# Patient Record
Sex: FEMALE | Race: WHITE | Employment: OTHER | URBAN - METROPOLITAN AREA
[De-identification: names, ages, dates, MRNs, and addresses within clinical notes are randomized per-mention and may not be internally consistent; named-entity substitution may affect disease eponyms.]

---

## 2017-11-13 ENCOUNTER — TRANSFERRED RECORDS (OUTPATIENT)
Dept: HEALTH INFORMATION MANAGEMENT | Facility: CLINIC | Age: 57
End: 2017-11-13

## 2018-01-24 ENCOUNTER — TRANSFERRED RECORDS (OUTPATIENT)
Dept: HEALTH INFORMATION MANAGEMENT | Facility: CLINIC | Age: 58
End: 2018-01-24

## 2018-09-13 ENCOUNTER — MEDICAL CORRESPONDENCE (OUTPATIENT)
Dept: HEALTH INFORMATION MANAGEMENT | Facility: CLINIC | Age: 58
End: 2018-09-13

## 2018-10-04 LAB
CREAT SERPL-MCNC: 1.5 MG/DL (ref 0.6–1)
GFR SERPL CREATININE-BSD FRML MDRD: 36 ML/MIN/1.73M2
GLUCOSE SERPL-MCNC: 72 MG/DL (ref 70–110)
POTASSIUM SERPL-SCNC: 4.5 MMOL/L (ref 3.5–5.1)

## 2018-11-23 LAB
CREAT SERPL-MCNC: 1 MG/DL (ref 0.55–1.02)
GFR SERPL CREATININE-BSD FRML MDRD: 57 ML/MIN/1.73M2
TSH SERPL-ACNC: 3.94 UIU/ML (ref 0.36–3.74)

## 2018-11-26 ENCOUNTER — TRANSFERRED RECORDS (OUTPATIENT)
Dept: HEALTH INFORMATION MANAGEMENT | Facility: CLINIC | Age: 58
End: 2018-11-26

## 2019-01-04 ENCOUNTER — TRANSFERRED RECORDS (OUTPATIENT)
Dept: HEALTH INFORMATION MANAGEMENT | Facility: CLINIC | Age: 59
End: 2019-01-04

## 2019-01-09 ENCOUNTER — OFFICE VISIT (OUTPATIENT)
Dept: PODIATRY | Facility: CLINIC | Age: 59
End: 2019-01-09
Payer: COMMERCIAL

## 2019-01-09 ENCOUNTER — ANCILLARY PROCEDURE (OUTPATIENT)
Dept: GENERAL RADIOLOGY | Facility: CLINIC | Age: 59
End: 2019-01-09
Payer: COMMERCIAL

## 2019-01-09 VITALS
OXYGEN SATURATION: 94 % | DIASTOLIC BLOOD PRESSURE: 71 MMHG | BODY MASS INDEX: 27.47 KG/M2 | HEIGHT: 67 IN | SYSTOLIC BLOOD PRESSURE: 137 MMHG | WEIGHT: 175 LBS | HEART RATE: 98 BPM

## 2019-01-09 DIAGNOSIS — M79.672 BILATERAL FOOT PAIN: ICD-10-CM

## 2019-01-09 DIAGNOSIS — M79.672 BILATERAL FOOT PAIN: Primary | ICD-10-CM

## 2019-01-09 DIAGNOSIS — M79.671 BILATERAL FOOT PAIN: ICD-10-CM

## 2019-01-09 DIAGNOSIS — E11.42 TYPE 2 DIABETES MELLITUS WITH DIABETIC POLYNEUROPATHY, UNSPECIFIED WHETHER LONG TERM INSULIN USE (H): ICD-10-CM

## 2019-01-09 DIAGNOSIS — E11.621 DIABETIC ULCER OF RIGHT MIDFOOT ASSOCIATED WITH TYPE 2 DIABETES MELLITUS, WITH NECROSIS OF BONE (H): ICD-10-CM

## 2019-01-09 DIAGNOSIS — M79.671 BILATERAL FOOT PAIN: Primary | ICD-10-CM

## 2019-01-09 DIAGNOSIS — L97.414 DIABETIC ULCER OF RIGHT MIDFOOT ASSOCIATED WITH TYPE 2 DIABETES MELLITUS, WITH NECROSIS OF BONE (H): ICD-10-CM

## 2019-01-09 LAB — RADIOLOGIST FLAGS: NORMAL

## 2019-01-09 PROCEDURE — 11042 DBRDMT SUBQ TIS 1ST 20SQCM/<: CPT | Performed by: PODIATRIST

## 2019-01-09 PROCEDURE — 73630 X-RAY EXAM OF FOOT: CPT | Mod: LT | Performed by: RADIOLOGY

## 2019-01-09 PROCEDURE — 99203 OFFICE O/P NEW LOW 30 MIN: CPT | Mod: 25 | Performed by: PODIATRIST

## 2019-01-09 RX ORDER — GLIMEPIRIDE 2 MG/1
TABLET ORAL
Refills: 0 | Status: ON HOLD | COMMUNITY
Start: 2018-12-10 | End: 2020-02-06

## 2019-01-09 RX ORDER — SIMVASTATIN 40 MG
TABLET ORAL
Refills: 0 | Status: ON HOLD | COMMUNITY
Start: 2018-06-11 | End: 2020-07-21

## 2019-01-09 RX ORDER — NICOTINE 21 MG/24HR
PATCH, TRANSDERMAL 24 HOURS TRANSDERMAL
Refills: 0 | COMMUNITY
Start: 2018-12-02 | End: 2019-08-28

## 2019-01-09 RX ORDER — GABAPENTIN 600 MG/1
TABLET ORAL
Refills: 10 | Status: ON HOLD | COMMUNITY
Start: 2019-01-05 | End: 2020-07-21

## 2019-01-09 ASSESSMENT — MIFFLIN-ST. JEOR: SCORE: 1388.48

## 2019-01-09 ASSESSMENT — PAIN SCALES - GENERAL: PAINLEVEL: WORST PAIN (10)

## 2019-01-09 NOTE — NURSING NOTE
"Tiffani Tan's chief complaint for this visit includes:  Chief Complaint   Patient presents with     Consult     bilateral feet     PCP: [facility]    Referring Provider:  Referred Self, MD  No address on file    /71 (BP Location: Right arm, Patient Position: Sitting, Cuff Size: Adult Large)   Pulse 98   Ht 1.689 m (5' 6.5\")   Wt 79.4 kg (175 lb)   SpO2 94%   BMI 27.82 kg/m    Worst Pain (10)     Do you need any medication refills at today's visit? no    "

## 2019-01-09 NOTE — LETTER
1/9/2019         RE: Tiffani Tan  [address] [facility] [address]        Dear Colleague,    Thank you for referring your patient, Tiffani Tan, to the [facility]. Please see a copy of my visit note below.    Date of Service: 1/9/2019    Chief Complaint:   Chief Complaint   Patient presents with     Consult     bilateral feet        HPI: Tiffani is a 59 year old female who presents today for further evaluation of right foot ulceration and left Charcot foot.  She has a significant history with Dr. Alexy Guzmán at [facility]. She relates that she has had surgery on the left foot. For the last few weeks, it appears as though she has been having an Unna's boot placed on the right foot. There are hundreds of pages of previous notes that were faxed to the office just befire this visit and they have not all been reviewed at the time of the visit.. She apparently had a GraftJacket applied to a left plantar 1st met ulceration in October, as well as a 1st met head resection Previously had a right 1st met head plantar condylectomy and Graft Jacket in the beginning of 2018. She also had a 2nd MTPJ implant at that time. She was going to have a surgical procedure on the right foot in December, however that was put on hold as she was having significant pain in the left foot. She has had numerous XRs and MRIs. She relates that she was recently hospitalized in December with cellulitis on the right foot. She has had the ulcer on the right foot for a few months. She does relate pain to the foot, which she was being prescribed narcotics. She relates that she is currently changing the dressing on the right foot daily with betadine. She is wearing a diabetic shoe on the right and a CAM boot on the left. 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 However, she does relate that she is walking on both of her feet.       Review of Systems: No  n/v/d/f/c/ns/sob/cp    PMH: DM2 with neuropathy.  Chronic pain.     PSxH: BL foot surgery.     Allergies: Patient has no allergy information on record.    SH:   Social History     Socioeconomic History     Marital status: Single     Spouse name: Not on file     Number of children: Not on file     Years of education: Not on file     Highest education level: Not on file   Social Needs     Financial resource strain: Not on file     Food insecurity - worry: Not on file     Food insecurity - inability: Not on file     Transportation needs - medical: Not on file     Transportation needs - non-medical: Not on file   Occupational History     Not on file   Tobacco Use     Smoking status: Not on file   Substance and Sexual Activity     Alcohol use: Not on file     Drug use: Not on file     Sexual activity: Not on file   Other Topics Concern     Not on file   Social History Narrative     Not on file       FH: No family history on file.    Objective:    PT pulses are 2/4 bilaterally. Difficult to palpate DP pulses. CRT is 4 seconds. Absent pedal hair.   Gross sensation is absent bilaterally. Protective sensation is absent as demonstrated with the [place] touch test.   Equinus is severe bilaterally. Charcot foot noted to the left foot with a rockerbottom deformity and DTI on the plantar aspect of the foot. No Charcot foot type on the right foot. Hammered digits BL.   Nails normal bilaterally.            A dibetic wound is noted at right  plantar 1st met head measuring 3.8cm x 4.4cm x 3cm.    Inman Classification: 3    Wound base: Pink/hypergranulation    Edges: macerated and hyperkeratotic    Drainage: moderate/serous    Odor: no    Undermining: As noted on the picture, the wound probes at the center. There is then undermining through the hypergranular tissue distally to the wound edge and then follows the edge up the medial side of the ulcer.    Bone Exposure: Yes: 1st met head    Clinical Signs of Infection: Not  acutely.    After obtaining patient consent, the wound was irrigated with copious amounts of saline. A scalpel was then used to debride the wound into subcutaneous tissue. The wound edges were debrided back to healthy, bleeding tissue. The wound base exhibited healthy bleeding. Given the patient's lack of sensation, no anesthesia was necessary for the procedure.  The plantar left foot has a DTI at the apex of the rockerbottom deformity. No open lesions noted to this foot.       bilateral foot xrays indicated in 3 weightbearing views.    There is likely some bone destruction of the right medial 1st met head. On the lateral view, she does have a plantarflexed first ray.   Bone destruction noted to the left foot that appears consistent with Charcot.       Assessment: Type 2 diabetic with h/o chronic ulcerations of BL feet with open ulcer of the right foot. I had a long and miya discussion with her about the nature, treatment, and prognosis of this ulcer. Because of the size and the depth, I do not see another way to heal this ulceration than complete NWB and an eventual total contact cast. I related to her that she is in a much higher risk category of having amputation, including a BKA. I discussed with her that she will need to stay completely NWB on this foot in order to have any chance of healing this ulceration. She does not appear acutely infected, but I would request that she sees ID in order to get their take on treatment with 6 weeks of abx. We will also repeat XRs in 2 weeks to see if there is any evolution noted in the right 1st met head. She did ask for pain medication and I deferred to the physician already writing these for her. I did refer her to pain management. I would also like to get a baseline PANCHITO for her. May need a vascular referral. I will also order home nursing, as the wound needs extensive packing.     Plan:  - Pt seen and evaluated.  - Xrays taken and discussed with her.   - I spent an hour  counseling her on the prognosis of the ulcer, as documented.   - Total NWB to BL feet.  - Referrals to ID and pain management.   - ABIs ordered.  - See again in 1 week.  - DH2 shoe dispensed to the right foot.  - Home nursing ordered for daily packing,              Again, thank you for allowing me to participate in the care of your patient.        Sincerely,        Daniel Giron DPM

## 2019-01-09 NOTE — PROGRESS NOTES
Date of Service: 1/9/2019    Chief Complaint:   Chief Complaint   Patient presents with     Consult     bilateral feet        HPI: Tiffani is a 59 year old female who presents today for further evaluation of right foot ulceration and left Charcot foot.  She has a significant history with Dr. Alexy Guzmán at [facility]. She relates that she has had surgery on the left foot. For the last few weeks, it appears as though she has been having an Unna's boot placed on the right foot. There are hundreds of pages of previous notes that were faxed to the office just befire this visit and they have not all been reviewed at the time of the visit.. She apparently had a GraftJacket applied to a left plantar 1st met ulceration in October, as well as a 1st met head resection Previously had a right 1st met head plantar condylectomy and Graft Jacket in the beginning of 2018. She also had a 2nd MTPJ implant at that time. She was going to have a surgical procedure on the right foot in December, however that was put on hold as she was having significant pain in the left foot. She has had numerous XRs and MRIs. She relates that she was recently hospitalized in December with cellulitis on the right foot. She has had the ulcer on the right foot for a few months. She does relate pain to the foot, which she was being prescribed narcotics. She relates that she is currently changing the dressing on the right foot daily with betadine. She is wearing a diabetic shoe on the right and a CAM boot on the left. 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 However, she does relate that she is walking on both of her feet.       Review of Systems: No n/v/d/f/c/ns/sob/cp    PMH: DM2 with neuropathy.  Chronic pain.     PSxH: BL foot surgery.     Allergies: Patient has no allergy information on record.    SH:   Social History     Socioeconomic History     Marital status: Single     Spouse name:  Not on file     Number of children: Not on file     Years of education: Not on file     Highest education level: Not on file   Social Needs     Financial resource strain: Not on file     Food insecurity - worry: Not on file     Food insecurity - inability: Not on file     Transportation needs - medical: Not on file     Transportation needs - non-medical: Not on file   Occupational History     Not on file   Tobacco Use     Smoking status: Not on file   Substance and Sexual Activity     Alcohol use: Not on file     Drug use: Not on file     Sexual activity: Not on file   Other Topics Concern     Not on file   Social History Narrative     Not on file       FH: No family history on file.    Objective:    PT pulses are 2/4 bilaterally. Difficult to palpate DP pulses. CRT is 4 seconds. Absent pedal hair.   Gross sensation is absent bilaterally. Protective sensation is absent as demonstrated with the [place] touch test.   Equinus is severe bilaterally. Charcot foot noted to the left foot with a rockerbottom deformity and DTI on the plantar aspect of the foot. No Charcot foot type on the right foot. Hammered digits BL.   Nails normal bilaterally.            A dibetic wound is noted at right  plantar 1st met head measuring 3.8cm x 4.4cm x 3cm.    Inman Classification: 3    Wound base: Pink/hypergranulation    Edges: macerated and hyperkeratotic    Drainage: moderate/serous    Odor: no    Undermining: As noted on the picture, the wound probes at the center. There is then undermining through the hypergranular tissue distally to the wound edge and then follows the edge up the medial side of the ulcer.    Bone Exposure: Yes: 1st met head    Clinical Signs of Infection: Not acutely.    After obtaining patient consent, the wound was irrigated with copious amounts of saline. A scalpel was then used to debride the wound into subcutaneous tissue. The wound edges were debrided back to healthy, bleeding tissue. The wound base  exhibited healthy bleeding. Given the patient's lack of sensation, no anesthesia was necessary for the procedure.  The plantar left foot has a DTI at the apex of the rockerbottom deformity. No open lesions noted to this foot.       bilateral foot xrays indicated in 3 weightbearing views.    There is likely some bone destruction of the right medial 1st met head. On the lateral view, she does have a plantarflexed first ray.   Bone destruction noted to the left foot that appears consistent with Charcot.       Assessment: Type 2 diabetic with h/o chronic ulcerations of BL feet with open ulcer of the right foot. I had a long and miya discussion with her about the nature, treatment, and prognosis of this ulcer. Because of the size and the depth, I do not see another way to heal this ulceration than complete NWB and an eventual total contact cast. I related to her that she is in a much higher risk category of having amputation, including a BKA. I discussed with her that she will need to stay completely NWB on this foot in order to have any chance of healing this ulceration. She does not appear acutely infected, but I would request that she sees ID in order to get their take on treatment with 6 weeks of abx. We will also repeat XRs in 2 weeks to see if there is any evolution noted in the right 1st met head. She did ask for pain medication and I deferred to the physician already writing these for her. I did refer her to pain management. I would also like to get a baseline PANCHITO for her. May need a vascular referral. I will also order home nursing, as the wound needs extensive packing.     Plan:  - Pt seen and evaluated.  - Xrays taken and discussed with her.   - I spent an hour counseling her on the prognosis of the ulcer, as documented.   - Total NWB to BL feet.  - Referrals to ID and pain management.   - ABIs ordered.  - See again in 1 week.  - DH2 shoe dispensed to the right foot.  - Home nursing ordered for daily packing,

## 2019-01-09 NOTE — PATIENT INSTRUCTIONS
Thanks for coming today.  [facility]  [address] [place] [address]    To schedule future appointments in Ortho Clinic, you may call [phone].    To schedule ordered imaging by your provider:   Call Central Imaging [phone]    To schedule an injection ordered by your provider:  Call Central Imaging Injection scheduling line: [phone]  [facility] available online at:  [email or web address]    Please call if any further questions or concerns ([phone]).  Clinic hours 8 am to 5 pm.    Return to clinic (call) if symptoms worsen or fail to improve.

## 2019-01-10 ENCOUNTER — TELEPHONE (OUTPATIENT)
Dept: PODIATRY | Facility: CLINIC | Age: 59
End: 2019-01-10

## 2019-01-10 NOTE — TELEPHONE ENCOUNTER
[facility] Call Center    Phone Message    May a detailed message be left on voicemail: yes    Reason for Call: Order(s): Home Care Orders: Other: Candelaria with [facility] states that Dr. Giron referred patient to have home care for wound care but there are no specifications as to how often, what to clean wound with, what to pack it with, how to be wrapped.  She can not find this information in Epic.  She is requesting a call back to clarify. Please advise.    Action Taken: Message routed to:  Adult Clinics: Podiatry p [id number]

## 2019-01-10 NOTE — TELEPHONE ENCOUNTER
Health Call Center    Phone Message    May a detailed message be left on voicemail: yes    Reason for Call: Other: Candelaria from home care states she is sorry but they can't see this pt for the wound care because of insurance purposes. Insurance requires home bound status so they cannot see her at her work place. Candelaria states she would appreciate the clinic reaching out to the pt on instructions of how to care for her wound. Pt's number is [phone].   Please call Candelaria [phone] with any questions.     Action Taken: Message routed to:  Adult Clinics: Podiatry p [phone]

## 2019-01-11 NOTE — TELEPHONE ENCOUNTER
Candelaria returned call, she talked with patient again who confirmed she moved to her place of employment and they then can start wound care for patient. They will start with daily dressing changes to help teach the  how to do wound care, after the appointment on the 16th they will reevaluate.    Haily Heath RN

## 2019-01-11 NOTE — TELEPHONE ENCOUNTER
Clean the areas.   Soak 1/4 inch ribbon of gauze in betadine and pack into the ulcer.   Cover with a dry dressing.     I don't think that she can do this by herself. We will see what it looks like at her next visit.     Called and talked with patient, she relays that she is living at her work and is on one level living because it is to hard to get around.  Let her know that I am not sure what her insurance requires for a homebound standpoint but I would talk with Candelaria again and see what insurance says.  Called and talked with Candelaria, explained what patient told me and she will look into more and call us back.    Relayed to patient about wound care and what supplies she needs. Currently she is getting supplies from [employer].  She will have her   her supplies and they will try to start dressing changes.  Will call patient back with update after talking with Candelaria again.  Haily Heath RN

## 2019-01-13 ENCOUNTER — DOCUMENTATION ONLY (OUTPATIENT)
Dept: CARE COORDINATION | Facility: CLINIC | Age: 59
End: 2019-01-13

## 2019-01-13 NOTE — PROGRESS NOTES
[facility] now requests orders and shares plan of care/discharge summaries for some patients through [facility].  Please REPLY TO THIS MESSAGE OR ROUTE BACK TO THE AUTHOR in order to give authorization for orders when needed.  This is considered a verbal order, you will still receive a faxed copy of orders for signature.  Thank you for your assistance in improving collaboration for our patients.    ORDER  SN 4W1, 3W2, 2W1, 6 PRN    MD SUMMARY/PLAN OF CARE    SUMMARY TO MD  SITUATION   Patient is a 59 year old female who is currently living at her place of business per her report due to being wheelchair bound and unable to get in and out of her house safely due to the stairs.  She stated her  goes between the business and the house and he has been doing the shopping and assisting her with her ADL/IADLs that she is not able to complete.  She has a handicapped accessible bathroom, bed, kitchen/lunchroom area and a shower with a seat in the business.  She demonstrated the ability to stand and pivot and take a few steps to transfer to the toilet and bed safely.  She has a wound to the bottom of her right foot that measured 3.5x4x0.8cm with tunneling at 11oclock that was 1.4cm deep.  Wound care had not been performed since her last MD visit as her  was unsure what to do.  There was no packing in the wound during assessment.  The wound was repacked and the  was instructed on what to do if orders are not able to be received in time on Monday.  Her left foot was assessed and the heel was white and boggy as well as the bottom of the foot due to moisture and the patient was instructed to keep the foot uncovered as much as possible to allow the moisture to dissipate.  The patient has a camwalker for the left foot and a diabetic shoe on the right.  The patient states she is experiencing increased depression due to the wound on her foot and her inability to be mobile.  Her PHQ2 score is 3.  A  sample of Calming aromatherapy was provided to the patient and she was instructed it can be purchased at  Pharmacy if it helps.  Patient stated she is not eating well and she was instructed that she needs proper nutrition for healing and her diabetes.  She was instructed to increase the amount of protein she eats daily to assist with wound healing. Her BS today was 136 after eating some fruit this morning.  Her vitals /64, P 85, T 97.2, RR 18, SPO2 95 percent on RA, lungs CTA.  She has severe neuropathy in her hands and feet that also causes her to have  problems with her hands.  She states the neuropathy has been present before she became a diabetic.  She states her pain is always over 10, even with the medication.  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  She was instructed to keep her legs elevated above her heart as much as possible to assist in removing the fluid from her legs.  Patient stated she has 2 alcoholic drinks per day and she was instructed that drinking alcohol can delay the healing process.  The following interaction was found during medication reconciliation glimepiride and losartan HCTZ, level 3.    BACKGROUND Wound care to R foot, DM2, Cellulitis both feet, Osteomylitis, Neuropathy, HTN, Anemia, Bilateral foot pain  ANALYSIS Patient will benefit from SN for wound care and assessment of health, patient declined PT, OT and HHA at this time.  RECOMMENDATION SN 4W1, 3W2, 2W1, 6 PRN

## 2019-01-14 NOTE — PROGRESS NOTES
[facility] is requesting orders be expedited.  They would like to have Home Care visit the patient today. Requesting a verbal so patient can be seen for wound care starting to day.   Please advise.

## 2019-01-16 ENCOUNTER — OFFICE VISIT (OUTPATIENT)
Dept: PODIATRY | Facility: CLINIC | Age: 59
End: 2019-01-16
Payer: COMMERCIAL

## 2019-01-16 VITALS
TEMPERATURE: 98.7 F | RESPIRATION RATE: 18 BRPM | DIASTOLIC BLOOD PRESSURE: 76 MMHG | SYSTOLIC BLOOD PRESSURE: 126 MMHG | OXYGEN SATURATION: 95 % | HEART RATE: 100 BPM

## 2019-01-16 DIAGNOSIS — L97.416 DIABETIC ULCER OF RIGHT MIDFOOT ASSOCIATED WITH TYPE 2 DIABETES MELLITUS, WITH BONE INVOLVEMENT WITHOUT EVIDENCE OF NECROSIS (H): ICD-10-CM

## 2019-01-16 DIAGNOSIS — E11.42 TYPE 2 DIABETES MELLITUS WITH DIABETIC POLYNEUROPATHY, WITHOUT LONG-TERM CURRENT USE OF INSULIN (H): ICD-10-CM

## 2019-01-16 DIAGNOSIS — E11.621 DIABETIC ULCER OF RIGHT MIDFOOT ASSOCIATED WITH TYPE 2 DIABETES MELLITUS, WITH BONE INVOLVEMENT WITHOUT EVIDENCE OF NECROSIS (H): ICD-10-CM

## 2019-01-16 DIAGNOSIS — M14.672 CHARCOT'S JOINT OF LEFT FOOT: ICD-10-CM

## 2019-01-16 PROCEDURE — 99213 OFFICE O/P EST LOW 20 MIN: CPT | Performed by: PODIATRIST

## 2019-01-16 ASSESSMENT — PAIN SCALES - GENERAL: PAINLEVEL: WORST PAIN (10)

## 2019-01-16 NOTE — PROGRESS NOTES
Chief Complaint:   Chief Complaint   Patient presents with     RECHECK     Bilat neuropathy, chronic ulcers, pain, appt per pt. Records at [facility]         Not on File      Subjective: Tiffani is a 59 year old female who presents to the clinic today for a follow up of right foot ulcer and left Charcot foot. She relates that home nursing is coming to change the dressings, however she thinks that her  can change the dressings. 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 She relates chronic pain to the foot. No n/v/d/f/c/ns/sob/cp.    Objective  98.7 100 18 126/76 Data Unavailable 0 lbs 0 oz        A dibetic wound is noted at right  plantar 1st met head measuring 3.8cm x 4.4cm x 3cm.     Inman Classification: 3     Wound base: Pink/hypergranulation     Edges: macerated and hyperkeratotic     Drainage: moderate/serous     Odor: no     Undermining: As noted on the picture, the wound probes at the center. There is then undermining through the hypergranular tissue distally to the wound edge and then follows the edge up the medial side of the ulcer.     Bone Exposure: Yes: 1st met head     Clinical Signs of Infection: Not acutely.     After obtaining patient consent, the wound was irrigated with copious amounts of saline. No sharp debridement performed today. However, silver nitrate was applied to the hypergranular base.   The plantar left foot has a DTI at the apex of the rockerbottom deformity. No open lesions noted to this foot.                Assessment: Type 2 diabetic with h/o chronic ulcerations of BL feet with open ulcer of the right foot. After last week's visit, she did not schedule the PANCHITO. She will need to bring us these results. Has an ID appt, but would like to see if she can get into her previous ID practice. She does continue to weightbear on the foot some. Continues with packing.      Plan:  - Pt seen and evaluated.  - Total NWB to BL feet.  - Cont home  nursing with packing.   - Con boot on the left and DH2 shoe on the right.   - See again in 2 weeks.

## 2019-01-16 NOTE — NURSING NOTE
Tiffani Tan's chief complaint for this visit includes:  Chief Complaint   Patient presents with     RECHECK     Bilat neuropathy, chronic ulcers, pain, appt per pt. Records at [facility]      PCP: [facility]    Referring Provider:  Referred Self, MD  No address on file    /76 (BP Location: Right arm, Patient Position: Sitting, Cuff Size: Adult Large)   Pulse 100   Temp 98.7  F (37.1  C)   Resp 18   SpO2 95%   Worst Pain (10)     Do you need any medication refills at today's visit? No    Keon Worley CMA (AAMA)

## 2019-01-16 NOTE — LETTER
1/16/2019         RE: Tiffani Tan  [address]   [facility] [address]        Dear Colleague,    Thank you for referring your patient, Tiffani Tan, to the [facility]. Please see a copy of my visit note below.    Chief Complaint:   Chief Complaint   Patient presents with     RECHECK     Bilat neuropathy, chronic ulcers, pain, appt per pt. Records at [facility]         Not on File      Subjective: Tiffani is a 59 year old female who presents to the clinic today for a follow up of right foot ulcer and left Charcot foot. She relates that home nursing is coming to change the dressings, however she thinks that her  can change the dressings. 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 She relates chronic pain to the foot. No n/v/d/f/c/ns/sob/cp.    Objective  98.7 100 18 126/76 Data Unavailable 0 lbs 0 oz        A dibetic wound is noted at right  plantar 1st met head measuring 3.8cm x 4.4cm x 3cm.     Inman Classification: 3     Wound base: Pink/hypergranulation     Edges: macerated and hyperkeratotic     Drainage: moderate/serous     Odor: no     Undermining: As noted on the picture, the wound probes at the center. There is then undermining through the hypergranular tissue distally to the wound edge and then follows the edge up the medial side of the ulcer.     Bone Exposure: Yes: 1st met head     Clinical Signs of Infection: Not acutely.     After obtaining patient consent, the wound was irrigated with copious amounts of saline. No sharp debridement performed today. However, silver nitrate was applied to the hypergranular base.   The plantar left foot has a DTI at the apex of the rockerbottom deformity. No open lesions noted to this foot.                Assessment: Type 2 diabetic with h/o chronic ulcerations of BL feet with open ulcer of the right foot. After last week's visit, she did not schedule the PANCHITO. She will need to bring us these results. Has an ID  appt, but would like to see if she can get into her previous ID practice. She does continue to weightbear on the foot some. Continues with packing.      Plan:  - Pt seen and evaluated.  - Total NWB to BL feet.  - Cont home nursing with packing.   - Con boot on the left and DH2 shoe on the right.   - See again in 2 weeks.       Again, thank you for allowing me to participate in the care of your patient.        Sincerely,        Daniel Giron DPM

## 2019-01-16 NOTE — PATIENT INSTRUCTIONS
Thanks for coming today.  Ortho/Sports Medicine Clinic  [address] [place] [address]    To schedule future appointments in Ortho Clinic, you may call [phone].    To schedule ordered imaging by your provider:   Call Central Imaging [phone]    To schedule an injection ordered by your provider:  Call Central Imaging Injection scheduling line: [phone]  [employer] available online at:  [email or web address]    Please call if any further questions or concerns ([phone]).  Clinic hours 8 am to 5 pm.    Return to clinic (call) if symptoms worsen or fail to improve.

## 2019-01-28 ENCOUNTER — DOCUMENTATION ONLY (OUTPATIENT)
Dept: CARE COORDINATION | Facility: CLINIC | Age: 59
End: 2019-01-28

## 2019-02-04 ENCOUNTER — TRANSFERRED RECORDS (OUTPATIENT)
Dept: HEALTH INFORMATION MANAGEMENT | Facility: CLINIC | Age: 59
End: 2019-02-04

## 2019-02-06 ENCOUNTER — OFFICE VISIT (OUTPATIENT)
Dept: PODIATRY | Facility: CLINIC | Age: 59
End: 2019-02-06
Payer: COMMERCIAL

## 2019-02-06 VITALS — HEART RATE: 84 BPM | OXYGEN SATURATION: 92 % | DIASTOLIC BLOOD PRESSURE: 62 MMHG | SYSTOLIC BLOOD PRESSURE: 98 MMHG

## 2019-02-06 DIAGNOSIS — L97.416 DIABETIC ULCER OF RIGHT MIDFOOT ASSOCIATED WITH TYPE 2 DIABETES MELLITUS, WITH BONE INVOLVEMENT WITHOUT EVIDENCE OF NECROSIS (H): Primary | ICD-10-CM

## 2019-02-06 DIAGNOSIS — E11.42 TYPE 2 DIABETES MELLITUS WITH DIABETIC POLYNEUROPATHY, WITHOUT LONG-TERM CURRENT USE OF INSULIN (H): ICD-10-CM

## 2019-02-06 DIAGNOSIS — M14.672 CHARCOT'S JOINT OF LEFT FOOT: ICD-10-CM

## 2019-02-06 DIAGNOSIS — E11.621 DIABETIC ULCER OF RIGHT MIDFOOT ASSOCIATED WITH TYPE 2 DIABETES MELLITUS, WITH BONE INVOLVEMENT WITHOUT EVIDENCE OF NECROSIS (H): Primary | ICD-10-CM

## 2019-02-06 PROCEDURE — 29445 APPL RIGID TOT CNTC LEG CAST: CPT | Mod: RT | Performed by: PODIATRIST

## 2019-02-06 RX ORDER — AMOXICILLIN AND CLAVULANATE POTASSIUM 500; 125 MG/1; MG/1
1 TABLET, FILM COATED ORAL
COMMUNITY
Start: 2019-01-29 | End: 2019-07-17

## 2019-02-06 RX ORDER — AMOXICILLIN 500 MG/1
CAPSULE ORAL
Refills: 0 | COMMUNITY
Start: 2019-01-29 | End: 2019-07-17

## 2019-02-06 ASSESSMENT — PAIN SCALES - GENERAL: PAINLEVEL: MILD PAIN (3)

## 2019-02-06 NOTE — PATIENT INSTRUCTIONS
Thanks for coming today.  Ortho/Sports Medicine Clinic  [address] [place] [address]    To schedule future appointments in Ortho Clinic, you may call [phone].    To schedule ordered imaging by your provider:   Call Central Imaging [phone]    To schedule an injection ordered by your provider:  Call Central Imaging Injection scheduling line: [phone]  [employer] available online at:  [email or web address]    Please call if any further questions or concerns ([phone]).  Clinic hours 8 am to 5 pm.    Return to clinic (call) if symptoms worsen or fail to improve.

## 2019-02-06 NOTE — PROGRESS NOTES
Chief Complaint   Patient presents with     Wound Check     right foot ulcer          Not on File      Subjective: Tiffani is a 59 year old female who presents to the clinic today for a follow up of right foot ulceration. Relates that home nursing is still coming to change the dressings and packing daily. Has been bearing weight on her feet. She is seeing ID, Dr. Carr in the "[employer] system. She is currently on Augmentin and Amox. She has had the PANCHITO performed at [facility] on Monday, however we do not have this report.     Objective     A dibetic wound is noted at right  plantar 1st met head measuring 3cm x 3.3cm x 1.5cm.     Inman Classification: 2     Wound base: Pink/hypergranulation     Edges: macerated and hyperkeratotic     Drainage: moderate/serous     Odor: no     Undermining: As noted on the picture, the wound probes at the center. There is then undermining through the hypergranular tissue distally to the wound edge and then follows the edge up the medial side of the ulcer.     Bone Exposure: No. All bone is covered.     Clinical Signs of Infection: Not acutely.       After obtaining patient consent, the wound was irrigated with copious amounts of saline. A scalpel was then used to debride the wound into subcutaneous tissue. The wound edges were debrided back to healthy, bleeding tissue. The wound base exhibited healthy bleeding. Given the patient's lack of sensation, no anesthesia was necessary for the procedure.     Assessment: Type 2 diabetic with h/o chronic ulcerations of BL feet with open ulcer of the right foot. She did have the PANCHITO performed. She will have the lab fax over the report. Currently on long term abx per ID. She does continue to weightbear on the foot some. Continues with packing.      Plan:  - Pt seen and evaluated.  - Total NWB to BL feet.  - I have recommended that she start total contact casting on the right leg today. She agrees to this. Wound was debrided and a TCC-EZ cast  was applied to the right leg. This should be left C/D/I for the next week.   - See again in 1 week.

## 2019-02-06 NOTE — NURSING NOTE
Tiffani Tan's chief complaint for this visit includes:  Chief Complaint   Patient presents with     Wound Check     right foot ulcer     PCP: [facility]    Referring Provider:  No referring provider defined for this encounter.    BP 98/62 (BP Location: Left arm, Patient Position: Sitting, Cuff Size: Adult Large)   Pulse 84   SpO2 92%   Mild Pain (3)     Do you need any medication refills at today's visit? NO

## 2019-02-06 NOTE — LETTER
2/6/2019         RE: Tiffani Tan  [address] [facility] [address]        Dear Colleague,    Thank you for referring your patient, Tiffani Tan, to the [facility]. Please see a copy of my visit note below.    Chief Complaint   Patient presents with     Wound Check     right foot ulcer          Not on File      Subjective: Tiffani is a 59 year old female who presents to the clinic today for a follow up of right foot ulceration. Relates that home nursing is still coming to change the dressings and packing daily. Has been bearing weight on her feet. She is seeing ID, Dr. Carr in the [employer] system. She is currently on Augmentin and Amox. She has had the PANCHITO performed at [facility] on Monday, however we do not have this report.     Objective     A dibetic wound is noted at right  plantar 1st met head measuring 3cm x 3.3cm x 1.5cm.     Inman Classification: 2     Wound base: Pink/hypergranulation     Edges: macerated and hyperkeratotic     Drainage: moderate/serous     Odor: no     Undermining: As noted on the picture, the wound probes at the center. There is then undermining through the hypergranular tissue distally to the wound edge and then follows the edge up the medial side of the ulcer.     Bone Exposure: No. All bone is covered.     Clinical Signs of Infection: Not acutely.       After obtaining patient consent, the wound was irrigated with copious amounts of saline. A scalpel was then used to debride the wound into subcutaneous tissue. The wound edges were debrided back to healthy, bleeding tissue. The wound base exhibited healthy bleeding. Given the patient's lack of sensation, no anesthesia was necessary for the procedure.     Assessment: Type 2 diabetic with h/o chronic ulcerations of BL feet with open ulcer of the right foot.  She did have the PANCHITO performed. She will have the lab fax over the report. Currently on long term abx per ID. She does continue to weightbear on the foot  some. Continues with packing.      Plan:  - Pt seen and evaluated.  - Total NWB to BL feet.  - I have recommended that she start total contact casting on the right leg today. She agrees to this. Wound was debrided and a TCC-EZ cast was applied to the right leg. This should be left C/D/I for the next week.   - See again in 1 week.         Again, thank you for allowing me to participate in the care of your patient.        Sincerely,        Daniel Girno DPM

## 2019-02-11 ENCOUNTER — TELEPHONE (OUTPATIENT)
Dept: PODIATRY | Facility: CLINIC | Age: 59
End: 2019-02-11

## 2019-02-11 NOTE — TELEPHONE ENCOUNTER
Called Selam back and relayed wound care orders from LOV note. Agreeable with plan. We will call and update after appt this coming Wednesday.     Vincenzo Farah RN

## 2019-02-11 NOTE — TELEPHONE ENCOUNTER
[facility] Call Center    Phone Message    May a detailed message be left on voicemail: yes    Reason for Call: Order(s): Home Care Orders: Skilled Nursing: extended 1 time a weeks for 5 weeks , Other: clarification on wound orders, Patient Provider: Dr. Giron and Date needed: Asap ok to leave v/msg    Action Taken: Message routed to:  Adult Clinics: Podiatry p [phone]

## 2019-02-12 NOTE — PROGRESS NOTES
Chief Complaint   Patient presents with     RECHECK     right foot TCC-EZ cast removal and wound check          Not on File      Subjective: Tiffani is a 59 year old female who presents to the clinic today for a follow up of right diabetic ulcer. She tolerated the cast well with no complications. Relates that she is trying to stay off of her foot. She saw ID yesterday and will cont with the Augmentin.    Objective        A diabetic wound is noted at right  plantar 1st met head measuring 2.9cm x 3.6cm x 0.5cm.     Inman Classification: 2     Wound base: Pink/hypergranulation     Edges: macerated and hyperkeratotic     Drainage: moderate/serous     Odor: no     Undermining: Central wound probing is filled in today.. There is then undermining through the hypergranular tissue distally to the wound edge and then follows the edge up the medial side of the ulcer. This is improving     Bone Exposure: No. All bone is covered.     Clinical Signs of Infection: Not acutely.        After obtaining patient consent, the wound was irrigated with copious amounts of saline. A scalpel was then used to debride the wound into subcutaneous tissue. The wound edges were debrided back to healthy, bleeding tissue. The wound base exhibited healthy bleeding. Given the patient's lack of sensation, no anesthesia was necessary for the procedure.     Assessment: Type 2 diabetic with h/o chronic ulcerations of BL feet with open ulcer of the right foot. This has actually improved today with a week of total contact casting. She did have the PANCHITO performed. She will have the lab fax over the report. Currently on long term abx per ID. She does continue to weightbear on the foot some. Continues with packing.      Plan:  - Pt seen and evaluated.  - Total NWB to BL feet.  - I have recommended that she cont total contact casting on the right leg today. She agrees to this. Wound was debrided and a fiberglass cast was applied to the right leg. This should be  left C/D/I for the next week.   - See again in 1 week.

## 2019-02-13 ENCOUNTER — OFFICE VISIT (OUTPATIENT)
Dept: PODIATRY | Facility: CLINIC | Age: 59
End: 2019-02-13
Payer: COMMERCIAL

## 2019-02-13 DIAGNOSIS — E11.621 DIABETIC ULCER OF RIGHT MIDFOOT ASSOCIATED WITH TYPE 2 DIABETES MELLITUS, WITH BONE INVOLVEMENT WITHOUT EVIDENCE OF NECROSIS (H): Primary | ICD-10-CM

## 2019-02-13 DIAGNOSIS — E11.42 TYPE 2 DIABETES MELLITUS WITH DIABETIC POLYNEUROPATHY, WITHOUT LONG-TERM CURRENT USE OF INSULIN (H): ICD-10-CM

## 2019-02-13 DIAGNOSIS — M14.672 CHARCOT'S JOINT OF LEFT FOOT: ICD-10-CM

## 2019-02-13 DIAGNOSIS — L97.416 DIABETIC ULCER OF RIGHT MIDFOOT ASSOCIATED WITH TYPE 2 DIABETES MELLITUS, WITH BONE INVOLVEMENT WITHOUT EVIDENCE OF NECROSIS (H): Primary | ICD-10-CM

## 2019-02-13 PROCEDURE — 29445 APPL RIGID TOT CNTC LEG CAST: CPT | Performed by: PODIATRIST

## 2019-02-13 NOTE — NURSING NOTE
Tiffani Tan's chief complaint for this visit includes:  Chief Complaint   Patient presents with     RECHECK     right foot TCC-EZ cast removal and wound check     PCP: [facility]    Referring Provider:  No referring provider defined for this encounter.    There were no vitals taken for this visit.  Data Unavailable     Do you need any medication refills at today's visit? no

## 2019-02-13 NOTE — PATIENT INSTRUCTIONS
Thanks for coming today.  Ortho/Sports Medicine Clinic  [address] [place] [address]    To schedule future appointments in Ortho Clinic, you may call [phone].    To schedule ordered imaging by your provider:   Call Central Imaging [phone]    To schedule an injection ordered by your provider:  Call Central Imaging Injection scheduling line: [phone]  [employer] available online at:  [email or web address]    Please call if any further questions or concerns ([phone]).  Clinic hours 8 am to 5 pm.    Return to clinic (call) if symptoms worsen or fail to improve.

## 2019-02-13 NOTE — LETTER
2/13/2019         RE: Tiffani Tan  [address] [facility] [address]        Dear Colleague,    Thank you for referring your patient, Tiffani Tan, to the [facility]. Please see a copy of my visit note below.    Chief Complaint   Patient presents with     RECHECK     right foot TCC-EZ cast removal and wound check          Not on File      Subjective: Tiffani is a 59 year old female who presents to the clinic today for a follow up of right diabetic ulcer. She tolerated the cast well with no complications. Relates that she is trying to stay off of her foot. She saw ID yesterday and will cont with the Augmentin.    Objective        A diabetic wound is noted at right  plantar 1st met head measuring 2.9cm x 3.6cm x  0.5cm.     Inman Classification: 2     Wound base: Pink/hypergranulation     Edges: macerated and hyperkeratotic     Drainage: moderate/serous     Odor: no     Undermining: Central wound probing is filled in today.. There is then undermining through the hypergranular tissue distally to the wound edge and then follows the edge up the medial side of the ulcer. This is improving     Bone Exposure: No. All bone is covered.     Clinical Signs of Infection: Not acutely.        After obtaining patient consent, the wound was irrigated with copious amounts of saline. A scalpel was then used to debride the wound into subcutaneous tissue. The wound edges were debrided back to healthy, bleeding tissue. The wound base exhibited healthy bleeding. Given the patient's lack of sensation, no anesthesia was necessary for the procedure.     Assessment: Type 2 diabetic with h/o chronic ulcerations of BL feet with open ulcer of the right foot. This has actually improved today with a week of total contact casting. She did have the PANCHITO performed. She will have the lab fax over the report. Currently on long term abx per ID. She does continue to weightbear on the foot some. Continues with packing.      Plan:  - Pt seen and  evaluated.  - Total NWB to BL feet.  - I have recommended that she cont total contact casting on the right leg today. She agrees to this. Wound was debrided and a fiberglass cast was applied to the right leg. This should be left C/D/I for the next week.   - See again in 1 week.         Again, thank you for allowing me to participate in the care of your patient.        Sincerely,        Daniel Giron DPM

## 2019-02-18 NOTE — PROGRESS NOTES
Chief Complaint   Patient presents with     RECHECK     right foot wound check          Not on File      Subjective: Tiffani is a 59 year old female who presents to the clinic today for a follow up of right foot ulceration on a diabetic Charcot foot. She has been total contact casted for the last two weeks. She tolerated the last cast, however she did not like the fiberglass much and wanted it cut off after a day or so.     Objective              A diabetic wound is noted at right  plantar 1st met head measuring 2.9cm x 4cm x 0.2cm.     Inman Classification: 2     Wound base: Pink/hypergranulation     Edges: macerated and hyperkeratotic     Drainage: moderate/serous     Odor: no     Undermining: None noted today. All undermining has filled in.     Bone Exposure: No. All bone is covered.     Clinical Signs of Infection: Not acutely.        After obtaining patient consent, the wound was irrigated with copious amounts of saline. A scalpel was then used to debride the wound into subcutaneous tissue. The wound edges were debrided back to healthy, bleeding tissue. The wound base exhibited healthy bleeding. Given the patient's lack of sensation, no anesthesia was necessary for the procedure.     Assessment: Type 2 diabetic with h/o chronic ulcerations of BL feet with open ulcer of the right foot. This has actually improved today with two weeks of total contact casting.      Plan:  - Pt seen and evaluated.  - Total NWB to BL feet.  - Wound debrided as described.   - I have recommended that she cont total contact casting on the right leg today, however we do not have TCCs and she does not want another fiberglass. The TCC-EZ system will be delivered today, so she will return tomorrow for EZ casting with our ATCs.  - See again in 1 week.

## 2019-02-20 ENCOUNTER — OFFICE VISIT (OUTPATIENT)
Dept: PODIATRY | Facility: CLINIC | Age: 59
End: 2019-02-20
Payer: COMMERCIAL

## 2019-02-20 DIAGNOSIS — L97.416 DIABETIC ULCER OF RIGHT MIDFOOT ASSOCIATED WITH TYPE 2 DIABETES MELLITUS, WITH BONE INVOLVEMENT WITHOUT EVIDENCE OF NECROSIS (H): Primary | ICD-10-CM

## 2019-02-20 DIAGNOSIS — E11.42 TYPE 2 DIABETES MELLITUS WITH DIABETIC POLYNEUROPATHY, WITHOUT LONG-TERM CURRENT USE OF INSULIN (H): ICD-10-CM

## 2019-02-20 DIAGNOSIS — E11.621 DIABETIC ULCER OF RIGHT MIDFOOT ASSOCIATED WITH TYPE 2 DIABETES MELLITUS, WITH BONE INVOLVEMENT WITHOUT EVIDENCE OF NECROSIS (H): Primary | ICD-10-CM

## 2019-02-20 PROCEDURE — 11042 DBRDMT SUBQ TIS 1ST 20SQCM/<: CPT | Performed by: PODIATRIST

## 2019-02-20 NOTE — LETTER
2/20/2019         RE: Tiffani Tan  [address] [facility] [address]        Dear Colleague,    Thank you for referring your patient, Tiffani Tan, to the [facility]. Please see a copy of my visit note below.    Chief Complaint   Patient presents with     RECHECK     right foot wound check          Not on File      Subjective: Tiffani is a 59 year old female who presents to the clinic today for a follow up of right foot ulceration on a diabetic Charcot foot. She has been total contact casted for the last two weeks. She tolerated the last cast, however she did not like the fiberglass much and wanted it cut off after a day or so.     Objective              A diabetic wound is noted at right  plantar 1st met head measuring 2.9cm x 4cm x 0.2cm.     Inman Classification: 2     Wound base: Pink/hypergranulation     Edges: macerated and hyperkeratotic     Drainage: moderate/serous     Odor: no     Undermining: None noted today. All undermining has filled in.     Bone Exposure: No. All bone is covered.     Clinical Signs of Infection: Not acutely.        After obtaining patient consent, the wound was irrigated with copious amounts of saline. A scalpel was then used to debride the wound into subcutaneous tissue. The wound edges were debrided back to healthy, bleeding tissue. The wound base exhibited healthy bleeding. Given the patient's lack of sensation, no anesthesia was necessary for the procedure.     Assessment: Type 2 diabetic with h/o chronic ulcerations of BL feet with open ulcer of the right foot. This has actually improved today with two weeks of total contact casting.      Plan:  - Pt seen and evaluated.  - Total NWB to BL feet.  - Wound debrided as described.   - I have recommended that she cont total contact casting on the right leg today, however we do not have TCCs and she does not want another fiberglass. The TCC-EZ system will be delivered today, so she will return tomorrow for EZ casting with  our ATCs.  - See again in 1 week.         Again, thank you for allowing me to participate in the care of your patient.        Sincerely,        Daniel Giron DPM

## 2019-02-20 NOTE — NURSING NOTE
Tiffani Tan's chief complaint for this visit includes:  Chief Complaint   Patient presents with     RECHECK     right foot wound check     [facility]: [facility]    Referring Provider:  No referring provider defined for this encounter.    There were no vitals taken for this visit.  Data Unavailable     Do you need any medication refills at today's visit? no

## 2019-02-20 NOTE — PATIENT INSTRUCTIONS
Thanks for coming today.  Ortho/Sports Medicine Clinic  [address] [place] [address]    To schedule future appointments in Ortho Clinic, you may call [phone].    To schedule ordered imaging by your provider:   Call Central Imaging [phone]    To schedule an injection ordered by your provider:  Call Central Imaging Injection scheduling line: [phone]  [employer] available online at:  [email or web address]    Please call if any further questions or concerns ([phone]).  Clinic hours 8 am to 5 pm.    Return to clinic (call) if symptoms worsen or fail to improve.

## 2019-02-21 ENCOUNTER — ALLIED HEALTH/NURSE VISIT (OUTPATIENT)
Dept: NURSING | Facility: CLINIC | Age: 59
End: 2019-02-21
Payer: COMMERCIAL

## 2019-02-21 DIAGNOSIS — L97.416 DIABETIC ULCER OF RIGHT MIDFOOT ASSOCIATED WITH TYPE 2 DIABETES MELLITUS, WITH BONE INVOLVEMENT WITHOUT EVIDENCE OF NECROSIS (H): Primary | ICD-10-CM

## 2019-02-21 DIAGNOSIS — E11.621 DIABETIC ULCER OF RIGHT MIDFOOT ASSOCIATED WITH TYPE 2 DIABETES MELLITUS, WITH BONE INVOLVEMENT WITHOUT EVIDENCE OF NECROSIS (H): Primary | ICD-10-CM

## 2019-02-21 DIAGNOSIS — Z47.89 AFTERCARE FOR CAST OR SPLINT CHECK OR CHANGE: ICD-10-CM

## 2019-02-21 DIAGNOSIS — Z97.8 CAST IN PLACE ON LOWER EXTREMITY: ICD-10-CM

## 2019-02-21 DIAGNOSIS — E11.42 TYPE 2 DIABETES MELLITUS WITH DIABETIC POLYNEUROPATHY, WITHOUT LONG-TERM CURRENT USE OF INSULIN (H): ICD-10-CM

## 2019-02-21 PROCEDURE — 29445 APPL RIGID TOT CNTC LEG CAST: CPT | Mod: RT

## 2019-02-21 NOTE — PROGRESS NOTES
A TCC-EZ cast was applied today in a normal and correct manor. The patient was given cast care instructions.     Cast/splint application  Date/Time: 2/21/2019 7:34 AM  Performed by: Gabriela Lyons ATC  Authorized by: Daniel Giron DPM     Consent:     Consent obtained:  Verbal    Consent given by:  Patient  Procedure details:     Laterality:  Right    Cast type:  Total contact    Supplies used: TCC-EZ.  Post-procedure details:     Patient tolerance of procedure:  Tolerated well, no immediate complications    Patient provided with cast or splint care instructions: Yes

## 2019-02-27 ENCOUNTER — OFFICE VISIT (OUTPATIENT)
Dept: PODIATRY | Facility: CLINIC | Age: 59
End: 2019-02-27
Payer: COMMERCIAL

## 2019-02-27 DIAGNOSIS — E11.621 DIABETIC ULCER OF RIGHT MIDFOOT ASSOCIATED WITH TYPE 2 DIABETES MELLITUS, WITH BONE INVOLVEMENT WITHOUT EVIDENCE OF NECROSIS (H): ICD-10-CM

## 2019-02-27 DIAGNOSIS — E11.42 TYPE 2 DIABETES MELLITUS WITH DIABETIC POLYNEUROPATHY, WITHOUT LONG-TERM CURRENT USE OF INSULIN (H): Primary | ICD-10-CM

## 2019-02-27 DIAGNOSIS — M14.672 CHARCOT'S JOINT OF LEFT FOOT: ICD-10-CM

## 2019-02-27 DIAGNOSIS — L97.416 DIABETIC ULCER OF RIGHT MIDFOOT ASSOCIATED WITH TYPE 2 DIABETES MELLITUS, WITH BONE INVOLVEMENT WITHOUT EVIDENCE OF NECROSIS (H): ICD-10-CM

## 2019-02-27 PROCEDURE — 29445 APPL RIGID TOT CNTC LEG CAST: CPT | Mod: RT | Performed by: PODIATRIST

## 2019-02-27 NOTE — LETTER
2019         RE: Tiffani Tan  [address] [facility] [address]        Dear Colleague,    Thank you for referring your patient, Tiffani Tan, to the [facility]. Please see a copy of my visit note below.    Chief Complaint   Patient presents with     WOUND CARE     Right foot          Not on File      Subjective: Tiffani is a 59 year old female who presents to the clinic today for a follow up of right foot ulcer. She tolerated the last cast well with no complications. No n/v/d/f/c/ns/sob/cp    Objective  Data Unavailable Data Unavailable Data Unavailable Data Unavailable Data Unavailable 0 lbs 0 oz        A diabetic pressure wound is noted at right  plantar 1st met head measuring 3cm x 3cm x 1.5cm.    Inman Classification: 2    Wound base: Red/Granulation    Edges: hyperkeratotic    Drainage: scant/serous    Odor: no    Undermining: no    Tunnelin O'Clock for 1.5cm    Bone Exposure: No    Clinical Signs of Infection: No    After obtaining patient consent, the wound was irrigated with copious amounts of saline. A scalpel was then used to debride the wound into the subcutaneous tissue. The wound edges were debrided to healthy, bleeding tissue. Given the patient's lack of sensation, no anesthesia was necessary for the procedure.       Assessment: DM2 with neuropathy  Right 1st met head ulcer. Healing well.     Plan:   - Pt seen and evaluated  - Wound was debrided as described.   - Recommend a TCC-EZ on the right foot and she agrees to this. This was applied to the right leg. This should be left C/D/I for the next week.   - Pt to return to clinic in 1 week.         Again, thank you for allowing me to participate in the care of your patient.        Sincerely,        Daniel Giron DPM

## 2019-02-28 NOTE — PROGRESS NOTES
Chief Complaint   Patient presents with     WOUND CARE     Right foot          Not on File      Subjective: Tiffani is a 59 year old female who presents to the clinic today for a follow up of right foot ulcer. She tolerated the last cast well with no complications. No n/v/d/f/c/ns/sob/cp    Objective  Data Unavailable Data Unavailable Data Unavailable Data Unavailable Data Unavailable 0 lbs 0 oz        A diabetic pressure wound is noted at right  plantar 1st met head measuring 3cm x 3cm x 1.5cm.    Inman Classification: 2    Wound base: Red/Granulation    Edges: hyperkeratotic    Drainage: scant/serous    Odor: no    Undermining: no    Tunnelin O'Clock for 1.5cm    Bone Exposure: No    Clinical Signs of Infection: No    After obtaining patient consent, the wound was irrigated with copious amounts of saline. A scalpel was then used to debride the wound into the subcutaneous tissue. The wound edges were debrided to healthy, bleeding tissue. Given the patient's lack of sensation, no anesthesia was necessary for the procedure.       Assessment: DM2 with neuropathy  Right 1st met head ulcer. Healing well.     Plan:   - Pt seen and evaluated  - Wound was debrided as described.   - Recommend a TCC-EZ on the right foot and she agrees to this. This was applied to the right leg. This should be left C/D/I for the next week.   - Pt to return to clinic in 1 week.

## 2019-03-01 ENCOUNTER — TRANSFERRED RECORDS (OUTPATIENT)
Dept: HEALTH INFORMATION MANAGEMENT | Facility: CLINIC | Age: 59
End: 2019-03-01

## 2019-03-04 NOTE — PROGRESS NOTES
Chief Complaint   Patient presents with     Right Foot - Cast Change, WOUND CARE        Not on File      Subjective: Tiffani is a 59 year old female who presents to the clinic today for a follow up of right foot ulcer. She tolerated the last cast well with no complications. No n/v/d/f/c/ns/sob/cp     Objective  BP 94/57 (BP Location: Left arm, Patient Position: Sitting, Cuff Size: Adult Large)   Pulse 80               A diabetic pressure wound is noted at right  plantar 1st met head measuring 2.9cm x 2.9cm x 1.5cm.     Inman Classification: 2     Wound base: Red/Granulation     Edges: hyperkeratotic     Drainage: scant/serous     Odor: no     Undermining: no     Tunnelin O'Clock for 1.5cm     Bone Exposure: No     Clinical Signs of Infection: No     After obtaining patient consent, the wound was irrigated with copious amounts of saline. A curettewas then used to debride the wound into the subcutaneous tissue. The wound edges were debrided to healthy, bleeding tissue. Given the patient's lack of sensation, no anesthesia was necessary for the procedure.         Assessment: DM2 with neuropathy  Right 1st met head ulcer. Healing well.      Plan:   - Pt seen and evaluated  - Wound was debrided as described.   - Recommend a TCC-EZ on the right foot and she agrees to this. This was applied to the right leg. This should be left C/D/I for the next week.   - Pt to return to clinic in 1 week for nurse visit and with me in 2 weeks.

## 2019-03-06 ENCOUNTER — OFFICE VISIT (OUTPATIENT)
Dept: PODIATRY | Facility: CLINIC | Age: 59
End: 2019-03-06
Payer: COMMERCIAL

## 2019-03-06 VITALS — HEART RATE: 80 BPM | DIASTOLIC BLOOD PRESSURE: 57 MMHG | SYSTOLIC BLOOD PRESSURE: 94 MMHG

## 2019-03-06 DIAGNOSIS — E11.621 DIABETIC ULCER OF RIGHT MIDFOOT ASSOCIATED WITH TYPE 2 DIABETES MELLITUS, WITH BONE INVOLVEMENT WITHOUT EVIDENCE OF NECROSIS (H): ICD-10-CM

## 2019-03-06 DIAGNOSIS — E11.42 TYPE 2 DIABETES MELLITUS WITH DIABETIC POLYNEUROPATHY, WITHOUT LONG-TERM CURRENT USE OF INSULIN (H): Primary | ICD-10-CM

## 2019-03-06 DIAGNOSIS — L97.416 DIABETIC ULCER OF RIGHT MIDFOOT ASSOCIATED WITH TYPE 2 DIABETES MELLITUS, WITH BONE INVOLVEMENT WITHOUT EVIDENCE OF NECROSIS (H): ICD-10-CM

## 2019-03-06 PROCEDURE — 29445 APPL RIGID TOT CNTC LEG CAST: CPT | Mod: RT | Performed by: PODIATRIST

## 2019-03-06 NOTE — LETTER
3/6/2019         RE: Tiffani Tan  [address] [facility] [address]        Dear Colleague,    Thank you for referring your patient, Tiffani Tan, to the [facility]. Please see a copy of my visit note below.    Chief Complaint   Patient presents with     Right Foot - Cast Change, WOUND CARE        Not on File      Subjective: Tiffani is a 59 year old female who presents to the clinic today for a follow up of right foot ulcer. She tolerated the last cast well with no complications. No n/v/d/f/c/ns/sob/cp     Objective  BP 94/57 (BP Location: Left arm, Patient Position: Sitting, Cuff Size: Adult Large)   Pulse 80               A diabetic pressure wound is noted at right  plantar 1st met head measuring  2.9cm x 2.9cm x 1.5cm.     Inman Classification: 2     Wound base: Red/Granulation     Edges: hyperkeratotic     Drainage: scant/serous     Odor: no     Undermining: no     Tunnelin O'Clock for 1.5cm     Bone Exposure: No     Clinical Signs of Infection: No     After obtaining patient consent, the wound was irrigated with copious amounts of saline. A curettewas then used to debride the wound into the subcutaneous tissue. The wound edges were debrided to healthy, bleeding tissue. Given the patient's lack of sensation, no anesthesia was necessary for the procedure.         Assessment: DM2 with neuropathy  Right 1st met head ulcer. Healing well.      Plan:   - Pt seen and evaluated  - Wound was debrided as described.   - Recommend a TCC-EZ on the right foot and she agrees to this. This was applied to the right leg. This should be left C/D/I for the next week.   - Pt to return to clinic in 1 week for nurse visit and with me in 2 weeks.          Again, thank you for allowing me to participate in the care of your patient.        Sincerely,        Daniel Giron DPM

## 2019-03-06 NOTE — NURSING NOTE
Tiffani Tan's chief complaint for this visit includes:  Chief Complaint   Patient presents with     Right Foot - Cast Change, WOUND CARE     PCP: [facility]    Referring Provider:  No referring provider defined for this encounter.    BP 94/57 (BP Location: Left arm, Patient Position: Sitting, Cuff Size: Adult Large)   Pulse 80   Data Unavailable     Do you need any medication refills at today's visit? No    Lisette Long LPN

## 2019-03-06 NOTE — PATIENT INSTRUCTIONS
Thanks for coming today.  Ortho[facility] Medicine Clinic  [address] [place] [address]    To schedule future appointments in Ortho Clinic, you may call [phone].    To schedule ordered imaging by your provider:   Call [facility] [phone]    To schedule an injection ordered by your provider:  Call [facility] Injection scheduling line: [phone]  All Prote[employer] Agency[employer] available online at:  [email or web address]    Please call if any further questions or concerns ([phone]).  Clinic hours 8 am to 5 pm.    Return to clinic (call) if symptoms worsen or fail to improve.

## 2019-03-12 ENCOUNTER — TELEPHONE (OUTPATIENT)
Dept: PODIATRY | Facility: CLINIC | Age: 59
End: 2019-03-12

## 2019-03-12 NOTE — TELEPHONE ENCOUNTER
Dr Castillo Meek in Neurology wants to speak with Dr Giron when he returns to [facility] next week. Dr Meek is seeing patient for left foot pain. Please call his cell # [phone] regarding patient medical history and possibility of new diagnosis of CRPS.  Deann Duenas RN

## 2019-03-13 ENCOUNTER — ALLIED HEALTH/NURSE VISIT (OUTPATIENT)
Dept: NURSING | Facility: CLINIC | Age: 59
End: 2019-03-13
Payer: COMMERCIAL

## 2019-03-13 DIAGNOSIS — E11.621 DIABETIC ULCER OF RIGHT MIDFOOT ASSOCIATED WITH TYPE 2 DIABETES MELLITUS, WITH BONE INVOLVEMENT WITHOUT EVIDENCE OF NECROSIS (H): Primary | ICD-10-CM

## 2019-03-13 DIAGNOSIS — L97.416 DIABETIC ULCER OF RIGHT MIDFOOT ASSOCIATED WITH TYPE 2 DIABETES MELLITUS, WITH BONE INVOLVEMENT WITHOUT EVIDENCE OF NECROSIS (H): Primary | ICD-10-CM

## 2019-03-13 DIAGNOSIS — Z47.89 AFTERCARE FOR CAST OR SPLINT CHECK OR CHANGE: ICD-10-CM

## 2019-03-13 PROCEDURE — 29445 APPL RIGID TOT CNTC LEG CAST: CPT

## 2019-03-13 NOTE — PROGRESS NOTES
Cast removal:    Relevant Diagnosis: Right foot wound    Patient educated on cast removal process: Yes     Tiffani's cast was removed per physician instruction.    Skin was observed and found to be intact with no signs of concern:Yes     Concern noted:no     Person(s) involved in removal:   Patient     Questions asked: none      The patient was placed in a Right TCC-EZ, she will follow up with Dr. Giron next week.     Cast/splint application  Date/Time: 3/13/2019 7:42 AM  Performed by: Gabriela Lyons ATC  Authorized by: Daniel Giron DPM     Consent:     Consent obtained:  Verbal    Consent given by:  Patient  Procedure details:     Laterality:  Right    Location:  Foot    Cast type:  Total contact    Supplies used: TCC-EZ.  Post-procedure details:     Patient tolerance of procedure:  Tolerated well, no immediate complications    Patient provided with cast or splint care instructions: Yes

## 2019-03-13 NOTE — PATIENT INSTRUCTIONS
Thanks for coming today.  Ortho/Sports Medicine Clinic  [address] [place] [address]    To schedule future appointments in Ortho Clinic, you may call [phone].    To schedule ordered imaging by your provider:   Call Central [facility] [phone]    To schedule an injection ordered by your provider:  Call Central Imaging Injection scheduling line: [phone]  [email or web address] available online at:  [email or web address]    Please call if any further questions or concerns ([phone]).  Clinic hours 8 am to 5 pm.    Return to clinic (call) if symptoms worsen or fail to improve.

## 2019-03-20 ENCOUNTER — OFFICE VISIT (OUTPATIENT)
Dept: PODIATRY | Facility: CLINIC | Age: 59
End: 2019-03-20
Payer: COMMERCIAL

## 2019-03-20 VITALS — HEART RATE: 84 BPM | OXYGEN SATURATION: 94 % | DIASTOLIC BLOOD PRESSURE: 74 MMHG | SYSTOLIC BLOOD PRESSURE: 132 MMHG

## 2019-03-20 DIAGNOSIS — M14.672 CHARCOT'S JOINT OF LEFT FOOT: Primary | ICD-10-CM

## 2019-03-20 DIAGNOSIS — L97.416 DIABETIC ULCER OF RIGHT MIDFOOT ASSOCIATED WITH TYPE 2 DIABETES MELLITUS, WITH BONE INVOLVEMENT WITHOUT EVIDENCE OF NECROSIS (H): ICD-10-CM

## 2019-03-20 DIAGNOSIS — E11.42 TYPE 2 DIABETES MELLITUS WITH DIABETIC POLYNEUROPATHY, WITHOUT LONG-TERM CURRENT USE OF INSULIN (H): ICD-10-CM

## 2019-03-20 DIAGNOSIS — E11.621 DIABETIC ULCER OF RIGHT MIDFOOT ASSOCIATED WITH TYPE 2 DIABETES MELLITUS, WITH BONE INVOLVEMENT WITHOUT EVIDENCE OF NECROSIS (H): ICD-10-CM

## 2019-03-20 PROCEDURE — 99213 OFFICE O/P EST LOW 20 MIN: CPT | Mod: 25 | Performed by: PODIATRIST

## 2019-03-20 PROCEDURE — 11042 DBRDMT SUBQ TIS 1ST 20SQCM/<: CPT | Performed by: PODIATRIST

## 2019-03-20 NOTE — PROGRESS NOTES
Chief Complaint   Patient presents with     RECHECK     TCC-EZ          Not on File      Subjective: Tiffani is a 59 year old female who presents to the clinic today for a follow up of right foot ulcer. She relates that she has seen her ID doctor. She continues with the antibiotics. Relates that she is seeing pain management. She is having pain in the left foot. Relates that she tolerate the cast well with no complications.     Objective        A diabetic pressure wound is noted at right  plantar 1st met head measuring 2.4cm x 2.7cm x 1cm.     Inman Classification: 2     Wound base: Red/Granulation     Edges: hyperkeratotic     Drainage: scant/serous     Odor: no     Undermining: no     Tunnelin O'Clock for 1cm     Bone Exposure: No     Clinical Signs of Infection: No     After obtaining patient consent, the wound was irrigated with copious amounts of saline. A curette and scalpel were then used to debride the wound into the subcutaneous tissue. The wound edges were debrided to healthy, bleeding tissue. Given the patient's lack of sensation, no anesthesia was necessary for the procedure.   Left foot is painful with ROM. Foot is in a forefoot inversion. No open lesions noted.      BL foot ABIs are 1.      Assessment: DM2 with neuropathy  Right 1st met head ulcer. Healing well.   Left Charcot foot.      Plan:   - Pt seen and evaluated  - Wound was debrided as described.   - Recommend a TCC-EZ on the right foot and she agrees to this. This was applied to the right leg. This should be left C/D/I for the next week. ABIs are normal. I think that this ulcer is ready for an Apligraf. Will try to get this covered. The longer this foot is in a cast, the more abnormal pressures are applied to the left foot, which increases her Charcot deformity.   - Pt to return to clinic in 1 week.

## 2019-03-20 NOTE — NURSING NOTE
Tiffani Tan's chief complaint for this visit includes:  Chief Complaint   Patient presents with     RECHECK     TCC-EZ     PCP: [facility]    Referring Provider:  No referring provider defined for this encounter.    /74   Pulse 84   SpO2 94%   Data Unavailable     Do you need any medication refills at today's visit? no

## 2019-03-20 NOTE — PATIENT INSTRUCTIONS
Thanks for coming today.  Ortho/Sports Medicine Clinic  [address] [place] [address]    To schedule future appointments in Ortho Clinic, you may call [phone].    To schedule ordered imaging by your Provider: Call [facility] at [phone]    [employer] available online at:   [email or web address]    Please call if any further questions or concerns [phone] and ask for the Orthopedic Department. Clinic hours 8 am to 5 pm.    Return to clinic if symptoms worsen.

## 2019-03-20 NOTE — LETTER
3/20/2019         RE: Tiffani Tan  [address] [facility] [address]        Dear Colleague,    Thank you for referring your patient, Tiffani Tan, to the [facility]. Please see a copy of my visit note below.    Chief Complaint   Patient presents with     RECHECK     TCC-EZ          Not on File      Subjective: Tiffani is a 59 year old female who presents to the clinic today for a follow up of right foot ulcer. She relates that she has seen her ID doctor. She continues with the antibiotics. Relates that she is seeing pain management. She is having pain in the left foot. Relates that she tolerate the cast well with no complications.     Objective        A diabetic pressure wound is noted at right  plantar 1st met head measuring 2. 4cm x 2. 7cm x 1cm.     Inman Classification: 2     Wound base: Red/Granulation     Edges: hyperkeratotic     Drainage: scant/serous     Odor: no     Undermining: no     Tunnelin O'Clock for 1cm     Bone Exposure: No     Clinical Signs of Infection: No     After obtaining patient consent, the wound was irrigated with copious amounts of saline. A curette  and scalpel were then used to debride the wound into the subcutaneous tissue. The wound edges were debrided to healthy, bleeding tissue. Given the patient's lack of sensation, no anesthesia was necessary for the procedure.   Left foot is painful with ROM. Foot is in a forefoot inversion. No open lesions noted.      BL foot ABIs are 1.      Assessment: DM2 with neuropathy  Right 1st met head ulcer. Healing well.   Left Charcot foot.      Plan:   - Pt seen and evaluated  - Wound was debrided as described.   - Recommend a TCC-EZ on the right foot and she agrees to this. This was applied to the right leg. This should be left C/D/I for the next week. ABIs are normal. I think that this ulcer is ready for an Apligraf. Will try to get this covered. The longer this foot is in a cast, the more abnormal pressures are applied to the  left foot, which increases her Charcot deformity.   - Pt to return to clinic in 1 week.         Again, thank you for allowing me to participate in the care of your patient.        Sincerely,        Daniel Giron DPM

## 2019-03-25 NOTE — PROGRESS NOTES
Chief Complaint   Patient presents with     Right Foot - WOUND CARE     Cast Change          No Known Allergies      Subjective: Tiffani is a 59 year old female who presents to the clinic today for a follow up of right foot ulcer. She relates that she has seen her ID doctor. She continues with the antibiotics. Relates that she is seeing pain management. She is having pain in the left foot. Relates that she tolerate the cast well with no complications.      Objective                  A diabetic pressure wound is noted at right  plantar 1st met head measuring 2.1cm x 2.7cm x 0.3cm.     Inman Classification: 2     Wound base: Red/Granulation     Edges: hyperkeratotic     Drainage: scant/serous     Odor: no     Undermining: no     Tunnelin O'Clock for 0.3cm     Bone Exposure: No     Clinical Signs of Infection: No     After obtaining patient consent, the wound was irrigated with copious amounts of saline. A curette was then used to debride the wound into the subcutaneous tissue. The wound edges were debrided to healthy, bleeding tissue. Given the patient's lack of sensation, no anesthesia was necessary for the procedure.        BL foot ABIs are 1.      Assessment: DM2 with neuropathy  Right 1st met head ulcer. Healing well. Marked healing in the depth of the ulcer over the last week. Does not probe deeply anymore. I think that the foot is finally ready for an Apligraf.  Left Charcot foot.      Plan:   - Pt seen and evaluated  - Wound was debrided as described.   - Recommend a TCC-EZ on the right foot and she agrees to this. This was applied to the right leg. This should be left C/D/I for the next week. ABIs are normal. I think that this ulcer is ready for an Apligraf. Will try to get this covered. The longer this foot is in a cast, the more abnormal pressures are applied to the left foot, which increases her Charcot deformity.   - Pt to return to clinic in 1 week. Will work on Apligraf coverage.

## 2019-03-27 ENCOUNTER — OFFICE VISIT (OUTPATIENT)
Dept: PODIATRY | Facility: CLINIC | Age: 59
End: 2019-03-27
Payer: COMMERCIAL

## 2019-03-27 ENCOUNTER — TELEPHONE (OUTPATIENT)
Dept: PODIATRY | Facility: CLINIC | Age: 59
End: 2019-03-27

## 2019-03-27 VITALS — HEART RATE: 83 BPM | DIASTOLIC BLOOD PRESSURE: 65 MMHG | SYSTOLIC BLOOD PRESSURE: 108 MMHG

## 2019-03-27 DIAGNOSIS — M14.672 CHARCOT'S JOINT OF LEFT FOOT: ICD-10-CM

## 2019-03-27 DIAGNOSIS — L97.416 DIABETIC ULCER OF RIGHT MIDFOOT ASSOCIATED WITH TYPE 2 DIABETES MELLITUS, WITH BONE INVOLVEMENT WITHOUT EVIDENCE OF NECROSIS (H): Primary | ICD-10-CM

## 2019-03-27 DIAGNOSIS — E11.42 TYPE 2 DIABETES MELLITUS WITH DIABETIC POLYNEUROPATHY, WITHOUT LONG-TERM CURRENT USE OF INSULIN (H): ICD-10-CM

## 2019-03-27 DIAGNOSIS — E11.621 DIABETIC ULCER OF RIGHT MIDFOOT ASSOCIATED WITH TYPE 2 DIABETES MELLITUS, WITH BONE INVOLVEMENT WITHOUT EVIDENCE OF NECROSIS (H): Primary | ICD-10-CM

## 2019-03-27 PROCEDURE — 29445 APPL RIGID TOT CNTC LEG CAST: CPT | Performed by: PODIATRIST

## 2019-03-27 NOTE — TELEPHONE ENCOUNTER
Financial Counselor Review for Apligraf, Dermagraft or Puraply AM:    Which product(s) to be checked: Apligraf and Dermagraf    Has the patient tried Apligraf, Dermagraft or Puraply before: Yes, has had Apligraf in the past, not for this wound.    Diagnosis code (include ICD-10 code):  M14.672, E11.621, L97.416, E11.42    Coverage and patient financial responsibility information:YES    Does patient need to be contacted by Financial Counselor:YES (not sure if we are still doing this?)    Note: Do not use abbreviations and route encounter to: [facility] [place] [[id number]]

## 2019-03-27 NOTE — NURSING NOTE
Tiffani Tan's chief complaint for this visit includes:  Chief Complaint   Patient presents with     Right Foot - WOUND CARE     Cast Change     PCP: [facility]    Referring Provider:  Wily Bonilla MD  [facility] [address]  [place] [address]    /65 (BP Location: Right arm, Patient Position: Sitting, Cuff Size: Adult Large)   Pulse 83   Data Unavailable     Do you need any medication refills at today's visit? Rubina Long LPN

## 2019-03-27 NOTE — LETTER
3/27/2019         RE: Tiffani Tan  [address] [facility] [address]        Dear Colleague,    Thank you for referring your patient, Tiffani Tan, to the [facility]. Please see a copy of my visit note below.    Chief Complaint   Patient presents with     Right Foot - WOUND CARE     Cast Change          No Known Allergies      Subjective: Tiffani is a 59 year old female who presents to the clinic today for a follow up of right foot ulcer. She relates that she has seen her ID doctor. She continues with the antibiotics. Relates that she is seeing pain management. She is having pain in the left foot. Relates that she tolerate the cast well with no complications.      Objective                  A diabetic pressure wound is noted at right  plantar 1st met head measuring 2.1cm x 2.7cm x 0.3cm.     Inman Classification: 2     Wound base: Red/Granulation     Edges: hyperkeratotic     Drainage: scant/serous     Odor: no     Undermining: no     Tunnelin O'Clock for 0.3cm     Bone Exposure: No     Clinical Signs of Infection: No     After obtaining patient consent, the wound was irrigated with copious amounts of saline. A curette w as then used to debride the wound into the subcutaneous tissue. The wound edges were debrided to healthy, bleeding tissue. Given the patient's lack of sensation, no anesthesia was necessary for the procedure.        BL foot ABIs are 1.      Assessment: DM2 with neuropathy  Right 1st met head ulcer. Healing well. Marked healing in the depth of the ulcer over the last week. Does not probe deeply anymore. I think that the foot is finally ready for an Apligraf.  Left Charcot foot.      Plan:   - Pt seen and evaluated  - Wound was debrided as described.   - Recommend a TCC-EZ on the right foot and she agrees to this. This was applied to the right leg. This should be left C/D/I for the next week. ABIs are normal. I think that this ulcer is ready for an Apligraf. Will try to get this  covered. The longer this foot is in a cast, the more abnormal pressures are applied to the left foot, which increases her Charcot deformity.   - Pt to return to clinic in 1 week. Will work on Apligraf coverage.         Again, thank you for allowing me to participate in the care of your patient.        Sincerely,        Daniel Giron DPM

## 2019-03-27 NOTE — PATIENT INSTRUCTIONS
Thanks for coming today.  Ortho/Sports Medicine Clinic  [address] [place] [address]    To schedule future appointments in Ortho Clinic, you may call [phone].    To schedule ordered imaging by your provider:   Call Central [facility] [phone]    To schedule an injection ordered by your provider:  Call [facility] Injection scheduling line: [phone]  [email or web address] available online at:  [email or web address]    Please call if any further questions or concerns ([phone]).  Clinic hours 8 am to 5 pm.    Return to clinic (call) if symptoms worsen or fail to improve.

## 2019-03-28 NOTE — TELEPHONE ENCOUNTER
Apligraf and Dermagraft do not require PA with [employer] [place]. Patient has a $2,200.00 deductible which has already been met. Services will be covered at the contracted allowed amount. Ref # [id number].     Spoke with patient. She understands and would like to move forward with scheduling.

## 2019-04-03 ENCOUNTER — OFFICE VISIT (OUTPATIENT)
Dept: PODIATRY | Facility: CLINIC | Age: 59
End: 2019-04-03
Payer: COMMERCIAL

## 2019-04-03 VITALS — DIASTOLIC BLOOD PRESSURE: 69 MMHG | SYSTOLIC BLOOD PRESSURE: 126 MMHG | HEART RATE: 61 BPM

## 2019-04-03 DIAGNOSIS — L97.416 DIABETIC ULCER OF RIGHT MIDFOOT ASSOCIATED WITH TYPE 2 DIABETES MELLITUS, WITH BONE INVOLVEMENT WITHOUT EVIDENCE OF NECROSIS (H): ICD-10-CM

## 2019-04-03 DIAGNOSIS — E11.621 DIABETIC ULCER OF RIGHT MIDFOOT ASSOCIATED WITH TYPE 2 DIABETES MELLITUS, WITH BONE INVOLVEMENT WITHOUT EVIDENCE OF NECROSIS (H): ICD-10-CM

## 2019-04-03 DIAGNOSIS — E11.42 TYPE 2 DIABETES MELLITUS WITH DIABETIC POLYNEUROPATHY, WITHOUT LONG-TERM CURRENT USE OF INSULIN (H): Primary | ICD-10-CM

## 2019-04-03 DIAGNOSIS — M14.672 CHARCOT'S JOINT OF LEFT FOOT: ICD-10-CM

## 2019-04-03 PROCEDURE — 15271 SKIN SUB GRAFT TRNK/ARM/LEG: CPT | Performed by: PODIATRIST

## 2019-04-03 NOTE — PROGRESS NOTES
Chief Complaint   Patient presents with     Right Foot - Follow Up, WOUND CARE, Cast Change          No Known Allergies      Subjective: Tiffani is a 59 year old female who presents to the clinic today for a follow up of right foot ulcer. She tolerated the cast well. She was approved for an Apligraf.     Objective  A diabetic pressure wound is noted at right  plantar 1st met head measuring 2.3cm x 2.3cm x 0.1cm.     Inman Classification: 2     Wound base: Red/Granulation     Edges: hyperkeratotic     Drainage: scant/serous     Odor: no     Undermining: no     Tunneling: None     Bone Exposure: No     Clinical Signs of Infection: No     After obtaining patient consent, the wound was irrigated with copious amounts of saline. A curette was then used to debride the wound into the subcutaneous tissue. The wound edges were debrided to healthy, bleeding tissue. Given the patient's lack of sensation, no anesthesia was necessary for the procedure.         BL foot ABIs are 1.      Assessment: DM2 with neuropathy  Right 1st met head ulcer. Healing well. Marked healing in the depth of the ulcer over the last week. Does not probe deeply anymore. I think that the foot is finally ready for an Apligraf.  Left Charcot foot.      Plan:   - Pt seen and evaluated  - Wound was debrided as described.   - I discussed Apligraf application with her. A 44 sq cm piece of Apligraf was applied to the right ulcer in the usual aseptic manner. This was fixated with Steri-strips and covered with sterile dressing. No waste of the graft.   - Recommend a TCC-EZ on the right foot and she agrees to this. This was applied to the right leg. This should be left C/D/I for the next week. ABIs are normal. I think that this ulcer is ready for an Apligraf. Will try to get this covered. The longer this foot is in a cast, the more abnormal pressures are applied to the left foot, which increases her Charcot deformity. She would like to speak to Dr. Adams about  reconstruction of the  Left Charcot foot. Will refer her.   - Pt to return to clinic in 1 week. This was [employer] application #1.

## 2019-04-03 NOTE — LETTER
4/3/2019         RE: Tiffani Tan  [address] [facility] [address]        Dear Colleague,    Thank you for referring your patient, Tiffani Tan, to the [facility]. Please see a copy of my visit note below.    Chief Complaint   Patient presents with     Right Foot - Follow Up, WOUND CARE, Cast Change          No Known Allergies      Subjective: Tiffani is a 59 year old female who presents to the clinic today for a follow up of right foot ulcer. She tolerated the cast well. She was approved for an Apligraf.     Objective  A diabetic pressure wound is noted at right  plantar 1st met head measuring 2.3cm x 2.3cm x 0.1cm.     Inman Classification: 2     Wound base: Red/Granulation     Edges: hyperkeratotic     Drainage: scant/serous     Odor: no     Undermining: no     Tunneling: None     Bone Exposure: No     Clinical Signs of Infection: No     After obtaining patient consent, the wound was irrigated with copious amounts of saline. A curette was then used to debride the wound into the subcutaneous tissue. The wound edges were debrided to healthy, bleeding tissue. Given the patient's lack of sensation, no anesthesia was necessary for the procedure.         BL foot ABIs are 1.      Assessment: DM2 with neuropathy  Right 1st met head ulcer. Healing well. Marked healing in the depth of the ulcer over the last week. Does not probe deeply anymore. I think that the foot is finally ready for an Apligraf.  Left Charcot foot.      Plan:   - Pt seen and evaluated  - Wound was debrided as described.   - I discussed Apligraf application with her. A 44 sq cm piece of Apligraf was applied to the right ulcer in the usual aseptic manner. This was fixated with Steri-strips and covered with sterile dressing. No waste of the graft.   - Recommend a TCC-EZ on the right foot and she agrees to this. This was applied to the right leg. This should be left C/D/I for the next week. ABIs are normal. I think that this ulcer is ready  for an Apligraf. Will try to get this covered. The longer this foot is in a cast, the more abnormal pressures are applied to the left foot, which increases her Charcot deformity. She would like to speak to Dr. Adams about reconstruction of the  Left Charcot foot. Will refer her.   - Pt to return to clinic in 1 week. This was Apligraf application #1.           Again, thank you for allowing me to participate in the care of your patient.        Sincerely,        Daniel Giron DPM

## 2019-04-03 NOTE — PATIENT INSTRUCTIONS
Thanks for coming today.  Ortho/Sports Medicine Clinic  [address] [place] [address]    To schedule future appointments in Ortho Clinic, you may call [phone].    To schedule ordered imaging by your provider:   Call Central [facility] [phone]    To schedule an injection ordered by your provider:  Call [facility] Injection scheduling line: [phone]  [email or web address] available online at:  [email or web address]    Please call if any further questions or concerns ([phone]).  Clinic hours 8 am to 5 pm.    Return to clinic (call) if symptoms worsen or fail to improve.

## 2019-04-03 NOTE — NURSING NOTE
Tiffani Tan's chief complaint for this visit includes:  Chief Complaint   Patient presents with     Right Foot - Follow Up, WOUND CARE, Cast Change     PCP: [facility]    Referring Provider:  Wily Bonilla MD  [facility] [address]  [place] [address]    /69 (BP Location: Right arm, Patient Position: Sitting, Cuff Size: Adult Large)   Pulse 61   Data Unavailable     Do you need any medication refills at today's visit? No    Lisette Long LPN

## 2019-04-08 NOTE — PROGRESS NOTES
Chief Complaint   Patient presents with     Right Foot - Follow Up, Cast Change          No Known Allergies      Subjective: Tiffani is a 59 year old female who presents to the clinic today for a follow up of right foot ulcer. Tolerated the cast well. Has Apligraf #1 placed last week.      Objective            A diabetic pressure wound is noted at right  plantar 1st met head measuring 1.6cm x 1.9cm x 0.1cm.     Inman Classification: 2     Wound base: Red/Granulation     Edges: hyperkeratotic     Drainage: scant/serous     Odor: no     Undermining: no     Tunneling: None     Bone Exposure: No     Clinical Signs of Infection: No     After obtaining patient consent, the wound was irrigated with copious amounts of saline. A curette was then used to debride the wound into the subcutaneous tissue. The wound edges were debrided to healthy, bleeding tissue. Given the patient's lack of sensation, no anesthesia was necessary for the procedure.         BL foot ABIs are 1.      Assessment: DM2 with neuropathy  Right 1st met head ulcer. Healing well. Marked healing in the depth of the ulcer over the last week. Does not probe deeply anymore. I think that the foot is finally ready for an Apligraf.  Left Charcot foot.      Plan:   - Pt seen and evaluated  - Wound was debrided as described.   - I discussed Apligraf application with her. A 44 sq cm piece of Apligraf was applied to the right ulcer in the usual aseptic manner. This was fixated with Steri-strips and covered with sterile dressing. No waste of the graft.   - Recommend a TCC-EZ on the right foot and she agrees to this. This was applied to the right leg. This should be left C/D/I for the next week. ABIs are normal.  The longer this foot is in a cast, the more abnormal pressures are applied to the left foot, which increases her Charcot deformity. She has an appt with Dr. Adams in a few weeks.   - Pt to return to clinic in 1 week. This was Apligraf application #2.

## 2019-04-10 ENCOUNTER — OFFICE VISIT (OUTPATIENT)
Dept: PODIATRY | Facility: CLINIC | Age: 59
End: 2019-04-10
Payer: COMMERCIAL

## 2019-04-10 DIAGNOSIS — E11.42 TYPE 2 DIABETES MELLITUS WITH DIABETIC POLYNEUROPATHY, WITHOUT LONG-TERM CURRENT USE OF INSULIN (H): ICD-10-CM

## 2019-04-10 DIAGNOSIS — E11.621 DIABETIC ULCER OF RIGHT MIDFOOT ASSOCIATED WITH TYPE 2 DIABETES MELLITUS, WITH BONE INVOLVEMENT WITHOUT EVIDENCE OF NECROSIS (H): ICD-10-CM

## 2019-04-10 DIAGNOSIS — L97.416 DIABETIC ULCER OF RIGHT MIDFOOT ASSOCIATED WITH TYPE 2 DIABETES MELLITUS, WITH BONE INVOLVEMENT WITHOUT EVIDENCE OF NECROSIS (H): ICD-10-CM

## 2019-04-10 DIAGNOSIS — M14.672 CHARCOT'S JOINT OF LEFT FOOT: Primary | ICD-10-CM

## 2019-04-10 PROCEDURE — 15271 SKIN SUB GRAFT TRNK/ARM/LEG: CPT | Performed by: PODIATRIST

## 2019-04-10 PROCEDURE — 99212 OFFICE O/P EST SF 10 MIN: CPT | Mod: 25 | Performed by: PODIATRIST

## 2019-04-10 NOTE — PATIENT INSTRUCTIONS
Thanks for coming today.  Ortho/[facility] Medicine Clinic  [address] [place] [address]    To schedule future appointments in Ortho Clinic, you may call [phone].    To schedule ordered imaging by your provider:   Call Central Imaging [phone]    To schedule an injection ordered by your provider:  Call Central Imaging Injection scheduling line: [phone]  [email or web address] available online at:  [email or web address]    Please call if any further questions or concerns ([phone]).  Clinic hours 8 am to 5 pm.    Return to clinic (call) if symptoms worsen or fail to improve.

## 2019-04-10 NOTE — LETTER
4/10/2019         RE: Tiffani Tan  [address] [facility] [address]        Dear Colleague,    Thank you for referring your patient, Tiffani Tan, to the [facility]. Please see a copy of my visit note below.    Chief Complaint   Patient presents with     Right Foot - Follow Up, Cast Change          No Known Allergies      Subjective: Tiffani is a 59 year old female who presents to the clinic today for a follow up of right foot ulcer. Tolerated the cast well. Has Apligraf #1 placed last week.      Objective            A diabetic pressure wound is noted at right  plantar 1st met head measuring 1.6cm x 1.9cm x 0.1cm.     Inman Classification: 2     Wound base: Red/Granulation     Edges: hyperkeratotic     Drainage: scant/serous     Odor: no     Undermining: no     Tunneling: None     Bone Exposure: No     Clinical Signs of Infection: No     After obtaining patient consent, the wound was irrigated with copious amounts of saline. A curette was then used to debride the wound into the subcutaneous tissue. The wound edges were debrided to healthy, bleeding tissue. Given the patient's lack of sensation, no anesthesia was necessary for the procedure.         BL foot ABIs are 1.      Assessment: DM2 with neuropathy  Right 1st met head ulcer. Healing well. Marked healing in the depth of the ulcer over the last week. Does not probe deeply anymore. I think that the foot is finally ready for an Apligraf.  Left Charcot foot.      Plan:   - Pt seen and evaluated  - Wound was debrided as described.   - I discussed Apligraf application with her. A 44 sq cm piece of Apligraf was applied to the right ulcer in the usual aseptic manner. This was fixated with Steri-strips and covered with sterile dressing. No waste of the graft.   - Recommend a TCC-EZ on the right foot and she agrees to this. This was applied to the right leg. This should be left C/D/I for the next week. ABIs are normal.  The longer this foot is in a cast, the  more abnormal pressures are applied to the left foot, which increases her Charcot deformity. She has an appt with Dr. Adams in a few weeks.   - Pt to return to clinic in 1 week. This was Apligraf application #2.      Again, thank you for allowing me to participate in the care of your patient.        Sincerely,        Daniel Giron DPM

## 2019-04-10 NOTE — NURSING NOTE
Tiffani Tan's chief complaint for this visit includes:  Chief Complaint   Patient presents with     Right Foot - Follow Up, Cast Change     PCP: [facility]    Referring Provider:  Wily Bonilla MD  [facility] [address]  [place] [address]    There were no vitals taken for this visit.  Data Unavailable     Do you need any medication refills at today's visit? No    Lisette Long LPN

## 2019-04-10 NOTE — NURSING NOTE
Right short leg TCC-EZ cast applied. Pt tolerated well. Cast education reviewed. All questions answered at this time.    Vincenzo Farah RN

## 2019-04-17 ENCOUNTER — OFFICE VISIT (OUTPATIENT)
Dept: PODIATRY | Facility: CLINIC | Age: 59
End: 2019-04-17
Payer: COMMERCIAL

## 2019-04-17 DIAGNOSIS — E11.42 TYPE 2 DIABETES MELLITUS WITH DIABETIC POLYNEUROPATHY, WITHOUT LONG-TERM CURRENT USE OF INSULIN (H): Primary | ICD-10-CM

## 2019-04-17 DIAGNOSIS — L97.416 DIABETIC ULCER OF RIGHT MIDFOOT ASSOCIATED WITH TYPE 2 DIABETES MELLITUS, WITH BONE INVOLVEMENT WITHOUT EVIDENCE OF NECROSIS (H): ICD-10-CM

## 2019-04-17 DIAGNOSIS — M14.672 CHARCOT'S JOINT OF LEFT FOOT: ICD-10-CM

## 2019-04-17 DIAGNOSIS — E11.621 DIABETIC ULCER OF RIGHT MIDFOOT ASSOCIATED WITH TYPE 2 DIABETES MELLITUS, WITH BONE INVOLVEMENT WITHOUT EVIDENCE OF NECROSIS (H): ICD-10-CM

## 2019-04-17 PROCEDURE — 15271 SKIN SUB GRAFT TRNK/ARM/LEG: CPT | Performed by: PODIATRIST

## 2019-04-17 NOTE — NURSING NOTE
Tiffani Tan's chief complaint for this visit includes:  Chief Complaint   Patient presents with     Right Foot - WOUND CARE, Cast Change     PCP: [facility]    Referring Provider:  Wily Bonilla MD  [facility] [address]  [place] [address]    There were no vitals taken for this visit.  Data Unavailable     Do you need any medication refills at today's visit? No    Lisette Long LPN

## 2019-04-17 NOTE — PROGRESS NOTES
Chief Complaint:   Chief Complaint   Patient presents with     Right Foot - WOUND CARE, Cast Change        No Known Allergies      Subjective: Tiffani is a 59 year old female who presents to the clinic today for a follow up of right foot ulceration. Relates that she tolerated the cast well with no complications. Had Apligraf #2 placed last week.     Objective          A diabetic pressure wound is noted at right  plantar foot measuring 1.8cm x 2.1cm x 0.1cm.    Inman Classification: 2    Wound base: Red/Granulation    Edges: intact    Drainage: scant/serous    Odor: no    Undermining: no    Bone Exposure: No    Clinical Signs of Infection: No    After obtaining patient consent, the wound was irrigated with copious amounts of saline. A curette was then used to debride the wound into the subcutaneous tissue. The wound edges were debrided to healthy, bleeding tissue. Given the patient's lack of sensation, no anesthesia was necessary for the procedure.       Assessment: DM2 with neuropathy.  Charcot foot  Right foot ulceration - continues to heal with Apligraf. 3rd application today.     Plan:   - Pt seen and evaluated  - Wound debrided as described.   - I discussed Apligraf application with her. A 44 sq cm piece of Apligraf was applied to the right ulcer in the usual aseptic manner. This was fixated with Steri-strips and covered with sterile dressing. No waste of the graft.   - Recommend a TCC-EZ on the right foot and she agrees to this. This was applied to the right leg. This should be left C/D/I for the next week. ABIs are normal.  The longer this foot is in a cast, the more abnormal pressures are applied to the left foot, which increases her Charcot deformity. She has an appt with Dr. Adams in a couple of weeks.   - Pt to return to clinic in 1 week. This was Apligraf application #3.

## 2019-04-17 NOTE — LETTER
4/17/2019         RE: Tiffani Tan  [address] [facility] [place] [address]        Dear Colleague,    Thank you for referring your patient, Tiffani Tan, to the [facility]. Please see a copy of my visit note below.    Chief Complaint:   Chief Complaint   Patient presents with     Right Foot - WOUND CARE, Cast Change        No Known Allergies      Subjective: Tiffani is a 59 year old female who presents to the clinic today for a follow up of right foot ulceration. Relates that she tolerated the cast well with no complications. Had Apligraf #2 placed last week.     Objective          A diabetic pressure wound is noted at right  plantar foot measuring 1.8cm x 2.1cm x 0.1cm.    Inman Classification: 2    Wound base: Red/Granulation    Edges: intact    Drainage: scant/serous    Odor: no    Undermining: no    Bone Exposure: No    Clinical Signs of Infection: No    After obtaining patient consent, the wound was irrigated with copious amounts of saline. A curette was then used to debride the wound into the subcutaneous tissue. The wound edges were debrided to healthy, bleeding tissue. Given the patient's lack of sensation, no anesthesia was necessary for the procedure.       Assessment: DM2 with neuropathy.  Charcot foot  Right foot ulceration - continues to heal with Apligraf. 3rd application today.     Plan:   - Pt seen and evaluated  - Wound debrided as described.   - I discussed Apligraf application with her. A 44 sq cm piece of Apligraf was applied to the right ulcer in the usual aseptic manner. This was fixated with Steri-strips and covered with sterile dressing. No waste of the graft.   - Recommend a TCC-EZ on the right foot and she agrees to this. This was applied to the right leg. This should be left C/D/I for the next week. ABIs are normal.  The longer this foot is in a cast, the more abnormal pressures are applied to the left foot, which increases her Charcot deformity. She has an appt with Dr. Adams in a  couple of weeks.   - Pt to return to clinic in 1 week. This was Apligraf application #3.        Again, thank you for allowing me to participate in the care of your patient.        Sincerely,        Daniel Giron DPM

## 2019-04-19 NOTE — TELEPHONE ENCOUNTER
RECORDS RECEIVED FROM: Charcot's joint of left foot,Diabetic ulcer of right midfoot associated with type 2 diabetes mellitus, with bone involvement without evidence of necrosis,referred by ,all records in system,appt per pt   DATE RECEIVED: Apr 30, 2019    NOTES STATUS DETAILS   OFFICE NOTE from referring provider Internal Dr. Giron 4/17/19   OFFICE NOTE from other specialist N/A    DISCHARGE SUMMARY from hospital N/A    DISCHARGE REPORT from the ER N/A    OPERATIVE REPORT N/A    MEDICATION LIST Internal    IMPLANT RECORD/STICKER N/A    LABS     CBC/DIFF Care Everywhere 3/14/19   CULTURES N/A    INJECTIONS DONE IN RADIOLOGY N/A    MRI N/A    CT SCAN N/A    XRAYS (IMAGES & REPORTS) Internal 1/9/19   TUMOR     PATHOLOGY  Slides & report N/A

## 2019-04-24 ENCOUNTER — OFFICE VISIT (OUTPATIENT)
Dept: PODIATRY | Facility: CLINIC | Age: 59
End: 2019-04-24
Payer: COMMERCIAL

## 2019-04-24 VITALS — DIASTOLIC BLOOD PRESSURE: 71 MMHG | SYSTOLIC BLOOD PRESSURE: 144 MMHG

## 2019-04-24 DIAGNOSIS — L97.416 DIABETIC ULCER OF RIGHT MIDFOOT ASSOCIATED WITH TYPE 2 DIABETES MELLITUS, WITH BONE INVOLVEMENT WITHOUT EVIDENCE OF NECROSIS (H): ICD-10-CM

## 2019-04-24 DIAGNOSIS — M14.672 CHARCOT'S JOINT OF LEFT FOOT: Primary | ICD-10-CM

## 2019-04-24 DIAGNOSIS — E11.621 DIABETIC ULCER OF RIGHT MIDFOOT ASSOCIATED WITH TYPE 2 DIABETES MELLITUS, WITH BONE INVOLVEMENT WITHOUT EVIDENCE OF NECROSIS (H): ICD-10-CM

## 2019-04-24 DIAGNOSIS — E11.42 TYPE 2 DIABETES MELLITUS WITH DIABETIC POLYNEUROPATHY, WITHOUT LONG-TERM CURRENT USE OF INSULIN (H): ICD-10-CM

## 2019-04-24 PROCEDURE — 15271 SKIN SUB GRAFT TRNK/ARM/LEG: CPT | Performed by: PODIATRIST

## 2019-04-24 NOTE — NURSING NOTE
Tiffani Tan's chief complaint for this visit includes:  Chief Complaint   Patient presents with     Right Foot - WOUND CARE, Cast Change     PCP: [facility]    Referring Provider:  Wily Bonilla MD  [facility] [address]  [place] [address]    /71 (BP Location: Right arm, Patient Position: Sitting, Cuff Size: Adult Regular)   Data Unavailable     Do you need any medication refills at today's visit? Rubina Long LPN

## 2019-04-24 NOTE — PROGRESS NOTES
Chief Complaint   Patient presents with     Right Foot - WOUND CARE, Cast Change          No Known Allergies        Subjective: Tiffani is a 59 year old female who presents to the clinic today for a follow up of right foot ulceration. Relates that she tolerated the cast well with no complications. Had Apligraf #3 placed last week.      Objective                 A diabetic pressure wound is noted at right  plantar foot measuring 1.8cm x 1.8cm x 0.1cm.     Inman Classification: 2     Wound base: Red/Granulation     Edges: intact     Drainage: scant/serous     Odor: no     Undermining: no     Bone Exposure: No     Clinical Signs of Infection: No     After obtaining patient consent, the wound was irrigated with copious amounts of saline. A curette was then used to debride the wound into the subcutaneous tissue. The wound edges were debrided to healthy, bleeding tissue. Given the patient's lack of sensation, no anesthesia was necessary for the procedure.         Assessment: DM2 with neuropathy.  Charcot foot  Right foot ulceration - continues to heal with Apligraf. 4th application today.      Plan:   - Pt seen and evaluated  - Wound debrided as described.   - I discussed Apligraf application with her. A 44 sq cm piece of Apligraf was applied to the right ulcer in the usual aseptic manner. This was fixated with Steri-strips and covered with sterile dressing. No waste of the graft.   - Recommend a TCC-EZ on the right foot and she agrees to this. This was applied to the right leg. This should be left C/D/I for the next week. ABIs are normal.  The longer this foot is in a cast, the more abnormal pressures are applied to the left foot, which increases her Charcot deformity. She has an appt with Dr. Adams in a couple of weeks.   - Pt to return to clinic in 1 week. This was Apligraf application #4.

## 2019-04-24 NOTE — PATIENT INSTRUCTIONS
Thanks for coming today.  Ortho/Sports Medicine Clinic  [address] [place] [address]    To schedule future appointments in Ortho Clinic, you may call [phone].    To schedule ordered imaging by your provider:   Call Central Imaging [phone]    To schedule an injection ordered by your provider:  Call Central Imaging Injection scheduling line: [phone]  [email or web address] available online at:  [email or web address]    Please call if any further questions or concerns ([phone]).  Clinic hours 8 am to 5 pm.    Return to clinic (call) if symptoms worsen or fail to improve.

## 2019-04-24 NOTE — LETTER
4/24/2019         RE: Tiffani Tan  [address] [facility] [address]        Dear Colleague,    Thank you for referring your patient, Tiffani Tan, to the [facility]. Please see a copy of my visit note below.    Chief Complaint   Patient presents with     Right Foot - WOUND CARE, Cast Change          No Known Allergies        Subjective: Tiffani is a 59 year old female who presents to the clinic today for a follow up of right foot ulceration. Relates that she tolerated the cast well with no complications. Had Apligraf #3 placed last week.      Objective                 A diabetic pressure wound is noted at right  plantar foot measuring 1.8cm x 1.8cm x 0.1cm.     Inman Classification: 2     Wound base: Red/Granulation     Edges: intact     Drainage: scant/serous     Odor: no     Undermining: no     Bone Exposure: No     Clinical Signs of Infection: No     After obtaining patient consent, the wound was irrigated with copious amounts of saline. A curette was then used to debride the wound into the subcutaneous tissue. The wound edges were debrided to healthy, bleeding tissue. Given the patient's lack of sensation, no anesthesia was necessary for the procedure.         Assessment: DM2 with neuropathy.  Charcot foot  Right foot ulceration - continues to heal with Apligraf. 4th application today.      Plan:   - Pt seen and evaluated  - Wound debrided as described.   - I discussed Apligraf application with her. A 44 sq cm piece of Apligraf was applied to the right ulcer in the usual aseptic manner. This was fixated with Steri-strips and covered with sterile dressing. No waste of the graft.   - Recommend a TCC-EZ on the right foot and she agrees to this. This was applied to the right leg. This should be left C/D/I for the next week. ABIs are normal.  The longer this foot is in a cast, the more abnormal pressures are applied to the left foot, which increases her Charcot deformity. She has an appt with Dr. Adams in  a couple of weeks.   - Pt to return to clinic in 1 week. This was Apligraf application #4.          Again, thank you for allowing me to participate in the care of your patient.        Sincerely,        Daniel Giron DPM

## 2019-04-30 ENCOUNTER — OFFICE VISIT (OUTPATIENT)
Dept: ORTHOPEDICS | Facility: CLINIC | Age: 59
End: 2019-04-30
Attending: PODIATRIST
Payer: COMMERCIAL

## 2019-04-30 ENCOUNTER — ANCILLARY PROCEDURE (OUTPATIENT)
Dept: GENERAL RADIOLOGY | Facility: CLINIC | Age: 59
End: 2019-04-30
Attending: ORTHOPAEDIC SURGERY
Payer: COMMERCIAL

## 2019-04-30 ENCOUNTER — PRE VISIT (OUTPATIENT)
Dept: ORTHOPEDICS | Facility: CLINIC | Age: 59
End: 2019-04-30

## 2019-04-30 DIAGNOSIS — M25.572 PAIN IN JOINT, ANKLE AND FOOT, LEFT: ICD-10-CM

## 2019-04-30 DIAGNOSIS — M14.672 CHARCOT'S JOINT OF LEFT FOOT: ICD-10-CM

## 2019-04-30 DIAGNOSIS — M14.672 CHARCOT'S JOINT OF LEFT FOOT: Primary | ICD-10-CM

## 2019-04-30 ASSESSMENT — ENCOUNTER SYMPTOMS
FLANK PAIN: 0
LOSS OF CONSCIOUSNESS: 0
TREMORS: 0
SEIZURES: 0
DIZZINESS: 1
DISTURBANCES IN COORDINATION: 1
INSOMNIA: 1
NUMBNESS: 1
PANIC: 0
DEPRESSION: 1
TINGLING: 0
HEMATURIA: 0
HEADACHES: 0
DECREASED CONCENTRATION: 0
WEAKNESS: 0
MEMORY LOSS: 0
NERVOUS/ANXIOUS: 1
SPEECH CHANGE: 0
DIFFICULTY URINATING: 0
DYSURIA: 0
PARALYSIS: 0

## 2019-04-30 NOTE — NURSING NOTE
Reason For Visit:   Chief Complaint   Patient presents with     Consult     charcot foot xr grom 1/19       There were no vitals taken for this visit.    Pain Assessment  Patient Currently in Pain: Yes  0-10 Pain Scale: 10  Primary Pain Location: Foot    Lisette David ATC

## 2019-04-30 NOTE — PROGRESS NOTES
CHIEF COMPLAINT:  Left foot deformity.      HISTORY OF PRESENT ILLNESS:  Mrs. Tan is a 59-year-old female who presents in the accompaniment of her daughter for evaluation of her left foot.  The patient reports to have a history of Charcot arthropathy and reports to have a struggle with the left foot .  Currently, she is under the care of Dr. Giron with regards to the right foot.      The patient has carried the diagnosis of Charcot arthropathy for quite a while.  For the most part, she has been able to control the left foot without any significant ulcerations, although this is always by their report.  Reports to be minimally ambulatory now secondary to taking care of her right foot.  Reports to wear a CAM walker on the left foot.      She presents today for discussion of treatment options.      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  Reports otherwise to be very sedentary and minimally active because of the precautions that she needs to take for her right foot.      PAST MEDICAL HISTORY:  Significant for insulin-dependent diabetes, high cholesterol.      PAST SURGICAL HISTORY:  Reviewed today.      DRUG ALLERGIES:  None.      CURRENT MEDICATIONS:  Please refer to encounter form.      PHYSICAL EXAMINATION:  On today's visit, she presents as a pleasant female in no apparent distress.      On today's visit, she presents with a fairly swollen left foot.  Presents with a very superficial ulceration along the lateral aspect of the ankle joint.  The foot presents as slightly cavus and varus alignment.  Forefoot exam is unremarkable except for some thickening of the skin plantarly.  Alignment of the lesser toes is within normal limits.      There is diffuse swelling through the lower leg with at least a 2+ pitting edema.      RADIOGRAPHIC EVALUATION:  Plain x-rays of the foot were reviewed today which were significant for showing complete destruction of the foot, both of the  forefoot and hindfoot area.  The patient presents with some anterior dislocation of the talus with respect to the tibia.      ASSESSMENT:  Status post left foot Charcot arthropathy with severe deformity.      PLAN:  I discussed with the patient that at this point I do not think that the foot is reconstructable.  I suggest to proceed with the use of a CROW brace to see how much we can contain the foot and keep her from having any ulcerations.      I spent a fair amount of time with her, telling her the importance and the fragility of the foot as it will be very easy for her to develop an ulcer and, therefore, to proceed with further complications down the road.      All questions were answered.  The patient was pleased with the discussion.  The patient will follow up accordingly.  A prescription for a CROW brace was given to her on today's visit.  The patient will follow up otherwise on a p.r.n. basis with us and she will continue her regular followup with Dr. Giron.      All questions were answered.  The patient was pleased with the discussion.      TT 30 minutes, CT 20 minutes.

## 2019-04-30 NOTE — LETTER
4/30/2019       RE: Tiffani Tan  [address] [facility] [address]     Dear Colleague,    Thank you for referring your patient, Tiffani Tan, to the [facility]. Please see a copy of my visit note below.    CHIEF COMPLAINT:  Left foot deformity.      HISTORY OF PRESENT ILLNESS:  Mrs. Tan is a 59-year-old female who presents in the accompaniment of her daughter for evaluation of her left foot.  The patient reports to have a history of Charcot arthropathy and reports to have a struggle with the left foot .  Currently, she is under the care of Dr. Giron with regards to the right foot.      The patient has carried the diagnosis of Charcot arthropathy for quite a while.  For the most part, she has been able to control the left foot without any significant ulcerations, although this is always by their report.  Reports to be minimally ambulatory now secondary to taking care of her right foot.  Reports to wear a CAM walker on the left foot.      She presents today for discussion of treatment options.      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  Reports otherwise to be very sedentary and minimally active because of the precautions that she needs to take for her right foot.      PAST MEDICAL HISTORY:  Significant for insulin-dependent diabetes, high cholesterol.      PAST SURGICAL HISTORY:  Reviewed today.      DRUG ALLERGIES:  None.      CURRENT MEDICATIONS:  Please refer to encounter form.      PHYSICAL EXAMINATION:  On today's visit, she presents as a pleasant female in no apparent distress.      On today's visit, she presents with a fairly swollen left foot.  Presents with a very superficial ulceration along the lateral aspect of the ankle joint.  The foot presents as slightly cavus and varus alignment.  Forefoot exam is unremarkable except for some thickening of the skin plantarly.  Alignment of the lesser toes is  within normal limits.      There is diffuse swelling through the lower leg with at least a 2+ pitting edema.      RADIOGRAPHIC EVALUATION:  Plain x-rays of the foot were reviewed today which were significant for showing complete destruction of the foot, both of the forefoot and hindfoot area.  The patient presents with some anterior dislocation of the talus with respect to the tibia.      ASSESSMENT:  Status post left foot Charcot arthropathy with severe deformity.      PLAN:  I discussed with the patient that at this point I do not think that the foot is reconstructable.  I suggest to proceed with the use of a CROW brace to see how much we can contain the foot and keep her from having any ulcerations.      I spent a fair amount of time with her, telling her the importance and the fragility of the foot as it will be very easy for her to develop an ulcer and, therefore, to proceed with further complications down the road.      All questions were answered.  The patient was pleased with the discussion.  The patient will follow up accordingly.  A prescription for a CROW brace was given to her on today's visit.  The patient will follow up otherwise on a p.r.n. basis with us and she will continue her regular followup with Dr. Giron.      All questions were answered.  The patient was pleased with the discussion.      TT 30 minutes, CT 20 minutes.     Again, thank you for allowing me to participate in the care of your patient.      Sincerely,    Aniceto Adams MD

## 2019-05-01 ENCOUNTER — OFFICE VISIT (OUTPATIENT)
Dept: PODIATRY | Facility: CLINIC | Age: 59
End: 2019-05-01
Payer: COMMERCIAL

## 2019-05-01 VITALS — HEART RATE: 92 BPM | SYSTOLIC BLOOD PRESSURE: 124 MMHG | OXYGEN SATURATION: 93 % | DIASTOLIC BLOOD PRESSURE: 64 MMHG

## 2019-05-01 DIAGNOSIS — E11.621 DIABETIC ULCER OF RIGHT MIDFOOT ASSOCIATED WITH TYPE 2 DIABETES MELLITUS, WITH BONE INVOLVEMENT WITHOUT EVIDENCE OF NECROSIS (H): Primary | ICD-10-CM

## 2019-05-01 DIAGNOSIS — L97.416 DIABETIC ULCER OF RIGHT MIDFOOT ASSOCIATED WITH TYPE 2 DIABETES MELLITUS, WITH BONE INVOLVEMENT WITHOUT EVIDENCE OF NECROSIS (H): Primary | ICD-10-CM

## 2019-05-01 DIAGNOSIS — E11.42 TYPE 2 DIABETES MELLITUS WITH DIABETIC POLYNEUROPATHY, WITHOUT LONG-TERM CURRENT USE OF INSULIN (H): ICD-10-CM

## 2019-05-01 PROCEDURE — 15275 SKIN SUB GRAFT FACE/NK/HF/G: CPT | Performed by: PODIATRIST

## 2019-05-01 NOTE — PROGRESS NOTES
No past medical history on file.  Patient Active Problem List   Diagnosis     Charcot's joint of left foot     Type 2 diabetes mellitus with diabetic polyneuropathy, without long-term current use of insulin (H)     Diabetic ulcer of right midfoot associated with type 2 diabetes mellitus, with bone involvement without evidence of necrosis (H)     No past surgical history on file.  Social History     Socioeconomic History     Marital status: Single     Spouse name: Not on file     Number of children: Not on file     Years of education: Not on file     Highest education level: Not on file   Occupational History     Not on file   Social Needs     Financial resource strain: Not on file     Food insecurity:     Worry: Not on file     Inability: Not on file     Transportation needs:     Medical: Not on file     Non-medical: Not on file   Tobacco Use     Smoking status: Current Every Day Smoker     Smokeless tobacco: Never Used   Substance and Sexual Activity     Alcohol use: Not on file     Drug use: Not on file     Sexual activity: Not on file   Lifestyle     Physical activity:     Days per week: Not on file     Minutes per session: Not on file     Stress: Not on file   Relationships     Social connections:     Talks on phone: Not on file     Gets together: Not on file     Attends Anglican service: Not on file     Active member of club or organization: Not on file     Attends meetings of clubs or organizations: Not on file     Relationship status: Not on file     Intimate partner violence:     Fear of current or ex partner: Not on file     Emotionally abused: Not on file     Physically abused: Not on file     Forced sexual activity: Not on file   Other Topics Concern     Not on file   Social History Narrative     Not on file     No family history on file.  A1c             5.5                1/21/2019  Labs, imaging and previous notes reviewed as noted in emr.  SUBJECTIVE FINDINGS:  59-year-old female presents for  Realigraf from Dr. Giron.  She is in an EZ total contact cast.  She relates it has been going okay.  She has seen Dr. Adams for Charcot foot on the left yesterday.  She relates there is no specific treatment for that for her.      OBJECTIVE FINDINGS:  DP and PT are 2/4 right.  She has a right plantar first MPJ ulcer that is deep into the subcutaneous tissues.  There is some serosanguineous drainage.  Minimal edema, no erythema, no odor, no calor.  It is about 2 x 2.5 cm at its widest margin.  There are some new skin islands.  The depth has decreased from looking at last week's picture.      ASSESSMENT AND PLAN:  Ulcer, right first MPJ.  She is diabetic with peripheral neuropathy.  Diagnosis and treatment discussed with her.  The ulcer was prepped for Apligraf.  The Apligraf was applied and secured with Wound Veil and Steri-Strips.  A saline wet-to-dry dressing applied.  Total contact EZ cast to the right lower extremity.  This is the fifth Apligraf that has been applied.  An entire Apligraf was used to cover the wound and margins.  None was discarded.  Return to clinic next week to follow up with Dr. Giron.

## 2019-05-01 NOTE — NURSING NOTE
Tiffani Tan's chief complaint for this visit includes:  Chief Complaint   Patient presents with     Right Foot - Follow Up, WOUND CARE     Cast Change     [facility]    Referring Provider:  Wily Bonilla MD  [facility] [address]  [place] [id number]    /64 (BP Location: Right arm, Patient Position: Sitting, Cuff Size: Adult Large)   Pulse 92   SpO2 93%   Data Unavailable     Do you need any medication refills at today's visit? No    Lisette Long LPN

## 2019-05-01 NOTE — PATIENT INSTRUCTIONS
Thanks for coming today.  Ortho/Sports Medicine Clinic  [address] [place] [address]    To schedule future appointments in Ortho Clinic, you may call [phone].    To schedule ordered imaging by your provider:   Call Central Imaging [phone]    To schedule an injection ordered by your provider:  Call Central Imaging Injection scheduling line: [phone]  [employer] available online at:  [email or web address]    Please call if any further questions or concerns ([phone]).  Clinic hours 8 am to 5 pm.    Return to clinic (call) if symptoms worsen or fail to improve.

## 2019-05-08 ENCOUNTER — OFFICE VISIT (OUTPATIENT)
Dept: PODIATRY | Facility: CLINIC | Age: 59
End: 2019-05-08
Payer: COMMERCIAL

## 2019-05-08 DIAGNOSIS — E11.42 TYPE 2 DIABETES MELLITUS WITH DIABETIC POLYNEUROPATHY, WITHOUT LONG-TERM CURRENT USE OF INSULIN (H): Primary | ICD-10-CM

## 2019-05-08 DIAGNOSIS — M14.672 CHARCOT'S JOINT OF LEFT FOOT: ICD-10-CM

## 2019-05-08 DIAGNOSIS — L97.416 DIABETIC ULCER OF RIGHT MIDFOOT ASSOCIATED WITH TYPE 2 DIABETES MELLITUS, WITH BONE INVOLVEMENT WITHOUT EVIDENCE OF NECROSIS (H): ICD-10-CM

## 2019-05-08 DIAGNOSIS — E11.621 DIABETIC ULCER OF RIGHT MIDFOOT ASSOCIATED WITH TYPE 2 DIABETES MELLITUS, WITH BONE INVOLVEMENT WITHOUT EVIDENCE OF NECROSIS (H): ICD-10-CM

## 2019-05-08 PROCEDURE — 29445 APPL RIGID TOT CNTC LEG CAST: CPT | Performed by: PODIATRIST

## 2019-05-08 NOTE — NURSING NOTE
Tiffani Tan's chief complaint for this visit includes:  Chief Complaint   Patient presents with     Right Foot - Follow Up, WOUND CARE     PCP: [facility] (Inactive)    Referring Provider:  Wily Bonilla MD  [facility] [address] [place] [address]    There were no vitals taken for this visit.  Data Unavailable     Do you need any medication refills at today's visit? No    Lisette Long LPN

## 2019-05-08 NOTE — PATIENT INSTRUCTIONS
Thanks for coming today.  Ortho/Sports Medicine Clinic  [address] [place] [address]    To schedule future appointments in Ortho Clinic, you may call [phone].    To schedule ordered imaging by your provider:   Call Central Imaging [phone]    To schedule an injection ordered by your provider:  Call Central Imaging Injection scheduling line: [phone]  [employer] available online at:  [email or web address]    Please call if any further questions or concerns ([phone]).  Clinic hours 8 am to 5 pm.    Return to clinic (call) if symptoms worsen or fail to improve.

## 2019-05-08 NOTE — LETTER
5/8/2019         RE: Tiffani Tan  [address] [facility] [address]        Dear Colleague,    Thank you for referring your patient, Tiffani Tan, to the [facility]. Please see a copy of my visit note below.    Chief Complaint:   Chief Complaint   Patient presents with     Right Foot - Follow Up, WOUND CARE        No Known Allergies      Subjective: Tiffani is a 59 year old female who presents to the clinic today for a follow up of right foot ulcer. She relates that she tolerated the last cast well with no complications. She has stayed off of the foot. Last week was Apligraf #5.    Objective  Data Unavailable Data Unavailable Data Unavailable Data Unavailable Data Unavailable 0 lbs 0 oz        A diabetic pressure wound is noted at right  plantar foot measuring 1.3cm x 1.3cm x 0.1cm.    Inman Classification: 2    Wound base: Red/Granulation    Edges: intact    Drainage: slight/serous    Odor: no    Undermining: no    Bone Exposure: No    Clinical Signs of Infection: No    After obtaining patient consent, the wound was irrigated with copious amounts of saline. A scalpel and curette were then used to debride the wound into the subcutaneous tissue. The wound edges were debrided to healthy, bleeding tissue. Given the patient's lack of sensation, no anesthesia was necessary for the procedure.       Assessment: DM2 with neuropathy  Charcot foot    Plan:   - Pt seen and evaluated  - Right foot ulceration was debrided as described. Wound edges look good. No Apligraf this week.   - TCC was applied to the right leg.  Remain NWB.   - Pt to return to clinic in 1 week.       Again, thank you for allowing me to participate in the care of your patient.        Sincerely,        Daniel Giron DPM

## 2019-05-08 NOTE — PROGRESS NOTES
Chief Complaint:   Chief Complaint   Patient presents with     Right Foot - Follow Up, WOUND CARE        No Known Allergies      Subjective: Tiffani is a 59 year old female who presents to the clinic today for a follow up of right foot ulcer. She relates that she tolerated the last cast well with no complications. She has stayed off of the foot. Last week was Apligraf #5.    Objective  Data Unavailable Data Unavailable Data Unavailable Data Unavailable Data Unavailable 0 lbs 0 oz        A diabetic pressure wound is noted at right  plantar foot measuring 1.3cm x 1.3cm x 0.1cm.    Inman Classification: 2    Wound base: Red/Granulation    Edges: intact    Drainage: slight/serous    Odor: no    Undermining: no    Bone Exposure: No    Clinical Signs of Infection: No    After obtaining patient consent, the wound was irrigated with copious amounts of saline. A scalpel and curette were then used to debride the wound into the subcutaneous tissue. The wound edges were debrided to healthy, bleeding tissue. Given the patient's lack of sensation, no anesthesia was necessary for the procedure.       Assessment: DM2 with neuropathy  Charcot foot    Plan:   - Pt seen and evaluated  - Right foot ulceration was debrided as described. Wound edges look good. No Apligraf this week.   - TCC was applied to the right leg.  Remain NWB.   - Pt to return to clinic in 1 week.

## 2019-05-15 ENCOUNTER — OFFICE VISIT (OUTPATIENT)
Dept: PODIATRY | Facility: CLINIC | Age: 59
End: 2019-05-15
Payer: COMMERCIAL

## 2019-05-15 DIAGNOSIS — L97.416 DIABETIC ULCER OF RIGHT MIDFOOT ASSOCIATED WITH TYPE 2 DIABETES MELLITUS, WITH BONE INVOLVEMENT WITHOUT EVIDENCE OF NECROSIS (H): ICD-10-CM

## 2019-05-15 DIAGNOSIS — M14.672 CHARCOT'S JOINT OF LEFT FOOT: ICD-10-CM

## 2019-05-15 DIAGNOSIS — E11.621 DIABETIC ULCER OF RIGHT MIDFOOT ASSOCIATED WITH TYPE 2 DIABETES MELLITUS, WITH BONE INVOLVEMENT WITHOUT EVIDENCE OF NECROSIS (H): ICD-10-CM

## 2019-05-15 DIAGNOSIS — E11.42 TYPE 2 DIABETES MELLITUS WITH DIABETIC POLYNEUROPATHY, WITHOUT LONG-TERM CURRENT USE OF INSULIN (H): Primary | ICD-10-CM

## 2019-05-15 PROCEDURE — 15271 SKIN SUB GRAFT TRNK/ARM/LEG: CPT | Performed by: PODIATRIST

## 2019-05-15 NOTE — LETTER
5/15/2019         RE: Tiffani Tan  [address] [facility] [address]        Dear Colleague,    Thank you for referring your patient, Tiffani Tan, to the [facility]. Please see a copy of my visit note below.    Chief Complaint:   Chief Complaint   Patient presents with     Right Foot - Follow Up, WOUND CARE, Cast Change        No Known Allergies      Subjective: Tiffani is a 59 year old female who presents to the clinic today for a follow up of right foot ulcer. She tolerated the cast well with no complications. She is getting her CROW in a few days for the left foot.     Objective          A diabetic pressure wound is noted at right  plantar 1st met head measuring 2cm x 2.3cm x 0.1cm.    Inman Classification: 2    Wound base: Red/Granulation    Edges: intact    Drainage: scant/serous    Odor: no    Undermining: no    Bone Exposure: No    Clinical Signs of Infection: No    After obtaining patient consent, the wound was irrigated with copious amounts of saline. A curette was then used to debride the wound into the subcutaneous tissue. The wound edges were debrided to healthy, bleeding tissue. Given the patient's lack of sensation, no anesthesia was necessary for the procedure.       Assessment: DM2 with neuropathy.  Charcot foot.  Right plantar foot ulceration - slightly larger than last week. Because of this., I would recommend that we use another Apligraf and TCC on the ulceration and she agrees.     Plan:   - Pt seen and evaluated  - Wound debrided as described.   - I discussed Apligraf application with her. A 44 sq cm piece of Apligraf was applied to the right ulcer in the usual aseptic manner. This was fixated with Steri-strips and covered with sterile dressing. No waste of the graft.   - Recommend a TCC-EZ on the right foot and she agrees to this. This was applied to the right leg. This should be left C/D/I for the next week. ABIs are normal.  The longer this foot is in a cast, the more abnormal  pressures are applied to the left foot, which increases her Charcot deformity. She has an appt with Dr. Aadms in a couple of weeks.   - Pt to return to clinic in 1 week. This was Apligraf application #6.        Again, thank you for allowing me to participate in the care of your patient.        Sincerely,        Daniel Giron DPM

## 2019-05-15 NOTE — PATIENT INSTRUCTIONS
Thanks for coming today.  Ortho/Sports Medicine Clinic  [address] [place] [address]    To schedule future appointments in Ortho Clinic, you may call [phone].    To schedule ordered imaging by your provider:   Call Central Imaging [phone]    To schedule an injection ordered by your provider:  Call Central Imaging Injection scheduling line: [phone]  [employer] available online at:  [email or web address]    Please call if any further questions or concerns ([phone]).  Clinic hours 8 am to 5 pm.    Return to clinic (call) if symptoms worsen or fail to improve.

## 2019-05-15 NOTE — PROGRESS NOTES
Chief Complaint:   Chief Complaint   Patient presents with     Right Foot - Follow Up, WOUND CARE, Cast Change        No Known Allergies      Subjective: Tiffani is a 59 year old female who presents to the clinic today for a follow up of right foot ulcer. She tolerated the cast well with no complications. She is getting her CROW in a few days for the left foot.     Objective          A diabetic pressure wound is noted at right  plantar 1st met head measuring 2cm x 2.3cm x 0.1cm.    Inman Classification: 2    Wound base: Red/Granulation    Edges: intact    Drainage: scant/serous    Odor: no    Undermining: no    Bone Exposure: No    Clinical Signs of Infection: No    After obtaining patient consent, the wound was irrigated with copious amounts of saline. A curette was then used to debride the wound into the subcutaneous tissue. The wound edges were debrided to healthy, bleeding tissue. Given the patient's lack of sensation, no anesthesia was necessary for the procedure.       Assessment: DM2 with neuropathy.  Charcot foot.  Right plantar foot ulceration - slightly larger than last week. Because of this., I would recommend that we use another Apligraf and TCC on the ulceration and she agrees.     Plan:   - Pt seen and evaluated  - Wound debrided as described.   - I discussed Apligraf application with her. A 44 sq cm piece of Apligraf was applied to the right ulcer in the usual aseptic manner. This was fixated with Steri-strips and covered with sterile dressing. No waste of the graft.   - Recommend a TCC-EZ on the right foot and she agrees to this. This was applied to the right leg. This should be left C/D/I for the next week. ABIs are normal.  The longer this foot is in a cast, the more abnormal pressures are applied to the left foot, which increases her Charcot deformity. She has an appt with Dr. Adams in a couple of weeks.   - Pt to return to clinic in 1 week. This was Apligraf application #6.

## 2019-05-15 NOTE — NURSING NOTE
Tiffani Tan's chief complaint for this visit includes:  Chief Complaint   Patient presents with     Right Foot - Follow Up, WOUND CARE, Cast Change     [facility]    Referring Provider:  Wily Bonilla MD  [facility] [address] [place] [address]    There were no vitals taken for this visit.  Data Unavailable     Do you need any medication refills at today's visit? No    Lisette Long LPN

## 2019-05-22 ENCOUNTER — OFFICE VISIT (OUTPATIENT)
Dept: PODIATRY | Facility: CLINIC | Age: 59
End: 2019-05-22
Payer: COMMERCIAL

## 2019-05-22 DIAGNOSIS — E11.42 TYPE 2 DIABETES MELLITUS WITH DIABETIC POLYNEUROPATHY, WITHOUT LONG-TERM CURRENT USE OF INSULIN (H): Primary | ICD-10-CM

## 2019-05-22 DIAGNOSIS — M14.672 CHARCOT'S JOINT OF LEFT FOOT: ICD-10-CM

## 2019-05-22 DIAGNOSIS — L97.416 DIABETIC ULCER OF RIGHT MIDFOOT ASSOCIATED WITH TYPE 2 DIABETES MELLITUS, WITH BONE INVOLVEMENT WITHOUT EVIDENCE OF NECROSIS (H): ICD-10-CM

## 2019-05-22 DIAGNOSIS — E11.621 DIABETIC ULCER OF RIGHT MIDFOOT ASSOCIATED WITH TYPE 2 DIABETES MELLITUS, WITH BONE INVOLVEMENT WITHOUT EVIDENCE OF NECROSIS (H): ICD-10-CM

## 2019-05-22 PROCEDURE — 29445 APPL RIGID TOT CNTC LEG CAST: CPT | Mod: RT | Performed by: PODIATRIST

## 2019-05-22 NOTE — LETTER
5/22/2019         RE: Tiffani Tan  [address] [facility] [address]        Dear Colleague,    Thank you for referring your patient, Tiffani Tan, to the [facility]. Please see a copy of my visit note below.    Chief Complaint:   Chief Complaint   Patient presents with     Right Foot - Follow Up, WOUND CARE, Cast Change        No Known Allergies      Subjective: Tiffani is a 59 year old female who presents to the clinic today for a follow up of right foot ulcer. She had an Apligraf #6 placed last week. She tolerated the cast well with no complications.      Objective        A diabetic pressure wound is noted at right  plantar 1st met head measuring  0.9cm x 2cm x 0.1cm.     Inman Classification: 2     Wound base: Red/Granulation     Edges: intact     Drainage: scant/serous     Odor: no     Undermining: no     Bone Exposure: No     Clinical Signs of Infection: No     After obtaining patient consent, the wound was irrigated with copious amounts of saline. A curette was then used to debride the wound into the subcutaneous tissue. The wound edges were debrided to healthy, bleeding tissue. Given the patient's lack of sensation, no anesthesia was necessary for the procedure.         Assessment: DM2 with neuropathy.  Charcot foot.  Right plantar foot ulceration - slightly larger than last week. Because of this., I would recommend that we use another Apligraf and TCC on the ulceration and she agrees.      Plan:   - Pt seen and evaluated  - Wound debrided as described.   - We do not have an Apligraf available for her for this week. Will cover the wound with Neema and a TCC.  - Recommend a TCC-EZ on the right foot and she agrees to this. This was applied to the right leg. This should be left C/D/I for the next week. ABIs are normal.    - Pt to return to clinic in 1 week for Apligraf application #7.      Again, thank you for allowing me to participate in the care of your patient.        Sincerely,        Daniel SIMPSON  MARY Giron

## 2019-05-22 NOTE — NURSING NOTE
Tiffani Tan's chief complaint for this visit includes:  Chief Complaint   Patient presents with     Right Foot - Follow Up, WOUND CARE, Cast Change     [facility]    Referring Provider:  Wily Bonilla MD  [facility] [address] [place] [address]    There were no vitals taken for this visit.  Data Unavailable     Do you need any medication refills at today's visit? No    Lisette Long LPN

## 2019-05-22 NOTE — PATIENT INSTRUCTIONS
Thanks for coming today.  Ortho/Sports Medicine Clinic  [address] [place] [address]    To schedule future appointments in Ortho Clinic, you may call [phone].    To schedule ordered imaging by your provider:   Call Central Imaging [phone]    To schedule an injection ordered by your provider:  Call Central Imaging Injection scheduling line: [phone]  [employer] available online at:  [email or web address]    Please call if any further questions or concerns ([phone]).  Clinic hours 8 am to 5 pm.    Return to clinic (call) if symptoms worsen or fail to improve.

## 2019-05-22 NOTE — PROGRESS NOTES
Chief Complaint:   Chief Complaint   Patient presents with     Right Foot - Follow Up, WOUND CARE, Cast Change        No Known Allergies      Subjective: Tiffani is a 59 year old female who presents to the clinic today for a follow up of right foot ulcer. She had an Apligraf #6 placed last week. She tolerated the cast well with no complications.      Objective        A diabetic pressure wound is noted at right  plantar 1st met head measuring 0.9cm x 2cm x 0.1cm.     Inman Classification: 2     Wound base: Red/Granulation     Edges: intact     Drainage: scant/serous     Odor: no     Undermining: no     Bone Exposure: No     Clinical Signs of Infection: No     After obtaining patient consent, the wound was irrigated with copious amounts of saline. A curette was then used to debride the wound into the subcutaneous tissue. The wound edges were debrided to healthy, bleeding tissue. Given the patient's lack of sensation, no anesthesia was necessary for the procedure.         Assessment: DM2 with neuropathy.  Charcot foot.  Right plantar foot ulceration - slightly larger than last week. Because of this., I would recommend that we use another Apligraf and TCC on the ulceration and she agrees.      Plan:   - Pt seen and evaluated  - Wound debrided as described.   - We do not have an Apligraf available for her for this week. Will cover the wound with Neema and a TCC.  - Recommend a TCC-EZ on the right foot and she agrees to this. This was applied to the right leg. This should be left C/D/I for the next week. ABIs are normal.    - Pt to return to clinic in 1 week for Apligraf application #7.

## 2019-05-29 ENCOUNTER — OFFICE VISIT (OUTPATIENT)
Dept: PODIATRY | Facility: CLINIC | Age: 59
End: 2019-05-29
Payer: COMMERCIAL

## 2019-05-29 DIAGNOSIS — E11.621 DIABETIC ULCER OF RIGHT MIDFOOT ASSOCIATED WITH TYPE 2 DIABETES MELLITUS, WITH BONE INVOLVEMENT WITHOUT EVIDENCE OF NECROSIS (H): ICD-10-CM

## 2019-05-29 DIAGNOSIS — E11.42 TYPE 2 DIABETES MELLITUS WITH DIABETIC POLYNEUROPATHY, WITHOUT LONG-TERM CURRENT USE OF INSULIN (H): Primary | ICD-10-CM

## 2019-05-29 DIAGNOSIS — M14.672 CHARCOT'S JOINT OF LEFT FOOT: ICD-10-CM

## 2019-05-29 DIAGNOSIS — L97.416 DIABETIC ULCER OF RIGHT MIDFOOT ASSOCIATED WITH TYPE 2 DIABETES MELLITUS, WITH BONE INVOLVEMENT WITHOUT EVIDENCE OF NECROSIS (H): ICD-10-CM

## 2019-05-29 PROCEDURE — 15271 SKIN SUB GRAFT TRNK/ARM/LEG: CPT | Performed by: PODIATRIST

## 2019-05-29 NOTE — PROGRESS NOTES
Chief Complaint:   Chief Complaint   Patient presents with     Right Foot - Follow Up, WOUND CARE, Cast Change        No Known Allergies      Subjective: Tiffani is a 59 year old female who presents to the clinic today for a follow up of right plantar foot ulceration. She tolerated the cast well with no complications.     Objective    A diabetic pressure wound is noted at right  plantar 1st met head measuring 0.9cm x 2cm x 0.1cm.     Inman Classification: 2     Wound base: Red/Granulation     Edges: intact     Drainage: scant/serous     Odor: no     Undermining: no     Bone Exposure: No     Clinical Signs of Infection: No     After obtaining patient consent, the wound was irrigated with copious amounts of saline. A curette was then used to debride the wound into the subcutaneous tissue. The wound edges were debrided to healthy, bleeding tissue. Given the patient's lack of sensation, no anesthesia was necessary for the procedure.         Assessment: DM2 with neuropathy.  Charcot foot.  Right plantar foot ulceration - slightly larger than last week. Because of this., I would recommend that we use another Apligraf and TCC on the ulceration and she agrees.      Plan:   - Pt seen and evaluated  - Wound debrided as described.   - I discussed Apligraf application with her. A 44 sq cm piece of Apligraf was applied to the right ulcer in the usual aseptic manner. This was fixated with Steri-strips and covered with sterile dressing. No waste of the graft.     - Recommend a TCC-EZ on the right foot and she agrees to this. This was applied to the right leg. This should be left C/D/I for the next week. ABIs are normal.    - Pt to return to clinic in 1 week for Apligraf application #8.

## 2019-05-29 NOTE — PATIENT INSTRUCTIONS
Thanks for coming today.  Ortho/Sports Medicine Clinic  [address] [place] [address]    To schedule future appointments in Ortho Clinic, you may call [phone].    To schedule ordered imaging by your provider:   Call Central Imaging [phone]    To schedule an injection ordered by your provider:  Call Central Imaging Injection scheduling line: [phone]  [employer] available online at:  [email or web address]    Please call if any further questions or concerns ([phone]).  Clinic hours 8 am to 5 pm.    Return to clinic (call) if symptoms worsen or fail to improve.

## 2019-05-29 NOTE — LETTER
5/29/2019         RE: Tiffani Tan  [address] [facility] [address]        Dear Colleague,    Thank you for referring your patient, Tiffani Tan, to the [facility]. Please see a copy of my visit note below.    Chief Complaint:   Chief Complaint   Patient presents with     Right Foot - Follow Up, WOUND CARE, Cast Change        No Known Allergies      Subjective: Tiffani is a 59 year old female who presents to the clinic today for a follow up of right plantar foot ulceration. She tolerated the cast well with no complications.     Objective    A diabetic pressure wound is noted at right  plantar 1st met head measuring 0.9cm x 2cm x 0.1cm.     Inman Classification: 2     Wound base: Red/Granulation     Edges: intact     Drainage: scant/serous     Odor: no     Undermining: no     Bone Exposure: No     Clinical Signs of Infection: No     After obtaining patient consent, the wound was irrigated with copious amounts of saline. A curette was then used to debride the wound into the subcutaneous tissue. The wound edges were debrided to healthy, bleeding tissue. Given the patient's lack of sensation, no anesthesia was necessary for the procedure.         Assessment: DM2 with neuropathy.  Charcot foot.  Right plantar foot ulceration - slightly larger than last week. Because of this., I would recommend that we use another Apligraf and TCC on the ulceration and she agrees.      Plan:   - Pt seen and evaluated  - Wound debrided as described.   -  I discussed Apligraf application with her. A 44 sq cm piece of Apligraf was applied to the right ulcer in the usual aseptic manner. This was fixated with Steri-strips and covered with sterile dressing. No waste of the graft.     - Recommend a TCC-EZ on the right foot and she agrees to this. This was applied to the right leg. This should be left C/D/I for the next week. ABIs are normal.    - Pt to return to clinic in 1 week for Apligraf application #8.        Again, thank you  for allowing me to participate in the care of your patient.        Sincerely,        Daniel Giron DPM

## 2019-06-05 ENCOUNTER — OFFICE VISIT (OUTPATIENT)
Dept: PODIATRY | Facility: CLINIC | Age: 59
End: 2019-06-05
Payer: COMMERCIAL

## 2019-06-05 DIAGNOSIS — E11.621 DIABETIC ULCER OF RIGHT MIDFOOT ASSOCIATED WITH TYPE 2 DIABETES MELLITUS, WITH NECROSIS OF BONE (H): ICD-10-CM

## 2019-06-05 DIAGNOSIS — M14.672 CHARCOT'S JOINT OF LEFT FOOT: ICD-10-CM

## 2019-06-05 DIAGNOSIS — L97.416 DIABETIC ULCER OF RIGHT MIDFOOT ASSOCIATED WITH TYPE 2 DIABETES MELLITUS, WITH BONE INVOLVEMENT WITHOUT EVIDENCE OF NECROSIS (H): ICD-10-CM

## 2019-06-05 DIAGNOSIS — L97.414 DIABETIC ULCER OF RIGHT MIDFOOT ASSOCIATED WITH TYPE 2 DIABETES MELLITUS, WITH NECROSIS OF BONE (H): ICD-10-CM

## 2019-06-05 DIAGNOSIS — E11.621 DIABETIC ULCER OF RIGHT MIDFOOT ASSOCIATED WITH TYPE 2 DIABETES MELLITUS, WITH BONE INVOLVEMENT WITHOUT EVIDENCE OF NECROSIS (H): ICD-10-CM

## 2019-06-05 DIAGNOSIS — E11.42 TYPE 2 DIABETES MELLITUS WITH DIABETIC POLYNEUROPATHY, WITHOUT LONG-TERM CURRENT USE OF INSULIN (H): Primary | ICD-10-CM

## 2019-06-05 PROCEDURE — 15271 SKIN SUB GRAFT TRNK/ARM/LEG: CPT | Performed by: PODIATRIST

## 2019-06-05 NOTE — PROGRESS NOTES
Chief Complaint:   Chief Complaint   Patient presents with     Right Foot - Follow Up, WOUND CARE, Cast Change        No Known Allergies      Subjective: Tiffani is a 59 year old female who presents to the clinic today for a follow up of right plantar foot ulceration. She tolerated the cast well with no complications.      Objective         A diabetic pressure wound is noted at right  plantar 1st met head measuring 1.9cm x 2cm x 0.1cm.     Inman Classification: 2     Wound base: Red/Granulation     Edges: intact     Drainage: scant/serous     Odor: no     Undermining: no     Bone Exposure: No     Clinical Signs of Infection: No     After obtaining patient consent, the wound was irrigated with copious amounts of saline. A curette was then used to debride the wound into the subcutaneous tissue. The wound edges were debrided to healthy, bleeding tissue. Given the patient's lack of sensation, no anesthesia was necessary for the procedure.         Assessment: DM2 with neuropathy.  Charcot foot.  Right plantar foot ulceration - better than last week. Because of this., I would recommend that we use another Apligraf and TCC on the ulceration and she agrees.      Plan:   - Pt seen and evaluated  - Wound debrided as described.   - I discussed Apligraf application with her. A 44 sq cm piece of Apligraf was applied to the right ulcer in the usual aseptic manner. This was fixated with Steri-strips and covered with sterile dressing. No waste of the graft.      - Recommend a TCC-EZ on the right foot and she agrees to this. This was applied to the right leg. This should be left C/D/I for the next week. ABIs are normal.    - Pt to return to clinic in 1 week for Apligraf application #8.

## 2019-06-05 NOTE — LETTER
6/5/2019         RE: Tiffani Tan  [address] [facility] [address]        Dear Colleague,    Thank you for referring your patient, Tiffani Tan, to the [facility]. Please see a copy of my visit note below.    Chief Complaint:   Chief Complaint   Patient presents with     Right Foot - Follow Up, WOUND CARE, Cast Change        No Known Allergies      Subjective: Tiffani is a 59 year old female who presents to the clinic today for a follow up of right plantar foot ulceration. She tolerated the cast well with no complications.      Objective         A diabetic pressure wound is noted at right  plantar 1st met head measuring 1.9cm x 2cm x 0.1cm.     Inman Classification: 2     Wound base: Red/Granulation     Edges: intact     Drainage: scant/serous     Odor: no     Undermining: no     Bone Exposure: No     Clinical Signs of Infection: No     After obtaining patient consent, the wound was irrigated with copious amounts of saline. A curette was then used to debride the wound into the subcutaneous tissue. The wound edges were debrided to healthy, bleeding tissue. Given the patient's lack of sensation, no anesthesia was necessary for the procedure.         Assessment: DM2 with neuropathy.  Charcot foot.  Right plantar foot ulceration - better than last week. Because of this., I would recommend that we use another Apligraf and TCC on the ulceration and she agrees.      Plan:   - Pt seen and evaluated  - Wound debrided as described.   - I discussed Apligraf application with her. A 44 sq cm piece of Apligraf was applied to the right ulcer in the usual aseptic manner. This was fixated with Steri-strips and covered with sterile dressing. No waste of the graft.      - Recommend a TCC-EZ on the right foot and she agrees to this. This was applied to the right leg. This should be left C/D/I for the next week. ABIs are normal.    - Pt to return to clinic in 1 week for Apligraf application #8.        Again, thank you for  allowing me to participate in the care of your patient.        Sincerely,        Daniel Giron DPM

## 2019-06-05 NOTE — NURSING NOTE
Tiffani Tan's chief complaint for this visit includes:  Chief Complaint   Patient presents with     Right Foot - Follow Up, WOUND CARE, Cast Change     [facility]    Referring Provider:  Wily Bonilla MD  [facility] [place] [facility] [address] [place] [address]    There were no vitals taken for this visit.  Data Unavailable     Do you need any medication refills at today's visit? No    Lisette Long LPN

## 2019-06-05 NOTE — PATIENT INSTRUCTIONS
Thanks for coming today.  Ortho/Sports Medicine Clinic  [address] [place] [address]    To schedule future appointments in Ortho Clinic, you may call [phone].    To schedule ordered imaging by your provider:   Call Central Imaging [phone]    To schedule an injection ordered by your provider:  Call Central Imaging Injection scheduling line: [phone]  [email or web address] available online at:  [email or web address]    Please call if any further questions or concerns ([phone]).  Clinic hours 8 am to 5 pm.    Return to clinic (call) if symptoms worsen or fail to improve.

## 2019-06-12 ENCOUNTER — OFFICE VISIT (OUTPATIENT)
Dept: PODIATRY | Facility: CLINIC | Age: 59
End: 2019-06-12
Payer: COMMERCIAL

## 2019-06-12 DIAGNOSIS — L97.416 DIABETIC ULCER OF RIGHT MIDFOOT ASSOCIATED WITH TYPE 2 DIABETES MELLITUS, WITH BONE INVOLVEMENT WITHOUT EVIDENCE OF NECROSIS (H): ICD-10-CM

## 2019-06-12 DIAGNOSIS — M14.672 CHARCOT'S JOINT OF LEFT FOOT: ICD-10-CM

## 2019-06-12 DIAGNOSIS — E11.42 TYPE 2 DIABETES MELLITUS WITH DIABETIC POLYNEUROPATHY, WITHOUT LONG-TERM CURRENT USE OF INSULIN (H): Primary | ICD-10-CM

## 2019-06-12 DIAGNOSIS — E11.621 DIABETIC ULCER OF RIGHT MIDFOOT ASSOCIATED WITH TYPE 2 DIABETES MELLITUS, WITH BONE INVOLVEMENT WITHOUT EVIDENCE OF NECROSIS (H): ICD-10-CM

## 2019-06-12 PROCEDURE — 11042 DBRDMT SUBQ TIS 1ST 20SQCM/<: CPT | Performed by: PODIATRIST

## 2019-06-12 NOTE — LETTER
6/12/2019         RE: Tiffani Tan  [address] [facility] [address]        Dear Colleague,    Thank you for referring your patient, Tiffani Tan, to the [facility]. Please see a copy of my visit note below.    Chief Complaint:   Chief Complaint   Patient presents with     Right Foot - Follow Up, WOUND CARE, Cast Change        No Known Allergies      Subjective: Tiffani is a 59 year old female who presents to the clinic today for a follow up of right plantar foot. She relates some itchiness to the cast.     Objective        A diabetic pressure wound is noted at right  plantar 1st met head measuring 1.5cm x 2cm x 0.1cm.     Inman Classification: 2     Wound base: Red/Granulation     Edges: irritated     Drainage: scant/serous     Odor: no     Undermining: no     Bone Exposure: No     Clinical Signs of Infection: No     After obtaining patient consent, the wound was irrigated with copious amounts of saline. A curette was then used to debride the wound into the subcutaneous tissue. The wound edges were debrided to healthy, bleeding tissue. Given the patient's lack of sensation, no anesthesia was necessary for the procedure.         Assessment: DM2 with neuropathy.  Charcot foot.  Right plantar foot ulceration - better than last week. Because of this., I would recommend that we use another Apligraf and TCC on the ulceration and she agrees.      Plan:   - Pt seen and evaluated  - Wound debrided as described.   - Will forego Apligraf and TCC for the next week. Start cleansing with Microklenz, pat dry, apply Medihoney to the wound, cover with Mepilex, 4x4s, and an ACE. Change daily.   - See again in 1 week.         Again, thank you for allowing me to participate in the care of your patient.        Sincerely,        Daniel Giron DPM

## 2019-06-12 NOTE — PATIENT INSTRUCTIONS
Thanks for coming today.  Ortho/Sports Medicine Clinic  [address] [place] [address]    To schedule future appointments in Ortho Clinic, you may call [phone].    To schedule ordered imaging by your provider:   Call Central Imaging [phone]    To schedule an injection ordered by your provider:  Call Central Imaging Injection scheduling line: [phone]  [email or web address] available online at:  [email or web address]    Please call if any further questions or concerns ([phone]).  Clinic hours 8 am to 5 pm.    Return to clinic (call) if symptoms worsen or fail to improve.

## 2019-06-12 NOTE — PROGRESS NOTES
Chief Complaint:   Chief Complaint   Patient presents with     Right Foot - Follow Up, WOUND CARE, Cast Change        No Known Allergies      Subjective: Tiffani is a 59 year old female who presents to the clinic today for a follow up of right plantar foot. She relates some itchiness to the cast.     Objective        A diabetic pressure wound is noted at right  plantar 1st met head measuring 1.5cm x 2cm x 0.1cm.     Inman Classification: 2     Wound base: Red/Granulation     Edges: irritated     Drainage: scant/serous     Odor: no     Undermining: no     Bone Exposure: No     Clinical Signs of Infection: No     After obtaining patient consent, the wound was irrigated with copious amounts of saline. A curette was then used to debride the wound into the subcutaneous tissue. The wound edges were debrided to healthy, bleeding tissue. Given the patient's lack of sensation, no anesthesia was necessary for the procedure.         Assessment: DM2 with neuropathy.  Charcot foot.  Right plantar foot ulceration - better than last week. Because of this., I would recommend that we use another Apligraf and TCC on the ulceration and she agrees.      Plan:   - Pt seen and evaluated  - Wound debrided as described.   - Will forego Apligraf and TCC for the next week. Start cleansing with Microklenz, pat dry, apply Medihoney to the wound, cover with Mepilex, 4x4s, and an ACE. Change daily.   - See again in 1 week.

## 2019-06-12 NOTE — NURSING NOTE
Tiffani Tan's chief complaint for this visit includes:  Chief Complaint   Patient presents with     Right Foot - Follow Up, WOUND CARE, Cast Change     [facility]    Referring Provider:  Wily Bonilla MD  [facility] [address] [place] [address]    There were no vitals taken for this visit.  Data Unavailable     Do you need any medication refills at today's visit? No    Lisette Long LPN

## 2019-06-19 ENCOUNTER — OFFICE VISIT (OUTPATIENT)
Dept: PODIATRY | Facility: CLINIC | Age: 59
End: 2019-06-19
Payer: COMMERCIAL

## 2019-06-19 DIAGNOSIS — E11.42 TYPE 2 DIABETES MELLITUS WITH DIABETIC POLYNEUROPATHY, WITHOUT LONG-TERM CURRENT USE OF INSULIN (H): Primary | ICD-10-CM

## 2019-06-19 DIAGNOSIS — E11.621 DIABETIC ULCER OF RIGHT MIDFOOT ASSOCIATED WITH TYPE 2 DIABETES MELLITUS, WITH BONE INVOLVEMENT WITHOUT EVIDENCE OF NECROSIS (H): ICD-10-CM

## 2019-06-19 DIAGNOSIS — E11.621 DIABETIC ULCER OF RIGHT MIDFOOT ASSOCIATED WITH TYPE 2 DIABETES MELLITUS, WITH NECROSIS OF BONE (H): ICD-10-CM

## 2019-06-19 DIAGNOSIS — L97.414 DIABETIC ULCER OF RIGHT MIDFOOT ASSOCIATED WITH TYPE 2 DIABETES MELLITUS, WITH NECROSIS OF BONE (H): ICD-10-CM

## 2019-06-19 DIAGNOSIS — L97.416 DIABETIC ULCER OF RIGHT MIDFOOT ASSOCIATED WITH TYPE 2 DIABETES MELLITUS, WITH BONE INVOLVEMENT WITHOUT EVIDENCE OF NECROSIS (H): ICD-10-CM

## 2019-06-19 DIAGNOSIS — M14.672 CHARCOT'S JOINT OF LEFT FOOT: ICD-10-CM

## 2019-06-19 PROCEDURE — 11042 DBRDMT SUBQ TIS 1ST 20SQCM/<: CPT | Performed by: PODIATRIST

## 2019-06-19 NOTE — PROGRESS NOTES
Chief Complaint:   Chief Complaint   Patient presents with     Right Foot - Follow Up, WOUND CARE        No Known Allergies      Subjective: Tiffani is a 59 year old female who presents to the clinic today for a follow up of right plantar foot ulcer. She relates that he has been covering the ulcer with the Medihoney daily. No pain to the foot.    Objective        A diabetic pressure wound is noted at right  plantar 1st met head measuring 1.4cm x 2.3cm x 0.1cm.     Inman Classification: 2     Wound base: Red/Granulation     Edges: irritated     Drainage: scant/serous     Odor: no     Undermining: no     Bone Exposure: No     Clinical Signs of Infection: No     After obtaining patient consent, the wound was irrigated with copious amounts of saline. A curette was then used to debride the wound into the subcutaneous tissue. The wound edges were debrided to healthy, bleeding tissue. Given the patient's lack of sensation, no anesthesia was necessary for the procedure.         Assessment: DM2 with neuropathy.  Charcot foot.  Right plantar foot ulceration - better than last week. Because of this., I would recommend that we use another Apligraf and TCC on the ulceration and she agrees.      Plan:   - Pt seen and evaluated  - Wound debrided as described.   - Will forego Apligraf and TCC for the next week. Start cleansing with Microklenz, pat dry, apply Aquacel to the wound, cover with 4x4, and an ACE. Change daily.   - See again in 1 week.

## 2019-06-19 NOTE — NURSING NOTE
Tiffani Tan's chief complaint for this visit includes:  Chief Complaint   Patient presents with     Right Foot - Follow Up, WOUND CARE     PCP: [facility] (Inactive)    Referring Provider:  Wily Bonilla MD  [facility] [place] [facility] [address] [place] [address]    There were no vitals taken for this visit.  Data Unavailable     Do you need any medication refills at today's visit? No    Lisette Long LPN

## 2019-06-19 NOTE — LETTER
6/19/2019         RE: Tiffani Tan  [address] [facility] [address]        Dear Colleague,    Thank you for referring your patient, Tiffani Tan, to the [facility]. Please see a copy of my visit note below.    Chief Complaint:   Chief Complaint   Patient presents with     Right Foot - Follow Up, WOUND CARE        No Known Allergies      Subjective: Tiffani is a 59 year old female who presents to the clinic today for a follow up of right plantar foot ulcer. She relates that he has been covering the ulcer with the Medihoney daily. No pain to the foot.    Objective        A diabetic pressure wound is noted at right  plantar 1st met head measuring 1.4cm x 2.3cm x 0.1cm.     Inman Classification: 2     Wound base: Red/Granulation     Edges: irritated     Drainage: scant/serous     Odor: no     Undermining: no     Bone Exposure: No     Clinical Signs of Infection: No     After obtaining patient consent, the wound was irrigated with copious amounts of saline. A curette was then used to debride the wound into the subcutaneous tissue. The wound edges were debrided to healthy, bleeding tissue. Given the patient's lack of sensation, no anesthesia was necessary for the procedure.         Assessment: DM2 with neuropathy.  Charcot foot.  Right plantar foot ulceration - better than last week. Because of this., I would recommend that we use another Apligraf and TCC on the ulceration and she agrees.      Plan:   - Pt seen and evaluated  - Wound debrided as described.   - Will forego Apligraf and TCC for the next week. Start cleansing with Microklenz, pat dry, apply Aquacel to the wound, cover with 4x4, and an ACE. Change daily.   - See again in 1 week.       Again, thank you for allowing me to participate in the care of your patient.        Sincerely,        Daniel Giron DPM

## 2019-06-19 NOTE — PATIENT INSTRUCTIONS
Thanks for coming today.  Ortho/Sports Medicine Clinic  [address] [place] [address]    To schedule future appointments in Ortho Clinic, you may call [phone].    To schedule ordered imaging by your provider:   Call Central Imaging [phone]    To schedule an injection ordered by your provider:  Call Central Imaging Injection scheduling line: [phone]  [email or web address] available online at:  [email or web address]    Please call if any further questions or concerns ([phone]).  Clinic hours 8 am to 5 pm.    Return to clinic (call) if symptoms worsen or fail to improve.

## 2019-06-24 NOTE — PROGRESS NOTES
Chief Complaint   Patient presents with     Right Foot - WOUND CARE, Follow Up          No Known Allergies      Subjective: Tiffani is a 59 year old female who presents to the clinic today for a follow up of right foot ulceration. She relates that she continues to use the Aquacel and has remained NWB on the foot. No pain noted today. Relates that she saw infectious dz and right leg looked good. She does have new wounds on the left leg and ankle. She relates that she wears the CROW, however she has a lot of drainage from the leg and the CROW will fill will fill with fluid and become saturated. She has gone back to using the CAM, however this has rubbed a sore spot on the outside of the left ankle. She relates that this morning, her right leg appeared red and warm. Was OK yesterday.     Objective          A diabetic pressure wound is noted at right  plantar 1st met head measuring 1.4cm x 2cm x 0.1cm.     Inman Classification: 2     Wound base: Red/Granulation     Edges: irritated     Drainage: scant/serous     Odor: no     Undermining: no     Bone Exposure: No     Clinical Signs of Infection: No     After obtaining patient consent, the wound was irrigated with copious amounts of saline. A curette was then used to debride the wound into the subcutaneous tissue. The wound edges were debrided to healthy, bleeding tissue. Given the patient's lack of sensation, no anesthesia was necessary for the procedure.   ____________________  Wound #2  A venous wound is noted at left  anterior leg measuring 0.5cm x 0.5cm x 0.1cm.    Inman Classification: 2    Wound base: Pink/Granulation    Edges: lipodermatosclerosis    Drainage: copious/serous    Odor: no    Undermining: no    Bone Exposure: No    Clinical Signs of Infection: No    After obtaining patient consent, the wound was irrigated with copious amounts of saline. No sharp debridement performed on this ulceration today.   _______________  Wound #3  A pressure wound is noted at  left  lateral malleolus measuring 0.5cm x 0.5cm x 0.2cm.    Inman Classification: 2    Wound base: Red/Granulation    Edges: intact    Drainage: scant/serous    Odor: no    Undermining: no    Bone Exposure: No    Clinical Signs of Infection: No    After obtaining patient consent, the wound was irrigated with copious amounts of saline. No sharp debridement performed today.        Assessment: DM2 with neuropathy.  Charcot foot.  Right plantar foot ulceration - better than last week. Recommend continuation of the Aquacel.  New left leg venous ulcer - recommend compression.  New left lateral mall ulceration from CAM pressure.      Plan:   - Pt seen and evaluated  - Wound debrided as described on the right plantar foot.   - Right leg was erythematous and warm. Cellulitis vs venous stasis changes. Will start abx for the area. Sent in rx for Augmentin. If any worsening, present to the ED.  - Agree with ID recs for compression on the left leg. I applied Aquacel to the wounds and wrapped in a Profore compressive boot. This should be left intact for the next week.   - Will forego Apligraf and TCC for the next week. Start cleansing with Microklenz, pat dry, apply Aquacel to the wound, cover with 4x4, and an ACE. Change daily.   - See again in 1 week.

## 2019-06-26 ENCOUNTER — OFFICE VISIT (OUTPATIENT)
Dept: PODIATRY | Facility: CLINIC | Age: 59
End: 2019-06-26
Payer: COMMERCIAL

## 2019-06-26 DIAGNOSIS — I87.2 VENOUS INCOMPETENCE: ICD-10-CM

## 2019-06-26 DIAGNOSIS — L97.322 SKIN ULCER OF LEFT ANKLE WITH FAT LAYER EXPOSED (H): ICD-10-CM

## 2019-06-26 DIAGNOSIS — L97.416 DIABETIC ULCER OF RIGHT MIDFOOT ASSOCIATED WITH TYPE 2 DIABETES MELLITUS, WITH BONE INVOLVEMENT WITHOUT EVIDENCE OF NECROSIS (H): ICD-10-CM

## 2019-06-26 DIAGNOSIS — E11.621 DIABETIC ULCER OF RIGHT MIDFOOT ASSOCIATED WITH TYPE 2 DIABETES MELLITUS, WITH BONE INVOLVEMENT WITHOUT EVIDENCE OF NECROSIS (H): ICD-10-CM

## 2019-06-26 DIAGNOSIS — M14.672 CHARCOT'S JOINT OF LEFT FOOT: ICD-10-CM

## 2019-06-26 DIAGNOSIS — L03.115 CELLULITIS OF RIGHT LOWER EXTREMITY: Primary | ICD-10-CM

## 2019-06-26 DIAGNOSIS — L97.222 VENOUS STASIS ULCER OF LEFT CALF WITH FAT LAYER EXPOSED WITHOUT VARICOSE VEINS (H): ICD-10-CM

## 2019-06-26 DIAGNOSIS — I87.2 VENOUS STASIS ULCER OF LEFT CALF WITH FAT LAYER EXPOSED WITHOUT VARICOSE VEINS (H): ICD-10-CM

## 2019-06-26 DIAGNOSIS — E11.42 TYPE 2 DIABETES MELLITUS WITH DIABETIC POLYNEUROPATHY, WITHOUT LONG-TERM CURRENT USE OF INSULIN (H): ICD-10-CM

## 2019-06-26 PROCEDURE — 99213 OFFICE O/P EST LOW 20 MIN: CPT | Mod: 25 | Performed by: PODIATRIST

## 2019-06-26 PROCEDURE — 11042 DBRDMT SUBQ TIS 1ST 20SQCM/<: CPT | Performed by: PODIATRIST

## 2019-06-26 NOTE — LETTER
6/26/2019         RE: Tiffani Tan  [address] [facility] [address]        Dear Colleague,    Thank you for referring your patient, Tiffani Tan, to the [facility]. Please see a copy of my visit note below.    Chief Complaint   Patient presents with     Right Foot - WOUND CARE, Follow Up          No Known Allergies      Subjective: Tiffani is a 59 year old female who presents to the clinic today for a follow up of right foot ulceration. She relates that she continues to use the Aquacel and has remained NWB on the foot. No pain noted today. Relates that she saw infectious dz and right leg looked good. She does have new wounds on the left leg and ankle. She relates that she wears the CROW, however she has a lot of drainage from the leg and the CROW will fill will fill with fluid and become saturated. She has gone back to using the CAM, however this has rubbed a sore spot on the outside of the left ankle. She relates that this morning, her right leg appeared red and warm. Was OK yesterday.     Objective          A diabetic pressure wound is noted at right  plantar 1st met head measuring 1.4cm x 2cm x 0.1cm.     Inman Classification: 2     Wound base: Red/Granulation     Edges: irritated     Drainage: scant/serous     Odor: no     Undermining: no     Bone Exposure: No     Clinical Signs of Infection: No     After obtaining patient consent, the wound was irrigated with copious amounts of saline. A curette was then used to debride the wound into the subcutaneous tissue. The wound edges were debrided to healthy, bleeding tissue. Given the patient's lack of sensation, no anesthesia was necessary for the procedure.   ____________________  Wound #2  A venous wound is noted at left  anterior leg measuring 0.5cm x 0.5cm x 0.1cm.    Inman Classification: 2    Wound base: Pink/Granulation    Edges: lipodermatosclerosis    Drainage: copious/serous    Odor: no    Undermining: no    Bone Exposure: No    Clinical Signs  of Infection: No    After obtaining patient consent, the wound was irrigated with copious amounts of saline. No sharp debridement performed on this ulceration today.   _______________  Wound #3  A pressure wound is noted at left  lateral malleolus measuring 0.5cm x 0.5cm x 0.2cm.    Inman Classification: 2    Wound base: Red/Granulation    Edges: intact    Drainage: scant/serous    Odor: no    Undermining: no    Bone Exposure: No    Clinical Signs of Infection: No    After obtaining patient consent, the wound was irrigated with copious amounts of saline. No sharp debridement performed today.        Assessment: DM2 with neuropathy.  Charcot foot.  Right plantar foot ulceration - better than last week. Recommend continuation of the Aquacel.  New left leg venous ulcer - recommend compression.  New left lateral mall ulceration from CAM pressure.      Plan:   - Pt seen and evaluated  - Wound debrided as described on the right plantar foot.   - Right leg was erythematous and warm. Cellulitis vs venous stasis changes. Will start abx for the area. Sent in rx for Augmentin. If any worsening, present to the ED.  - Agree with ID recs for compression on the left leg. I applied Aquacel to the wounds and wrapped in a Profore compressive boot. This should be left intact for the next week.   - Will forego Apligraf and TCC for the next week. Start cleansing with Microklenz, pat dry, apply Aquacel to the wound, cover with 4x4, and an ACE. Change daily.   - See again in 1 week.         Again, thank you for allowing me to participate in the care of your patient.        Sincerely,        Daniel Giron DPM

## 2019-06-26 NOTE — NURSING NOTE
Tiffani Tan's chief complaint for this visit includes:  Chief Complaint   Patient presents with     Right Foot - WOUND CARE, Follow Up     PCP: [facility] (Inactive)    Referring Provider:  Wily Bonilla MD  [facility] [facility] [address] [place] [address]    There were no vitals taken for this visit.  Data Unavailable     Do you need any medication refills at today's visit? No    Lisette Long LPN

## 2019-06-26 NOTE — PATIENT INSTRUCTIONS
Thanks for coming today.  Ortho/Sports Medicine Clinic  [address] [place] [address]    To schedule future appointments in Ortho Clinic, you may call [phone].    To schedule ordered imaging by your provider:   Call Central Imaging [phone]    To schedule an injection ordered by your provider:  Call Central Imaging Injection scheduling line: [phone]  [email or web address] available online at:  [email or web address]    Please call if any further questions or concerns ([phone]).  Clinic hours 8 am to 5 pm.    Return to clinic (call) if symptoms worsen or fail to improve.

## 2019-07-01 NOTE — PROGRESS NOTES
Chief Complaint   Patient presents with     Right Foot - Follow Up, WOUND CARE          No Known Allergies      Subjective: Tiffani is a 59 year old female who presents to the clinic today for a follow up of BL foot ulcers. She relates that she has been taking the Augmentin as directed. She relates that she tolerated the left boot well with no complications. She has been changing the dressing on the right foot as directed. No pain on the right and left lateral malleolar pain 2/2 ulceration.      Objective              A diabetic pressure wound is noted at right  plantar 1st met head measuring 1.1cm x 2cm x 0.1cm.     Inman Classification: 2     Wound base: Red/Granulation     Edges: irritated     Drainage: scant/serous     Odor: no     Undermining: no     Bone Exposure: No     Clinical Signs of Infection: No     After obtaining patient consent, the wound was irrigated with copious amounts of saline. A curette was then used to debride the wound into the subcutaneous tissue. The wound edges were debrided to healthy, bleeding tissue. Given the patient's lack of sensation, no anesthesia was necessary for the procedure.   ____________________  Wound #2    A venous wound is noted at left  anterior leg and has healed.  _______________  Wound #3        A pressure wound is noted at left  lateral malleolus measuring 0.5cm x 0.5cm x 0.2cm.     Inman Classification: 2     Wound base: Red/Granulation     Edges: intact     Drainage: scant/serous     Odor: no     Undermining: no     Bone Exposure: No     Clinical Signs of Infection: No     After obtaining patient consent, the wound was irrigated with copious amounts of saline. No sharp debridement performed today.         Assessment: DM2 with neuropathy.  Charcot foot.  Right plantar foot ulceration - better than last week. Recommend continuation of the Aquacel.  New left leg venous ulcer - healed  New left lateral mall ulceration from CAM pressure.      Plan:   - Pt seen and  evaluated  - Wound debrided as described on the right plantar foot.   - Right leg was erythematous and warm. Cellulitis vs venous stasis changes. Will start abx for the area. Sent in rx for Augmentin. If any worsening, present to the ED.  - Agree with ID recs for compression on bilateral legs. I applied Aquacel to the wounds and wrapped in  Profore compressive boots. These should be left intact for the next week.   - See again in 1 week.

## 2019-07-03 ENCOUNTER — OFFICE VISIT (OUTPATIENT)
Dept: PODIATRY | Facility: CLINIC | Age: 59
End: 2019-07-03
Payer: COMMERCIAL

## 2019-07-03 DIAGNOSIS — L97.322 SKIN ULCER OF LEFT ANKLE WITH FAT LAYER EXPOSED (H): ICD-10-CM

## 2019-07-03 DIAGNOSIS — M14.672 CHARCOT'S JOINT OF LEFT FOOT: ICD-10-CM

## 2019-07-03 DIAGNOSIS — I87.2 VENOUS INCOMPETENCE: ICD-10-CM

## 2019-07-03 DIAGNOSIS — E11.621 DIABETIC ULCER OF RIGHT MIDFOOT ASSOCIATED WITH TYPE 2 DIABETES MELLITUS, WITH BONE INVOLVEMENT WITHOUT EVIDENCE OF NECROSIS (H): ICD-10-CM

## 2019-07-03 DIAGNOSIS — E11.42 TYPE 2 DIABETES MELLITUS WITH DIABETIC POLYNEUROPATHY, WITHOUT LONG-TERM CURRENT USE OF INSULIN (H): Primary | ICD-10-CM

## 2019-07-03 DIAGNOSIS — L97.416 DIABETIC ULCER OF RIGHT MIDFOOT ASSOCIATED WITH TYPE 2 DIABETES MELLITUS, WITH BONE INVOLVEMENT WITHOUT EVIDENCE OF NECROSIS (H): ICD-10-CM

## 2019-07-03 PROCEDURE — 11042 DBRDMT SUBQ TIS 1ST 20SQCM/<: CPT | Performed by: PODIATRIST

## 2019-07-03 PROCEDURE — 99213 OFFICE O/P EST LOW 20 MIN: CPT | Mod: 25 | Performed by: PODIATRIST

## 2019-07-03 NOTE — NURSING NOTE
Tiffani Tan's chief complaint for this visit includes:  Chief Complaint   Patient presents with     Right Foot - Follow Up, WOUND CARE     PCP: [facility] (Inactive)    Referring Provider:  Wily Bonilla MD  [facility] [facility] [address] [place] [address]    There were no vitals taken for this visit.  Data Unavailable     Do you need any medication refills at today's visit? No    Lisette Long LPN

## 2019-07-03 NOTE — PATIENT INSTRUCTIONS
Thanks for coming today.  Ortho/Sports Medicine Clinic  [address] [place] [address]    To schedule future appointments in Ortho Clinic, you may call [phone].    To schedule ordered imaging by your provider:   Call Central Imaging [phone]    To schedule an injection ordered by your provider:  Call Central Imaging Injection scheduling line: [phone]  [employer] available online at:  [email or web address]    Please call if any further questions or concerns ([phone]).  Clinic hours 8 am to 5 pm.    Return to clinic (call) if symptoms worsen or fail to improve.

## 2019-07-03 NOTE — LETTER
7/3/2019         RE: Tiffani Tan  [address] [facility] [address]        Dear Colleague,    Thank you for referring your patient, Tiffani Tan, to the [facility]. Please see a copy of my visit note below.    Chief Complaint   Patient presents with     Right Foot - Follow Up, WOUND CARE          No Known Allergies      Subjective: Tiffani is a 59 year old female who presents to the clinic today for a follow up of BL foot ulcers. She relates that she has been taking the Augmentin as directed. She relates that she tolerated the left boot well with no complications. She has been changing the dressing on the right foot as directed. No pain on the right and left lateral malleolar pain 2/2 ulceration.      Objective              A diabetic pressure wound is noted at right  plantar 1st met head measuring 1.1cm x 2cm x 0.1cm.     Inman Classification: 2     Wound base: Red/Granulation     Edges: irritated     Drainage: scant/serous     Odor: no     Undermining: no     Bone Exposure: No     Clinical Signs of Infection: No     After obtaining patient consent, the wound was irrigated with copious amounts of saline. A curette was then used to debride the wound into the subcutaneous tissue. The wound edges were debrided to healthy, bleeding tissue. Given the patient's lack of sensation, no anesthesia was necessary for the procedure.   ____________________  Wound #2    A venous wound is noted at left  anterior leg  and has healed.  _______________  Wound #3        A pressure wound is noted at left  lateral malleolus measuring 0.5cm x 0.5cm x 0.2cm.     Inman Classification: 2     Wound base: Red/Granulation     Edges: intact     Drainage: scant/serous     Odor: no     Undermining: no     Bone Exposure: No     Clinical Signs of Infection: No     After obtaining patient consent, the wound was irrigated with copious amounts of saline. No sharp debridement performed today.         Assessment: DM2 with  neuropathy.  Charcot foot.  Right plantar foot ulceration - better than last week. Recommend continuation of the Aquacel.  New left leg venous ulcer - healed  New left lateral mall ulceration from CAM pressure.      Plan:   - Pt seen and evaluated  - Wound debrided as described on the right plantar foot.   - Right leg was erythematous and warm. Cellulitis vs venous stasis changes. Will start abx for the area. Sent in rx for Augmentin. If any worsening, present to the ED.  - Agree with ID recs for compression on bilateral legs. I applied Aquacel to the wounds and wrapped in  Profore compressive boots. These should be left intact for the next week.   - See again in 1 week.       Again, thank you for allowing me to participate in the care of your patient.        Sincerely,        Daniel Giron DPM

## 2019-07-10 ENCOUNTER — OFFICE VISIT (OUTPATIENT)
Dept: PODIATRY | Facility: CLINIC | Age: 59
End: 2019-07-10
Payer: COMMERCIAL

## 2019-07-10 DIAGNOSIS — L97.416 DIABETIC ULCER OF RIGHT MIDFOOT ASSOCIATED WITH TYPE 2 DIABETES MELLITUS, WITH BONE INVOLVEMENT WITHOUT EVIDENCE OF NECROSIS (H): ICD-10-CM

## 2019-07-10 DIAGNOSIS — E11.621 DIABETIC ULCER OF RIGHT MIDFOOT ASSOCIATED WITH TYPE 2 DIABETES MELLITUS, WITH BONE INVOLVEMENT WITHOUT EVIDENCE OF NECROSIS (H): ICD-10-CM

## 2019-07-10 DIAGNOSIS — M14.672 CHARCOT'S JOINT OF LEFT FOOT: ICD-10-CM

## 2019-07-10 DIAGNOSIS — L97.322 SKIN ULCER OF LEFT ANKLE WITH FAT LAYER EXPOSED (H): ICD-10-CM

## 2019-07-10 DIAGNOSIS — E11.42 TYPE 2 DIABETES MELLITUS WITH DIABETIC POLYNEUROPATHY, WITHOUT LONG-TERM CURRENT USE OF INSULIN (H): Primary | ICD-10-CM

## 2019-07-10 DIAGNOSIS — I87.2 VENOUS INCOMPETENCE: ICD-10-CM

## 2019-07-10 PROCEDURE — 29445 APPL RIGID TOT CNTC LEG CAST: CPT | Mod: RT | Performed by: PODIATRIST

## 2019-07-10 PROCEDURE — 29581 APPL MULTLAYER CMPRN SYS LEG: CPT | Mod: LT | Performed by: PODIATRIST

## 2019-07-10 NOTE — NURSING NOTE
Tiffani Tan's chief complaint for this visit includes:  Chief Complaint   Patient presents with     Right Foot - Follow Up, WOUND CARE     Left Foot - Follow Up, WOUND CARE     [facility]    Referring Provider:  Wily Bonilla MD  [facility] [facility] [address] [place] [address]    There were no vitals taken for this visit.  Data Unavailable     Do you need any medication refills at today's visit? Rubina Long LPN

## 2019-07-10 NOTE — PROGRESS NOTES
Chief Complaint   Patient presents with     Right Foot - Follow Up, WOUND CARE     Left Foot - Follow Up, WOUND CARE          No Known Allergies      Subjective: Tiffani is a 59 year old female who presents to the clinic today for a follow up of BL foot ulcers. She tolerated the boots well with no complications.     Objective        A diabetic pressure wound is noted at right  plantar 1st met head measuring 1.1cm x 2cm x 0.1cm.     Inman Classification: 2     Wound base: Red/Granulation     Edges: irritated     Drainage: scant/serous     Odor: no     Undermining: no     Bone Exposure: No     Clinical Signs of Infection: No     After obtaining patient consent, the wound was irrigated with copious amounts of saline. A curette was then used to debride the wound into the subcutaneous tissue. The wound edges were debrided to healthy, bleeding tissue. Given the patient's lack of sensation, no anesthesia was necessary for the procedure.   ____________________  Wound #2     A venous wound is noted at left  anterior leg and has healed.  _______________  Wound #3              A pressure wound is noted at left  lateral malleolus measuring 0.5cm x 0.5cm x 0.2cm.     Inman Classification: 2     Wound base: Red/Granulation     Edges: intact     Drainage: scant/serous     Odor: no     Undermining: no     Bone Exposure: No     Clinical Signs of Infection: No     After obtaining patient consent, the wound was irrigated with copious amounts of saline. A curette was then used to debride the wound into the subcutaneous tissue. The wound edges were debrided to healthy, bleeding tissue. Given the patient's lack of sensation, no anesthesia was necessary for the procedure.         Assessment: DM2 with neuropathy.  Charcot foot.  Right plantar foot ulceration - stable but not more contracted. Will go back to TCC.  New left leg venous ulcer - healed  New left lateral mall ulceration from CAM pressure.      Plan:   - Pt seen and  evaluated  - Wounds debrided as described on the BL feet.   - Right leg was placed in a TCC-EZ.   - Profore boot applied to the left leg.   - See again in 1 week.

## 2019-07-10 NOTE — PATIENT INSTRUCTIONS
Thanks for coming today.  Ortho/Sports Medicine Clinic  [address] [place] [address]    To schedule future appointments in Ortho Clinic, you may call [phone].    To schedule ordered imaging by your provider:   Call Central Imaging [phone]    To schedule an injection ordered by your provider:  Call Central Imaging Injection scheduling line: [phone]  [employer] available online at:  [email or web address]    Please call if any further questions or concerns ([phone]).  Clinic hours 8 am to 5 pm.    Return to clinic (call) if symptoms worsen or fail to improve.

## 2019-07-10 NOTE — LETTER
7/10/2019         RE: Tiffani Tan  [address] [facility] [address]        Dear Colleague,    Thank you for referring your patient, Tiffani Tan, to the [facility]. Please see a copy of my visit note below.    Chief Complaint   Patient presents with     Right Foot - Follow Up, WOUND CARE     Left Foot - Follow Up, WOUND CARE          No Known Allergies      Subjective: Tiffani is a 59 year old female who presents to the clinic today for a follow up of BL foot ulcers. She tolerated the boots well with no complications.     Objective        A diabetic pressure wound is noted at right  plantar 1st met head measuring 1.1cm x 2cm x 0.1cm.     Inman Classification: 2     Wound base: Red/Granulation     Edges: irritated     Drainage: scant/serous     Odor: no     Undermining: no     Bone Exposure: No     Clinical Signs of Infection: No     After obtaining patient consent, the wound was irrigated with copious amounts of saline. A curette was then used to debride the wound into the subcutaneous tissue. The wound edges were debrided to healthy, bleeding tissue. Given the patient's lack of sensation, no anesthesia was necessary for the procedure.   ____________________  Wound #2     A venous wound is noted at left  anterior leg and has healed.  _______________  Wound #3              A pressure wound is noted at left  lateral malleolus measuring 0.5cm x 0.5cm x 0.2cm.     Inman Classification: 2     Wound base: Red/Granulation     Edges: intact     Drainage: scant/serous     Odor: no     Undermining: no     Bone Exposure: No     Clinical Signs of Infection: No     After obtaining patient consent, the wound was irrigated with copious amounts of saline. A curette was then used to debride the wound into the subcutaneous tissue. The wound edges were debrided to healthy, bleeding tissue. Given the patient's lack of sensation, no anesthesia was necessary for the procedure.         Assessment: DM2 with  neuropathy.  Charcot foot.  Right plantar foot ulceration - stable but not more contracted. Will go back to TCC.  New left leg venous ulcer - healed  New left lateral mall ulceration from CAM pressure.      Plan:   - Pt seen and evaluated  - Wounds debrided as described on the BL feet.   - Right leg was placed in a TCC-EZ.   - Profore boot applied to the left leg.   - See again in 1 week.         Again, thank you for allowing me to participate in the care of your patient.        Sincerely,        Daniel Giron DPM

## 2019-07-14 NOTE — PROGRESS NOTES
Chief Complaint   Patient presents with     Right Foot - Follow Up, WOUND CARE, Cast Change     Left Foot - Follow Up, WOUND CARE          No Known Allergies      Subjective: Tiffani is a 59 year old female who presents to the clinic today for a follow up of BL foot ulcers. She tolerated the boot well with no complications on the left and the same with the cast on the right foot. She has been intermittently casted for the right diabetic foot over the last few months. She received a letter from the [facility] that states that she is having her license revoked because of the casting on the right leg.      Objective              A diabetic pressure wound is noted at right  plantar 1st met head measuring 1.6cm x 2cm x 0.1cm.     Inman Classification: 2     Wound base: Red/Granulation     Edges: irritated     Drainage: scant/serous     Odor: no     Undermining: no     Bone Exposure: No     Clinical Signs of Infection: No     After obtaining patient consent, the wound was irrigated with copious amounts of saline. A curette was then used to debride the wound into the subcutaneous tissue. The wound edges were debrided to healthy, bleeding tissue. Given the patient's lack of sensation, no anesthesia was necessary for the procedure.   ____________________  Wound #2                       A pressure wound is noted at left  lateral malleolus measuring 0.5cm x 0.5cm x 0.2cm.     Inman Classification: 2     Wound base: Red/Granulation     Edges: intact     Drainage: scant/serous     Odor: no     Undermining: no     Bone Exposure: No     Clinical Signs of Infection: No     After obtaining patient consent, the wound was irrigated with copious amounts of saline. A curette was then used to debride the wound into the subcutaneous tissue. The wound edges were debrided to healthy, bleeding tissue. Given the patient's lack of sensation, no anesthesia was necessary for the procedure.         Assessment: DM2 with neuropathy.  Charcot foot.  Right  plantar foot ulceration - stable but not more contracted. Will go back to TCC.  New left leg venous ulcer - healed  New left lateral mall ulceration from CAM pressure.      Plan:   - Pt seen and evaluated  - Wounds debrided as described on the BL feet.   - No cast will be applied to the right leg this week. She has had intermittent casting for the last few months for the right leg. When she is in the cast, she does not drive herself. When she is not casted, she is able to drive a car as she had previously been doing. Her cast is not a permanent treatment and the history of casting should not preclude her from driving.   - Profore boot applied to BL legs.   - See again in 1 week.

## 2019-07-17 ENCOUNTER — OFFICE VISIT (OUTPATIENT)
Dept: PODIATRY | Facility: CLINIC | Age: 59
End: 2019-07-17
Payer: COMMERCIAL

## 2019-07-17 DIAGNOSIS — L97.416 DIABETIC ULCER OF RIGHT MIDFOOT ASSOCIATED WITH TYPE 2 DIABETES MELLITUS, WITH BONE INVOLVEMENT WITHOUT EVIDENCE OF NECROSIS (H): ICD-10-CM

## 2019-07-17 DIAGNOSIS — I87.2 VENOUS INCOMPETENCE: ICD-10-CM

## 2019-07-17 DIAGNOSIS — M14.672 CHARCOT'S JOINT OF LEFT FOOT: ICD-10-CM

## 2019-07-17 DIAGNOSIS — L97.322 SKIN ULCER OF LEFT ANKLE WITH FAT LAYER EXPOSED (H): ICD-10-CM

## 2019-07-17 DIAGNOSIS — E11.42 TYPE 2 DIABETES MELLITUS WITH DIABETIC POLYNEUROPATHY, WITHOUT LONG-TERM CURRENT USE OF INSULIN (H): Primary | ICD-10-CM

## 2019-07-17 DIAGNOSIS — E11.621 DIABETIC ULCER OF RIGHT MIDFOOT ASSOCIATED WITH TYPE 2 DIABETES MELLITUS, WITH BONE INVOLVEMENT WITHOUT EVIDENCE OF NECROSIS (H): ICD-10-CM

## 2019-07-17 PROCEDURE — 29581 APPL MULTLAYER CMPRN SYS LEG: CPT | Mod: 50 | Performed by: PODIATRIST

## 2019-07-17 RX ORDER — LOSARTAN POTASSIUM AND HYDROCHLOROTHIAZIDE 25; 100 MG/1; MG/1
1 TABLET ORAL DAILY
Status: ON HOLD | COMMUNITY
Start: 2019-07-04 | End: 2020-02-06

## 2019-07-17 RX ORDER — ESCITALOPRAM OXALATE 10 MG/1
10 TABLET ORAL
COMMUNITY
Start: 2019-02-22 | End: 2019-08-28

## 2019-07-17 RX ORDER — HYDROCODONE BITARTRATE AND ACETAMINOPHEN 7.5; 325 MG/1; MG/1
1 TABLET ORAL 3 TIMES DAILY PRN
Refills: 0 | Status: ON HOLD | COMMUNITY
Start: 2019-06-10 | End: 2020-02-06

## 2019-07-17 NOTE — LETTER
7/17/2019         RE: Tiffani Tan  [address] [facility] [address]        Dear Colleague,    Thank you for referring your patient, Tiffani Tan, to the [facility]. Please see a copy of my visit note below.    Chief Complaint   Patient presents with     Right Foot - Follow Up, WOUND CARE, Cast Change     Left Foot - Follow Up, WOUND CARE          No Known Allergies      Subjective: Tiffani is a 59 year old female who presents to the clinic today for a follow up of BL foot ulcers. She tolerated the boot well with no complications on the left and the same with the cast on the right foot.  She has been intermittently casted for the right diabetic foot over the last few months. She received a letter from the [employer] that states that she is having her license revoked because of the casting on the right leg.      Objective              A diabetic pressure wound is noted at right  plantar 1st met head measuring 1.6cm x 2cm x 0.1cm.     Inman Classification: 2     Wound base: Red/Granulation     Edges: irritated     Drainage: scant/serous     Odor: no     Undermining: no     Bone Exposure: No     Clinical Signs of Infection: No     After obtaining patient consent, the wound was irrigated with copious amounts of saline. A curette was then used to debride the wound into the subcutaneous tissue. The wound edges were debrided to healthy, bleeding tissue. Given the patient's lack of sensation, no anesthesia was necessary for the procedure.   ____________________  Wound #2                       A pressure wound is noted at left  lateral malleolus measuring 0.5cm x 0.5cm x 0.2cm.     Inman Classification: 2     Wound base: Red/Granulation     Edges: intact     Drainage: scant/serous     Odor: no     Undermining: no     Bone Exposure: No     Clinical Signs of Infection: No     After obtaining patient consent, the wound was irrigated with copious amounts of saline. A curette was then used to debride the wound into the  subcutaneous tissue. The wound edges were debrided to healthy, bleeding tissue. Given the patient's lack of sensation, no anesthesia was necessary for the procedure.         Assessment: DM2 with neuropathy.  Charcot foot.  Right plantar foot ulceration - stable but not more contracted. Will go back to TCC.  New left leg venous ulcer - healed  New left lateral mall ulceration from CAM pressure.      Plan:   - Pt seen and evaluated  - Wounds debrided as described on the BL feet.   - No cast will be applied to the right leg this week. She has had intermittent casting for the last few months for the right leg. When she is in the cast, she does not drive herself. When she is not casted, she is able to drive a car as she had previously been doing. Her cast is not a permanent treatment and the history of casting should not preclude her from driving.   - Profore boot applied to BL legs.   - See again in 1 week.       Again, thank you for allowing me to participate in the care of your patient.        Sincerely,        Daniel Giron DPM

## 2019-07-17 NOTE — NURSING NOTE
Tiffani Tan's chief complaint for this visit includes:  Chief Complaint   Patient presents with     Right Foot - Follow Up, WOUND CARE, Cast Change     Left Foot - Follow Up, WOUND CARE     PCP: [facility] (Inactive)    Referring Provider:  Wily Bonilla MD  [facility] [address] [place] [address]    There were no vitals taken for this visit.  Data Unavailable     Do you need any medication refills at today's visit? Rubina Long LPN

## 2019-07-17 NOTE — PATIENT INSTRUCTIONS
Thanks for coming today.  Ortho/Sports Medicine Clinic  [address] [place] [address]    To schedule future appointments in Ortho Clinic, you may call [phone].    To schedule ordered imaging by your provider:   Call Central Imaging [phone]    To schedule an injection ordered by your provider:  Call Central Imaging Injection scheduling line: [phone]  [email or web address] available online at:  [email or web address]    Please call if any further questions or concerns ([phone]).  Clinic hours 8 am to 5 pm.    Return to clinic (call) if symptoms worsen or fail to improve.

## 2019-07-24 ENCOUNTER — OFFICE VISIT (OUTPATIENT)
Dept: PODIATRY | Facility: CLINIC | Age: 59
End: 2019-07-24
Payer: COMMERCIAL

## 2019-07-24 DIAGNOSIS — E11.621 DIABETIC ULCER OF RIGHT MIDFOOT ASSOCIATED WITH TYPE 2 DIABETES MELLITUS, WITH BONE INVOLVEMENT WITHOUT EVIDENCE OF NECROSIS (H): ICD-10-CM

## 2019-07-24 DIAGNOSIS — L97.322 SKIN ULCER OF LEFT ANKLE WITH FAT LAYER EXPOSED (H): ICD-10-CM

## 2019-07-24 DIAGNOSIS — E11.42 TYPE 2 DIABETES MELLITUS WITH DIABETIC POLYNEUROPATHY, WITHOUT LONG-TERM CURRENT USE OF INSULIN (H): Primary | ICD-10-CM

## 2019-07-24 DIAGNOSIS — M14.672 CHARCOT'S JOINT OF LEFT FOOT: ICD-10-CM

## 2019-07-24 DIAGNOSIS — L97.416 DIABETIC ULCER OF RIGHT MIDFOOT ASSOCIATED WITH TYPE 2 DIABETES MELLITUS, WITH BONE INVOLVEMENT WITHOUT EVIDENCE OF NECROSIS (H): ICD-10-CM

## 2019-07-24 PROCEDURE — 11042 DBRDMT SUBQ TIS 1ST 20SQCM/<: CPT | Performed by: PODIATRIST

## 2019-07-24 NOTE — PROGRESS NOTES
Chief Complaint   Patient presents with     Right Foot - Follow Up, WOUND CARE, Unnaboot     Left Foot - Follow Up, WOUND CARE, Unnaboot          No Known Allergies      Subjective: Tiffani is a 59 year old female who presents to the clinic today for a follow up of BL foot ulcers. Relates that she tolerated the boots well with no complications. DL has been suspended for 30 days while [employer] reviews her notes.     Objective        A diabetic pressure wound is noted at right  plantar 1st met head measuring 1.3cm x 2.5cm x 0.2cm.     Inman Classification: 2     Wound base: Red/Granulation     Edges: irritated     Drainage: scant/serous     Odor: no     Undermining: no     Bone Exposure: No     Clinical Signs of Infection: No     After obtaining patient consent, the wound was irrigated with copious amounts of saline. A curette was then used to debride the wound into the subcutaneous tissue. The wound edges were debrided to healthy, bleeding tissue. Given the patient's lack of sensation, no anesthesia was necessary for the procedure.   ____________________  Wound #2                             A pressure wound is noted at left  lateral malleolus measuring 0.5cm x 0.3cm x 0.2cm.     Inman Classification: 2     Wound base: Red/Granulation     Edges: intact     Drainage: scant/serous     Odor: no     Undermining: no     Bone Exposure: No     Clinical Signs of Infection: No     After obtaining patient consent, the wound was irrigated with copious amounts of saline. A curette was then used to debride the wound into the subcutaneous tissue. The wound edges were debrided to healthy, bleeding tissue. Given the patient's lack of sensation, no anesthesia was necessary for the procedure.         Assessment: DM2 with neuropathy.  Charcot foot.  Right plantar foot ulceration - stable but not more contracted. Will go back to TCC.  New left leg venous ulcer - healed  New left lateral mall ulceration from CAM pressure.      Plan:   - Pt seen  and evaluated  - Wounds debrided as described on the BL feet.   - Will try to get Regranex covered for the right foot. Will likely need PA.   - Unna's boots applied to BL legs.   - See again in 1 week.

## 2019-07-24 NOTE — LETTER
7/24/2019         RE: Tiffani Tan  [address] [facility] [address]        Dear Colleague,    Thank you for referring your patient, Tiffani Tan, to the [facility]. Please see a copy of my visit note below.    Chief Complaint   Patient presents with     Right Foot - Follow Up, WOUND CARE, Unnaboot     Left Foot - Follow Up, WOUND CARE, Unnaboot          No Known Allergies      Subjective: Tiffani is a 59 year old female who presents to the clinic today for a follow up of BL foot ulcers. Relates that she tolerated the boots well with no complications. DL has been suspended for 30 days while [employer] reviews her notes.     Objective        A diabetic pressure wound is noted at right  plantar 1st met head measuring 1.3cm x 2.5cm x 0.2cm.     Inman Classification: 2     Wound base: Red/Granulation     Edges: irritated     Drainage: scant/serous     Odor: no     Undermining: no     Bone Exposure: No     Clinical Signs of Infection: No     After obtaining patient consent, the wound was irrigated with copious amounts of saline. A curette was then used to debride the wound into the subcutaneous tissue. The wound edges were debrided to healthy, bleeding tissue. Given the patient's lack of sensation, no anesthesia was necessary for the procedure.   ____________________  Wound #2                             A pressure wound is noted at left  lateral malleolus measuring 0.5cm x 0.3cm x 0.2cm.     Inman Classification: 2     Wound base: Red/Granulation     Edges: intact     Drainage: scant/serous     Odor: no     Undermining: no     Bone Exposure: No     Clinical Signs of Infection: No     After obtaining patient consent, the wound was irrigated with copious amounts of saline. A curette was then used to debride the wound into the subcutaneous tissue. The wound edges were debrided to healthy, bleeding tissue. Given the patient's lack of sensation, no anesthesia was necessary for the procedure.         Assessment: DM2  with neuropathy.  Charcot foot.  Right plantar foot ulceration - stable but not more contracted. Will go back to TCC.  New left leg venous ulcer - healed  New left lateral mall ulceration from CAM pressure.      Plan:   - Pt seen and evaluated  - Wounds debrided as described on the BL feet.   -  Will try to get Regranex covered for the right foot. Will likely need PA.   - Unna's boots applied to BL legs.   - See again in 1 week.         Again, thank you for allowing me to participate in the care of your patient.        Sincerely,        Daniel Giron, MARY

## 2019-07-24 NOTE — PATIENT INSTRUCTIONS
Thanks for coming today.  [facility] Clinic  [address] [place] [address]    To schedule future appointments in Ortho Clinic, you may call [phone].    To schedule ordered imaging by your provider:   Call Central Imaging [phone]    To schedule an injection ordered by your provider:  Call Central Imaging Injection scheduling line: [phone]  [email or web address] available online at:  [email or web address]    Please call if any further questions or concerns ([phone]).  Clinic hours 8 am to 5 pm.    Return to clinic (call) if symptoms worsen or fail to improve.

## 2019-07-24 NOTE — NURSING NOTE
Tiffani Tan's chief complaint for this visit includes:  Chief Complaint   Patient presents with     Right Foot - Follow Up, WOUND CARE, Unnaboot     Left Foot - Follow Up, WOUND CARE, Unnaboot     PCP: T[facility] (Inactive)    Referring Provider:  Wily Bonilla MD  [facility]  [id number] [place] [address]    There were no vitals taken for this visit.  Data Unavailable     Do you need any medication refills at today's visit? No    Lisette Long LPN

## 2019-07-31 ENCOUNTER — OFFICE VISIT (OUTPATIENT)
Dept: PODIATRY | Facility: CLINIC | Age: 59
End: 2019-07-31
Payer: COMMERCIAL

## 2019-07-31 DIAGNOSIS — E11.42 TYPE 2 DIABETES MELLITUS WITH DIABETIC POLYNEUROPATHY, WITHOUT LONG-TERM CURRENT USE OF INSULIN (H): Primary | ICD-10-CM

## 2019-07-31 DIAGNOSIS — L97.322 SKIN ULCER OF LEFT ANKLE WITH FAT LAYER EXPOSED (H): ICD-10-CM

## 2019-07-31 DIAGNOSIS — E11.621 DIABETIC ULCER OF RIGHT MIDFOOT ASSOCIATED WITH TYPE 2 DIABETES MELLITUS, WITH BONE INVOLVEMENT WITHOUT EVIDENCE OF NECROSIS (H): ICD-10-CM

## 2019-07-31 DIAGNOSIS — I87.2 VENOUS INCOMPETENCE: ICD-10-CM

## 2019-07-31 DIAGNOSIS — M14.672 CHARCOT'S JOINT OF LEFT FOOT: ICD-10-CM

## 2019-07-31 DIAGNOSIS — L97.416 DIABETIC ULCER OF RIGHT MIDFOOT ASSOCIATED WITH TYPE 2 DIABETES MELLITUS, WITH BONE INVOLVEMENT WITHOUT EVIDENCE OF NECROSIS (H): ICD-10-CM

## 2019-07-31 PROCEDURE — 11042 DBRDMT SUBQ TIS 1ST 20SQCM/<: CPT | Performed by: PODIATRIST

## 2019-07-31 NOTE — LETTER
7/31/2019         RE: Tiffani Tan  [address] [facility] [address]        Dear Colleague,    Thank you for referring your patient, Tiffani Tan, to the [facility]. Please see a copy of my visit note below.    Chief Complaint   Patient presents with     Right Foot - Follow Up, WOUND CARE     Left Foot - Follow Up, WOUND CARE          No Known Allergies      Subjective: Tiffani is a 59 year old female who presents to the clinic today for a follow up of BL foot ulcerations. She relates that she tolerated the boots well with no complications.     Objective              A diabetic pressure wound is noted at right  plantar 1st met head measuring 1.3cm x 2.5cm x 0.2cm.     Inman Classification: 2     Wound base: Red/Granulation     Edges: irritated     Drainage: scant/serous     Odor: no     Undermining: no     Bone Exposure: No     Clinical Signs of Infection: No     After obtaining patient consent, the wound was irrigated with copious amounts of saline. A curette and scalpel were then used to debride the wound into the subcutaneous tissue. The wound edges were debrided to healthy, bleeding tissue. Given the patient's lack of sensation, no anesthesia was necessary for the procedure.   ____________________  Wound #2                                   A pressure wound is noted at left  lateral malleolus measuring 0.3cm x 0.3cm x 0.2cm.     Inman Classification: 2     Wound base: Red/Granulation     Edges: intact     Drainage: scant/serous     Odor: no     Undermining: no     Bone Exposure: No     Clinical Signs of Infection: No     After obtaining patient consent, the wound was irrigated with copious amounts of saline. A curette was then used to debride the wound into the subcutaneous tissue. The wound edges were debrided to healthy, bleeding tissue. Given the patient's lack of sensation, no anesthesia was necessary for the procedure.         Assessment: DM2 with neuropathy.  Charcot foot.  Right plantar foot  ulceration - stable but not more contracted. Will go back to [facility].  New left leg venous ulcer - healed  New left lateral mall ulceration from CAM pressure.      Plan:   - Pt seen and evaluated  - Wounds debrided as described on the BL feet.   - Will try to get Regranex covered for the right foot. It is going through the PA process now.    - Unna's boots applied to BL legs. Neema was applied to the right foot.  - See again in 1 week.         Again, thank you for allowing me to participate in the care of your patient.        Sincerely,        Daniel Giron, MARY

## 2019-07-31 NOTE — NURSING NOTE
Tiffani Tan's chief complaint for this visit includes:  Chief Complaint   Patient presents with     Right Foot - Follow Up, WOUND CARE     Left Foot - Follow Up, WOUND CARE     [facility]    Referring Provider:  Wily Bonilla MD  [facility] [place] [facility] [address] [place] [address]    There were no vitals taken for this visit.  Data Unavailable     Do you need any medication refills at today's visit? Rubina Long LPN

## 2019-07-31 NOTE — PATIENT INSTRUCTIONS
Thanks for coming today.  Ortho[facility] Medicine Clinic  [address] [place] [address]    To schedule future appointments in Ortho Clinic, you may call [phone].    To schedule ordered imaging by your provider:   Call Central Imaging [phone]    To schedule an injection ordered by your provider:  Call Central Imaging Injection scheduling line: [phone]  Vibrowhart available online at:  [email or web address]    Please call if any further questions or concerns ([phone]).  Clinic hours 8 am to 5 pm.    Return to clinic (call) if symptoms worsen or fail to improve.

## 2019-07-31 NOTE — PROGRESS NOTES
Chief Complaint   Patient presents with     Right Foot - Follow Up, WOUND CARE     Left Foot - Follow Up, WOUND CARE          No Known Allergies      Subjective: Tiffani is a 59 year old female who presents to the clinic today for a follow up of BL foot ulcerations. She relates that she tolerated the boots well with no complications.     Objective              A diabetic pressure wound is noted at right  plantar 1st met head measuring 1.3cm x 2.5cm x 0.2cm.     Inman Classification: 2     Wound base: Red/Granulation     Edges: irritated     Drainage: scant/serous     Odor: no     Undermining: no     Bone Exposure: No     Clinical Signs of Infection: No     After obtaining patient consent, the wound was irrigated with copious amounts of saline. A curette and scalpel were then used to debride the wound into the subcutaneous tissue. The wound edges were debrided to healthy, bleeding tissue. Given the patient's lack of sensation, no anesthesia was necessary for the procedure.   ____________________  Wound #2                                   A pressure wound is noted at left  lateral malleolus measuring 0.3cm x 0.3cm x 0.2cm.     Inman Classification: 2     Wound base: Red/Granulation     Edges: intact     Drainage: scant/serous     Odor: no     Undermining: no     Bone Exposure: No     Clinical Signs of Infection: No     After obtaining patient consent, the wound was irrigated with copious amounts of saline. A curette was then used to debride the wound into the subcutaneous tissue. The wound edges were debrided to healthy, bleeding tissue. Given the patient's lack of sensation, no anesthesia was necessary for the procedure.         Assessment: DM2 with neuropathy.  Charcot foot.  Right plantar foot ulceration - stable but not more contracted. Will go back to [facility].  New left leg venous ulcer - healed  New left lateral mall ulceration from CAM pressure.      Plan:   - Pt seen and evaluated  - Wounds debrided as described on  the BL feet.   - Will try to get Regranex covered for the right foot. It is going through the PA process now.    - Unna's boots applied to BL legs. Neema was applied to the right foot.  - See again in 1 week.

## 2019-08-01 ENCOUNTER — TELEPHONE (OUTPATIENT)
Dept: PODIATRY | Facility: CLINIC | Age: 59
End: 2019-08-01

## 2019-08-01 NOTE — TELEPHONE ENCOUNTER
Health Call Center    Phone Message    May a detailed message be left on voicemail: yes    Reason for Call: Symptoms or Concerns     Patient has questions for administration of the cream, regranex, for her foot ulcer - Please call to advise. She wants to begin using it ASAP - due for tonight.      Action Taken: Message routed to:  Adult Clinics: Podiatry p [id number]

## 2019-08-01 NOTE — TELEPHONE ENCOUNTER
Pt picked up Regranex cream at pharmacy today and had questions about cleaning the ulcers prior and general application. Went over instructions, clean with saline or wound cleanser, OK to pat dry with sterile gauze or let dry a little, then apply a thin layer of the medication evenly over the ulcer, cover with sterile gauze. Will send message to Dr. Giron letting him know that she will start using it tonight.     Vincenzo Farah RN

## 2019-08-07 ENCOUNTER — OFFICE VISIT (OUTPATIENT)
Dept: PODIATRY | Facility: CLINIC | Age: 59
End: 2019-08-07
Payer: COMMERCIAL

## 2019-08-07 DIAGNOSIS — L97.322 SKIN ULCER OF LEFT ANKLE WITH FAT LAYER EXPOSED (H): ICD-10-CM

## 2019-08-07 DIAGNOSIS — E11.42 TYPE 2 DIABETES MELLITUS WITH DIABETIC POLYNEUROPATHY, WITHOUT LONG-TERM CURRENT USE OF INSULIN (H): Primary | ICD-10-CM

## 2019-08-07 DIAGNOSIS — E11.621 DIABETIC ULCER OF RIGHT MIDFOOT ASSOCIATED WITH TYPE 2 DIABETES MELLITUS, WITH BONE INVOLVEMENT WITHOUT EVIDENCE OF NECROSIS (H): ICD-10-CM

## 2019-08-07 DIAGNOSIS — L97.416 DIABETIC ULCER OF RIGHT MIDFOOT ASSOCIATED WITH TYPE 2 DIABETES MELLITUS, WITH BONE INVOLVEMENT WITHOUT EVIDENCE OF NECROSIS (H): ICD-10-CM

## 2019-08-07 DIAGNOSIS — I87.2 VENOUS INCOMPETENCE: ICD-10-CM

## 2019-08-07 DIAGNOSIS — M14.672 CHARCOT'S JOINT OF LEFT FOOT: ICD-10-CM

## 2019-08-07 PROCEDURE — 11042 DBRDMT SUBQ TIS 1ST 20SQCM/<: CPT | Performed by: PODIATRIST

## 2019-08-07 NOTE — PATIENT INSTRUCTIONS
Thanks for coming today.  Ortho[facility] Medicine Clinic  [address] [place] [address]    To schedule future appointments in Ortho Clinic, you may call [phone].    To schedule ordered imaging by your provider:   Call Central Imaging [phone]    To schedule an injection ordered by your provider:  Call Central Imaging Injection scheduling line: [phone]  Spry Hive Industrieshart available online at:  [email or web address]    Please call if any further questions or concerns ([phone]).  Clinic hours 8 am to 5 pm.    Return to clinic (call) if symptoms worsen or fail to improve.

## 2019-08-07 NOTE — LETTER
8/7/2019         RE: Tiffani Tan  [address] [facility] [address]        Dear Colleague,    Thank you for referring your patient, Tiffani Tan, to the [facility]. Please see a copy of my visit note below.    Chief Complaint:   Chief Complaint   Patient presents with     Right Foot - Follow Up, WOUND CARE     Left Foot - Follow Up, WOUND CARE        No Known Allergies      Subjective: Tiffani is a 59 year old female who presents to the clinic today for a follow up of bilateral foot wounds.  She relates that she did get the Regranex gel.  She has been using it on the right foot at night.  She relates that she is getting quite significant pain in the left lateral ankle.  This is the foot that is quite deformed.    Objective        A diabetic pressure wound is noted at right  plantar 1st met head measuring 1.3cm x 2.8cm x 0.2cm.     Inman Classification: 2     Wound base: Red/Granulation     Edges: intact     Drainage: scant/serous     Odor: no     Undermining: no     Bone Exposure: No     Clinical Signs of Infection: No     After obtaining patient consent, the wound was irrigated with copious amounts of saline. A curette w as then used to debride the wound into the subcutaneous tissue. The wound edges were debrided to healthy, bleeding tissue. Given the patient's lack of sensation, no anesthesia was necessary for the procedure.   ____________________  Wound #2           A pressure wound is noted at left  lateral malleolus measuring 0.3cm x 0.3cm x 0.2cm.     Inman Classification: 2     Wound base: Red/Granulation     Edges: intact     Drainage: scant/serous     Odor: no     Undermining: no     Bone Exposure: No     Clinical Signs of Infection: No     After obtaining patient consent, the wound was irrigated with copious amounts of saline. A curette was then used to debride the wound into the subcutaneous tissue. The wound edges were debrided to healthy, bleeding tissue. Given the patient's lack of  sensation, no anesthesia was necessary for the procedure.         Assessment: DM2 with neuropathy.  Charcot foot.  Right plantar foot ulceration - stable but not more contracted. Will go back to [facility].  Left leg venous ulcer - healed  Left lateral mall ulceration from CAM pressure.      Plan:   - Pt seen and evaluated  - Wounds debrided as described on the BL feet.   - Cont Regranex on the right foot.  - Unna's boots applied to L leg. Saline dressing to right foot in the daytime.   - See again in 1 week.       Again, thank you for allowing me to participate in the care of your patient.        Sincerely,        Daniel Giron DPM

## 2019-08-07 NOTE — NURSING NOTE
Tiffani Tan's chief complaint for this visit includes:  Chief Complaint   Patient presents with     Right Foot - Follow Up, WOUND CARE     Left Foot - Follow Up, WOUND CARE     [facility]    Referring Provider:  Wily Bonilla MD  [facility] [place] [facility] [address] [place] [address]    There were no vitals taken for this visit.  Data Unavailable     Do you need any medication refills at today's visit? Rubina Long LPN

## 2019-08-07 NOTE — PROGRESS NOTES
Chief Complaint:   Chief Complaint   Patient presents with     Right Foot - Follow Up, WOUND CARE     Left Foot - Follow Up, WOUND CARE        No Known Allergies      Subjective: Tiffani is a 59 year old female who presents to the clinic today for a follow up of bilateral foot wounds.  She relates that she did get the Regranex gel.  She has been using it on the right foot at night.  She relates that she is getting quite significant pain in the left lateral ankle.  This is the foot that is quite deformed.    Objective        A diabetic pressure wound is noted at right  plantar 1st met head measuring 1.3cm x 2.8cm x 0.2cm.     Inman Classification: 2     Wound base: Red/Granulation     Edges: intact     Drainage: scant/serous     Odor: no     Undermining: no     Bone Exposure: No     Clinical Signs of Infection: No     After obtaining patient consent, the wound was irrigated with copious amounts of saline. A curette was then used to debride the wound into the subcutaneous tissue. The wound edges were debrided to healthy, bleeding tissue. Given the patient's lack of sensation, no anesthesia was necessary for the procedure.   ____________________  Wound #2           A pressure wound is noted at left  lateral malleolus measuring 0.3cm x 0.3cm x 0.2cm.     Inman Classification: 2     Wound base: Red/Granulation     Edges: intact     Drainage: scant/serous     Odor: no     Undermining: no     Bone Exposure: No     Clinical Signs of Infection: No     After obtaining patient consent, the wound was irrigated with copious amounts of saline. A curette was then used to debride the wound into the subcutaneous tissue. The wound edges were debrided to healthy, bleeding tissue. Given the patient's lack of sensation, no anesthesia was necessary for the procedure.         Assessment: DM2 with neuropathy.  Charcot foot.  Right plantar foot ulceration - stable but not more contracted. Will go back to [facility].  Left leg venous ulcer  - healed  Left lateral mall ulceration from CAM pressure.      Plan:   - Pt seen and evaluated  - Wounds debrided as described on the BL feet.   - Cont Regranex on the right foot.  - Unna's boots applied to L leg. Saline dressing to right foot in the daytime.   - See again in 1 week.

## 2019-08-15 ENCOUNTER — APPOINTMENT (OUTPATIENT)
Dept: NURSING | Facility: CLINIC | Age: 59
End: 2019-08-15
Payer: COMMERCIAL

## 2019-08-21 ENCOUNTER — TELEPHONE (OUTPATIENT)
Dept: ORTHOPEDICS | Facility: CLINIC | Age: 59
End: 2019-08-21

## 2019-08-21 ENCOUNTER — OFFICE VISIT (OUTPATIENT)
Dept: PODIATRY | Facility: CLINIC | Age: 59
End: 2019-08-21
Payer: COMMERCIAL

## 2019-08-21 DIAGNOSIS — E11.621 DIABETIC ULCER OF RIGHT MIDFOOT ASSOCIATED WITH TYPE 2 DIABETES MELLITUS, WITH BONE INVOLVEMENT WITHOUT EVIDENCE OF NECROSIS (H): ICD-10-CM

## 2019-08-21 DIAGNOSIS — L97.416 DIABETIC ULCER OF RIGHT MIDFOOT ASSOCIATED WITH TYPE 2 DIABETES MELLITUS, WITH BONE INVOLVEMENT WITHOUT EVIDENCE OF NECROSIS (H): ICD-10-CM

## 2019-08-21 DIAGNOSIS — E11.42 TYPE 2 DIABETES MELLITUS WITH DIABETIC POLYNEUROPATHY, WITHOUT LONG-TERM CURRENT USE OF INSULIN (H): Primary | ICD-10-CM

## 2019-08-21 DIAGNOSIS — M14.672 CHARCOT'S JOINT OF LEFT FOOT: ICD-10-CM

## 2019-08-21 PROCEDURE — 11042 DBRDMT SUBQ TIS 1ST 20SQCM/<: CPT | Performed by: PODIATRIST

## 2019-08-21 RX ORDER — CIPROFLOXACIN 500 MG/1
500 TABLET, FILM COATED ORAL 2 TIMES DAILY
Qty: 14 TABLET | Refills: 0 | Status: SHIPPED | OUTPATIENT
Start: 2019-08-21 | End: 2019-12-11

## 2019-08-21 NOTE — PROGRESS NOTES
Chief Complaint:   Chief Complaint   Patient presents with     Right Foot - Follow Up, WOUND CARE     Left Foot - Follow Up, WOUND CARE        No Known Allergies      Subjective: Tiffani is a 59 year old female who presents to the clinic today for a follow up of BL foot ulcerations. She relates that she continues to use the Regranex on the right foot ulceration. Has noticed green drainage to the foot. Continues to have left ankle pain.     Objective          A diabetic pressure wound is noted at right  plantar 1st met head measuring 1.6cm x 2.9cm x 0.2cm.    Inman Classification: 2    Wound base: Pink/Granulation    Edges: hyperkeratotic    Drainage: moderate/green    Odor: fruity    Undermining: no    Bone Exposure: No    Clinical Signs of Infection: Yes: green tint with a fruity odor.     After obtaining patient consent, the wound was irrigated with copious amounts of saline. A scalpel and curette were then used to debride the wound into the subcutaneous tissue. The wound edges were debrided to healthy, bleeding tissue. Given the patient's lack of sensation, no anesthesia was necessary for the procedure.   Wound on the left lateral malleolus has healed over today. No s/s of infection.     Assessment: DM2 with a healed left ankle ulceration. Right plantar 1st met head ulcer persists and now has pseudomonal infection. Wound is also larger than the last visit.     Plan:   - Pt seen and evaluated  - Right foot ulcer was debrided as described. For dressings, apply a Vashe soaked 4x4 to the area and wrap with Kerlix. Change daily.   - Needs pseudomonal coverage. She is at increased risk of hypoglycemia with Cipro and Amaryl. I have discussed this with her and the symptoms of hypoglycemia. She is going to test her blood sugars more often.   - Unna's boot was applied to the left leg.  - Pt to return to clinic in 1 week.

## 2019-08-21 NOTE — PATIENT INSTRUCTIONS
Thanks for coming today.  Ortho/Sports Medicine Clinic  [address] [place] [address]    To schedule future appointments in Ortho Clinic, you may call [phone].    To schedule ordered imaging by your provider:   Call Central Imaging [phone]    To schedule an injection ordered by your provider:  Call Central Imaging Injection scheduling line: [phone]  ClearServehart available online at:  [email or web address]    Please call if any further questions or concerns ([phone]).  Clinic hours 8 am to 5 pm.    Return to clinic (call) if symptoms worsen or fail to improve.

## 2019-08-21 NOTE — NURSING NOTE
Tiffani Tan's chief complaint for this visit includes:  Chief Complaint   Patient presents with     Right Foot - Follow Up, WOUND CARE     Left Foot - Follow Up, WOUND CARE     [facility]    Referring Provider:  Wily Bonilla MD  [facility] [place] [facility] [address] [place] [address]    There were no vitals taken for this visit.  Data Unavailable     Do you need any medication refills at today's visit? Rubina Long LPN

## 2019-08-21 NOTE — LETTER
8/21/2019         RE: Tiffani Tan  [address] [facility] [address]        Dear Colleague,    Thank you for referring your patient, Tiffani Tan, to the [facility]. Please see a copy of my visit note below.    Chief Complaint:   Chief Complaint   Patient presents with     Right Foot - Follow Up, WOUND CARE     Left Foot - Follow Up, WOUND CARE        No Known Allergies      Subjective: Tiffani is a 59 year old female who presents to the clinic today for a follow up of BL foot ulcerations. She relates that she continues to use the Regranex on the right foot ulceration. Has noticed green drainage to the foot. Continues to have left ankle pain.     Objective          A diabetic pressure wound is noted at right  plantar 1st met head measuring 1.6cm x 2.9cm x 0.2cm.    Inman Classification: 2    Wound base: Pink/Granulation    Edges: hyperkeratotic    Drainage: moderate/green    Odor: fruity    Undermining: no    Bone Exposure: No    Clinical Signs of Infection: Yes: green tint with a fruity odor.     After obtaining patient consent, the wound was irrigated with copious amounts of saline. A scalpel and curette were then used to debride the wound into the subcutaneous tissue. The wound edges were debrided to healthy, bleeding tissue. Given the patient's lack of sensation, no anesthesia was necessary for the procedure.   Wound on the left lateral malleolus has healed over today. No s/s of infection.     Assessment: DM2 with a healed left ankle ulceration. Right plantar 1st met head ulcer persists and now has pseudomonal infection. Wound is also larger than the last visit.     Plan:   - Pt seen and evaluated  - Right foot ulcer was debrided as described. For dressings, apply a Vashe soaked 4x4 to the area and wrap with Kerlix. Change daily.   - Needs pseudomonal coverage. She is at increased risk of hypoglycemia with Cipro and Amaryl. I have discussed this with her and the symptoms of hypoglycemia. She is  going to test her blood sugars more often.   - Unna's boot was applied to the left leg.  - Pt to return to clinic in 1 week.       Again, thank you for allowing me to participate in the care of your patient.        Sincerely,        Daniel Giron DPM

## 2019-08-21 NOTE — TELEPHONE ENCOUNTER
Financial Counselor Review for Apligraf, Dermagraft, Puraply AM, Affinity, NuShield:    Which product(s) to be checked:  Puraply AM Affinity,     Has the patient tried any of these products before: NO    Was it for this wound: YES    Diagnosis code (include ICD-10 code): E11.42, L97.322, E11.621, L97.416, M14.672,     Coverage and patient financial responsibility information:YES    Does patient need to be contacted by Financial Counselor:YES    Note: Do not use abbreviations and route encounter to [facility] [place] [[id number]]

## 2019-08-23 NOTE — TELEPHONE ENCOUNTER
No PA Required Apligraf. Patient has a $2,200.00 deductible which has been met. Services will be covered at the contracted allowed amount.

## 2019-08-23 NOTE — TELEPHONE ENCOUNTER
She has already used Apligraf, this PA was for Puraply as seen below. Can that please be checked?    Lisette UMANA LPN

## 2019-08-28 ENCOUNTER — TELEPHONE (OUTPATIENT)
Dept: ORTHOPEDICS | Facility: CLINIC | Age: 59
End: 2019-08-28

## 2019-08-28 ENCOUNTER — OFFICE VISIT (OUTPATIENT)
Dept: PODIATRY | Facility: CLINIC | Age: 59
End: 2019-08-28
Payer: COMMERCIAL

## 2019-08-28 ENCOUNTER — DOCUMENTATION ONLY (OUTPATIENT)
Dept: CARE COORDINATION | Facility: CLINIC | Age: 59
End: 2019-08-28

## 2019-08-28 ENCOUNTER — ANCILLARY PROCEDURE (OUTPATIENT)
Dept: GENERAL RADIOLOGY | Facility: CLINIC | Age: 59
End: 2019-08-28
Attending: PODIATRIST
Payer: COMMERCIAL

## 2019-08-28 DIAGNOSIS — E11.621 DIABETIC ULCER OF RIGHT MIDFOOT ASSOCIATED WITH TYPE 2 DIABETES MELLITUS, WITH BONE INVOLVEMENT WITHOUT EVIDENCE OF NECROSIS (H): ICD-10-CM

## 2019-08-28 DIAGNOSIS — M14.672 CHARCOT'S JOINT OF LEFT FOOT: Primary | ICD-10-CM

## 2019-08-28 DIAGNOSIS — M14.672 CHARCOT'S JOINT OF LEFT FOOT: ICD-10-CM

## 2019-08-28 DIAGNOSIS — E11.42 TYPE 2 DIABETES MELLITUS WITH DIABETIC POLYNEUROPATHY, WITHOUT LONG-TERM CURRENT USE OF INSULIN (H): ICD-10-CM

## 2019-08-28 DIAGNOSIS — I87.2 VENOUS INCOMPETENCE: ICD-10-CM

## 2019-08-28 DIAGNOSIS — M79.605 PAIN OF LEFT LOWER EXTREMITY: ICD-10-CM

## 2019-08-28 DIAGNOSIS — L97.416 DIABETIC ULCER OF RIGHT MIDFOOT ASSOCIATED WITH TYPE 2 DIABETES MELLITUS, WITH BONE INVOLVEMENT WITHOUT EVIDENCE OF NECROSIS (H): ICD-10-CM

## 2019-08-28 PROCEDURE — 99213 OFFICE O/P EST LOW 20 MIN: CPT | Mod: 25 | Performed by: PODIATRIST

## 2019-08-28 PROCEDURE — 11042 DBRDMT SUBQ TIS 1ST 20SQCM/<: CPT | Performed by: PODIATRIST

## 2019-08-28 PROCEDURE — 73590 X-RAY EXAM OF LOWER LEG: CPT | Mod: LT | Performed by: RADIOLOGY

## 2019-08-28 PROCEDURE — 73620 X-RAY EXAM OF FOOT: CPT | Mod: LT | Performed by: RADIOLOGY

## 2019-08-28 PROCEDURE — 73610 X-RAY EXAM OF ANKLE: CPT | Mod: LT | Performed by: RADIOLOGY

## 2019-08-28 NOTE — PROGRESS NOTES
Chief Complaint:   Chief Complaint   Patient presents with     Right Foot - Follow Up, WOUND CARE     Left Foot - Follow Up, WOUND CARE        No Known Allergies      Subjective: Tiffani is a 59 year old female who presents to the clinic today for a follow up of right plantar foot ulcer. Relates that she has been taking the cipro. She has another dose for tonight. She relates that she had to remove the Unna's boot on the left leg after a couple of days as the leg because very swollen and painful. She relates that the leg has blisters and is leaking a copious amount of fluid.     Objective        A diabetic pressure wound is noted at right  plantar 1st met head measuring 1.5cm x 2.9cm x 0.2cm.     Inman Classification: 2     Wound base: Pink/Granulation     Edges: hyperkeratotic     Drainage: moderate/green     Odor: fruity     Undermining: no     Bone Exposure: No     Clinical Signs of Infection: no     After obtaining patient consent, the wound was irrigated with copious amounts of saline. A curette weas then used to debride the wound into the subcutaneous tissue. The wound edges were debrided to healthy, bleeding tissue. Given the patient's lack of sensation, no anesthesia was necessary for the procedure.   Wound on the left lateral malleolus has healed over today. No s/s of infection.   Left leg is very edematous and is hyperpigmented without any warmth to the area. No open lesions noted to the leg, however there are blisters on the lower leg and there is copious amounts of serous drainage to the leg.              Assessment: DM2 with a healed left ankle ulceration. Right plantar 1st met head ulcer persists. 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   She has new edema to the left foot, ankle and leg without warmth. This appears to be relates to her venous dz and Charcot changes.    left tib/fib, ankle, and foot xrays indicated in 6 nonweightbearing views.    Charcot changes noted throughout the  left foot and ankle. Complete destruction of the cuneiform joints. Talus is significantly displaced from the mortise. Severe Charcot destruction. No tibial or fibular involvement noted.         Plan:   - Pt seen and evaluated.  - Right foot ulcer was debrided as described. For dressings, apply a Vashe soaked 4x4 to the area and wrap with Kerlix. Change daily.   - XRs of the left LE taken and discussed with her. Unfortunately, the foot is no longer acceptable for weight bearing and I do not think that the boot is providing her any more support. She would like to speak to Dr. Adams regarding a BKA. Referral was written.   - Adaptic, ABDs, Ricky, and Tubigrip was applied to the left leg. Change daily.   - Pt to return to clinic in 1 week.

## 2019-08-28 NOTE — TELEPHONE ENCOUNTER
Financial Counselor Review for Apligraf, Dermagraft,:    Which product(s) to be checked: Apligraf, Dermagraft, Puraply Janel Johansen    Has the patient tried any of these products before: YES    Was it for this wound: YES    Diagnosis code (include ICD-10 code): M14.672, E11.42, E11.621, &97.416, 87.2    Coverage and patient financial responsibility information:YES    Does patient need to be contacted by Financial Counselor:YES    Note: Do not use abbreviations and route encounter to [facility] [place] [[id number]]

## 2019-08-28 NOTE — PATIENT INSTRUCTIONS
Thanks for coming today.  Ortho/Sports Medicine Clinic  [address] [place] [address]    To schedule future appointments in Ortho Clinic, you may call [phone].    To schedule ordered imaging by your provider:   Call Central Imaging [phone]    To schedule an injection ordered by your provider:  Call Central Imaging Injection scheduling line: [phone]  Emergent Labshart available online at:  [email or web address]    Please call if any further questions or concerns ([phone]).  Clinic hours 8 am to 5 pm.    Return to clinic (call) if symptoms worsen or fail to improve.

## 2019-08-28 NOTE — NURSING NOTE
Tiffani Tan's chief complaint for this visit includes:  Chief Complaint   Patient presents with     Right Foot - Follow Up, WOUND CARE     Left Foot - Follow Up, WOUND CARE     [facility]    Referring Provider:  Wily Bonilla MD  [facility] [facility] [address] [place] [address]    There were no vitals taken for this visit.  Data Unavailable     Do you need any medication refills at today's visit? Rubina Long LPN

## 2019-08-28 NOTE — LETTER
8/28/2019         RE: Tiffani Tan  [address] [facility] [address]        Dear Colleague,    Thank you for referring your patient, Tiffani Tan, to the [facility]. Please see a copy of my visit note below.    Chief Complaint:   Chief Complaint   Patient presents with     Right Foot - Follow Up, WOUND CARE     Left Foot - Follow Up, WOUND CARE        No Known Allergies      Subjective: Tiffani is a 59 year old female who presents to the clinic today for a follow up of right plantar foot ulcer. Relates that she has been taking the cipro. She has another dose for tonight. She relates that she had to remove the Unna's boot on the left leg after a couple of days as the leg because very swollen and painful. She relates that the leg has blisters and is leaking a copious amount of fluid.     Objective        A diabetic pressure wound is noted at right  plantar 1st met head measuring 1.5cm x 2.9cm x 0.2cm.     Inman Classification: 2     Wound base: Pink/Granulation     Edges: hyperkeratotic     Drainage: moderate/green     Odor: fruity     Undermining: no     Bone Exposure: No     Clinical Signs of Infection:  no     After obtaining patient consent, the wound was irrigated with copious amounts of saline. A curette we as then used to debride the wound into the subcutaneous tissue. The wound edges were debrided to healthy, bleeding tissue. Given the patient's lack of sensation, no anesthesia was necessary for the procedure.   Wound on the left lateral malleolus has healed over today. No s/s of infection.   Left leg is very edematous and is hyperpigmented without any warmth to the area. No open lesions noted to the leg, however there are blisters on the lower leg and there is copious amounts of serous drainage to the leg.              Assessment: DM2 with a healed left ankle ulceration. Right plantar 1st met head ulcer persists. 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   She has new edema to  the left foot, ankle and leg without warmth. This appears to be relates to her venous dz and Charcot changes.    left tib/fib, ankle, and foot xrays indicated in 6 nonweightbearing views.    Charcot changes noted throughout the left foot and ankle. Complete destruction of the cuneiform joints. Talus is significantly displaced from the mortise. Severe Charcot destruction. No tibial or fibular involvement noted.         Plan:   - Pt seen and evaluated.  - Right foot ulcer was debrided as described. For dressings, apply a Vashe soaked 4x4 to the area and wrap with Kerlix. Change daily.   - XRs of the left LE taken and discussed with her. Unfortunately, the foot is no longer acceptable for weight bearing and I do not think that the boot is providing her any more support. She would like to speak to Dr. Adams regarding a BKA. Referral was written.   - Adaptic, ABDs, Ricky, and Tubigrip was applied to the left leg. Change daily.   - Pt to return to clinic in 1 week.          Again, thank you for allowing me to participate in the care of your patient.        Sincerely,        Daniel Giron DPM

## 2019-09-03 NOTE — TELEPHONE ENCOUNTER
Pa submitted to [facility] for any of the following products: Apligraf, Dermagraft, Puraply AM Idalmis, Nuield    Once PA received will route message when patient is good to go.

## 2019-09-04 ENCOUNTER — OFFICE VISIT (OUTPATIENT)
Dept: PODIATRY | Facility: CLINIC | Age: 59
End: 2019-09-04
Payer: COMMERCIAL

## 2019-09-04 DIAGNOSIS — L97.416 DIABETIC ULCER OF RIGHT MIDFOOT ASSOCIATED WITH TYPE 2 DIABETES MELLITUS, WITH BONE INVOLVEMENT WITHOUT EVIDENCE OF NECROSIS (H): ICD-10-CM

## 2019-09-04 DIAGNOSIS — E11.42 TYPE 2 DIABETES MELLITUS WITH DIABETIC POLYNEUROPATHY, WITHOUT LONG-TERM CURRENT USE OF INSULIN (H): ICD-10-CM

## 2019-09-04 DIAGNOSIS — I87.2 VENOUS INCOMPETENCE: ICD-10-CM

## 2019-09-04 DIAGNOSIS — M14.672 CHARCOT'S JOINT OF LEFT FOOT: Primary | ICD-10-CM

## 2019-09-04 DIAGNOSIS — E11.621 DIABETIC ULCER OF RIGHT MIDFOOT ASSOCIATED WITH TYPE 2 DIABETES MELLITUS, WITH BONE INVOLVEMENT WITHOUT EVIDENCE OF NECROSIS (H): ICD-10-CM

## 2019-09-04 PROCEDURE — 99213 OFFICE O/P EST LOW 20 MIN: CPT | Mod: 25 | Performed by: PODIATRIST

## 2019-09-04 PROCEDURE — 11042 DBRDMT SUBQ TIS 1ST 20SQCM/<: CPT | Performed by: PODIATRIST

## 2019-09-04 NOTE — PATIENT INSTRUCTIONS
Thanks for coming today.  Ortho/Sports Medicine Clinic  [address] [place] [address]    To schedule future appointments in Ortho Clinic, you may call [phone].    To schedule ordered imaging by your provider:   Call Central Imaging [phone]    To schedule an injection ordered by your provider:  Call Central Imaging Injection scheduling line: [phone]  Tinteohart available online at:  [email or web address]    Please call if any further questions or concerns ([phone]).  Clinic hours 8 am to 5 pm.    Return to clinic (call) if symptoms worsen or fail to improve.

## 2019-09-04 NOTE — PROGRESS NOTES
Chief Complaint:   Chief Complaint   Patient presents with     Right Foot - WOUND CARE, RECHECK     Left Foot - WOUND CARE, RECHECK        No Known Allergies      Subjective: Tiffani is a 59 year old female who presents to the clinic today for a follow up of left Charcot foot and right diabetic ulcer. Relates that she needs a letter for her license to be reinstated. Relates that she has an appt with Dr. Adams on Tuesday to discuss the left foot Charcot. She has been using the Vashe on the right foot. Edema in the left leg has decreased.     Objective        A diabetic pressure wound is noted at right  plantar 1st met head measuring 2cm x 3cm x 0.2cm.     Inman Classification: 2     Wound base: Pink/Granulation     Edges: hyperkeratotic     Drainage: moderate/green     Odor: fruity     Undermining: no     Bone Exposure: No     Clinical Signs of Infection: no     After obtaining patient consent, the wound was irrigated with copious amounts of saline. A curette weas then used to debride the wound into the subcutaneous tissue. The wound edges were debrided to healthy, bleeding tissue. Given the patient's lack of sensation, no anesthesia was necessary for the procedure.   Wound on the left lateral malleolus has healed over today. No s/s of infection.   Left leg is very edematous and is hyperpigmented without any warmth to the area. No open lesions noted to the leg, however there are blisters on the lower leg and there is copious amounts of serous drainage to the leg.                 Assessment: DM2 with a healed left ankle ulceration. Right plantar 1st met head ulcer persists. 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   She has new edema to the left foot, ankle and leg without warmth. This appears to be relates to her venous dz and Charcot changes.     left tib/fib, ankle, and foot xrays indicated in 6 nonweightbearing views.     Charcot changes noted throughout the left foot and ankle. Complete destruction  of the cuneiform joints. Talus is significantly displaced from the mortise. Severe Charcot destruction. No tibial or fibular involvement noted.         Plan:   - Pt seen and evaluated.  - Right foot ulcer was debrided as described. For dressings, apply Prismato the area and wrap with Kerlix. Change every other day.   - Adaptic, ABDs, Ricky, and Tubigrip was applied to the left leg. Change daily.   - Pt to return to clinic in 1 week.

## 2019-09-04 NOTE — LETTER
Verification of Appointment  2019     Seen today: yes    Patient:  Tiffani Tan  :   1960  MRN:     [id number]  Physician: DANIEL KNOWLES    Tiffani Tan is under my care for a right foot ulcer.  She has had her driving privileges suspended. From my findings today:  She does not have to be reviewed at any time.  She is not on a prescription that would keep her from driving.  She does not have a medical reason why she cannot drive.  She is safe to drive a motor vehicle.                         Daniel Knowles DPM

## 2019-09-04 NOTE — LETTER
9/4/2019         RE: Tiffani Tan  [address] [facility] [address]        Dear Colleague,    Thank you for referring your patient, Tiffani Tan, to the [facility]. Please see a copy of my visit note below.    Chief Complaint:   Chief Complaint   Patient presents with     Right Foot - WOUND CARE, RECHECK     Left Foot - WOUND CARE, RECHECK        No Known Allergies      Subjective: Tiffani is a 59 year old female who presents to the clinic today for a follow up of left Charcot foot and right diabetic ulcer. Relates that she needs a letter for her license to be reinstated. Relates that she has an appt with Dr. Adams on Tuesday to discuss the left foot Charcot. She has been using the Vashe on the right foot. Edema in the left leg has decreased.     Objective        A diabetic pressure wound is noted at right  plantar 1st met head measuring 2cm x 3cm x 0.2cm.     Inman Classification: 2     Wound base: Pink/Granulation     Edges: hyperkeratotic     Drainage: moderate/green     Odor: fruity     Undermining: no     Bone Exposure: No     Clinical Signs of Infection: no     After obtaining patient consent, the wound was irrigated with copious amounts of saline. A curette weas then used to debride the wound into the subcutaneous tissue. The wound edges were debrided to healthy, bleeding tissue. Given the patient's lack of sensation, no anesthesia was necessary for the procedure.   Wound on the left lateral malleolus has healed over today. No s/s of infection.   Left leg is very edematous and is hyperpigmented without any warmth to the area. No open lesions noted to the leg, however there are blisters on the lower leg and there is copious amounts of serous drainage to the leg.                 Assessment: DM2 with a healed left ankle ulceration. Right plantar 1st met head ulcer persists. 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   She has new edema to the left foot, ankle and leg without warmth.  This appears to be relates to her venous dz and Charcot changes.     left tib/fib, ankle, and foot xrays indicated in 6 nonweightbearing views.     Charcot changes noted throughout the left foot and ankle. Complete destruction of the cuneiform joints. Talus is significantly displaced from the mortise. Severe Charcot destruction. No tibial or fibular involvement noted.         Plan:   - Pt seen and evaluated.  - Right foot ulcer was debrided as described. For dressings, apply Prismato the area and wrap with Kerlix. Change every other day.   - Adaptic, ABDs, Ricky, and Tubigrip was applied to the left leg. Change daily.   - Pt to return to clinic in 1 week.          Again, thank you for allowing me to participate in the care of your patient.        Sincerely,        Daniel Giron DPM

## 2019-09-04 NOTE — NURSING NOTE
Tiffani aTn's chief complaint for this visit includes:  Chief Complaint   Patient presents with     Right Foot - WOUND CARE, RECHECK     Left Foot - WOUND CARE, RECHECK     [facility]    Referring Provider:  Wily Bonilla MD  [facility] [address] [place] [address]    There were no vitals taken for this visit.  Data Unavailable     Do you need any medication refills at today's visit? Rubina Long LPN

## 2019-09-06 NOTE — TELEPHONE ENCOUNTER
Received [place] of [employer] Fax. Apligraf is covered, Puraply is not. Still waiting to hear about Dermagraft.    Lisette UMANA LPN

## 2019-09-11 NOTE — TELEPHONE ENCOUNTER
PA came back from [employer] [place] patient has no coverage for PuraPly/ AM and Nushield.    Patient does have coverage for Apligraf and Dermagraft CPT code: 65448, 20879, 89483, 59846 with ICD10: E11.42, E11.621, L97.416, E87.2 patient has met deductible of $2,200 and is now covered at 100%. Call ref. # [id number] Debbie 09/11/19 1:27pm

## 2019-09-18 ENCOUNTER — OFFICE VISIT (OUTPATIENT)
Dept: PODIATRY | Facility: CLINIC | Age: 59
End: 2019-09-18
Payer: COMMERCIAL

## 2019-09-18 DIAGNOSIS — E11.42 TYPE 2 DIABETES MELLITUS WITH DIABETIC POLYNEUROPATHY, WITHOUT LONG-TERM CURRENT USE OF INSULIN (H): ICD-10-CM

## 2019-09-18 DIAGNOSIS — E11.621 DIABETIC ULCER OF RIGHT MIDFOOT ASSOCIATED WITH TYPE 2 DIABETES MELLITUS, WITH BONE INVOLVEMENT WITHOUT EVIDENCE OF NECROSIS (H): ICD-10-CM

## 2019-09-18 DIAGNOSIS — L97.416 DIABETIC ULCER OF RIGHT MIDFOOT ASSOCIATED WITH TYPE 2 DIABETES MELLITUS, WITH BONE INVOLVEMENT WITHOUT EVIDENCE OF NECROSIS (H): ICD-10-CM

## 2019-09-18 DIAGNOSIS — M14.672 CHARCOT'S JOINT OF LEFT FOOT: Primary | ICD-10-CM

## 2019-09-18 PROCEDURE — 15275 SKIN SUB GRAFT FACE/NK/HF/G: CPT | Performed by: PODIATRIST

## 2019-09-18 NOTE — PROGRESS NOTES
Chief Complaint:   Chief Complaint   Patient presents with     Right Foot - WOUND CARE     Left Foot - WOUND CARE        No Known Allergies      Subjective: Tiffani is a 59 year old female who presents to the clinic today for a follow up of right foot ulcer and left Charcot foot. She has an appt with Dr. Adams in October. No pain to the right foot.     Objective        A diabetic pressure wound is noted at right  plantar 1st met head measuring 2.3cm x 3.2cm x 0.2cm.     Inman Classification: 2     Wound base: Pink/Granulation     Edges: hyperkeratotic     Drainage: moderate/green     Odor: fruity     Undermining: no     Bone Exposure: No     Clinical Signs of Infection: no     After obtaining patient consent, the wound was irrigated with copious amounts of saline. A curette weas then used to debride the wound into the subcutaneous tissue. The wound edges were debrided to healthy, bleeding tissue. Given the patient's lack of sensation, no anesthesia was necessary for the procedure.   Wound on the left lateral malleolus has healed over today. No s/s of infection.        Assessment: DM2 with a healed left ankle ulceration. Right plantar 1st met head ulcer persists. Pseudomonal colonization/infection appears to be resolved.   She has new edema to the left foot, ankle and leg without warmth. This appears to be relates to her venous dz and Charcot changes.        Plan:   - Pt seen and evaluated.  - Right foot ulcer was debrided as described.   - A 38 square cm piece of Dermagraft was applied to the right foot wound and affixed with steristrips. The foot was wrapped in a DSD. This should be left intact for 7 days.   - Adaptic, ABDs, Ricky, and Tubigrip was applied to the left leg. Change daily.   - Pt to return to clinic in 1 week.

## 2019-09-18 NOTE — LETTER
9/18/2019         RE: Tiffani Tan  [address] [facility] [address]        Dear Colleague,    Thank you for referring your patient, Tiffani Tan, to the [facility]. Please see a copy of my visit note below.    Chief Complaint:   Chief Complaint   Patient presents with     Right Foot - WOUND CARE     Left Foot - WOUND CARE        No Known Allergies      Subjective: Tiffani is a 59 year old female who presents to the clinic today for a follow up of right foot ulcer and left Charcot foot. She has an appt with Dr. Adams in October. No pain to the right foot.     Objective        A diabetic pressure wound is noted at right  plantar 1st met head measuring 2.3cm x 3 .2cm x 0.2cm.     Inman Classification: 2     Wound base: Pink/Granulation     Edges: hyperkeratotic     Drainage: moderate/green     Odor: fruity     Undermining: no     Bone Exposure: No     Clinical Signs of Infection: no     After obtaining patient consent, the wound was irrigated with copious amounts of saline. A curette weas then used to debride the wound into the subcutaneous tissue. The wound edges were debrided to healthy, bleeding tissue. Given the patient's lack of sensation, no anesthesia was necessary for the procedure.   Wound on the left lateral malleolus has healed over today. No s/s of infection.        Assessment: DM2 with a healed left ankle ulceration. Right plantar 1st met head ulcer persists. Pseudomonal colonization/infection appears to be resolved.   She has new edema to the left foot, ankle and leg without warmth. This appears to be relates to her venous dz and Charcot changes.        Plan:   - Pt seen and evaluated.  - Right foot ulcer was debrided as described.   - Dermagraft was applied to the right foot wound and affixed with steristrips. The foot was wrapped in a DSD. This should be left intact for 7 days.   - Adaptic, ABDs, Ricky, and Tubigrip was applied to the left leg. Change daily.   - Pt to return to clinic in 1  week.       Again, thank you for allowing me to participate in the care of your patient.        Sincerely,        Daniel Giron DPM

## 2019-09-18 NOTE — PATIENT INSTRUCTIONS
Thanks for coming today.  Ortho/Sports Medicine Clinic  [address] [place] [address]    To schedule future appointments in Ortho Clinic, you may call [phone].    To schedule ordered imaging by your provider:   Call Central Imaging [phone]    To schedule an injection ordered by your provider:  Call Central Imaging Injection scheduling line: [phone]  Renew Fibrehart available online at:  [email or web address]    Please call if any further questions or concerns ([phone]).  Clinic hours 8 am to 5 pm.    Return to clinic (call) if symptoms worsen or fail to improve.

## 2019-09-18 NOTE — NURSING NOTE
Tiffani Tan's chief complaint for this visit includes:  Chief Complaint   Patient presents with     Right Foot - WOUND CARE     Left Foot - WOUND CARE     [facility]    Referring Provider:  Wily Bonilla MD  [facility] [address] [place] [address]    There were no vitals taken for this visit.  Data Unavailable     Do you need any medication refills at today's visit? No    Lisette Long LPN

## 2019-09-25 ENCOUNTER — OFFICE VISIT (OUTPATIENT)
Dept: PODIATRY | Facility: CLINIC | Age: 59
End: 2019-09-25
Payer: COMMERCIAL

## 2019-09-25 DIAGNOSIS — L97.322 SKIN ULCER OF LEFT ANKLE WITH FAT LAYER EXPOSED (H): ICD-10-CM

## 2019-09-25 DIAGNOSIS — L97.416 DIABETIC ULCER OF RIGHT MIDFOOT ASSOCIATED WITH TYPE 2 DIABETES MELLITUS, WITH BONE INVOLVEMENT WITHOUT EVIDENCE OF NECROSIS (H): ICD-10-CM

## 2019-09-25 DIAGNOSIS — E11.621 DIABETIC ULCER OF RIGHT MIDFOOT ASSOCIATED WITH TYPE 2 DIABETES MELLITUS, WITH BONE INVOLVEMENT WITHOUT EVIDENCE OF NECROSIS (H): ICD-10-CM

## 2019-09-25 DIAGNOSIS — E11.42 TYPE 2 DIABETES MELLITUS WITH DIABETIC POLYNEUROPATHY, WITHOUT LONG-TERM CURRENT USE OF INSULIN (H): ICD-10-CM

## 2019-09-25 DIAGNOSIS — I87.2 VENOUS INCOMPETENCE: ICD-10-CM

## 2019-09-25 DIAGNOSIS — M14.672 CHARCOT'S JOINT OF LEFT FOOT: Primary | ICD-10-CM

## 2019-09-25 PROCEDURE — 11042 DBRDMT SUBQ TIS 1ST 20SQCM/<: CPT | Performed by: PODIATRIST

## 2019-09-25 NOTE — PATIENT INSTRUCTIONS
Thanks for coming today.  Ortho/Sports Medicine Clinic  [address] [place] [address]    To schedule future appointments in Ortho Clinic, you may call [phone].    To schedule ordered imaging by your provider:   Call Central Imaging [phone]    To schedule an injection ordered by your provider:  Call Central Imaging Injection scheduling line: [phone]  HelloBookshart available online at:  [email or web address]    Please call if any further questions or concerns ([phone]).  Clinic hours 8 am to 5 pm.    Return to clinic (call) if symptoms worsen or fail to improve.

## 2019-09-25 NOTE — PROGRESS NOTES
Chief Complaint:   Chief Complaint   Patient presents with     Right Foot - WOUND CARE     Left Foot - WOUND CARE        No Known Allergies      Subjective: Tiffani is a 59 year old female who presents to the clinic today for a follow up of right foot ulcer. She tolerated the boot well with no complications. Has an appt coming up with Dr. Adams.     Objective        A diabetic pressure wound is noted at right  plantar 1st met head measuring 2cm x 2.8cm x 0.2cm.     Inman Classification: 2     Wound base: Pink/Granulation     Edges: hyperkeratotic     Drainage: moderate/green     Odor: fruity     Undermining: no     Bone Exposure: No     Clinical Signs of Infection: no     After obtaining patient consent, the wound was irrigated with copious amounts of saline. A curette was then used to debride the wound into the subcutaneous tissue. The wound edges were debrided to healthy, bleeding tissue. Given the patient's lack of sensation, no anesthesia was necessary for the procedure.   Wound on the left lateral malleolus has healed over today. No s/s of infection.         Assessment: DM2 with a healed left ankle ulceration. Right plantar 1st met head ulcer persists.   She has edema to the left foot, ankle and leg without warmth. This appears to be relates to her venous dz and Charcot changes.        Plan:   - Pt seen and evaluated.  - Right foot ulcer was debrided as described.   - Dermagraft not available. Will wrap with Neema and an Unna's boot.   - Pt to return to clinic in 1 week.

## 2019-09-25 NOTE — NURSING NOTE
Tiffani Tan's chief complaint for this visit includes:  Chief Complaint   Patient presents with     Right Foot - WOUND CARE     Left Foot - WOUND CARE     [facility]    Referring Provider:  Wily Bonilla MD  [facility] [address] [place] [address]    There were no vitals taken for this visit.  Data Unavailable     Do you need any medication refills at today's visit? No    Lisette Long LPN

## 2019-09-25 NOTE — LETTER
9/25/2019         RE: Tiffani Tan  [address] [facility] [address]        Dear Colleague,    Thank you for referring your patient, Tiffani Tan, to the [facility]. Please see a copy of my visit note below.    Chief Complaint:   Chief Complaint   Patient presents with     Right Foot - WOUND CARE     Left Foot - WOUND CARE        No Known Allergies      Subjective: Tiffani is a 59 year old female who presents to the clinic today for a follow up of right foot ulcer. She tolerated the boot well with no complications. Has an appt coming up with Dr. Adams.     Objective        A diabetic pressure wound is noted at right  plantar 1st met head measuring 2cm x 2.8cm x 0.2cm.     Inman Classification: 2     Wound base: Pink/Granulation     Edges: hyperkeratotic     Drainage: moderate/green     Odor: fruity     Undermining: no     Bone Exposure: No     Clinical Signs of Infection: no     After obtaining patient consent, the wound was irrigated with copious amounts of saline. A curette was then used to debride the wound into the subcutaneous tissue. The wound edges were debrided to healthy, bleeding tissue. Given the patient's lack of sensation, no anesthesia was necessary for the procedure.   Wound on the left lateral malleolus has healed over today. No s/s of infection.         Assessment: DM2 with a healed left ankle ulceration. Right plantar 1st met head ulcer persists.   She has edema to the left foot, ankle and leg without warmth. This appears to be relates to her venous dz and Charcot changes.        Plan:   - Pt seen and evaluated.  - Right foot ulcer was debrided as described.   -  Dermagraft not available. Will wrap with Neema and an Unna's boot.   - Pt to return to clinic in 1 week.       Again, thank you for allowing me to participate in the care of your patient.        Sincerely,        Daniel Giron DPM

## 2019-10-01 ENCOUNTER — OFFICE VISIT (OUTPATIENT)
Dept: ORTHOPEDICS | Facility: CLINIC | Age: 59
End: 2019-10-01
Payer: COMMERCIAL

## 2019-10-01 ENCOUNTER — ANCILLARY PROCEDURE (OUTPATIENT)
Dept: GENERAL RADIOLOGY | Facility: CLINIC | Age: 59
End: 2019-10-01
Attending: ORTHOPAEDIC SURGERY
Payer: COMMERCIAL

## 2019-10-01 VITALS — HEIGHT: 66 IN | BODY MASS INDEX: 31.34 KG/M2 | WEIGHT: 195 LBS

## 2019-10-01 DIAGNOSIS — M14.672 CHARCOT'S JOINT OF LEFT FOOT: ICD-10-CM

## 2019-10-01 DIAGNOSIS — M14.672 CHARCOT'S JOINT OF LEFT FOOT: Primary | ICD-10-CM

## 2019-10-01 ASSESSMENT — MIFFLIN-ST. JEOR: SCORE: 1471.26

## 2019-10-01 NOTE — LETTER
10/1/2019       RE: Tiffani Tan  [address] [facility] [address]     Dear Colleague,    Thank you for referring your patient, Tiffani Tan, to the [facility] ORTHOP[facility] at [facility]. Please see a copy of my visit note below.    CHIEF COMPLAINT:  Left foot Charcot arthropathy type 3A and 1.      HISTORY OF PRESENT ILLNESS:  Ms. Tan presents today for further followup in the company of her .  Reports to have a further deformity of the foot in spite of her best efforts.  She reports to be using the boot all the time.  However, she reports that the boot is not fitting her anymore.      PHYSICAL EXAMINATION:  On today's visit, she presents with quite a deformed foot with a very strong and non-correctable inverted posture.  There is no obvious skin breakdown.      RADIOGRAPHIC EVALUATION:  Three views of the ankle were obtained today which were significant for showing a full dislocation of the ankle joint.  The patient presented with a large amount of destruction of the mid foot area based on x-rays from 08/2019.        ASSESSMENT:  Advanced left foot Charcot arthropathy.      PLAN:  I discussed with the patient and her  that at this point I do not believe that this foot is helpable.  I encouraged her to proceed with some adjustments to the CROW brace, and if this is not successful in improving her symptoms to a satisfying level to her, then we will consider a below-the-knee amputation.  Prior to that, she will have to be confirmed to have appropriate blood flow to her leg.      All questions were answered.  Patient was pleased with the discussion.  The patient will follow up on a p.r.n. basis.      TT 15 minutes, CT 10 minutes.    Aniceto Adams MD

## 2019-10-01 NOTE — PROGRESS NOTES
CHIEF COMPLAINT:  Left foot Charcot arthropathy type 3A and 1.      HISTORY OF PRESENT ILLNESS:  Ms. Tan presents today for further followup in the company of her .  Reports to have a further deformity of the foot in spite of her best efforts.  She reports to be using the boot all the time.  However, she reports that the boot is not fitting her anymore.      PHYSICAL EXAMINATION:  On today's visit, she presents with quite a deformed foot with a very strong and non-correctable inverted posture.  There is no obvious skin breakdown.      RADIOGRAPHIC EVALUATION:  Three views of the ankle were obtained today which were significant for showing a full dislocation of the ankle joint.  The patient presented with a large amount of destruction of the mid foot area based on x-rays from 08/2019.        ASSESSMENT:  Advanced left foot Charcot arthropathy.      PLAN:  I discussed with the patient and her  that at this point I do not believe that this foot is helpable.  I encouraged her to proceed with some adjustments to the CROW brace, and if this is not successful in improving her symptoms to a satisfying level to her, then we will consider a below-the-knee amputation.  Prior to that, she will have to be confirmed to have appropriate blood flow to her leg.      All questions were answered.  Patient was pleased with the discussion.  The patient will follow up on a p.r.n. basis.      TT 15 minutes, CT 10 minutes.

## 2019-10-01 NOTE — NURSING NOTE
"Reason For Visit:   Chief Complaint   Patient presents with     Consult     Left charcot foot       Ht 1.676 m (5' 6\")   Wt 88.5 kg (195 lb)   BMI 31.47 kg/m      Pain Assessment  Patient Currently in Pain: Yes  0-10 Pain Scale: 10  Primary Pain Location: Foot    Lisette David ATC    "

## 2019-10-02 ENCOUNTER — OFFICE VISIT (OUTPATIENT)
Dept: PODIATRY | Facility: CLINIC | Age: 59
End: 2019-10-02
Payer: COMMERCIAL

## 2019-10-02 DIAGNOSIS — L97.416 DIABETIC ULCER OF RIGHT MIDFOOT ASSOCIATED WITH TYPE 2 DIABETES MELLITUS, WITH BONE INVOLVEMENT WITHOUT EVIDENCE OF NECROSIS (H): ICD-10-CM

## 2019-10-02 DIAGNOSIS — E11.621 DIABETIC ULCER OF RIGHT MIDFOOT ASSOCIATED WITH TYPE 2 DIABETES MELLITUS, WITH BONE INVOLVEMENT WITHOUT EVIDENCE OF NECROSIS (H): ICD-10-CM

## 2019-10-02 DIAGNOSIS — M14.672 CHARCOT'S JOINT OF LEFT FOOT: Primary | ICD-10-CM

## 2019-10-02 DIAGNOSIS — E11.42 TYPE 2 DIABETES MELLITUS WITH DIABETIC POLYNEUROPATHY, WITHOUT LONG-TERM CURRENT USE OF INSULIN (H): ICD-10-CM

## 2019-10-02 PROCEDURE — 15271 SKIN SUB GRAFT TRNK/ARM/LEG: CPT | Performed by: PODIATRIST

## 2019-10-02 NOTE — PATIENT INSTRUCTIONS
Thanks for coming today.  [facility]  [address] [place] [address]    To schedule future appointments in Ortho Clinic, you may call [phone].    To schedule ordered imaging by your provider:   Call [facility] [phone]    To schedule an injection ordered by your provider:  Call Central [facility] Injection scheduling line: [phone]  [employer] available online at:  [email or web address]    Please call if any further questions or concerns ([phone]).  Clinic hours 8 am to 5 pm.    Return to clinic (call) if symptoms worsen or fail to improve.

## 2019-10-02 NOTE — PROGRESS NOTES
Date of Service: 10/2/2019    Chief Complaint:   Chief Complaint   Patient presents with     Right Foot - WOUND CARE        HPI: Tiffani is a 59 year old female who presents today for further evaluation of right foot ulcer. Relates that she saw Dr. Adams yesterday. They are going to try to fix the boot and if that does not work, move toward BKA of the left leg.      Review of Systems: No n/v/d/f/c/ns/sob/cp    PMH: No past medical history on file.    PSxH: No past surgical history on file.    Allergies: Patient has no known allergies.    SH:   Social History     Socioeconomic History     Marital status: Single     Spouse name: Not on file     Number of children: Not on file     Years of education: Not on file     Highest education level: Not on file   Occupational History     Not on file   Social Needs     Financial resource strain: Not on file     Food insecurity:     Worry: Not on file     Inability: Not on file     Transportation needs:     Medical: Not on file     Non-medical: Not on file   Tobacco Use     Smoking status: Current Every Day Smoker     Smokeless tobacco: Never Used   Substance and Sexual Activity     Alcohol use: Not on file     Drug use: Not on file     Sexual activity: Not on file   Lifestyle     Physical activity:     Days per week: Not on file     Minutes per session: Not on file     Stress: Not on file   Relationships     Social connections:     Talks on phone: Not on file     Gets together: Not on file     Attends Gnosticist service: Not on file     Active member of club or organization: Not on file     Attends meetings of clubs or organizations: Not on file     Relationship status: Not on file     Intimate partner violence:     Fear of current or ex partner: Not on file     Emotionally abused: Not on file     Physically abused: Not on file     Forced sexual activity: Not on file   Other Topics Concern     Not on file   Social History Narrative     Not on file       FH: No family history on  file.    Objective:        A diabetic pressure wound is noted at right  plantar 1st met head measuring 2.3cm x 3.2cm x 0.3cm.     Inman Classification: 2     Wound base: Pink/Granulation     Edges: hyperkeratotic     Drainage: moderate/green     Odor: fruity     Undermining: no     Bone Exposure: No     Clinical Signs of Infection: no     After obtaining patient consent, the wound was irrigated with copious amounts of saline. A curette weas then used to debride the wound into the subcutaneous tissue. The wound edges were debrided to healthy, bleeding tissue. Given the patient's lack of sensation, no anesthesia was necessary for the procedure.   Wound on the left lateral malleolus has healed over today. No s/s of infection.         Assessment: DM2 with a healed left ankle ulceration. Right plantar 1st met head ulcer persists. Pseudomonal colonization/infection appears to be resolved.   She has new edema to the left foot, ankle and leg without warmth. This appears to be relates to her venous dz and Charcot changes.        Plan:   - Pt seen and evaluated.  - Right foot ulcer was debrided as described.   - A 38 square cm piece of Dermagraft was applied to the right foot wound and affixed with steristrips. The foot was wrapped in a DSD. This should be left intact for 7 days.   - Pt to return to clinic in 1 week.

## 2019-10-02 NOTE — NURSING NOTE
Tiffani Tan's chief complaint for this visit includes:  Chief Complaint   Patient presents with     Right Foot - WOUND CARE     [facility]    Referring Provider:  Wily Bonilla MD  [facility] [facility] [address] [place] [address]    There were no vitals taken for this visit.  Data Unavailable     Do you need any medication refills at today's visit? No    Lisette Long LPN

## 2019-10-02 NOTE — LETTER
10/2/2019         RE: Tiffani Tan  [address] [facility] [address]        Dear Colleague,    Thank you for referring your patient, Tiffani Tan, to the [facility]. Please see a copy of my visit note below.    Date of Service: 10/2/2019    Chief Complaint:   Chief Complaint   Patient presents with     Right Foot - WOUND CARE        HPI: Tiffani is a 59 year old female who presents today for further evaluation of right foot ulcer. Relates that she saw Dr. Adams yesterday. They are going to try to fix the boot and if that does not work, move toward BKA of the left leg.      Review of Systems: No n/v/d/f/c/ns/sob/cp    PMH: No past medical history on file.    PSxH: No past surgical history on file.    Allergies: Patient has no known allergies.    SH:   Social History     Socioeconomic History     Marital status: Single     Spouse name: Not on file     Number of children: Not on file     Years of education: Not on file     Highest education level: Not on file   Occupational History     Not on file   Social Needs     Financial resource strain: Not on file     Food insecurity:     Worry: Not on file     Inability: Not on file     Transportation needs:     Medical: Not on file     Non-medical: Not on file   Tobacco Use     Smoking status: Current Every Day Smoker     Smokeless tobacco: Never Used   Substance and Sexual Activity     Alcohol use: Not on file     Drug use: Not on file     Sexual activity: Not on file   Lifestyle     Physical activity:     Days per week: Not on file     Minutes per session: Not on file     Stress: Not on file   Relationships     Social connections:     Talks on phone: Not on file     Gets together: Not on file     Attends Gnosticism service: Not on file     Active member of club or organization: Not on file     Attends meetings of clubs or organizations: Not on file     Relationship status: Not on file     Intimate partner violence:     Fear of current or ex partner: Not on file      Emotionally abused: Not on file     Physically abused: Not on file     Forced sexual activity: Not on file   Other Topics Concern     Not on file   Social History Narrative     Not on file       FH: No family history on file.    Objective:        A diabetic pressure wound is noted at right  plantar 1st met head measuring 2.3cm x 3.2cm x 0.3cm.     Inman Classification: 2     Wound base: Pink/Granulation     Edges: hyperkeratotic     Drainage: moderate/green     Odor: fruity     Undermining: no     Bone Exposure: No     Clinical Signs of Infection: no     After obtaining patient consent, the wound was irrigated with copious amounts of saline. A curette weas then used to debride the wound into the subcutaneous tissue. The wound edges were debrided to healthy, bleeding tissue. Given the patient's lack of sensation, no anesthesia was necessary for the procedure.   Wound on the left lateral malleolus has healed over today. No s/s of infection.         Assessment: DM2 with a healed left ankle ulceration. Right plantar 1st met head ulcer persists. Pseudomonal colonization/infection appears to be resolved.   She has new edema to the left foot, ankle and leg without warmth. This appears to be relates to her venous dz and Charcot changes.        Plan:   - Pt seen and evaluated.  - Right foot ulcer was debrided as described.   - A 38 square cm piece of Dermagraft was applied to the right foot wound and affixed with steristrips. The foot was wrapped in a DSD. This should be left intact for 7 days.   - Pt to return to clinic in 1 week.              Again, thank you for allowing me to participate in the care of your patient.        Sincerely,        Daniel Giron DPM

## 2019-10-09 ENCOUNTER — OFFICE VISIT (OUTPATIENT)
Dept: PODIATRY | Facility: CLINIC | Age: 59
End: 2019-10-09
Payer: COMMERCIAL

## 2019-10-09 DIAGNOSIS — E11.621 DIABETIC ULCER OF RIGHT MIDFOOT ASSOCIATED WITH TYPE 2 DIABETES MELLITUS, WITH BONE INVOLVEMENT WITHOUT EVIDENCE OF NECROSIS (H): ICD-10-CM

## 2019-10-09 DIAGNOSIS — L97.416 DIABETIC ULCER OF RIGHT MIDFOOT ASSOCIATED WITH TYPE 2 DIABETES MELLITUS, WITH BONE INVOLVEMENT WITHOUT EVIDENCE OF NECROSIS (H): ICD-10-CM

## 2019-10-09 DIAGNOSIS — E11.42 TYPE 2 DIABETES MELLITUS WITH DIABETIC POLYNEUROPATHY, WITHOUT LONG-TERM CURRENT USE OF INSULIN (H): ICD-10-CM

## 2019-10-09 DIAGNOSIS — M14.672 CHARCOT'S JOINT OF LEFT FOOT: Primary | ICD-10-CM

## 2019-10-09 PROCEDURE — 29445 APPL RIGID TOT CNTC LEG CAST: CPT | Performed by: PODIATRIST

## 2019-10-09 NOTE — PATIENT INSTRUCTIONS
Thanks for coming today.  [facility]  [address] [place] [address]    To schedule future appointments in Ortho Clinic, you may call [phone].    To schedule ordered imaging by your provider:   Call Central Imaging [phone]    To schedule an injection ordered by your provider:  Call Central Imaging Injection scheduling line: [phone]  [employer] available online at:  [email or web address]    Please call if any further questions or concerns ([phone]).  Clinic hours 8 am to 5 pm.    Return to clinic (call) if symptoms worsen or fail to improve.

## 2019-10-09 NOTE — NURSING NOTE
Tiffani Tan's chief complaint for this visit includes:  Chief Complaint   Patient presents with     Right Foot - WOUND CARE     dermagraft     PCP: [facility] (Inactive)    Referring Provider:  Wily Bonilla MD  [facility] [address] [place] [address]    There were no vitals taken for this visit.  Data Unavailable     Do you need any medication refills at today's visit? No    Lisette Long LPN

## 2019-10-09 NOTE — LETTER
10/9/2019         RE: Tiffani Tan  [address] [facility] [address]        Dear Colleague,    Thank you for referring your patient, Tiffani Tna, to the [facility]. Please see a copy of my visit note below.    Chief Complaint:   Chief Complaint   Patient presents with     Right Foot - WOUND CARE     dermagraft        No Known Allergies      Subjective: Tiffani is a 59 year old female who presents to the clinic today for a follow up of right plantar foot ulceration. She relates that she does put some weight on the foot for transfers    Objective        A diabetic pressure wound is noted at right  plantar 1st met head measuring 1.5cm x 3cm x 0.5cm.     Inman Classification: 2     Wound base: Pink/Granulation     Edges: hyperkeratotic     Drainage: moderate/green     Odor: no     Underminin O'Clock     Bone Exposure: No     Clinical Signs of Infection: no     After obtaining patient consent, the wound was irrigated with copious amounts of saline. A scalpel was then used to debride the wound into the subcutaneous tissue. The wound edges were debrided to healthy, bleeding tissue. Silver nitrate sticks were used as cautery.  Given the patient's lack of sensation, no anesthesia was necessary for the procedure.   Wound on the left lateral malleolus has healed over today. No s/s of infection.         Assessment: DM2 with a healed left ankle ulceration. Right plantar 1st met head ulcer persists. Pseudomonal colonization/infection appears to be resolved.   She has new edema to the left foot, ankle and leg without warmth. This appears to be relates to her venous dz and Charcot changes.        Plan:   - Pt seen and evaluated.  - Right foot ulcer was debrided as described.   - Unfortunately, she does have some new undermining. Will defer Dermagraft this week. I would like to total contact cast her to help with load transfer. She agrees to this. A TCC-EZ was applied to the right leg. Should be left C/D/I for the  next week.   - Pt to return to clinic in 1 week. Reapply TCC next week.       Again, thank you for allowing me to participate in the care of your patient.        Sincerely,        Daniel Giron DPM

## 2019-10-09 NOTE — PROGRESS NOTES
Chief Complaint:   Chief Complaint   Patient presents with     Right Foot - WOUND CARE     dermagraft        No Known Allergies      Subjective: Tiffani is a 59 year old female who presents to the clinic today for a follow up of right plantar foot ulceration. She relates that she does put some weight on the foot for transfers    Objective        A diabetic pressure wound is noted at right  plantar 1st met head measuring 1.5cm x 3cm x 0.5cm.     Inman Classification: 2     Wound base: Pink/Granulation     Edges: hyperkeratotic     Drainage: moderate/green     Odor: no     Underminin O'Clock     Bone Exposure: No     Clinical Signs of Infection: no     After obtaining patient consent, the wound was irrigated with copious amounts of saline. A scalpel was then used to debride the wound into the subcutaneous tissue. The wound edges were debrided to healthy, bleeding tissue. Silver nitrate sticks were used as cautery.  Given the patient's lack of sensation, no anesthesia was necessary for the procedure.   Wound on the left lateral malleolus has healed over today. No s/s of infection.         Assessment: DM2 with a healed left ankle ulceration. Right plantar 1st met head ulcer persists. Pseudomonal colonization/infection appears to be resolved.   She has new edema to the left foot, ankle and leg without warmth. This appears to be relates to her venous dz and Charcot changes.        Plan:   - Pt seen and evaluated.  - Right foot ulcer was debrided as described.   - Unfortunately, she does have some new undermining. Will defer Dermagraft this week. I would like to total contact cast her to help with load transfer. She agrees to this. A TCC-EZ was applied to the right leg. Should be left C/D/I for the next week.   - Pt to return to clinic in 1 week. Reapply TCC next week.

## 2019-10-16 ENCOUNTER — APPOINTMENT (OUTPATIENT)
Dept: NURSING | Facility: CLINIC | Age: 59
End: 2019-10-16
Payer: COMMERCIAL

## 2019-10-23 ENCOUNTER — OFFICE VISIT (OUTPATIENT)
Dept: PODIATRY | Facility: CLINIC | Age: 59
End: 2019-10-23
Payer: COMMERCIAL

## 2019-10-23 DIAGNOSIS — L97.416 DIABETIC ULCER OF RIGHT MIDFOOT ASSOCIATED WITH TYPE 2 DIABETES MELLITUS, WITH BONE INVOLVEMENT WITHOUT EVIDENCE OF NECROSIS (H): ICD-10-CM

## 2019-10-23 DIAGNOSIS — E11.42 TYPE 2 DIABETES MELLITUS WITH DIABETIC POLYNEUROPATHY, WITHOUT LONG-TERM CURRENT USE OF INSULIN (H): ICD-10-CM

## 2019-10-23 DIAGNOSIS — E11.621 DIABETIC ULCER OF RIGHT MIDFOOT ASSOCIATED WITH TYPE 2 DIABETES MELLITUS, WITH BONE INVOLVEMENT WITHOUT EVIDENCE OF NECROSIS (H): ICD-10-CM

## 2019-10-23 DIAGNOSIS — M14.672 CHARCOT'S JOINT OF LEFT FOOT: Primary | ICD-10-CM

## 2019-10-23 PROCEDURE — 29445 APPL RIGID TOT CNTC LEG CAST: CPT | Mod: LT | Performed by: PODIATRIST

## 2019-10-23 NOTE — PROGRESS NOTES
Chief Complaint:   Chief Complaint   Patient presents with     Right Foot - WOUND CARE        No Known Allergies      Subjective: Tiffani is a 59 year old female who presents to the clinic today for a follow up of right foot ulceration. She was casted last week and tolerated this well. No new LE concerns.     Objective        A diabetic pressure wound is noted at right  plantar 1st met head measuring 2cm x 3.7cm x 0.5cm.      Inman Classification: 2     Wound base: Pink/Granulation     Edges: hyperkeratotic     Drainage: moderate/serous     Odor: no     Underminin O'Clock     Bone Exposure: No     Clinical Signs of Infection: no     After obtaining patient consent, the wound was irrigated with copious amounts of saline. A curette was then used to debride the wound into the subcutaneous tissue. The wound edges were debrided to healthy, bleeding tissue. Silver nitrate sticks were used as cautery.  Given the patient's lack of sensation, no anesthesia was necessary for the procedure.   Wound on the left lateral malleolus has healed over today. No s/s of infection.         Assessment: DM2 with a healed left ankle ulceration. Right plantar 1st met head ulcer persists. Pseudomonal colonization/infection appears to be resolved.           Plan:   - Pt seen and evaluated.  - Right foot ulcer was debrided as described.   - Wound has improved again since the TCC. Will cont with this.    - Pt to return to clinic in 1 week.

## 2019-10-23 NOTE — NURSING NOTE
Tiffani Tan's chief complaint for this visit includes:  Chief Complaint   Patient presents with     Right Foot - WOUND CARE     [facility]    Referring Provider:  Wily Bonilla MD  [facility] [address] [place] [address]    There were no vitals taken for this visit.  Data Unavailable     Do you need any medication refills at today's visit? No    Lisette Long LPN

## 2019-10-23 NOTE — LETTER
10/23/2019         RE: Tiffani Tan  [address] [facility] [address]        Dear Colleague,    Thank you for referring your patient, Tiffani Tan, to the [facility]. Please see a copy of my visit note below.      Chief Complaint:   Chief Complaint   Patient presents with     Right Foot - WOUND CARE        No Known Allergies      Subjective: Tiffani is a 59 year old female who presents to the clinic today for a follow up of right foot ulceration. She was casted last week and tolerated this well. No new LE concerns.     Objective        A diabetic pressure wound is noted at right  plantar 1st met head measuring 2cm x 3.7cm x 0.5cm.      Inman Classification: 2     Wound base: Pink/Granulation     Edges: hyperkeratotic     Drainage: moderate/serous     Odor: no     Underminin O'Clock     Bone Exposure: No     Clinical Signs of Infection: no     After obtaining patient consent, the wound was irrigated with copious amounts of saline. A  curette was then used to debride the wound into the subcutaneous tissue. The wound edges were debrided to healthy, bleeding tissue. Silver nitrate sticks were used as cautery.  Given the patient's lack of sensation, no anesthesia was necessary for the procedure.   Wound on the left lateral malleolus has healed over today. No s/s of infection.         Assessment: DM2 with a healed left ankle ulceration. Right plantar 1st met head ulcer persists. Pseudomonal colonization/infection appears to be resolved.           Plan:   - Pt seen and evaluated.  - Right foot ulcer was debrided as described.   - Wound has improved again since the TCC. Will cont with this.    - Pt to return to clinic in 1 week.       Again, thank you for allowing me to participate in the care of your patient.        Sincerely,        Daniel Giron DPM

## 2019-10-29 NOTE — PROGRESS NOTES
Chief Complaint   Patient presents with     Right Foot - WOUND CARE          No Known Allergies      Subjective: Tiffani is a 59 year old female who presents to the clinic today for a follow up of right foot ulceration. She was casted last week and tolerated this well. No new LE concerns.      Objective              A diabetic pressure wound is noted at right  plantar 1st met head measuring 1.9cm x 3.5cm x 0.2cm.      Inman Classification: 2     Wound base: Pink/Granulation     Edges: hyperkeratotic     Drainage: moderate/serous     Odor: no     Underminin O'Clock     Bone Exposure: No     Clinical Signs of Infection: no     After obtaining patient consent, the wound was irrigated with copious amounts of saline. A curette was then used to debride the wound into the subcutaneous tissue. The wound edges were debrided to healthy, bleeding tissue. Silver nitrate sticks were used as cautery.  Given the patient's lack of sensation, no anesthesia was necessary for the procedure.   Wound on the left lateral malleolus has healed over today. No s/s of infection.         Assessment: DM2 with a healed left ankle ulceration. Right plantar 1st met head ulcer persists. Pseudomonal colonization/infection appears to be resolved.            Plan:   - Pt seen and evaluated.  - Right foot ulcer was debrided as described.   - Wound has improved again since the TCC. Will cont with this.   - - A 38 square cm piece of Dermagraft was applied to the right foot wound and affixed with steristrips. The foot was wrapped in a DSD. This should be left intact for 7 days.    - Pt to return to clinic in 1 week.

## 2019-10-30 ENCOUNTER — OFFICE VISIT (OUTPATIENT)
Dept: PODIATRY | Facility: CLINIC | Age: 59
End: 2019-10-30
Payer: COMMERCIAL

## 2019-10-30 DIAGNOSIS — E11.621 DIABETIC ULCER OF RIGHT MIDFOOT ASSOCIATED WITH TYPE 2 DIABETES MELLITUS, WITH BONE INVOLVEMENT WITHOUT EVIDENCE OF NECROSIS (H): ICD-10-CM

## 2019-10-30 DIAGNOSIS — E11.42 TYPE 2 DIABETES MELLITUS WITH DIABETIC POLYNEUROPATHY, WITHOUT LONG-TERM CURRENT USE OF INSULIN (H): ICD-10-CM

## 2019-10-30 DIAGNOSIS — L97.416 DIABETIC ULCER OF RIGHT MIDFOOT ASSOCIATED WITH TYPE 2 DIABETES MELLITUS, WITH BONE INVOLVEMENT WITHOUT EVIDENCE OF NECROSIS (H): ICD-10-CM

## 2019-10-30 DIAGNOSIS — M14.672 CHARCOT'S JOINT OF LEFT FOOT: Primary | ICD-10-CM

## 2019-10-30 PROCEDURE — 15275 SKIN SUB GRAFT FACE/NK/HF/G: CPT | Performed by: PODIATRIST

## 2019-10-30 NOTE — LETTER
10/30/2019         RE: Tiffani Tan  [address] [facility] [address]        Dear Colleague,    Thank you for referring your patient, Tiffani Tan, to the [facility]. Please see a copy of my visit note below.    Chief Complaint   Patient presents with     Right Foot - WOUND CARE          No Known Allergies      Subjective: Tiffani is a 59 year old female who presents to the clinic today for a follow up of right foot ulceration. She was casted last week and tolerated this well. No new LE concerns.      Objective              A diabetic pressure wound is noted at right  plantar 1st met head measuring 1.9cm x 3.5cm x 0.2cm.      Inman Classification: 2     Wound base: Pink/Granulation     Edges: hyperkeratotic     Drainage: moderate/serous     Odor: no     Underminin O'Clock     Bone Exposure: No     Clinical Signs of Infection: no     After obtaining patient consent, the wound was irrigated with copious amounts of saline. A curette was then used to debride the wound into the subcutaneous tissue. The wound edges were debrided to healthy, bleeding tissue. Silver nitrate sticks were used as cautery.  Given the patient's lack of sensation, no anesthesia was necessary for the procedure.   Wound on the left lateral malleolus has healed over today. No s/s of infection.         Assessment: DM2 with a healed left ankle ulceration. Right plantar 1st met head ulcer persists. Pseudomonal colonization/infection appears to be resolved.            Plan:   - Pt seen and evaluated.  - Right foot ulcer was debrided as described.   - Wound has improved again since the TCC. Will cont with this.   - - A 38 square cm piece of Dermagraft was applied to the right foot wound and affixed with steristrips. The foot was wrapped in a DSD. This should be left intact for 7 days.    - Pt to return to clinic in 1 week.            Again, thank you for allowing me to participate in the care of your patient.         Sincerely,        Daniel Giron DPM

## 2019-10-30 NOTE — NURSING NOTE
Tiffani Tan's chief complaint for this visit includes:  Chief Complaint   Patient presents with     Right Foot - WOUND CARE     [facility]    Referring Provider:  Wily Bonilla MD  [facility] [address] [place] [address]    There were no vitals taken for this visit.  Data Unavailable     Do you need any medication refills at today's visit? NO    Lisette Long LPN

## 2019-11-06 ENCOUNTER — OFFICE VISIT (OUTPATIENT)
Dept: PODIATRY | Facility: CLINIC | Age: 59
End: 2019-11-06
Payer: COMMERCIAL

## 2019-11-06 DIAGNOSIS — M14.672 CHARCOT'S JOINT OF LEFT FOOT: Primary | ICD-10-CM

## 2019-11-06 DIAGNOSIS — E11.42 TYPE 2 DIABETES MELLITUS WITH DIABETIC POLYNEUROPATHY, WITHOUT LONG-TERM CURRENT USE OF INSULIN (H): ICD-10-CM

## 2019-11-06 DIAGNOSIS — E11.621 DIABETIC ULCER OF RIGHT MIDFOOT ASSOCIATED WITH TYPE 2 DIABETES MELLITUS, WITH BONE INVOLVEMENT WITHOUT EVIDENCE OF NECROSIS (H): ICD-10-CM

## 2019-11-06 DIAGNOSIS — L97.416 DIABETIC ULCER OF RIGHT MIDFOOT ASSOCIATED WITH TYPE 2 DIABETES MELLITUS, WITH BONE INVOLVEMENT WITHOUT EVIDENCE OF NECROSIS (H): ICD-10-CM

## 2019-11-06 PROCEDURE — 15275 SKIN SUB GRAFT FACE/NK/HF/G: CPT | Performed by: PODIATRIST

## 2019-11-06 NOTE — LETTER
11/6/2019         RE: Tiffani Tan  [address] [place] [employer] [address]        Dear Colleague,    Thank you for referring your patient, Tiffani Tan, to the [facility]. Please see a copy of my visit note below.    Chief Complaint:   Chief Complaint   Patient presents with     Right Foot - WOUND CARE        No Known Allergies      Subjective: Tiffani is a 59 year old female who presents to the clinic today for a follow up of right plantar foot ulceration. She tolerated the cast well with no complications.     Objective          A right plantar 1st met head wound is noted measuring 1.7cm x 3.4cm x 0.1cm.  Inman Classification: 2    Wound base: Red/Granulation    Edges: macerated hyperkeratotic tissue    Drainage: scant/serous    Odor: no    Undermining: no    Bone Exposure: No    Clinical Signs of Infection: No    After obtaining patient consent, the wound was irrigated with copious amounts of saline. A scalpel was then used to debride the wound into the subcutaneous tissue. The wound edges were debrided to healthy, bleeding tissue. Given the patient's lack of sensation, no anesthesia was necessary for the procedure.       Assessment: Healing right foot ulceration  DM2 with neuropathy.     Plan:   - Pt seen and evaluated  - Wound was debrided as described.   - Wound has improved again since the TCC. Will cont with this.   - A 38 square cm piece of Dermagraft was applied to the right foot wound and affixed with steristrips. The foot was wrapped in a DSD. This should be left intact for 7 days.    - Pt to return to clinic in 1 week.       Again, thank you for allowing me to participate in the care of your patient.        Sincerely,        Daniel Giron DPM

## 2019-11-06 NOTE — NURSING NOTE
Tiffani Tan's chief complaint for this visit includes:  Chief Complaint   Patient presents with     Right Foot - WOUND CARE     [facility]    Referring Provider:  Wily Bonilla MD  [facility] [facility] [address] [place] [address]    There were no vitals taken for this visit.  Data Unavailable     Do you need any medication refills at today's visit? No    Lisette Long LPN

## 2019-11-06 NOTE — PROGRESS NOTES
Chief Complaint:   Chief Complaint   Patient presents with     Right Foot - WOUND CARE        No Known Allergies      Subjective: Tiffani is a 59 year old female who presents to the clinic today for a follow up of right plantar foot ulceration. She tolerated the cast well with no complications.     Objective          A right plantar 1st met head wound is noted measuring 1.7cm x 3.4cm x 0.1cm.  Inman Classification: 2    Wound base: Red/Granulation    Edges: macerated hyperkeratotic tissue    Drainage: scant/serous    Odor: no    Undermining: no    Bone Exposure: No    Clinical Signs of Infection: No    After obtaining patient consent, the wound was irrigated with copious amounts of saline. A scalpel was then used to debride the wound into the subcutaneous tissue. The wound edges were debrided to healthy, bleeding tissue. Given the patient's lack of sensation, no anesthesia was necessary for the procedure.       Assessment: Healing right foot ulceration  DM2 with neuropathy.     Plan:   - Pt seen and evaluated  - Wound was debrided as described.   - Wound has improved again since the TCC. Will cont with this.   - A 38 square cm piece of Dermagraft was applied to the right foot wound and affixed with steristrips. The foot was wrapped in a DSD. This should be left intact for 7 days.    - Pt to return to clinic in 1 week.

## 2019-11-12 NOTE — PROGRESS NOTES
Chief Complaint   Patient presents with     Right Foot - Follow Up, WOUND CARE          No Known Allergies      Subjective: Tiffani is a 59 year old female who presents to the clinic today for a follow up of right foot ulceration. She tolerated the cast last week with no complications. No new LE complaints.     Objective        A right plantar 1stmet head wound is noted measuring 1.4cm x 3cm x 0.1cm.  Inman Classification: 2     Wound base: Red/Granulation     Edges: macerated hyperkeratotic tissue     Drainage: scant/serous     Odor: no     Undermining: no     Bone Exposure: No     Clinical Signs of Infection: No     After obtaining patient consent, the wound was irrigated with copious amounts of saline. A curette was then used to debride the wound into the subcutaneous tissue. The wound edges were debrided to healthy, bleeding tissue. Given the patient's lack of sensation, no anesthesia was necessary for the procedure.         Assessment: Healing right foot ulceration  DM2 with neuropathy.      Plan:   - Pt seen and evaluated  - Wound was debrided as described.   - Wound has improved again since the TCC. Will cont with this.   - A 38 square cm piece of Dermagraft was applied to the right foot wound and affixed with steristrips. The foot was wrapped in a DSD. This should be left intact for 7 days.    - Pt to return to clinic in 1 week.

## 2019-11-13 ENCOUNTER — OFFICE VISIT (OUTPATIENT)
Dept: PODIATRY | Facility: CLINIC | Age: 59
End: 2019-11-13
Payer: COMMERCIAL

## 2019-11-13 VITALS — SYSTOLIC BLOOD PRESSURE: 123 MMHG | DIASTOLIC BLOOD PRESSURE: 64 MMHG | HEART RATE: 53 BPM | OXYGEN SATURATION: 94 %

## 2019-11-13 DIAGNOSIS — I87.2 VENOUS INCOMPETENCE: ICD-10-CM

## 2019-11-13 DIAGNOSIS — E11.621 DIABETIC ULCER OF RIGHT MIDFOOT ASSOCIATED WITH TYPE 2 DIABETES MELLITUS, WITH BONE INVOLVEMENT WITHOUT EVIDENCE OF NECROSIS (H): ICD-10-CM

## 2019-11-13 DIAGNOSIS — L97.416 DIABETIC ULCER OF RIGHT MIDFOOT ASSOCIATED WITH TYPE 2 DIABETES MELLITUS, WITH BONE INVOLVEMENT WITHOUT EVIDENCE OF NECROSIS (H): ICD-10-CM

## 2019-11-13 DIAGNOSIS — M14.672 CHARCOT'S JOINT OF LEFT FOOT: Primary | ICD-10-CM

## 2019-11-13 DIAGNOSIS — E11.42 TYPE 2 DIABETES MELLITUS WITH DIABETIC POLYNEUROPATHY, WITHOUT LONG-TERM CURRENT USE OF INSULIN (H): ICD-10-CM

## 2019-11-13 PROCEDURE — 15275 SKIN SUB GRAFT FACE/NK/HF/G: CPT | Performed by: PODIATRIST

## 2019-11-13 NOTE — NURSING NOTE
Tiffani Tan's chief complaint for this visit includes:  Chief Complaint   Patient presents with     Right Foot - Follow Up, WOUND CARE     [facility]    Referring Provider:  Wily Bonilla MD  [facility] [address]  [place] [address]    /64 (BP Location: Left arm, Patient Position: Sitting, Cuff Size: Adult Regular)   Pulse 53   SpO2 94%   Data Unavailable     Do you need any medication refills at today's visit? No    Lisette Long LPN

## 2019-11-13 NOTE — LETTER
11/13/2019         RE: Tiffani Tan  [address] [place] [facility] [address]        Dear Colleague,    Thank you for referring your patient, Tiffani Tan, to the [facility]. Please see a copy of my visit note below.    Chief Complaint   Patient presents with     Right Foot - Follow Up, WOUND CARE          No Known Allergies      Subjective: Tiffani is a 59 year old female who presents to the clinic today for a follow up of right foot ulceration. She tolerated the cast last week with no complications. No new LE complaints.     Objective        A right plantar 1stmet head wound is noted measuring 1.4cm x 3cm x 0.1cm.  Inman Classification: 2     Wound base: Red/Granulation     Edges: macerated hyperkeratotic tissue     Drainage: scant/serous     Odor: no     Undermining: no     Bone Exposure: No     Clinical Signs of Infection: No     After obtaining patient consent, the wound was irrigated with copious amounts of saline. A curette was then used to debride the wound into the subcutaneous tissue. The wound edges were debrided to healthy, bleeding tissue. Given the patient's lack of sensation, no anesthesia was necessary for the procedure.         Assessment: Healing right foot ulceration  DM2 with neuropathy.      Plan:   - Pt seen and evaluated  - Wound was debrided as described.   - Wound has improved again since the TCC. Will cont with this.   - A 38 square cm piece of Dermagraft was applied to the right foot wound and affixed with steristrips. The foot was wrapped in a DSD. This should be left intact for 7 days.    - Pt to return to clinic in 1 week.            Again, thank you for allowing me to participate in the care of your patient.        Sincerely,        Daniel Giron DPM

## 2019-11-19 NOTE — PROGRESS NOTES
Chief Complaint   Patient presents with     Right Foot - WOUND CARE          No Known Allergies      Subjective: Tiffani is a 59 year old female who presents to the clinic today for a follow up of right foot wound. She tolerated the cast well with no complications.     Objective      A right plantar 1st met head wound is noted measuring 1.2cm x 2.7cm x 0.1cm.  Inman Classification: 2     Wound base: Red/Granulation     Edges: macerated hyperkeratotic tissue     Drainage: scant/serous     Odor: no     Undermining: no     Bone Exposure: No     Clinical Signs of Infection: No     After obtaining patient consent, the wound was irrigated with copious amounts of saline. A curette was then used to debride the wound into the subcutaneous tissue. The wound edges were debrided to healthy, bleeding tissue. Given the patient's lack of sensation, no anesthesia was necessary for the procedure.         Assessment: Healing right foot ulceration  DM2 with neuropathy.      Plan:   - Pt seen and evaluated  - Wound was debrided as described.   - Wound has improved again since the TCC. Will cont with this.   - A 38 square cm piece of Dermagraft was applied to the right foot wound and affixed with steristrips. The foot was wrapped in a DSD. This should be left intact for 7 days.    - Pt to return to clinic in 1 week.

## 2019-11-20 ENCOUNTER — OFFICE VISIT (OUTPATIENT)
Dept: PODIATRY | Facility: CLINIC | Age: 59
End: 2019-11-20
Payer: COMMERCIAL

## 2019-11-20 ENCOUNTER — TELEPHONE (OUTPATIENT)
Dept: PODIATRY | Facility: CLINIC | Age: 59
End: 2019-11-20

## 2019-11-20 DIAGNOSIS — E11.621 DIABETIC ULCER OF RIGHT MIDFOOT ASSOCIATED WITH TYPE 2 DIABETES MELLITUS, WITH BONE INVOLVEMENT WITHOUT EVIDENCE OF NECROSIS (H): ICD-10-CM

## 2019-11-20 DIAGNOSIS — L97.416 DIABETIC ULCER OF RIGHT MIDFOOT ASSOCIATED WITH TYPE 2 DIABETES MELLITUS, WITH BONE INVOLVEMENT WITHOUT EVIDENCE OF NECROSIS (H): ICD-10-CM

## 2019-11-20 DIAGNOSIS — M14.672 CHARCOT'S JOINT OF LEFT FOOT: Primary | ICD-10-CM

## 2019-11-20 DIAGNOSIS — E11.42 TYPE 2 DIABETES MELLITUS WITH DIABETIC POLYNEUROPATHY, WITHOUT LONG-TERM CURRENT USE OF INSULIN (H): ICD-10-CM

## 2019-11-20 PROCEDURE — 15275 SKIN SUB GRAFT FACE/NK/HF/G: CPT | Performed by: PODIATRIST

## 2019-11-20 NOTE — LETTER
11/20/2019         RE: Tiffani Tan  [address] [facility] [address]        Dear Colleague,    Thank you for referring your patient, Tiffani Tan, to the [facility]. Please see a copy of my visit note below.    Chief Complaint   Patient presents with     Right Foot - WOUND CARE          No Known Allergies      Subjective: Tiffani is a 59 year old female who presents to the clinic today for a follow up of right foot wound. She tolerated the cast well with no complications.     Objective      A right plantar 1st met head wound is noted measuring 1.2cm x 2.7cm x 0.1cm.  Inman Classification: 2     Wound base: Red/Granulation     Edges: macerated hyperkeratotic tissue     Drainage: scant/serous     Odor: no     Undermining: no     Bone Exposure: No     Clinical Signs of Infection: No     After obtaining patient consent, the wound was irrigated with copious amounts of saline. A curette was then used to debride the wound into the subcutaneous tissue. The wound edges were debrided to healthy, bleeding tissue. Given the patient's lack of sensation, no anesthesia was necessary for the procedure.         Assessment: Healing right foot ulceration  DM2 with neuropathy.      Plan:   - Pt seen and evaluated  - Wound was debrided as described.   - Wound has improved again since the TCC. Will cont with this.   - A 38 square cm piece of Dermagraft was applied to the right foot wound and affixed with steristrips. The foot was wrapped in a DSD. This should be left intact for 7 days.    - Pt to return to clinic in 1 week.         Again, thank you for allowing me to participate in the care of your patient.        Sincerely,        Daniel Giron DPM
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
## 2019-11-20 NOTE — NURSING NOTE
Tiffani Tan's chief complaint for this visit includes:  Chief Complaint   Patient presents with     Right Foot - WOUND CARE     [facility]    Referring Provider:  Wily Bonilla MD  [facility] [address] [place] [address]    There were no vitals taken for this visit.  Data Unavailable     Do you need any medication refills at today's visit? No    Lisette Long LPN

## 2019-11-25 NOTE — TELEPHONE ENCOUNTER
11/25 Explained we need to schedule an appointment on 1/2/2020 with Dr. Whitten at 7 a.m. Also explained we need to schedule nurse only visit the week of Christmas. Provided phone number [phone] to schedule.     Jeannette Aguilar          Procedure    Ortho/Sports Med/Ent/Eye   [facility]   [phone]

## 2019-11-26 ENCOUNTER — ALLIED HEALTH/NURSE VISIT (OUTPATIENT)
Dept: NURSING | Facility: CLINIC | Age: 59
End: 2019-11-26
Payer: COMMERCIAL

## 2019-11-26 DIAGNOSIS — E11.42 TYPE 2 DIABETES MELLITUS WITH DIABETIC POLYNEUROPATHY, WITHOUT LONG-TERM CURRENT USE OF INSULIN (H): ICD-10-CM

## 2019-11-26 DIAGNOSIS — E11.621 DIABETIC ULCER OF RIGHT MIDFOOT ASSOCIATED WITH TYPE 2 DIABETES MELLITUS, WITH BONE INVOLVEMENT WITHOUT EVIDENCE OF NECROSIS (H): ICD-10-CM

## 2019-11-26 DIAGNOSIS — L97.329: ICD-10-CM

## 2019-11-26 DIAGNOSIS — M14.672 CHARCOT'S JOINT OF LEFT FOOT: Primary | ICD-10-CM

## 2019-11-26 DIAGNOSIS — E11.622: ICD-10-CM

## 2019-11-26 DIAGNOSIS — L97.416 DIABETIC ULCER OF RIGHT MIDFOOT ASSOCIATED WITH TYPE 2 DIABETES MELLITUS, WITH BONE INVOLVEMENT WITHOUT EVIDENCE OF NECROSIS (H): ICD-10-CM

## 2019-11-27 NOTE — PROGRESS NOTES
Tiffani Tan comes into clinic today at the request of Dr. Medina for cast change and wound check of bilateral foot ulcers.    S: No complaints from right leg and the TCC-EZ currently in place. Pt complains of pressure, swelling, and pinching in left leg. She is wearing a crow boot custom made by [employer] and this is pinching due the amount of edema.     O: Right plantar 1st metatarsal head wound slightly smaller than last week, measurements not taken. Wound edges were very macerated, dressings that were removed were also heavily saturated, wound base beefy red tissue. Left leg was found to have a small skin tear on the proximal anterior aspect of the ankle, this was actively weeping serous fluid during the whole visit. Apparently new left lateral malleolus ulcer measuring approximately 2.5 cm across and 0.5 cm at the deepest point. Skin appears macerated around this ulcer as well, tissue not as healthy appearing, but some areas of bleeding noted. Left leg very edematous and weeping throughout visit.     A: Neither ulcer appears to be infected. Significant swelling noted in both legs, but left is worse than right.    P: Foam dressings placed on both ulcers per the instructions of Dr. Giron who was consulted via phone. TCC placed on right leg. Left leg wrapped with cling wrap to keep gauze and dressings in place, then wrapped with ace up to knee to help compress fluid. Pt reported that she will remove the left leg boot and monitor dressings multiple times per day to help keep dry. She also stated that she would elevate this leg. She will call the clinic if she notes any drainage through the TCC on the right leg so we can replace. Pt will return to clinic in one week.    This service provided today was under the direct supervision of Dr. Beckford, who was available if needed.    Vincenzo Farah RN

## 2019-11-27 NOTE — PATIENT INSTRUCTIONS
Thanks for coming today.  Ortho/Sports Medicine Clinic  [address] [place] [address]    To schedule future appointments in Ortho Clinic, you may call [phone].    To schedule ordered imaging by your provider:   Call Central Imaging [phone]    To schedule an injection ordered by your provider:  Call Central Imaging Injection scheduling line: [phone]  [employer] available online at:  [email or web address]    Please call if any further questions or concerns ([phone]).  Clinic hours 8 am to 5 pm.    Return to clinic (call) if symptoms worsen or fail to improve.

## 2019-12-04 ENCOUNTER — OFFICE VISIT (OUTPATIENT)
Dept: PODIATRY | Facility: CLINIC | Age: 59
End: 2019-12-04
Payer: COMMERCIAL

## 2019-12-04 VITALS — SYSTOLIC BLOOD PRESSURE: 146 MMHG | HEART RATE: 101 BPM | DIASTOLIC BLOOD PRESSURE: 72 MMHG | OXYGEN SATURATION: 94 %

## 2019-12-04 DIAGNOSIS — L97.323 SKIN ULCER OF LEFT ANKLE WITH NECROSIS OF MUSCLE (H): ICD-10-CM

## 2019-12-04 DIAGNOSIS — M14.672 CHARCOT'S JOINT OF LEFT FOOT: Primary | ICD-10-CM

## 2019-12-04 DIAGNOSIS — L97.416 DIABETIC ULCER OF RIGHT MIDFOOT ASSOCIATED WITH TYPE 2 DIABETES MELLITUS, WITH BONE INVOLVEMENT WITHOUT EVIDENCE OF NECROSIS (H): ICD-10-CM

## 2019-12-04 DIAGNOSIS — E11.42 TYPE 2 DIABETES MELLITUS WITH DIABETIC POLYNEUROPATHY, WITHOUT LONG-TERM CURRENT USE OF INSULIN (H): ICD-10-CM

## 2019-12-04 DIAGNOSIS — E11.621 DIABETIC ULCER OF RIGHT MIDFOOT ASSOCIATED WITH TYPE 2 DIABETES MELLITUS, WITH BONE INVOLVEMENT WITHOUT EVIDENCE OF NECROSIS (H): ICD-10-CM

## 2019-12-04 PROCEDURE — 15275 SKIN SUB GRAFT FACE/NK/HF/G: CPT | Performed by: PODIATRIST

## 2019-12-04 PROCEDURE — 99213 OFFICE O/P EST LOW 20 MIN: CPT | Mod: 25 | Performed by: PODIATRIST
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
## 2019-12-04 NOTE — NURSING NOTE
Tiffani Tan's chief complaint for this visit includes:  Chief Complaint   Patient presents with     RECHECK     pressure ulcer right  plantar 1st met head -  possible Dermagraft     PCP: [facility] (Inactive)    Referring Provider:  Wily Bonilla MD  [facility]  [id number] [place] [address]    BP (!) 146/72 (BP Location: Left arm, Patient Position: Sitting, Cuff Size: Adult Regular)   Pulse 101   SpO2 94%   Data Unavailable     Do you need any medication refills at today's visit? Rubina Oshea, [facility]
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
## 2019-12-10 NOTE — PATIENT INSTRUCTIONS
Thanks for coming today.  Ortho/Sports Medicine Clinic  [address] [place] [address]    To schedule future appointments in Ortho Clinic, you may call [phone].    To schedule ordered imaging by your provider:   Call Central Imaging [phone]    To schedule an injection ordered by your provider:  Call Central Imaging Injection scheduling line: [phone]  [email or web address] available online at:  [email or web address]    Please call if any further questions or concerns ([phone]).  Clinic hours 8 am to 5 pm.    Return to clinic (call) if symptoms worsen or fail to improve.

## 2019-12-10 NOTE — PROGRESS NOTES
Chief Complaint   Patient presents with     RECHECK     bilateral feet wound check          No Known Allergies      Subjective: Tiffani is a 59 year old female who presents to the clinic today for a follow up of BL diabetic foot and ankle wounds. She is contemplating a left BKA.     Objective        A right plantar 1st met head wound is noted measuring 0.9cm x 2.7cm x 0.1cm.  Inman Classification: 2     Wound base: Red/Granulation     Edges: macerated tissue     Drainage: scant/serous     Odor: no     Undermining: no     Bone Exposure: No     Clinical Signs of Infection: Yes - cellulitis of the hallux     After obtaining patient consent, the wound was irrigated with copious amounts of saline. No sharp debridement performed today.   ____________________________________  Wound #2              A Charcot wound is noted at left  lateral malleolus measuring 1.9cm x 2cm x 0.3cm.     Inman Classification: 3     Wound base: Red/hypergranulation     Edges: epibole     Drainage: copious/serous     Odor: no     Underminin O'Clock     Bone Exposure: Yes: lateral malleolus     Clinical Signs of Infection: No     After obtaining patient consent, the wound was irrigated with copious amounts of saline. No sharp debridement performed on this ulceration.      Lab Results   Component Value Date    WBC 9.4 2019     Lab Results   Component Value Date    RBC 3.95 2019     Lab Results   Component Value Date    HGB 11.9 2019     Lab Results   Component Value Date    HCT 38.4 2019     No components found for: MCT  Lab Results   Component Value Date    MCV 97 2019     Lab Results   Component Value Date    MCH 30.1 2019     Lab Results   Component Value Date    MCHC 31.0 2019     Lab Results   Component Value Date    RDW 18.5 2019     Lab Results   Component Value Date     2019     Last Comprehensive Metabolic Panel:  Sodium   Date Value Ref Range Status   2019 132 (L) 133 -  144 mmol/L Final     Potassium   Date Value Ref Range Status   12/11/2019 4.6 3.4 - 5.3 mmol/L Final     Chloride   Date Value Ref Range Status   12/11/2019 100 94 - 109 mmol/L Final     Carbon Dioxide   Date Value Ref Range Status   12/11/2019 26 20 - 32 mmol/L Final     Anion Gap   Date Value Ref Range Status   12/11/2019 6 3 - 14 mmol/L Final     Glucose   Date Value Ref Range Status   12/11/2019 108 (H) 70 - 99 mg/dL Final     Comment:     Non Fasting     Urea Nitrogen   Date Value Ref Range Status   12/11/2019 21 7 - 30 mg/dL Final     Creatinine   Date Value Ref Range Status   12/11/2019 1.00 0.52 - 1.04 mg/dL Final     GFR Estimate   Date Value Ref Range Status   12/11/2019 61 >60 mL/min/[1.73_m2] Final     Comment:     Non  GFR Calc  Starting 12/18/2018, serum creatinine based estimated GFR (eGFR) will be   calculated using the Chronic Kidney Disease Epidemiology Collaboration   (CKD-EPI) equation.       Calcium   Date Value Ref Range Status   12/11/2019 9.1 8.5 - 10.1 mg/dL Final          Assessment: Healing right foot ulceration  DM2 with neuropathy.   Left Charcot foot and ankle with probing ulceration to the lateral malleolus. This does not appear infected today. She has seriously thought about having the BKA over the last few days. She is strongly considering this.      Plan:   - Pt seen and evaluated  - Wound is likely infected today on the right foot. No Dermagraft. Start Augmentin. Betadine dressings daily.   - Pt to return to clinic in 1 week.

## 2019-12-11 ENCOUNTER — OFFICE VISIT (OUTPATIENT)
Dept: PODIATRY | Facility: CLINIC | Age: 59
End: 2019-12-11
Payer: COMMERCIAL

## 2019-12-11 VITALS — DIASTOLIC BLOOD PRESSURE: 69 MMHG | HEART RATE: 97 BPM | SYSTOLIC BLOOD PRESSURE: 133 MMHG

## 2019-12-11 DIAGNOSIS — E11.621 DIABETIC ULCER OF RIGHT MIDFOOT ASSOCIATED WITH TYPE 2 DIABETES MELLITUS, WITH BONE INVOLVEMENT WITHOUT EVIDENCE OF NECROSIS (H): Primary | ICD-10-CM

## 2019-12-11 DIAGNOSIS — L97.416 DIABETIC ULCER OF RIGHT MIDFOOT ASSOCIATED WITH TYPE 2 DIABETES MELLITUS, WITH BONE INVOLVEMENT WITHOUT EVIDENCE OF NECROSIS (H): Primary | ICD-10-CM

## 2019-12-11 LAB
ANION GAP SERPL CALCULATED.3IONS-SCNC: 6 MMOL/L (ref 3–14)
BASOPHILS # BLD AUTO: 0.1 10E9/L (ref 0–0.2)
BASOPHILS NFR BLD AUTO: 1 %
BUN SERPL-MCNC: 21 MG/DL (ref 7–30)
CALCIUM SERPL-MCNC: 9.1 MG/DL (ref 8.5–10.1)
CHLORIDE SERPL-SCNC: 100 MMOL/L (ref 94–109)
CO2 SERPL-SCNC: 26 MMOL/L (ref 20–32)
CREAT SERPL-MCNC: 1 MG/DL (ref 0.52–1.04)
DIFFERENTIAL METHOD BLD: ABNORMAL
EOSINOPHIL # BLD AUTO: 0.1 10E9/L (ref 0–0.7)
EOSINOPHIL NFR BLD AUTO: 1 %
ERYTHROCYTE [DISTWIDTH] IN BLOOD BY AUTOMATED COUNT: 18.5 % (ref 10–15)
GFR SERPL CREATININE-BSD FRML MDRD: 61 ML/MIN/{1.73_M2}
GLUCOSE SERPL-MCNC: 108 MG/DL (ref 70–99)
HCT VFR BLD AUTO: 38.4 % (ref 35–47)
HGB BLD-MCNC: 11.9 G/DL (ref 11.7–15.7)
IMM GRANULOCYTES # BLD: 0.1 10E9/L (ref 0–0.4)
IMM GRANULOCYTES NFR BLD: 0.7 %
LYMPHOCYTES # BLD AUTO: 2.5 10E9/L (ref 0.8–5.3)
LYMPHOCYTES NFR BLD AUTO: 26.8 %
MCH RBC QN AUTO: 30.1 PG (ref 26.5–33)
MCHC RBC AUTO-ENTMCNC: 31 G/DL (ref 31.5–36.5)
MCV RBC AUTO: 97 FL (ref 78–100)
MONOCYTES # BLD AUTO: 1.1 10E9/L (ref 0–1.3)
MONOCYTES NFR BLD AUTO: 11.4 %
NEUTROPHILS # BLD AUTO: 5.6 10E9/L (ref 1.6–8.3)
NEUTROPHILS NFR BLD AUTO: 59.1 %
PLATELET # BLD AUTO: 192 10E9/L (ref 150–450)
POTASSIUM SERPL-SCNC: 4.6 MMOL/L (ref 3.4–5.3)
RBC # BLD AUTO: 3.95 10E12/L (ref 3.8–5.2)
SODIUM SERPL-SCNC: 132 MMOL/L (ref 133–144)
WBC # BLD AUTO: 9.4 10E9/L (ref 4–11)

## 2019-12-11 PROCEDURE — 99213 OFFICE O/P EST LOW 20 MIN: CPT | Performed by: PODIATRIST

## 2019-12-11 PROCEDURE — 36415 COLL VENOUS BLD VENIPUNCTURE: CPT | Performed by: PODIATRIST

## 2019-12-11 PROCEDURE — 80048 BASIC METABOLIC PNL TOTAL CA: CPT | Performed by: PODIATRIST

## 2019-12-11 PROCEDURE — 85025 COMPLETE CBC W/AUTO DIFF WBC: CPT | Performed by: PODIATRIST

## 2019-12-11 NOTE — LETTER
2019         RE: Tiffani Tan  [address] [facility] [address]        Dear Colleague,    Thank you for referring your patient, Tiffani Tan, to the [facility]. Please see a copy of my visit note below.    Chief Complaint   Patient presents with     RECHECK     bilateral feet wound check          No Known Allergies      Subjective: Tiffani is a 59 year old female who presents to the clinic today for a follow up of BL diabetic foot and ankle wounds. She is contemplating a left BKA.     Objective        A right plantar 1st met head wound is noted measuring 0.9cm x 2.7cm x 0.1cm.  Inman Classification: 2     Wound base: Red/Granulation     Edges: macerated tissue     Drainage: scant/serous     Odor: no     Undermining: no     Bone Exposure: No     Clinical Signs of Infection: Yes - cellulitis of the hallux     After obtaining patient consent, the wound was irrigated with copious amounts of saline. No sharp debridement performed today.   ____________________________________  Wound #2              A Charcot wound is noted at left  lateral malleolus measuring 1.9cm x 2cm x 0.3cm.     Inman Classification: 3     Wound base: Red/hypergranulation     Edges: epibole     Drainage: copious/serous     Odor: no     Underminin O'Clock     Bone Exposure: Yes: lateral malleolus     Clinical Signs of Infection: No     After obtaining patient consent, the wound was irrigated with copious amounts of saline. No sharp debridement performed on this ulceration.      Lab Results   Component Value Date    WBC 9.4 2019     Lab Results   Component Value Date    RBC 3.95 2019     Lab Results   Component Value Date    HGB 11.9 2019     Lab Results   Component Value Date    HCT 38.4 2019     No components found for: MCT  Lab Results   Component Value Date    MCV 97 2019     Lab Results   Component Value Date    MCH 30.1 2019     Lab Results   Component Value Date    MCHC 31.0  12/11/2019     Lab Results   Component Value Date    RDW 18.5 12/11/2019     Lab Results   Component Value Date     12/11/2019     Last Comprehensive Metabolic Panel:  Sodium   Date Value Ref Range Status   12/11/2019 132 (L) 133 - 144 mmol/L Final     Potassium   Date Value Ref Range Status   12/11/2019 4.6 3.4 - 5.3 mmol/L Final     Chloride   Date Value Ref Range Status   12/11/2019 100 94 - 109 mmol/L Final     Carbon Dioxide   Date Value Ref Range Status   12/11/2019 26 20 - 32 mmol/L Final     Anion Gap   Date Value Ref Range Status   12/11/2019 6 3 - 14 mmol/L Final     Glucose   Date Value Ref Range Status   12/11/2019 108 (H) 70 - 99 mg/dL Final     Comment:     Non Fasting     Urea Nitrogen   Date Value Ref Range Status   12/11/2019 21 7 - 30 mg/dL Final     Creatinine   Date Value Ref Range Status   12/11/2019 1.00 0.52 - 1.04 mg/dL Final     GFR Estimate   Date Value Ref Range Status   12/11/2019 61 >60 mL/min/[1.73_m2] Final     Comment:     Non  GFR Calc  Starting 12/18/2018, serum creatinine based estimated GFR (eGFR) will be   calculated using the Chronic Kidney Disease Epidemiology Collaboration   (CKD-EPI) equation.       Calcium   Date Value Ref Range Status   12/11/2019 9.1 8.5 - 10.1 mg/dL Final          Assessment: Healing right foot ulceration  DM2 with neuropathy.   Left Charcot foot and ankle with probing ulceration to the lateral malleolus. This does not appear infected today. She has seriously thought about having the BKA over the last few days. She is strongly considering this.      Plan:   - Pt seen and evaluated  - Wound is likely infected today on the right foot. No Dermagraft. Start Augmentin. Betadine dressings daily.   - Pt to return to clinic in 1 week.         Again, thank you for allowing me to participate in the care of your patient.        Sincerely,        Daniel Giron DPM

## 2019-12-11 NOTE — PATIENT INSTRUCTIONS
Thanks for coming today.  [facility]  [address] [place] [address]    To schedule future appointments in Ortho Clinic, you may call [phone].    To schedule ordered imaging by your provider:   Call Central Imaging [phone]    To schedule an injection ordered by your provider:  Call Central Imaging Injection scheduling line: [phone]  Call [employer] available online at:  [email or web address]    Please call if any further questions or concerns ([phone]).  Clinic hours 8 am to 5 pm.    Return to clinic (call) if symptoms worsen or fail to improve.

## 2019-12-11 NOTE — NURSING NOTE
Tiffani Tan's goals for this visit include:   Chief Complaint   Patient presents with     RECHECK     bilateral feet wound check     She requests these members of her care team be copied on today's visit information:     PCP: Clinic - [facility]    Referring Provider:  Wily Bonilla MD  [facility]  [address] [place] [address]    /69 (BP Location: Left arm, Patient Position: Sitting, Cuff Size: Adult Regular)   Pulse 97     Do you need any medication refills at today's visit? No

## 2019-12-18 ENCOUNTER — OFFICE VISIT (OUTPATIENT)
Dept: PODIATRY | Facility: CLINIC | Age: 59
End: 2019-12-18
Payer: COMMERCIAL

## 2019-12-18 VITALS — DIASTOLIC BLOOD PRESSURE: 84 MMHG | OXYGEN SATURATION: 96 % | SYSTOLIC BLOOD PRESSURE: 162 MMHG | HEART RATE: 101 BPM

## 2019-12-18 DIAGNOSIS — I87.2 VENOUS INCOMPETENCE: ICD-10-CM

## 2019-12-18 DIAGNOSIS — M14.672 CHARCOT'S JOINT OF LEFT FOOT: ICD-10-CM

## 2019-12-18 DIAGNOSIS — L97.416 DIABETIC ULCER OF RIGHT MIDFOOT ASSOCIATED WITH TYPE 2 DIABETES MELLITUS, WITH BONE INVOLVEMENT WITHOUT EVIDENCE OF NECROSIS (H): Primary | ICD-10-CM

## 2019-12-18 DIAGNOSIS — E11.42 TYPE 2 DIABETES MELLITUS WITH DIABETIC POLYNEUROPATHY, WITHOUT LONG-TERM CURRENT USE OF INSULIN (H): ICD-10-CM

## 2019-12-18 DIAGNOSIS — E11.621 DIABETIC ULCER OF RIGHT MIDFOOT ASSOCIATED WITH TYPE 2 DIABETES MELLITUS, WITH BONE INVOLVEMENT WITHOUT EVIDENCE OF NECROSIS (H): Primary | ICD-10-CM

## 2019-12-18 DIAGNOSIS — L97.323 SKIN ULCER OF LEFT ANKLE WITH NECROSIS OF MUSCLE (H): ICD-10-CM

## 2019-12-18 PROCEDURE — 15275 SKIN SUB GRAFT FACE/NK/HF/G: CPT | Performed by: PODIATRIST

## 2019-12-18 PROCEDURE — 99213 OFFICE O/P EST LOW 20 MIN: CPT | Mod: 25 | Performed by: PODIATRIST

## 2019-12-18 NOTE — NURSING NOTE
Tiffani Tan's chief complaint for this visit includes:  Chief Complaint   Patient presents with     RECHECK     pressure ulcer right  plantar 1st met head - Dermagraft     PCP: Clinic - [facility]    Referring Provider:  Wily Bonilla MD  [facility]  [id number] [place] [address]    BP (!) 162/84 (BP Location: Left arm, Patient Position: Sitting, Cuff Size: Adult Regular)   Pulse 101   SpO2 96%   Data Unavailable     Do you need any medication refills at today's visit? No    Christina Oshea, ARIAN

## 2019-12-18 NOTE — PATIENT INSTRUCTIONS
Thanks for coming today.  Ortho[facility] Medicine Clinic  [address] [place] [address]    To schedule future appointments in Ortho Clinic, you may call [phone].    To schedule ordered imaging by your provider:   Call Central Imaging [phone]    To schedule an injection ordered by your provider:  Call Central Imaging Injection scheduling line: [phone]  [employer] available online at:  [email or web address]    Please call if any further questions or concerns ([phone]).  Clinic hours 8 am to 5 pm.    Return to clinic (call) if symptoms worsen or fail to improve.

## 2019-12-18 NOTE — LETTER
2019         RE: Tiffani Tan  [address] [facility] [address]        Dear Colleague,    Thank you for referring your patient, Tiffani Tan, to the [facility]. Please see a copy of my visit note below.    Chief Complaint   Patient presents with     RECHECK     pressure ulcer right  plantar 1st met head - Dermagraft          No Known Allergies      Subjective: Tiffani is a 59 year old female who presents to the clinic today for a follow up of BL diabetic foot and ankle wounds. She is contemplating a left BKA.  She is taking the Augmentin. No n/v/d/f/c/ns/sob/cp. Relates that her legs are discolored and warm.      Objective          A right plantar 1st met head wound is noted measuring 1.4cm x 2cm x 0.1cm.  Inman Classification: 2     Wound base: Red/Granulation     Edges: macerated tissue     Drainage: scant/serous     Odor: no     Undermining: no     Bone Exposure: No     Clinical Signs of Infection: Yes - cellulitis of the hallux       After obtaining patient consent, the wound was irrigated with copious amounts of saline. A curettte was then used to debride the wound into subcutaneous tissue. The wound edges were debrided back to healthy, bleeding tissue. The wound base exhibited healthy bleeding. Given the patient's lack of sensation, no anesthesia was necessary for the procedure.    ____________________________________  Wound #2                  A Charcot wound is noted at left  lateral malleolus measuring 1.9cm x 2cm x 1cm.     Inman Classification: 3     Wound base: Red/hypergranulation     Edges: epibole     Drainage: copious/serous     Odor: no     Underminin O'Clock     Bone Exposure: Yes: lateral malleolus     Clinical Signs of Infection: No     After obtaining patient consent, the wound was irrigated with copious amounts of saline. No sharp debridement performed on this ulceration.   Legs are warm with venous stasis dermatitis changes noted BL. No s/s of infection noted.             Lab Results   Component Value Date     WBC 9.4 12/11/2019            Lab Results   Component Value Date     RBC 3.95 12/11/2019            Lab Results   Component Value Date     HGB 11.9 12/11/2019            Lab Results   Component Value Date     HCT 38.4 12/11/2019      No components found for: MCT        Lab Results   Component Value Date     MCV 97 12/11/2019            Lab Results   Component Value Date     MCH 30.1 12/11/2019            Lab Results   Component Value Date     MCHC 31.0 12/11/2019            Lab Results   Component Value Date     RDW 18.5 12/11/2019            Lab Results   Component Value Date      12/11/2019      Last Comprehensive Metabolic Panel:        Sodium   Date Value Ref Range Status   12/11/2019 132 (L) 133 - 144 mmol/L Final            Potassium   Date Value Ref Range Status   12/11/2019 4.6 3.4 - 5.3 mmol/L Final            Chloride   Date Value Ref Range Status   12/11/2019 100 94 - 109 mmol/L Final            Carbon Dioxide   Date Value Ref Range Status   12/11/2019 26 20 - 32 mmol/L Final            Anion Gap   Date Value Ref Range Status   12/11/2019 6 3 - 14 mmol/L Final              Glucose   Date Value Ref Range Status   12/11/2019 108 (H) 70 - 99 mg/dL Final       Comment:       Non Fasting            Urea Nitrogen   Date Value Ref Range Status   12/11/2019 21 7 - 30 mg/dL Final            Creatinine   Date Value Ref Range Status   12/11/2019 1.00 0.52 - 1.04 mg/dL Final              GFR Estimate   Date Value Ref Range Status   12/11/2019 61 >60 mL/min/[1.73_m2] Final       Comment:       Non  GFR Calc  Starting 12/18/2018, serum creatinine based estimated GFR (eGFR) will be   calculated using the Chronic Kidney Disease Epidemiology Collaboration   (CKD-EPI) equation.               Calcium   Date Value Ref Range Status   12/11/2019 9.1 8.5 - 10.1 mg/dL Final            Assessment: Healing right foot ulceration  DM2 with neuropathy.   Left  Charcot foot and ankle with probing ulceration to the lateral malleolus. This does not appear infected today. She has seriously thought about having the BKA over the last few days. She is strongly considering this.   Venous stasis dermatitis.     Plan:   - Pt seen and evaluated  - A 38 square cm piece of Dermagraft was applied to the right foot wound and affixed with steristrips. The foot was wrapped in a Profore boot. This should be left intact for 7 days.   - Left left wrapped in a Profore boot that she will take off on Friday. Aquacel as the wound contact layer.   - If worsening over the next week, she should present to the ED.  - Pt to return to clinic in 1 week.           Again, thank you for allowing me to participate in the care of your patient.        Sincerely,        Daniel Giron DPM

## 2019-12-18 NOTE — PROGRESS NOTES
Chief Complaint   Patient presents with     RECHECK     pressure ulcer right  plantar 1st met head - Dermagraft          No Known Allergies      Subjective: Tiffani is a 59 year old female who presents to the clinic today for a follow up of BL diabetic foot and ankle wounds. She is contemplating a left BKA. She is taking the Augmentin. No n/v/d/f/c/ns/sob/cp. Relates that her legs are discolored and warm.      Objective          A right plantar 1st met head wound is noted measuring 1.4cm x 2cm x 0.1cm.  Inman Classification: 2     Wound base: Red/Granulation     Edges: macerated tissue     Drainage: scant/serous     Odor: no     Undermining: no     Bone Exposure: No     Clinical Signs of Infection: Yes - cellulitis of the hallux       After obtaining patient consent, the wound was irrigated with copious amounts of saline. A curettte was then used to debride the wound into subcutaneous tissue. The wound edges were debrided back to healthy, bleeding tissue. The wound base exhibited healthy bleeding. Given the patient's lack of sensation, no anesthesia was necessary for the procedure.    ____________________________________  Wound #2                  A Charcot wound is noted at left  lateral malleolus measuring 1.9cm x 2cm x 1cm.     Inman Classification: 3     Wound base: Red/hypergranulation     Edges: epibole     Drainage: copious/serous     Odor: no     Underminin O'Clock     Bone Exposure: Yes: lateral malleolus     Clinical Signs of Infection: No     After obtaining patient consent, the wound was irrigated with copious amounts of saline. No sharp debridement performed on this ulceration.   Legs are warm with venous stasis dermatitis changes noted BL. No s/s of infection noted.            Lab Results   Component Value Date     WBC 9.4 2019            Lab Results   Component Value Date     RBC 3.95 2019            Lab Results   Component Value Date     HGB 11.9 2019            Lab Results    Component Value Date     HCT 38.4 12/11/2019      No components found for: MCT        Lab Results   Component Value Date     MCV 97 12/11/2019            Lab Results   Component Value Date     MCH 30.1 12/11/2019            Lab Results   Component Value Date     MCHC 31.0 12/11/2019            Lab Results   Component Value Date     RDW 18.5 12/11/2019            Lab Results   Component Value Date      12/11/2019      Last Comprehensive Metabolic Panel:        Sodium   Date Value Ref Range Status   12/11/2019 132 (L) 133 - 144 mmol/L Final            Potassium   Date Value Ref Range Status   12/11/2019 4.6 3.4 - 5.3 mmol/L Final            Chloride   Date Value Ref Range Status   12/11/2019 100 94 - 109 mmol/L Final            Carbon Dioxide   Date Value Ref Range Status   12/11/2019 26 20 - 32 mmol/L Final            Anion Gap   Date Value Ref Range Status   12/11/2019 6 3 - 14 mmol/L Final              Glucose   Date Value Ref Range Status   12/11/2019 108 (H) 70 - 99 mg/dL Final       Comment:       Non Fasting            Urea Nitrogen   Date Value Ref Range Status   12/11/2019 21 7 - 30 mg/dL Final      Creatinine   Date Value Ref Range Status   12/11/2019 1.00 0.52 - 1.04 mg/dL Final              GFR Estimate   Date Value Ref Range Status   12/11/2019 61 >60 mL/min/[1.73_m2] Final       Comment:       Non  GFR Calc  Starting 12/18/2018, serum creatinine based estimated GFR (eGFR) will be   calculated using the Chronic Kidney Disease Epidemiology Collaboration   (CKD-EPI) equation.               Calcium   Date Value Ref Range Status   12/11/2019 9.1 8.5 - 10.1 mg/dL Final            Assessment: Healing right foot ulceration  DM2 with neuropathy.   Left Charcot foot and ankle with probing ulceration to the lateral malleolus. This does not appear infected today. She has seriously thought about having the BKA over the last few days. She is strongly considering this.   Venous stasis  dermatitis.     Plan:   - Pt seen and evaluated  - A 38 square cm piece of Dermagraft was applied to the right foot wound and affixed with steristrips. The foot was wrapped in a Profore boot. This should be left intact for 7 days.   - Left left wrapped in a Profore boot that she will take off on Friday. Aquacel as the wound contact layer.   - If worsening over the next week, she should present to the ED.  - Pt to return to clinic in 1 week.

## 2019-12-24 ENCOUNTER — ALLIED HEALTH/NURSE VISIT (OUTPATIENT)
Dept: NURSING | Facility: CLINIC | Age: 59
End: 2019-12-24
Payer: COMMERCIAL

## 2019-12-24 DIAGNOSIS — I87.2 VENOUS INCOMPETENCE: ICD-10-CM

## 2019-12-24 DIAGNOSIS — L97.323 SKIN ULCER OF LEFT ANKLE WITH NECROSIS OF MUSCLE (H): ICD-10-CM

## 2019-12-24 DIAGNOSIS — E11.621 DIABETIC ULCER OF RIGHT MIDFOOT ASSOCIATED WITH TYPE 2 DIABETES MELLITUS, WITH BONE INVOLVEMENT WITHOUT EVIDENCE OF NECROSIS (H): Primary | ICD-10-CM

## 2019-12-24 DIAGNOSIS — E11.42 TYPE 2 DIABETES MELLITUS WITH DIABETIC POLYNEUROPATHY, WITHOUT LONG-TERM CURRENT USE OF INSULIN (H): ICD-10-CM

## 2019-12-24 DIAGNOSIS — L97.416 DIABETIC ULCER OF RIGHT MIDFOOT ASSOCIATED WITH TYPE 2 DIABETES MELLITUS, WITH BONE INVOLVEMENT WITHOUT EVIDENCE OF NECROSIS (H): Primary | ICD-10-CM

## 2019-12-24 DIAGNOSIS — M14.672 CHARCOT'S JOINT OF LEFT FOOT: ICD-10-CM

## 2019-12-24 PROCEDURE — 99211 OFF/OP EST MAY X REQ PHY/QHP: CPT

## 2019-12-26 NOTE — PATIENT INSTRUCTIONS
Thanks for coming today.  Ortho/[facility] Medicine Clinic  [address] [place] [address]    To schedule future appointments in Ortho Clinic, you may call [phone].    To schedule ordered imaging by your provider:   Call Central Imaging [phone]    To schedule an injection ordered by your provider:  Call Central Imaging Injection scheduling line: [phone]  [employer] available online at:  [email or web address]    Please call if any further questions or concerns ([phone]).  Clinic hours 8 am to 5 pm.    Return to clinic (call) if symptoms worsen or fail to improve.

## 2019-12-26 NOTE — PROGRESS NOTES
Tiffani Tan comes into clinic today at the request of Dr. Giron for wound care/dressing change.    S: There has been history of infection in right plantar 1st met head wound, but no signs and symptoms present today. Pt complains of drainage from both wounds and pain in her right small toe from being compressed in the Profore boot. Pt reports elevating and icing with relief. Currently taking over the counter pain medications for pain as needed    O: Right plantar 1st met head wound: moderate amount of serosanguinous drainage. Maceration noted around the wound. Left lateral malleolus Charcot wound: same as other wound.     A: There doesn't appear to be any change in her wounds from last week. Continue with treatment plan per Dr. Giron's orders.     P: All sutures were easily removed today. Routine wound care discussed. The patient will follow up in 1 week, per post op plan. If there are any concerns or signs and symptoms of infection, patient will reach out to the clinic. Patient is agreeable with plan.    This service provided today was under the direct supervision of Dr. Romero, who was available if needed.    Vincenzo Farah RN

## 2020-01-02 ENCOUNTER — OFFICE VISIT (OUTPATIENT)
Dept: PODIATRY | Facility: CLINIC | Age: 60
End: 2020-01-02
Payer: COMMERCIAL

## 2020-01-02 VITALS — SYSTOLIC BLOOD PRESSURE: 136 MMHG | DIASTOLIC BLOOD PRESSURE: 76 MMHG | OXYGEN SATURATION: 91 % | HEART RATE: 99 BPM

## 2020-01-02 DIAGNOSIS — L97.323 SKIN ULCER OF LEFT ANKLE WITH NECROSIS OF MUSCLE (H): ICD-10-CM

## 2020-01-02 DIAGNOSIS — M14.672 CHARCOT'S JOINT OF LEFT FOOT: ICD-10-CM

## 2020-01-02 DIAGNOSIS — E11.42 TYPE 2 DIABETES MELLITUS WITH DIABETIC POLYNEUROPATHY, WITHOUT LONG-TERM CURRENT USE OF INSULIN (H): ICD-10-CM

## 2020-01-02 DIAGNOSIS — I87.2 VENOUS INCOMPETENCE: ICD-10-CM

## 2020-01-02 DIAGNOSIS — E11.621 DIABETIC ULCER OF RIGHT MIDFOOT ASSOCIATED WITH TYPE 2 DIABETES MELLITUS, WITH BONE INVOLVEMENT WITHOUT EVIDENCE OF NECROSIS (H): Primary | ICD-10-CM

## 2020-01-02 DIAGNOSIS — L97.416 DIABETIC ULCER OF RIGHT MIDFOOT ASSOCIATED WITH TYPE 2 DIABETES MELLITUS, WITH BONE INVOLVEMENT WITHOUT EVIDENCE OF NECROSIS (H): Primary | ICD-10-CM

## 2020-01-02 PROCEDURE — 29580 STRAPPING UNNA BOOT: CPT | Mod: RT | Performed by: PODIATRIST

## 2020-01-02 PROCEDURE — 29405 APPL SHORT LEG CAST: CPT | Mod: LT | Performed by: PODIATRIST

## 2020-01-02 RX ORDER — CEPHALEXIN 500 MG/1
500 CAPSULE ORAL 2 TIMES DAILY
Qty: 28 CAPSULE | Refills: 0 | Status: SHIPPED | OUTPATIENT
Start: 2020-01-02 | End: 2020-01-16

## 2020-01-02 NOTE — PROGRESS NOTES
No past medical history on file.  Patient Active Problem List   Diagnosis     Charcot's joint of left foot     Type 2 diabetes mellitus with diabetic polyneuropathy, without long-term current use of insulin (H)     Diabetic ulcer of right midfoot associated with type 2 diabetes mellitus, with bone involvement without evidence of necrosis (H)     Venous incompetence     Venous stasis ulcer of left calf with fat layer exposed without varicose veins (H)     Skin ulcer of left ankle with fat layer exposed (H)     No past surgical history on file.  Social History     Socioeconomic History     Marital status: Single     Spouse name: Not on file     Number of children: Not on file     Years of education: Not on file     Highest education level: Not on file   Occupational History     Not on file   Social Needs     Financial resource strain: Not on file     Food insecurity:     Worry: Not on file     Inability: Not on file     Transportation needs:     Medical: Not on file     Non-medical: Not on file   Tobacco Use     Smoking status: Current Every Day Smoker     Smokeless tobacco: Never Used   Substance and Sexual Activity     Alcohol use: Not on file     Drug use: Not on file     Sexual activity: Not on file   Lifestyle     Physical activity:     Days per week: Not on file     Minutes per session: Not on file     Stress: Not on file   Relationships     Social connections:     Talks on phone: Not on file     Gets together: Not on file     Attends Restorationist service: Not on file     Active member of club or organization: Not on file     Attends meetings of clubs or organizations: Not on file     Relationship status: Not on file     Intimate partner violence:     Fear of current or ex partner: Not on file     Emotionally abused: Not on file     Physically abused: Not on file     Forced sexual activity: Not on file   Other Topics Concern     Not on file   Social History Narrative     Not on file     No family history on  file.  Lab Results   Component Value Date    WBC 9.4 12/11/2019     Lab Results   Component Value Date    RBC 3.95 12/11/2019     Lab Results   Component Value Date    HGB 11.9 12/11/2019     Lab Results   Component Value Date    HCT 38.4 12/11/2019     No components found for: MCT  Lab Results   Component Value Date    MCV 97 12/11/2019     Lab Results   Component Value Date    MCH 30.1 12/11/2019     Lab Results   Component Value Date    MCHC 31.0 12/11/2019     Lab Results   Component Value Date    RDW 18.5 12/11/2019     Lab Results   Component Value Date     12/11/2019     Last Comprehensive Metabolic Panel:  Sodium   Date Value Ref Range Status   12/11/2019 132 (L) 133 - 144 mmol/L Final     Potassium   Date Value Ref Range Status   12/11/2019 4.6 3.4 - 5.3 mmol/L Final     Chloride   Date Value Ref Range Status   12/11/2019 100 94 - 109 mmol/L Final     Carbon Dioxide   Date Value Ref Range Status   12/11/2019 26 20 - 32 mmol/L Final     Anion Gap   Date Value Ref Range Status   12/11/2019 6 3 - 14 mmol/L Final     Glucose   Date Value Ref Range Status   12/11/2019 108 (H) 70 - 99 mg/dL Final     Comment:     Non Fasting     Urea Nitrogen   Date Value Ref Range Status   12/11/2019 21 7 - 30 mg/dL Final     Creatinine   Date Value Ref Range Status   12/11/2019 1.00 0.52 - 1.04 mg/dL Final     GFR Estimate   Date Value Ref Range Status   12/11/2019 61 >60 mL/min/[1.73_m2] Final     Comment:     Non  GFR Calc  Starting 12/18/2018, serum creatinine based estimated GFR (eGFR) will be   calculated using the Chronic Kidney Disease Epidemiology Collaboration   (CKD-EPI) equation.       Calcium   Date Value Ref Range Status   12/11/2019 9.1 8.5 - 10.1 mg/dL Final   SUBJECTIVE FINDINGS:  60-year-old female presents from Dr. Giron for right first MPJ ulcer and left lateral ankle ulcer. She relates she has been doing Dermagraft.  She thinks she has had maybe 5 or 6 of those applied on the right.   She has been wearing a CROW boot on the left but she does not feel it fits well.  She has had it adjusted.  Relates no recent injuries.  She is in a wheelchair.  She is at work.  She relates they have done a Profore boot on the right.  The left one got wet so she stopped that.  She has seen Dr. Adams for surgical options for below-the-knee amputation on the left.  Relates no systemic signs of infection but she does have difficulty with mobility with the left one.      OBJECTIVE FINDINGS:  DP and PT are 2/4 bilaterally.  She has peripheral edema left and to a lesser degree on the right.  She has a right plantar first MPJ ulcer that is about 3.5 x 1 cm.  It is deep into the subcutaneous tissues.  Some edema.  Minimal erythema.  No odor, no calor.  Some serosanguineous drainage.  She has venous stasis discoloration bilaterally.  She has a left lateral ankle ulcer that is about 2.5 cm in diameter.  It is deep through the subcutaneous tissues.  Some edema, some erythema, some serosanguineous drainage.  No odor, no calor.  She has instability of the ankle on the left.  Previous x-rays and labs reviewed as noted in the EMR.        ASSESSMENT/PLAN:  Ulcer, left ankle.  Charcot foot and ankle, left.  Ulcer, right first MPJ.  She is diabetic with peripheral neuropathy.  Previous notes reviewed as noted in EMR.  Diagnosis and treatment options discussed with her.  Local wound care done upon consent today.  Wound Vashe wet-to-dry dressing applied to the ulcer sites.  Total contact cast applied to the left.  Total contact soft cast applied to the right and use discussed with her for both those.  Surgical shoe dispensed for the left.  I am going to put her on Keflex for the signs of infection.  She will return to clinic and see me Monday.

## 2020-01-02 NOTE — PATIENT INSTRUCTIONS
Thanks for coming today.  [facility]  [address] [place] [address]    To schedule future appointments in Ortho Clinic, you may call [phone].    To schedule ordered imaging by your provider:   Call Central Imaging [phone]    To schedule an injection ordered by your provider:  Call Central Imaging Injection scheduling line: [phone]  [employer] available online at:  [email or web address]    Please call if any further questions or concerns ([phone]).  Clinic hours 8 am to 5 pm.    Return to clinic (call) if symptoms worsen or fail to improve.

## 2020-01-02 NOTE — LETTER
1/2/2020         RE: Tiffani Tan  [address] [facility] [address]        Dear Colleague,    Thank you for referring your patient, Tiffani Tan, to the [facility]. Please see a copy of my visit note below.    No past medical history on file.  Patient Active Problem List   Diagnosis     Charcot's joint of left foot     Type 2 diabetes mellitus with diabetic polyneuropathy, without long-term current use of insulin (H)     Diabetic ulcer of right midfoot associated with type 2 diabetes mellitus, with bone involvement without evidence of necrosis (H)     Venous incompetence     Venous stasis ulcer of left calf with fat layer exposed without varicose veins (H)     Skin ulcer of left ankle with fat layer exposed (H)     No past surgical history on file.  Social History     Socioeconomic History     Marital status: Single     Spouse name: Not on file     Number of children: Not on file     Years of education: Not on file     Highest education level: Not on file   Occupational History     Not on file   Social Needs     Financial resource strain: Not on file     Food insecurity:     Worry: Not on file     Inability: Not on file     Transportation needs:     Medical: Not on file     Non-medical: Not on file   Tobacco Use     Smoking status: Current Every Day Smoker     Smokeless tobacco: Never Used   Substance and Sexual Activity     Alcohol use: Not on file     Drug use: Not on file     Sexual activity: Not on file   Lifestyle     Physical activity:     Days per week: Not on file     Minutes per session: Not on file     Stress: Not on file   Relationships     Social connections:     Talks on phone: Not on file     Gets together: Not on file     Attends Scientologist service: Not on file     Active member of club or organization: Not on file     Attends meetings of clubs or organizations: Not on file     Relationship status: Not on file     Intimate partner violence:     Fear of current or ex partner: Not on  file     Emotionally abused: Not on file     Physically abused: Not on file     Forced sexual activity: Not on file   Other Topics Concern     Not on file   Social History Narrative     Not on file     No family history on file.  Lab Results   Component Value Date    WBC 9.4 12/11/2019     Lab Results   Component Value Date    RBC 3.95 12/11/2019     Lab Results   Component Value Date    HGB 11.9 12/11/2019     Lab Results   Component Value Date    HCT 38.4 12/11/2019     No components found for: MCT  Lab Results   Component Value Date    MCV 97 12/11/2019     Lab Results   Component Value Date    MCH 30.1 12/11/2019     Lab Results   Component Value Date    MCHC 31.0 12/11/2019     Lab Results   Component Value Date    RDW 18.5 12/11/2019     Lab Results   Component Value Date     12/11/2019     Last Comprehensive Metabolic Panel:  Sodium   Date Value Ref Range Status   12/11/2019 132 (L) 133 - 144 mmol/L Final     Potassium   Date Value Ref Range Status   12/11/2019 4.6 3.4 - 5.3 mmol/L Final     Chloride   Date Value Ref Range Status   12/11/2019 100 94 - 109 mmol/L Final     Carbon Dioxide   Date Value Ref Range Status   12/11/2019 26 20 - 32 mmol/L Final     Anion Gap   Date Value Ref Range Status   12/11/2019 6 3 - 14 mmol/L Final     Glucose   Date Value Ref Range Status   12/11/2019 108 (H) 70 - 99 mg/dL Final     Comment:     Non Fasting     Urea Nitrogen   Date Value Ref Range Status   12/11/2019 21 7 - 30 mg/dL Final     Creatinine   Date Value Ref Range Status   12/11/2019 1.00 0.52 - 1.04 mg/dL Final     GFR Estimate   Date Value Ref Range Status   12/11/2019 61 >60 mL/min/[1.73_m2] Final     Comment:     Non  GFR Calc  Starting 12/18/2018, serum creatinine based estimated GFR (eGFR) will be   calculated using the Chronic Kidney Disease Epidemiology Collaboration   (CKD-EPI) equation.       Calcium   Date Value Ref Range Status   12/11/2019 9.1 8.5 - 10.1 mg/dL Final   SUBJECTIVE  FINDINGS:  60-year-old female presents from Dr. Giron for right first MPJ ulcer and left lateral ankle ulcer. She relates she has been doing Dermagraft.  She thinks she has had maybe 5 or 6 of those applied on the right.  She has been wearing a CROW boot on the left but she does not feel it fits well.  She has had it adjusted.  Relates no recent injuries.  She is in a wheelchair.  She is at work.  She relates they have done a Profore boot on the right.  The left one got wet so she stopped that.  She has seen Dr. Adams for surgical options for below-the-knee amputation on the left.  Relates no systemic signs of infection but she does have difficulty with mobility with the left one.      OBJECTIVE FINDINGS:  DP and PT are 2/4 bilaterally.  She has peripheral edema left and to a lesser degree on the right.  She has a right plantar first MPJ ulcer that is about 3.5 x 1 cm.  It is deep into the subcutaneous tissues.  Some edema.  Minimal erythema.  No odor, no calor.  Some serosanguineous drainage.  She has venous stasis discoloration bilaterally.  She has a left lateral ankle ulcer that is about 2.5 cm in diameter.  It is deep through the subcutaneous tissues.  Some edema, some erythema, some serosanguineous drainage.  No odor, no calor.  She has instability of the ankle on the left.  Previous x-rays and labs reviewed as noted in the EMR.        ASSESSMENT/PLAN:  Ulcer, left ankle.  Charcot foot and ankle, left.  Ulcer, right first MPJ.  She is diabetic with peripheral neuropathy.  Previous notes reviewed as noted in EMR.  Diagnosis and treatment options discussed with her.  Local wound care done upon consent today.  Wound Vashe wet-to-dry dressing applied to the ulcer sites.  Total contact cast applied to the left.  Total contact soft cast applied to the right and use discussed with her for both those.  Surgical shoe dispensed for the left.  I am going to put her on Keflex for the signs of infection.  She will return  to clinic and see me Monday.                   Again, thank you for allowing me to participate in the care of your patient.        Sincerely,        Nolan Whitten DPM

## 2020-01-02 NOTE — NURSING NOTE
Tiffani Tan's chief complaint for this visit includes:  Chief Complaint   Patient presents with     RECHECK     wound check      PCP: Clinic - [facility]    Referring Provider:  Wily Bonilla MD  [facility]  [id number]  [place] [address]    /76   Pulse 99   SpO2 91%   Data Unavailable     Do you need any medication refills at today's visit? no

## 2020-01-06 ENCOUNTER — OFFICE VISIT (OUTPATIENT)
Dept: PODIATRY | Facility: CLINIC | Age: 60
End: 2020-01-06
Payer: COMMERCIAL

## 2020-01-06 VITALS — HEART RATE: 91 BPM | OXYGEN SATURATION: 92 % | SYSTOLIC BLOOD PRESSURE: 131 MMHG | DIASTOLIC BLOOD PRESSURE: 69 MMHG

## 2020-01-06 DIAGNOSIS — L97.323 SKIN ULCER OF LEFT ANKLE WITH NECROSIS OF MUSCLE (H): ICD-10-CM

## 2020-01-06 DIAGNOSIS — L97.416 DIABETIC ULCER OF RIGHT MIDFOOT ASSOCIATED WITH TYPE 2 DIABETES MELLITUS, WITH BONE INVOLVEMENT WITHOUT EVIDENCE OF NECROSIS (H): Primary | ICD-10-CM

## 2020-01-06 DIAGNOSIS — E11.621 DIABETIC ULCER OF RIGHT MIDFOOT ASSOCIATED WITH TYPE 2 DIABETES MELLITUS, WITH BONE INVOLVEMENT WITHOUT EVIDENCE OF NECROSIS (H): Primary | ICD-10-CM

## 2020-01-06 DIAGNOSIS — E11.49 TYPE II OR UNSPECIFIED TYPE DIABETES MELLITUS WITH NEUROLOGICAL MANIFESTATIONS, NOT STATED AS UNCONTROLLED(250.60) (H): ICD-10-CM

## 2020-01-06 DIAGNOSIS — M14.672 CHARCOT'S JOINT OF LEFT FOOT: ICD-10-CM

## 2020-01-06 DIAGNOSIS — E11.51 DIABETES MELLITUS WITH PERIPHERAL VASCULAR DISEASE (H): ICD-10-CM

## 2020-01-06 PROCEDURE — 29580 STRAPPING UNNA BOOT: CPT | Mod: RT | Performed by: PODIATRIST

## 2020-01-06 PROCEDURE — 29405 APPL SHORT LEG CAST: CPT | Mod: LT | Performed by: PODIATRIST

## 2020-01-06 NOTE — LETTER
1/6/2020         RE: Tiffani Tan  [address] [facility] [address]        Dear Colleague,    Thank you for referring your patient, Tiffani Tan, to the [facility]. Please see a copy of my visit note below.    No past medical history on file.  Patient Active Problem List   Diagnosis     Charcot's joint of left foot     Type 2 diabetes mellitus with diabetic polyneuropathy, without long-term current use of insulin (H)     Diabetic ulcer of right midfoot associated with type 2 diabetes mellitus, with bone involvement without evidence of necrosis (H)     Venous incompetence     Venous stasis ulcer of left calf with fat layer exposed without varicose veins (H)     Skin ulcer of left ankle with fat layer exposed (H)     No past surgical history on file.  Social History     Socioeconomic History     Marital status: Single     Spouse name: Not on file     Number of children: Not on file     Years of education: Not on file     Highest education level: Not on file   Occupational History     Not on file   Social Needs     Financial resource strain: Not on file     Food insecurity:     Worry: Not on file     Inability: Not on file     Transportation needs:     Medical: Not on file     Non-medical: Not on file   Tobacco Use     Smoking status: Current Every Day Smoker     Smokeless tobacco: Never Used   Substance and Sexual Activity     Alcohol use: Not on file     Drug use: Not on file     Sexual activity: Not on file   Lifestyle     Physical activity:     Days per week: Not on file     Minutes per session: Not on file     Stress: Not on file   Relationships     Social connections:     Talks on phone: Not on file     Gets together: Not on file     Attends Sabianist service: Not on file     Active member of club or organization: Not on file     Attends meetings of clubs or organizations: Not on file     Relationship status: Not on file     Intimate partner violence:     Fear of current or ex partner: Not on  file     Emotionally abused: Not on file     Physically abused: Not on file     Forced sexual activity: Not on file   Other Topics Concern     Not on file   Social History Narrative     Not on file     No family history on file.          SUBJECTIVE FINDINGS:  A 60-year-old female returns to clinic for ulcer left ankle, Charcot foot-ankle left, ulcer right first MPJ, static peripheral neuropathy, venous stasis disease, lymphedema.  Relates she is doing okay.  She relates she gets a little rubbing on the anterior proximal calf on the left.  Otherwise, they both felt better.  She did not get the tingling or the sharp pain in her first MPJ like she did before with them.  That is better.      OBJECTIVE FINDINGS:  DP and PT are 2/4 bilaterally.  She has decreased edema bilaterally.  She has an ulcer on the left lateral ankle.  It is deep through the subcutaneous tissue.  There is a good granular base, some serosanguineous drainage, no erythema, no odor, no calor, minimal maceration.  She has a plantar right first MPJ ulceration with a granular base.  There is some maceration, no erythema, no odor, no calor, some serosanguineous drainage.      IMPRESSION AND PLAN:  Ulcer, left ankle.  Charcot foot-ankle, left.  Ulcer, right first MPJ.  She is diabetic with peripheral neuropathy and vascular disease and venous edema.  Diagnosis and treatment options discussed with her.  Local wound care done upon consent.  Wound Vashe wet-to-dry dressing applied to the ulcer sites.  Total contact cast was applied to the left leg upon consent.  Total contact soft cast applied to the right lower extremity upon consent.  She will return to clinic and see me Friday as scheduled.         Again, thank you for allowing me to participate in the care of your patient.        Sincerely,        Nolan Whitten DPM

## 2020-01-06 NOTE — PROGRESS NOTES
No past medical history on file.  Patient Active Problem List   Diagnosis     Charcot's joint of left foot     Type 2 diabetes mellitus with diabetic polyneuropathy, without long-term current use of insulin (H)     Diabetic ulcer of right midfoot associated with type 2 diabetes mellitus, with bone involvement without evidence of necrosis (H)     Venous incompetence     Venous stasis ulcer of left calf with fat layer exposed without varicose veins (H)     Skin ulcer of left ankle with fat layer exposed (H)     No past surgical history on file.  Social History     Socioeconomic History     Marital status: Single     Spouse name: Not on file     Number of children: Not on file     Years of education: Not on file     Highest education level: Not on file   Occupational History     Not on file   Social Needs     Financial resource strain: Not on file     Food insecurity:     Worry: Not on file     Inability: Not on file     Transportation needs:     Medical: Not on file     Non-medical: Not on file   Tobacco Use     Smoking status: Current Every Day Smoker     Smokeless tobacco: Never Used   Substance and Sexual Activity     Alcohol use: Not on file     Drug use: Not on file     Sexual activity: Not on file   Lifestyle     Physical activity:     Days per week: Not on file     Minutes per session: Not on file     Stress: Not on file   Relationships     Social connections:     Talks on phone: Not on file     Gets together: Not on file     Attends Scientology service: Not on file     Active member of club or organization: Not on file     Attends meetings of clubs or organizations: Not on file     Relationship status: Not on file     Intimate partner violence:     Fear of current or ex partner: Not on file     Emotionally abused: Not on file     Physically abused: Not on file     Forced sexual activity: Not on file   Other Topics Concern     Not on file   Social History Narrative     Not on file     No family history on  file.          SUBJECTIVE FINDINGS:  A 60-year-old female returns to clinic for ulcer left ankle, Charcot foot-ankle left, ulcer right first MPJ, static peripheral neuropathy, venous stasis disease, lymphedema.  Relates she is doing okay.  She relates she gets a little rubbing on the anterior proximal calf on the left.  Otherwise, they both felt better.  She did not get the tingling or the sharp pain in her first MPJ like she did before with them.  That is better.      OBJECTIVE FINDINGS:  DP and PT are 2/4 bilaterally.  She has decreased edema bilaterally.  She has an ulcer on the left lateral ankle.  It is deep through the subcutaneous tissue.  There is a good granular base, some serosanguineous drainage, no erythema, no odor, no calor, minimal maceration.  She has a plantar right first MPJ ulceration with a granular base.  There is some maceration, no erythema, no odor, no calor, some serosanguineous drainage.      IMPRESSION AND PLAN:  Ulcer, left ankle.  Charcot foot-ankle, left.  Ulcer, right first MPJ.  She is diabetic with peripheral neuropathy and vascular disease and venous edema.  Diagnosis and treatment options discussed with her.  Local wound care done upon consent.  Wound Vashe wet-to-dry dressing applied to the ulcer sites.  Total contact cast was applied to the left leg upon consent.  Total contact soft cast applied to the right lower extremity upon consent.  She will return to clinic and see me Friday as scheduled.

## 2020-01-06 NOTE — NURSING NOTE
Tiffani Tan's chief complaint for this visit includes:  No chief complaint on file.    PCP: Clinic - [facility]    Referring Provider:  Wily Bonilla MD  [facility] [address]  [place] [address]    /69 (BP Location: Right arm, Patient Position: Sitting, Cuff Size: Adult Regular)   Pulse 91   SpO2 92%   Data Unavailable     Do you need any medication refills at today's visit? Rubina Oshea CMA

## 2020-01-10 ENCOUNTER — OFFICE VISIT (OUTPATIENT)
Dept: PODIATRY | Facility: CLINIC | Age: 60
End: 2020-01-10
Payer: COMMERCIAL

## 2020-01-10 VITALS — SYSTOLIC BLOOD PRESSURE: 158 MMHG | OXYGEN SATURATION: 90 % | DIASTOLIC BLOOD PRESSURE: 90 MMHG | HEART RATE: 110 BPM

## 2020-01-10 DIAGNOSIS — L97.416 DIABETIC ULCER OF RIGHT MIDFOOT ASSOCIATED WITH TYPE 2 DIABETES MELLITUS, WITH BONE INVOLVEMENT WITHOUT EVIDENCE OF NECROSIS (H): Primary | ICD-10-CM

## 2020-01-10 DIAGNOSIS — M14.672 CHARCOT'S JOINT OF LEFT FOOT: ICD-10-CM

## 2020-01-10 DIAGNOSIS — E11.49 TYPE II OR UNSPECIFIED TYPE DIABETES MELLITUS WITH NEUROLOGICAL MANIFESTATIONS, NOT STATED AS UNCONTROLLED(250.60) (H): ICD-10-CM

## 2020-01-10 DIAGNOSIS — L97.323 SKIN ULCER OF LEFT ANKLE WITH NECROSIS OF MUSCLE (H): ICD-10-CM

## 2020-01-10 DIAGNOSIS — E11.51 DIABETES MELLITUS WITH PERIPHERAL VASCULAR DISEASE (H): ICD-10-CM

## 2020-01-10 DIAGNOSIS — E11.621 DIABETIC ULCER OF RIGHT MIDFOOT ASSOCIATED WITH TYPE 2 DIABETES MELLITUS, WITH BONE INVOLVEMENT WITHOUT EVIDENCE OF NECROSIS (H): Primary | ICD-10-CM

## 2020-01-10 PROCEDURE — 29405 APPL SHORT LEG CAST: CPT | Mod: LT | Performed by: PODIATRIST

## 2020-01-10 PROCEDURE — 29580 STRAPPING UNNA BOOT: CPT | Mod: RT | Performed by: PODIATRIST

## 2020-01-10 NOTE — PATIENT INSTRUCTIONS
Thanks for coming today.  Ortho/Sports Medicine Clinic  [address] [place] [address]    To schedule future appointments in Ortho Clinic, you may call [phone].    To schedule ordered imaging by your provider:   Call Central Imaging [phone]    To schedule an injection ordered by your provider:  Call Central Imaging Injection scheduling line: [phone]  [email or web address] available online at:  [email or web address]    Please call if any further questions or concerns ([phone]).  Clinic hours 8 am to 5 pm.    Return to clinic (call) if symptoms worsen or fail to improve.

## 2020-01-10 NOTE — NURSING NOTE
Tiffani Tan's chief complaint for this visit includes:  Chief Complaint   Patient presents with     RECHECK     wound care     PCP: [place] [facility]    Referring Provider:  No referring provider defined for this encounter.    BP (!) 158/90   Pulse 110   SpO2 90%   Data Unavailable     Do you need any medication refills at today's visit? no

## 2020-01-10 NOTE — PROGRESS NOTES
No past medical history on file.  Patient Active Problem List   Diagnosis     Charcot's joint of left foot     Type 2 diabetes mellitus with diabetic polyneuropathy, without long-term current use of insulin (H)     Diabetic ulcer of right midfoot associated with type 2 diabetes mellitus, with bone involvement without evidence of necrosis (H)     Venous incompetence     Venous stasis ulcer of left calf with fat layer exposed without varicose veins (H)     Skin ulcer of left ankle with fat layer exposed (H)     No past surgical history on file.  Social History     Socioeconomic History     Marital status: Single     Spouse name: Not on file     Number of children: Not on file     Years of education: Not on file     Highest education level: Not on file   Occupational History     Not on file   Social Needs     Financial resource strain: Not on file     Food insecurity:     Worry: Not on file     Inability: Not on file     Transportation needs:     Medical: Not on file     Non-medical: Not on file   Tobacco Use     Smoking status: Current Every Day Smoker     Smokeless tobacco: Never Used   Substance and Sexual Activity     Alcohol use: Not on file     Drug use: Not on file     Sexual activity: Not on file   Lifestyle     Physical activity:     Days per week: Not on file     Minutes per session: Not on file     Stress: Not on file   Relationships     Social connections:     Talks on phone: Not on file     Gets together: Not on file     Attends Cheondoism service: Not on file     Active member of club or organization: Not on file     Attends meetings of clubs or organizations: Not on file     Relationship status: Not on file     Intimate partner violence:     Fear of current or ex partner: Not on file     Emotionally abused: Not on file     Physically abused: Not on file     Forced sexual activity: Not on file   Other Topics Concern     Not on file   Social History Narrative     Not on file     No family history on  file.    SUBJECTIVE FINDINGS:  A 60-year-old female returns to clinic for ulcer left ankle, Charcot foot-ankle left, ulcer right first MPJ, static peripheral neuropathy, venous stasis disease, lymphedema.  Relates she is doing okay.  She relates she gets a little rubbing on the proximal calf on the left.       OBJECTIVE FINDINGS:  DP and PT are 2/4 bilaterally.  She has decreased edema bilaterally.  She has an ulcer on the left lateral ankle.  It is deep through the subcutaneous tissue.  There is a good granular base, some serosanguineous drainage, no erythema, no odor, no calor, minimal maceration.  She has a plantar right first MPJ ulceration with a granular base.  There is some maceration, no erythema, no odor, no calor, some serosanguineous drainage.      IMPRESSION AND PLAN:  Ulcer, left ankle.  Charcot foot-ankle, left.  Ulcer, right first MPJ.  She is diabetic with peripheral neuropathy and vascular disease and venous edema.  Diagnosis and treatment options discussed with her.  Local wound care done upon consent.  Wound Vashe wet-to-dry dressing applied to the ulcer sites.  Neema applied to the right 1st mpj ulcer.  Total contact cast was applied to the left leg upon consent.  Total contact soft cast applied to the right lower extremity upon consent.  She will return to clinic and see me in one week.

## 2020-01-10 NOTE — LETTER
1/10/2020         RE: Tiffani Tan  [address] [facility] [address]        Dear Colleague,    Thank you for referring your patient, Tiffani Tan, to the [facility]. Please see a copy of my visit note below.    No past medical history on file.  Patient Active Problem List   Diagnosis     Charcot's joint of left foot     Type 2 diabetes mellitus with diabetic polyneuropathy, without long-term current use of insulin (H)     Diabetic ulcer of right midfoot associated with type 2 diabetes mellitus, with bone involvement without evidence of necrosis (H)     Venous incompetence     Venous stasis ulcer of left calf with fat layer exposed without varicose veins (H)     Skin ulcer of left ankle with fat layer exposed (H)     No past surgical history on file.  Social History     Socioeconomic History     Marital status: Single     Spouse name: Not on file     Number of children: Not on file     Years of education: Not on file     Highest education level: Not on file   Occupational History     Not on file   Social Needs     Financial resource strain: Not on file     Food insecurity:     Worry: Not on file     Inability: Not on file     Transportation needs:     Medical: Not on file     Non-medical: Not on file   Tobacco Use     Smoking status: Current Every Day Smoker     Smokeless tobacco: Never Used   Substance and Sexual Activity     Alcohol use: Not on file     Drug use: Not on file     Sexual activity: Not on file   Lifestyle     Physical activity:     Days per week: Not on file     Minutes per session: Not on file     Stress: Not on file   Relationships     Social connections:     Talks on phone: Not on file     Gets together: Not on file     Attends Denominational service: Not on file     Active member of club or organization: Not on file     Attends meetings of clubs or organizations: Not on file     Relationship status: Not on file     Intimate partner violence:     Fear of current or ex partner: Not on  file     Emotionally abused: Not on file     Physically abused: Not on file     Forced sexual activity: Not on file   Other Topics Concern     Not on file   Social History Narrative     Not on file     No family history on file.    SUBJECTIVE FINDINGS:  A 60-year-old female returns to clinic for ulcer left ankle, Charcot foot-ankle left, ulcer right first MPJ, static peripheral neuropathy, venous stasis disease, lymphedema.  Relates she is doing okay.  She relates she gets a little rubbing on the proximal calf on the left.       OBJECTIVE FINDINGS:  DP and PT are 2/4 bilaterally.  She has decreased edema bilaterally.  She has an ulcer on the left lateral ankle.  It is deep through the subcutaneous tissue.  There is a good granular base, some serosanguineous drainage, no erythema, no odor, no calor, minimal maceration.  She has a plantar right first MPJ ulceration with a granular base.  There is some maceration, no erythema, no odor, no calor, some serosanguineous drainage.      IMPRESSION AND PLAN:  Ulcer, left ankle.  Charcot foot-ankle, left.  Ulcer, right first MPJ.  She is diabetic with peripheral neuropathy and vascular disease and venous edema.  Diagnosis and treatment options discussed with her.  Local wound care done upon consent.  Wound Vashe wet-to-dry dressing applied to the ulcer sites.   Neema applied to the right 1st mpj ulcer.  Total contact cast was applied to the left leg upon consent.  Total contact soft cast applied to the right lower extremity upon consent.  She will return to clinic and see me in one week.     Again, thank you for allowing me to participate in the care of your patient.        Sincerely,        Nolan Whitten DPM

## 2020-01-16 ENCOUNTER — OFFICE VISIT (OUTPATIENT)
Dept: PODIATRY | Facility: CLINIC | Age: 60
End: 2020-01-16
Payer: COMMERCIAL

## 2020-01-16 VITALS — OXYGEN SATURATION: 94 % | SYSTOLIC BLOOD PRESSURE: 146 MMHG | HEART RATE: 96 BPM | DIASTOLIC BLOOD PRESSURE: 83 MMHG

## 2020-01-16 DIAGNOSIS — E11.51 DIABETES MELLITUS WITH PERIPHERAL VASCULAR DISEASE (H): ICD-10-CM

## 2020-01-16 DIAGNOSIS — I87.2 VENOUS INCOMPETENCE: ICD-10-CM

## 2020-01-16 DIAGNOSIS — E11.49 TYPE II OR UNSPECIFIED TYPE DIABETES MELLITUS WITH NEUROLOGICAL MANIFESTATIONS, NOT STATED AS UNCONTROLLED(250.60) (H): ICD-10-CM

## 2020-01-16 DIAGNOSIS — E11.42 TYPE 2 DIABETES MELLITUS WITH DIABETIC POLYNEUROPATHY, WITHOUT LONG-TERM CURRENT USE OF INSULIN (H): ICD-10-CM

## 2020-01-16 DIAGNOSIS — M14.672 CHARCOT'S JOINT OF LEFT FOOT: ICD-10-CM

## 2020-01-16 DIAGNOSIS — L97.416 DIABETIC ULCER OF RIGHT MIDFOOT ASSOCIATED WITH TYPE 2 DIABETES MELLITUS, WITH BONE INVOLVEMENT WITHOUT EVIDENCE OF NECROSIS (H): Primary | ICD-10-CM

## 2020-01-16 DIAGNOSIS — L97.323 SKIN ULCER OF LEFT ANKLE WITH NECROSIS OF MUSCLE (H): ICD-10-CM

## 2020-01-16 DIAGNOSIS — E11.621 DIABETIC ULCER OF RIGHT MIDFOOT ASSOCIATED WITH TYPE 2 DIABETES MELLITUS, WITH BONE INVOLVEMENT WITHOUT EVIDENCE OF NECROSIS (H): Primary | ICD-10-CM

## 2020-01-16 PROCEDURE — 29405 APPL SHORT LEG CAST: CPT | Mod: LT | Performed by: PODIATRIST

## 2020-01-16 PROCEDURE — 29580 STRAPPING UNNA BOOT: CPT | Mod: RT | Performed by: PODIATRIST

## 2020-01-16 RX ORDER — CEPHALEXIN 500 MG/1
500 CAPSULE ORAL 2 TIMES DAILY
Qty: 28 CAPSULE | Refills: 0 | Status: ON HOLD | OUTPATIENT
Start: 2020-01-16 | End: 2020-02-06

## 2020-01-16 NOTE — NURSING NOTE
Tiffani Tan's chief complaint for this visit includes:  Chief Complaint   Patient presents with     RECHECK     cast change     PCP: [facility]    Referring Provider:  No referring provider defined for this encounter.    BP (!) 146/83   Pulse 96   SpO2 94%   Data Unavailable     Do you need any medication refills at today's visit? no

## 2020-01-16 NOTE — PROGRESS NOTES
No past medical history on file.  Patient Active Problem List   Diagnosis     Charcot's joint of left foot     Type 2 diabetes mellitus with diabetic polyneuropathy, without long-term current use of insulin (H)     Diabetic ulcer of right midfoot associated with type 2 diabetes mellitus, with bone involvement without evidence of necrosis (H)     Venous incompetence     Venous stasis ulcer of left calf with fat layer exposed without varicose veins (H)     Skin ulcer of left ankle with fat layer exposed (H)     No past surgical history on file.  Social History     Socioeconomic History     Marital status: Single     Spouse name: Not on file     Number of children: Not on file     Years of education: Not on file     Highest education level: Not on file   Occupational History     Not on file   Social Needs     Financial resource strain: Not on file     Food insecurity:     Worry: Not on file     Inability: Not on file     Transportation needs:     Medical: Not on file     Non-medical: Not on file   Tobacco Use     Smoking status: Current Every Day Smoker     Smokeless tobacco: Never Used   Substance and Sexual Activity     Alcohol use: Not on file     Drug use: Not on file     Sexual activity: Not on file   Lifestyle     Physical activity:     Days per week: Not on file     Minutes per session: Not on file     Stress: Not on file   Relationships     Social connections:     Talks on phone: Not on file     Gets together: Not on file     Attends Bahai service: Not on file     Active member of club or organization: Not on file     Attends meetings of clubs or organizations: Not on file     Relationship status: Not on file     Intimate partner violence:     Fear of current or ex partner: Not on file     Emotionally abused: Not on file     Physically abused: Not on file     Forced sexual activity: Not on file   Other Topics Concern     Not on file   Social History Narrative     Not on file     No family history on  file.        SUBJECTIVE FINDINGS:  60-year-old female returns to clinic for ulcer, left ankle and right first MPJ.  She relates it is doing okay.  She relates it does feel good to be able to put weight on her calf and get in the shower and that has helped.  She is not sure how the ulcer is doing.  No problems with the cast or the Unna boot on the right.      OBJECTIVE FINDINGS:  DP and PT are 2/4 bilaterally.  She has decreased edema bilaterally.  She has a right plantar first MPJ ulcer that is about 3.2 x 1 cm at its widest margins.  There is hyperkeratotic tissue buildup with some maceration.  There is a good granular base.  There is some edema, minimal erythema, no odor, no calor.  Some serosanguineous drainage.  She has a left lateral ankle ulcer with an increased granular base.  This tracks deep through the subcutaneous tissues.  There is some edema, no gross erythema, no odor, no calor.  Some serosanguineous drainage.      ASSESSMENT/PLAN:  Ulcer, left ankle.  Charcot foot, left foot and ankle.  Ulcer, right first MPJ plantarly.  She is diabetic with peripheral neuropathy and vascular disease.  Diagnosis and treatment options discussed with her.  Local wound care done upon consent today.  Neema applied to the ulcer sites.  Hydrofera Blue applied to the ulcer sites.  A multilayer Unna boot and total contact soft cast applied to the right lower extremity.  Total contact cast applied to the left lower extremity, both upon consent, and use discussed with her.  Some improvement seen.  She will follow up either with me or Dr. Giron next week.

## 2020-01-16 NOTE — PATIENT INSTRUCTIONS
Thanks for coming today.  Ortho/Sports Medicine Clinic  [address] [place] [address]    To schedule future appointments in Ortho Clinic, you may call [phone].    To schedule ordered imaging by your provider:   Call Central Imaging [phone]    To schedule an injection ordered by your provider:  Call Central Imaging Injection scheduling line: [phone]  [place] Depollutionhart available online at:  [email or web address]    Please call if any further questions or concerns ([phone]).  Clinic hours 8 am to 5 pm.    Return to clinic (call) if symptoms worsen or fail to improve.

## 2020-01-16 NOTE — LETTER
1/16/2020         RE: Tiffani Tan  [address] [facility] [address]        Dear Colleague,    Thank you for referring your patient, Tiffani Tan, to the [facility]. Please see a copy of my visit note below.    No past medical history on file.  Patient Active Problem List   Diagnosis     Charcot's joint of left foot     Type 2 diabetes mellitus with diabetic polyneuropathy, without long-term current use of insulin (H)     Diabetic ulcer of right midfoot associated with type 2 diabetes mellitus, with bone involvement without evidence of necrosis (H)     Venous incompetence     Venous stasis ulcer of left calf with fat layer exposed without varicose veins (H)     Skin ulcer of left ankle with fat layer exposed (H)     No past surgical history on file.  Social History     Socioeconomic History     Marital status: Single     Spouse name: Not on file     Number of children: Not on file     Years of education: Not on file     Highest education level: Not on file   Occupational History     Not on file   Social Needs     Financial resource strain: Not on file     Food insecurity:     Worry: Not on file     Inability: Not on file     Transportation needs:     Medical: Not on file     Non-medical: Not on file   Tobacco Use     Smoking status: Current Every Day Smoker     Smokeless tobacco: Never Used   Substance and Sexual Activity     Alcohol use: Not on file     Drug use: Not on file     Sexual activity: Not on file   Lifestyle     Physical activity:     Days per week: Not on file     Minutes per session: Not on file     Stress: Not on file   Relationships     Social connections:     Talks on phone: Not on file     Gets together: Not on file     Attends Faith service: Not on file     Active member of club or organization: Not on file     Attends meetings of clubs or organizations: Not on file     Relationship status: Not on file     Intimate partner violence:     Fear of current or ex partner: Not on  file     Emotionally abused: Not on file     Physically abused: Not on file     Forced sexual activity: Not on file   Other Topics Concern     Not on file   Social History Narrative     Not on file     No family history on file.        SUBJECTIVE FINDINGS:  60-year-old female returns to clinic for ulcer, left ankle and right first MPJ.  She relates it is doing okay.  She relates it does feel good to be able to put weight on her calf and get in the shower and that has helped.  She is not sure how the ulcer is doing.  No problems with the cast or the Unna boot on the right.      OBJECTIVE FINDINGS:  DP and PT are 2/4 bilaterally.  She has decreased edema bilaterally.  She has a right plantar first MPJ ulcer that is about 3.2 x 1 cm at its widest margins.  There is hyperkeratotic tissue buildup with some maceration.  There is a good granular base.  There is some edema, minimal erythema, no odor, no calor.  Some serosanguineous drainage.  She has a left lateral ankle ulcer with an increased granular base.  This tracks deep through the subcutaneous tissues.  There is some edema, no gross erythema, no odor, no calor.  Some serosanguineous drainage.      ASSESSMENT/PLAN:  Ulcer, left ankle.  Charcot foot, left foot and ankle.  Ulcer, right first MPJ plantarly.  She is diabetic with peripheral neuropathy and vascular disease.  Diagnosis and treatment options discussed with her.  Local wound care done upon consent today.  Neema applied to the ulcer sites.  Hydrofera Blue applied to the ulcer sites.  A multilayer Unna boot and total contact soft cast applied to the right lower extremity.  Total contact cast applied to the left lower extremity, both upon consent, and use discussed with her.  Some improvement seen.  She will follow up either with me or Dr. Giron next week.           Again, thank you for allowing me to participate in the care of your patient.        Sincerely,        Nolan Whitten DPM

## 2020-01-22 ENCOUNTER — OFFICE VISIT (OUTPATIENT)
Dept: PODIATRY | Facility: CLINIC | Age: 60
End: 2020-01-22
Payer: COMMERCIAL

## 2020-01-22 VITALS — DIASTOLIC BLOOD PRESSURE: 85 MMHG | HEART RATE: 79 BPM | SYSTOLIC BLOOD PRESSURE: 152 MMHG | OXYGEN SATURATION: 95 %

## 2020-01-22 DIAGNOSIS — L97.323 SKIN ULCER OF LEFT ANKLE WITH NECROSIS OF MUSCLE (H): ICD-10-CM

## 2020-01-22 DIAGNOSIS — M14.672 CHARCOT'S JOINT OF LEFT FOOT: ICD-10-CM

## 2020-01-22 DIAGNOSIS — E11.621 DIABETIC ULCER OF RIGHT MIDFOOT ASSOCIATED WITH TYPE 2 DIABETES MELLITUS, WITH BONE INVOLVEMENT WITHOUT EVIDENCE OF NECROSIS (H): Primary | ICD-10-CM

## 2020-01-22 DIAGNOSIS — L97.416 DIABETIC ULCER OF RIGHT MIDFOOT ASSOCIATED WITH TYPE 2 DIABETES MELLITUS, WITH BONE INVOLVEMENT WITHOUT EVIDENCE OF NECROSIS (H): Primary | ICD-10-CM

## 2020-01-22 PROCEDURE — 99213 OFFICE O/P EST LOW 20 MIN: CPT | Mod: 25 | Performed by: PODIATRIST

## 2020-01-22 PROCEDURE — 11042 DBRDMT SUBQ TIS 1ST 20SQCM/<: CPT | Performed by: PODIATRIST

## 2020-01-22 NOTE — LETTER
2020         RE: Tiffani Tan  [address] [facility] [address]        Dear Colleague,    Thank you for referring your patient, Tiffani Tan, to the [facility]. Please see a copy of my visit note below.    Chief Complaint   Patient presents with     RECHECK     pressure ulcer right  plantar 1st met head -  / left charcot foot and left ankle ulcer          No Known Allergies      Subjective: Tiffani is a 60 year old female who presents to the clinic today for a follow up of BL foot and leg ulcerations. She was unna's boooted BL with casting on the left foot last week. She tolerated these well. She did weight bear on the left leg.    Objective            A right plantar 1st met head wound is noted measuring 1.5cm x 3cm x 0.2cm.  Inman Classification: 2     Wound base: Red/Granulation     Edges: macerated tissue     Drainage: scant/serous     Odor: no     Undermining: no     Bone Exposure: No     Clinical Signs of Infection: Yes - cellulitis of the hallux        After obtaining patient consent, the wound was irrigated with copious amounts of saline. A curette was then used to debride the wound into subcutaneous tissue. The wound edges were debrided back to healthy, bleeding tissue. The wound base exhibited healthy bleeding. Given the patient's lack of sensation, no anesthesia was necessary for the procedure.     ____________________________________  Wound #2                        A Charcot wound is noted at left  lateral malleolus measuring 2.2cm x 2.6cm x 2.6cm.     Inman Classification: 3     Wound base: Red/hypergranulation     Edges: epibole     Drainage: copious/serous     Odor: no     Underminin O'Clock     Bone Exposure: Yes: lateral malleolus     Clinical Signs of Infection: No     After obtaining patient consent, the wound was irrigated with copious amounts of saline. No sharp debridement performed on this ulceration.   Legs are warm with venous stasis dermatitis changes noted BL.  No s/s of infection noted.            Assessment: Healing right foot ulceration  DM2 with neuropathy.   Left Charcot foot and ankle with probing ulceration to the lateral malleolus. This does not appear infected today. She has seriously thought about having the BKA over the last few days.   Venous stasis dermatitis.     Plan:   - Pt seen and evaluated  - Right foot wound debrided as described.  - BL Unna's booting applied. Posterior splint applied to the left leg. She will likely need left BKA.   - If worsening over the next week, she should present to the ED.  - Pt to return to clinic in 1 week.           Again, thank you for allowing me to participate in the care of your patient.        Sincerely,        Daniel Giron DPM

## 2020-01-22 NOTE — NURSING NOTE
Tiffani Tan's chief complaint for this visit includes:  Chief Complaint   Patient presents with     RECHECK     pressure ulcer right  plantar 1st met head -  / left charcot foot and left ankle ulcer     PCP: [facility]    Referring Provider:  Wily Bonilla MD  [facility]  [id number] [place] [address]    BP (!) 152/85 (BP Location: Left arm, Patient Position: Sitting, Cuff Size: Adult Regular)   Pulse 79   SpO2 95%   Data Unavailable     Do you need any medication refills at today's visit? No    Christina Oshea, [facility]

## 2020-01-29 ENCOUNTER — OFFICE VISIT (OUTPATIENT)
Dept: PODIATRY | Facility: CLINIC | Age: 60
End: 2020-01-29
Payer: COMMERCIAL

## 2020-01-29 ENCOUNTER — TELEPHONE (OUTPATIENT)
Dept: PODIATRY | Facility: CLINIC | Age: 60
End: 2020-01-29

## 2020-01-29 VITALS — DIASTOLIC BLOOD PRESSURE: 80 MMHG | HEART RATE: 84 BPM | SYSTOLIC BLOOD PRESSURE: 149 MMHG | OXYGEN SATURATION: 92 %

## 2020-01-29 DIAGNOSIS — L97.323 SKIN ULCER OF LEFT ANKLE WITH NECROSIS OF MUSCLE (H): ICD-10-CM

## 2020-01-29 DIAGNOSIS — E11.51 DIABETES MELLITUS WITH PERIPHERAL VASCULAR DISEASE (H): ICD-10-CM

## 2020-01-29 DIAGNOSIS — E11.49 TYPE II OR UNSPECIFIED TYPE DIABETES MELLITUS WITH NEUROLOGICAL MANIFESTATIONS, NOT STATED AS UNCONTROLLED(250.60) (H): ICD-10-CM

## 2020-01-29 DIAGNOSIS — M14.672 CHARCOT'S JOINT OF LEFT FOOT: ICD-10-CM

## 2020-01-29 DIAGNOSIS — L97.416 DIABETIC ULCER OF RIGHT MIDFOOT ASSOCIATED WITH TYPE 2 DIABETES MELLITUS, WITH BONE INVOLVEMENT WITHOUT EVIDENCE OF NECROSIS (H): Primary | ICD-10-CM

## 2020-01-29 DIAGNOSIS — E11.621 DIABETIC ULCER OF RIGHT MIDFOOT ASSOCIATED WITH TYPE 2 DIABETES MELLITUS, WITH BONE INVOLVEMENT WITHOUT EVIDENCE OF NECROSIS (H): Primary | ICD-10-CM

## 2020-01-29 DIAGNOSIS — B35.1 ONYCHOMYCOSIS: ICD-10-CM

## 2020-01-29 PROCEDURE — 29405 APPL SHORT LEG CAST: CPT | Mod: LT | Performed by: PODIATRIST

## 2020-01-29 PROCEDURE — 29580 STRAPPING UNNA BOOT: CPT | Mod: RT | Performed by: PODIATRIST

## 2020-01-29 NOTE — TELEPHONE ENCOUNTER
Financial Counselor Review for Apligraf, Dermagraft, Puraply AM, Affinity, NuShield:    Which product(s) to be checked: Apligraf, Dermagraft, Puraply AM Affinity, NuShield    Has the patient tried any of these products before: YES    Was it for this wound: YES    Diagnosis code (include ICD-10 code): E11.621; L97.416; M14.672; L97.323; 250.60; E11.49; E11.51; B35.1    Coverage and patient financial responsibility information:YES    Does patient need to be contacted by Financial Counselor:YES    Note: Do not use abbreviations and route encounter to [facility] [place] [[id number]]

## 2020-01-29 NOTE — PATIENT INSTRUCTIONS
Thanks for coming today.  Ortho/Sports Medicine Clinic  [address] [place] [address]    To schedule future appointments in Ortho Clinic, you may call [phone].    To schedule ordered imaging by your provider:   Call Central Imaging [phone]    To schedule an injection ordered by your provider:  Call Central Imaging Injection scheduling line: [phone]  Anagranhart available online at:  [email or web address]    Please call if any further questions or concerns ([phone]).  Clinic hours 8 am to 5 pm.    Return to clinic (call) if symptoms worsen or fail to improve.

## 2020-01-29 NOTE — PROGRESS NOTES
No past medical history on file.  Patient Active Problem List   Diagnosis     Charcot's joint of left foot     Type 2 diabetes mellitus with diabetic polyneuropathy, without long-term current use of insulin (H)     Diabetic ulcer of right midfoot associated with type 2 diabetes mellitus, with bone involvement without evidence of necrosis (H)     Venous incompetence     Venous stasis ulcer of left calf with fat layer exposed without varicose veins (H)     Skin ulcer of left ankle with fat layer exposed (H)     No past surgical history on file.  Social History     Socioeconomic History     Marital status: Single     Spouse name: Not on file     Number of children: Not on file     Years of education: Not on file     Highest education level: Not on file   Occupational History     Not on file   Social Needs     Financial resource strain: Not on file     Food insecurity:     Worry: Not on file     Inability: Not on file     Transportation needs:     Medical: Not on file     Non-medical: Not on file   Tobacco Use     Smoking status: Current Every Day Smoker     Smokeless tobacco: Never Used   Substance and Sexual Activity     Alcohol use: Not on file     Drug use: Not on file     Sexual activity: Not on file   Lifestyle     Physical activity:     Days per week: Not on file     Minutes per session: Not on file     Stress: Not on file   Relationships     Social connections:     Talks on phone: Not on file     Gets together: Not on file     Attends Buddhism service: Not on file     Active member of club or organization: Not on file     Attends meetings of clubs or organizations: Not on file     Relationship status: Not on file     Intimate partner violence:     Fear of current or ex partner: Not on file     Emotionally abused: Not on file     Physically abused: Not on file     Forced sexual activity: Not on file   Other Topics Concern     Not on file   Social History Narrative     Not on file     No family history on  file.  SUBJECTIVE FINDINGS:  60-year-old female returns to clinic for ulcer left ankle, Charcot foot left and ankle and ulcer right first MPJ.  She relates she needs her toenails cut.  She cannot really cut them herself.  Relates she is doing okay.  She saw Dr. Giron last week.  Reviewed those notes.      OBJECTIVE FINDINGS:  DP and PT are 2/4 bilaterally.  Still some peripheral edema bilaterally.  Overall, this is decreased.  Left lateral ankle ulcer is relatively unchanged.  It is deep through the subcutaneous tissue.  There is some serosanguineous drainage.  No erythema, no odor, no calor.  She has a right plantar first MPJ ulcer that is deep through the subcutaneous tissue with some serosanguineous drainage, no erythema, no odor, no calor.  She has dystrophic, thick and brittle nails with subungual debris and dystrophy bilaterally to differing degrees 1-5.        ASSESSMENT/PLAN:  Ulcer, left ankle.  Charcot foot and ankle, left.  Ulcer, right first MPJ.  She is diabetic with peripheral neuropathy and vascular disease, onychauxis and onychomycosis.  Diagnosis and treatment options discussed with her.  Local wound care done upon consent today.  Endoform applied to the ulcer sites with Wound Vashe wet-to-dry dressings.  A multilayer Unna boot and compression boot and total soft contact cast applied to the right lower extremity upon consent.  Total contact cast applied to left lower extremity upon consent.  She relates the cast felt better than the posterior splint because it seemed to be more solid.  I am going to have her hang on to the posterior splint as we may go back to that.  It is still in good shape.  We will recheck Apligraf, Dermagraft and PuraPly coverage.  She will return to clinic and see me in 1 week.     Toenails were debrided or reduced upon consent.  The right fourth toenail bled a bit upon debridement.  Local wound care done upon consent.  Endoform and Band-Aid applied and use discussed with  .

## 2020-01-29 NOTE — LETTER
1/29/2020         RE: Tiffani Tan  [address] [facility] [address]        Dear Colleague,    Thank you for referring your patient, Tiffani Tan, to the [facility]. Please see a copy of my visit note below.    No past medical history on file.  Patient Active Problem List   Diagnosis     Charcot's joint of left foot     Type 2 diabetes mellitus with diabetic polyneuropathy, without long-term current use of insulin (H)     Diabetic ulcer of right midfoot associated with type 2 diabetes mellitus, with bone involvement without evidence of necrosis (H)     Venous incompetence     Venous stasis ulcer of left calf with fat layer exposed without varicose veins (H)     Skin ulcer of left ankle with fat layer exposed (H)     No past surgical history on file.  Social History     Socioeconomic History     Marital status: Single     Spouse name: Not on file     Number of children: Not on file     Years of education: Not on file     Highest education level: Not on file   Occupational History     Not on file   Social Needs     Financial resource strain: Not on file     Food insecurity:     Worry: Not on file     Inability: Not on file     Transportation needs:     Medical: Not on file     Non-medical: Not on file   Tobacco Use     Smoking status: Current Every Day Smoker     Smokeless tobacco: Never Used   Substance and Sexual Activity     Alcohol use: Not on file     Drug use: Not on file     Sexual activity: Not on file   Lifestyle     Physical activity:     Days per week: Not on file     Minutes per session: Not on file     Stress: Not on file   Relationships     Social connections:     Talks on phone: Not on file     Gets together: Not on file     Attends Yarsanism service: Not on file     Active member of club or organization: Not on file     Attends meetings of clubs or organizations: Not on file     Relationship status: Not on file     Intimate partner violence:     Fear of current or ex partner: Not on  file     Emotionally abused: Not on file     Physically abused: Not on file     Forced sexual activity: Not on file   Other Topics Concern     Not on file   Social History Narrative     Not on file     No family history on file.  SUBJECTIVE FINDINGS:  60-year-old female returns to clinic for ulcer left ankle, Charcot foot left and ankle and ulcer right first MPJ.  She relates she needs her toenails cut.  She cannot really cut them herself.  Relates she is doing okay.  She saw Dr. Giorn last week.  Reviewed those notes.      OBJECTIVE FINDINGS:  DP and PT are 2/4 bilaterally.  Still some peripheral edema bilaterally.  Overall, this is decreased.  Left lateral ankle ulcer is relatively unchanged.  It is deep through the subcutaneous tissue.  There is some serosanguineous drainage.  No erythema, no odor, no calor.  She has a right plantar first MPJ ulcer that is deep through the subcutaneous tissue with some serosanguineous drainage, no erythema, no odor, no calor.  She has dystrophic, thick and brittle nails with subungual debris and dystrophy bilaterally to differing degrees 1-5.        ASSESSMENT/PLAN:  Ulcer, left ankle.  Charcot foot and ankle, left.  Ulcer, right first MPJ.  She is diabetic with peripheral neuropathy and vascular disease, onychauxis and onychomycosis.  Diagnosis and treatment options discussed with her.  Local wound care done upon consent today.  Endoform applied to the ulcer sites with Wound Vashe wet-to-dry dressings.  A multilayer Unna boot and compression boot and total soft contact cast applied to the right lower extremity upon consent.  Total contact cast applied to left lower extremity upon consent.  She relates the cast felt better than the posterior splint because it seemed to be more solid.  I am going to have her hang on to the posterior splint as we may go back to that.  It is still in good shape.  We will recheck Apligraf, Dermagraft and PuraPly coverage.  She will return to  clinic and see me in 1 week.     Toenails were debrided or reduced upon consent.  The right fourth toenail bled a bit upon debridement.  Local wound care done upon consent.  Endoform and Band-Aid applied and use discussed with her.           Again, thank you for allowing me to participate in the care of your patient.        Sincerely,        Nolan Whitten DPM

## 2020-01-29 NOTE — NURSING NOTE
Tiffani Tan's chief complaint for this visit includes:  Chief Complaint   Patient presents with     RECHECK     right foot check      PCP: [facility]    Referring Provider:  No referring provider defined for this encounter.    BP (!) 149/80   Pulse 84   SpO2 92%   Data Unavailable     Do you need any medication refills at today's visit? no

## 2020-01-30 NOTE — TELEPHONE ENCOUNTER
Benefit investigation form and ddemographics facesheet faxed to [facility] for review.

## 2020-02-03 ENCOUNTER — TRANSFERRED RECORDS (OUTPATIENT)
Dept: HEALTH INFORMATION MANAGEMENT | Facility: CLINIC | Age: 60
End: 2020-02-03

## 2020-02-03 LAB
ALT SERPL-CCNC: 21 IU/L (ref 8–45)
AST SERPL-CCNC: 33 IU/L (ref 2–40)
CREAT SERPL-MCNC: 1.35 MG/DL (ref 0.57–1.11)
GFR SERPL CREATININE-BSD FRML MDRD: 40 ML/MIN/1.73M2
GLUCOSE SERPL-MCNC: 229 MG/DL (ref 65–100)
POTASSIUM SERPL-SCNC: 6.5 MMOL/L (ref 3.5–5)

## 2020-02-03 NOTE — TELEPHONE ENCOUNTER
Benefits came back from [facility], Apligraf, Dermagraft under this policy doesn't require a prior authorization. This patient has a $2200 deductible of which $55 has been applied. Once met will be covered at the contracted amount. Call ref. [place] [id number] 1/31/2020 1:20pm    At this time PuraPly/AM and NuShield is not covered for this patient and will not be covered under this patients plan. Call ref. # [place] [id number] 1/31/2020 1:20pm

## 2020-02-04 ENCOUNTER — APPOINTMENT (OUTPATIENT)
Dept: GENERAL RADIOLOGY | Facility: CLINIC | Age: 60
End: 2020-02-04
Attending: PHYSICIAN ASSISTANT
Payer: COMMERCIAL

## 2020-02-04 ENCOUNTER — APPOINTMENT (OUTPATIENT)
Dept: CARDIOLOGY | Facility: CLINIC | Age: 60
End: 2020-02-04
Attending: SURGERY
Payer: COMMERCIAL

## 2020-02-04 ENCOUNTER — HOSPITAL ENCOUNTER (INPATIENT)
Facility: CLINIC | Age: 60
LOS: 3 days | Discharge: HOME | End: 2020-02-07
Attending: SURGERY | Admitting: INTERNAL MEDICINE
Payer: COMMERCIAL

## 2020-02-04 ENCOUNTER — APPOINTMENT (OUTPATIENT)
Dept: ULTRASOUND IMAGING | Facility: CLINIC | Age: 60
End: 2020-02-04
Attending: SURGERY
Payer: COMMERCIAL

## 2020-02-04 ENCOUNTER — APPOINTMENT (OUTPATIENT)
Dept: GENERAL RADIOLOGY | Facility: CLINIC | Age: 60
End: 2020-02-04
Attending: SURGERY
Payer: COMMERCIAL

## 2020-02-04 DIAGNOSIS — K25.4 GASTROINTESTINAL HEMORRHAGE ASSOCIATED WITH GASTRIC ULCER: Primary | ICD-10-CM

## 2020-02-04 DIAGNOSIS — K70.30 ALCOHOLIC CIRRHOSIS OF LIVER WITHOUT ASCITES (H): ICD-10-CM

## 2020-02-04 PROBLEM — K92.2 UGIB (UPPER GASTROINTESTINAL BLEED): Status: ACTIVE | Noted: 2020-02-04

## 2020-02-04 PROBLEM — K92.2 GI BLEED: Status: ACTIVE | Noted: 2020-02-04

## 2020-02-04 LAB
ALBUMIN SERPL-MCNC: 2.3 G/DL (ref 3.4–5)
ALBUMIN UR-MCNC: NEGATIVE MG/DL
ALP SERPL-CCNC: 86 U/L (ref 40–150)
ALT SERPL W P-5'-P-CCNC: 21 U/L (ref 0–50)
ANION GAP SERPL CALCULATED.3IONS-SCNC: 1 MMOL/L (ref 3–14)
ANION GAP SERPL CALCULATED.3IONS-SCNC: 5 MMOL/L (ref 3–14)
ANION GAP SERPL CALCULATED.3IONS-SCNC: 5 MMOL/L (ref 3–14)
ANION GAP SERPL CALCULATED.3IONS-SCNC: 7 MMOL/L (ref 3–14)
APPEARANCE UR: CLEAR
AST SERPL W P-5'-P-CCNC: 32 U/L (ref 0–45)
BILIRUB SERPL-MCNC: 0.5 MG/DL (ref 0.2–1.3)
BILIRUB UR QL STRIP: NEGATIVE
BLD PROD TYP BPU: NORMAL
BLD UNIT ID BPU: 0
BLOOD PRODUCT CODE: NORMAL
BPU ID: NORMAL
BUN SERPL-MCNC: 90 MG/DL (ref 7–30)
BUN SERPL-MCNC: 93 MG/DL (ref 7–30)
BUN SERPL-MCNC: 99 MG/DL (ref 7–30)
BUN SERPL-MCNC: 99 MG/DL (ref 7–30)
CA-I BLD-MCNC: 4.6 MG/DL (ref 4.4–5.2)
CALCIUM SERPL-MCNC: 7.9 MG/DL (ref 8.5–10.1)
CALCIUM SERPL-MCNC: 8 MG/DL (ref 8.5–10.1)
CALCIUM SERPL-MCNC: 8.1 MG/DL (ref 8.5–10.1)
CALCIUM SERPL-MCNC: 8.3 MG/DL (ref 8.5–10.1)
CHLORIDE SERPL-SCNC: 108 MMOL/L (ref 94–109)
CHLORIDE SERPL-SCNC: 110 MMOL/L (ref 94–109)
CHLORIDE SERPL-SCNC: 110 MMOL/L (ref 94–109)
CHLORIDE SERPL-SCNC: 112 MMOL/L (ref 94–109)
CO2 SERPL-SCNC: 24 MMOL/L (ref 20–32)
CO2 SERPL-SCNC: 25 MMOL/L (ref 20–32)
CO2 SERPL-SCNC: 26 MMOL/L (ref 20–32)
CO2 SERPL-SCNC: 29 MMOL/L (ref 20–32)
COLOR UR AUTO: YELLOW
CREAT SERPL-MCNC: 1.25 MG/DL (ref 0.52–1.04)
CREAT SERPL-MCNC: 1.29 MG/DL (ref 0.52–1.04)
CREAT SERPL-MCNC: 1.53 MG/DL (ref 0.52–1.04)
CREAT SERPL-MCNC: 1.53 MG/DL (ref 0.52–1.04)
CRP SERPL-MCNC: 19 MG/L (ref 0–8)
ERYTHROCYTE [DISTWIDTH] IN BLOOD BY AUTOMATED COUNT: 16.4 % (ref 10–15)
ERYTHROCYTE [DISTWIDTH] IN BLOOD BY AUTOMATED COUNT: 16.6 % (ref 10–15)
ERYTHROCYTE [DISTWIDTH] IN BLOOD BY AUTOMATED COUNT: 16.7 % (ref 10–15)
ERYTHROCYTE [DISTWIDTH] IN BLOOD BY AUTOMATED COUNT: 16.9 % (ref 10–15)
ERYTHROCYTE [DISTWIDTH] IN BLOOD BY AUTOMATED COUNT: NORMAL % (ref 10–15)
ERYTHROCYTE [SEDIMENTATION RATE] IN BLOOD BY WESTERGREN METHOD: 108 MM/H (ref 0–30)
GFR SERPL CREATININE-BSD FRML MDRD: 36 ML/MIN/{1.73_M2}
GFR SERPL CREATININE-BSD FRML MDRD: 36 ML/MIN/{1.73_M2}
GFR SERPL CREATININE-BSD FRML MDRD: 45 ML/MIN/{1.73_M2}
GFR SERPL CREATININE-BSD FRML MDRD: 46 ML/MIN/{1.73_M2}
GLUCOSE BLDC GLUCOMTR-MCNC: 162 MG/DL (ref 70–99)
GLUCOSE BLDC GLUCOMTR-MCNC: 165 MG/DL (ref 70–99)
GLUCOSE BLDC GLUCOMTR-MCNC: 175 MG/DL (ref 70–99)
GLUCOSE BLDC GLUCOMTR-MCNC: 200 MG/DL (ref 70–99)
GLUCOSE BLDC GLUCOMTR-MCNC: 220 MG/DL (ref 70–99)
GLUCOSE BLDC GLUCOMTR-MCNC: 232 MG/DL (ref 70–99)
GLUCOSE SERPL-MCNC: 160 MG/DL (ref 70–99)
GLUCOSE SERPL-MCNC: 172 MG/DL (ref 70–99)
GLUCOSE SERPL-MCNC: 199 MG/DL (ref 70–99)
GLUCOSE SERPL-MCNC: 239 MG/DL (ref 70–99)
GLUCOSE UR STRIP-MCNC: NEGATIVE MG/DL
GRAM STN SPEC: ABNORMAL
GRAM STN SPEC: ABNORMAL
HBA1C MFR BLD: 6.5 % (ref 0–5.6)
HCO3 BLDV-SCNC: 23 MMOL/L (ref 21–28)
HCT VFR BLD AUTO: 19.5 % (ref 35–47)
HCT VFR BLD AUTO: 22.8 % (ref 35–47)
HCT VFR BLD AUTO: 23.3 % (ref 35–47)
HCT VFR BLD AUTO: 24.1 % (ref 35–47)
HCT VFR BLD AUTO: NORMAL % (ref 35–47)
HGB BLD-MCNC: 6 G/DL (ref 11.7–15.7)
HGB BLD-MCNC: 7.1 G/DL (ref 11.7–15.7)
HGB BLD-MCNC: 7.2 G/DL (ref 11.7–15.7)
HGB BLD-MCNC: 7.4 G/DL (ref 11.7–15.7)
HGB BLD-MCNC: NORMAL G/DL (ref 11.7–15.7)
HGB UR QL STRIP: NEGATIVE
INR PPP: 1.45 (ref 0.86–1.14)
KETONES UR STRIP-MCNC: NEGATIVE MG/DL
LACTATE BLD-SCNC: 1.2 MMOL/L (ref 0.7–2)
LACTATE BLD-SCNC: 1.4 MMOL/L (ref 0.7–2)
LACTATE BLD-SCNC: 1.6 MMOL/L (ref 0.7–2)
LEUKOCYTE ESTERASE UR QL STRIP: NEGATIVE
Lab: ABNORMAL
MCH RBC QN AUTO: 31.6 PG (ref 26.5–33)
MCH RBC QN AUTO: 31.7 PG (ref 26.5–33)
MCH RBC QN AUTO: 31.8 PG (ref 26.5–33)
MCH RBC QN AUTO: 31.9 PG (ref 26.5–33)
MCH RBC QN AUTO: NORMAL PG (ref 26.5–33)
MCHC RBC AUTO-ENTMCNC: 30.7 G/DL (ref 31.5–36.5)
MCHC RBC AUTO-ENTMCNC: 30.8 G/DL (ref 31.5–36.5)
MCHC RBC AUTO-ENTMCNC: 30.9 G/DL (ref 31.5–36.5)
MCHC RBC AUTO-ENTMCNC: 31.1 G/DL (ref 31.5–36.5)
MCHC RBC AUTO-ENTMCNC: NORMAL G/DL (ref 31.5–36.5)
MCV RBC AUTO: 101 FL (ref 78–100)
MCV RBC AUTO: 103 FL (ref 78–100)
MCV RBC AUTO: 103 FL (ref 78–100)
MCV RBC AUTO: 104 FL (ref 78–100)
MCV RBC AUTO: NORMAL FL (ref 78–100)
MRSA DNA SPEC QL NAA+PROBE: NEGATIVE
NITRATE UR QL: NEGATIVE
O2/TOTAL GAS SETTING VFR VENT: ABNORMAL %
OXYHGB MFR BLDV: 95 %
PCO2 BLDV: 31 MM HG (ref 40–50)
PH BLDV: 7.49 PH (ref 7.32–7.43)
PH UR STRIP: 5.5 PH (ref 5–7)
PHOSPHATE SERPL-MCNC: 3.6 MG/DL (ref 2.5–4.5)
PLATELET # BLD AUTO: 133 10E9/L (ref 150–450)
PLATELET # BLD AUTO: 153 10E9/L (ref 150–450)
PLATELET # BLD AUTO: 160 10E9/L (ref 150–450)
PLATELET # BLD AUTO: 81 10E9/L (ref 150–450)
PLATELET # BLD AUTO: NORMAL 10E9/L (ref 150–450)
PO2 BLDV: 73 MM HG (ref 25–47)
POTASSIUM SERPL-SCNC: 4.6 MMOL/L (ref 3.4–5.3)
POTASSIUM SERPL-SCNC: 4.8 MMOL/L (ref 3.4–5.3)
POTASSIUM SERPL-SCNC: 5 MMOL/L (ref 3.4–5.3)
POTASSIUM SERPL-SCNC: 5.1 MMOL/L (ref 3.4–5.3)
PROCALCITONIN SERPL-MCNC: 1.75 NG/ML
PROT SERPL-MCNC: 6.4 G/DL (ref 6.8–8.8)
RBC # BLD AUTO: 1.88 10E12/L (ref 3.8–5.2)
RBC # BLD AUTO: 2.25 10E12/L (ref 3.8–5.2)
RBC # BLD AUTO: 2.27 10E12/L (ref 3.8–5.2)
RBC # BLD AUTO: 2.33 10E12/L (ref 3.8–5.2)
RBC # BLD AUTO: NORMAL 10E12/L (ref 3.8–5.2)
RBC #/AREA URNS AUTO: <1 /HPF (ref 0–2)
SODIUM SERPL-SCNC: 138 MMOL/L (ref 133–144)
SODIUM SERPL-SCNC: 141 MMOL/L (ref 133–144)
SODIUM SERPL-SCNC: 142 MMOL/L (ref 133–144)
SODIUM SERPL-SCNC: 142 MMOL/L (ref 133–144)
SOURCE: NORMAL
SP GR UR STRIP: 1.03 (ref 1–1.03)
SPECIMEN SOURCE: ABNORMAL
SPECIMEN SOURCE: NORMAL
SQUAMOUS #/AREA URNS AUTO: 1 /HPF (ref 0–1)
TRANSFUSION STATUS PATIENT QL: NORMAL
TRANSFUSION STATUS PATIENT QL: NORMAL
TROPONIN I SERPL-MCNC: 0.51 UG/L (ref 0–0.04)
TROPONIN I SERPL-MCNC: 0.54 UG/L (ref 0–0.04)
TSH SERPL DL<=0.005 MIU/L-ACNC: 0.7 MU/L (ref 0.4–4)
UROBILINOGEN UR STRIP-MCNC: NORMAL MG/DL (ref 0–2)
WBC # BLD AUTO: 20.1 10E9/L (ref 4–11)
WBC # BLD AUTO: 22.4 10E9/L (ref 4–11)
WBC # BLD AUTO: 22.6 10E9/L (ref 4–11)
WBC # BLD AUTO: 23.9 10E9/L (ref 4–11)
WBC # BLD AUTO: NORMAL 10E9/L (ref 4–11)
WBC #/AREA URNS AUTO: 0 /HPF (ref 0–5)

## 2020-02-04 PROCEDURE — 25000128 H RX IP 250 OP 636: Performed by: INTERNAL MEDICINE

## 2020-02-04 PROCEDURE — 85652 RBC SED RATE AUTOMATED: CPT | Performed by: STUDENT IN AN ORGANIZED HEALTH CARE EDUCATION/TRAINING PROGRAM

## 2020-02-04 PROCEDURE — 86140 C-REACTIVE PROTEIN: CPT | Performed by: STUDENT IN AN ORGANIZED HEALTH CARE EDUCATION/TRAINING PROGRAM

## 2020-02-04 PROCEDURE — 25000132 ZZH RX MED GY IP 250 OP 250 PS 637: Performed by: STUDENT IN AN ORGANIZED HEALTH CARE EDUCATION/TRAINING PROGRAM

## 2020-02-04 PROCEDURE — 93306 TTE W/DOPPLER COMPLETE: CPT | Mod: 26 | Performed by: INTERNAL MEDICINE

## 2020-02-04 PROCEDURE — 87186 SC STD MICRODIL/AGAR DIL: CPT | Performed by: PHYSICIAN ASSISTANT

## 2020-02-04 PROCEDURE — 86850 RBC ANTIBODY SCREEN: CPT | Performed by: STUDENT IN AN ORGANIZED HEALTH CARE EDUCATION/TRAINING PROGRAM

## 2020-02-04 PROCEDURE — 87040 BLOOD CULTURE FOR BACTERIA: CPT | Performed by: STUDENT IN AN ORGANIZED HEALTH CARE EDUCATION/TRAINING PROGRAM

## 2020-02-04 PROCEDURE — 73610 X-RAY EXAM OF ANKLE: CPT | Mod: LT

## 2020-02-04 PROCEDURE — 93005 ELECTROCARDIOGRAM TRACING: CPT

## 2020-02-04 PROCEDURE — 87640 STAPH A DNA AMP PROBE: CPT | Performed by: STUDENT IN AN ORGANIZED HEALTH CARE EDUCATION/TRAINING PROGRAM

## 2020-02-04 PROCEDURE — 83605 ASSAY OF LACTIC ACID: CPT | Performed by: INTERNAL MEDICINE

## 2020-02-04 PROCEDURE — 84100 ASSAY OF PHOSPHORUS: CPT | Performed by: STUDENT IN AN ORGANIZED HEALTH CARE EDUCATION/TRAINING PROGRAM

## 2020-02-04 PROCEDURE — 84132 ASSAY OF SERUM POTASSIUM: CPT | Performed by: STUDENT IN AN ORGANIZED HEALTH CARE EDUCATION/TRAINING PROGRAM

## 2020-02-04 PROCEDURE — 85027 COMPLETE CBC AUTOMATED: CPT | Performed by: STUDENT IN AN ORGANIZED HEALTH CARE EDUCATION/TRAINING PROGRAM

## 2020-02-04 PROCEDURE — 86923 COMPATIBILITY TEST ELECTRIC: CPT | Performed by: STUDENT IN AN ORGANIZED HEALTH CARE EDUCATION/TRAINING PROGRAM

## 2020-02-04 PROCEDURE — 36415 COLL VENOUS BLD VENIPUNCTURE: CPT | Performed by: STUDENT IN AN ORGANIZED HEALTH CARE EDUCATION/TRAINING PROGRAM

## 2020-02-04 PROCEDURE — 86704 HEP B CORE ANTIBODY TOTAL: CPT | Performed by: INTERNAL MEDICINE

## 2020-02-04 PROCEDURE — 25000131 ZZH RX MED GY IP 250 OP 636 PS 637: Performed by: STUDENT IN AN ORGANIZED HEALTH CARE EDUCATION/TRAINING PROGRAM

## 2020-02-04 PROCEDURE — 87070 CULTURE OTHR SPECIMN AEROBIC: CPT | Performed by: PHYSICIAN ASSISTANT

## 2020-02-04 PROCEDURE — 87641 MR-STAPH DNA AMP PROBE: CPT | Performed by: STUDENT IN AN ORGANIZED HEALTH CARE EDUCATION/TRAINING PROGRAM

## 2020-02-04 PROCEDURE — P9016 RBC LEUKOCYTES REDUCED: HCPCS | Performed by: STUDENT IN AN ORGANIZED HEALTH CARE EDUCATION/TRAINING PROGRAM

## 2020-02-04 PROCEDURE — 82330 ASSAY OF CALCIUM: CPT | Performed by: STUDENT IN AN ORGANIZED HEALTH CARE EDUCATION/TRAINING PROGRAM

## 2020-02-04 PROCEDURE — 86900 BLOOD TYPING SEROLOGIC ABO: CPT | Performed by: STUDENT IN AN ORGANIZED HEALTH CARE EDUCATION/TRAINING PROGRAM

## 2020-02-04 PROCEDURE — 36556 INSERT NON-TUNNEL CV CATH: CPT | Mod: GC | Performed by: SURGERY

## 2020-02-04 PROCEDURE — 25000125 ZZHC RX 250: Performed by: STUDENT IN AN ORGANIZED HEALTH CARE EDUCATION/TRAINING PROGRAM

## 2020-02-04 PROCEDURE — 40000141 ZZH STATISTIC PERIPHERAL IV START W/O US GUIDANCE

## 2020-02-04 PROCEDURE — 80048 BASIC METABOLIC PNL TOTAL CA: CPT | Performed by: STUDENT IN AN ORGANIZED HEALTH CARE EDUCATION/TRAINING PROGRAM

## 2020-02-04 PROCEDURE — 25000128 H RX IP 250 OP 636: Performed by: STUDENT IN AN ORGANIZED HEALTH CARE EDUCATION/TRAINING PROGRAM

## 2020-02-04 PROCEDURE — 84145 PROCALCITONIN (PCT): CPT | Performed by: STUDENT IN AN ORGANIZED HEALTH CARE EDUCATION/TRAINING PROGRAM

## 2020-02-04 PROCEDURE — 87077 CULTURE AEROBIC IDENTIFY: CPT | Performed by: PHYSICIAN ASSISTANT

## 2020-02-04 PROCEDURE — 86803 HEPATITIS C AB TEST: CPT | Performed by: INTERNAL MEDICINE

## 2020-02-04 PROCEDURE — 93306 TTE W/DOPPLER COMPLETE: CPT

## 2020-02-04 PROCEDURE — 85610 PROTHROMBIN TIME: CPT | Performed by: STUDENT IN AN ORGANIZED HEALTH CARE EDUCATION/TRAINING PROGRAM

## 2020-02-04 PROCEDURE — 40000986 XR CHEST PORT 1 VW

## 2020-02-04 PROCEDURE — 40000893 ZZH STATISTIC PT IP EVAL DEFER

## 2020-02-04 PROCEDURE — 80053 COMPREHEN METABOLIC PANEL: CPT | Performed by: STUDENT IN AN ORGANIZED HEALTH CARE EDUCATION/TRAINING PROGRAM

## 2020-02-04 PROCEDURE — 82805 BLOOD GASES W/O2 SATURATION: CPT | Performed by: STUDENT IN AN ORGANIZED HEALTH CARE EDUCATION/TRAINING PROGRAM

## 2020-02-04 PROCEDURE — 80048 BASIC METABOLIC PNL TOTAL CA: CPT | Performed by: INTERNAL MEDICINE

## 2020-02-04 PROCEDURE — 25800030 ZZH RX IP 258 OP 636: Performed by: STUDENT IN AN ORGANIZED HEALTH CARE EDUCATION/TRAINING PROGRAM

## 2020-02-04 PROCEDURE — 93010 ELECTROCARDIOGRAM REPORT: CPT | Mod: 76 | Performed by: INTERNAL MEDICINE

## 2020-02-04 PROCEDURE — 00000146 ZZHCL STATISTIC GLUCOSE BY METER IP

## 2020-02-04 PROCEDURE — 86706 HEP B SURFACE ANTIBODY: CPT | Performed by: INTERNAL MEDICINE

## 2020-02-04 PROCEDURE — 86706 HEP B SURFACE ANTIBODY: CPT | Performed by: STUDENT IN AN ORGANIZED HEALTH CARE EDUCATION/TRAINING PROGRAM

## 2020-02-04 PROCEDURE — 85027 COMPLETE CBC AUTOMATED: CPT | Performed by: SURGERY

## 2020-02-04 PROCEDURE — 85027 COMPLETE CBC AUTOMATED: CPT | Performed by: INTERNAL MEDICINE

## 2020-02-04 PROCEDURE — 86901 BLOOD TYPING SEROLOGIC RH(D): CPT | Performed by: STUDENT IN AN ORGANIZED HEALTH CARE EDUCATION/TRAINING PROGRAM

## 2020-02-04 PROCEDURE — C9113 INJ PANTOPRAZOLE SODIUM, VIA: HCPCS | Performed by: STUDENT IN AN ORGANIZED HEALTH CARE EDUCATION/TRAINING PROGRAM

## 2020-02-04 PROCEDURE — 81001 URINALYSIS AUTO W/SCOPE: CPT | Performed by: STUDENT IN AN ORGANIZED HEALTH CARE EDUCATION/TRAINING PROGRAM

## 2020-02-04 PROCEDURE — 76700 US EXAM ABDOM COMPLETE: CPT

## 2020-02-04 PROCEDURE — 40000556 ZZH STATISTIC PERIPHERAL IV START W US GUIDANCE

## 2020-02-04 PROCEDURE — 84484 ASSAY OF TROPONIN QUANT: CPT | Performed by: INTERNAL MEDICINE

## 2020-02-04 PROCEDURE — 99223 1ST HOSP IP/OBS HIGH 75: CPT | Mod: GC | Performed by: INTERNAL MEDICINE

## 2020-02-04 PROCEDURE — 87340 HEPATITIS B SURFACE AG IA: CPT | Performed by: INTERNAL MEDICINE

## 2020-02-04 PROCEDURE — 73630 X-RAY EXAM OF FOOT: CPT | Mod: RT

## 2020-02-04 PROCEDURE — 36415 COLL VENOUS BLD VENIPUNCTURE: CPT | Performed by: SURGERY

## 2020-02-04 PROCEDURE — 83036 HEMOGLOBIN GLYCOSYLATED A1C: CPT | Performed by: STUDENT IN AN ORGANIZED HEALTH CARE EDUCATION/TRAINING PROGRAM

## 2020-02-04 PROCEDURE — 83605 ASSAY OF LACTIC ACID: CPT | Performed by: STUDENT IN AN ORGANIZED HEALTH CARE EDUCATION/TRAINING PROGRAM

## 2020-02-04 PROCEDURE — 84443 ASSAY THYROID STIM HORMONE: CPT | Performed by: STUDENT IN AN ORGANIZED HEALTH CARE EDUCATION/TRAINING PROGRAM

## 2020-02-04 PROCEDURE — 20000004 ZZH R&B ICU UMMC

## 2020-02-04 PROCEDURE — 84484 ASSAY OF TROPONIN QUANT: CPT | Performed by: STUDENT IN AN ORGANIZED HEALTH CARE EDUCATION/TRAINING PROGRAM

## 2020-02-04 PROCEDURE — 25800030 ZZH RX IP 258 OP 636: Performed by: INTERNAL MEDICINE

## 2020-02-04 PROCEDURE — 87205 SMEAR GRAM STAIN: CPT | Performed by: PHYSICIAN ASSISTANT

## 2020-02-04 RX ORDER — GABAPENTIN 600 MG/1
600 TABLET ORAL 2 TIMES DAILY
Status: DISCONTINUED | OUTPATIENT
Start: 2020-02-04 | End: 2020-02-04

## 2020-02-04 RX ORDER — FENTANYL CITRATE 50 UG/ML
25-50 INJECTION, SOLUTION INTRAMUSCULAR; INTRAVENOUS
Status: DISCONTINUED | OUTPATIENT
Start: 2020-02-04 | End: 2020-02-07 | Stop reason: HOSPADM

## 2020-02-04 RX ORDER — THIAMINE HYDROCHLORIDE 100 MG/ML
100 INJECTION, SOLUTION INTRAMUSCULAR; INTRAVENOUS DAILY
Status: DISCONTINUED | OUTPATIENT
Start: 2020-02-04 | End: 2020-02-05

## 2020-02-04 RX ORDER — NOREPINEPHRINE BITARTRATE 0.06 MG/ML
0.03-0.4 INJECTION, SOLUTION INTRAVENOUS CONTINUOUS
Status: DISCONTINUED | OUTPATIENT
Start: 2020-02-04 | End: 2020-02-06

## 2020-02-04 RX ORDER — GABAPENTIN 600 MG/1
600 TABLET ORAL DAILY
Status: DISCONTINUED | OUTPATIENT
Start: 2020-02-04 | End: 2020-02-04

## 2020-02-04 RX ORDER — CEFTRIAXONE 1 G/1
1 INJECTION, POWDER, FOR SOLUTION INTRAMUSCULAR; INTRAVENOUS ONCE
Status: COMPLETED | OUTPATIENT
Start: 2020-02-04 | End: 2020-02-04

## 2020-02-04 RX ORDER — FENTANYL CITRATE 50 UG/ML
INJECTION, SOLUTION INTRAMUSCULAR; INTRAVENOUS
Status: DISCONTINUED
Start: 2020-02-04 | End: 2020-02-05 | Stop reason: HOSPADM

## 2020-02-04 RX ORDER — CEFTRIAXONE 2 G/1
2 INJECTION, POWDER, FOR SOLUTION INTRAMUSCULAR; INTRAVENOUS EVERY 24 HOURS
Status: DISCONTINUED | OUTPATIENT
Start: 2020-02-05 | End: 2020-02-04

## 2020-02-04 RX ORDER — NICOTINE POLACRILEX 4 MG
15-30 LOZENGE BUCCAL
Status: DISCONTINUED | OUTPATIENT
Start: 2020-02-04 | End: 2020-02-07 | Stop reason: HOSPADM

## 2020-02-04 RX ORDER — DEXTROSE MONOHYDRATE 25 G/50ML
25-50 INJECTION, SOLUTION INTRAVENOUS
Status: DISCONTINUED | OUTPATIENT
Start: 2020-02-04 | End: 2020-02-04

## 2020-02-04 RX ORDER — GABAPENTIN 600 MG/1
600 TABLET ORAL 2 TIMES DAILY
Status: DISCONTINUED | OUTPATIENT
Start: 2020-02-05 | End: 2020-02-06

## 2020-02-04 RX ORDER — DEXTROSE MONOHYDRATE 25 G/50ML
25-50 INJECTION, SOLUTION INTRAVENOUS
Status: DISCONTINUED | OUTPATIENT
Start: 2020-02-04 | End: 2020-02-07 | Stop reason: HOSPADM

## 2020-02-04 RX ORDER — SIMVASTATIN 10 MG
40 TABLET ORAL AT BEDTIME
Status: DISCONTINUED | OUTPATIENT
Start: 2020-02-04 | End: 2020-02-07 | Stop reason: HOSPADM

## 2020-02-04 RX ORDER — PIPERACILLIN SODIUM, TAZOBACTAM SODIUM 4; .5 G/20ML; G/20ML
4.5 INJECTION, POWDER, LYOPHILIZED, FOR SOLUTION INTRAVENOUS EVERY 6 HOURS
Status: DISCONTINUED | OUTPATIENT
Start: 2020-02-04 | End: 2020-02-07 | Stop reason: HOSPADM

## 2020-02-04 RX ORDER — NALOXONE HYDROCHLORIDE 0.4 MG/ML
.1-.4 INJECTION, SOLUTION INTRAMUSCULAR; INTRAVENOUS; SUBCUTANEOUS
Status: DISCONTINUED | OUTPATIENT
Start: 2020-02-04 | End: 2020-02-07 | Stop reason: HOSPADM

## 2020-02-04 RX ORDER — NICOTINE POLACRILEX 4 MG
15-30 LOZENGE BUCCAL
Status: DISCONTINUED | OUTPATIENT
Start: 2020-02-04 | End: 2020-02-04

## 2020-02-04 RX ORDER — FOLIC ACID 5 MG/ML
1 INJECTION, SOLUTION INTRAMUSCULAR; INTRAVENOUS; SUBCUTANEOUS DAILY
Status: DISCONTINUED | OUTPATIENT
Start: 2020-02-04 | End: 2020-02-05

## 2020-02-04 RX ORDER — GABAPENTIN 600 MG/1
600 TABLET ORAL ONCE
Status: COMPLETED | OUTPATIENT
Start: 2020-02-04 | End: 2020-02-04

## 2020-02-04 RX ADMIN — PIPERACILLIN AND TAZOBACTAM 4.5 G: 4; .5 INJECTION, POWDER, FOR SOLUTION INTRAVENOUS at 17:03

## 2020-02-04 RX ADMIN — VANCOMYCIN HYDROCHLORIDE 2500 MG: 1 INJECTION, POWDER, LYOPHILIZED, FOR SOLUTION INTRAVENOUS at 16:14

## 2020-02-04 RX ADMIN — GABAPENTIN 600 MG: 600 TABLET, FILM COATED ORAL at 21:13

## 2020-02-04 RX ADMIN — INSULIN ASPART 1 UNITS: 100 INJECTION, SOLUTION INTRAVENOUS; SUBCUTANEOUS at 21:13

## 2020-02-04 RX ADMIN — SODIUM CHLORIDE 8 MG/HR: 9 INJECTION, SOLUTION INTRAVENOUS at 08:36

## 2020-02-04 RX ADMIN — GABAPENTIN 600 MG: 600 TABLET, FILM COATED ORAL at 08:46

## 2020-02-04 RX ADMIN — SODIUM CHLORIDE 8 MG/HR: 9 INJECTION, SOLUTION INTRAVENOUS at 16:53

## 2020-02-04 RX ADMIN — CEFTRIAXONE 1 G: 1 INJECTION, POWDER, FOR SOLUTION INTRAMUSCULAR; INTRAVENOUS at 08:46

## 2020-02-04 RX ADMIN — FOLIC ACID 1 MG: 5 INJECTION, SOLUTION INTRAMUSCULAR; INTRAVENOUS; SUBCUTANEOUS at 08:46

## 2020-02-04 RX ADMIN — SODIUM CHLORIDE, POTASSIUM CHLORIDE, SODIUM LACTATE AND CALCIUM CHLORIDE 1000 ML: 600; 310; 30; 20 INJECTION, SOLUTION INTRAVENOUS at 12:30

## 2020-02-04 RX ADMIN — INSULIN ASPART 2 UNITS: 100 INJECTION, SOLUTION INTRAVENOUS; SUBCUTANEOUS at 08:45

## 2020-02-04 RX ADMIN — SODIUM CHLORIDE, POTASSIUM CHLORIDE, SODIUM LACTATE AND CALCIUM CHLORIDE 1000 ML: 600; 310; 30; 20 INJECTION, SOLUTION INTRAVENOUS at 14:35

## 2020-02-04 RX ADMIN — PIPERACILLIN AND TAZOBACTAM 4.5 G: 4; .5 INJECTION, POWDER, FOR SOLUTION INTRAVENOUS at 11:58

## 2020-02-04 RX ADMIN — OCTREOTIDE ACETATE 50 MCG/HR: 200 INJECTION, SOLUTION INTRAVENOUS; SUBCUTANEOUS at 08:36

## 2020-02-04 RX ADMIN — INSULIN ASPART 2 UNITS: 100 INJECTION, SOLUTION INTRAVENOUS; SUBCUTANEOUS at 11:58

## 2020-02-04 RX ADMIN — PIPERACILLIN AND TAZOBACTAM 4.5 G: 4; .5 INJECTION, POWDER, FOR SOLUTION INTRAVENOUS at 23:07

## 2020-02-04 RX ADMIN — THIAMINE HYDROCHLORIDE 100 MG: 100 INJECTION, SOLUTION INTRAMUSCULAR; INTRAVENOUS at 09:16

## 2020-02-04 RX ADMIN — SIMVASTATIN 40 MG: 10 TABLET, FILM COATED ORAL at 21:12

## 2020-02-04 RX ADMIN — INSULIN ASPART 1 UNITS: 100 INJECTION, SOLUTION INTRAVENOUS; SUBCUTANEOUS at 23:19

## 2020-02-04 RX ADMIN — INSULIN ASPART 1 UNITS: 100 INJECTION, SOLUTION INTRAVENOUS; SUBCUTANEOUS at 16:16

## 2020-02-04 RX ADMIN — Medication 0.03 MCG/KG/MIN: at 22:40

## 2020-02-04 ASSESSMENT — MIFFLIN-ST. JEOR: SCORE: 1594.75

## 2020-02-04 ASSESSMENT — ACTIVITIES OF DAILY LIVING (ADL)
ADLS_ACUITY_SCORE: 19
ADLS_ACUITY_SCORE: 19
ADLS_ACUITY_SCORE: 20
ADLS_ACUITY_SCORE: 20

## 2020-02-04 NOTE — PHARMACY-CONSULT NOTE
Patient is being treated for hyperkalemia. A pharmacy consult was initiated to review the patient's medication list for possible causes of hyperkalemia.    The following medications for this patient may cause or exacerbate hyperkalemia:     - Losartan/hydrochlorothiazide has a hyperkalemia incidence rate <1% due to the losartan component of this combination medication.    Consider adjusting medications listed above to avoid future episodes of hyperkalemia.     Radha Curry, PharmD, MPH  PGY1 Pharmacy Practice Resident

## 2020-02-04 NOTE — PROGRESS NOTES
Intensivist addendum:  Notified that patient's bp fell to 80/40.  Pulse in 90s.  On my visit she is in no acute distress.  Breathing without difficulty.  Mentation is actually improved from this morning.  Unclear why her bp has decreased.  Her noon hgb is 7.4 up from 7.1 this morning with no transfusion.  pltlts and INR are ok.  She has had no further bloody/black bowel movements and no hematemesis.  I suspect she is not bleeding further.  This may be onset of sepsis; her foot with osteo is a possible source.  We will fluid bolus with 30 ml/kg, broaden abx to vanco/zosyn, check lactate level and pan culture.  Will initiate pressors if hypotension is recalcitrant to fluids.

## 2020-02-04 NOTE — CONSULTS
"[facility]  ORTHOPAEDIC SURGERY CONSULT - HISTORY AND PHYSICAL    DATE OF CONSULT: 2/4/2020   REQUESTING PROVIDER: Nile Gottlieb MD, MD    CC: ulcer, right and left foot  DATE OF INJURY: chronic      HISTORY OF PRESENT ILLNESS:   Tiffani Tan is a 60 year old female with PMH including T2DM, cirrhosis 2/2 alcohol abuse, charcot arthropathy of left foot and ankle, right first MP joint, and chronic osteomyelitis. She was transferred from [facility] for hemorrhagic shock 2/2 acute upper GI bleed. Ortho/podiatry was consulted to assess bilateral lower extremity wounds.    The patient follows with podiatry as outpatient for lower extremity wounds. She was last seen by Dr. Whitten on 1/29. Per the clinic note:    \"Ulcer, left ankle.  Charcot foot and ankle, left.  Ulcer, right first MPJ.  She is diabetic with peripheral neuropathy and vascular disease, onychauxis and onychomycosis.  Diagnosis and treatment options discussed with her.  Local wound care done upon consent today.  Endoform applied to the ulcer sites with Wound Vashe wet-to-dry dressings.  A multilayer Unna boot and compression boot and total soft contact cast applied to the right lower extremity upon consent.  Total contact cast applied to left lower extremity upon consent.  She relates the cast felt better than the posterior splint because it seemed to be more solid.  I am going to have her hang on to the posterior splint as we may go back to that.  It is still in good shape.  We will recheck Apligraf, Dermagraft and PuraPly coverage.  She will return to clinic and see me in 1 week.      Toenails were debrided or reduced upon consent.  The right fourth toenail bled a bit upon debridement.  Local wound care done upon consent.  Endoform and Band-Aid applied and use discussed with her.\"    Based on chart review, the patient has seen Dr. Adams as recently as 10/2019 to discuss left BKA due to the chronic, non-healing nature of the wound. This is " "non-painful to her, and she believes that has contributed to her apprehension to go forward with the BKA. She has previously attempted unna boot treatment with posterior splint for weight bearing. She was ambulating immediately prior to presenting to the OSH earlier this week.       In addition to the above complaints, the patient also reports the following:   General: denies fever, chills, sweats; endorses fatigue  Integument: denies rashes, sores, lumps/bumps not otherwise noted above - bilateral LE wounds have been \"stable\"  Respiratory: denies cough, sputum production, hemoptysis, dyspnea, and wheezing  Cardiac: denies chest pain, SOB, palpitations  GI: endorses abdominal pain, nausea; denies recent vomiting, change in bowel habits - diarrhea or constipation  Peripheral Vascular: denies history of DVT, peripheral edema  Musculoskeletal:  denies joint pain, swollen or stiff joints not otherwise noted above  Neurological: denies fainting, blackouts, focal weakness or paralysis, numbness/loss of sensation, tingling/altered sensation, tremors or other involuntary movements not otherwise noted above  Hematological: denies blood abnormalities other than those noted above    History obtained from patient interview and chart review. All relevant notes were reviewed and HPI/pertinent ROS were updated to reflect their current presentation and hospital course.       PAST MEDICAL HISTORY:   Charcot arthropathy, left ankle and foot, right foot  T2DM    PAST SURGICAL HISTORY:   No prior orthopedic surgery    MEDICATIONS:   Prior to Admission medications    Medication Sig Last Dose Taking? Auth Provider   amoxicillin-clavulanate (AUGMENTIN) 875-125 MG tablet Take 1 tablet by mouth 2 times daily   Daniel Giron DPM   blood glucose (CONTOUR NEXT TEST) test strip apply 1 strip by finger poke route 2 times per day.   Reported, Patient   blood glucose monitoring (MIGUEL A MICROLET) lancets by subcutaneous route 2 times per " day.   Reported, Patient   cephALEXin (KEFLEX) 500 MG capsule Take 1 capsule (500 mg) by mouth 2 times daily   Nolan Whitten DPM   CONTOUR NEXT TEST test strip TEST BLOOD BID   Reported, Patient   gabapentin (NEURONTIN) 600 MG tablet TK 2 TS PO TID. OK TO TK 1 ADDITIONAL T PRN.   Reported, Patient   glimepiride (AMARYL) 2 MG tablet TK 1 T PO  D   Reported, Patient   HYDROcodone-acetaminophen (NORCO) 7.5-325 MG per tablet Take 1 tablet by mouth 3 times daily as needed for pain   Reported, Patient   losartan-hydrochlorothiazide (HYZAAR) 100-25 MG tablet Take 1 tablet by mouth daily   Reported, Patient   order for DME Equipment being ordered: [id number] $35  shoe post op mens Nolan Brito DPM   order for DME Equipment being ordered: [id number] $70   Daniel Cope LG, DPM   simvastatin (ZOCOR) 40 MG tablet TK 1 T PO QPM.   Reported, Patient         ALLERGIES:   Patient has no known allergies.      SOCIAL HISTORY:   Patient endorses daily tobacco use; patient drinks alcohol daily; denies illicit drug use    Social History     Occupational History     Not on file   Tobacco Use     Smoking status: Current Every Day Smoker     Smokeless tobacco: Never Used   Substance and Sexual Activity     Alcohol use: Not on file     Drug use: Not on file     Sexual activity: Not on file       FAMILY HISTORY:  Patient denies known family history of bleeding, clotting, or anesthesia related complications.     REVIEW OF SYSTEMS:   A brief 10-point ROS was performed during the patient encounter. All pertinent items are included in the HPI. Other systems were negative, as below:    Head:  denies new headache, dizziness, light headedness  Eyes: denies new changes in vision, blurred vision, diplopia, excessive tearing  Ears: denies new hearing loss, pain  Nose: denies recent nasal congestion   Mouth: denies new oral sores, ulcers  Throat:  denies new pain, hoarseness, difficulty  swallowing  Urinary: denies new onset dysuria, hematuria, polyuria, nocturia   Endocrine:  denies excessive sweating, polyuria, polydipsia, polyphagia   Psychiatric: denies new onset depression, sleep disturbances, substance abuse      PHYSICAL EXAM:   Vitals:    02/04/20 0800 02/04/20 0900 02/04/20 1000 02/04/20 1100   BP: 111/57 125/65 91/47 100/41   BP Location: Right arm      Pulse: 99 95 94 103   Resp: 16 14 13 12   Temp: 98.5  F (36.9  C)      TempSrc: Axillary      SpO2: 100% 100% 100% 100%   Weight:       Height:         General: Aroused with verbal stimuli, appropriate, following commands, NAD  Neuro: CN II-XII grossly intact  Psych: Appropriate affect  Skin: venous stasis bilateral lower extremities  HEENT:  Normocephalic, atraumatic; EOMs grossly intact; external ear without erythema or edema bilaterally; no septal deviation  Lungs: Breathing comfortably and nonlabored, audible wheezes with cough, no stridor noted  Heart/Cardiovascular: Regular pulse, no peripheral cyanosis  Abdomen: Soft, non-tender, non-distended   Musculoskeletal:   Right Lower Extremity:     [id number] ulcer over the metatarsal head, stage III      No significant tenderness to palpation over thigh, knee, leg, ankle/foot.     Normal ROM of hip, knee, and ankle, without tenderness    Motor intact distally throughout the tibial and peroneal nerve distributions as indicated by intact 5/5 strength with TA/GSC/EHL/FHL firing    No sensation throughout sp/dp/tibial/saph/sural nerves    DP/PT pulses palpable, 1+, toes cool, darkened  Left Lower Extremity:     [id number], probe to bone over lateral malleolus, purulent discharge; varus deformity at ankle joint      No significant tenderness to palpation over thigh, knee, leg, ankle/foot.     Normal ROM of hip, knee, and ankle, without tenderness    Motor intact distally throughout the tibial and peroneal nerve distributions as indicated by intact 5/5 strength with TA/GSC/EHL/FHL firing    No  sensation over sp/dp/tibial/saph/sural nerves    DP/PT pulses palpable, 1+, toes cool      LABS:  CBC  Hemoglobin   Date Value Ref Range Status   02/04/2020 7.1 (L) 11.7 - 15.7 g/dL Final     WBC   Date Value Ref Range Status   02/04/2020 22.4 (H) 4.0 - 11.0 10e9/L Final     Hematocrit   Date Value Ref Range Status   02/04/2020 22.8 (L) 35.0 - 47.0 % Final     Platelet Count   Date Value Ref Range Status   02/04/2020 153 150 - 450 10e9/L Final       CREATININE  Creatinine   Date Value Ref Range Status   02/04/2020 1.25 (H) 0.52 - 1.04 mg/dL Final       GLUCOSE  Glucose   Date Value Ref Range Status   02/04/2020 239 (H) 70 - 99 mg/dL Final       INR  INR   Date Value Ref Range Status   02/04/2020 1.45 (H) 0.86 - 1.14 Final       INFLAMMATORY LABS  WBC   Date Value Ref Range Status   02/04/2020 22.4 (H) 4.0 - 11.0 10e9/L Final     CRP Inflammation   Date Value Ref Range Status   02/04/2020 19.0 (H) 0.0 - 8.0 mg/L Final     Sed Rate   Date Value Ref Range Status   02/04/2020 108 (H) 0 - 30 mm/h Final         IMAGING:  EXAM: XR ANKLE LT G/E 3 VW  2/4/2020 10:39 AM       HISTORY: History of charcot, ulcer over lateral malleolus     COMPARISON: 10/1/2019, 8/28/2019     FINDINGS: 3 nonweightbearing views of the left ankle.     Soft tissue defect evident lateral to the lateral malleolus.  Periostitis of the distal fibula as progressed compared to prior. Tiny  avulsive fragments distal to the distal fibula on the oblique view.  Subtle distal fibular osteolysis. Distal tibial periostitis is similar  prior. Extensive bony deformity and disorganized osteolysis and bony  proliferation, grossly similar to prior. Anterior subluxation of the  talar dome, similar to prior. Diffuse soft tissue prominence.                                                                       IMPRESSION:   1. Progressive periostitis of the distal fibula. Subtle osteolysis of  the distal fibula compatible with osteomyelitis. Small adjacent  avulsive  fracture fragment.  2. Charcot arthropathy changes.      EXAM: XR FOOT RT G/E 3 VW  2/4/2020 10:40 AM       HISTORY: Plantar ulceration     COMPARISON: Foot 1/9/2019     FINDINGS: Portable nonweightbearing AP, oblique, and lateral views of  the right foot.     Osteolysis of the first metatarsal head appears progressed compared to  prior with progressive loss of bone stock of the median eminence.  Chronic deformities of the second and fifth metatarsals and second and  fifth proximal phalanges, stable. Chronic deformities of the heads of  the third and fourth metatarsals     Collapse of the fourth metatarsal head is new since prior radiographs.  Stable ossicle within the second metatarsophalangeal joint. Tiny  plantar calcaneal heel spur. Dorsal soft tissue swelling. Plantar soft  tissue defect. Mild amount of air projecting over the first  metatarsophalangeal joint on the AP view, likely correlating to  plantar soft tissue defect on the lateral view.                                                                       IMPRESSION:   1. Progressive osteolysis of the first metatarsal head with plantar  soft tissue defect and gas compatible with ongoing septic arthritis  and osteomyelitis.  2. Collapse of the fourth metatarsal head is new since prior  radiographs 1/9/2019.    ASSESSMENT AND PLAN:    Tiffani Tan is a 60 year old female who presents with T2DM, peripheral neuropathy, charcot arthropathy of left ankle/foot, right foot who presents with leukocytosis, elevated CRP/ESR and sepsis (WBC, tachycardia) in the setting of recent upper GI bleed and chronic osteomyelitis of right first metatarsal head, left distal fibula.    Although recent leukocytosis/sepsis may be reactive from recent UGI bleed/hemorrhagic shock, findings are suspicious in setting of bilateral lower extremity osteomyelitis. Wound cultures and XRs will be obtained today. Recommend broad spectrum antibiotics, narrowing per culture results.      Unfortunately, the patient has exhausted wound care options without success. Patient would likely benefit from a left BKA. The timing of this procedure would depend on the patient's clinical course. If she appreciates improvement with antibiotic treatment, elect for local wound cares per podiatry recommendations (below) and outpatient coordination for elective procedure. If, however, the patient does not improve, it would be reasonable to pursue left BKA during this hospital stay. In either event, recommend nutrition consult for optimization and PCO2 to assess vascular status.     Activity: NWB to left, right lower extremity  Wound cares: Recommend WOC nursing consult -  microklens ulcers, apply Medihoney, wrap with sterile Kerlix and ACE wrap from MP joints to below knee with ACE wrap.  Cultures: wound cultures, gram stain left lateral malleolar wound pending    Consults: podiatry (wound care recs as above per Dr. Whitten)    Ortho will continue to follow    Edwar Hernandez PA-C  Orthopaedic Surgery  Pager: [phone]     Thank you for allowing me to participate in this patient's care. Please page me at [phone] with any questions/concerns. If there is no response, if it is a weekend, or if it is during evening hours, please page the orthopaedic surgery resident on call.

## 2020-02-04 NOTE — PROGRESS NOTES
Talked to hepatology team about hypotension at bedside, reports talked to MICU resident/team, continue with fluids, increased from 500 to 1L LR, order placed, Dr. Gottlieb at bedside to assess at 1350. Report continue to monitor and give fluids at this time. RN at bedside. No EGD until pressures are improved. Continue with POC.

## 2020-02-04 NOTE — PROGRESS NOTES
Talked to MD about low BP, MAP 60-61, MD notified, verbal order for 500cc LR. 500cc LR given over 1 hr. Continue with POC.

## 2020-02-04 NOTE — CONSULTS
GASTROENTEROLOGY CONSULTATION      Date of Admission: 2/4/2020         Reason for Consultation:   59 yo woman w/ question of cirrhosis on CT presented w/ melena and hematemesis. Needs endoscopic evaluation  Requesting Dr. Nile Gottlieb     ASSESSMENT    Tiffani Tan is a 60-year-old female with a history of alcohol use disorder, DM2 c/b Charcot's foot and chronic foot ulcers, HTN and HLD who presents with coffee-ground emesis, melena, epigastric pain, and acute on chronic anemia.     # Concern for upper GI bleed   # Acute on chronic anemia   Presents with 1-2 day history of coffee-ground emesis, melena and intermittent epigastric pain, hgb 9.8 on admission to outside hospital, down from baseline 11-12. Hgb further down-trended to 7.6 and 7.1, in the setting of IV hydration. Altogether, concerning for UGI bleed. Differential includes PUD, esophagogastric varices, gastritis, AVM, Zo Ureña tear. Tachycardic and mildly hypotensive at outside hospital, improved with IVF. No melena on exam, low suspicion for ongoing bleed.     # Presumed alcoholic cirrhosis  -- MELD-Na 13 on 2/4/2020  -- Hepatic encephalopathy: No  -- Ascites: No  -- Varices: No previous EGDs  -- HCC screening: Last ultrasound 2/3/20, no focal masses  CT showing enlarged liver with nodular contour and recanalized umbilical vein suggestive of cirrhosis and portal hypertension. Patient has long history of alcohol abuse with no prior diagnosis of cirrhosis. No signs of decompensated disease (no ascites, hepatic encephalopathy or known varices). Elevated INR possibly due to impaired synthetic function vs  vitamin deficiency. No documented hep A/B/C immune status.        RECOMMENDATIONS  - Had planned for EGD this afternoon to evaluate for varices vs other causes of UGI bleed but had to abort procedure due to hypotension. Will re-attempt once hemodynamics improved.  Not urgent at this point unless evidence of active bleeding.  - Fluid resuscitation and  workup for hypotension per MICU. Low suspicion that hypotension is due to active GI bleed  - Continue pantoprazole and octreotide drips  - Trend Hgb , transfuse to goal of 7  - Daily CMP and INR   - Please obtain hepatitis B and C serologies      Thank you for involving us in this patient's care. Please do not hesitate to contact the GI service with any questions or concerns.     Pt care plan discussed with Dr. Dias, GI staff physician.    Aleksandar Dunn MD  Internal Medicine, PGY-2  -------------------------------------------------------------------------------------------------------------------           History of Present Illness:   Tiffani Tan is a 60-year-old female with a history of alcohol use disorder, type 2 diabetes (complicated by neuropathy, Charcot's foot and chronic foot ulcers), hypertension and hyperlipidemia who presents with melena and abdominal pain.    Patient initially presented to [facility] yesterday complaining of acute worsening of chronic foot pain along with 1-day history of epigastric pain, nausea, vomiting (coffee-ground emesis) and black stools.  Epigastric pain is achy in nature and occurs intermittently throughout the day, no identifiable precipitating or alleviating factors.  Emesis has been coffee-ground in color since the first episode, no bright red blood.  She denies fevers, chills, shortness of breath or chest pain.  Denies NSAID use.  She reports long history of heavy alcohol use, drinks about 4 mixed vodka drinks in [employer] shot cups daily (1 oz vodka per drink), has been drinking this amount for years. Denies heavier alcohol use prior. No previous DIUs or alcohol-related legal issues. No previous diagnosis of cirrhosis, no prior EGDs. She reports having a colonoscopy in the past that was unremarkable.     On presentation to outside hospital, patient was tachycardic and normotensive, with labs notable for Hgb 9.8 (recent baseline 11-13), Cr. 1.25 (up from 1.0 in  "12/2019), K 6.5, lactate 3.4. Abdominal ultrasound showed unremarkable liver and no evidence of biliary ductal dilatation. CT a/p also obtained which showed enlarged liver with nodular contour suggestive of cirrhosis. Patient was started on octreotide and PPI drips and transferred to the [facility] for further evaluation and management.             Past Medical History:   Alcohol use disorder   DM2, c/b Charcot's foot and diabetic foot ulcers  HTN  HLD    Restless leg syndrome         Previous Endoscopy:   No EGDs  Last colonoscopy 10/2016 per chart review, no report available         Social History:   ETOH: drinks about 4 mixed vodka drinks in [employer] shot cups daily (1 oz vodka per drink)  Tobacco: smokes 1ppd  Drugs: denies          Family History:   Father- colon cancer   No family hx of liver disease          Allergies:   Reviewed and edited as appropriate   No Known Allergies         Medications:     Current Facility-Administered Medications   Medication     [START ON 2/5/2020] cefTRIAXone (ROCEPHIN) 2 g vial to attach to  ml bag for ADULTS or NS 50 ml bag for PEDS     glucose gel 15-30 g    Or     dextrose 50 % injection 25-50 mL    Or     glucagon injection 1 mg     glucose gel 15-30 g    Or     dextrose 50 % injection 25-50 mL    Or     glucagon injection 1 mg     folic acid injection 1 mg     gabapentin (NEURONTIN) tablet 600 mg     insulin aspart (NovoLOG) inj (RAPID ACTING)     naloxone (NARCAN) injection 0.1-0.4 mg     octreotide (sandoSTATIN) 1,250 mcg in sodium chloride 0.9 % 250 mL     pantoprazole (PROTONIX) 80 mg in sodium chloride 0.9 % 100 mL infusion     simvastatin (ZOCOR) tablet 40 mg     thiamine (B-1) injection 100 mg             Review of Systems:     A complete 10 point review of systems was performed and is negative except as noted in the HPI           Physical Exam:   /65   Pulse 95   Temp 98.5  F (36.9  C) (Axillary)   Resp 14   Ht 1.676 m (5' 6\")   Wt " 101.3 kg (223 lb 5.2 oz)   SpO2 100%   BMI 36.05 kg/m    Wt:   Wt Readings from Last 2 Encounters:   02/04/20 101.3 kg (223 lb 5.2 oz)   10/01/19 88.5 kg (195 lb)      Gen: Ill-appearing, no acute distress  Eyes: Sclera anicteric  Respiratory: Unlabored breathing on 6LPM O2  Abd: Obese, nondistended, mild tenderness to palpation in epigastrium, no masses, +bs  Rectal: small external hemorrhoids, no anal fissures, no melena   Skin: wwp, a few spider angiomas on chest, no jaundice  Neuro: Awake, alert, answering questions appropriately but slow to respond at times, no asterixis  MSK: Charcot's foot b/l with dressing over ulcers         Data:   Labs and imaging below were independently reviewed and interpreted    BMP  Recent Labs   Lab 02/04/20  0743      POTASSIUM 4.8   CHLORIDE 108   NAYANA 8.3*   CO2 29   BUN 93*   CR 1.25*   *     CBC  Recent Labs   Lab 02/04/20  0743   WBC 22.4*   RBC 2.25*   HGB 7.1*   HCT 22.8*   *   MCH 31.6   MCHC 31.1*   RDW 16.6*        INR  Recent Labs   Lab 02/04/20  0734   INR 1.45*     LFTs  Recent Labs   Lab 02/04/20  0743   ALKPHOS 86   AST 32   ALT 21   BILITOTAL 0.5   PROTTOTAL 6.4*   ALBUMIN 2.3*     02/03/2020 US Abdomen limited RUQ  EXAM: US ABDOMEN LIMITED RUQ  LOCATION: [facility]  DATE/TIME: 2/3/2020 9:30 PM    INDICATION: Vomiting.  COMPARISON: None.  TECHNIQUE: Limited abdominal ultrasound.    FINDINGS:    GALLBLADDER: No gallstones are visualized, however, the gallbladder wall appears  to be moderately thickened and irregular measuring up to 8 mm. No  pericholecystic fluid is noted. There was not a sonographic Canseco's sign.  Clinical significance of the wall thickening is uncertain, however, recommend  correlation with physical examination.    BILE DUCTS: No biliary dilatation. The common duct measures 4 mm.    LIVER: Normal parenchyma with smooth contour. No focal mass.    RIGHT KIDNEY: No hydronephrosis.    PANCREAS: Obscured by bowel  gas.    No ascites.    IMPRESSION:  1.  Indeterminate irregular thickening of the gallbladder wall without  gallstones. No sonographic Canseco's sign was noted. Acalculous cholecystitis  cannot be excluded, however, I would recommend correlation with the patient's  clinical exam. We have no prior imaging for comparison. Consider follow-up.  2.  No evidence for biliary ductal dilatation.  3.  The liver is unremarkable.    -------------------------------------------------------------------------------------------------------------  02/03/2020 CT a/p w/ contrast   EXAM: CT ABDOMEN PELVIS W  LOCATION: [facility]  DATE/TIME: 2/3/2020 10:28 PM    INDICATION: Vomiting  COMPARISON: Ultrasound 02/03/2020  TECHNIQUE: CT scan of the abdomen and pelvis was performed following injection  of IV contrast. Multiplanar reformats were obtained. Dose reduction techniques  were used.  CONTRAST: IOHEXOL 350 MG IODINE/ML  ML BOTTLE: 117mL    FINDINGS:   LOWER CHEST: Lung bases are clear. Varices along the distal esophagus.    HEPATOBILIARY: Liver is enlarged and nodular in contour. Recanalized umbilical  vein. Gallbladder is contracted. This reduces evaluation of wall thickening  described sonographically.    PANCREAS: Normal.    SPLEEN: Normal.    ADRENAL GLANDS: Normal.    KIDNEYS/BLADDER: Normal.    BOWEL: Normal caliber. Diverticulosis. Normal appendix.    LYMPH NODES: Subcentimeter retroperitoneal lymph nodes.    VASCULATURE: Nonspecific haziness adjacent to the origins of the celiac and  superior mesenteric arteries.    PELVIC ORGANS: Coarse uterine calcifications suggesting fibroids.    OTHER: None.    MUSCULOSKELETAL: Degenerative change osseous structures.    IMPRESSION:   1.  Liver is enlarged with nodular contour suggesting cirrhosis.  2.  No bowel obstruction or abscess.  3.  Diverticulosis.    Attending Note  I evaluated the patient with the resident and participated in the decision-making and agree with the  resident's note.  Tanya Dias MD

## 2020-02-04 NOTE — H&P
[facility]      MICU History and Physicial  Tiffani Tan MRN: [id number]  1960  Date of Admission:(Not on file)  Primary care provider: [facility]      Assessment and Plan:     60yoF w/ hx of etoh cirrhosis, T2DM c/b Charcot's foot transferred from OSH for hemorrhagic shock 2/2 acute UGIB.     PLAN:  ===NEURO===  #Diabetic neuropathy  Takes 1200 mg gabapentin TID. Given current LIGIA will not continue at this high dose.   - started on 600 mg gabapentin BID     # Etoh dependence  Has hx of etoh abuse. Per patient, her last drink was on Friday (1/31/20). No hx of DT per chart review. Pt started on thiamine and folate in OSH. Given diazepam for concern of etoh w/d.   - CIWA protocol   - started on thiamine   - started on folate  - chem dep consult    # Sedation: none    ===CARDIOVASCULAR===  # Septic Shock   # C/f hemorrhagic shock (less likely)   Acute hematemesis at OSH, hgb 9.8, trended down to 7.6 s/p 2.5L crystalloid, LA 3.8, trended down to 2.4 w/ volume resuscitation. Abd imaging showed evidence of cirrhosis and possible ascites. No prior diagnosis of cirrhosis per chart review. Has hx of etoh abuse. Received thiamine, folate and crystalloids before transfer, no record of transfusion done at OSH. Septic shock also on ddx with leukocytosis of 21 and hx of charcot's foot and LLE ulcer. CRP 2.7 XR shows evidence of osteomyelitis. Received 1x zosyn at OSH. Pt became hypotensive later on in the day requiring more fluids. She was broadened from CTX to vanc and zosyn. Hgb dropped to 6.0 (most likely dilutional as she had received an additional 2 L LR for hypotension).   - on vanc and zosyn   - hold pta losartan-hydrochlorothiazide   - hold pta lasix   - type and cross  - 2 large bore IV  - transfuse for hgb <7, plt <50  - stat cbc w/ coags  - hgb Q4H  - GI consult  - blood cx  - trend wbc and CRP, ESR    ===PULMONARY===  No acute issue    ===GASTROINTESTINAL===  # Acute  UGIB c/b possible hemorrhagic shock  # Etoh cirrhosis   # Ascites  Acute hematemesis w/ hgb of 9.8, trended down to 7.6 s/p volume resuscitation. Outside CT CAP showed varices along the distal esophagus and an enlarged, nodular liver consistent with cirrhosis. No ascites seen on CT or abd U/S at OSH.  - hgb Q4H, transfuse for hgb <7   - GI consult, appreciate recs  - NPO   - on octreotide gtt  - on protonix gtt   - on vanc/zosyn     # Nutrition  NPO for possible scope in the AM     ===INFECTIOUS DISEASE===  # Osteomyelitis, right foot     # Leukocytosis   # Lactic acidosis   Abd pain w/ CT CAP showing cirrhosis and gallbladder wall thickening. No gallstone or biliary dilatation. LFTs not concerning for acute biliary process. No CT evidence of intra-abd infection, no ascities. Received zosyn x1 in ED at OSH. Afebrile. Xray of foot shows evidence of osteomyelitis. Grew enterococcus, acinetobacter and corynebacterium from R food wound when hospitalized earlier in 2019. All organisms were pansensitive. At the time forefoot amputation was discussed but pt declined. She was started on a prolonged course of Augmentin (1/29/2019-6/24/2019) and followed up in ID clinic. Most recently seen by podiatry on 1/29/2020, received local wound care.  - on vanc and zosyn   - trend CRP and ESR  - CBC Q4H   - podiatry consult   - pan cultured     Antimicrobials:  Zosyn 4.5 g IV Q6H (2/4 - *)   Vancomycin 1500 mg IV Q18H (2/4 - *)     Cultures:   BC pending   Sputum culture pending   Urine culture pending     ===RENAL===  # LIGIA  # Hyperkalemia (resolved)   Cr 1.38 on admission from baseline 0.8. Hyperkalemia likely 2/2 LIGIA. Shifted in OSH, K trended down from 6.3 --> 5.8. Hyperkalemia resolved later in the day following insulin administration. EKG showed no peaked T waves, MD interval prolongation.   - BMP Q4H   - hyperkalemia protocol     # Lactic acidosis   # AGMA  LA 3.4 on admission in setting of leukocytosis and acute GIB. Trended  down after volume resuscitation. Received zosyn x1. High glucose of 299 but without ketones.   - trend lactate  - BMP Q4H     ===HEME/ONC===  # Acute blood loss anemia  Hgb baseline around 10, hgb 9.8 on admission, trended down to 7.6 s/p 2.5L crystalloid. Likely partially 2/2 dilution as all cell lines trended down, but pt actively having hematemsis w/ elevated LA. Hgb dropped to 6.0 later in the day, following an additional 2 L of LR. Patient has been typed and screened, she consented for blood transfusion.   - hgb Q4H  - transfuse for hgb <7, plt <50    ===ENDOCRINE===  # T2DM c/b peripheral neuropathy and charcot's foot  # Hyperglycemia  Takes glimepiride pta, no insulin. Most recent HgbA1C 6.5.  in ED but neg beta-hydroxybutyrate. No concern for DKA.   - hold pta glimepiride  - medium sliding scale insulin  - hypoglycemic protocol     # HLD   - c/w pta simvastatin     ===SKIN/MSK===  # Osteomyelitis, right foot   Xray of foot shows osteomyelitis. In the past, wound grew pan-sensitive enterococcus. Had been on prolonged abx until 6/2019. Started on Vanc and Zosyn. Has been closely followed by podiatry as outpatient, most recent visit 1/29/2020. Receiving local wound care, including endoform w/ wound vashe wet-to-dry dressing. Multilayer unna boot and compression boot w/ total soft contact cast for RLE. Total contact cast for LLE  - podiatry consult  - on vanc and zosyn   - PT/OT    Prophylaxis:  DVT: SCD (2/2 to c/f UGIB)   GI: PPI gtt, octreotide gtt   Family:  Updated on 2/4    Disposition: ICU     Code Status: full     Patient was seen and discussed with attending physician Dr. Gottlieb, who agrees with above assessment and plan.    Angela Valencia MD   Internal Medicine, PGY-1   P:[phone]            Chief Complaint:   Acute UGIB         History of Present Illness:   Tiffani Tan is a 60 y.o. female with past medical history including T2DM c/f neuropathy, charcot's foot, etoh abuse presented to ED of  OSH for abd pain and vomiting. On presentation, pt reports poor po intake and multiple episodes of coffee ground emesis since early AM of 2/3 w/ BRB.  Also reports diarrhea over past 2 days that is also black associated w/ abd pain over suprapubic area that migrated to epigastric region. She denies any current chest pain or shortness of breath. She endorses nausea on and off. She denies any urinary symptoms including dysuria, frequency, or urgency. She denies fever or chills. She states that she is currently only taking gabapentin and no other medications. Has not been checking her blood sugars regularly over the last couple months. Sees podiatry weekly for foot ulcers and wound care. She reports her foot ulcers have been stable with no concerns. Patient states that she typically drinks 5-7 mixed vodka drinks/day. She denies any other concerns at this time.     In ED, she was found to be tachycardic in 120s, BP 90-100s/50-60s. Admission labs significant for K 6.3 --> 5.8 s/p shift, Cr 1.38 from baseline of 0.8-0.9, LA 3.4 --> 2.4, wbc 21, hgb 9.8 --> 7.6 s/p 2.5L NS. Lipase neg, Alkphos 109, ALT 31, AST 33. CT abd/pelv showed evidence c/f cirrhosis, no ascites and thickened gallbladder wall. RUQ neg for Canseco's sign and showed no gallstone or biliary dilation. Pt endorses hx of etoh abuse but unclear last drink per ED note. It was suspected that etiology of GIB was 2/2 variceal given imaging evidence c/f cirrhosis. Pt started on thiamine, folate, PPI IV and octreotide. Had further episodes of emesis in ED and was transferred to [facility] for concern of further decompensation from hemorrhagic shock           Review of Systems:     Unable to perform due to patient condition.          Past Medical History:   Medical History reviewed.   No past medical history on file.          Past Surgical History:   Surgical History reviewed.   No past surgical history on file.          Social History:   Social History reviewed.  Social  History     Tobacco Use     Smoking status: Current Every Day Smoker     Smokeless tobacco: Never Used   Substance Use Topics     Alcohol use: Not on file             Family History:   Family History reviewed.   No family history on file.          Allergies:   No Known Allergies          Medications:   Medications Reviewed.   No current facility-administered medications for this encounter.      Current Outpatient Medications   Medication Sig     amoxicillin-clavulanate (AUGMENTIN) 875-125 MG tablet Take 1 tablet by mouth 2 times daily     blood glucose (CONTOUR NEXT TEST) test strip apply 1 strip by finger poke route 2 times per day.     blood glucose monitoring (MIGUEL A MICROLET) lancets by subcutaneous route 2 times per day.     cephALEXin (KEFLEX) 500 MG capsule Take 1 capsule (500 mg) by mouth 2 times daily     CONTOUR NEXT TEST test strip TEST BLOOD BID     gabapentin (NEURONTIN) 600 MG tablet TK 2 TS PO TID. OK TO TK 1 ADDITIONAL T PRN.     glimepiride (AMARYL) 2 MG tablet TK 1 T PO  D     HYDROcodone-acetaminophen (NORCO) 7.5-325 MG per tablet Take 1 tablet by mouth 3 times daily as needed for pain     losartan-hydrochlorothiazide (HYZAAR) 100-25 MG tablet Take 1 tablet by mouth daily     order for DME Equipment being ordered: [id number] $35  shoe post op mens md     order for DME Equipment being ordered: [id number] $70  DH Shoe LG     simvastatin (ZOCOR) 40 MG tablet TK 1 T PO QPM.             Physical Exam:   Vitals were reviewed.  Temp: 98.8  F (37.1  C) Temp src: Oral BP: (!) 85/52 Pulse: 100 Heart Rate: 99 Resp: 16 SpO2: 100 % O2 Device: Nasal cannula Oxygen Delivery: 2 LPM    General: in no acute distress, obese  Skin: bilateral venous stasis present on bilateral lower extremities  HEENT: MMM, PERRLA, EOM intact  CV: RRR, normal S1S2, no murmur, clicks, rubs  Resp: Clear to auscultation bilaterally, no wheezes, rhonchi  Abd: obese, non tender to palpation, firm, slightly distended,  hepatomegaly present, no rebound tenderness, guarding, or rigidity   Musc: full ROM in all extremities, weight bearing limited by osteomyelitis of right foot and presence of several foot ulcers   Extremities: bilateral venous stasis present, both feet appear edematous but are firm to touch, ulcers present on bilateral feet   Neuro: No lateralizing symptoms or focal neurologic deficits, AOx3

## 2020-02-04 NOTE — PROGRESS NOTES
Critical Care Services Progress Note:  I personally examined and evaluated the patient today. I formulated today s plan with the house staff team or resident(s) and agree with the findings and plan in the associated note (see separately attested resident note).   I have evaluated all laboratory values and imaging studies from the past 24 hours.  Summary of hospital course:  60F h/o etoh abuse and has CT evidence of cirrhosis now transferred from OSH for melena.  Overnight events/pertinent findings today:  Arrived safely from OSH overnight.  No abdominal pain, no nausea/vomiting now.  Ongoing melena.  No dizzyness or lightheadedness.  ROS:  Negative on 10-point ROS except as noted above.  Data  bp ok off pressors.  satting well on room air.  Labs (personally reviewed):  Lytes ok.  Creat 1.38 ligia.  Glucose 310 at OSH.  hgb 7.1.  pltlts 153.  Wbc elevated 22.4. INR 1.45 acceptable.  ekg (personally reviewed):  99 sinus, nl axis, nl int, no acute ischemic changes.  Assessment/plan:  1.  Acute GI bleeding: large bore access.  Monitoring hgbs.  Hemodynamically stable.  pltlts ok 153. T/s sent.  INR ok. protonix and octreotide drips.  CTX prophy.  Hepatology folllowing.  NPO.  2.  H/o Cirrhosis:  Appreciate hepatology input.  Likely etoh cirrhosis.  No evidence of acute hepatitis today.  3.  Leukocytosis:  22.4, likely reactive to GIB  4.  H/o etoh abuse:  ciwa protocol.  Thiamine and folate ordered.  5.  LIGIA: in setting of gib.  Continue volume support.  6. H/o foot osteo:  Continue coverage per home regimen.  Podiatry consult.  Rest per resident note.     Nile Gottlieb

## 2020-02-04 NOTE — LETTER
Patient:  Tiffani Tan  :   1960  MRN:     [id number]        Ms. Tiffani Tan  [address] [facility] [address]        2020    Dear Ms. Tan,    Thank you for choosing the [facility] for your healthcare needs.  We appreciate the opportunity to serve you.    The following are your recent test results.     Resulted Orders   Blood culture   Result Value Ref Range    Specimen Description Blood Right Arm     Culture Micro No growth    Blood culture   Result Value Ref Range    Specimen Description Blood Left Hand     Culture Micro No growth    Wound Culture Aerobic Bacterial   Result Value Ref Range    Specimen Description Wound Left lateral malleolus     Special Requests Specimen collected in eSwab transport (white cap)     Culture Micro Light growth  Enterococcus faecalis   (A)     Culture Micro (A)      Light growth  Corynebacterium striatum  Identification obtained by MALDI-TOF mass spectrometry research use only database. Test   characteristics determined and verified by the [facility]   ([facility]) [place], [place].  Susceptibility testing not routinely done      Culture Micro Light growth  Stenotrophomonas maltophilia   (A)     Culture Micro Light growth  Normal skin dorian      Blood culture   Result Value Ref Range    Specimen Description Blood     Special Requests Left Hand     Culture Micro No growth    Blood culture   Result Value Ref Range    Specimen Description Blood     Special Requests Right Arm     Culture Micro No growth    Helicobacter pylori Antigen Stool   Result Value Ref Range    Helicobacter pylori Antigen Stool Negative NEG^Negative      Comment:      Negative for Helicobacter pylori antigen by enzyme immunoassay. A negative   result indicates the absence of H. pylori antigen or that the level of antigen   is below the level of detection.             Please contact us if you have any questions or concerns.  We  look forward to serving your needs in the future.      Sincerely,    Patricia Felder MD

## 2020-02-04 NOTE — PROGRESS NOTES
Admitted/transferred from: [facility]  Reason for admission/transfer: Acute GIB   Patient status upon admission/transfer: Lethargic but answering questions appropriately.  Interventions: per plan of care   Plan: per plan of care  2 RN skin assessment: completed by Johana CAMACHO and Siva BETANCOURT  Result of skin assessment and interventions/actions: Right arm bruising, right side of mouth bruising (dog scratched her per patient) right ball of foot ulcer and Left ankle ulcer.   Height, weight, drug calc weight: Done  Patient belongings (see Flowsheet - Adult Profile for details): with patient at bedside.  MDRO education (if applicable):

## 2020-02-04 NOTE — PHARMACY-VANCOMYCIN DOSING SERVICE
Pharmacy Vancomycin Initial Note  Date of Service 2020  Patient's  1960  60 year old, female    Indication: Sepsis    Current estimated CrCl = Estimated Creatinine Clearance: 55 mL/min (A) (based on SCr of 1.29 mg/dL (H)).    Creatinine for last 3 days  2020:  7:43 AM Creatinine 1.25 mg/dL; 12:01 PM Creatinine 1.29 mg/dL    Recent Vancomycin Level(s) for last 3 days  No results found for requested labs within last 72 hours.      Vancomycin IV Administrations (past 72 hours)      No vancomycin orders with administrations in past 72 hours.                Nephrotoxins and other renal medications (From now, onward)    Start     Dose/Rate Route Frequency Ordered Stop    20 1100  vancomycin 1500 mg in 0.9% NaCl 250 ml intermittent infusion 1,500 mg      15 mg/kg × 101.3 kg  over 90 Minutes Intravenous EVERY 18 HOURS 20 1407      20 1415  vancomycin (VANCOCIN) 2,500 mg in sodium chloride 0.9 % 500 mL intermittent infusion      25 mg/kg × 101.3 kg  over 2 Hours Intravenous ONCE 20 1401      20 1130  piperacillin-tazobactam (ZOSYN) 4.5 g vial to attach to  mL bag      4.5 g  over 30 Minutes Intravenous EVERY 6 HOURS 20 1129            Contrast Orders - past 72 hours (72h ago, onward)    None                Plan:  1.  Start vancomycin  2500 mg IV once followed by 1500 mg IV q18h.   2.  Goal Trough Level: 15-20 mg/L   3.  Pharmacy will check trough levels as appropriate in 1-3 Days.    4. Serum creatinine levels will be ordered daily for the first week of therapy and at least twice weekly for subsequent weeks.    5. [place] method utilized to dose vancomycin therapy: Method 2    Gorge Mccabe [facility]

## 2020-02-04 NOTE — PROGRESS NOTES
Reviewed RN consult for bilateral foot wounds, provider order required for WOC service. WOC RN requested bedside RN obtain provider order/consult.   Per chart review pt is being followed closely by podiatry, last seen by Dr Whitten on 1/29.  Please consider consult by podiatry for continuity of care.    Or will need MD order for WOC to assess.    WOC consult completed by WOC- RN, await provider consult if services desired    Rose Murdock RN BSN CWOCN

## 2020-02-05 LAB
ABO + RH BLD: NORMAL
ABO + RH BLD: NORMAL
ALBUMIN SERPL-MCNC: 2.2 G/DL (ref 3.4–5)
ALP SERPL-CCNC: 73 U/L (ref 40–150)
ALT SERPL W P-5'-P-CCNC: 20 U/L (ref 0–50)
ANION GAP SERPL CALCULATED.3IONS-SCNC: 6 MMOL/L (ref 3–14)
AST SERPL W P-5'-P-CCNC: 43 U/L (ref 0–45)
BACTERIA SPEC CULT: ABNORMAL
BACTERIA SPEC CULT: ABNORMAL
BILIRUB DIRECT SERPL-MCNC: 0.2 MG/DL (ref 0–0.2)
BILIRUB SERPL-MCNC: 0.6 MG/DL (ref 0.2–1.3)
BLD GP AB SCN SERPL QL: NORMAL
BLD PROD TYP BPU: NORMAL
BLD PROD TYP BPU: NORMAL
BLD UNIT ID BPU: 0
BLOOD BANK CMNT PATIENT-IMP: NORMAL
BLOOD PRODUCT CODE: NORMAL
BPU ID: NORMAL
BUN SERPL-MCNC: 82 MG/DL (ref 7–30)
CALCIUM SERPL-MCNC: 8.1 MG/DL (ref 8.5–10.1)
CHLORIDE SERPL-SCNC: 114 MMOL/L (ref 94–109)
CO2 SERPL-SCNC: 23 MMOL/L (ref 20–32)
CREAT SERPL-MCNC: 1.46 MG/DL (ref 0.52–1.04)
ERYTHROCYTE [DISTWIDTH] IN BLOOD BY AUTOMATED COUNT: 16.8 % (ref 10–15)
ERYTHROCYTE [DISTWIDTH] IN BLOOD BY AUTOMATED COUNT: 16.9 % (ref 10–15)
ERYTHROCYTE [DISTWIDTH] IN BLOOD BY AUTOMATED COUNT: 17.1 % (ref 10–15)
ERYTHROCYTE [DISTWIDTH] IN BLOOD BY AUTOMATED COUNT: 17.2 % (ref 10–15)
ERYTHROCYTE [DISTWIDTH] IN BLOOD BY AUTOMATED COUNT: 17.3 % (ref 10–15)
FIBRINOGEN PPP-MCNC: 334 MG/DL (ref 200–420)
GFR SERPL CREATININE-BSD FRML MDRD: 38 ML/MIN/{1.73_M2}
GLUCOSE BLDC GLUCOMTR-MCNC: 149 MG/DL (ref 70–99)
GLUCOSE BLDC GLUCOMTR-MCNC: 156 MG/DL (ref 70–99)
GLUCOSE BLDC GLUCOMTR-MCNC: 157 MG/DL (ref 70–99)
GLUCOSE BLDC GLUCOMTR-MCNC: 161 MG/DL (ref 70–99)
GLUCOSE BLDC GLUCOMTR-MCNC: 162 MG/DL (ref 70–99)
GLUCOSE BLDC GLUCOMTR-MCNC: 226 MG/DL (ref 70–99)
GLUCOSE SERPL-MCNC: 167 MG/DL (ref 70–99)
GRAM STN SPEC: ABNORMAL
HBV CORE AB SERPL QL IA: NONREACTIVE
HBV SURFACE AB SERPL IA-ACNC: 0 M[IU]/ML
HBV SURFACE AG SERPL QL IA: NONREACTIVE
HCT VFR BLD AUTO: 21.9 % (ref 35–47)
HCT VFR BLD AUTO: 23 % (ref 35–47)
HCT VFR BLD AUTO: 23.8 % (ref 35–47)
HCT VFR BLD AUTO: 25.3 % (ref 35–47)
HCT VFR BLD AUTO: 25.6 % (ref 35–47)
HCV AB SERPL QL IA: NONREACTIVE
HGB BLD-MCNC: 6.7 G/DL (ref 11.7–15.7)
HGB BLD-MCNC: 7.1 G/DL (ref 11.7–15.7)
HGB BLD-MCNC: 7.2 G/DL (ref 11.7–15.7)
HGB BLD-MCNC: 7.8 G/DL (ref 11.7–15.7)
HGB BLD-MCNC: 7.8 G/DL (ref 11.7–15.7)
INR PPP: 1.33 (ref 0.86–1.14)
INTERPRETATION ECG - MUSE: NORMAL
INTERPRETATION ECG - MUSE: NORMAL
Lab: ABNORMAL
MAGNESIUM SERPL-MCNC: 1.6 MG/DL (ref 1.6–2.3)
MCH RBC QN AUTO: 31.4 PG (ref 26.5–33)
MCH RBC QN AUTO: 31.4 PG (ref 26.5–33)
MCH RBC QN AUTO: 31.6 PG (ref 26.5–33)
MCH RBC QN AUTO: 31.8 PG (ref 26.5–33)
MCH RBC QN AUTO: 32.1 PG (ref 26.5–33)
MCHC RBC AUTO-ENTMCNC: 30.3 G/DL (ref 31.5–36.5)
MCHC RBC AUTO-ENTMCNC: 30.5 G/DL (ref 31.5–36.5)
MCHC RBC AUTO-ENTMCNC: 30.6 G/DL (ref 31.5–36.5)
MCHC RBC AUTO-ENTMCNC: 30.8 G/DL (ref 31.5–36.5)
MCHC RBC AUTO-ENTMCNC: 30.9 G/DL (ref 31.5–36.5)
MCV RBC AUTO: 102 FL (ref 78–100)
MCV RBC AUTO: 103 FL (ref 78–100)
MCV RBC AUTO: 104 FL (ref 78–100)
MCV RBC AUTO: 104 FL (ref 78–100)
MCV RBC AUTO: 105 FL (ref 78–100)
NUM BPU REQUESTED: 2
PHOSPHATE SERPL-MCNC: 4.2 MG/DL (ref 2.5–4.5)
PLATELET # BLD AUTO: 63 10E9/L (ref 150–450)
PLATELET # BLD AUTO: 64 10E9/L (ref 150–450)
PLATELET # BLD AUTO: 68 10E9/L (ref 150–450)
PLATELET # BLD AUTO: 72 10E9/L (ref 150–450)
PLATELET # BLD AUTO: 76 10E9/L (ref 150–450)
POTASSIUM SERPL-SCNC: 4.4 MMOL/L (ref 3.4–5.3)
PROT SERPL-MCNC: 6.2 G/DL (ref 6.8–8.8)
RBC # BLD AUTO: 2.12 10E12/L (ref 3.8–5.2)
RBC # BLD AUTO: 2.26 10E12/L (ref 3.8–5.2)
RBC # BLD AUTO: 2.29 10E12/L (ref 3.8–5.2)
RBC # BLD AUTO: 2.43 10E12/L (ref 3.8–5.2)
RBC # BLD AUTO: 2.45 10E12/L (ref 3.8–5.2)
SODIUM SERPL-SCNC: 143 MMOL/L (ref 133–144)
SPECIMEN EXP DATE BLD: NORMAL
SPECIMEN SOURCE: ABNORMAL
SPECIMEN SOURCE: ABNORMAL
TRANSFUSION STATUS PATIENT QL: NORMAL
TRANSFUSION STATUS PATIENT QL: NORMAL
TROPONIN I SERPL-MCNC: 0.58 UG/L (ref 0–0.04)
TROPONIN I SERPL-MCNC: 0.77 UG/L (ref 0–0.04)
WBC # BLD AUTO: 15.1 10E9/L (ref 4–11)
WBC # BLD AUTO: 15.7 10E9/L (ref 4–11)
WBC # BLD AUTO: 16.1 10E9/L (ref 4–11)
WBC # BLD AUTO: 17.7 10E9/L (ref 4–11)
WBC # BLD AUTO: 22 10E9/L (ref 4–11)

## 2020-02-05 PROCEDURE — 25800030 ZZH RX IP 258 OP 636: Performed by: INTERNAL MEDICINE

## 2020-02-05 PROCEDURE — 83735 ASSAY OF MAGNESIUM: CPT | Performed by: INTERNAL MEDICINE

## 2020-02-05 PROCEDURE — 84484 ASSAY OF TROPONIN QUANT: CPT | Performed by: STUDENT IN AN ORGANIZED HEALTH CARE EDUCATION/TRAINING PROGRAM

## 2020-02-05 PROCEDURE — 25800030 ZZH RX IP 258 OP 636: Performed by: STUDENT IN AN ORGANIZED HEALTH CARE EDUCATION/TRAINING PROGRAM

## 2020-02-05 PROCEDURE — 25000132 ZZH RX MED GY IP 250 OP 250 PS 637: Performed by: STUDENT IN AN ORGANIZED HEALTH CARE EDUCATION/TRAINING PROGRAM

## 2020-02-05 PROCEDURE — 85610 PROTHROMBIN TIME: CPT | Performed by: INTERNAL MEDICINE

## 2020-02-05 PROCEDURE — 40000902 ZZH STATISTIC WOC PT EDUCATION, 16-30 MIN

## 2020-02-05 PROCEDURE — 80076 HEPATIC FUNCTION PANEL: CPT | Performed by: INTERNAL MEDICINE

## 2020-02-05 PROCEDURE — 87205 SMEAR GRAM STAIN: CPT | Performed by: STUDENT IN AN ORGANIZED HEALTH CARE EDUCATION/TRAINING PROGRAM

## 2020-02-05 PROCEDURE — 25000132 ZZH RX MED GY IP 250 OP 250 PS 637: Performed by: INTERNAL MEDICINE

## 2020-02-05 PROCEDURE — 25000128 H RX IP 250 OP 636: Performed by: STUDENT IN AN ORGANIZED HEALTH CARE EDUCATION/TRAINING PROGRAM

## 2020-02-05 PROCEDURE — P9016 RBC LEUKOCYTES REDUCED: HCPCS | Performed by: STUDENT IN AN ORGANIZED HEALTH CARE EDUCATION/TRAINING PROGRAM

## 2020-02-05 PROCEDURE — 20000004 ZZH R&B ICU UMMC

## 2020-02-05 PROCEDURE — 85027 COMPLETE CBC AUTOMATED: CPT | Performed by: STUDENT IN AN ORGANIZED HEALTH CARE EDUCATION/TRAINING PROGRAM

## 2020-02-05 PROCEDURE — 80048 BASIC METABOLIC PNL TOTAL CA: CPT | Performed by: INTERNAL MEDICINE

## 2020-02-05 PROCEDURE — 25000128 H RX IP 250 OP 636: Performed by: INTERNAL MEDICINE

## 2020-02-05 PROCEDURE — 25000125 ZZHC RX 250: Performed by: STUDENT IN AN ORGANIZED HEALTH CARE EDUCATION/TRAINING PROGRAM

## 2020-02-05 PROCEDURE — 99291 CRITICAL CARE FIRST HOUR: CPT | Performed by: INTERNAL MEDICINE

## 2020-02-05 PROCEDURE — 25000132 ZZH RX MED GY IP 250 OP 250 PS 637: Performed by: NURSE PRACTITIONER

## 2020-02-05 PROCEDURE — 25000125 ZZHC RX 250

## 2020-02-05 PROCEDURE — G0463 HOSPITAL OUTPT CLINIC VISIT: HCPCS

## 2020-02-05 PROCEDURE — 85027 COMPLETE CBC AUTOMATED: CPT | Performed by: NURSE PRACTITIONER

## 2020-02-05 PROCEDURE — 84100 ASSAY OF PHOSPHORUS: CPT | Performed by: INTERNAL MEDICINE

## 2020-02-05 PROCEDURE — 25000128 H RX IP 250 OP 636

## 2020-02-05 PROCEDURE — 0DJ08ZZ INSPECTION OF UPPER INTESTINAL TRACT, VIA NATURAL OR ARTIFICIAL OPENING ENDOSCOPIC: ICD-10-PCS | Performed by: INTERNAL MEDICINE

## 2020-02-05 PROCEDURE — 85027 COMPLETE CBC AUTOMATED: CPT | Performed by: INTERNAL MEDICINE

## 2020-02-05 PROCEDURE — C9113 INJ PANTOPRAZOLE SODIUM, VIA: HCPCS | Performed by: STUDENT IN AN ORGANIZED HEALTH CARE EDUCATION/TRAINING PROGRAM

## 2020-02-05 PROCEDURE — 43235 EGD DIAGNOSTIC BRUSH WASH: CPT | Performed by: INTERNAL MEDICINE

## 2020-02-05 PROCEDURE — 25000128 H RX IP 250 OP 636: Performed by: NURSE PRACTITIONER

## 2020-02-05 PROCEDURE — 85384 FIBRINOGEN ACTIVITY: CPT | Performed by: INTERNAL MEDICINE

## 2020-02-05 PROCEDURE — 84484 ASSAY OF TROPONIN QUANT: CPT | Performed by: INTERNAL MEDICINE

## 2020-02-05 PROCEDURE — 00000146 ZZHCL STATISTIC GLUCOSE BY METER IP

## 2020-02-05 RX ORDER — SIMETHICONE
LIQUID (ML) MISCELLANEOUS PRN
Status: DISCONTINUED | OUTPATIENT
Start: 2020-02-05 | End: 2020-02-07 | Stop reason: HOSPADM

## 2020-02-05 RX ORDER — FENTANYL CITRATE 50 UG/ML
25-50 INJECTION, SOLUTION INTRAMUSCULAR; INTRAVENOUS
Status: DISCONTINUED | OUTPATIENT
Start: 2020-02-05 | End: 2020-02-05

## 2020-02-05 RX ORDER — POTASSIUM CL/LIDO/0.9 % NACL 10MEQ/0.1L
10 INTRAVENOUS SOLUTION, PIGGYBACK (ML) INTRAVENOUS
Status: DISCONTINUED | OUTPATIENT
Start: 2020-02-05 | End: 2020-02-07 | Stop reason: HOSPADM

## 2020-02-05 RX ORDER — NALOXONE HYDROCHLORIDE 0.4 MG/ML
.1-.4 INJECTION, SOLUTION INTRAMUSCULAR; INTRAVENOUS; SUBCUTANEOUS
Status: DISCONTINUED | OUTPATIENT
Start: 2020-02-05 | End: 2020-02-05

## 2020-02-05 RX ORDER — FLUMAZENIL 0.1 MG/ML
0.2 INJECTION, SOLUTION INTRAVENOUS
Status: DISCONTINUED | OUTPATIENT
Start: 2020-02-05 | End: 2020-02-05

## 2020-02-05 RX ORDER — POTASSIUM CHLORIDE 1.5 G/1.58G
20-40 POWDER, FOR SOLUTION ORAL
Status: DISCONTINUED | OUTPATIENT
Start: 2020-02-05 | End: 2020-02-07 | Stop reason: HOSPADM

## 2020-02-05 RX ORDER — POTASSIUM CHLORIDE 7.45 MG/ML
10 INJECTION INTRAVENOUS
Status: DISCONTINUED | OUTPATIENT
Start: 2020-02-05 | End: 2020-02-07 | Stop reason: HOSPADM

## 2020-02-05 RX ORDER — PANTOPRAZOLE SODIUM 40 MG/1
40 TABLET, DELAYED RELEASE ORAL
Status: DISCONTINUED | OUTPATIENT
Start: 2020-02-05 | End: 2020-02-07 | Stop reason: HOSPADM

## 2020-02-05 RX ORDER — MAGNESIUM SULFATE HEPTAHYDRATE 40 MG/ML
4 INJECTION, SOLUTION INTRAVENOUS EVERY 4 HOURS PRN
Status: DISCONTINUED | OUTPATIENT
Start: 2020-02-05 | End: 2020-02-07 | Stop reason: HOSPADM

## 2020-02-05 RX ORDER — FOLIC ACID 1 MG/1
1 TABLET ORAL DAILY
Status: DISCONTINUED | OUTPATIENT
Start: 2020-02-06 | End: 2020-02-07 | Stop reason: HOSPADM

## 2020-02-05 RX ORDER — POTASSIUM CHLORIDE 750 MG/1
20-40 TABLET, EXTENDED RELEASE ORAL
Status: DISCONTINUED | OUTPATIENT
Start: 2020-02-05 | End: 2020-02-07 | Stop reason: HOSPADM

## 2020-02-05 RX ORDER — POTASSIUM CHLORIDE 29.8 MG/ML
20 INJECTION INTRAVENOUS
Status: DISCONTINUED | OUTPATIENT
Start: 2020-02-05 | End: 2020-02-07 | Stop reason: HOSPADM

## 2020-02-05 RX ORDER — FENTANYL CITRATE 50 UG/ML
INJECTION, SOLUTION INTRAMUSCULAR; INTRAVENOUS
Status: DISCONTINUED
Start: 2020-02-05 | End: 2020-02-05 | Stop reason: HOSPADM

## 2020-02-05 RX ORDER — FENTANYL CITRATE 50 UG/ML
50-100 INJECTION, SOLUTION INTRAMUSCULAR; INTRAVENOUS
Status: COMPLETED | OUTPATIENT
Start: 2020-02-05 | End: 2020-02-05

## 2020-02-05 RX ORDER — LANOLIN ALCOHOL/MO/W.PET/CERES
100 CREAM (GRAM) TOPICAL DAILY
Status: DISCONTINUED | OUTPATIENT
Start: 2020-02-06 | End: 2020-02-07 | Stop reason: HOSPADM

## 2020-02-05 RX ADMIN — GABAPENTIN 600 MG: 600 TABLET, FILM COATED ORAL at 08:39

## 2020-02-05 RX ADMIN — OCTREOTIDE ACETATE 50 MCG/HR: 200 INJECTION, SOLUTION INTRAVENOUS; SUBCUTANEOUS at 11:12

## 2020-02-05 RX ADMIN — INSULIN ASPART 3 UNITS: 100 INJECTION, SOLUTION INTRAVENOUS; SUBCUTANEOUS at 21:18

## 2020-02-05 RX ADMIN — FOLIC ACID 1 MG: 5 INJECTION, SOLUTION INTRAMUSCULAR; INTRAVENOUS; SUBCUTANEOUS at 08:59

## 2020-02-05 RX ADMIN — PANTOPRAZOLE SODIUM 40 MG: 40 TABLET, DELAYED RELEASE ORAL at 17:33

## 2020-02-05 RX ADMIN — TOPICAL ANESTHETIC: 200 SPRAY DENTAL; PERIODONTAL at 10:30

## 2020-02-05 RX ADMIN — INSULIN ASPART 1 UNITS: 100 INJECTION, SOLUTION INTRAVENOUS; SUBCUTANEOUS at 17:33

## 2020-02-05 RX ADMIN — PIPERACILLIN AND TAZOBACTAM 4.5 G: 4; .5 INJECTION, POWDER, FOR SOLUTION INTRAVENOUS at 05:51

## 2020-02-05 RX ADMIN — THIAMINE HYDROCHLORIDE 100 MG: 100 INJECTION, SOLUTION INTRAMUSCULAR; INTRAVENOUS at 08:58

## 2020-02-05 RX ADMIN — PIPERACILLIN AND TAZOBACTAM 4.5 G: 4; .5 INJECTION, POWDER, FOR SOLUTION INTRAVENOUS at 17:33

## 2020-02-05 RX ADMIN — INSULIN ASPART 1 UNITS: 100 INJECTION, SOLUTION INTRAVENOUS; SUBCUTANEOUS at 04:16

## 2020-02-05 RX ADMIN — FENTANYL CITRATE 50 MCG: 50 INJECTION INTRAMUSCULAR; INTRAVENOUS at 10:32

## 2020-02-05 RX ADMIN — SODIUM CHLORIDE 500 ML: 9 INJECTION, SOLUTION INTRAVENOUS at 10:34

## 2020-02-05 RX ADMIN — MAGNESIUM SULFATE IN WATER 4 G: 40 INJECTION, SOLUTION INTRAVENOUS at 15:22

## 2020-02-05 RX ADMIN — INSULIN ASPART 1 UNITS: 100 INJECTION, SOLUTION INTRAVENOUS; SUBCUTANEOUS at 13:28

## 2020-02-05 RX ADMIN — INSULIN ASPART 1 UNITS: 100 INJECTION, SOLUTION INTRAVENOUS; SUBCUTANEOUS at 08:30

## 2020-02-05 RX ADMIN — MIDAZOLAM 1 MG: 1 INJECTION INTRAMUSCULAR; INTRAVENOUS at 10:32

## 2020-02-05 RX ADMIN — GABAPENTIN 600 MG: 600 TABLET, FILM COATED ORAL at 21:06

## 2020-02-05 RX ADMIN — MIDAZOLAM 1 MG: 1 INJECTION INTRAMUSCULAR; INTRAVENOUS at 10:34

## 2020-02-05 RX ADMIN — PIPERACILLIN AND TAZOBACTAM 4.5 G: 4; .5 INJECTION, POWDER, FOR SOLUTION INTRAVENOUS at 11:17

## 2020-02-05 RX ADMIN — SODIUM CHLORIDE 8 MG/HR: 9 INJECTION, SOLUTION INTRAVENOUS at 03:52

## 2020-02-05 RX ADMIN — SIMVASTATIN 40 MG: 10 TABLET, FILM COATED ORAL at 21:06

## 2020-02-05 RX ADMIN — VANCOMYCIN HYDROCHLORIDE 1500 MG: 10 INJECTION, POWDER, LYOPHILIZED, FOR SOLUTION INTRAVENOUS at 12:17

## 2020-02-05 RX ADMIN — TOPICAL ANESTHETIC 0.5 ML: 200 SPRAY DENTAL; PERIODONTAL at 10:32

## 2020-02-05 ASSESSMENT — ACTIVITIES OF DAILY LIVING (ADL)
ADLS_ACUITY_SCORE: 27
ADLS_ACUITY_SCORE: 21
ADLS_ACUITY_SCORE: 20
ADLS_ACUITY_SCORE: 23
ADLS_ACUITY_SCORE: 21
ADLS_ACUITY_SCORE: 23

## 2020-02-05 ASSESSMENT — MIFFLIN-ST. JEOR: SCORE: 1606.75

## 2020-02-05 NOTE — PROGRESS NOTES
MEDICAL ICU PROGRESS NOTE  02/05/2020      Date of Service (when I saw the patient): 02/05/2020    ASSESSMENT: 60yoF w/ hx of etoh cirrhosis, T2DM c/b Charcot's foot transferred from OSH for hemorrhagic shock 2/2 acute UGIB. EGD (2/5) shows multiple gastric ulcers.    CHANGES and MAJOR THINGS TODAY:   - EGD today per GI  - on/off levo overnight, MAP goal >65  - stop octreotide   - change PPI to oral    PLAN:  ===NEURO===  #Diabetic neuropathy  Takes 1200 mg gabapentin TID. Given current LIGIA will not continue at this high dose.   - started on 600 mg gabapentin BID      # Etoh dependence  Has hx of etoh abuse. Per patient, her last drink was on Friday (1/31/20). No hx of DT per chart review. Pt started on thiamine and folate in OSH. Given diazepam for concern of etoh w/d.   - [facility] protocol   - on thiamine and folate  - chem dep consult     # Sedation: none     ===CARDIOVASCULAR===  # Septic Shock vs hemorraghic shock   Acute hematemesis at OSH, hgb 9.8, trended down to 7.6 s/p 2.5L crystalloid, LA 3.8, trended down to 2.4 w/ volume resuscitation. Abd imaging showed evidence of cirrhosis and possible ascites. No prior diagnosis of cirrhosis per chart review. Has hx of etoh abuse. Received thiamine, folate and crystalloids before transfer, no record of transfusion done at OSH. Septic shock also on ddx with leukocytosis of 21 and hx of charcot's foot and LLE ulcer. CRP 2.7 XR shows evidence of osteomyelitis. Hgb dropped to 6.0, 1 uPRBC given. hgb stable overnight  - see ID for antimicrobials and cultures   - hold pta losartan, hydrochlorothiazide, lasix  - transfuse for hgb <7, plt <50  - stat cbc w/ coags  - repeat hbg this afternoon  - GI consult with EGD today    # elevated troponin  Likely secondary to hypotension and demand. ECG (2/4) unremarkable. Echo (2/4) EF >70% with hyperkinetic LV. Trop slightly elevated.  - trend troponin to peak     ===PULMONARY===  No acute issue  - on/off NC overnight 1-2  L     ===GASTROINTESTINAL===  # Acute UGIB c/b possible hemorrhagic shock  # Etoh cirrhosis   # Ascites  Acute hematemesis w/ hgb of 9.8, trended down to 7.6 s/p volume resuscitation. Outside CT CAP showed varices along the distal esophagus and an enlarged, nodular liver consistent with cirrhosis. No ascites seen on CT or abd U/S at OSH. Abd US (2/4) with hepatomegaly, no ascites. No portal vein thrombus. EGD (2/5) showed multiple gastric ulcers.  - hgb repeat this afternoon, transfuse for hgb <7   - GI consult, appreciate recs  - H pylori stool antigen   - stop octreotide  - stop protonix gtt, start PO BID     # Nutrition  - advance diet as tolerated     ===INFECTIOUS DISEASE===  # Osteomyelitis, right foot     # Leukocytosis   # Lactic acidosis   Abd pain w/ CT CAP showing cirrhosis and gallbladder wall thickening. No gallstone or biliary dilatation. LFTs not concerning for acute biliary process. No CT evidence of intra-abd infection, no ascities. Received zosyn x1 in ED at OSH. Afebrile. Xray of foot shows evidence of osteomyelitis. Grew enterococcus, acinetobacter and corynebacterium from R food wound when hospitalized earlier in 2019. All organisms were pansensitive. At the time forefoot amputation was discussed but pt declined. She was started on a prolonged course of Augmentin (1/29/2019-6/24/2019) and followed up in ID clinic. Most recently seen by podiatry on 1/29/2020, received local wound care.  - on vanc and zosyn   - ortho consult- appreciate recs     Antimicrobials:  Zosyn 4.5 g IV Q6H (2/4 - *)   Vancomycin 1500 mg IV Q18H (2/4 - *)      Cultures:   BC (2/4) pending   Urine culture (2/4) pending   Left foot wound (2/4) pending. GS with rare gram pos bacilli  MRSA (2/4) negative     ===RENAL===  # LIGIA  # Hyperkalemia (resolved)   Cr 1.38 on admission from baseline 0.8. Hyperkalemia likely 2/2 LIGIA. Shifted in OSH, K trended down from 6.3 --> 5.8. Hyperkalemia resolved later in the day following insulin  administration. EKG showed no peaked T waves, VT interval prolongation.   - Daily BMP  - hyperkalemia protocol      # Lactic acidosis - resolved  LA 3.4 on admission in setting of leukocytosis and acute GIB. Trended down after volume resuscitation. Received zosyn x1. High glucose of 299 but without ketones.      ===HEME/ONC===  # Acute blood loss anemia  Hgb baseline around 10, hgb 9.8 on admission, trended down to 7.6 s/p 2.5L crystalloid. Likely partially 2/2 dilution as all cell lines trended down, but pt actively having hematemsis w/ elevated LA. Hgb dropped to 6.0 later in the day, following an additional 2 L of LR. Patient has been typed and screened, she consented for blood transfusion.   - CBC this afternoon, then daily if stable  - transfuse for hgb <7, plt <50     ===ENDOCRINE===  # T2DM c/b peripheral neuropathy and charcot's foot  # Hyperglycemia  Takes glimepiride pta, no insulin. Most recent HgbA1C 6.5.  in ED but neg beta-hydroxybutyrate. No concern for DKA.   - hold pta glimepiride  - medium sliding scale insulin  - hypoglycemic protocol      # HLD   - c/w pta simvastatin      ===SKIN/MSK===  # Osteomyelitis, right foot   Xray of foot shows osteomyelitis. In the past, wound grew pan-sensitive enterococcus. Had been on prolonged abx until 6/2019. Started on Vanc and Zosyn. Has been closely followed by podiatry as outpatient, most recent visit 1/29/2020. Receiving local wound care, including endoform w/ wound vashe wet-to-dry dressing. Multilayer unna boot and compression boot w/ total soft contact cast for RLE. Total contact cast for LLE  - ortho consult  - WOC consult  - on vanc and zosyn   - PT/OT    General Cares/Prophylaxis:    DVT Prophylaxis: Mechanical only (bleeding risk)  GI Prophylaxis: PPI  Code Status: Full Code    Lines/tubes/drains:  - R) internal jugular triple lumen, multiple PIVs    Disposition: Medical ICU    Patient seen and findings/plan discussed with medical ICU staff,   Marcelino CLARKE [facility] NP    ====================================  INTERVAL HISTORY:   Tiffani had a grossly unremarkable overnight.  She did require levo fed on and off at minimal doses.  She did need oxygen via nasal cannula at 1 to 2 L.  This morning she denies any pain, chest pain, shortness of breath, breathing difficulties.  She does have some nausea but attributes that to not being able to eat.  Only complaint right now is dry mouth and being thirsty.  She would also like to go home.  Denies any visual or auditory hallucinations.  Explained to her that the plan today will be for her EGD scope, and the results would dictate her next steps.  She verbalizes understanding and is in agreement with the plan.  OBJECTIVE:   1. VITAL SIGNS:   Temp:  [97  F (36.1  C)-99.5  F (37.5  C)] 98.6  F (37  C)  Pulse:  [] 98  Heart Rate:  [] 96  Resp:  [6-40] 6  BP: ()/(36-65) 108/62  SpO2:  [90 %-100 %] 97 %  Resp: (!) 6      2. INTAKE/ OUTPUT:   I/O last 3 completed shifts:  In: 2488.07 [P.O.:150; I.V.:1038.07; IV Piggyback:1000]  Out: 2750 [Urine:2750]    3. PHYSICAL EXAMINATION:  General: in no acute distress, obese  Skin: bilateral venous stasis present on bilateral lower extremities  HEENT: MMM, PERRLA, EOM intact  CV: RRR, normal S1S2, no murmur, clicks, rubs  Resp: Clear to auscultation bilaterally, no wheezes, rhonchi  Abd: obese, non tender to palpation, firm, slightly distended, hepatomegaly present, no rebound tenderness, guarding, or rigidity   Musc: full ROM in all extremities, weight bearing limited by osteomyelitis of right foot and presence of foot ulcers   Extremities: bilateral venous stasis present, both feet appear edematous but are firm to touch, ulcers present on bilateral feet   Neuro: No lateralizing symptoms or focal neurologic deficits, AOx3     4. LABS:   Arterial Blood Gases   No lab results found in last 7 days.  Complete Blood Count   Recent Labs   Lab 02/05/20  6122  02/04/20 2156 02/04/20  1651 02/04/20  1601   WBC 22.0* 22.6* 20.1* Canceled, Test credited   HGB 7.2* 7.2* 6.0* Canceled, Test credited   PLT 76* 81* 133* Canceled, Test credited     Basic Metabolic Panel  Recent Labs   Lab 02/05/20  0413 02/04/20  2156 02/04/20  1601 02/04/20  1201    142 142 141   POTASSIUM 4.4 4.6 5.0 5.1   CHLORIDE 114* 112* 110* 110*   CO2 23 24 25 26   BUN 82* 90* 99* 99*   CR 1.46* 1.53* 1.53* 1.29*   * 160* 172* 199*     Liver Function Tests  Recent Labs   Lab 02/05/20 0413 02/04/20  0743 02/04/20  0734   AST 43 32  --    ALT 20 21  --    ALKPHOS 73 86  --    BILITOTAL 0.6 0.5  --    ALBUMIN 2.2* 2.3*  --    INR  --   --  1.45*     Pancreatic Enzymes  No lab results found in last 7 days.  Coagulation Profile  Recent Labs   Lab 02/04/20  0734   INR 1.45*       5. RADIOLOGY:   Recent Results (from the past 24 hour(s))   XR Ankle Left G/E 3 Views    Narrative    EXAM: XR ANKLE LT G/E 3 VW  2/4/2020 10:39 AM      HISTORY: History of charcot, ulcer over lateral malleolus    COMPARISON: 10/1/2019, 8/28/2019    FINDINGS: 3 nonweightbearing views of the left ankle.    Soft tissue defect evident lateral to the lateral malleolus.  Periostitis of the distal fibula as progressed compared to prior. Tiny  avulsive fragments distal to the distal fibula on the oblique view.  Subtle distal fibular osteolysis. Distal tibial periostitis is similar  prior. Extensive bony deformity and disorganized osteolysis and bony  proliferation, grossly similar to prior. Anterior subluxation of the  talar dome, similar to prior. Diffuse soft tissue prominence.       Impression    IMPRESSION:   1. Progressive periostitis of the distal fibula. Subtle osteolysis of  the distal fibula compatible with osteomyelitis. Small adjacent  avulsive fracture fragment.  2. Charcot arthropathy changes.    JORGE CLARK (Joe) MD   XR Foot Right G/E 3 Views    Narrative    EXAM: XR FOOT RT G/E 3 VW  2/4/2020 10:40 AM       HISTORY: Plantar ulceration    COMPARISON: Foot 1/9/2019    FINDINGS: Portable nonweightbearing AP, oblique, and lateral views of  the right foot.    Osteolysis of the first metatarsal head appears progressed compared to  prior with progressive loss of bone stock of the median eminence.  Chronic deformities of the second and fifth metatarsals and second and  fifth proximal phalanges, stable. Chronic deformities of the heads of  the third and fourth metatarsals    Collapse of the fourth metatarsal head is new since prior radiographs.  Stable ossicle within the second metatarsophalangeal joint. Tiny  plantar calcaneal heel spur. Dorsal soft tissue swelling. Plantar soft  tissue defect. Mild amount of air projecting over the first  metatarsophalangeal joint on the AP view, likely correlating to  plantar soft tissue defect on the lateral view.       Impression    IMPRESSION:   1. Progressive osteolysis of the first metatarsal head with plantar  soft tissue defect and gas compatible with ongoing septic arthritis  and osteomyelitis.  2. Collapse of the fourth metatarsal head is new since prior  radiographs 1/9/2019.    JORGE CLARK MD (Joe)   US Abdomen Complete w Doppler Complete    Narrative    EXAMINATION: US ABDOMEN COMPLETE WITH DOPPLER COMPLETE  2/4/2020 2:38  PM      CLINICAL HISTORY: pt with newly diagnosed cirrhosis, please assess for  ascites and possible clots in the portal vein    COMPARISON: None. There is an outside abdomen ultrasound report dated  2/3/2020.        FINDINGS:  Enlarged, heterogeneous, and coarsened liver measuring 21 cm in the  craniocaudal dimension. No focal mass is appreciated. No intra or  extrahepatic biliary duct dilatation. The common bile duct measures 4  mm in diameter. The gallbladder wall is borderline thickened,  measuring 3.3 mm. No, gallstones or pericholecystic fluid.    Extrahepatic portal vein flow is antegrade, measuring 14 cm/sec.  Right portal vein flow is  antegrade, measuring 18 cm/sec.  Left portal vein flow is antegrade, measuring 13 cm/sec.    Hepatic artery flow is antegrade:  181 cm/sec peak systolic  0.63 resistive index.     The splenic vein is patent with flow towards the liver.  The left,  middle, and right hepatic veins are patent with flow towards the IVC.  The IVC is patent with flow towards the heart.   The aorta is of  normal caliber.    The spleen measures maximally 11.5 cm and is normal in appearance. The  visualized head and body of the pancreas are normal in echogenicity.  The tail is not well visualized.    The kidneys are normal in position and echogenicity. The right kidney  measures 11.4 cm and the left kidney measures 10.7 cm. No  hydronephrosis, shadowing stone, or mass appreciated.    No free fluid appreciated in the visualized portions of the abdomen.      Impression    IMPRESSION:   1. Hepatomegaly. Echogenic, heterogenous liver parenchyma and mildly  thickened gallbladder wall most compatible with chronic intrinsic  parenchymal disease. No focal mass appreciated. No ascites appreciated  as questioned.  2. Patent doppler evaluation of the liver. No portal vein thrombus  appreciated as questioned.    I have personally reviewed the examination and initial interpretation  and I agree with the findings.    STERLING BORREGO MD   Echocardiogram Complete    Narrative    [id number]  [id number]  [id number]  [id number]^RIGO^VAHID^- AP SIERRA           [facility],[place]  Echocardiography Laboratory  [address] [place] [id number]     Name: ZURDO ALEGRIA  MRN: [id number]  : 1960  Study Date: 2020 02:58 PM  Age: 60 yrs  Gender: Female  Patient Location: [facility]  Reason For Study: Endocarditis  Ordering Physician: VAHID SIERRA  Performed By: Silver Charles RDCS     BSA: 2.1 m2  Height: 66 in  Weight: 223 lb  BP: 89/44 mmHg  _____________________________________________________________________________  __         Procedure  Complete Portable Echo Adult. Technically difficult study. Poor acoustic  windows.  _____________________________________________________________________________  __        Interpretation Summary  Technically difficult study. Poor acoustic windows.     No definite echocardiographic evidence of endocarditis on this transthoracic  study. Consider CHIDI if clinical suspicion warrants.     Global and regional left ventricular function is hyperkinetic with an EF >70%.  Hyperdynamic LV function results in cavity obliteration in systole and a peak  mid-cavitary gradient of 50 mmHg with Valsalva maneuver.  Right ventricular function, chamber size, wall motion, and thickness are  normal.  No significant valvular disease.  There is no prior study for direct comparison.     _____________________________________________________________________________  __        Left Ventricle  Left ventricular wall thickness is normal. Left ventricular size is normal.  Global and regional left ventricular function is hyperkinetic with an EF >70%.  Cavity obliteration and mid-ventricular gradient are present. There is a mid-  cavitary gradient that reaches 50 mmHg with valsalva, most likely due to  hyperdynamic state. Left ventricular diastolic function is normal. No regional  wall motion abnormalities are seen.     Right Ventricle  Right ventricular function, chamber size, wall motion, and thickness are  normal.     Atria  Both atria appear normal.        Mitral Valve  The mitral valve is normal.     Aortic Valve  Aortic valve is normal in structure and function. The aortic valve is  tricuspid. No aortic regurgitation is present.     Tricuspid Valve  The tricuspid valve is normal. The peak velocity of the tricuspid regurgitant  jet is not obtainable.     Pulmonic Valve  The pulmonic valve is normal.     Vessels  The aorta root is normal. Ascending aorta 3.5 cm. Dilation of the inferior  vena cava is present with normal respiratory  variation in diameter. IVC  diameter and respiratory changes fall into an intermediate range suggesting an  RA pressure of 8 mmHg.     Pericardium  No pericardial effusion is present.        Compared to Previous Study  There is no prior study for direct comparison.  _____________________________________________________________________________  __     MMode/2D Measurements & Calculations  RVDd: 3.3 cm  IVSd: 0.96 cm  LVIDd: 5.3 cm  LVIDs: 3.4 cm  LVPWd: 1.0 cm  FS: 37.4 %  LV mass(C)d: 201.0 grams  LV mass(C)dI: 95.9 grams/m2  asc Aorta Diam: 3.5 cm  LVOT diam: 2.3 cm  LVOT area: 4.1 cm2  LA Volume Index (BP): 35.3 ml/m2  RWT: 0.38  TAPSE: 2.3 cm           Doppler Measurements & Calculations  MV E max yoshi: 103.3 cm/sec  MV A max yoshi: 116.1 cm/sec  MV E/A: 0.89  MV dec slope: 571.7 cm/sec2  MV dec time: 0.18 sec  E/E' av.5  Lateral E/e': 11.3  Medial E/e': 13.6     _____________________________________________________________________________  __           Report approved by: Richard Hinton 2020 04:14 PM      XR Chest Port 1 View    Narrative    EXAMINATION: XR CHEST PORT 1 VW, 2020 9:33 PM    INDICATION: 59 yo woman w/ cirrhosis presented w/ GI bleed and sepsis.  Verify R IJ central line placement    COMPARISON: None    FINDINGS: Single AP upright radiograph the chest    Trachea is midline. Cardiac and mediastinal borders are clear. Cardiac  silhouette is enlarged. Low lung volumes. Right IJ central venous  catheter with tip projecting in the low SVC. Mild indistinct pulmonary  vasculature with interstitial opacities. No appreciable pneumothorax.  Trace bilateral pleural effusions.       Impression    IMPRESSION:  1. Right IJ central venous catheter with tip projecting in the lower  SVC.  2. Mildly enlarged heart with mild pulmonary edema.    I have personally reviewed the examination and initial interpretation  and I agree with the findings.    STERLING BORREGO MD        =========================================

## 2020-02-05 NOTE — PLAN OF CARE
ICU End of Shift Summary. See flowsheets for vital signs and detailed assessment.    Changes this shift: Alert and oriented, no reports of pain, JONAS, and PERRL. No BM or emesis overnight. Needing levo on/off to maintain MAPS >65. On Protonix and octreotide gtt. Afebrile. On 2L NC overnight. LS clear.     Plan: EGD today, NPO since 0000.

## 2020-02-05 NOTE — PLAN OF CARE
ICU End of Shift Summary. See flowsheets for vital signs and detailed assessment.    Changes this shift: L eye bruising, unknown source. Alter and oriented x4. Pt reports no pain, nausea. No bloody vomiting or stools. Reports feeling bloating, no significant change with bedside assessment. Unable to complete EGD d/t hypotension, most likely tomorrow. Tachycardia, 's. Hgb 6.0, MD notified, PRBC ordered. Hypotension, MD notified and are following, 2L bolus given, pressures remain the same or slightly improved, asymptomatic. Cardiac workup completed. L foot, non-weight bearing. Continues requiring oxygen, 2-4L. Protonix and octreotide infusions continue.  Diabetic ulcer, per podiatry to remain covered. Echo at bedside. Abdominal US at bedside.     Plan: PRBC to be given. Monitor pressures. Continue with POC.

## 2020-02-05 NOTE — PROGRESS NOTES
Orthopaedic Surgery Progress Note 02/05/2020    S: PRBC transfused overnight.  Afebrile.  BP responsive to measures, more normalized.  No other acute events overnight.      O:  Temp: 99  F (37.2  C) Temp src: Axillary BP: 105/53 Pulse: 96 Heart Rate: 98 Resp: 13 SpO2: 96 % O2 Device: Nasal cannula Oxygen Delivery: 2 LPM    Exam:  Gen: No acute distress, resting comfortably in bed.  Resp: Non-labored breathing  MSK:  BLE:  - Dressings c/d/i  - Ulcerations appear stable, no purulence  - No leg erythema  - BLE pitting edema      Recent Labs   Lab 02/05/20  0413 02/04/20  2156 02/04/20  1651  02/04/20  0743   WBC 22.0* 22.6* 20.1*   < > 22.4*   HGB 7.2* 7.2* 6.0*   < > 7.1*   PLT 76* 81* 133*   < > 153   CRP  --   --   --   --  19.0*    < > = values in this interval not displayed.       Assessment: Tiffani Tan is a 60 year old female with bilateral LE ulcerations and SIRS criteria, but more compatible with ongoing GI bleed and less likely sepsis related to LE infection/osteomyelitis.    Plan:  No immediate indications for BKA or surgical debridement  WOC/Podiatry for wound cares  BLE elevation and edema control  Nutritional optimization  Abx per ID/primary teams  Trend WBC/CRP/ESR/Procalcitonin  Remainder of cares per primary      Orthopedics to continue to chart check and monitor vitals/lab values.  If clinical condition deteriorates please contact us for further treatment recommendations      Levy Garces MD  Orthopaedic Surgery PGY-4  Pager [phone]    Please page me directly with any questions/concerns during regular weekday hours before 5pm. If there is no response, if it is a weekend, or if it is during evening hours then please page the orthopaedic surgery resident on call.

## 2020-02-05 NOTE — PROGRESS NOTES
"CLINICAL NUTRITION SERVICES - ASSESSMENT NOTE     Nutrition Prescription    RECOMMENDATIONS FOR MDs/PROVIDERS TO ORDER:  Adv diet to Regular, or as allowed by GI team.    Malnutrition Status:    Pt does not meet two of the established criteria necessary for diagnosing malnutrition but is at risk for malnutrition.    Recommendations already ordered by Registered Dietitian (RD):  Discussed nutrition POC on MICU rounds.        REASON FOR ASSESSMENT  Tiffani Tan is a 60 year old female assessed by the dietitian for Admission Nutrition Risk Screen for new/uncontrolled diabetes.    NUTRITION HISTORY  Note pt has a wound on bilateral feet, podiatry to see (WOC RN has seen as well). Pt's skin on her feet is very, very dry and wound is chronic. Pt has a h/o EtOH abuse. Pt does not check her BG, per NP.     CURRENT NUTRITION ORDERS  Diet: NPO  Intake/Tolerance: n/a    LABS  Labs reviewed- highest BG was 239 mg/dL yesterday on admission. Recent HgbA1c was 6.5% on 2/4/20.    MEDICATIONS  Medications reviewed- folate and thiamin    ANTHROPOMETRICS  Height: 167.6 cm (5' 6\")  Admission Weight: 101.3 kg (2/4/20)  Most Recent Weight: 102.5 kg (225 lb 15.5 oz)    IBW: 59.1 kg  BMI: Obesity Grade II BMI 35-39.9  Weight History:   Wt Readings from Last 10 Encounters:   02/05/20 102.5 kg (225 lb 15.5 oz)   10/01/19 88.5 kg (195 lb)   01/09/19 79.4 kg (175 lb)     Weight has gone up significantly over the past year.   Dosing Weight: 70 kg (adj wt)    ASSESSED NUTRITION NEEDS  Estimated Energy Needs: 0821-1254 kcals/day (25 - 30 kcals/kg)  Justification: Maintenance; aim for low end given obesity  Estimated Protein Needs:  g/day (1.2 - 1.5 g/kg)  Justification: Wound healing  Estimated Fluid Needs: Per provider pending fluid status    PHYSICAL FINDINGS  See malnutrition section below.  Sarcopenic obesity  Dry, scaly skin on feet  Wounds on feet  LE skin is chris     MALNUTRITION  % Intake: Decreased intake does not meet criteria  % " Weight Loss: None noted  Subcutaneous Fat Loss: None observed  Muscle Loss: Scapular bone, Thoracic region (clavicle, acromium bone, deltoid, trapezius, pectoral) and Upper arm (bicep, tricep): mild  Fluid Accumulation/Edema: None noted, though difficult to assess given obesity  Malnutrition Diagnosis: Pt does not meet two of the established criteria necessary for diagnosing malnutrition but is at risk for malnutrition    NUTRITION DIAGNOSIS  Inadequate oral intake related to GI status inhibiting ability to take PO as evidenced by pt NPO x 2 days.      INTERVENTIONS  Implementation  Nutrition Education: See education flowsheet   Collaboration with other providers - MICU rounds    Goals  Pt to consume % of nutritionally adequate meal trays TID, or the equivalent with supplements/snacks.     Monitoring/Evaluation  Progress toward goals will be monitored and evaluated per protocol.    Yue Peng RD, LD  ([facility] diet[facility], abk- [phone])

## 2020-02-05 NOTE — PROCEDURES
DATE:  2/4/2020    PROCEDURE:   R internal jugular vein central line placement    INDICATION:   Hypotension    PROCEDURE :   John Oneal MD    SUPERVISED BY:  Tanya Butt MD    CONSENT:   Informed consent was obtained after risks and benefits were explained at length.     PROCEDURE SUMMARY:   A time-out was performed. The pt's R neck was prepped w/ chlorhexidine in terile fashion. The access area was injected w/ 1% lidocaine to numb the region. Ultrasound was used to guide the introducer needle into the R internal jugular vein until flush was obtained. The wire was introduced and it's location in the internal jugular vein was verified by ultrasound. The introducer needle was removed and a knick was made in the skin prior to dilation. After dilation, a triple lumen central line was inserted to 16cm. All ports miles back blood. It was sutured in place and a antibiotic disc was applied. The area was dressed and cleaned. The patient tolerated the procedure well.     An Xray was ordered to verify placement.     Dr. Butt  was present for the key portions of the procedure.    ESTIMATED BLOOD LOSS: 5mL      John Oneal MD  Internal Medicine PGY-3

## 2020-02-05 NOTE — OR NURSING
EGD completed on unit 4C with 4C RN monitoring patient and administering conscious sedation. No interventions.

## 2020-02-05 NOTE — PLAN OF CARE
PT 4C: CANCEL; pt having procedure in AM and unable to return in PM. Awaiting ortho recs on NWB vs heel weightbearing on R LE.

## 2020-02-05 NOTE — PROGRESS NOTES
"[facility] Nurse Inpatient Wound Assessment   Reason for consultation: bilateral LE wounds     Assessment  Bilateral LE wounds due to Diabetic Ulcer. Wounds are chronic per chart and Pt. She sees podiatry weekly with last visit on 1/29/20.  Status: initial assessment    Treatment Plan  Bilateral LE wounds: Daily  and PRN when soiled   1. Cleanse with microklenz and gauze  2. Cut approx 3-4 cm strip of mesalt for R foot - lay over wound bed and 5-6 cm strip for L foot wound gently packed into cavity.  3. Cut Exudry in half and place each half over wounds on bilateral feet  4. Secure using 1/2 roll kerlix and medipore tape. Ace 6\" to bilateral LE from foot to below knee.   5. Encourage pt to keep pressure off wounds as often as possible.     Orders Written  [facility] Nurse follow-up plan:signing off and weekly  Nursing to notify the Provider(s) and re-consult the [facility] Nurse if wound(s) deteriorates or new skin concern.    Patient History  According to provider note(s):  60yoF w/ hx of etoh cirrhosis, T2DM c/b Charcot's foot transferred from OSH for hemorrhagic shock 2/2 acute UGIB. EGD (2/5) shows multiple gastric ulcers.    Objective Data  Containment of urine/stool: Brief, Incontinent pad in bed and External catheter    Active Diet Order  Orders Placed This Encounter      Advance Diet as Tolerated: Clear Liquid Diet      Output:   I/O last 3 completed shifts:  In: 2488.07 [P.O.:150; I.V.:1038.07; IV Piggyback:1000]  Out: 2750 [Urine:2750]    Risk Assessment:   Sensory Perception: 3-->slightly limited  Moisture: 3-->occasionally moist  Activity: 3-->walks occasionally  Mobility: 3-->slightly limited  Nutrition: 3-->adequate  Friction and Shear: 3-->no apparent problem  Stewart Score: 18                          Labs:   Recent Labs   Lab 02/05/20  1116 02/05/20  0810 02/05/20  0413  02/04/20  0743  02/04/20  0734   ALBUMIN  --   --  2.2*  --  2.3*   < >  --    HGB 6.7* 7.1* 7.2*   < > 7.1*  --   --    INR  --  1.33*  --   --   --   " --  1.45*   WBC 16.1* 17.7* 22.0*   < > 22.4*  --   --    A1C  --   --   --   --   --   --  6.5*   CRP  --   --   --   --  19.0*  --   --     < > = values in this interval not displayed.       Physical Exam  Skin inspection: focused bilateral LE    Wound Location:  Right medial/plantar foot near 1st met head    Date of last photo 2/5/20 (measuring tool is towards heel)  Wound History: chronic wounds  Measurements (length x width x depth, in cm) 1.5  x 2.5  x  0.5 cm   Wound Base: 100 % granulation tissue and superficial scab to edge of wound circumferentially   Tunneling up to 0.8 cm from 10-11 o'clock and 3-5 O'clock if 12 is big toe and 6 is measuring tool.   Undermining N/A  Palpation of the wound bed: normal   Periwound skin: intact and superficial scabbing to edge of wound  Periwound Color: normal and consistent with surrounding tissue  Periwound Temperature: normal   Drainage:, moderate  Description of drainage: serosanguinous  Odor: none  Pain: denies , none    Wound Location:  Left lateral charcot foot    Date of last photo 2/5/20  Wound History: chronic wound to charcot foot  Measurements (length x width x depth, in cm) 1.5  x 1.8  x  0.6 cm   Wound Base: 100 % granulation tissue  Tunneling N/A  Undermining N/A  Palpation of the wound bed: normal   Periwound skin: intact and callused  Periwound Color: normal and consistent with surrounding tissue  Periwound Temperature: normal   Drainage:, moderate  Description of drainage: serosanguinous  Odor: none  Pain: absent, none      Interventions  Current support surface: Standard  Atmos Air mattress  Current off-loading measures: Pillows under calves and Pillows  Visual inspection and assessment completed of wounds  Wound Care: done per plan of care  Supplies: gathered, placed at the bedside, discussed with RN and discussed with patient  Education provided: plan of care, Infection prevention  and Off-loading pressure  Discussed plan of care with Patient and  Nurse    Nadya Bedolla, RN

## 2020-02-06 ENCOUNTER — TELEPHONE (OUTPATIENT)
Dept: GASTROENTEROLOGY | Facility: CLINIC | Age: 60
End: 2020-02-06

## 2020-02-06 LAB
ALBUMIN UR-MCNC: NEGATIVE MG/DL
ANION GAP SERPL CALCULATED.3IONS-SCNC: 2 MMOL/L (ref 3–14)
APPEARANCE UR: CLEAR
BILIRUB UR QL STRIP: NEGATIVE
BUN SERPL-MCNC: 45 MG/DL (ref 7–30)
CALCIUM SERPL-MCNC: 8.2 MG/DL (ref 8.5–10.1)
CHLORIDE SERPL-SCNC: 114 MMOL/L (ref 94–109)
CO2 SERPL-SCNC: 26 MMOL/L (ref 20–32)
COLOR UR AUTO: YELLOW
CREAT SERPL-MCNC: 1.16 MG/DL (ref 0.52–1.04)
ERYTHROCYTE [DISTWIDTH] IN BLOOD BY AUTOMATED COUNT: 17.5 % (ref 10–15)
ERYTHROCYTE [DISTWIDTH] IN BLOOD BY AUTOMATED COUNT: 17.7 % (ref 10–15)
ERYTHROCYTE [SEDIMENTATION RATE] IN BLOOD BY WESTERGREN METHOD: 114 MM/H (ref 0–30)
GFR SERPL CREATININE-BSD FRML MDRD: 51 ML/MIN/{1.73_M2}
GLUCOSE BLDC GLUCOMTR-MCNC: 132 MG/DL (ref 70–99)
GLUCOSE BLDC GLUCOMTR-MCNC: 148 MG/DL (ref 70–99)
GLUCOSE BLDC GLUCOMTR-MCNC: 149 MG/DL (ref 70–99)
GLUCOSE BLDC GLUCOMTR-MCNC: 153 MG/DL (ref 70–99)
GLUCOSE BLDC GLUCOMTR-MCNC: 170 MG/DL (ref 70–99)
GLUCOSE BLDC GLUCOMTR-MCNC: 200 MG/DL (ref 70–99)
GLUCOSE SERPL-MCNC: 162 MG/DL (ref 70–99)
GLUCOSE UR STRIP-MCNC: NEGATIVE MG/DL
HCT VFR BLD AUTO: 24.9 % (ref 35–47)
HCT VFR BLD AUTO: 24.9 % (ref 35–47)
HGB BLD-MCNC: 7.5 G/DL (ref 11.7–15.7)
HGB BLD-MCNC: 7.6 G/DL (ref 11.7–15.7)
HGB UR QL STRIP: NEGATIVE
KETONES UR STRIP-MCNC: NEGATIVE MG/DL
LEUKOCYTE ESTERASE UR QL STRIP: NEGATIVE
MAGNESIUM SERPL-MCNC: 2.5 MG/DL (ref 1.6–2.3)
MCH RBC QN AUTO: 31.5 PG (ref 26.5–33)
MCH RBC QN AUTO: 32.5 PG (ref 26.5–33)
MCHC RBC AUTO-ENTMCNC: 30.1 G/DL (ref 31.5–36.5)
MCHC RBC AUTO-ENTMCNC: 30.5 G/DL (ref 31.5–36.5)
MCV RBC AUTO: 105 FL (ref 78–100)
MCV RBC AUTO: 106 FL (ref 78–100)
MUCOUS THREADS #/AREA URNS LPF: PRESENT /LPF
NITRATE UR QL: NEGATIVE
PH UR STRIP: 6 PH (ref 5–7)
PLATELET # BLD AUTO: 61 10E9/L (ref 150–450)
PLATELET # BLD AUTO: 63 10E9/L (ref 150–450)
POTASSIUM SERPL-SCNC: 4.2 MMOL/L (ref 3.4–5.3)
RBC # BLD AUTO: 2.34 10E12/L (ref 3.8–5.2)
RBC # BLD AUTO: 2.38 10E12/L (ref 3.8–5.2)
RBC #/AREA URNS AUTO: 1 /HPF (ref 0–2)
SODIUM SERPL-SCNC: 142 MMOL/L (ref 133–144)
SOURCE: ABNORMAL
SP GR UR STRIP: 1.02 (ref 1–1.03)
SQUAMOUS #/AREA URNS AUTO: <1 /HPF (ref 0–1)
UPPER GI ENDOSCOPY: NORMAL
UROBILINOGEN UR STRIP-MCNC: NORMAL MG/DL (ref 0–2)
WBC # BLD AUTO: 12.7 10E9/L (ref 4–11)
WBC # BLD AUTO: 13 10E9/L (ref 4–11)
WBC #/AREA URNS AUTO: 0 /HPF (ref 0–5)

## 2020-02-06 PROCEDURE — 83735 ASSAY OF MAGNESIUM: CPT | Performed by: NURSE PRACTITIONER

## 2020-02-06 PROCEDURE — 25000128 H RX IP 250 OP 636: Performed by: STUDENT IN AN ORGANIZED HEALTH CARE EDUCATION/TRAINING PROGRAM

## 2020-02-06 PROCEDURE — 25000128 H RX IP 250 OP 636: Performed by: INTERNAL MEDICINE

## 2020-02-06 PROCEDURE — 00000146 ZZHCL STATISTIC GLUCOSE BY METER IP

## 2020-02-06 PROCEDURE — 99233 SBSQ HOSP IP/OBS HIGH 50: CPT | Performed by: INTERNAL MEDICINE

## 2020-02-06 PROCEDURE — 25000132 ZZH RX MED GY IP 250 OP 250 PS 637: Performed by: STUDENT IN AN ORGANIZED HEALTH CARE EDUCATION/TRAINING PROGRAM

## 2020-02-06 PROCEDURE — 85027 COMPLETE CBC AUTOMATED: CPT | Performed by: NURSE PRACTITIONER

## 2020-02-06 PROCEDURE — 80048 BASIC METABOLIC PNL TOTAL CA: CPT | Performed by: NURSE PRACTITIONER

## 2020-02-06 PROCEDURE — 12000012 ZZH R&B MS OVERFLOW UMMC

## 2020-02-06 PROCEDURE — 85027 COMPLETE CBC AUTOMATED: CPT | Performed by: INTERNAL MEDICINE

## 2020-02-06 PROCEDURE — 25000132 ZZH RX MED GY IP 250 OP 250 PS 637: Performed by: NURSE PRACTITIONER

## 2020-02-06 PROCEDURE — 99233 SBSQ HOSP IP/OBS HIGH 50: CPT | Mod: GC | Performed by: INTERNAL MEDICINE

## 2020-02-06 PROCEDURE — 81001 URINALYSIS AUTO W/SCOPE: CPT | Performed by: STUDENT IN AN ORGANIZED HEALTH CARE EDUCATION/TRAINING PROGRAM

## 2020-02-06 PROCEDURE — 36415 COLL VENOUS BLD VENIPUNCTURE: CPT | Performed by: INTERNAL MEDICINE

## 2020-02-06 PROCEDURE — 25800030 ZZH RX IP 258 OP 636: Performed by: INTERNAL MEDICINE

## 2020-02-06 PROCEDURE — 85652 RBC SED RATE AUTOMATED: CPT | Performed by: INTERNAL MEDICINE

## 2020-02-06 RX ORDER — GABAPENTIN 600 MG/1
1200 TABLET ORAL 3 TIMES DAILY
Status: DISCONTINUED | OUTPATIENT
Start: 2020-02-06 | End: 2020-02-07 | Stop reason: HOSPADM

## 2020-02-06 RX ORDER — GABAPENTIN 600 MG/1
600 TABLET ORAL 3 TIMES DAILY
Status: DISCONTINUED | OUTPATIENT
Start: 2020-02-06 | End: 2020-02-06

## 2020-02-06 RX ADMIN — PIPERACILLIN AND TAZOBACTAM 4.5 G: 4; .5 INJECTION, POWDER, FOR SOLUTION INTRAVENOUS at 07:02

## 2020-02-06 RX ADMIN — FOLIC ACID 1 MG: 1 TABLET ORAL at 09:43

## 2020-02-06 RX ADMIN — GABAPENTIN 1200 MG: 600 TABLET, FILM COATED ORAL at 13:54

## 2020-02-06 RX ADMIN — GABAPENTIN 1200 MG: 600 TABLET, FILM COATED ORAL at 19:32

## 2020-02-06 RX ADMIN — THIAMINE HCL TAB 100 MG 100 MG: 100 TAB at 09:44

## 2020-02-06 RX ADMIN — PANTOPRAZOLE SODIUM 40 MG: 40 TABLET, DELAYED RELEASE ORAL at 09:44

## 2020-02-06 RX ADMIN — PIPERACILLIN AND TAZOBACTAM 4.5 G: 4; .5 INJECTION, POWDER, FOR SOLUTION INTRAVENOUS at 17:41

## 2020-02-06 RX ADMIN — PIPERACILLIN AND TAZOBACTAM 4.5 G: 4; .5 INJECTION, POWDER, FOR SOLUTION INTRAVENOUS at 12:30

## 2020-02-06 RX ADMIN — PIPERACILLIN AND TAZOBACTAM 4.5 G: 4; .5 INJECTION, POWDER, FOR SOLUTION INTRAVENOUS at 00:00

## 2020-02-06 RX ADMIN — PANTOPRAZOLE SODIUM 40 MG: 40 TABLET, DELAYED RELEASE ORAL at 16:31

## 2020-02-06 RX ADMIN — SIMVASTATIN 40 MG: 10 TABLET, FILM COATED ORAL at 22:17

## 2020-02-06 RX ADMIN — INSULIN ASPART 1 UNITS: 100 INJECTION, SOLUTION INTRAVENOUS; SUBCUTANEOUS at 00:01

## 2020-02-06 RX ADMIN — GABAPENTIN 1200 MG: 600 TABLET, FILM COATED ORAL at 09:44

## 2020-02-06 RX ADMIN — VANCOMYCIN HYDROCHLORIDE 1500 MG: 10 INJECTION, POWDER, LYOPHILIZED, FOR SOLUTION INTRAVENOUS at 04:55

## 2020-02-06 ASSESSMENT — ACTIVITIES OF DAILY LIVING (ADL)
ADLS_ACUITY_SCORE: 23
ADLS_ACUITY_SCORE: 26
ADLS_ACUITY_SCORE: 21
ADLS_ACUITY_SCORE: 21

## 2020-02-06 NOTE — PLAN OF CARE
PT 4C>7A: Will hold at this time. Ortho PA wrote B NWB on 2/3 consult note but clarified with ortho resident on 2/5 that she can be B WBAT. Pt has left LE charcot foot and doesn't weightbear on that foot at baseline. Per pt report she heel weightbears on R LE bed<>w/c. No functional change in weightbearing status at this time. OT follow for functional assessment. PT will initiate if appropriate.

## 2020-02-06 NOTE — PLAN OF CARE
ICU End of Shift Summary. See flowsheets for vital signs and detailed assessment.    Changes this shift: Tmax 100.1. Tried to wean oxygen; however patient de-sated to 87% on room air while in bed. NC back on at 2 LPM. Insulin orders changed from every 4 hours to before meals and at bedtime. CIWA 0. Stool sample still needs to be collected for H. pylori.     Plan: Continue with POC and notify team of any changes. Possible transfer.      Problem: Gastrointestinal Condition Comorbidity  Goal: Gastrointestinal Condition  Description  Patient comorbidity will be monitored for signs and symptoms of Gastrointestinal condition.  Problems will be absent, minimized or managed by discharge/transition of care.  Outcome: No Change

## 2020-02-06 NOTE — PROGRESS NOTES
MEDICAL ICU PROGRESS NOTE  02/06/2020      Date of Service (when I saw the patient): 02/06/2020    ASSESSMENT: 59 yo F w/ hx of etoh cirrhosis, T2DM c/b diabetic neuropathy, osteomyelitis of the right foot, transferred from OSH for hemorrhagic shock 2/2 acute UGIB.        CHANGES TODAY:   - transfer to medicine   - central line removed   - vanc discontinued  - continue zosyn   - pt restarted on 1200 mg gabapentin TID     PLAN:  ===NEURO===  # Diabetic neuropathy  Takes 1200 mg gabapentin TID PTA. Initially held 2/2 to LIGIA. LIGIA resolved on 2/6, restarted on home dose.      # Etoh dependence  Has hx of etoh abuse. Per patient, her last drink was on Friday (1/31/20). No hx of delirium tremens or seizures per chart review. Pt started on thiamine and folate in OSH. Given diazepam for concern of etoh w/d.   - [facility] protocol   - on thiamine and folate  - chem dep consult     # Sedation: none     ===CARDIOVASCULAR===  # Septic Shock  Acute hematemesis at OSH, hgb 9.8, trended down to 7.6 s/p 2.5L crystalloid, LA 3.8, trended down to 2.4 w/ volume resuscitation. Abd imaging showed evidence of cirrhosis and possible ascites. No prior diagnosis of cirrhosis per chart review. Has hx of etoh abuse. Received thiamine, folate and crystalloids before transfer, no record of transfusion done at OSH. Septic shock also on ddx with leukocytosis of 21 and hx of osteomyelitis and LLE ulcer. CRP 2.7 XR shows evidence of osteomyelitis. Hgb dropped to 6.0 on 2/4, 1 uPRBC given. Hgb stable at 7.6 prior to transfer.   - see ID for antimicrobials and cultures   - held pta losartan, hydrochlorothiazide, lasix due to low blood pressure   - transfuse for hgb <7, plt <50  - cbc w/ coags    # Troponemia   Likely secondary to hypotension and demand. EKG (2/4) unremarkable. Echo (2/4) EF >70% with hyperkinetic LV. Trop slightly elevated.  - trend troponin to peak     ===PULMONARY===  No acute issue  - on/off NC overnight 1-2  L     ===GASTROINTESTINAL===  # Acute UGIB c/b possible hemorrhagic shock  # Etoh cirrhosis   # Ascites  Acute hematemesis w/ hgb of 9.8, trended down to 7.6 s/p volume resuscitation. Outside CT CAP showed varices along the distal esophagus and an enlarged, nodular liver consistent with cirrhosis. No ascites seen on CT or abd U/S at OSH. Abd US (2/4) with hepatomegaly, no ascites. No portal vein thrombus. EGD (2/5) showed multiple gastric ulcers.  - hgb repeat this afternoon, transfuse for hgb <7   - H pylori stool antigen pending   - stop octreotide  - stop protonix gtt, start PO BID     # Nutrition  - advance diet as tolerated     ===INFECTIOUS DISEASE===  # Osteomyelitis, right foot     # Leukocytosis   # Lactic acidosis   Abd pain w/ CT CAP showing cirrhosis and gallbladder wall thickening. No gallstone or biliary dilatation. LFTs not concerning for acute biliary process. No CT evidence of intra-abd infection, no ascities. Received zosyn x1 in ED at OSH. Afebrile. Xray of foot shows evidence of osteomyelitis. Grew enterococcus, actinetobacter and corynebacterium from R food wound when hospitalized earlier in 2019. All organisms were pansensitive. At the time forefoot amputation was discussed but pt declined. She was started on a prolonged course of Augmentin (1/29/2019-6/24/2019) and followed up in ID clinic. Most recently seen by podiatry on 1/29/2020, received local wound care.  - vanc discontinued   - continue zosyn   - ortho consult- appreciate recs     Antimicrobials:  Zosyn 4.5 g IV Q6H (2/4 - *)   Vancomycin 1500 mg IV Q18H (2/4 - 2/6)      Cultures:   BC (2/4) no growth at 2 days   UA benign   Left foot wound (2/4); light growth enterococcus faecalis, corynebacterium striatum, stenotrophomonas maltophilia    MRSA (2/4) negative     ===RENAL===  # LIGIA, resolved   # Hyperkalemia (resolved)   Cr 1.38 on admission from baseline 0.8. Hyperkalemia likely 2/2 LIGIA. Shifted in OSH, K trended down from 6.3 -->  5.8. Hyperkalemia resolved later in the day following insulin administration. EKG showed no peaked T waves, ND interval prolongation.   - Daily BMP  - hyperkalemia protocol      # Lactic acidosis - resolved  LA 3.4 on admission in setting of leukocytosis and acute GIB. Trended down after volume resuscitation. Received zosyn x1. High glucose of 299 but without ketones.      ===HEME/ONC===  # Acute blood loss anemia  Hgb baseline around 10, hgb 9.8 on admission, trended down to 7.6 s/p 2.5L crystalloid. Likely partially 2/2 dilution as all cell lines trended down, but pt actively having hematemsis w/ elevated LA. Hgb dropped to 6.0 later in the day on 2/5, following an additional 2 L of LR. Patient has been typed and screened, she consented for blood transfusion.   - CBC this afternoon, then daily if stable  - transfuse for hgb <7, plt <50     ===ENDOCRINE===  # T2DM c/b peripheral neuropathy  # Hyperglycemia  Takes glimepiride pta, no insulin. Most recent HgbA1C 6.5.  in ED but neg beta-hydroxybutyrate. No concern for DKA.   - hold pta glimepiride  - medium sliding scale insulin  - hypoglycemic protocol      # HLD   - c/w pta simvastatin      ===SKIN/MSK===  # Osteomyelitis, right foot   Xray of right foot shows osteomyelitis. In the past, wound grew pan-sensitive enterococcus. Had been on prolonged abx until 6/2019. Started on Vanc and Zosyn. Has been closely followed by podiatry as outpatient, most recent visit 1/29/2020. Receiving local wound care, including endoform w/ wound vashe wet-to-dry dressing. Multilayer unna boot and compression boot w/ total soft contact cast for RLE. Total contact cast for LLE.  - ortho consult  - WOC consult  - vanc discontinued   - pt on zosyn   - PT/OT    General Cares/Prophylaxis:    DVT Prophylaxis: Mechanical only (bleeding risk)  GI Prophylaxis: PPI  Code Status: Full Code    Lines/tubes/drains:  - R internal jugular triple lumen (removed), multiple PIVs    Disposition:  Medical ICU    Patient seen and findings/plan discussed with medical ICU staff, Dr Marcelino Valencia MD   Internal Medicine, PGY-1   P: [phone]     ====================================  INTERVAL HISTORY:   Nursing notes reviewed. No acute events overnight. Patient remains stable with O2 97% on 2 LPM nasal canula. No fevers, no tachycardia, hypotension improving.     OBJECTIVE:   VITAL SIGNS:   Temp:  [97.2  F (36.2  C)-100.1  F (37.8  C)] 99.5  F (37.5  C)  Pulse:  [83-99] 84  Heart Rate:  [] 86  Resp:  [0-28] 13  BP: ()/(43-67) 112/57  SpO2:  [88 %-99 %] 94 %  Resp: 13    INTAKE/ OUTPUT:     Intake/Output Summary (Last 24 hours) at 2/6/2020 1837  Last data filed at 2/6/2020 1600  Gross per 24 hour   Intake 1682.5 ml   Output 2525 ml   Net -842.5 ml         PHYSICAL EXAMINATION:  General: in no acute distress, obese  Skin: bilateral venous stasis present on bilateral lower extremities  HEENT: MMM, PERRLA, EOM intact  CV: RRR, normal S1S2, no murmur, clicks, rubs  Resp: Clear to auscultation bilaterally, no wheezes, rhonchi  Abd: obese, non tender to palpation, firm, slightly distended, hepatomegaly present, no rebound tenderness, guarding, or rigidity   Musc: full ROM in all extremities, weight bearing limited by osteomyelitis of right foot and presence of foot ulcers   Extremities: bilateral venous stasis present, both feet appear edematous but are firm to touch, ulcers present on bilateral feet   Neuro: No lateralizing symptoms or focal neurologic deficits, AOx3     LABS:   Arterial Blood Gases   No lab results found in last 7 days.  Complete Blood Count   Recent Labs   Lab 02/06/20  0345 02/05/20  2240 02/05/20  1743 02/05/20  1116   WBC 13.0* 15.1* 15.7* 16.1*   HGB 7.5* 7.8* 7.8* 6.7*   PLT 61* 64* 63* 68*     Basic Metabolic Panel  Recent Labs   Lab 02/06/20  0345 02/05/20  0413 02/04/20  2156 02/04/20  1601    143 142 142   POTASSIUM 4.2 4.4 4.6 5.0   CHLORIDE 114* 114* 112*  110*   CO2 26 23 24 25   BUN 45* 82* 90* 99*   CR 1.16* 1.46* 1.53* 1.53*   * 167* 160* 172*     Liver Function Tests  Recent Labs   Lab 02/05/20  0810 02/05/20  0413 02/04/20  0743 02/04/20 0734   AST  --  43 32  --    ALT  --  20 21  --    ALKPHOS  --  73 86  --    BILITOTAL  --  0.6 0.5  --    ALBUMIN  --  2.2* 2.3*  --    INR 1.33*  --   --  1.45*     Pancreatic Enzymes  No lab results found in last 7 days.  Coagulation Profile  Recent Labs   Lab 02/05/20  0810 02/04/20  0734   INR 1.33* 1.45*       5. RADIOLOGY:   No results found for this or any previous visit (from the past 24 hour(s)).    =========================================

## 2020-02-06 NOTE — PROGRESS NOTES
Transferred to: 7A at 11:40.  Status at time of transfer: VS stable.   Belongings: 2 green belongings bags with pt, as well as purse, cell phone and   Hanley removed? (if no, why?): no hanley  Chart and medications: chart and meds geoffrey pt  Family notified: yes    Gave report to 7A nurseЕлена at 10:15.    Labs

## 2020-02-06 NOTE — PLAN OF CARE
OT/4C: Cancel. Received clarification of weightbearing status for participation/initiation of therapies, pt is now OK to weightbear as tolerated bilaterally. Pt then transferring to , therapist unable to check back per scheduling demands. As pt utilizes wheelchair for mobility at baseline but is able to transfer, OT will plan to initiate tomorrow to assess functional status. Will reschedule.

## 2020-02-06 NOTE — PROGRESS NOTES
Gastroenterology Inpatient Sign Off Note    ASSESSMENT  Tiffani Tan is a 60-year-old female with a history of alcohol use disorder, DM2 c/b Charcot's foot and chronic foot ulcers, HTN and HLD who presents with coffee-ground emesis, melena, epigastric pain and acute on chronic anemia concerning for upper gastrointestinal bleed. She also had CT a/p showing enlarged liver with nodular contour and recanalized umbilical vein suggestive of cirrhosis and portal hypertension.      # Upper gastrointestinal bleed  Underwent EGD on 2/5/20 which showed 3 non-bleeding antral ulcers, one large Ezra Class IIc lesion and two smaller and clean based lesions. H. Pylori stool Ag pending. Patient advised to avoid NSAIDS.   - Continue pantoprazole 40 mg BID for 2 months, then decrease to once daily.   - Will repeat EGD in 2-3 months as outpatient to check healing of ulcers       # Suspected alcoholic cirrhosis  MELD 11 on 2/6/20. No signs of decompensated disease; no ascites, hepatic encephalopathy or varices. Abdominal ultrasound on 2/4/20 showed no focal masses. Hepatitis B/C testing negative but shows patient is not immunized against hep B.   - Please vaccinate against hepatitis B  - Follow up in hepatology clinic for further evaluation of suspected cirrhosis     Inpatient GI consults service will sign off. No further recommendations at this time. If primary team has addition questions, please page consult fellow listed in [place].    Current GI Consult Staff: Dr. Leventhal     Follow up recommendations:   - Follow up in outpatient hepatology clinic   - Repeat EGD in 8 weeks to re-evaluate gastric ulcers       Aleksandar Dunn MD  Internal Medicine Resident   [phone]

## 2020-02-06 NOTE — PLAN OF CARE
"/51 (BP Location: Left arm)   Pulse 87   Temp 98.1  F (36.7  C) (Oral)   Resp 16   Ht 1.676 m (5' 6\")   Wt 102.5 kg (225 lb 15.5 oz)   SpO2 92%   BMI 36.47 kg/m   Pt transferred from  around 1200. AVSS on RA. Patient denies pain and nausea. . Urine Output - no output since transferring. Bowel Function - last BM yesterday. Nutrition - regular diet w/ poor oral intake. PIV w/ TKO for antibiotics. Activity - poor mobility per baseline, Ax1 w/ walker and wheelchair. Dressing changes performed per orders, MD would like to see wounds, next RN to change dressings should page MD for assessment. Plan of Care - continue IV antibiotics .     "

## 2020-02-06 NOTE — PROGRESS NOTES
[facility], [place]    TRANSFER ACCEPTANCE NOTE - Isabel Nicole Service        Date of Admission:  2/4/2020    ICU course     60-year-old lady with a past medical history significant for type 2 diabetes complicated by diabetic neuropathy, Charcot foot, diabetic ulcer, alcohol abuse who presented to the ED at [facility] with abdominal pain, nausea, black emesis, and bright red blood per rectum.  She was noted to be tachycardic and hypotensive.  CT abdomen pelvis show signs of cirrhosis leading to the concern for variceal bleeding.  Patient was then transferred to [facility] MICU for further care.    Patient was admitted in ICU from 2/4-6.  He was started on pantoprazole and octreotide drip.  She remained hypotensive requiring pressors until 2/5 8:30 AM.  She was seen by GI and underwent EGD on 2/5 -noted 3 nonbleeding antral ulcers (one large with a flat pigmented spot and 2 smaller clean-based ulcers).  GI recommended continue PPI p.o. twice daily for 2 months then daily as well as perform stool H. pylori antigen test with the plan to repeat upper endoscopy in 2 to 3 months.    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  The ICU team has a concern that the child the patient had an earlier evaluation was from septic shock rather than hemorrhagic shock.  Patient has been on vancomycin and Zosyn.  She is also been evaluated by orthopedics and podiatry. Ortho will continue to follow, no plan for BKA this admission.     Assessment & Plan      CHANGES TODAY  - ID consult for osteomyelitis  - discontinue vancomycin, continue zosyn  - add on ESR     # Upper GI bleeding secondary to gastric ulcer  Presented with black emesis and BRBPR. Baseline Hgb 11.9, dropped to the katie of 6s this admission. EGD 2/5 with 3 nonbleeding antral ulcers (one large with a flat pigmented spot and 2 smaller clean-based ulcers). No varices.  -  GI consult; appreciate recs  - continue pantoprazole 40 mg PO bid  - Avoid NSAIDs and alcohol  - Follow up EGD in 2-3 months  - hold losartan/HCTZ in the setting of recent GI bleeding    # EtOH cirrhosis  # EtOH dependence  Noted cirrhosis on CT abd/pel 2/3. Appeared to be compensated at this moment.   - MELD-Na score: 11 at 2/6/2020  3:45 AM  MELD score: 11 at 2/6/2020  3:45 AM  Calculated from:  Serum Creatinine: 1.16 mg/dL at 2/6/2020  3:45 AM  Serum Sodium: 142 mmol/L (Rounded to 137 mmol/L) at 2/6/2020  3:45 AM  Total Bilirubin: 0.6 mg/dL (Rounded to 1 mg/dL) at 2/5/2020  4:13 AM  INR(ratio): 1.33 at 2/5/2020  8:10 AM  Age: 60 years  - counseling alcohol cessation  - will need to establish care with hepatology as outpatient    # Osteomyelitis of R foot and L ankle   # Chronic lower extremities ulcers  Patient has been having ulcer at both feet for years. Previously grew enterococcus, acinetobacter and corynebacterium from R food wound when hospitalized earlier in 2019. All organisms were pansensitive. At the time forefoot amputation was discussed but pt declined. She was started on a prolonged course of Augmentin (1/29/2019-6/24/2019) and followed up in ID clinic up until 06/2019 then followed by podiatry. Most recently seen by podiatry on 1/29/2020, received local wound care. XR at L ankle and R foot showed osteomyelitis.  and CRP 19 2/4.   - ID consult   - add-on ESR, CRP  - discontinue vancomycin; continue zosyn    Antimicrobials:  Zosyn 4.5 g IV Q6H (2/4 - current)   Vancomycin 1500 mg IV Q18H (2/4 -2/6)     Cultures:   BC (2/4) pending   Urine culture (2/4) pending   Left foot wound (2/4) pending. GS with rare gram pos bacilli  MRSA (2/4) negative    # LIGIA  Baseline Cr 1.0. Cr peaked at 1.53 on admission (2/4). Likely due to shock. Now improving.   - trend BMP    # T2DM  A1C 6.5 (2/2019). Home regimen: glimepiride 2 mg po qday.  - hold glimepiride  - medium sliding scale insulin  - fingerstick  glucose qid  - hypoglycemia protocol     Diet: Regular Diet Adult    Fluids: none  Lines: PIV  DVT Prophylaxis: enoxaparin sq  Dueñas Catheter: not present  Code Status: Full Code      Disposition Plan   Expected discharge: 2 - 3 days, once antibiotic plan established.  Entered: Roger Farooq MD 02/06/2020, 12:15 PM     The patient's care was discussed with the Attending Physician, Dr. Felder.    Roger Farooq MD  [address], [place]  Pager: [phone]  Please see sticky note for cross cover information  ______________________________________________________________________    Physical Exam    Vital Signs: Temp: 98.1  F (36.7  C) Temp src: Oral BP: 124/51 Pulse: 87 Heart Rate: 86 Resp: 16 SpO2: 92 % O2 Device: None (Room air) Oxygen Delivery: 1 LPM  Weight: 225 lbs 15.54 oz  Constitutional: awake, alert, cooperative, no apparent distress, and appears stated age  Respiratory: No increased work of breathing, good air exchange, clear to auscultation bilaterally, no crackles or wheezing  Cardiovascular: Normal apical impulse, regular rate and rhythm, normal S1 and S2, no S3 or S4, and no murmur noted  GI: No scars, normal bowel sounds, soft, non-distended, non-tender, no masses palpated, no hepatosplenomegaly  Skin: spider angioma at chest wall, palmar erythema  Musculoskeletal:both legs were wrapped, noted blood oozing from the sole of R foot and lateral side of the L ankle  Neurologic: Awake, alert, oriented to name, place and time.  Cranial nerves II-XII are grossly intact.  No asterixis.    Data      Recent Labs   Lab 02/06/20  1417 02/06/20  0345 02/05/20  2240 02/05/20  1743  02/05/20  0810 02/05/20  0413 02/04/20  2156  02/04/20  0743 02/04/20  0734   WBC 12.7* 13.0* 15.1* 15.7*   < > 17.7* 22.0* 22.6*   < > 22.4*  --    HGB 7.6* 7.5* 7.8* 7.8*   < > 7.1* 7.2* 7.2*   < > 7.1*  --    * 105* 105* 104*   < > 102* 104* 103*   < > 101*  --    PLT 63* 61* 64*  63*   < > 72* 76* 81*   < > 153  --    INR  --   --   --   --   --  1.33*  --   --   --   --  1.45*   NA  --  142  --   --   --   --  143 142   < > 138  --    POTASSIUM  --  4.2  --   --   --   --  4.4 4.6   < > 4.8  --    CHLORIDE  --  114*  --   --   --   --  114* 112*   < > 108  --    CO2  --  26  --   --   --   --  23 24   < > 29  --    BUN  --  45*  --   --   --   --  82* 90*   < > 93*  --    CR  --  1.16*  --   --   --   --  1.46* 1.53*   < > 1.25*  --    ANIONGAP  --  2*  --   --   --   --  6 5   < > 1*  --    NAYANA  --  8.2*  --   --   --   --  8.1* 8.0*   < > 8.3*  --    GLC  --  162*  --   --   --   --  167* 160*   < > 239*  --    ALBUMIN  --   --   --   --   --   --  2.2*  --   --  2.3*  --    PROTTOTAL  --   --   --   --   --   --  6.2*  --   --  6.4*  --    BILITOTAL  --   --   --   --   --   --  0.6  --   --  0.5  --    ALKPHOS  --   --   --   --   --   --  73  --   --  86  --    ALT  --   --   --   --   --   --  20  --   --  21  --    AST  --   --   --   --   --   --  43  --   --  32  --    TROPI  --   --   --  0.580*  --   --  0.769* 0.544*   < >  --   --     < > = values in this interval not displayed.

## 2020-02-06 NOTE — PROGRESS NOTES
Orthopedics Progress    Chart reviewed, patient not seen this AM.  Interval events noted.  Tmax 100.1F, WBC trending down, near normal now.  No s/s of sepsis.  Will plan to continue with plan as previously outlined, no likely BKA planned this admission.      WBAT BLE  Orthopedics to continue to monitor, will not round regularly.  Please contact us with any questions regarding the care of this patient      Levy Garces MD  Orthopedic Surgery, PGY-4  Pager: [phone]  7:27 AM  February 6, 2020

## 2020-02-06 NOTE — PROGRESS NOTES
ICU End of Shift Summary. See flowsheets for vital signs and detailed assessment.    Changes this shift: Levophed gtt off most of the day. EGD at bedside. Hgb checked after EGD, Hgb=6.7. 1 unit blood given. Recheck this evening, Hgb=7.8. Following Troponin, 16:00 results pending.  Diet advanced to Regular. Tolerating well.  Pt is incont urine, called once to use the bedpan. Purewick system doesn't work as pt moves around in the bed.      Plan:   Continue to monitor labs. Possible transfer tomorrow.

## 2020-02-07 ENCOUNTER — APPOINTMENT (OUTPATIENT)
Dept: OCCUPATIONAL THERAPY | Facility: CLINIC | Age: 60
End: 2020-02-07
Attending: SURGERY
Payer: COMMERCIAL

## 2020-02-07 VITALS
TEMPERATURE: 98.1 F | OXYGEN SATURATION: 98 % | HEART RATE: 89 BPM | DIASTOLIC BLOOD PRESSURE: 55 MMHG | BODY MASS INDEX: 35.41 KG/M2 | HEIGHT: 66 IN | SYSTOLIC BLOOD PRESSURE: 136 MMHG | RESPIRATION RATE: 16 BRPM | WEIGHT: 220.3 LBS

## 2020-02-07 LAB
ANION GAP SERPL CALCULATED.3IONS-SCNC: 2 MMOL/L (ref 3–14)
BUN SERPL-MCNC: 23 MG/DL (ref 7–30)
CALCIUM SERPL-MCNC: 8.4 MG/DL (ref 8.5–10.1)
CHLORIDE SERPL-SCNC: 113 MMOL/L (ref 94–109)
CO2 SERPL-SCNC: 26 MMOL/L (ref 20–32)
CREAT SERPL-MCNC: 1.07 MG/DL (ref 0.52–1.04)
ERYTHROCYTE [DISTWIDTH] IN BLOOD BY AUTOMATED COUNT: 17.9 % (ref 10–15)
GFR SERPL CREATININE-BSD FRML MDRD: 56 ML/MIN/{1.73_M2}
GLUCOSE BLDC GLUCOMTR-MCNC: 143 MG/DL (ref 70–99)
GLUCOSE BLDC GLUCOMTR-MCNC: 161 MG/DL (ref 70–99)
GLUCOSE SERPL-MCNC: 135 MG/DL (ref 70–99)
HCT VFR BLD AUTO: 26.2 % (ref 35–47)
HGB BLD-MCNC: 7.9 G/DL (ref 11.7–15.7)
MCH RBC QN AUTO: 31.9 PG (ref 26.5–33)
MCHC RBC AUTO-ENTMCNC: 30.2 G/DL (ref 31.5–36.5)
MCV RBC AUTO: 106 FL (ref 78–100)
PLATELET # BLD AUTO: 76 10E9/L (ref 150–450)
POTASSIUM SERPL-SCNC: 4.4 MMOL/L (ref 3.4–5.3)
RBC # BLD AUTO: 2.48 10E12/L (ref 3.8–5.2)
SODIUM SERPL-SCNC: 141 MMOL/L (ref 133–144)
WBC # BLD AUTO: 11.7 10E9/L (ref 4–11)

## 2020-02-07 PROCEDURE — 80048 BASIC METABOLIC PNL TOTAL CA: CPT | Performed by: INTERNAL MEDICINE

## 2020-02-07 PROCEDURE — 87338 HPYLORI STOOL AG IA: CPT | Performed by: PHYSICIAN ASSISTANT

## 2020-02-07 PROCEDURE — 25000128 H RX IP 250 OP 636: Performed by: STUDENT IN AN ORGANIZED HEALTH CARE EDUCATION/TRAINING PROGRAM

## 2020-02-07 PROCEDURE — 00000146 ZZHCL STATISTIC GLUCOSE BY METER IP

## 2020-02-07 PROCEDURE — 25000132 ZZH RX MED GY IP 250 OP 250 PS 637: Performed by: STUDENT IN AN ORGANIZED HEALTH CARE EDUCATION/TRAINING PROGRAM

## 2020-02-07 PROCEDURE — 25000132 ZZH RX MED GY IP 250 OP 250 PS 637: Performed by: NURSE PRACTITIONER

## 2020-02-07 PROCEDURE — 97165 OT EVAL LOW COMPLEX 30 MIN: CPT | Mod: GO

## 2020-02-07 PROCEDURE — 97535 SELF CARE MNGMENT TRAINING: CPT | Mod: GO

## 2020-02-07 PROCEDURE — 85652 RBC SED RATE AUTOMATED: CPT | Performed by: STUDENT IN AN ORGANIZED HEALTH CARE EDUCATION/TRAINING PROGRAM

## 2020-02-07 PROCEDURE — 85027 COMPLETE CBC AUTOMATED: CPT | Performed by: INTERNAL MEDICINE

## 2020-02-07 PROCEDURE — 97530 THERAPEUTIC ACTIVITIES: CPT | Mod: GO

## 2020-02-07 PROCEDURE — 40000893 ZZH STATISTIC PT IP EVAL DEFER

## 2020-02-07 PROCEDURE — 99239 HOSP IP/OBS DSCHRG MGMT >30: CPT | Mod: GC | Performed by: INTERNAL MEDICINE

## 2020-02-07 PROCEDURE — 36415 COLL VENOUS BLD VENIPUNCTURE: CPT | Performed by: INTERNAL MEDICINE

## 2020-02-07 RX ORDER — PANTOPRAZOLE SODIUM 40 MG/1
40 TABLET, DELAYED RELEASE ORAL
Qty: 120 TABLET | Refills: 1 | Status: SHIPPED | OUTPATIENT
Start: 2020-02-07 | End: 2020-02-07

## 2020-02-07 RX ORDER — PANTOPRAZOLE SODIUM 40 MG/1
40 TABLET, DELAYED RELEASE ORAL
Qty: 120 TABLET | Refills: 3 | Status: SHIPPED | OUTPATIENT
Start: 2020-02-07

## 2020-02-07 RX ORDER — LANOLIN ALCOHOL/MO/W.PET/CERES
100 CREAM (GRAM) TOPICAL DAILY
Qty: 60 TABLET | Refills: 3 | Status: SHIPPED | OUTPATIENT
Start: 2020-02-08 | End: 2020-02-07

## 2020-02-07 RX ORDER — LANOLIN ALCOHOL/MO/W.PET/CERES
100 CREAM (GRAM) TOPICAL DAILY
Qty: 60 TABLET | Refills: 3 | Status: ON HOLD | OUTPATIENT
Start: 2020-02-08 | End: 2020-07-21

## 2020-02-07 RX ORDER — FOLIC ACID 1 MG/1
1 TABLET ORAL DAILY
Qty: 60 TABLET | Refills: 3 | Status: SHIPPED | OUTPATIENT
Start: 2020-02-08 | End: 2020-02-07

## 2020-02-07 RX ORDER — LANOLIN ALCOHOL/MO/W.PET/CERES
100 CREAM (GRAM) TOPICAL DAILY
Qty: 60 TABLET | Refills: 0 | Status: SHIPPED | OUTPATIENT
Start: 2020-02-08 | End: 2020-02-07

## 2020-02-07 RX ORDER — FOLIC ACID 1 MG/1
1 TABLET ORAL DAILY
Qty: 60 TABLET | Refills: 3 | Status: ON HOLD | OUTPATIENT
Start: 2020-02-08 | End: 2020-07-21

## 2020-02-07 RX ORDER — PANTOPRAZOLE SODIUM 40 MG/1
40 TABLET, DELAYED RELEASE ORAL EVERY EVENING
Qty: 60 TABLET | Refills: 0 | Status: ON HOLD
Start: 2020-02-07 | End: 2020-07-21

## 2020-02-07 RX ORDER — FOLIC ACID 1 MG/1
1 TABLET ORAL DAILY
Qty: 60 TABLET | Refills: 0 | Status: SHIPPED | OUTPATIENT
Start: 2020-02-08 | End: 2020-02-07

## 2020-02-07 RX ADMIN — GABAPENTIN 1200 MG: 600 TABLET, FILM COATED ORAL at 08:17

## 2020-02-07 RX ADMIN — PIPERACILLIN AND TAZOBACTAM 4.5 G: 4; .5 INJECTION, POWDER, FOR SOLUTION INTRAVENOUS at 11:29

## 2020-02-07 RX ADMIN — PANTOPRAZOLE SODIUM 40 MG: 40 TABLET, DELAYED RELEASE ORAL at 16:04

## 2020-02-07 RX ADMIN — PIPERACILLIN AND TAZOBACTAM 4.5 G: 4; .5 INJECTION, POWDER, FOR SOLUTION INTRAVENOUS at 05:26

## 2020-02-07 RX ADMIN — GABAPENTIN 1200 MG: 600 TABLET, FILM COATED ORAL at 13:58

## 2020-02-07 RX ADMIN — PANTOPRAZOLE SODIUM 40 MG: 40 TABLET, DELAYED RELEASE ORAL at 08:17

## 2020-02-07 RX ADMIN — PIPERACILLIN AND TAZOBACTAM 4.5 G: 4; .5 INJECTION, POWDER, FOR SOLUTION INTRAVENOUS at 00:24

## 2020-02-07 RX ADMIN — FOLIC ACID 1 MG: 1 TABLET ORAL at 08:17

## 2020-02-07 RX ADMIN — THIAMINE HCL TAB 100 MG 100 MG: 100 TAB at 08:17

## 2020-02-07 ASSESSMENT — ACTIVITIES OF DAILY LIVING (ADL)
PREVIOUS_RESPONSIBILITIES: MEAL PREP;HOUSEKEEPING;LAUNDRY
ADLS_ACUITY_SCORE: 23
ADLS_ACUITY_SCORE: 21
ADLS_ACUITY_SCORE: 19

## 2020-02-07 ASSESSMENT — MIFFLIN-ST. JEOR: SCORE: 1581.02

## 2020-02-07 NOTE — PLAN OF CARE
"OT 7A:  Discharge Planner OT   Patient plan for discharge: home  Current status: OT evaluation completed and treatment initiated.  Pt WBAT B LE per ortho, no change in WB status from baseline.  Pt uses wc for mobility in home and at work, does some walking with FWW and crawls up stairs.  Pt is ind with basic ADL and has all DME.  Pt mod I SPT EOB<>commode.  Note significant collapse at L ankle with any WB with orthotics in place - ortho paged.  Per pt, this is normal except when she has \"casts\" on.  Pt may benefit from alternate orthotic; is followed by podiatry as OP.  Await recs.   Barriers to return to prior living situation: none from rehab standpoint; medical  Recommendations for discharge: home with A; educated in OP PT option however pt feels she is at baseline  Rationale for recommendations: Pt appears close to baseline for mobility and ADL.  No barriers to discharge home.  Has all needed equipment in place.        Entered by: Indu Forbes 02/07/2020 11:14 AM       "

## 2020-02-07 NOTE — CONSULTS
[facility]EAS[facility] CONSULTATION     Patient:  Tiffani Tan   YOB: 1960  Date of Visit:  02/07/2020  Date of Admission: 2/4/2020  Consult Requester:Patricia Felder,*          Assessment and Recommendations:   ID Problem List:  1.  Chronic bilateral osteomyelitis with septic arthritis   2.  Chronic bilateral diabetic foot ulcerations  3.  Progressive neuropathic arthropathy of left ankle  4.  Diabetes mellitus type II  5.  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  Suspected vascular disease with current tobacco use    Recommendations:  1.  Please culture right foot ulcer  2.  No antibiotics to be prescribed on discharge  3.  Follow up with [facility] (per patient preference) to discuss results of culture and consider additional antibiotics to treat progressive imaging findings of right foot.    Discussion:  Tiffani Tan is a 60 year old female with PMH of cirrhosis 2/2 EtOH misuse, current tobacco use, vascular disease, T2DM, chronic ulcers of b/l feet c/b chronic osteomyelitis and neuropathic arthropathy of left ankle/foot who was transferred from OSH and admitted to MICU with hemorrhagic shock 2/2 GIB. ID has been consulted for assistance with management of chronic osteomyelitis and foot wounds.     Reassuringly, there does not appear to be an active skin/soft tissue infection. Clinically she seems to be doing well with reassuring vitals and labs. She does not note new findings or changed appearance of her ulcerations recently. Culture during this admission was done on left foot, and is growing Stenotrophomonas, E faecalis (pansensitive), and Corynebacterium. However, XR shows worsening osteomyelitis of right foot compared to ~1 year ago. It is likely that most of her imaging findings from both left and right LE reflect chronic osteomyelitis infection, but it could be reasonable to try a course of oral antibiotics for the right foot given the worsening imaging findings. Would obtain a  culture from the right foot and then tailor regimen to species isolated from that. Do not think she needs to be covered empirically on discharge given this is a chronic infection.     Thank you for the consult. Patient prefers to discharge and follow up with [facility].    Trcay Gilbert PA-C   Pager: [phone]  02/07/2020  ________________________________________________________________    Consult Question: Osteomyelitis of R foot, diabetic wound of L foot, please assist with treatment plan  Admission Diagnosis: gi bleed  GI bleed  UGIB (upper gastrointestinal bleed)         History of Present Illness:   Information obtained from chart, review of CareEverywhere notes/labs/imaging, and from patient:    Tiffani Tan is a 60 year old female with PMH of cirrhosis 2/2 EtOH misuse, current tobacco use, vascular disease, T2DM, chronic ulcers of b/l feet c/b chronic osteomyelitis and neuropathic arthropathy of left ankle/foot who was transferred from OSH and admitted to MICU with hemorrhagic shock 2/2 GIB. ID has been consulted for assistance with management of chronic osteomyelitis and foot wounds.     Patient initially present to OSH on 2/4 with abdominal pain, n/v, black emesis, and BRBPR. Imaging was concerning for variceal bleeding so she was transferred to [facility]. In the MICU she was started on pantoprazole and octreotide. She required pressors for hypotension until 2/5 AM. She underwent EGD by GI on 2/5 with 3 nonbleeding ulcers identified. She was stable for transfer to the floor on 2/6. Bleeding has since resolved.    In regards to her diabetic foot ulcers, she reports issues with foot ulcers for approximately the past 30 years. Ulcerations have been treated surgically a number of times in the past by Dr. Alexy Guzmán in podiatry with [facility] in [place]. She has also followed with Dr. Yousuf Carr in ID at [facility], and has been on multiple course of antibiotics through the years. Due to the chronic and complex  nature of her wounds she was previously recommended toe vs forefoot amputation during hospitalization 12/2018, but she elected to try to salvage the foot with suppressive antibiotics. She completed an extended course of Augmentin and ampicillin from 1/29/2019 to around 05/2019 based on most recent ID note on 6/24/2019. She currently follows with Podiatry through  and had frequent visits with them (last on 1/29/20). She recently received Keflex for 14 days (1/16-1/29).    She was seen by Podiatry/Ortho this admission who recommended wound cultures, XR, and broad spectrum antibiotics. XR of right foot showed progression of osteolysis of the first metatarsal head with plantar soft tissue defect and gas compatible with ongoing septic arthritis and osteomyelitis in addition to new collapse of 4th metatarsal head. XR of left ankle revealed progressive periostitis of the distal fibula and subtle osteolysis of the distal fibula compatible with osteomyelitis in addition to Charcot arthropathy changes. Ortho noted that patient has exhausted wound care options without success and she is a candidate for left BKA at some point in future based on response to antibiotics. Cultures from left foot wound have grown Corynebacterium, Enterococcus faecalis, and Stenotrophomonas maltophilia. No culture was taken of right foot wound. She was started on Zosyn and Vancomycin on 2/4. Vancomycin was discontinued on 2/6. Notable, patient remains afebrile, WBC has improved from 24 to 11.7, and BC from 2/4 remain negative. Given no e/o sepsis, Ortho is planning on outpatient BKA at a date that is TBD.    Today, Tiffani reports she feels well overall. She does note have great sensation in her feet other than pain, but notes no changes recently. She denies drainage or odor from either foot. She admits that she knows she will need left BKA but hasn't made up her mind yet about when she will have it done. She also expressed some frustration with lack  "of plan for right foot ulceration. She denies recent fever or chill in the hospital or at home. No headaches, mouth sores, cough, SOB, abdominal pain, n/v. She reports a loose BM that had \"old blood\" in it earlier today. She denies other rashes bleeding or bruising.            Review of Systems:   10 point review of systems was negative except for noted as above in HPI.            Past Medical History:   Based on chart review:  1. Type 2 diabetes mellitus  2. Chronic neuropathic ulcerations.  3. Polyneuropathy.  4. Hyperlipidemia.  5. Hypertension.  7. Alcohol misuse  8. Liver cirrhosis 2/2 EtOH         Past Surgical History:     Past Surgical History:   Procedure Laterality Date     ESOPHAGOSCOPY, GASTROSCOPY, DUODENOSCOPY (EGD), COMBINED N/A 2/5/2020    Procedure: ESOPHAGOGASTRODUODENOSCOPY (EGD);  Surgeon: Tanya Dias MD;  Location:  GI   h/o Multiple foot procedures and skin grafts         Family History:   Reviewed and non-contributory.          Social History:    to  for about 3 years. 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 She is a current tobacco smoker. She drinks about 2 vodka drinks per day.         Current Medications:       folic acid  1 mg Oral Daily     gabapentin  1,200 mg Oral TID     influenza vaccine adult (product based on age)  0.5 mL Intramuscular Prior to discharge     insulin aspart  1-3 Units Subcutaneous TID AC     insulin aspart  1-3 Units Subcutaneous At Bedtime     pantoprazole  40 mg Oral BID AC     piperacillin-tazobactam  4.5 g Intravenous Q6H     simvastatin  40 mg Oral At Bedtime     vitamin B1  100 mg Oral Daily            Allergies:   No Known Allergies         Physical Exam:   Vitals were reviewed  Temp:  [98.1  F (36.7  C)-99.2  F (37.3  C)] 98.6  F (37  C)  Pulse:  [] 89  Heart Rate:  [] 98  Resp:  [9-16] 16  BP: (106-137)/(50-66) 121/50  SpO2:  [92 %-100 %] 98 %    Vitals:    02/04/20 0657 02/05/20 0200 02/07/20 " 0509   Weight: 101.3 kg (223 lb 5.2 oz) 102.5 kg (225 lb 15.5 oz) 99.9 kg (220 lb 4.8 oz)       Constitutional: Pleasant and cooperative female seen lying in bed, in NAD. Awake, alert, interactive.  HEENT: NC/AT, EOMI, sclera clear, conjunctiva normal, OP with MMM, no lesions or erythema  Respiratory: No increased work of breathing, CTAB, no crackles or wheezing.  Cardiovascular: RRR, no murmur noted. No peripheral edema.  GI: Normal bowel sounds, soft, non-distended and non-tender.  Skin: Warm, dry, well-perfused. Large circular 2.5cm x 2.5 cm ulceration over left lateral malleolus. Ovular ulceration of right first MTP 3cm x 2cm, relatively shallow ~3mm.   Musculoskeletal: Deformity of LLE at ankle. Good strength and ROM in bed.   Neurologic: A&O. Answers questions appropriately, speech normal. Moves all extremities spontaneously.  Neuropsychiatric: Calm. Affect appropriate to situation.           Laboratory Data:     Microbiology:  Culture Micro   Date Value Ref Range Status   02/05/2020 (A)  Final    Canceled, Test credited  >10 Squamous epithelial cells/low power field indicates oral contamination. Please   recollect.     02/05/2020   Final    Notification of test cancellation was given to  Rafael Valdez RN 2/5/20 @ 0501     02/04/2020 No growth after 3 days  Preliminary   02/04/2020 No growth after 3 days  Preliminary   02/04/2020 Light growth  Enterococcus faecalis   (A)  Preliminary   02/04/2020 (A)  Preliminary    Light growth  Corynebacterium striatum  Identification obtained by MALDI-TOF mass spectrometry research use only database. Test   characteristics determined and verified by the Infect[facility] Diagnostic Laboratory   ([facility]) [place], [place].  Susceptibility testing not routinely done     02/04/2020 (A)  Preliminary    Light growth  Stenotrophomonas maltophilia  Susceptibility testing in progress     02/04/2020 Light growth  Normal skin dorian    Preliminary   02/04/2020 No growth after 3 days   Preliminary   02/04/2020 No growth after 3 days  Preliminary       Inflammatory Markers    Recent Labs   Lab Test 02/06/20  1417 02/04/20  0743   * 108*   CRP  --  19.0*       Metabolic Studies       Recent Labs   Lab Test 02/07/20  0541 02/06/20 0345 02/05/20  0413 02/04/20  2156 02/04/20  1601 02/04/20  1201  02/04/20  0743 02/04/20  0734    142 143 142 142 141  --  138  --    POTASSIUM 4.4 4.2 4.4 4.6 5.0 5.1  --  4.8  --    CHLORIDE 113* 114* 114* 112* 110* 110*  --  108  --    CO2 26 26 23 24 25 26  --  29  --    ANIONGAP 2* 2* 6 5 7 5  --  1*  --    BUN 23 45* 82* 90* 99* 99*  --  93*  --    CR 1.07* 1.16* 1.46* 1.53* 1.53* 1.29*  --  1.25*  --    GFRESTIMATED 56* 51* 38* 36* 36* 45*  --  46*  --    * 162* 167* 160* 172* 199*  --  239*  --    A1C  --   --   --   --   --   --   --   --  6.5*   NAYANA 8.4* 8.2* 8.1* 8.0* 7.9* 8.1*  --  8.3*  --    PHOS  --   --  4.2  --   --   --   --  3.6  --    MAG  --  2.5* 1.6  --   --   --   --   --   --    LACT  --   --   --  1.4 1.6  --    < >  --   --     < > = values in this interval not displayed.       Hepatic Studies    Recent Labs   Lab Test 02/05/20 0413 02/04/20  0743   BILITOTAL 0.6 0.5   ALKPHOS 73 86   ALBUMIN 2.2* 2.3*   AST 43 32   ALT 20 21       Hematology Studies      Recent Labs   Lab Test 02/07/20  0541 02/06/20  1417 02/06/20  0345 02/05/20  2240 02/05/20  1743 02/05/20  1116  12/11/19  1221   WBC 11.7* 12.7* 13.0* 15.1* 15.7* 16.1*   < > 9.4   ANEU  --   --   --   --   --   --   --  5.6   ALYM  --   --   --   --   --   --   --  2.5   ARIEL  --   --   --   --   --   --   --  1.1   AEOS  --   --   --   --   --   --   --  0.1   HGB 7.9* 7.6* 7.5* 7.8* 7.8* 6.7*   < > 11.9   HCT 26.2* 24.9* 24.9* 25.6* 25.3* 21.9*   < > 38.4   PLT 76* 63* 61* 64* 63* 68*   < > 192    < > = values in this interval not displayed.       Arterial Blood Gas Testing    Recent Labs   Lab Test 02/04/20  1601   O2PER 3L        Urine Studies     Recent Labs   Lab  Test 02/06/20  1646 02/04/20  1229   URINEPH 6.0 5.5   NITRITE Negative Negative   LEUKEST Negative Negative   WBCU 0 0       Hepatitis B Testing   Recent Labs   Lab Test 02/04/20  2156   HBCAB Nonreactive   HEPBANG Nonreactive       Hepatitis C Testing     Hepatitis C Antibody   Date Value Ref Range Status   02/04/2020 Nonreactive NR^Nonreactive Final     Comment:     Assay performance characteristics have not been established for newborns,   infants, and children              Imaging:     Results for orders placed or performed during the hospital encounter of 02/04/20   XR Ankle Left G/E 3 Views    Narrative    EXAM: XR ANKLE LT G/E 3 VW  2/4/2020 10:39 AM      HISTORY: History of charcot, ulcer over lateral malleolus    COMPARISON: 10/1/2019, 8/28/2019    FINDINGS: 3 nonweightbearing views of the left ankle.    Soft tissue defect evident lateral to the lateral malleolus.  Periostitis of the distal fibula as progressed compared to prior. Tiny  avulsive fragments distal to the distal fibula on the oblique view.  Subtle distal fibular osteolysis. Distal tibial periostitis is similar  prior. Extensive bony deformity and disorganized osteolysis and bony  proliferation, grossly similar to prior. Anterior subluxation of the  talar dome, similar to prior. Diffuse soft tissue prominence.       Impression    IMPRESSION:   1. Progressive periostitis of the distal fibula. Subtle osteolysis of  the distal fibula compatible with osteomyelitis. Small adjacent  avulsive fracture fragment.  2. Charcot arthropathy changes.    JORGE CLARK MD (Joe)   XR Foot Right G/E 3 Views    Narrative    EXAM: XR FOOT RT G/E 3 VW  2/4/2020 10:40 AM      HISTORY: Plantar ulceration    COMPARISON: Foot 1/9/2019    FINDINGS: Portable nonweightbearing AP, oblique, and lateral views of  the right foot.    Osteolysis of the first metatarsal head appears progressed compared to  prior with progressive loss of bone stock of the median  eminence.  Chronic deformities of the second and fifth metatarsals and second and  fifth proximal phalanges, stable. Chronic deformities of the heads of  the third and fourth metatarsals    Collapse of the fourth metatarsal head is new since prior radiographs.  Stable ossicle within the second metatarsophalangeal joint. Tiny  plantar calcaneal heel spur. Dorsal soft tissue swelling. Plantar soft  tissue defect. Mild amount of air projecting over the first  metatarsophalangeal joint on the AP view, likely correlating to  plantar soft tissue defect on the lateral view.       Impression    IMPRESSION:   1. Progressive osteolysis of the first metatarsal head with plantar  soft tissue defect and gas compatible with ongoing septic arthritis  and osteomyelitis.  2. Collapse of the fourth metatarsal head is new since prior  radiographs 1/9/2019.    JORGE (Juan Alberto CLARK MD   US Abdomen Complete w Doppler Complete    Narrative    EXAMINATION: US ABDOMEN COMPLETE WITH DOPPLER COMPLETE  2/4/2020 2:38  PM      CLINICAL HISTORY: pt with newly diagnosed cirrhosis, please assess for  ascites and possible clots in the portal vein    COMPARISON: None. There is an outside abdomen ultrasound report dated  2/3/2020.        FINDINGS:  Enlarged, heterogeneous, and coarsened liver measuring 21 cm in the  craniocaudal dimension. No focal mass is appreciated. No intra or  extrahepatic biliary duct dilatation. The common bile duct measures 4  mm in diameter. The gallbladder wall is borderline thickened,  measuring 3.3 mm. No, gallstones or pericholecystic fluid.    Extrahepatic portal vein flow is antegrade, measuring 14 cm/sec.  Right portal vein flow is antegrade, measuring 18 cm/sec.  Left portal vein flow is antegrade, measuring 13 cm/sec.    Hepatic artery flow is antegrade:  181 cm/sec peak systolic  0.63 resistive index.     The splenic vein is patent with flow towards the liver.  The left,  middle, and right hepatic veins are  patent with flow towards the IVC.  The IVC is patent with flow towards the heart.   The aorta is of  normal caliber.    The spleen measures maximally 11.5 cm and is normal in appearance. The  visualized head and body of the pancreas are normal in echogenicity.  The tail is not well visualized.    The kidneys are normal in position and echogenicity. The right kidney  measures 11.4 cm and the left kidney measures 10.7 cm. No  hydronephrosis, shadowing stone, or mass appreciated.    No free fluid appreciated in the visualized portions of the abdomen.      Impression    IMPRESSION:   1. Hepatomegaly. Echogenic, heterogenous liver parenchyma and mildly  thickened gallbladder wall most compatible with chronic intrinsic  parenchymal disease. No focal mass appreciated. No ascites appreciated  as questioned.  2. Patent doppler evaluation of the liver. No portal vein thrombus  appreciated as questioned.    I have personally reviewed the examination and initial interpretation  and I agree with the findings.    STERLING BORREGO MD   XR Chest Port 1 View    Narrative    EXAMINATION: XR CHEST PORT 1 VW, 2/4/2020 9:33 PM    INDICATION: 61 yo woman w/ cirrhosis presented w/ GI bleed and sepsis.  Verify R IJ central line placement    COMPARISON: None    FINDINGS: Single AP upright radiograph the chest    Trachea is midline. Cardiac and mediastinal borders are clear. Cardiac  silhouette is enlarged. Low lung volumes. Right IJ central venous  catheter with tip projecting in the low SVC. Mild indistinct pulmonary  vasculature with interstitial opacities. No appreciable pneumothorax.  Trace bilateral pleural effusions.       Impression    IMPRESSION:  1. Right IJ central venous catheter with tip projecting in the lower  SVC.  2. Mildly enlarged heart with mild pulmonary edema.    I have personally reviewed the examination and initial interpretation  and I agree with the findings.    STERLING BORREGO MD   Echocardiogram Complete    Narrative     [id number]  [id number]  [id number]  [id number]^RIGO^VAHID^- AKSPENSER RIGO           [facility],[place]  Echocardiography Laboratory  [address] [place] [id number]     Name: ZURDO ALEGRIA  MRN: [id number]  : 1960  Study Date: 2020 02:58 PM  Age: 60 yrs  Gender: Female  [place] [facility] [place]  Reason For Study: Endocarditis  Ordering Physician: VAHID SIERRA  Performed By: Silver Charles RDCS     BSA: 2.1 m2  Height: 66 in  Weight: 223 lb  BP: 89/44 mmHg  _____________________________________________________________________________  __        Procedure  Complete Portable Echo Adult. Technically difficult study. Poor acoustic  windows.  _____________________________________________________________________________  __        Interpretation Summary  Technically difficult study. Poor acoustic windows.     No definite echocardiographic evidence of endocarditis on this transthoracic  study. Consider CHIDI if clinical suspicion warrants.     Global and regional left ventricular function is hyperkinetic with an EF >70%.  Hyperdynamic LV function results in cavity obliteration in systole and a peak  mid-cavitary gradient of 50 mmHg with Valsalva maneuver.  Right ventricular function, chamber size, wall motion, and thickness are  normal.  No significant valvular disease.  There is no prior study for direct comparison.     _____________________________________________________________________________  __        Left Ventricle  Left ventricular wall thickness is normal. Left ventricular size is normal.  Global and regional left ventricular function is hyperkinetic with an EF >70%.  Cavity obliteration and mid-ventricular gradient are present. There is a mid-  cavitary gradient that reaches 50 mmHg with valsalva, most likely due to  hyperdynamic state. Left ventricular diastolic function is normal. No regional  wall motion abnormalities are seen.     Right Ventricle  Right ventricular  function, chamber size, wall motion, and thickness are  normal.     Atria  Both atria appear normal.        Mitral Valve  The mitral valve is normal.     Aortic Valve  Aortic valve is normal in structure and function. The aortic valve is  tricuspid. No aortic regurgitation is present.     Tricuspid Valve  The tricuspid valve is normal. The peak velocity of the tricuspid regurgitant  jet is not obtainable.     Pulmonic Valve  The pulmonic valve is normal.     Vessels  The aorta root is normal. Ascending aorta 3.5 cm. Dilation of the inferior  vena cava is present with normal respiratory variation in diameter. IVC  diameter and respiratory changes fall into an intermediate range suggesting an  RA pressure of 8 mmHg.     Pericardium  No pericardial effusion is present.        Compared to Previous Study  There is no prior study for direct comparison.  _____________________________________________________________________________  __     MMode/2D Measurements & Calculations  RVDd: 3.3 cm  IVSd: 0.96 cm  LVIDd: 5.3 cm  LVIDs: 3.4 cm  LVPWd: 1.0 cm  FS: 37.4 %  LV mass(C)d: 201.0 grams  LV mass(C)dI: 95.9 grams/m2  asc Aorta Diam: 3.5 cm  LVOT diam: 2.3 cm  LVOT area: 4.1 cm2  LA Volume Index (BP): 35.3 ml/m2  RWT: 0.38  TAPSE: 2.3 cm           Doppler Measurements & Calculations  MV E max yoshi: 103.3 cm/sec  MV A max yoshi: 116.1 cm/sec  MV E/A: 0.89  MV dec slope: 571.7 cm/sec2  MV dec time: 0.18 sec  E/E' av.5  Lateral E/e': 11.3  Medial E/e': 13.6     _____________________________________________________________________________  __           Report approved by: Richard Hinton 2020 04:14 PM

## 2020-02-07 NOTE — PLAN OF CARE
7A defer  Discharge Planner PT   Patient plan for discharge: home  Current status: pt encountered in supine, denies IP PT services. Reports at baseline with functional mobility performance of transfers, has w/c at home she predominately utilizes. Denies concern with return to home. Will complete orders and sign off.   Barriers to return to prior living situation: medical status  Recommendations for discharge: home with assist prn  Rationale for recommendations: see above.       Entered by: Bk [place] 02/07/2020 10:52 AM

## 2020-02-07 NOTE — DISCHARGE SUMMARY
Medicine Discharge Summary  Tiffani Tan MRN: [id number]  1960  Primary care provider: [facility]  ___________________________________          Date of Admission:  2/4/2020  Date of Discharge:  2/7/2020   Admitting Physician:  Tanya Butt MD  Discharge Physician:  Miller MD  Discharging Service:  Internal Medicine, [place] [address]     Primary Provider: [facility]         Reason for Admission:   Shock, melena, BRBPR          Discharge Diagnosis:   - Upper GI bleeding secondary to gastric ulcer  - Osteomyelitis of R foot and L ankle   - Chronic lower extremities ulcers  - Charcot foot  - Non-oliguric LIGIA  - Demand ischemia         Procedures & Significant Findings:   EGD 2/5  Impression:          - Three non-bleeding antral ulcers, one large with a                        flat pigmented spot (Ezra Class IIc) and two smaller                        and clean-based. These are likely the source of GI                        bleeding.                        - LA Grade B esophagitis.                        - Portal hypertensive gastropathy.                        - Normal examined duodenum.                        - No specimens collected.          Consultations:   - Gastroenterology  - Orthopaedics  - Infectious diseases         Relevant Results:     Most Recent 3 CBC's:  Recent Labs   Lab Test 02/07/20  0541 02/06/20  1417 02/06/20  0345   WBC 11.7* 12.7* 13.0*   HGB 7.9* 7.6* 7.5*   * 106* 105*   PLT 76* 63* 61*     Most Recent 3 BMP's:  Recent Labs   Lab Test 02/07/20  0541 02/06/20  0345 02/05/20  0413    142 143   POTASSIUM 4.4 4.2 4.4   CHLORIDE 113* 114* 114*   CO2 26 26 23   BUN 23 45* 82*   CR 1.07* 1.16* 1.46*   ANIONGAP 2* 2* 6   NAYANA 8.4* 8.2* 8.1*   * 162* 167*     Most Recent 2 LFT's:  Recent Labs   Lab Test 02/05/20  0416  02/04/20  0743   AST 43 32   ALT 20 21   ALKPHOS 73 86   BILITOTAL 0.6 0.5     Most Recent 3 INR's:  Recent Labs   Lab Test 02/05/20  0810 02/04/20  0734   INR 1.33* 1.45*     Most Recent 3 Troponin's:  Recent Labs   Lab Test 02/05/20  1743 02/05/20  0413 02/04/20  2156   TROPI 0.580* 0.769* 0.544*             Hospital Course by Problem:      # Upper GI bleeding secondary to gastric ulcer  # Hemorrhagic shock  - Presented to the ED at [facility] with abdominal pain, nausea, black emesis, and bright red blood per rectum. Noted to be tachycardic and hypotensive.  CT abdomen pelvis show signs of cirrhosis leading to the concern for variceal bleeding.  Patient was then transferred to [facility] MICU for further care.  - Baseline Hgb 11.9, dropped to the katie of 6s this admission. Hypotensive and required pressors till 2/5 AM.   - EGD 2/5 with 3 nonbleeding antral ulcers (one large with a flat pigmented spot and 2 smaller clean-based ulcers). No varices.  - GI recommended continue PPI p.o. twice daily for 2 months then daily as well as perform stool H. pylori antigen test (the result is pending). GI plan to repeat upper endoscopy in 2 to 3 months.     # EtOH cirrhosis  # EtOH dependence  - Noted cirrhosis on CT abd/pel 2/3. Appeared to be compensated at this moment.   - counseling alcohol cessation  - will need to establish care with hepatology as outpatient     # Osteomyelitis of R foot and L ankle   # Chronic lower extremities ulcers  # Charcot foot  - Patient appeared non-septic throughout the admission. Shock is most likely from hemorrhagic shock rather than septic.  - Seen by ortho. No plan for intervention given the wound appeared to be stable.  Recommended WBAT BLE.  - Please see photo of the wounds in Media tab.  - Has chronic neuropathy that resulted in charcot foot. Most recently seen by podiatry on 1/29/2020, received local wound care. XR at L ankle and R foot showed osteomyelitis.  and CRP 19 2/4. R  "first metatarsal x-ray shows progressive bone destruction concerning for osteomyelitis. L ankle x-ray is unchanged from baseline.   - ID suggested R foot wound culture in order to direct therapy for osteomyelitis. No need to continue antibiotics given her lack of symptoms.   - Patient prefers to follow up with her outpatient ID doctor in [place].By the time of her follow up, antibiotics could be appropriately prescribed.      # LIGIA: Baseline Cr 1.0. Cr peaked at 1.53 on admission (2/4). Likely due to shock. Resolved with Cr 1.07 at discharge.      # T2DM: A1C 6.5 (2/2019). Resume home glimepiride 2 mg po qday at discharge.     # Demand ischemia: troponin peaked at 0.769 in the setting of GI bleed. Resolved.    Follow up plan summary  - Follow up with primary care provider, [facility], within 7 days for hospital follow- up.  The following labs/tests are recommended: CBC, BMP.    - Follow up with Dr. Lowe, your infectious disease doctor in 2-3 weeks   - Follow up with GI team here for repeat EGD in 2-3 months    Physical Exam on day of Discharge:  Blood pressure 136/55, pulse 89, temperature 98.1  F (36.7  C), temperature source Oral, resp. rate 16, height 1.676 m (5' 6\"), weight 99.9 kg (220 lb 4.8 oz), SpO2 98 %.  General: AAOx3, no clubbing/cyanosis, no edema, no JVD  HEENT:  Supple   Cardiac: RRR. No m/r/g. Normal S1, S2.   Pulm: CTAB, no wheezes, no crackles  Abd: obese, +BS, soft, non distended, non tender  Skin: No rash/petechiae, no stigmata of chronic liver disease  MSK: L foot deformities, noted open wounds at the medial side of the sole of R foot and at the lateral malleolus at L ankle  Neuro:  No new focal deficits         Discharge Medications:     Current Discharge Medication List      START taking these medications    Details   folic acid (FOLVITE) 1 MG tablet Take 1 tablet (1 mg) by mouth daily  Qty: 60 tablet, Refills: 0    Associated Diagnoses: Alcoholic cirrhosis " of liver without ascites (H)      pantoprazole (PROTONIX) 40 MG EC tablet Take 1 tablet (40 mg) by mouth 2 times daily (before meals)  Qty: 120 tablet, Refills: 1    Associated Diagnoses: Gastrointestinal hemorrhage associated with gastric ulcer      vitamin B1 (THIAMINE) 100 MG tablet Take 1 tablet (100 mg) by mouth daily  Qty: 60 tablet, Refills: 0    Associated Diagnoses: Alcoholic cirrhosis of liver without ascites (H)         CONTINUE these medications which have NOT CHANGED    Details   gabapentin (NEURONTIN) 600 MG tablet TK 2 TS PO TID. OK TO TK 1 ADDITIONAL T PRN.  Refills: 10      simvastatin (ZOCOR) 40 MG tablet TK 1 T PO QPM.  Refills: 0      !! blood glucose (CONTOUR NEXT TEST) test strip apply 1 strip by finger poke route 2 times per day.      blood glucose monitoring (MIGUEL A MICROLET) lancets by subcutaneous route 2 times per day.      !! CONTOUR NEXT TEST test strip TEST BLOOD BID  Refills: 6      !! order for DME Equipment being ordered: [id number] $35  shoe post op mens md  Qty: 1 Device, Refills: 0    Associated Diagnoses: Diabetic ulcer of right midfoot associated with type 2 diabetes mellitus, with bone involvement without evidence of necrosis (H); Charcot's joint of left foot; Skin ulcer of left ankle with necrosis of muscle (H); Type 2 diabetes mellitus with diabetic polyneuropathy, without long-term current use of insulin (H); Venous incompetence      !! order for DME Equipment being ordered: DME [id number] $70  DH Shoe LG  Qty: 1 Device, Refills: 0    Associated Diagnoses: Bilateral foot pain; Diabetic ulcer of right midfoot associated with type 2 diabetes mellitus, with necrosis of bone (H); Type 2 diabetes mellitus with diabetic polyneuropathy, unspecified whether long term insulin use (H)       !! - Potential duplicate medications found. Please discuss with provider.               Discharge Instructions and Follow-Up:     Discharge Procedure Orders   GASTROENTEROLOGY ADULT REF  PROCEDURE ONLY [facility] [phone]   Referral Priority: Routine   Number of Visits Requested: 1     Reason for your hospital stay   Order Comments: Dear Tiffani Tan    Your were hospitalized at [facility] with bleeding from stomach ulcer and treated with endoscopy evaluation with pantoprazole (medication to suppress the acid in the stomach.  Over your hospitalization you have improved and today you are ready to be discharged.  If you continue pantoprazole therapy you should continue to improve but if you develop fever, shortness of breath, light headedness, chest pain, worsening abdominal pain, black emesis, black/bloody stool or any changes in the wounds at both feet, please seek medical attention.    We are suggesting the following medication changes:  - please take pantoprazole 40 mg twice daily for 2 months then switch to once daily around April 2020  - please avoid alcohol use and NSAIDs use e.g. ibuprofen, naproxen     Please set up an appointment with:  - Your primary care provider, [facility], within 7 days for hospital follow- up.  The following labs/tests are recommended: CBC, BMP.    - Dr. Lowe, your infectious disease doctor in 2-3 weeks   - GI team here for repeat EGD in 2-3 months    It was a pleasure meeting with you today. Thank you for allowing me and my team the privilege of caring for you today. You are the reason we are here, and I truly hope we provided you with the excellent service you deserve. Please let us know if there is anything else we can do for you so that we can be sure you are leaving completely satisfied with your care experience.    Your hospital unit at the time of discharge is 7A so if you have any questions please call the hospital at [phone] and ask to talk to a nurse on 7A.    Take care!  Roger Farooq MD     Follow Up and recommended labs and tests   Order Comments: - Follow up with primary  "care provider, [facility], within 7 days for hospital follow- up.  The following labs/tests are recommended: CBC, BMP.    - Follow up with Dr. Lowe, your infectious disease doctor in 2-3 weeks   - Follow up with GI team here for repeat EGD in 2-3 months     Activity   Order Comments: Your activity upon discharge: activity as tolerated     Order Specific Question Answer Comments   Is discharge order? Yes      Discharge Instructions     Wound care and dressings   Order Comments: Bilateral LE wounds: Daily  and PRN when soiled   1. Cleanse with microklenz and gauze  2. Cut approx 3-4 cm strip of mesalt for R foot - lay over wound bed and 5-6 cm strip for L foot wound gently packed into cavity.  3. Cut Exudry in half and place each half over wounds on bilateral feet  4. Secure using 1/2 roll kerlix and medipore tape. Ace 6\" to bilateral LE from foot to below knee.   5. Encourage pt to keep pressure off wounds as often as possible.     Full Code     Order Specific Question Answer Comments   Code status determined by: Discussion with patient/legal decision maker      Diet   Order Comments: Follow this diet upon discharge: Orders Placed This Encounter      Regular Diet Adult     Order Specific Question Answer Comments   Is discharge order? Yes              Discharge Disposition:   To home         Condition on Discharge:   Discharge condition: Stable   Code status on discharge: Full Code        Date of service: 2/7/2020    The patient was discussed with Dr. Felder.    Roger Farooq MD  PGY-3 Internal Medicine  Pager [phone]      "

## 2020-02-07 NOTE — PLAN OF CARE
Afebrile. OVSS on RA. Pt denies N/V/D and pain. Needs stool sample, pt aware. Feet wrapped, dressing CDI. Team would like to see feet during wound change, please contact. L arm PIV TKO. Up with GB and walker. SBA to commode. Continue with POC.         Problem: Adult Inpatient Plan of Care  Goal: Plan of Care Review  2/7/2020 0633 by Janessa Valero, RN  Outcome: No Change     Problem: Adult Inpatient Plan of Care  Goal: Absence of Hospital-Acquired Illness or Injury  2/7/2020 0633 by Janessa Valero, RN  Outcome: No Change     Problem: Adult Inpatient Plan of Care  Goal: Optimal Comfort and Wellbeing  2/7/2020 0633 by Janessa Valero, RN  Outcome: No Change

## 2020-02-07 NOTE — PLAN OF CARE
"  /60 (BP Location: Left arm)   Pulse 87   Temp 98.5  F (36.9  C) (Oral)   Resp 16   Ht 1.676 m (5' 6\")   Wt 102.5 kg (225 lb 15.5 oz)   SpO2 94%   BMI 36.47 kg/m      [id number]  VSS on RA, no s/s of distress. A/Ox4, pleasant and cooperative with cares; quiet but calling appropriately and able to express needs. Denied pain. BLE wounds changed/dressed by day RN (orders to be changed daily); CDI and wrapped with ACE bandages. Denied n/v--though endorsed poor appetite this evening. AC/HS BG, 132 and 170, no sliding scale given per protocol. Lower left PIV TKO for abx; upper left PIV SL. Up x1-2 assist to BSC and is weight bearing as tolerated; this evening pt insisted on pivoting to wheelchair and then taking wheelchair to bathroom--she tolerated fairly well; no BMs this shift. Stool specimen still needed for H.pylori testing. UA/UC sent and pending; adequate UOP (some urine missed hat). Continue abx therapy and BLE wound care. Pt spent most of shift watching television; pt sleeping now; call light and belongings within reach. Will continue to monitor and continue POC/notify team as needed.   "

## 2020-02-07 NOTE — PROGRESS NOTES
"   02/07/20 0900   Quick Adds   Type of Visit Initial Occupational Therapy Evaluation   Living Environment   Lives With spouse   Living Arrangements house   Home Accessibility stairs to enter home;stairs within home   Number of Stairs, Main Entrance 1   Number of Stairs, Within Home, Primary other (see comments)  (7, 8)   Transportation Anticipated car, drives self   Living Environment Comment Pt lives with  in house with 1 matheus TO BASEMENT.  Then needs to crawl up 7 and 8 stairs to reach main level.  Pt uses FWW for ambulation.  Pt uses  manual wc in home and power w/c at work but states she walks \"a lot\".  Pt reports she \"crawls\" up the stairs.  Pt has a shower chair and grab bars for bathing.    Functional Level   Ambulation 1-->assistive equipment   Transferring 1-->assistive equipment   Toileting 1-->assistive equipment   Bathing 1-->assistive equipment   Dressing 1-->assistive equipment   Eating 0-->independent   Communication 0-->understands/communicates without difficulty   Swallowing 0-->swallows foods/liquids without difficulty   Cognition 0 - no cognition issues reported   Fall history within last six months yes   Number of times patient has fallen within last six months 1   Prior Functional Level Comment Pt reports ind with basic ADL.  Pt reports  sometimes assists with stairs and IADL and \"He will do what I need him to\"   General Information   Onset of Illness/Injury or Date of Surgery - Date 02/04/20   Referring Physician Angela Valencia MD   Patient/Family Goals Statement to go home   Additional Occupational Profile Info/Pertinent History of Current Problem Per chart: \"59 yo F w/ hx of etoh cirrhosis, T2DM c/b diabetic neuropathy, osteomyelitis of the right foot, transferred from [facility] for hemorrhagic shock 2/2 acute UGIB. \"   Precautions/Limitations fall precautions   Weight-Bearing Status - LLE weight-bearing as tolerated   Weight-Bearing Status - RLE weight-bearing as " tolerated   General Info Comments Has orthotic shoes and posterior orthotic for L LE   Cognitive Status Examination   Orientation orientation to person, place and time   Level of Consciousness alert   Follows Commands (Cognition) WNL   Cognitive Comment Pt alert and appropriate throughout   Visual Perception   Visual Perception Wears glasses   Visual Perception Comments Pt states she is due for an eye appointment but denies any vision changes   Sensory Examination   Sensory Comments Baseline numbness B LE; baseline tingling in B uE   Pain Assessment   Patient Currently in Pain No   Range of Motion (ROM)   ROM Comment WFL B UE   Strength   Strength Comments No concerns observed   Hand Strength   Hand Strength Comments WFL BUE   Mobility   Bed Mobility Comments SBA   Transfer Skills   Transfer Comments SBA   Instrumental Activities of Daily Living (IADL)   Previous Responsibilities meal prep;housekeeping;laundry   IADL Comments w/c based.   can assist prn.   Activities of Daily Living Analysis   Impairments Contributing to Impaired Activities of Daily Living   (activity tolerance)   General Therapy Interventions   Planned Therapy Interventions transfer training;ADL retraining   Clinical Impression   Criteria for Skilled Therapeutic Interventions Met yes, treatment indicated   OT Diagnosis impaired ADL   Influenced by the following impairments deconditioning   Assessment of Occupational Performance 1-3 Performance Deficits   Identified Performance Deficits dressing, toileting, transfers   Clinical Decision Making (Complexity) Low complexity   Therapy Frequency Daily   Predicted Duration of Therapy Intervention (days/wks) 2 days   Anticipated Discharge Disposition Home with Assist;Home with Outpatient Therapy   Risks and Benefits of Treatment have been explained. Yes   Patient, Family & other staff in agreement with plan of care Yes   Clinical Impression Comments Pt presents with deconditionign affecting ADL  "particpation.  Pt will benefit from skilled OT to address and maximize safety and I with ADL.    [facility] AM-PAC TM \"6 Clicks\"   2016, Trustees of [facility], under license to [employer].  All rights reserved.   6 Clicks Short Forms Daily Activity Inpatient Short Form   [facility] AM-PAC  \"6 Clicks\" Daily Activity Inpatient Short Form   1. Putting on and taking off regular lower body clothing? 3 - A Little   2. Bathing (including washing, rinsing, drying)? 3 - A Little   3. Toileting, which includes using toilet, bedpan or urinal? 4 - None   4. Putting on and taking off regular upper body clothing? 4 - None   5. Taking care of personal grooming such as brushing teeth? 4 - None   6. Eating meals? 4 - None   Daily Activity Raw Score (Score out of 24.Lower scores equate to lower levels of function) 22   Total Evaluation Time   Total Evaluation Time (Minutes) 8     "

## 2020-02-07 NOTE — PLAN OF CARE
Patient discharging home this evening. Discharge orders reviewed and signed. PIV DC'd. R/L foot dressings changed, R foot wound sent for culture. All belongings with the patient. Vitals are stable, on room air.

## 2020-02-08 LAB
BACTERIA SPEC CULT: ABNORMAL
Lab: ABNORMAL
SPECIMEN SOURCE: ABNORMAL

## 2020-02-08 NOTE — PLAN OF CARE
Occupational Therapy Discharge Summary    Reason for therapy discharge:    Discharged to home.    Progress towards therapy goal(s). See goals on Care Plan in [facility] electronic health record for goal details.  Goals met    Therapy recommendation(s):    Continued therapy is recommended.  Rationale/Recommendations:  may benefit from PT follow up and ortho/podiatry to maximize safe functional mobility given chronic wounds and L ankle instability.

## 2020-02-10 ENCOUNTER — PATIENT OUTREACH (OUTPATIENT)
Dept: CARE COORDINATION | Facility: CLINIC | Age: 60
End: 2020-02-10

## 2020-02-10 LAB
BACTERIA SPEC CULT: NO GROWTH
H PYLORI AG STL QL IA: NEGATIVE
Lab: NORMAL
Lab: NORMAL
SPECIMEN SOURCE: NORMAL

## 2020-02-20 ENCOUNTER — OFFICE VISIT (OUTPATIENT)
Dept: PODIATRY | Facility: CLINIC | Age: 60
End: 2020-02-20
Payer: COMMERCIAL

## 2020-02-20 VITALS — DIASTOLIC BLOOD PRESSURE: 67 MMHG | HEART RATE: 90 BPM | SYSTOLIC BLOOD PRESSURE: 97 MMHG

## 2020-02-20 DIAGNOSIS — M14.672 CHARCOT'S JOINT OF LEFT FOOT: ICD-10-CM

## 2020-02-20 DIAGNOSIS — E11.49 TYPE II OR UNSPECIFIED TYPE DIABETES MELLITUS WITH NEUROLOGICAL MANIFESTATIONS, NOT STATED AS UNCONTROLLED(250.60) (H): ICD-10-CM

## 2020-02-20 DIAGNOSIS — L03.115 CELLULITIS OF RIGHT LOWER EXTREMITY: ICD-10-CM

## 2020-02-20 DIAGNOSIS — L97.323 SKIN ULCER OF LEFT ANKLE WITH NECROSIS OF MUSCLE (H): Primary | ICD-10-CM

## 2020-02-20 DIAGNOSIS — L97.512 SKIN ULCER OF RIGHT FOOT WITH FAT LAYER EXPOSED (H): ICD-10-CM

## 2020-02-20 PROCEDURE — 29405 APPL SHORT LEG CAST: CPT | Mod: LT | Performed by: PODIATRIST

## 2020-02-20 PROCEDURE — 29580 STRAPPING UNNA BOOT: CPT | Mod: RT | Performed by: PODIATRIST

## 2020-02-20 NOTE — NURSING NOTE
Tiffani Tan's chief complaint for this visit includes:  Chief Complaint   Patient presents with     RECHECK     wound care     PCP: [facility]    Referring Provider:  No referring provider defined for this encounter.    BP 97/67   Pulse 90   PF 95 L/min   Data Unavailable     Do you need any medication refills at today's visit? NO

## 2020-02-20 NOTE — LETTER
2/20/2020         RE: Tiffani Tan  [address] [facility] [address]        Dear Colleague,    Thank you for referring your patient, Tiffani Tan, to the [facility]. Please see a copy of my visit note below.    No past medical history on file.  Patient Active Problem List   Diagnosis     Charcot's joint of left foot     Type 2 diabetes mellitus with diabetic polyneuropathy, without long-term current use of insulin (H)     Diabetic ulcer of right midfoot associated with type 2 diabetes mellitus, with bone involvement without evidence of necrosis (H)     Venous incompetence     Venous stasis ulcer of left calf with fat layer exposed without varicose veins (H)     Skin ulcer of left ankle with fat layer exposed (H)     GI bleed     UGIB (upper gastrointestinal bleed)     Past Surgical History:   Procedure Laterality Date     ESOPHAGOSCOPY, GASTROSCOPY, DUODENOSCOPY (EGD), COMBINED N/A 2/5/2020    Procedure: ESOPHAGOGASTRODUODENOSCOPY (EGD);  Surgeon: Tanya Dias MD;  Location: [facility]     Social History     Socioeconomic History     Marital status: Single     Spouse name: Not on file     Number of children: Not on file     Years of education: Not on file     Highest education level: Not on file   Occupational History     Not on file   Social Needs     Financial resource strain: Not on file     Food insecurity:     Worry: Not on file     Inability: Not on file     Transportation needs:     Medical: Not on file     Non-medical: Not on file   Tobacco Use     Smoking status: Current Every Day Smoker     Smokeless tobacco: Never Used   Substance and Sexual Activity     Alcohol use: Not on file     Drug use: Not on file     Sexual activity: Not on file   Lifestyle     Physical activity:     Days per week: Not on file     Minutes per session: Not on file     Stress: Not on file   Relationships     Social connections:     Talks on phone: Not on file     Gets together: Not on file     Attends  Worship service: Not on file     Active member of club or organization: Not on file     Attends meetings of clubs or organizations: Not on file     Relationship status: Not on file     Intimate partner violence:     Fear of current or ex partner: Not on file     Emotionally abused: Not on file     Physically abused: Not on file     Forced sexual activity: Not on file   Other Topics Concern     Not on file   Social History Narrative     Not on file     No family history on file.  Lab Results   Component Value Date    WBC 11.7 02/07/2020     Lab Results   Component Value Date    RBC 2.48 02/07/2020     Lab Results   Component Value Date    HGB 7.9 02/07/2020     Lab Results   Component Value Date    HCT 26.2 02/07/2020     No components found for: MCT  Lab Results   Component Value Date     02/07/2020     Lab Results   Component Value Date    MCH 31.9 02/07/2020     Lab Results   Component Value Date    MCHC 30.2 02/07/2020     Lab Results   Component Value Date    RDW 17.9 02/07/2020     Lab Results   Component Value Date    PLT 76 02/07/2020     Last Comprehensive Metabolic Panel:  Sodium   Date Value Ref Range Status   02/07/2020 141 133 - 144 mmol/L Final     Potassium   Date Value Ref Range Status   02/07/2020 4.4 3.4 - 5.3 mmol/L Final     Chloride   Date Value Ref Range Status   02/07/2020 113 (H) 94 - 109 mmol/L Final     Carbon Dioxide   Date Value Ref Range Status   02/07/2020 26 20 - 32 mmol/L Final     Anion Gap   Date Value Ref Range Status   02/07/2020 2 (L) 3 - 14 mmol/L Final     Glucose   Date Value Ref Range Status   02/07/2020 135 (H) 70 - 99 mg/dL Final     Urea Nitrogen   Date Value Ref Range Status   02/07/2020 23 7 - 30 mg/dL Final     Creatinine   Date Value Ref Range Status   02/07/2020 1.07 (H) 0.52 - 1.04 mg/dL Final     GFR Estimate   Date Value Ref Range Status   02/07/2020 56 (L) >60 mL/min/[1.73_m2] Final     Comment:     Non  GFR Calc  Starting 12/18/2018, serum  creatinine based estimated GFR (eGFR) will be   calculated using the Chronic Kidney Disease Epidemiology Collaboration   (CKD-EPI) equation.       Calcium   Date Value Ref Range Status   02/07/2020 8.4 (L) 8.5 - 10.1 mg/dL Final     Lab Results   Component Value Date    AST 43 02/05/2020     Lab Results   Component Value Date    ALT 20 02/05/2020     No results found for: BILICONJ   Lab Results   Component Value Date    BILITOTAL 0.6 02/05/2020     Lab Results   Component Value Date    ALBUMIN 2.2 02/05/2020     Lab Results   Component Value Date    PROTTOTAL 6.2 02/05/2020      Lab Results   Component Value Date    ALKPHOS 73 02/05/2020     Inr   1.33                2/05/2020  Lab Results   Component Value Date    A1C 6.5 02/04/2020     Results for orders placed or performed during the hospital encounter of 02/04/20   Wound Culture Aerobic Bacterial   Result Value Ref Range    Specimen Description Wound Left lateral malleolus     Special Requests Specimen collected in eSwab transport (white cap)     Culture Micro Light growth  Enterococcus faecalis   (A)     Culture Micro (A)      Light growth  Corynebacterium striatum  Identification obtained by MALDI-TOF mass spectrometry research use only database. Test   characteristics determined and verified by the Infectious Diseases Diagnostic Laboratory   ([facility]) [place], [place].  Susceptibility testing not routinely done      Culture Micro Light growth  Stenotrophomonas maltophilia   (A)     Culture Micro Light growth  Normal skin dorian          Susceptibility    Enterococcus faecalis - DEVI     AMPICILLIN <=2 Sensitive ug/mL     PENICILLIN 4 Sensitive ug/mL     VANCOMYCIN 1 Sensitive ug/mL     Gentamicin Screen*  Sensitive       * No high level gentamicin resistance found - If no high level gentamicin resistance is found, combination therapy with an aminoglycoside may be indicated for serious enterococcal infections such as bacteremia and endocarditis.    Stenotrophomonas  maltophilia - DEVI     CEFTAZIDIME >16.0 Resistant ug/mL     LEVOFLOXACIN 0.5 Sensitive ug/mL     Trimethoprim/Sulfa <=2.0/38.0 Sensitive ug/mL       SUBJECTIVE FINDINGS:  60-year-old female returns to clinic for ulcer left lateral ankle, right plantar first MPJ.  She relates she was in the hospital with a GI bleed since I have seen her last.  Reviewed previous notes as noted in EMR.  She had a discharge diagnosis of osteomyelitis right foot and ankle, chronic lower extremity ulcers, Charcot foot for her foot diagnoses.  Discharge summary and previous notes reviewed as noted in EMR.  She relates she is currently having no fevers or chills.  She is not on any antibiotics.  She relates her right leg is swollen a bit.  She is using the posterior splint and changing the dressing daily on the left.  She does relate the cast feels better and it is easier for her to get around in but last time we used it she relates it got tight.  She went into the ER and she ended up down at [facility].        OBJECTIVE FINDINGS:  DP and PT are 2/4 bilaterally.  She has right plantar first MPJ ulcer.  It is about 3.5 x 2 cm.  It is deep through the subcutaneous tissues.  There is some maceration, some hyperkeratotic tissue buildup, good granular base.  Some edema, some serosanguineous drainage, no erythema, no odor, no calor.  She has proximal right leg edema with cellulitic erythema, serosanguineous drainage, scratches, no odor, no calor.  She has a left lateral ankle ulcer that is anne.  It is about 2 cm in diameter.  It tracks about 2.5 cm deep.  There is some maceration, some granulation tissue, some edema, some serosanguineous drainage.  No erythema, no odor, no calor there.  She has increased varus deformity of the foot and ankle.  She has some peripheral edema on left leg as well, although that is doing okay.  She has some dry skin bilaterally.      ASSESSMENT/PLAN:  Ulcer, left ankle.  Charcot foot and ankle, left.   Ulcer, right first MPJ.  Cellulitis, right leg with venous stasis and peripheral edema.  She is diabetic with peripheral neuropathy and vascular disease.  Diagnosis and treatment options discussed with her.  Local wound care done upon consent today.  Biatain Silver applied to the ulcer sites.  A multilayer Unna boot and compression boot applied to the right lower extremity upon consent.  A total contact cast applied to the left lower extremity upon consent and use discussed with her.  I discussed with her I do not think she is going to be able to go a whole week on the right without the Unna boot sliding down as the swelling decreases.  If she cannot get in, she has an appointment with Dr. Giron on Wednesday.  She will keep that.  If she has to come in sooner to have this changed, she will do that as scheduling for Monday and Tuesday does not work well for her.  Same goes for the contact cast on the left.  I am going to put her on Augmentin for signs of infection.  Labs ordered and use discussed with her.  Previous imaging reviewed.  She does have some osteoarthritic versus osteomyelitis versus gout changes in the first MPJ on the right.  She also has what appears to be some cortical change on the left lateral malleolus that has changed since previous x-rays and also could be consistent with either osteolysis from chronic ulcer or osteomyelitis or Charcot foot or all of these.                     Also, discussed with her followup appointments.  She has an appointment with GI.  She needs to get an appointment with her primary physician for evaluation and management for general health care.  She relates she will do that, as well as Infectious Disease.         Again, thank you for allowing me to participate in the care of your patient.        Sincerely,        Nolan Whitten DPM

## 2020-02-20 NOTE — PATIENT INSTRUCTIONS
Thanks for coming today.  [facility]  [address] [place] [address]    To schedule future appointments in Ortho Clinic, you may call [phone].    To schedule ordered imaging by your provider:   Call Central Imaging [phone]    To schedule an injection ordered by your provider:  Call [facility] Injection scheduling line: [phone]  [email or web address] available online at:  [email or web address]    Please call if any further questions or concerns ([phone]).  Clinic hours 8 am to 5 pm.    Return to clinic (call) if symptoms worsen or fail to improve.

## 2020-02-20 NOTE — PROGRESS NOTES
No past medical history on file.  Patient Active Problem List   Diagnosis     Charcot's joint of left foot     Type 2 diabetes mellitus with diabetic polyneuropathy, without long-term current use of insulin (H)     Diabetic ulcer of right midfoot associated with type 2 diabetes mellitus, with bone involvement without evidence of necrosis (H)     Venous incompetence     Venous stasis ulcer of left calf with fat layer exposed without varicose veins (H)     Skin ulcer of left ankle with fat layer exposed (H)     GI bleed     UGIB (upper gastrointestinal bleed)     Past Surgical History:   Procedure Laterality Date     ESOPHAGOSCOPY, GASTROSCOPY, DUODENOSCOPY (EGD), COMBINED N/A 2/5/2020    Procedure: ESOPHAGOGASTRODUODENOSCOPY (EGD);  Surgeon: Tanya Dias MD;  Location: [facility]     Social History     Socioeconomic History     Marital status: Single     Spouse name: Not on file     Number of children: Not on file     Years of education: Not on file     Highest education level: Not on file   Occupational History     Not on file   Social Needs     Financial resource strain: Not on file     Food insecurity:     Worry: Not on file     Inability: Not on file     Transportation needs:     Medical: Not on file     Non-medical: Not on file   Tobacco Use     Smoking status: Current Every Day Smoker     Smokeless tobacco: Never Used   Substance and Sexual Activity     Alcohol use: Not on file     Drug use: Not on file     Sexual activity: Not on file   Lifestyle     Physical activity:     Days per week: Not on file     Minutes per session: Not on file     Stress: Not on file   Relationships     Social connections:     Talks on phone: Not on file     Gets together: Not on file     Attends [employer] service: Not on file     Active member of club or organization: Not on file     Attends meetings of clubs or organizations: Not on file     Relationship status: Not on file     Intimate partner violence:     Fear of  current or ex partner: Not on file     Emotionally abused: Not on file     Physically abused: Not on file     Forced sexual activity: Not on file   Other Topics Concern     Not on file   Social History Narrative     Not on file     No family history on file.  Lab Results   Component Value Date    WBC 11.7 02/07/2020     Lab Results   Component Value Date    RBC 2.48 02/07/2020     Lab Results   Component Value Date    HGB 7.9 02/07/2020     Lab Results   Component Value Date    HCT 26.2 02/07/2020     No components found for: MCT  Lab Results   Component Value Date     02/07/2020     Lab Results   Component Value Date    MCH 31.9 02/07/2020     Lab Results   Component Value Date    MCHC 30.2 02/07/2020     Lab Results   Component Value Date    RDW 17.9 02/07/2020     Lab Results   Component Value Date    PLT 76 02/07/2020     Last Comprehensive Metabolic Panel:  Sodium   Date Value Ref Range Status   02/07/2020 141 133 - 144 mmol/L Final     Potassium   Date Value Ref Range Status   02/07/2020 4.4 3.4 - 5.3 mmol/L Final     Chloride   Date Value Ref Range Status   02/07/2020 113 (H) 94 - 109 mmol/L Final     Carbon Dioxide   Date Value Ref Range Status   02/07/2020 26 20 - 32 mmol/L Final     Anion Gap   Date Value Ref Range Status   02/07/2020 2 (L) 3 - 14 mmol/L Final     Glucose   Date Value Ref Range Status   02/07/2020 135 (H) 70 - 99 mg/dL Final     Urea Nitrogen   Date Value Ref Range Status   02/07/2020 23 7 - 30 mg/dL Final     Creatinine   Date Value Ref Range Status   02/07/2020 1.07 (H) 0.52 - 1.04 mg/dL Final     GFR Estimate   Date Value Ref Range Status   02/07/2020 56 (L) >60 mL/min/[1.73_m2] Final     Comment:     Non  GFR Calc  Starting 12/18/2018, serum creatinine based estimated GFR (eGFR) will be   calculated using the Chronic Kidney Disease Epidemiology Collaboration   (CKD-EPI) equation.       Calcium   Date Value Ref Range Status   02/07/2020 8.4 (L) 8.5 - 10.1 mg/dL  Final     Lab Results   Component Value Date    AST 43 02/05/2020     Lab Results   Component Value Date    ALT 20 02/05/2020     No results found for: BILICONJ   Lab Results   Component Value Date    BILITOTAL 0.6 02/05/2020     Lab Results   Component Value Date    ALBUMIN 2.2 02/05/2020     Lab Results   Component Value Date    PROTTOTAL 6.2 02/05/2020      Lab Results   Component Value Date    ALKPHOS 73 02/05/2020     Inr   1.33                2/05/2020  Lab Results   Component Value Date    A1C 6.5 02/04/2020     Results for orders placed or performed during the hospital encounter of 02/04/20   Wound Culture Aerobic Bacterial   Result Value Ref Range    Specimen Description Wound Left lateral malleolus     Special Requests Specimen collected in eSwab transport (white cap)     Culture Micro Light growth  Enterococcus faecalis   (A)     Culture Micro (A)      Light growth  Corynebacterium striatum  Identification obtained by MALDI-TOF mass spectrometry research use only database. Test   characteristics determined and verified by the Infectious Diseases Diagnostic Laboratory   ([facility]) [place], [place].  Susceptibility testing not routinely done      Culture Micro Light growth  Stenotrophomonas maltophilia   (A)     Culture Micro Light growth  Normal skin dorian          Susceptibility    Enterococcus faecalis - DEVI     AMPICILLIN <=2 Sensitive ug/mL     PENICILLIN 4 Sensitive ug/mL     VANCOMYCIN 1 Sensitive ug/mL     Gentamicin Screen*  Sensitive       * No high level gentamicin resistance found - If no high level gentamicin resistance is found, combination therapy with an aminoglycoside may be indicated for serious enterococcal infections such as bacteremia and endocarditis.    Stenotrophomonas maltophilia - DEVI     CEFTAZIDIME >16.0 Resistant ug/mL     LEVOFLOXACIN 0.5 Sensitive ug/mL     Trimethoprim/Sulfa <=2.0/38.0 Sensitive ug/mL       SUBJECTIVE FINDINGS:  60-year-old female returns to clinic for ulcer left  lateral ankle, right plantar first MPJ.  She relates she was in the hospital with a GI bleed since I have seen her last.  Reviewed previous notes as noted in EMR.  She had a discharge diagnosis of osteomyelitis right foot and ankle, chronic lower extremity ulcers, Charcot foot for her foot diagnoses.  Discharge summary and previous notes reviewed as noted in EMR.  She relates she is currently having no fevers or chills.  She is not on any antibiotics.  She relates her right leg is swollen a bit.  She is using the posterior splint and changing the dressing daily on the left.  She does relate the cast feels better and it is easier for her to get around in but last time we used it she relates it got tight.  She went into the ER and she ended up down at [facility].        OBJECTIVE FINDINGS:  DP and PT are 2/4 bilaterally.  She has right plantar first MPJ ulcer.  It is about 3.5 x 2 cm.  It is deep through the subcutaneous tissues.  There is some maceration, some hyperkeratotic tissue buildup, good granular base.  Some edema, some serosanguineous drainage, no erythema, no odor, no calor.  She has proximal right leg edema with cellulitic erythema, serosanguineous drainage, scratches, no odor, no calor.  She has a left lateral ankle ulcer that is anne.  It is about 2 cm in diameter.  It tracks about 2.5 cm deep.  There is some maceration, some granulation tissue, some edema, some serosanguineous drainage.  No erythema, no odor, no calor there.  She has increased varus deformity of the foot and ankle.  She has some peripheral edema on left leg as well, although that is doing okay.  She has some dry skin bilaterally.      ASSESSMENT/PLAN:  Ulcer, left ankle.  Charcot foot and ankle, left.  Ulcer, right first MPJ.  Cellulitis, right leg with venous stasis and peripheral edema.  She is diabetic with peripheral neuropathy and vascular disease.  Diagnosis and treatment options discussed with her.  Local wound  care done upon consent today.  Biatain Silver applied to the ulcer sites.  A multilayer Unna boot and compression boot applied to the right lower extremity upon consent.  A total contact cast applied to the left lower extremity upon consent and use discussed with her.  I discussed with her I do not think she is going to be able to go a whole week on the right without the Unna boot sliding down as the swelling decreases.  If she cannot get in, she has an appointment with Dr. Giron on Wednesday.  She will keep that.  If she has to come in sooner to have this changed, she will do that as scheduling for Monday and Tuesday does not work well for her.  Same goes for the contact cast on the left.  I am going to put her on Augmentin for signs of infection.  Labs ordered and use discussed with her.  Previous imaging reviewed.  She does have some osteoarthritic versus osteomyelitis versus gout changes in the first MPJ on the right.  She also has what appears to be some cortical change on the left lateral malleolus that has changed since previous x-rays and also could be consistent with either osteolysis from chronic ulcer or osteomyelitis or Charcot foot or all of these.                     Also, discussed with her followup appointments.  She has an appointment with GI.  She needs to get an appointment with her primary physician for evaluation and management for general health care.  She relates she will do that, as well as Infectious Disease.

## 2020-02-26 ENCOUNTER — OFFICE VISIT (OUTPATIENT)
Dept: PODIATRY | Facility: CLINIC | Age: 60
End: 2020-02-26
Payer: COMMERCIAL

## 2020-02-26 ENCOUNTER — TELEPHONE (OUTPATIENT)
Dept: ORTHOPEDICS | Facility: CLINIC | Age: 60
End: 2020-02-26

## 2020-02-26 VITALS — DIASTOLIC BLOOD PRESSURE: 59 MMHG | SYSTOLIC BLOOD PRESSURE: 119 MMHG | HEART RATE: 99 BPM | OXYGEN SATURATION: 93 %

## 2020-02-26 DIAGNOSIS — L97.416 DIABETIC ULCER OF RIGHT MIDFOOT ASSOCIATED WITH TYPE 2 DIABETES MELLITUS, WITH BONE INVOLVEMENT WITHOUT EVIDENCE OF NECROSIS (H): ICD-10-CM

## 2020-02-26 DIAGNOSIS — M14.672 CHARCOT'S JOINT OF LEFT FOOT: ICD-10-CM

## 2020-02-26 DIAGNOSIS — L97.324 SKIN ULCER OF LEFT ANKLE WITH NECROSIS OF BONE (H): ICD-10-CM

## 2020-02-26 DIAGNOSIS — E11.621 DIABETIC ULCER OF RIGHT MIDFOOT ASSOCIATED WITH TYPE 2 DIABETES MELLITUS, WITH BONE INVOLVEMENT WITHOUT EVIDENCE OF NECROSIS (H): ICD-10-CM

## 2020-02-26 DIAGNOSIS — L97.512 SKIN ULCER OF RIGHT FOOT WITH FAT LAYER EXPOSED (H): ICD-10-CM

## 2020-02-26 DIAGNOSIS — E11.42 TYPE 2 DIABETES MELLITUS WITH DIABETIC POLYNEUROPATHY, WITHOUT LONG-TERM CURRENT USE OF INSULIN (H): Primary | ICD-10-CM

## 2020-02-26 DIAGNOSIS — L03.115 CELLULITIS OF RIGHT LOWER EXTREMITY: ICD-10-CM

## 2020-02-26 DIAGNOSIS — L97.222 VENOUS STASIS ULCER OF LEFT CALF WITH FAT LAYER EXPOSED WITHOUT VARICOSE VEINS (H): ICD-10-CM

## 2020-02-26 DIAGNOSIS — E11.49 TYPE II OR UNSPECIFIED TYPE DIABETES MELLITUS WITH NEUROLOGICAL MANIFESTATIONS, NOT STATED AS UNCONTROLLED(250.60) (H): ICD-10-CM

## 2020-02-26 DIAGNOSIS — I87.2 VENOUS INCOMPETENCE: ICD-10-CM

## 2020-02-26 DIAGNOSIS — I87.2 VENOUS STASIS ULCER OF LEFT CALF WITH FAT LAYER EXPOSED WITHOUT VARICOSE VEINS (H): ICD-10-CM

## 2020-02-26 DIAGNOSIS — L97.323 SKIN ULCER OF LEFT ANKLE WITH NECROSIS OF MUSCLE (H): ICD-10-CM

## 2020-02-26 LAB
ANION GAP SERPL CALCULATED.3IONS-SCNC: 5 MMOL/L (ref 3–14)
BASOPHILS # BLD AUTO: 0.1 10E9/L (ref 0–0.2)
BASOPHILS NFR BLD AUTO: 0.6 %
BUN SERPL-MCNC: 10 MG/DL (ref 7–30)
CALCIUM SERPL-MCNC: 8.8 MG/DL (ref 8.5–10.1)
CHLORIDE SERPL-SCNC: 102 MMOL/L (ref 94–109)
CO2 SERPL-SCNC: 27 MMOL/L (ref 20–32)
CREAT SERPL-MCNC: 0.8 MG/DL (ref 0.52–1.04)
CRP SERPL-MCNC: 45.9 MG/L (ref 0–8)
DIFFERENTIAL METHOD BLD: ABNORMAL
EOSINOPHIL # BLD AUTO: 0.1 10E9/L (ref 0–0.7)
EOSINOPHIL NFR BLD AUTO: 0.8 %
ERYTHROCYTE [DISTWIDTH] IN BLOOD BY AUTOMATED COUNT: 17.2 % (ref 10–15)
ERYTHROCYTE [SEDIMENTATION RATE] IN BLOOD BY WESTERGREN METHOD: 125 MM/H (ref 0–30)
GFR SERPL CREATININE-BSD FRML MDRD: 79 ML/MIN/{1.73_M2}
GLUCOSE SERPL-MCNC: 108 MG/DL (ref 70–99)
HCT VFR BLD AUTO: 25.6 % (ref 35–47)
HGB BLD-MCNC: 7.9 G/DL (ref 11.7–15.7)
IMM GRANULOCYTES # BLD: 0.1 10E9/L (ref 0–0.4)
IMM GRANULOCYTES NFR BLD: 0.6 %
LYMPHOCYTES # BLD AUTO: 1.8 10E9/L (ref 0.8–5.3)
LYMPHOCYTES NFR BLD AUTO: 22 %
MCH RBC QN AUTO: 29.3 PG (ref 26.5–33)
MCHC RBC AUTO-ENTMCNC: 30.9 G/DL (ref 31.5–36.5)
MCV RBC AUTO: 95 FL (ref 78–100)
MONOCYTES # BLD AUTO: 1 10E9/L (ref 0–1.3)
MONOCYTES NFR BLD AUTO: 11.6 %
NEUTROPHILS # BLD AUTO: 5.4 10E9/L (ref 1.6–8.3)
NEUTROPHILS NFR BLD AUTO: 64.4 %
PLATELET # BLD AUTO: 242 10E9/L (ref 150–450)
POTASSIUM SERPL-SCNC: 5.1 MMOL/L (ref 3.4–5.3)
RBC # BLD AUTO: 2.7 10E12/L (ref 3.8–5.2)
SODIUM SERPL-SCNC: 134 MMOL/L (ref 133–144)
URATE SERPL-MCNC: 6.3 MG/DL (ref 2.6–6)
WBC # BLD AUTO: 8.3 10E9/L (ref 4–11)

## 2020-02-26 PROCEDURE — 29405 APPL SHORT LEG CAST: CPT | Mod: LT | Performed by: PODIATRIST

## 2020-02-26 PROCEDURE — 80048 BASIC METABOLIC PNL TOTAL CA: CPT | Performed by: PODIATRIST

## 2020-02-26 PROCEDURE — 84550 ASSAY OF BLOOD/URIC ACID: CPT | Performed by: PODIATRIST

## 2020-02-26 PROCEDURE — 85025 COMPLETE CBC W/AUTO DIFF WBC: CPT | Performed by: PODIATRIST

## 2020-02-26 PROCEDURE — 85652 RBC SED RATE AUTOMATED: CPT | Performed by: PODIATRIST

## 2020-02-26 PROCEDURE — 29581 APPL MULTLAYER CMPRN SYS LEG: CPT | Mod: RT | Performed by: PODIATRIST

## 2020-02-26 PROCEDURE — 86140 C-REACTIVE PROTEIN: CPT | Performed by: PODIATRIST

## 2020-02-26 PROCEDURE — 36415 COLL VENOUS BLD VENIPUNCTURE: CPT | Performed by: PODIATRIST

## 2020-02-26 NOTE — LETTER
2020         RE: Tiffani Tan  [address] [facility] [address]        Dear Colleague,    Thank you for referring your patient, Tiffani Tan, to the [facility]. Please see a copy of my visit note below.    Chief Complaint:   Chief Complaint   Patient presents with     RECHECK     pressure ulcer right  plantar 1st met head - Dermagraft / left charcot foot and left ankle ulcer        No Known Allergies      Subjective: Tiffani is a 60 year old female who presents to the clinic today for a follow up of BL foot and ankle ulcerations. She had a cast on the left leg and an Unna's boot on the right. She was started on Augmentin for right leg cellulitis. She was recently admitted and XRs showed increased destruction of the left lateral malleolus and the right 1st MTPJ head. She has not seen ID yet.    Objective         A right plantar 1st met head wound is noted measuring 1.5cm x 3cm x 0.2cm.  Inman Classification: 2     Wound base: Red/Granulation     Edges: macerated tissue     Drainage: scant/serous     Odor: no     Undermining: no     Bone Exposure: No     Clinical Signs of Infection: Yes - cellulitis of the hallux        After obtaining patient consent, the wound was irrigated with copious amounts of saline. A curette was then used to debride the wound into subcutaneous tissue. The wound edges were debrided back to healthy, bleeding tissue. The wound base exhibited healthy bleeding. Given the patient's lack of sensation, no anesthesia was necessary for the procedure.     ____________________________________  Wound #2            A Charcot wound is noted at left  lateral malleolus measuring 2.2cm x 2.6cm x 2.6cm.     Inman Classification: 3     Wound base: Red/hypergranulation     Edges: epibole     Drainage: copious/serous     Odor: no     Underminin O'Clock     Bone Exposure: Yes: lateral malleolus     Clinical Signs of Infection: No     After obtaining patient consent, the wound was  irrigated with copious amounts of saline. No sharp debridement performed on this ulceration.   Legs are warm with venous stasis dermatitis changes noted BL. No s/s of infection noted.  Skin breakdown form the Unna's boot on the anterior right leg. No infection noted.       Lab Results   Component Value Date    WBC 8.3 02/26/2020     Lab Results   Component Value Date    RBC 2.70 02/26/2020     Lab Results   Component Value Date    HGB 7.9 02/26/2020     Lab Results   Component Value Date    HCT 25.6 02/26/2020     No components found for: MCT  Lab Results   Component Value Date    MCV 95 02/26/2020     Lab Results   Component Value Date    MCH 29.3 02/26/2020     Lab Results   Component Value Date    MCHC 30.9 02/26/2020     Lab Results   Component Value Date    RDW 17.2 02/26/2020     Lab Results   Component Value Date     02/26/2020     Last Comprehensive Metabolic Panel:  Sodium   Date Value Ref Range Status   02/26/2020 134 133 - 144 mmol/L Final     Potassium   Date Value Ref Range Status   02/26/2020 5.1 3.4 - 5.3 mmol/L Final     Chloride   Date Value Ref Range Status   02/26/2020 102 94 - 109 mmol/L Final     Carbon Dioxide   Date Value Ref Range Status   02/26/2020 27 20 - 32 mmol/L Final     Anion Gap   Date Value Ref Range Status   02/26/2020 5 3 - 14 mmol/L Final     Glucose   Date Value Ref Range Status   02/26/2020 108 (H) 70 - 99 mg/dL Final     Urea Nitrogen   Date Value Ref Range Status   02/26/2020 10 7 - 30 mg/dL Final     Creatinine   Date Value Ref Range Status   02/26/2020 0.80 0.52 - 1.04 mg/dL Final     GFR Estimate   Date Value Ref Range Status   02/26/2020 79 >60 mL/min/[1.73_m2] Final     Comment:     Non  GFR Calc  Starting 12/18/2018, serum creatinine based estimated GFR (eGFR) will be   calculated using the Chronic Kidney Disease Epidemiology Collaboration   (CKD-EPI) equation.       Calcium   Date Value Ref Range Status   02/26/2020 8.8 8.5 - 10.1 mg/dL Final           Assessment: Right foot ulceration  DM2 with neuropathy.   Left Charcot foot and ankle with probing ulceration to the lateral malleolus. This does not appear infected today. She has seriously thought about having the BKA over the last few days.   Venous stasis dermatitis.     Plan:   - Pt seen and evaluated  - Right foot wound debrided as described.  - BL  Profore booting applied. Cast applied to the left leg. She will likely need left BKA.   - If worsening over the next week, she should present to the ED.  - Will contact gastro regarding recent bleed and  Low HGB today.   - Pt to return to clinic in 1 week.     Again, thank you for allowing me to participate in the care of your patient.        Sincerely,        Daniel Giron DPM

## 2020-02-26 NOTE — PROGRESS NOTES
Chief Complaint:   Chief Complaint   Patient presents with     RECHECK     pressure ulcer right  plantar 1st met head - Dermagraft / left charcot foot and left ankle ulcer        No Known Allergies      Subjective: Tiffani is a 60 year old female who presents to the clinic today for a follow up of BL foot and ankle ulcerations. She had a cast on the left leg and an Unna's boot on the right. She was started on Augmentin for right leg cellulitis. She was recently admitted and XRs showed increased destruction of the left lateral malleolus and the right 1st MTPJ head. She has not seen ID yet.    Objective         A right plantar 1st met head wound is noted measuring 1.5cm x 3cm x 0.2cm.  Inman Classification: 2     Wound base: Red/Granulation     Edges: macerated tissue     Drainage: scant/serous     Odor: no     Undermining: no     Bone Exposure: No     Clinical Signs of Infection: Yes - cellulitis of the hallux        After obtaining patient consent, the wound was irrigated with copious amounts of saline. A curette was then used to debride the wound into subcutaneous tissue. The wound edges were debrided back to healthy, bleeding tissue. The wound base exhibited healthy bleeding. Given the patient's lack of sensation, no anesthesia was necessary for the procedure.     ____________________________________  Wound #2            A Charcot wound is noted at left  lateral malleolus measuring 2.2cm x 2.6cm x 2.6cm.     Inman Classification: 3     Wound base: Red/hypergranulation     Edges: epibole     Drainage: copious/serous     Odor: no     Underminin O'Clock     Bone Exposure: Yes: lateral malleolus     Clinical Signs of Infection: No     After obtaining patient consent, the wound was irrigated with copious amounts of saline. No sharp debridement performed on this ulceration.   Legs are warm with venous stasis dermatitis changes noted BL. No s/s of infection noted.  Skin breakdown form the Unna's boot on the anterior  right leg. No infection noted.       Lab Results   Component Value Date    WBC 8.3 02/26/2020     Lab Results   Component Value Date    RBC 2.70 02/26/2020     Lab Results   Component Value Date    HGB 7.9 02/26/2020     Lab Results   Component Value Date    HCT 25.6 02/26/2020     No components found for: MCT  Lab Results   Component Value Date    MCV 95 02/26/2020     Lab Results   Component Value Date    MCH 29.3 02/26/2020     Lab Results   Component Value Date    MCHC 30.9 02/26/2020     Lab Results   Component Value Date    RDW 17.2 02/26/2020     Lab Results   Component Value Date     02/26/2020     Last Comprehensive Metabolic Panel:  Sodium   Date Value Ref Range Status   02/26/2020 134 133 - 144 mmol/L Final     Potassium   Date Value Ref Range Status   02/26/2020 5.1 3.4 - 5.3 mmol/L Final     Chloride   Date Value Ref Range Status   02/26/2020 102 94 - 109 mmol/L Final     Carbon Dioxide   Date Value Ref Range Status   02/26/2020 27 20 - 32 mmol/L Final     Anion Gap   Date Value Ref Range Status   02/26/2020 5 3 - 14 mmol/L Final     Glucose   Date Value Ref Range Status   02/26/2020 108 (H) 70 - 99 mg/dL Final     Urea Nitrogen   Date Value Ref Range Status   02/26/2020 10 7 - 30 mg/dL Final     Creatinine   Date Value Ref Range Status   02/26/2020 0.80 0.52 - 1.04 mg/dL Final     GFR Estimate   Date Value Ref Range Status   02/26/2020 79 >60 mL/min/[1.73_m2] Final     Comment:     Non  GFR Calc  Starting 12/18/2018, serum creatinine based estimated GFR (eGFR) will be   calculated using the Chronic Kidney Disease Epidemiology Collaboration   (CKD-EPI) equation.       Calcium   Date Value Ref Range Status   02/26/2020 8.8 8.5 - 10.1 mg/dL Final          Assessment: Right foot ulceration  DM2 with neuropathy.   Left Charcot foot and ankle with probing ulceration to the lateral malleolus. This does not appear infected today. She has seriously thought about having the BKA over the  last few days.   Venous stasis dermatitis.     Plan:   - Pt seen and evaluated  - Right foot wound debrided as described.  - BL Profore booting applied. Cast applied to the left leg. She will likely need left BKA.   - If worsening over the next week, she should present to the ED.  - Will contact gastro regarding recent bleed and  Low HGB today.   - Pt to return to clinic in 1 week.

## 2020-02-26 NOTE — NURSING NOTE
Tiffani Tan's chief complaint for this visit includes:  Chief Complaint   Patient presents with     RECHECK     pressure ulcer right  plantar 1st met head - Dermagraft / left charcot foot and left ankle ulcer     PCP: Verónica - [facility]    Referring Provider:  No referring provider defined for this encounter.    /59 (BP Location: Right arm, Patient Position: Sitting, Cuff Size: Adult Regular)   Pulse 99   SpO2 93%   Data Unavailable     Do you need any medication refills at today's visit? Rubina Oshea CMA

## 2020-02-26 NOTE — TELEPHONE ENCOUNTER
Dr. Whitten was notified by lab of patient's HgB being 7.9.  We were trying to contact PCP, but patient has not seen since 11/2018. Recently had GI procedure by Dr. Dias.  We will send to them to see if they want to follow up with lab.    Haily Heath RN

## 2020-02-26 NOTE — NURSING NOTE
Patient is scheduled to see Dr. Donahue - Infectious Disease at [facility] in [place] on 03/04/2020 at 10:00 am.  Patient has been notified of this appointment time.

## 2020-02-26 NOTE — PATIENT INSTRUCTIONS
Schedule an appointment with Dr. Adams. [facility] Ph#[phone] / [facility] Ph#[phone]    Schedule an appointment with Dr. Carr.       Thanks for coming today.  [facility]  [address] [place] [address]    To schedule future appointments in Ortho Clinic, you may call [phone].    To schedule ordered imaging by your provider:   Call Central Imaging [phone]    To schedule an injection ordered by your provider:  Call Central Imaging Injection scheduling line: [phone]  [facility]ou[facility]art available online at:  [email or web address]    Please call if any further questions or concerns ([phone]).  Clinic hours 8 am to 5 pm.    Return to clinic (call) if symptoms worsen or fail to improve.

## 2020-02-28 NOTE — TELEPHONE ENCOUNTER
2/28 Called and spoke to patient. I explained we need to have her see a primary care doctor. She explained she has one- Dr. Bonilla at [facility]. She was going to call and set up an appointment.     Jeannette Aguilar   Procedure    Ortho/Sports Med/Ent/Eye   [facility]   [phone]

## 2020-03-02 ENCOUNTER — TELEPHONE (OUTPATIENT)
Dept: PODIATRY | Facility: CLINIC | Age: 60
End: 2020-03-02

## 2020-03-02 ENCOUNTER — NURSE TRIAGE (OUTPATIENT)
Dept: NURSING | Facility: CLINIC | Age: 60
End: 2020-03-02

## 2020-03-02 DIAGNOSIS — K92.2 UPPER GI BLEED: Primary | ICD-10-CM

## 2020-03-02 DIAGNOSIS — K28.9: ICD-10-CM

## 2020-03-02 NOTE — TELEPHONE ENCOUNTER
"Patient states she missed a telephone call back from clinic today.  Currently per Epic chart this RN read to Patient the open Telephone note 3/2/20.    \"   Daniel Giron DPM   Podiatrist Primary Podiatric Medicine   Upper GI bleed +1 more   Dx   Patient Request   Reason for call    Conversation: Patient Request   (Newest Message First)   Darshan Farah, RN       3/2/20 4:46 PM   Note      Discussed case with Dr. Giron. Pt needs to see both GI and PCP. GI to manage GI bleed and treatment recommendations and PCP to manage overall care and co-morbidities. Would also appreciate the assistance of care coordination and social work as needed so that Pt does not have any care gaps. She is currently seeing podiatry, has an appt with ID on 3/4/20 through [facility] (?Richard), and needs to get in with GI in the next week.      Called and left message to discuss plan above.      Vincenzo Farah RN \"           Patient verbalizes understanding of the above information.  States will call her PCP tomorrow (Dr. Grissom at [facility]) and will also call Podiatry and follow up with the RN re above Telephone note.  Has clinic numbers by report.    Protocol-  Information Only Call- Adult  Care advice reviewed.   Disposition-  Information given  Caller states understanding of the recommended disposition.   Advised to call back if further questions or concerns.     JOE Samayoa RN  [facility]     Reason for Disposition    Health Information question, no triage required and triager able to answer question    Protocols used: INFORMATION ONLY CALL-A-AH    "

## 2020-03-02 NOTE — TELEPHONE ENCOUNTER
[facility] Call Center    Phone Message    May a detailed message be left on voicemail: yes     Reason for Call: Other: WANTS TO DISCUSS UPCOMING INTERNAL MEDICINE APPT MARCOS KNOWLES/NICOLE     Action Taken: Message routed to:  Adult Clinics: Podiatry p [phone]    Travel Screening: Not Applicable

## 2020-03-02 NOTE — TELEPHONE ENCOUNTER
Per discussion with Dr. Giron, Pt needs to see GI for management of her GI bleed/ulcers. This can be with our GI or [facility], up to Pt and availability. Dr. Giron would like Pt to be seen within next week if possible. Referral in. I have not spoken to Pt yet at the time of this note, see more recent tele encounter for updates on this.    Vincenzo Farah RN

## 2020-03-02 NOTE — TELEPHONE ENCOUNTER
Discussed case with Dr. Giron. Pt needs to see both GI and PCP. GI to manage GI bleed and treatment recommendations and PCP to manage overall care and co-morbidities. Would also appreciate the assistance of care coordination and social work as needed so that Pt does not have any care gaps. She is currently seeing podiatry, has an appt with ID on 3/4/20 through [facility] (?Richard), and needs to get in with GI in the next week.     Called and left message to discuss plan above.     Vincenzo Farah RN

## 2020-03-03 ENCOUNTER — OFFICE VISIT (OUTPATIENT)
Dept: GASTROENTEROLOGY | Facility: CLINIC | Age: 60
End: 2020-03-03
Attending: PODIATRIST
Payer: COMMERCIAL

## 2020-03-03 VITALS
HEIGHT: 66 IN | DIASTOLIC BLOOD PRESSURE: 76 MMHG | BODY MASS INDEX: 35.36 KG/M2 | SYSTOLIC BLOOD PRESSURE: 151 MMHG | WEIGHT: 220 LBS | OXYGEN SATURATION: 97 % | HEART RATE: 100 BPM

## 2020-03-03 DIAGNOSIS — E66.01 MORBID OBESITY (H): ICD-10-CM

## 2020-03-03 DIAGNOSIS — K27.9 PEPTIC ULCER: Primary | ICD-10-CM

## 2020-03-03 PROCEDURE — 99204 OFFICE O/P NEW MOD 45 MIN: CPT | Performed by: INTERNAL MEDICINE

## 2020-03-03 ASSESSMENT — MIFFLIN-ST. JEOR: SCORE: 1579.66

## 2020-03-03 NOTE — PROGRESS NOTES
GASTROENTEROLOGY NEW PATIENT CLINIC VISIT    CC/REFERRING MD:    Wily Bonilla    REASON FOR CONSULTATION:   Daniel Giron for   Chief Complaint   Patient presents with     New Patient     Upper GI bleed, Ulcer of upper gastrointestinal tract        HISTORY OF PRESENT ILLNESS:    Tiffani Tan is 60 year old female who presents for evaluation of prior upper GI bleed.  History includes chronic alcohol use, type 2 diabetes complicated by neuropathy, Charcot's foot and chronic foot ulcers.  She recently was admitted to the hospital at [facility] and [facility].  She had presented with coffee-ground emesis and a new anemia along with a CT scan suggestive of possible cirrhosis.  She was initially at [facility] but was transferred to the [facility] for further evaluation.  There, upper endoscopy noted 3 gastric ulcers, 1 of them with a pigmented spot along with portal hypertensive gastropathy and LA grade B esophagitis.  Endo-therapy was not indicated at that point.  She was treated with medical therapy and improved and was subsequently released with a plan for follow-up endoscopy in 3 months time.  The patient reports that she had not been taking any NSAIDs leading up to the time of the bleeding.  Stool H. pylori was checked at that point and was negative.  The patient reports that she has not had prior GI bleeding in the past.  There is no history of a prior upper endoscopy.  Prior to the episode of bleeding, she did not have abdominal pain, no nausea or vomiting, no dysphagia, no changes in her bowel habits.  She does note a family history of colorectal cancer and the patient last underwent a colonoscopy herself 3 to 4 years ago.  She notes that she does have 2-3 drinks with hard alcohol on most days.  She also has a current 1 pack/day smoker      PROBLEM LIST  Patient Active Problem List    Diagnosis Date Noted     Morbid obesity (H) 03/03/2020     Priority: Medium     GI bleed  02/04/2020     Priority: Medium     UGIB (upper gastrointestinal bleed) 02/04/2020     Priority: Medium     Venous incompetence 06/26/2019     Priority: Medium     Venous stasis ulcer of left calf with fat layer exposed without varicose veins (H) 06/26/2019     Priority: Medium     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) 06/26/2019     Priority: Medium     Charcot's joint of left foot 01/16/2019     Priority: Medium     Type 2 diabetes mellitus with diabetic polyneuropathy, without long-term current use of insulin (H) 01/16/2019     Priority: Medium     Diabetic ulcer of right midfoot associated with type 2 diabetes mellitus, with bone involvement without evidence of necrosis (H) 01/16/2019     Priority: Medium       PERTINENT PAST MEDICAL HISTORY:  (I personally reviewed this history with the patient at today's visit) History reviewed. No pertinent past medical history.      PREVIOUS SURGERIES: (I personally reviewed this history with the patient at today's visit)   Past Surgical History:   Procedure Laterality Date     ESOPHAGOSCOPY, GASTROSCOPY, DUODENOSCOPY (EGD), COMBINED N/A 2/5/2020    Procedure: ESOPHAGOGASTRODUODENOSCOPY (EGD);  Surgeon: Tanya Dias MD;  Location:  GI         ALLERGIES:   No Known Allergies    PERTINENT MEDICATIONS:    Current Outpatient Medications:      amoxicillin-clavulanate 875-125 MG PO tablet, Take 1 tablet by mouth 2 times daily, Disp: 28 tablet, Rfl: 0     blood glucose (CONTOUR NEXT TEST) test strip, apply 1 strip by finger poke route 2 times per day., Disp: , Rfl:      blood glucose monitoring (MIGUEL A MICROLET) lancets, by subcutaneous route 2 times per day., Disp: , Rfl:      CONTOUR NEXT TEST test strip, TEST BLOOD BID, Disp: , Rfl: 6     folic acid (FOLVITE) 1 MG tablet, Take 1 tablet (1 mg) by mouth daily, Disp: 60 tablet, Rfl: 3     gabapentin (NEURONTIN) 600 MG tablet, TK 2 TS PO TID. OK TO TK 1 ADDITIONAL T PRN., Disp: , Rfl: 10     order for  DME, Equipment being ordered: [id number] $35  shoe post op mens md, Disp: 1 Device, Rfl: 0     order for DME, Equipment being ordered: DME [id number] $70   Yasir CONTRERAS, Disp: 1 Device, Rfl: 0     pantoprazole (PROTONIX) 40 MG EC tablet, Take 1 tablet (40 mg) by mouth every evening, Disp: 60 tablet, Rfl: 0     pantoprazole (PROTONIX) 40 MG EC tablet, Take 1 tablet (40 mg) by mouth every morning (before breakfast), Disp: 120 tablet, Rfl: 3     simvastatin (ZOCOR) 40 MG tablet, TK 1 T PO QPM., Disp: , Rfl: 0     vitamin B1 (THIAMINE) 100 MG tablet, Take 1 tablet (100 mg) by mouth daily, Disp: 60 tablet, Rfl: 3    SOCIAL HISTORY:  Social History     Socioeconomic History     Marital status: Single     Spouse name: Not on file     Number of children: Not on file     Years of education: Not on file     Highest education level: Not on file   Occupational History     Not on file   Social Needs     Financial resource strain: Not on file     Food insecurity:     Worry: Not on file     Inability: Not on file     Transportation needs:     Medical: Not on file     Non-medical: Not on file   Tobacco Use     Smoking status: Current Every Day Smoker     Packs/day: 1.00     Smokeless tobacco: Never Used   Substance and Sexual Activity     Alcohol use: Yes     Drug use: Not on file     Sexual activity: Not on file   Lifestyle     Physical activity:     Days per week: Not on file     Minutes per session: Not on file     Stress: Not on file   Relationships     Social connections:     Talks on phone: Not on file     Gets together: Not on file     Attends Voodoo service: Not on file     Active member of club or organization: Not on file     Attends meetings of clubs or organizations: Not on file     Relationship status: Not on file     Intimate partner violence:     Fear of current or ex partner: Not on file     Emotionally abused: Not on file     Physically abused: Not on file     Forced sexual activity: Not on file   Other  "Topics Concern     Parent/sibling w/ CABG, MI or angioplasty before 65F 55M? Not Asked   Social History Narrative     Not on file       FAMILY HISTORY: (I personally reviewed this history with the patient at today's visit)  Family History   Problem Relation Age of Onset     No Known Problems Mother      No Known Problems Father       ROS:    No fevers or chills  No weight loss  No blurry vision, double vision or change in vision  No sore throat  No lymphadenopathy  No headache, paraesthesias, or weakness in a limb  No shortness of breath or wheezing  No chest pain or pressure  No arthralgias or myalgias  No rashes or skin changes  No odynophagia or dysphagia  No BRBPR, hematochezia, melena  No dysuria, frequency or urgency  No hot/cold intolerance or polyria  No anxiety or depression  PHYSICAL EXAMINATION:  Constitutional: aaox3, cooperative, pleasant, not dyspneic/diaphoretic, no acute distress  Vitals reviewed: BP (!) 151/76 (BP Location: Left arm, Patient Position: Sitting, Cuff Size: Adult Large)   Pulse 100   Ht 1.676 m (5' 6\")   Wt 99.8 kg (220 lb)   SpO2 97%   BMI 35.51 kg/m    Wt:   Wt Readings from Last 2 Encounters:   03/03/20 99.8 kg (220 lb)   02/07/20 99.9 kg (220 lb 4.8 oz)      Eyes: Sclera anicteric/injected  Ears/nose/mouth/throat: Normal oropharynx without ulcers or exudate, mucus membranes moist, hearing intact  Neck: supple, thyroid normal size  CV: No edema, RRR  Respiratory: Unlabored breathing, CTAB  Lymph: No submandibular, supraclavicular or inguinal lymphadenopathy  Abd: nondistended, no masses, +bs, no hepatosplenomegaly, nontender, no peritoneal signs  Skin: warm, perfused, no jaundice  Psych: Normal affect  MSK: in wheelchair      PERTINENT STUDIES: (I personally reviewed these laboratory studies today)  Most recent CBC:   Recent Labs   Lab Test 02/26/20  0812 02/07/20  0541   WBC 8.3 11.7*   HGB 7.9* 7.9*   HCT 25.6* 26.2*    76*     Most recent hepatic panel:  Recent Labs "   Lab Test 02/05/20  0413 02/04/20  0743   ALT 20 21   AST 43 32     Most recent creatinine:  Recent Labs   Lab Test 02/26/20  0812 02/07/20  0541   CR 0.80 1.07*       ASSESSMENT/PLAN:    Tiffani Tan is a 60 year old female who presents for evaluation of upper GI bleed secondary to gastric ulcers.  There may be a secondary contribution of anemia which is from the esophagitis and portal hypertensive gastropathy.  The gastric ulcers appear to be idiopathic given the H. pylori was negative and there is no history of NSAID use.  I recommend that she continue to take the Protonix twice daily and she may require this over the long-term to prevent further repeat bleeding from gastric ulcers.  She does need an upper endoscopy in 2 months time.  Though she does have a Charcot foot, she is able to transfer independently and thus of her blood counts recover then we may be able to do this at [place], otherwise it can be scheduled at the [place] [facility].  We do note that her blood counts do remain low and there is a macrocytosis noted and it is likely that she has some contribution of acute blood loss anemia in the background of alcohol induced marrow suppression and anemia of chronic disease.  She was counseled today to discontinue alcohol use and to quit smoking.    Peptic ulcer  Morbid obesity (H)  Orders Placed This Encounter   Procedures     CBC with platelets differential     Last Lab Result: Hemoglobin (g/dL)       Date                     Value                 02/26/2020               7.9 (LL)         ----------     Standing Status:   Future     Standing Expiration Date:   3/3/2021     Ferritin     Standing Status:   Future     Standing Expiration Date:   3/3/2021     Folate     Standing Status:   Future     Standing Expiration Date:   3/3/2021     Iron     Standing Status:   Future     Standing Expiration Date:   3/3/2021     Iron binding capacity     Standing Status:   Future     Standing Expiration Date:   3/3/2021      Vitamin B12     Standing Status:   Future     Standing Expiration Date:   3/3/2021     Transferrin     Standing Status:   Future     Standing Expiration Date:   3/3/2021         RTC 3 months    Thank you for this consultation.  It was a pleasure to participate in the care of this patient; please contact us with any further questions.    This note was created with voice recognition software, and while reviewed for accuracy, typos may remain.     Noam Foss MD  Adjunct  of Medicine  Division of Gastroenterology, Hepatology and Nutrition  [facility]  [phone]

## 2020-03-03 NOTE — TELEPHONE ENCOUNTER
3/3 Called and spoke to patient. She is currently scheduled for today with aston Aguilar   Procedure    Ortho/Sports Med/Ent/Eye   [facility]   [phone]

## 2020-03-03 NOTE — TELEPHONE ENCOUNTER
Reached to Pt and discussed. She is understanding and will get in with GI and PCP ASAP. She has appt with GI this afternoon here, but is not sure if she will have a ride.    Vincenzo Farah RN

## 2020-03-03 NOTE — NURSING NOTE
"Tiffani Tan's goals for this visit include:   Chief Complaint   Patient presents with     New Patient     Upper GI bleed, Ulcer of upper gastrointestinal tract        She requests these members of her care team be copied on today's visit information:     PCP: Wily Bonilla    Referring Provider:  Daniel Giron, MARY  [address] [place] [address]    BP (!) 151/76 (BP Location: Left arm, Patient Position: Sitting, Cuff Size: Adult Large)   Pulse 100   Ht 1.676 m (5' 6\")   Wt 99.8 kg (220 lb)   SpO2 97%   BMI 35.51 kg/m      Do you need any medication refills at today's visit? No    Lisette Long LPN    "

## 2020-03-05 ENCOUNTER — OFFICE VISIT (OUTPATIENT)
Dept: PODIATRY | Facility: CLINIC | Age: 60
End: 2020-03-05
Payer: COMMERCIAL

## 2020-03-05 VITALS — HEART RATE: 110 BPM | DIASTOLIC BLOOD PRESSURE: 77 MMHG | OXYGEN SATURATION: 90 % | SYSTOLIC BLOOD PRESSURE: 135 MMHG

## 2020-03-05 DIAGNOSIS — L03.115 CELLULITIS OF RIGHT LOWER EXTREMITY: ICD-10-CM

## 2020-03-05 DIAGNOSIS — M14.672 CHARCOT'S JOINT OF LEFT FOOT: ICD-10-CM

## 2020-03-05 DIAGNOSIS — L97.323 SKIN ULCER OF LEFT ANKLE WITH NECROSIS OF MUSCLE (H): ICD-10-CM

## 2020-03-05 DIAGNOSIS — L97.222 VENOUS STASIS ULCER OF LEFT CALF WITH FAT LAYER EXPOSED WITHOUT VARICOSE VEINS (H): ICD-10-CM

## 2020-03-05 DIAGNOSIS — E11.42 TYPE 2 DIABETES MELLITUS WITH DIABETIC POLYNEUROPATHY, WITHOUT LONG-TERM CURRENT USE OF INSULIN (H): Primary | ICD-10-CM

## 2020-03-05 DIAGNOSIS — L97.512 SKIN ULCER OF RIGHT FOOT WITH FAT LAYER EXPOSED (H): ICD-10-CM

## 2020-03-05 DIAGNOSIS — B35.3 TINEA PEDIS OF BOTH FEET: ICD-10-CM

## 2020-03-05 DIAGNOSIS — E11.49 TYPE II OR UNSPECIFIED TYPE DIABETES MELLITUS WITH NEUROLOGICAL MANIFESTATIONS, NOT STATED AS UNCONTROLLED(250.60) (H): ICD-10-CM

## 2020-03-05 DIAGNOSIS — E11.621 DIABETIC ULCER OF RIGHT MIDFOOT ASSOCIATED WITH TYPE 2 DIABETES MELLITUS, WITH BONE INVOLVEMENT WITHOUT EVIDENCE OF NECROSIS (H): ICD-10-CM

## 2020-03-05 DIAGNOSIS — L97.416 DIABETIC ULCER OF RIGHT MIDFOOT ASSOCIATED WITH TYPE 2 DIABETES MELLITUS, WITH BONE INVOLVEMENT WITHOUT EVIDENCE OF NECROSIS (H): ICD-10-CM

## 2020-03-05 DIAGNOSIS — I87.2 VENOUS STASIS ULCER OF LEFT CALF WITH FAT LAYER EXPOSED WITHOUT VARICOSE VEINS (H): ICD-10-CM

## 2020-03-05 PROCEDURE — 29405 APPL SHORT LEG CAST: CPT | Mod: LT | Performed by: PODIATRIST

## 2020-03-05 PROCEDURE — 29580 STRAPPING UNNA BOOT: CPT | Mod: RT | Performed by: PODIATRIST

## 2020-03-05 RX ORDER — ECONAZOLE NITRATE 10 MG/G
CREAM TOPICAL DAILY
Qty: 85 G | Refills: 1 | Status: ON HOLD | OUTPATIENT
Start: 2020-03-05 | End: 2020-07-21

## 2020-03-05 NOTE — PATIENT INSTRUCTIONS
Thanks for coming today.  Ortho/Sports Medicine Clinic  [address] [place] [address]    To schedule future appointments in Ortho Clinic, you may call [phone].    To schedule ordered imaging by your provider:   Call Central Imaging [phone]    To schedule an injection ordered by your provider:  Call Central Imaging Injection scheduling line: [phone]  [facility] available online at:  [email or web address]    Please call if any further questions or concerns ([phone]).  Clinic hours 8 am to 5 pm.    Return to clinic (call) if symptoms worsen or fail to improve.

## 2020-03-05 NOTE — NURSING NOTE
Tiffani Tan's chief complaint for this visit includes:  Chief Complaint   Patient presents with     RECHECK     wound check      PCP: Wily Bonilla    Referring Provider:  No referring provider defined for this encounter.    /77   Pulse 110   SpO2 90%   Data Unavailable     Do you need any medication refills at today's visit? no

## 2020-03-05 NOTE — PROGRESS NOTES
No past medical history on file.  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)     GI bleed     UGIB (upper gastrointestinal bleed)     Morbid obesity (H)     Past Surgical History:   Procedure Laterality Date     ESOPHAGOSCOPY, GASTROSCOPY, DUODENOSCOPY (EGD), COMBINED N/A 2/5/2020    Procedure: ESOPHAGOGASTRODUODENOSCOPY (EGD);  Surgeon: Tanya Dias MD;  Location:  GI     Social History     Socioeconomic History     Marital status: Single     Spouse name: Not on file     Number of children: Not on file     Years of education: Not on file     Highest education level: Not on file   Occupational History     Not on file   Social Needs     Financial resource strain: Not on file     Food insecurity:     Worry: Not on file     Inability: Not on file     Transportation needs:     Medical: Not on file     Non-medical: Not on file   Tobacco Use     Smoking status: Current Every Day Smoker     Packs/day: 1.00     Smokeless tobacco: Never Used   Substance and Sexual Activity     Alcohol use: Yes     Drug use: Not on file     Sexual activity: Not on file   Lifestyle     Physical activity:     Days per week: Not on file     Minutes per session: Not on file     Stress: Not on file   Relationships     Social connections:     Talks on phone: Not on file     Gets together: Not on file     Attends Islam service: Not on file     Active member of club or organization: Not on file     Attends meetings of clubs or organizations: Not on file     Relationship status: Not on file     Intimate  partner violence:     Fear of current or ex partner: Not on file     Emotionally abused: Not on file     Physically abused: Not on file     Forced sexual activity: Not on file   Other Topics Concern     Parent/sibling w/ CABG, MI or angioplasty before 65F 55M? Not Asked   Social History Narrative     Not on file     Family History   Problem Relation Age of Onset     No Known Problems Mother      No Known Problems Father      Lab Results   Component Value Date    WBC 8.3 02/26/2020     Lab Results   Component Value Date    RBC 2.70 02/26/2020     Lab Results   Component Value Date    HGB 7.9 02/26/2020     Lab Results   Component Value Date    HCT 25.6 02/26/2020     No components found for: MCT  Lab Results   Component Value Date    MCV 95 02/26/2020     Lab Results   Component Value Date    MCH 29.3 02/26/2020     Lab Results   Component Value Date    MCHC 30.9 02/26/2020     Lab Results   Component Value Date    RDW 17.2 02/26/2020     Lab Results   Component Value Date     02/26/2020     Last Comprehensive Metabolic Panel:  Sodium   Date Value Ref Range Status   02/26/2020 134 133 - 144 mmol/L Final     Potassium   Date Value Ref Range Status   02/26/2020 5.1 3.4 - 5.3 mmol/L Final     Chloride   Date Value Ref Range Status   02/26/2020 102 94 - 109 mmol/L Final     Carbon Dioxide   Date Value Ref Range Status   02/26/2020 27 20 - 32 mmol/L Final     Anion Gap   Date Value Ref Range Status   02/26/2020 5 3 - 14 mmol/L Final     Glucose   Date Value Ref Range Status   02/26/2020 108 (H) 70 - 99 mg/dL Final     Urea Nitrogen   Date Value Ref Range Status   02/26/2020 10 7 - 30 mg/dL Final     Creatinine   Date Value Ref Range Status   02/26/2020 0.80 0.52 - 1.04 mg/dL Final     GFR Estimate   Date Value Ref Range Status   02/26/2020 79 >60 mL/min/[1.73_m2] Final     Comment:     Non  GFR Calc  Starting 12/18/2018, serum creatinine based estimated GFR (eGFR) will be   calculated using the  Chronic Kidney Disease Epidemiology Collaboration   (CKD-EPI) equation.       Calcium   Date Value Ref Range Status   02/26/2020 8.8 8.5 - 10.1 mg/dL Final     Lab Results   Component Value Date    CRP 45.9 02/26/2020     Lab Results   Component Value Date     02/26/2020     Uric Acid   Date Value Ref Range Status   02/26/2020 6.3 (H) 2.6 - 6.0 mg/dL Final               SUBJECTIVE FINDINGS:  60-year-old female returns to clinic for ulcer, left ankle and right plantar first MPJ.  She relates it is doing okay.  She relates the cast is doing well and it helps with her mobility.  She is just waiting to schedule with surgeon for below-knee amputation.  She relates her right Unna boot she took off a few days ago because it was rubbing the proximal anterior leg.  Relates she has some dry skin on the toes.  She is wondering if she can clean that off.  Relates no specific injury.  No specific relieving or aggravating factors.  She relates she did see her primary physician and Infectious Disease.  The note from Infectious Disease and Gastroenterology I reviewed those as noted in the EMR.  She relates she is taking Augmentin with no problems.  She has had no systemic signs of infection currently.      OBJECTIVE FINDINGS:  DP and PT are 2/4 bilaterally.  She has decreased edema on the left.  She has maybe some increased edema on the right.  She has a right plantar first MPJ ulcer that is deep through the dermis into the subcutaneous tissues.  There is some serosanguineous drainage, mild edema, no erythema, no odor, no calor.  Left lateral ankle ulcer is also deep through the subcutaneous tissues.  There is some slight maceration, some serosanguineous drainage, no odor, no calor, no erythema.  She has a Charcot deformity on the left.      IMPRESSION AND PLAN:  Ulcer, left ankle.  Ulcer, right plantar first MPJ at the sulcus area.  She has Charcot foot, ankle on the left.  She is a diabetic with peripheral neuropathy and  vascular disease.  Diagnosis and treatment options discussed with her.  Local wound care done upon consent today.  Endoform and Aquacel Ag applied to the right plantar first MPJ ulcer.  Endoform and MicroKlenz wet-to-dry dressing applied to the lateral ankle.  Aquacel Ag applied to the right proximal leg abrasion.  She does have venous stasis present as well.  Multilayer Unna boot and compression boot applied to the right lower extremity upon consent.  Total contact cast applied to the left lower extremity upon consent and use discussed with her.  I am going to continue the Augmentin.  I am going to recheck labs.   She relates she does not have time for those today so she will get those next week when she comes into clinic and see either me or Dr. Giron in 1 week.  Ulcers appear stable at this time.  She will schedule with surgeon for below-the-knee amputation options on the left and surgical options on the right.   She also has dry, scaly skin on her toes consistent with chronic tinea pedis changes and dry skin and onychomycosis changes.  Prescription for econazole given and use discussed with her.

## 2020-03-05 NOTE — LETTER
3/5/2020         RE: Tiffani Tan  [address] [facility] [address]        Dear Colleague,    Thank you for referring your patient, Tiffani Tan, to the [facility]. Please see a copy of my visit note below.    No past medical history on file.  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)     GI bleed     UGIB (upper gastrointestinal bleed)     Morbid obesity (H)     Past Surgical History:   Procedure Laterality Date     ESOPHAGOSCOPY, GASTROSCOPY, DUODENOSCOPY (EGD), COMBINED N/A 2/5/2020    Procedure: ESOPHAGOGASTRODUODENOSCOPY (EGD);  Surgeon: Tanya Dias MD;  Location: [facility]     Social History     Socioeconomic History     Marital status: Single     Spouse name: Not on file     Number of children: Not on file     Years of education: Not on file     Highest education level: Not on file   Occupational History     Not on file   Social Needs     Financial resource strain: Not on file     Food insecurity:     Worry: Not on file     Inability: Not on file     Transportation needs:     Medical: Not on file     Non-medical: Not on file   Tobacco Use     Smoking status: Current Every Day Smoker     Packs/day: 1.00     Smokeless tobacco: Never Used   Substance and Sexual Activity     Alcohol use: Yes     Drug use: Not on file     Sexual activity: Not on file   Lifestyle     Physical activity:     Days per week: Not on file     Minutes per session: Not on file     Stress: Not on file   Relationships     Social connections:     Talks on phone: Not on file     Gets  together: Not on file     Attends [employer] service: Not on file     Active member of club or organization: Not on file     Attends meetings of clubs or organizations: Not on file     Relationship status: Not on file     Intimate partner violence:     Fear of current or ex partner: Not on file     Emotionally abused: Not on file     Physically abused: Not on file     Forced sexual activity: Not on file   Other Topics Concern     Parent/sibling w/ CABG, MI or angioplasty before 65F 55M? Not Asked   Social History Narrative     Not on file     Family History   Problem Relation Age of Onset     No Known Problems Mother      No Known Problems Father      Lab Results   Component Value Date    WBC 8.3 02/26/2020     Lab Results   Component Value Date    RBC 2.70 02/26/2020     Lab Results   Component Value Date    HGB 7.9 02/26/2020     Lab Results   Component Value Date    HCT 25.6 02/26/2020     No components found for: MCT  Lab Results   Component Value Date    MCV 95 02/26/2020     Lab Results   Component Value Date    MCH 29.3 02/26/2020     Lab Results   Component Value Date    MCHC 30.9 02/26/2020     Lab Results   Component Value Date    RDW 17.2 02/26/2020     Lab Results   Component Value Date     02/26/2020     Last Comprehensive Metabolic Panel:  Sodium   Date Value Ref Range Status   02/26/2020 134 133 - 144 mmol/L Final     Potassium   Date Value Ref Range Status   02/26/2020 5.1 3.4 - 5.3 mmol/L Final     Chloride   Date Value Ref Range Status   02/26/2020 102 94 - 109 mmol/L Final     Carbon Dioxide   Date Value Ref Range Status   02/26/2020 27 20 - 32 mmol/L Final     Anion Gap   Date Value Ref Range Status   02/26/2020 5 3 - 14 mmol/L Final     Glucose   Date Value Ref Range Status   02/26/2020 108 (H) 70 - 99 mg/dL Final     Urea Nitrogen   Date Value Ref Range Status   02/26/2020 10 7 - 30 mg/dL Final     Creatinine   Date Value Ref Range Status   02/26/2020 0.80 0.52 - 1.04 mg/dL Final     GFR  Estimate   Date Value Ref Range Status   02/26/2020 79 >60 mL/min/[1.73_m2] Final     Comment:     Non  GFR Calc  Starting 12/18/2018, serum creatinine based estimated GFR (eGFR) will be   calculated using the Chronic Kidney Disease Epidemiology Collaboration   (CKD-EPI) equation.       Calcium   Date Value Ref Range Status   02/26/2020 8.8 8.5 - 10.1 mg/dL Final     Lab Results   Component Value Date    CRP 45.9 02/26/2020     Lab Results   Component Value Date     02/26/2020     Uric Acid   Date Value Ref Range Status   02/26/2020 6.3 (H) 2.6 - 6.0 mg/dL Final               SUBJECTIVE FINDINGS:  60-year-old female returns to clinic for ulcer, left ankle and right plantar first MPJ.  She relates it is doing okay.  She relates the cast is doing well and it helps with her mobility.  She is just waiting to schedule with surgeon for below-knee amputation.  She relates her right Unna boot she took off a few days ago because it was rubbing the proximal anterior leg.  Relates she has some dry skin on the toes.  She is wondering if she can clean that off.  Relates no specific injury.  No specific relieving or aggravating factors.  She relates she did see her primary physician and Infectious Disease.  The note from Infectious Disease and Gastroenterology I reviewed those as noted in the EMR.  She relates she is taking Augmentin with no problems.  She has had no systemic signs of infection currently.      OBJECTIVE FINDINGS:  DP and PT are 2/4 bilaterally.  She has decreased edema on the left.  She has maybe some increased edema on the right.  She has a right plantar first MPJ ulcer that is deep through the dermis into the subcutaneous tissues.  There is some serosanguineous drainage, mild edema, no erythema, no odor, no calor.  Left lateral ankle ulcer is also deep through the subcutaneous tissues.  There is some slight maceration, some serosanguineous drainage, no odor, no calor, no erythema.  She  has a Charcot deformity on the left.      IMPRESSION AND PLAN:  Ulcer, left ankle.  Ulcer, right plantar first MPJ at the sulcus area.  She has Charcot foot, ankle on the left.  She is a diabetic with peripheral neuropathy and vascular disease.  Diagnosis and treatment options discussed with her.  Local wound care done upon consent today.  Endoform and Aquacel Ag applied to the right plantar first MPJ ulcer.  Endoform and MicroKlenz wet-to-dry dressing applied to the lateral ankle.  Aquacel Ag applied to the right proximal leg abrasion.  She does have venous stasis present as well.  Multilayer Unna boot and compression boot applied to the right lower extremity upon consent.  Total contact cast applied to the left lower extremity upon consent and use discussed with her.  I am going to continue the Augmentin.  I am going to recheck labs.   She relates she does not have time for those today so she will get those next week when she comes into clinic and see either me or Dr. Giron in 1 week.  Ulcers appear stable at this time.  She will schedule with surgeon for below-the-knee amputation options on the left and surgical options on the right.   She also has dry, scaly skin on her toes consistent with chronic tinea pedis changes and dry skin and onychomycosis changes.  Prescription for econazole given and use discussed with her.             Again, thank you for allowing me to participate in the care of your patient.        Sincerely,        Nolan Whitten DPM

## 2020-03-11 ENCOUNTER — ANCILLARY PROCEDURE (OUTPATIENT)
Dept: GENERAL RADIOLOGY | Facility: CLINIC | Age: 60
End: 2020-03-11
Attending: PODIATRIST
Payer: COMMERCIAL

## 2020-03-11 ENCOUNTER — OFFICE VISIT (OUTPATIENT)
Dept: PODIATRY | Facility: CLINIC | Age: 60
End: 2020-03-11
Payer: COMMERCIAL

## 2020-03-11 DIAGNOSIS — E11.42 TYPE 2 DIABETES MELLITUS WITH DIABETIC POLYNEUROPATHY, WITHOUT LONG-TERM CURRENT USE OF INSULIN (H): ICD-10-CM

## 2020-03-11 DIAGNOSIS — I87.2 VENOUS STASIS ULCER OF LEFT CALF WITH FAT LAYER EXPOSED WITHOUT VARICOSE VEINS (H): ICD-10-CM

## 2020-03-11 DIAGNOSIS — E11.621 DIABETIC ULCER OF RIGHT MIDFOOT ASSOCIATED WITH TYPE 2 DIABETES MELLITUS, WITH BONE INVOLVEMENT WITHOUT EVIDENCE OF NECROSIS (H): ICD-10-CM

## 2020-03-11 DIAGNOSIS — L97.512 SKIN ULCER OF RIGHT FOOT WITH FAT LAYER EXPOSED (H): ICD-10-CM

## 2020-03-11 DIAGNOSIS — L97.222 VENOUS STASIS ULCER OF LEFT CALF WITH FAT LAYER EXPOSED WITHOUT VARICOSE VEINS (H): ICD-10-CM

## 2020-03-11 DIAGNOSIS — L97.323 SKIN ULCER OF LEFT ANKLE WITH NECROSIS OF MUSCLE (H): ICD-10-CM

## 2020-03-11 DIAGNOSIS — L97.416 DIABETIC ULCER OF RIGHT MIDFOOT ASSOCIATED WITH TYPE 2 DIABETES MELLITUS, WITH BONE INVOLVEMENT WITHOUT EVIDENCE OF NECROSIS (H): ICD-10-CM

## 2020-03-11 DIAGNOSIS — B35.3 TINEA PEDIS OF BOTH FEET: ICD-10-CM

## 2020-03-11 DIAGNOSIS — E11.42 TYPE 2 DIABETES MELLITUS WITH DIABETIC POLYNEUROPATHY, WITHOUT LONG-TERM CURRENT USE OF INSULIN (H): Primary | ICD-10-CM

## 2020-03-11 DIAGNOSIS — M14.672 CHARCOT'S JOINT OF LEFT FOOT: ICD-10-CM

## 2020-03-11 DIAGNOSIS — E11.49 TYPE II OR UNSPECIFIED TYPE DIABETES MELLITUS WITH NEUROLOGICAL MANIFESTATIONS, NOT STATED AS UNCONTROLLED(250.60) (H): ICD-10-CM

## 2020-03-11 DIAGNOSIS — K27.9 PEPTIC ULCER: ICD-10-CM

## 2020-03-11 DIAGNOSIS — L03.115 CELLULITIS OF RIGHT LOWER EXTREMITY: ICD-10-CM

## 2020-03-11 LAB
ANION GAP SERPL CALCULATED.3IONS-SCNC: 5 MMOL/L (ref 3–14)
BASOPHILS # BLD AUTO: 0.1 10E9/L (ref 0–0.2)
BASOPHILS NFR BLD AUTO: 0.9 %
BUN SERPL-MCNC: 17 MG/DL (ref 7–30)
CALCIUM SERPL-MCNC: 8.6 MG/DL (ref 8.5–10.1)
CHLORIDE SERPL-SCNC: 106 MMOL/L (ref 94–109)
CO2 SERPL-SCNC: 24 MMOL/L (ref 20–32)
CREAT SERPL-MCNC: 0.95 MG/DL (ref 0.52–1.04)
CRP SERPL-MCNC: 17.7 MG/L (ref 0–8)
DIFFERENTIAL METHOD BLD: ABNORMAL
EOSINOPHIL # BLD AUTO: 0.2 10E9/L (ref 0–0.7)
EOSINOPHIL NFR BLD AUTO: 1.4 %
ERYTHROCYTE [DISTWIDTH] IN BLOOD BY AUTOMATED COUNT: 17.9 % (ref 10–15)
ERYTHROCYTE [SEDIMENTATION RATE] IN BLOOD BY WESTERGREN METHOD: 105 MM/H (ref 0–30)
FERRITIN SERPL-MCNC: 22 NG/ML (ref 8–252)
FOLATE SERPL-MCNC: 9.5 NG/ML
GFR SERPL CREATININE-BSD FRML MDRD: 65 ML/MIN/{1.73_M2}
GLUCOSE SERPL-MCNC: 93 MG/DL (ref 70–99)
HCT VFR BLD AUTO: 31.4 % (ref 35–47)
HGB BLD-MCNC: 8.8 G/DL (ref 11.7–15.7)
IMM GRANULOCYTES # BLD: 0.1 10E9/L (ref 0–0.4)
IMM GRANULOCYTES NFR BLD: 0.7 %
IRON SERPL-MCNC: 20 UG/DL (ref 35–180)
LYMPHOCYTES # BLD AUTO: 2.9 10E9/L (ref 0.8–5.3)
LYMPHOCYTES NFR BLD AUTO: 25.9 %
MCH RBC QN AUTO: 26.7 PG (ref 26.5–33)
MCHC RBC AUTO-ENTMCNC: 28 G/DL (ref 31.5–36.5)
MCV RBC AUTO: 95 FL (ref 78–100)
MONOCYTES # BLD AUTO: 0.9 10E9/L (ref 0–1.3)
MONOCYTES NFR BLD AUTO: 7.9 %
NEUTROPHILS # BLD AUTO: 7.2 10E9/L (ref 1.6–8.3)
NEUTROPHILS NFR BLD AUTO: 63.2 %
PLATELET # BLD AUTO: 235 10E9/L (ref 150–450)
POTASSIUM SERPL-SCNC: 5 MMOL/L (ref 3.4–5.3)
RBC # BLD AUTO: 3.3 10E12/L (ref 3.8–5.2)
SODIUM SERPL-SCNC: 135 MMOL/L (ref 133–144)
TIBC SERPL-MCNC: 457 UG/DL (ref 240–430)
TRANSFERRIN SERPL-MCNC: 358 MG/DL (ref 210–360)
URATE SERPL-MCNC: 5.6 MG/DL (ref 2.6–6)
VIT B12 SERPL-MCNC: 1706 PG/ML (ref 193–986)
WBC # BLD AUTO: 11.3 10E9/L (ref 4–11)

## 2020-03-11 PROCEDURE — 85025 COMPLETE CBC W/AUTO DIFF WBC: CPT | Performed by: PODIATRIST

## 2020-03-11 PROCEDURE — 85652 RBC SED RATE AUTOMATED: CPT | Performed by: PODIATRIST

## 2020-03-11 PROCEDURE — 73630 X-RAY EXAM OF FOOT: CPT | Mod: RT | Performed by: RADIOLOGY

## 2020-03-11 PROCEDURE — 99213 OFFICE O/P EST LOW 20 MIN: CPT | Mod: 25 | Performed by: PODIATRIST

## 2020-03-11 PROCEDURE — 84466 ASSAY OF TRANSFERRIN: CPT | Performed by: INTERNAL MEDICINE

## 2020-03-11 PROCEDURE — 84550 ASSAY OF BLOOD/URIC ACID: CPT | Performed by: PODIATRIST

## 2020-03-11 PROCEDURE — 83540 ASSAY OF IRON: CPT | Performed by: INTERNAL MEDICINE

## 2020-03-11 PROCEDURE — 86140 C-REACTIVE PROTEIN: CPT | Performed by: PODIATRIST

## 2020-03-11 PROCEDURE — 83550 IRON BINDING TEST: CPT | Performed by: INTERNAL MEDICINE

## 2020-03-11 PROCEDURE — 80048 BASIC METABOLIC PNL TOTAL CA: CPT | Performed by: PODIATRIST

## 2020-03-11 PROCEDURE — 73610 X-RAY EXAM OF ANKLE: CPT | Mod: LT | Performed by: RADIOLOGY

## 2020-03-11 PROCEDURE — 82607 VITAMIN B-12: CPT | Performed by: INTERNAL MEDICINE

## 2020-03-11 PROCEDURE — 82728 ASSAY OF FERRITIN: CPT | Performed by: INTERNAL MEDICINE

## 2020-03-11 PROCEDURE — 82746 ASSAY OF FOLIC ACID SERUM: CPT | Performed by: INTERNAL MEDICINE

## 2020-03-11 PROCEDURE — 36415 COLL VENOUS BLD VENIPUNCTURE: CPT | Performed by: INTERNAL MEDICINE

## 2020-03-11 PROCEDURE — 29405 APPL SHORT LEG CAST: CPT | Mod: XS | Performed by: PODIATRIST

## 2020-03-11 PROCEDURE — 11042 DBRDMT SUBQ TIS 1ST 20SQCM/<: CPT | Performed by: PODIATRIST

## 2020-03-11 RX ORDER — CLINDAMYCIN HCL 300 MG
300 CAPSULE ORAL 3 TIMES DAILY
Qty: 21 CAPSULE | Refills: 0 | Status: SHIPPED | OUTPATIENT
Start: 2020-03-11 | End: 2020-04-29

## 2020-03-11 NOTE — PROGRESS NOTES
Chief Complaint   Patient presents with     WOUND CARE     BL feet              No Known Allergies      Subjective: Tiffani is a 60 year old female who presents to the clinic today for a follow up of left ankle ulcer and right foot ulcer. She saw Dr. Donahue in ID and no abx were recommended and she was told that she needs amputation of the right hallux. Relates that she has not seen her primary physician yet, but has an appt. She was anemic from a lab that was drawn here. GI has asked her to stop smoking, stop EtOH use, and schedule upper endoscopy in 2 months. She is currently on Augmentin as directed by Dr. Whitten.     Objective    A right plantar 1st met head wound is noted measuring 1.5cm x 3cm x 0.2cm.  Inman Classification: 2     Wound base: Red/Granulation     Edges: macerated tissue     Drainage: scant/serous     Odor: no     Undermining: no     Bone Exposure: No     Clinical Signs of Infection: Yes - cellulitis of the hallux        After obtaining patient consent, the wound was irrigated with copious amounts of saline. A curette was then used to debride the wound into subcutaneous tissue. The wound edges were debrided back to healthy, bleeding tissue. The wound base exhibited healthy bleeding. Given the patient's lack of sensation, no anesthesia was necessary for the procedure.     ____________________________________  Wound #2     A Charcot wound is noted at left  lateral malleolus measuring 2.2cm x 2.6cm x 2.6cm.     Inman Classification: 3     Wound base: Red/hypergranulation     Edges: epibole     Drainage: copious/serous     Odor: no     Underminin O'Clock     Bone Exposure: Yes: lateral malleolus     Clinical Signs of Infection: No     After obtaining patient consent, the wound was irrigated with copious amounts of saline. No sharp debridement performed on this ulceration.   Legs are warm with venous stasis dermatitis changes noted BL. No s/s of infection noted.  Skin breakdown form the Unna's  boot on the anterior right leg. No infection noted.      right, left and foot and left ankle respectively xrays indicated in 6 weightbearing views.    Right foot - she has progression of the osteolysis of the first metatarsal head. No free air noted on these views.   Left ankle - Progression of the osteolysis with new distal fibular fragmentation. Absence of most of the midfoot bones 2/2 Charcot.        Assessment: Right foot ulceration - this is worsening on XR and she likely has medullary cavity exposure. I agree with Dr. Donahue's assessment and have also recommended that she have a partial or total 1st ray amputation. She has significant osteolysis to the 1st met head and this will be difficult to control with antibiotics. Hallux still appears cellulitic. Will change abx to clindamycin for short term coverage.   DM2 with neuropathy.   Left Charcot foot and ankle with probing ulceration to the lateral malleolus. XR shows progressive osteolysis of the lateral malleolus. Because of the loss of most of her midfoot osseous structures, this foot will not be a stable weight bearing structure.  Venous stasis dermatitis.     Plan:   - Pt seen and evaluated  - Right foot wound debrided as described.  - Start Cleocin 300 TID. Stop Augmentin.   - BL Profore booting applied. Cast applied to the left leg. She will need left BKA.   - If worsening over the next week, she should present to the ED.  - She should see Dr. Adams regarding left BKA and right 1st met excision.   - Pt to return to clinic in 1 week.

## 2020-03-11 NOTE — PATIENT INSTRUCTIONS
Thanks for coming today.  [facility]  [address] [place] [address]    To schedule future appointments in Ortho Clinic, you may call [phone].    To schedule ordered imaging by your provider:   Call Central Imaging [phone]    To schedule an injection ordered by your provider:  Call Central Imaging Injection scheduling line: [phone]  [facility] available online at:  [email or web address]    Please call if any further questions or concerns ([phone]).  Clinic hours 8 am to 5 pm.    Return to clinic (call) if symptoms worsen or fail to improve.

## 2020-03-11 NOTE — LETTER
3/11/2020         RE: Tiffani Tan  [address] [facility] [address]        Dear Colleague,    Thank you for referring your patient, Tiffani Tan, to the [facility]. Please see a copy of my visit note below.    Chief Complaint   Patient presents with     WOUND CARE     BL feet              No Known Allergies      Subjective: Tiffani is a 60 year old female who presents to the clinic today for a follow up of left ankle ulcer and right foot ulcer. She saw Dr. Donahue in ID and no abx were recommended and she was told that she needs amputation of the right hallux. Relates that she has not seen her primary physician yet, but has an appt. She was anemic from a lab that was drawn here. GI has asked her to stop smoking, stop EtOH use, and schedule upper endoscopy in 2 months. She is currently on Augmentin as directed by Dr. Whitten.     Objective    A right plantar 1st met head wound is noted measuring 1.5cm x 3cm x 0.2cm.  Inman Classification: 2     Wound base: Red/Granulation     Edges: macerated tissue     Drainage: scant/serous     Odor: no     Undermining: no     Bone Exposure: No     Clinical Signs of Infection: Yes - cellulitis of the hallux        After obtaining patient consent, the wound was irrigated with copious amounts of saline. A curette was then used to debride the wound into subcutaneous tissue. The wound edges were debrided back to healthy, bleeding tissue. The wound base exhibited healthy bleeding. Given the patient's lack of sensation, no anesthesia was necessary for the procedure.     ____________________________________  Wound #2     A Charcot wound is noted at left  lateral malleolus measuring 2.2cm x 2.6cm x 2.6cm.     Inman Classification: 3     Wound base: Red/hypergranulation     Edges: epibole     Drainage: copious/serous     Odor: no     Underminin O'Clock     Bone Exposure: Yes: lateral malleolus     Clinical Signs of Infection: No     After obtaining patient consent,  the wound was irrigated with copious amounts of saline. No sharp debridement performed on this ulceration.   Legs are warm with venous stasis dermatitis changes noted BL. No s/s of infection noted.  Skin breakdown form the Unna's boot on the anterior right leg. No infection noted.      right, left and foot and left ankle respectively xrays indicated in 6 weightbearing views.    Right foot - she has progression of the osteolysis of the first metatarsal head. No free air noted on these views.   Left ankle - Progression of the osteolysis with new distal fibular fragmentation. Absence of most of the midfoot bones 2/2 Charcot.        Assessment: Right foot ulceration - this is worsening on XR and she likely has medullary cavity exposure. I agree with Dr. Donahue's assessment and have also recommended that she have a partial or total 1st ray amputation. She has significant osteolysis to the 1st met head and this will be difficult to control with antibiotics. Hallux still appears cellulitic. Will change abx to clindamycin for short term coverage.   DM2 with neuropathy.   Left Charcot foot and ankle with probing ulceration to the lateral malleolus. XR shows progressive osteolysis of the lateral malleolus. Because of the loss of most of her midfoot osseous structures, this foot will not be a stable weight bearing structure.  Venous stasis dermatitis.     Plan:   - Pt seen and evaluated  - Right foot wound debrided as described.  - Start Cleocin 300 TID. Stop Augmentin.   - BL Profore booting applied. Cast applied to the left leg. She will need left BKA.   - If worsening over the next week, she should present to the ED.  - She should see Dr. Adams regarding left BKA and right 1st met excision.   - Pt to return to clinic in 1 week.           Again, thank you for allowing me to participate in the care of your patient.        Sincerely,        Daniel Giron DPM

## 2020-03-13 ENCOUNTER — TELEPHONE (OUTPATIENT)
Dept: GASTROENTEROLOGY | Facility: CLINIC | Age: 60
End: 2020-03-13

## 2020-03-13 ENCOUNTER — TELEPHONE (OUTPATIENT)
Dept: PODIATRY | Facility: CLINIC | Age: 60
End: 2020-03-13

## 2020-03-13 NOTE — TELEPHONE ENCOUNTER
Spoke to patient who states he is not taking a B12, only a B1. Relayed to clinic RNCC who will review chart and see if patient needs to stop taking B1 because of high B12 levels or not. Patient was ok with this plan.    Lisette Long LPN

## 2020-03-13 NOTE — TELEPHONE ENCOUNTER
Per Dr. Foss:    Please let this patient know that vitamin B12 level was somewhat high. Please ask if she is taking vitamin B12 supplementation and if so please hold supplement for the time being.  Iron count is slightly low but would not recommend supplementation at this time given her foot issues.    Carri Nur RN  Gastroenterology Care Coordinator  [place], [place]

## 2020-03-13 NOTE — TELEPHONE ENCOUNTER
Discussed with Dr. Giron. He is ok with staff relaying HGB level of 8.8 (up 0.9 since 2/26/20). Dr. Giron recommended following up with GI as to if/when it should be rechecked or what next step would be.     Called Pt and relayed lab. I advised that there isn't a current plan as to how often to follow the HGB. I advised that typically, if she is feeling ok at her current level, there wouldn't be a reason to continue to test unless something changes with her symptom-wise. Otherwise, we would be drawing the lab and not doing anything with it. Pt was agreeable with this plan and understood.     Vincenzo Farah RN

## 2020-03-13 NOTE — TELEPHONE ENCOUNTER
Writer spoke to patient regarding result notes from Dr. Foss. While on the phone patient asked about Hemaglobin levels from a blood draw ordered on 3/11/2020. Informed patient an encounter would be sent to the podiatry team and patient will be contacted once they have time to review message. Patient was agreeable to this plan and had no further questions.    Lisette Long LPN

## 2020-03-18 ENCOUNTER — TELEPHONE (OUTPATIENT)
Dept: ORTHOPEDICS | Facility: CLINIC | Age: 60
End: 2020-03-18

## 2020-03-18 NOTE — TELEPHONE ENCOUNTER
Per Dr. Foss:    Instruct patient that high B12 may be due to liver issues.  Needs to stop drinking alcohol completely.     In 3 months or when COVID-19 pandemic risk is improved, needs further evaluation of this with repeat labs for CMP, CBC, repeat Vit B12, homocysteine level, methylmalonic acid level.  Also needs CT scan of chest, upper endoscopy and also repeat colonoscopy.       If she wants to proceed with this, then let me know and I can place the orders.  If she wants to discuss further prior to scheduling then set up non-urgent phone visit in perhaps 4-6 weeks.     Advised patient as above. She is comfortable with proceeding. She will come in for labs at 4 weeks and will have procedures at 3 months. She does not have any questions at this time.    Carri Nur RN  Gastroenterology Care Coordinator  [place], [place]

## 2020-03-18 NOTE — TELEPHONE ENCOUNTER
I called Tiffani this morning to help get her rescheduled 4-6 weeks from now. I was able to help get her rescheduled for 4/28/20 at 7am.    Lisette David, ATC

## 2020-03-19 ENCOUNTER — OFFICE VISIT (OUTPATIENT)
Dept: PODIATRY | Facility: CLINIC | Age: 60
End: 2020-03-19
Payer: COMMERCIAL

## 2020-03-19 VITALS — DIASTOLIC BLOOD PRESSURE: 65 MMHG | SYSTOLIC BLOOD PRESSURE: 111 MMHG | OXYGEN SATURATION: 93 % | HEART RATE: 93 BPM

## 2020-03-19 DIAGNOSIS — L97.416 DIABETIC ULCER OF RIGHT MIDFOOT ASSOCIATED WITH TYPE 2 DIABETES MELLITUS, WITH BONE INVOLVEMENT WITHOUT EVIDENCE OF NECROSIS (H): ICD-10-CM

## 2020-03-19 DIAGNOSIS — M14.672 CHARCOT'S JOINT OF LEFT FOOT: ICD-10-CM

## 2020-03-19 DIAGNOSIS — L97.222 VENOUS STASIS ULCER OF LEFT CALF WITH FAT LAYER EXPOSED WITHOUT VARICOSE VEINS (H): ICD-10-CM

## 2020-03-19 DIAGNOSIS — L97.323 SKIN ULCER OF LEFT ANKLE WITH NECROSIS OF MUSCLE (H): ICD-10-CM

## 2020-03-19 DIAGNOSIS — I87.2 VENOUS STASIS ULCER OF LEFT CALF WITH FAT LAYER EXPOSED WITHOUT VARICOSE VEINS (H): ICD-10-CM

## 2020-03-19 DIAGNOSIS — E11.621 DIABETIC ULCER OF RIGHT MIDFOOT ASSOCIATED WITH TYPE 2 DIABETES MELLITUS, WITH BONE INVOLVEMENT WITHOUT EVIDENCE OF NECROSIS (H): ICD-10-CM

## 2020-03-19 DIAGNOSIS — R79.89 ELEVATED VITAMIN B12 LEVEL: ICD-10-CM

## 2020-03-19 DIAGNOSIS — E11.42 TYPE 2 DIABETES MELLITUS WITH DIABETIC POLYNEUROPATHY, WITHOUT LONG-TERM CURRENT USE OF INSULIN (H): Primary | ICD-10-CM

## 2020-03-19 DIAGNOSIS — K27.9 PEPTIC ULCER: Primary | ICD-10-CM

## 2020-03-19 LAB
ANION GAP SERPL CALCULATED.3IONS-SCNC: 9 MMOL/L (ref 3–14)
BASOPHILS # BLD AUTO: 0.1 10E9/L (ref 0–0.2)
BASOPHILS NFR BLD AUTO: 1 %
BUN SERPL-MCNC: 24 MG/DL (ref 7–30)
CALCIUM SERPL-MCNC: 9 MG/DL (ref 8.5–10.1)
CHLORIDE SERPL-SCNC: 100 MMOL/L (ref 94–109)
CO2 SERPL-SCNC: 22 MMOL/L (ref 20–32)
CREAT SERPL-MCNC: 1.23 MG/DL (ref 0.52–1.04)
CRP SERPL-MCNC: 16.8 MG/L (ref 0–8)
DIFFERENTIAL METHOD BLD: ABNORMAL
EOSINOPHIL # BLD AUTO: 0.2 10E9/L (ref 0–0.7)
EOSINOPHIL NFR BLD AUTO: 2 %
ERYTHROCYTE [DISTWIDTH] IN BLOOD BY AUTOMATED COUNT: 17.8 % (ref 10–15)
ERYTHROCYTE [SEDIMENTATION RATE] IN BLOOD BY WESTERGREN METHOD: 97 MM/H (ref 0–30)
GFR SERPL CREATININE-BSD FRML MDRD: 47 ML/MIN/{1.73_M2}
GLUCOSE SERPL-MCNC: 165 MG/DL (ref 70–99)
HCT VFR BLD AUTO: 30.4 % (ref 35–47)
HGB BLD-MCNC: 8.8 G/DL (ref 11.7–15.7)
IMM GRANULOCYTES # BLD: 0.1 10E9/L (ref 0–0.4)
IMM GRANULOCYTES NFR BLD: 1.2 %
LYMPHOCYTES # BLD AUTO: 3 10E9/L (ref 0.8–5.3)
LYMPHOCYTES NFR BLD AUTO: 28.4 %
MCH RBC QN AUTO: 26.1 PG (ref 26.5–33)
MCHC RBC AUTO-ENTMCNC: 28.9 G/DL (ref 31.5–36.5)
MCV RBC AUTO: 90 FL (ref 78–100)
MONOCYTES # BLD AUTO: 0.7 10E9/L (ref 0–1.3)
MONOCYTES NFR BLD AUTO: 6.6 %
NEUTROPHILS # BLD AUTO: 6.4 10E9/L (ref 1.6–8.3)
NEUTROPHILS NFR BLD AUTO: 60.8 %
PLATELET # BLD AUTO: 284 10E9/L (ref 150–450)
POTASSIUM SERPL-SCNC: 5.2 MMOL/L (ref 3.4–5.3)
RBC # BLD AUTO: 3.37 10E12/L (ref 3.8–5.2)
SODIUM SERPL-SCNC: 131 MMOL/L (ref 133–144)
WBC # BLD AUTO: 10.5 10E9/L (ref 4–11)

## 2020-03-19 PROCEDURE — 36415 COLL VENOUS BLD VENIPUNCTURE: CPT | Performed by: INTERNAL MEDICINE

## 2020-03-19 PROCEDURE — 29405 APPL SHORT LEG CAST: CPT | Mod: LT | Performed by: PODIATRIST

## 2020-03-19 PROCEDURE — 83921 ORGANIC ACID SINGLE QUANT: CPT | Performed by: INTERNAL MEDICINE

## 2020-03-19 RX ORDER — CLINDAMYCIN HCL 300 MG
300 CAPSULE ORAL 3 TIMES DAILY
Qty: 42 CAPSULE | Refills: 0 | Status: SHIPPED | OUTPATIENT
Start: 2020-03-19 | End: 2020-04-29

## 2020-03-19 NOTE — LETTER
3/19/2020         RE: Tiffani Tan  [address] [facility] [address]        Dear Colleague,    Thank you for referring your patient, Tiffani Tan, to the [facility]. Please see a copy of my visit note below.    No past medical history on file.  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)     GI bleed     UGIB (upper gastrointestinal bleed)     Morbid obesity (H)     Past Surgical History:   Procedure Laterality Date     ESOPHAGOSCOPY, GASTROSCOPY, DUODENOSCOPY (EGD), COMBINED N/A 2/5/2020    Procedure: ESOPHAGOGASTRODUODENOSCOPY (EGD);  Surgeon: Tanya Dias MD;  Location: [facility]     Social History     Socioeconomic History     Marital status: Single     Spouse name: Not on file     Number of children: Not on file     Years of education: Not on file     Highest education level: Not on file   Occupational History     Not on file   Social Needs     Financial resource strain: Not on file     Food insecurity     Worry: Not on file     Inability: Not on file     Transportation needs     Medical: Not on file     Non-medical: Not on file   Tobacco Use     Smoking status: Current Every Day Smoker     Packs/day: 1.00     Smokeless tobacco: Never Used   Substance and Sexual Activity     Alcohol use: Yes     Drug use: Not on file     Sexual activity: Not on file   Lifestyle     Physical activity     Days per week: Not on file     Minutes per session: Not on file     Stress: Not on file   Relationships     Social connections     Talks on phone: Not on file     Gets  together: Not on file     Attends Buddhist service: Not on file     Active member of club or organization: Not on file     Attends meetings of clubs or organizations: Not on file     Relationship status: Not on file     Intimate partner violence     Fear of current or ex partner: Not on file     Emotionally abused: Not on file     Physically abused: Not on file     Forced sexual activity: Not on file   Other Topics Concern     Parent/sibling w/ CABG, MI or angioplasty before 65F 55M? Not Asked   Social History Narrative     Not on file     Family History   Problem Relation Age of Onset     No Known Problems Mother      No Known Problems Father      Lab Results   Component Value Date    WBC 11.3 03/11/2020     Lab Results   Component Value Date    RBC 3.30 03/11/2020     Lab Results   Component Value Date    HGB 8.8 03/11/2020     Lab Results   Component Value Date    HCT 31.4 03/11/2020     No components found for: MCT  Lab Results   Component Value Date    MCV 95 03/11/2020     Lab Results   Component Value Date    MCH 26.7 03/11/2020     Lab Results   Component Value Date    MCHC 28.0 03/11/2020     Lab Results   Component Value Date    RDW 17.9 03/11/2020     Lab Results   Component Value Date     03/11/2020     basic metabolic panel  Lab Results   Component Value Date     03/11/2020     Lab Results   Component Value Date    CRP 17.7 03/11/2020     Uric Acid   Date Value Ref Range Status   03/11/2020 5.6 2.6 - 6.0 mg/dL Final                 SUBJECTIVE FINDINGS:  60-year-old female returns to clinic for ulcer left ankle, ulcer right first MPJ.  She relates it is doing okay.  She relates she finished the clindamycin with no problems yesterday.  Relates the cast was rubbing on the anterior leg on the left and it is bothering her.  She has been doing Iodosorb for the right first MPJ.  Previous notes reviewed.      OBJECTIVE FINDINGS:  Previous labs and imaging reviewed.  DP and PT are 2/4 bilaterally.   Some peripheral edema bilaterally.  She has a right plantar first MPJ ulcer that is about 2.5 cm in diameter.  Some hyperkeratotic tissue buildup, some serosanguineous drainage, no erythema, no odor, no calor.  It is deep through the subcutaneous tissues.  Left lateral ankle is deep through the subcutaneous tissue, some serosanguineous drainage, no erythema, some edema, no odor, no calor.      ASSESSMENT/PLAN:  Ulcer, left ankle.  Ulcer, right plantar first MPJ.  She does have changes of osteolysis and this might be osteomyelitic changes as well.  Venous stasis present as well.  She is diabetic with peripheral neuropathy.  Diagnosis and treatment discussed with her.  Local wound care done upon consent today.  Right first MPJ, continue to clean with wound cleanser, apply Iodosorb, sterile dressing and Ace wraps for compression.  Edema on the right is under relatively good control.  The left ankle, local wound care done upon consent.  Wound Vashe wet-to-dry dressing and total contact cast applied to the left lower extremity upon consent.  She will follow up in 1 week with either me or Dr. Giron.  We will recheck labs.  If those are elevated, we will restart clindamycin.  If not, we will discontinue that.  She will follow up with her appointment with Dr. Adams for any surgical options.  It got pushed out.  She will follow up with them as scheduled.     Lab Results   Component Value Date    WBC 10.5 03/19/2020     Lab Results   Component Value Date    RBC 3.37 03/19/2020     Lab Results   Component Value Date    HGB 8.8 03/19/2020     Lab Results   Component Value Date    HCT 30.4 03/19/2020     No components found for: MCT  Lab Results   Component Value Date    MCV 90 03/19/2020     Lab Results   Component Value Date    MCH 26.1 03/19/2020     Lab Results   Component Value Date    MCHC 28.9 03/19/2020     Lab Results   Component Value Date    RDW 17.8 03/19/2020     Lab Results   Component Value Date      03/19/2020     Last Comprehensive Metabolic Panel:  Sodium   Date Value Ref Range Status   03/19/2020 131 (L) 133 - 144 mmol/L Final     Potassium   Date Value Ref Range Status   03/19/2020 5.2 3.4 - 5.3 mmol/L Final     Chloride   Date Value Ref Range Status   03/19/2020 100 94 - 109 mmol/L Final     Carbon Dioxide   Date Value Ref Range Status   03/19/2020 22 20 - 32 mmol/L Final     Anion Gap   Date Value Ref Range Status   03/19/2020 9 3 - 14 mmol/L Final     Glucose   Date Value Ref Range Status   03/19/2020 165 (H) 70 - 99 mg/dL Final     Comment:     Non Fasting     Urea Nitrogen   Date Value Ref Range Status   03/19/2020 24 7 - 30 mg/dL Final     Creatinine   Date Value Ref Range Status   03/19/2020 1.23 (H) 0.52 - 1.04 mg/dL Final     GFR Estimate   Date Value Ref Range Status   03/19/2020 47 (L) >60 mL/min/[1.73_m2] Final     Comment:     Non  GFR Calc  Starting 12/18/2018, serum creatinine based estimated GFR (eGFR) will be   calculated using the Chronic Kidney Disease Epidemiology Collaboration   (CKD-EPI) equation.       Calcium   Date Value Ref Range Status   03/19/2020 9.0 8.5 - 10.1 mg/dL Final     Lab Results   Component Value Date    SED 97 03/19/2020     Lab Results   Component Value Date    CRP 17.7 03/11/2020     Lab Results   Component Value Date    SED 97 03/19/2020     Lab Results   Component Value Date    CRP 16.8 03/19/2020           Again, thank you for allowing me to participate in the care of your patient.        Sincerely,        Nolan Whitten DPM

## 2020-03-19 NOTE — PROGRESS NOTES
No past medical history on file.  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)     GI bleed     UGIB (upper gastrointestinal bleed)     Morbid obesity (H)     Past Surgical History:   Procedure Laterality Date     ESOPHAGOSCOPY, GASTROSCOPY, DUODENOSCOPY (EGD), COMBINED N/A 2/5/2020    Procedure: ESOPHAGOGASTRODUODENOSCOPY (EGD);  Surgeon: Tanya Dias MD;  Location:  [facility]     Social History     Socioeconomic History     Marital status: Single     Spouse name: Not on file     Number of children: Not on file     Years of education: Not on file     Highest education level: Not on file   Occupational History     Not on file   Social Needs     Financial resource strain: Not on file     Food insecurity     Worry: Not on file     Inability: Not on file     Transportation needs     Medical: Not on file     Non-medical: Not on file   Tobacco Use     Smoking status: Current Every Day Smoker     Packs/day: 1.00     Smokeless tobacco: Never Used   Substance and Sexual Activity     Alcohol use: Yes     Drug use: Not on file     Sexual activity: Not on file   Lifestyle     Physical activity     Days per week: Not on file     Minutes per session: Not on file     Stress: Not on file   Relationships     Social connections     Talks on phone: Not on file     Gets together: Not on file     Attends Gnosticist service: Not on file     Active member of club or organization: Not on file     Attends meetings of clubs or organizations: Not on file     Relationship status: Not on file     Intimate  partner violence     Fear of current or ex partner: Not on file     Emotionally abused: Not on file     Physically abused: Not on file     Forced sexual activity: Not on file   Other Topics Concern     Parent/sibling w/ CABG, MI or angioplasty before 65F 55M? Not Asked   Social History Narrative     Not on file     Family History   Problem Relation Age of Onset     No Known Problems Mother      No Known Problems Father      Lab Results   Component Value Date    WBC 11.3 03/11/2020     Lab Results   Component Value Date    RBC 3.30 03/11/2020     Lab Results   Component Value Date    HGB 8.8 03/11/2020     Lab Results   Component Value Date    HCT 31.4 03/11/2020     No components found for: MCT  Lab Results   Component Value Date    MCV 95 03/11/2020     Lab Results   Component Value Date    MCH 26.7 03/11/2020     Lab Results   Component Value Date    MCHC 28.0 03/11/2020     Lab Results   Component Value Date    RDW 17.9 03/11/2020     Lab Results   Component Value Date     03/11/2020     basic metabolic panel  Lab Results   Component Value Date     03/11/2020     Lab Results   Component Value Date    CRP 17.7 03/11/2020     Uric Acid   Date Value Ref Range Status   03/11/2020 5.6 2.6 - 6.0 mg/dL Final                 SUBJECTIVE FINDINGS:  60-year-old female returns to clinic for ulcer left ankle, ulcer right first MPJ.  She relates it is doing okay.  She relates she finished the clindamycin with no problems yesterday.  Relates the cast was rubbing on the anterior leg on the left and it is bothering her.  She has been doing Iodosorb for the right first MPJ.  Previous notes reviewed.      OBJECTIVE FINDINGS:  Previous labs and imaging reviewed.  DP and PT are 2/4 bilaterally.  Some peripheral edema bilaterally.  She has a right plantar first MPJ ulcer that is about 2.5 cm in diameter.  Some hyperkeratotic tissue buildup, some serosanguineous drainage, no erythema, no odor, no calor.  It is deep  through the subcutaneous tissues.  Left lateral ankle is deep through the subcutaneous tissue, some serosanguineous drainage, no erythema, some edema, no odor, no calor.      ASSESSMENT/PLAN:  Ulcer, left ankle.  Ulcer, right plantar first MPJ.  She does have changes of osteolysis and this might be osteomyelitic changes as well.  Venous stasis present as well.  She is diabetic with peripheral neuropathy.  Diagnosis and treatment discussed with her.  Local wound care done upon consent today.  Right first MPJ, continue to clean with wound cleanser, apply Iodosorb, sterile dressing and Ace wraps for compression.  Edema on the right is under relatively good control.  The left ankle, local wound care done upon consent.  Wound Vashe wet-to-dry dressing and total contact cast applied to the left lower extremity upon consent.  She will follow up in 1 week with either me or Dr. Giron.  We will recheck labs.  If those are elevated, we will restart clindamycin.  If not, we will discontinue that.  She will follow up with her appointment with Dr. Adams for any surgical options.  It got pushed out.  She will follow up with them as scheduled.     Lab Results   Component Value Date    WBC 10.5 03/19/2020     Lab Results   Component Value Date    RBC 3.37 03/19/2020     Lab Results   Component Value Date    HGB 8.8 03/19/2020     Lab Results   Component Value Date    HCT 30.4 03/19/2020     No components found for: MCT  Lab Results   Component Value Date    MCV 90 03/19/2020     Lab Results   Component Value Date    MCH 26.1 03/19/2020     Lab Results   Component Value Date    MCHC 28.9 03/19/2020     Lab Results   Component Value Date    RDW 17.8 03/19/2020     Lab Results   Component Value Date     03/19/2020     Last Comprehensive Metabolic Panel:  Sodium   Date Value Ref Range Status   03/19/2020 131 (L) 133 - 144 mmol/L Final     Potassium   Date Value Ref Range Status   03/19/2020 5.2 3.4 - 5.3 mmol/L Final      Chloride   Date Value Ref Range Status   03/19/2020 100 94 - 109 mmol/L Final     Carbon Dioxide   Date Value Ref Range Status   03/19/2020 22 20 - 32 mmol/L Final     Anion Gap   Date Value Ref Range Status   03/19/2020 9 3 - 14 mmol/L Final     Glucose   Date Value Ref Range Status   03/19/2020 165 (H) 70 - 99 mg/dL Final     Comment:     Non Fasting     Urea Nitrogen   Date Value Ref Range Status   03/19/2020 24 7 - 30 mg/dL Final     Creatinine   Date Value Ref Range Status   03/19/2020 1.23 (H) 0.52 - 1.04 mg/dL Final     GFR Estimate   Date Value Ref Range Status   03/19/2020 47 (L) >60 mL/min/[1.73_m2] Final     Comment:     Non  GFR Calc  Starting 12/18/2018, serum creatinine based estimated GFR (eGFR) will be   calculated using the Chronic Kidney Disease Epidemiology Collaboration   (CKD-EPI) equation.       Calcium   Date Value Ref Range Status   03/19/2020 9.0 8.5 - 10.1 mg/dL Final     Lab Results   Component Value Date    SED 97 03/19/2020     Lab Results   Component Value Date    CRP 17.7 03/11/2020     Lab Results   Component Value Date    SED 97 03/19/2020     Lab Results   Component Value Date    CRP 16.8 03/19/2020

## 2020-03-19 NOTE — NURSING NOTE
Tiffani Tan's chief complaint for this visit includes:  Chief Complaint   Patient presents with     RECHECK     PCP: Wily Bonilla    Referring Provider:  No referring provider defined for this encounter.    /65   Pulse 93   SpO2 93%   Data Unavailable     Do you need any medication refills at today's visit? no

## 2020-03-19 NOTE — PATIENT INSTRUCTIONS
Thanks for coming today.  [facility]  [address] [place] [address]    To schedule future appointments in Ortho Clinic, you may call [phone].    To schedule ordered imaging by your provider:   Call Central Imaging [phone]    To schedule an injection ordered by your provider:  Call Central Imaging Injection scheduling line: [phone]  [employer] available online at:  [email or web address]    Please call if any further questions or concerns ([phone]).  Clinic hours 8 am to 5 pm.    Return to clinic (call) if symptoms worsen or fail to improve.

## 2020-03-24 ENCOUNTER — TELEPHONE (OUTPATIENT)
Dept: PODIATRY | Facility: CLINIC | Age: 60
End: 2020-03-24

## 2020-03-24 ENCOUNTER — DOCUMENTATION ONLY (OUTPATIENT)
Dept: CARE COORDINATION | Facility: CLINIC | Age: 60
End: 2020-03-24

## 2020-03-24 NOTE — TELEPHONE ENCOUNTER
Do you have any of the following symptoms:  a)      Fever (or reported chills) No   b)      Shortness of Breath No  c)      Rash No   D)     Cough: No    If a patient reports yes to any of these symptoms, obtain direction from the provider and call the patient back to let them know if they can come in or not.  1.    Provider needs to determine if this patient should still be seen in clinic.  2.    If decision to not see in clinic, call patient back and refer them to COVID- 19 [email or web address] or schedule COVID-19 phone visit.  3.    Turn in-person visit into telephone visit (FOR RETURN PATIENTS ONLY)    Remind patients that visitors are not allowed on site. Only one legal guardian who screens negative to the above questions will be allowed to accompany patients. If a patient indicates that they will be bringing a legal guardian with them to the appointment please make sure to screen both the patient and the legal guardian for symptoms using the tool above.          .

## 2020-03-25 ENCOUNTER — OFFICE VISIT (OUTPATIENT)
Dept: PODIATRY | Facility: CLINIC | Age: 60
End: 2020-03-25
Payer: COMMERCIAL

## 2020-03-25 DIAGNOSIS — E11.42 TYPE 2 DIABETES MELLITUS WITH DIABETIC POLYNEUROPATHY, WITHOUT LONG-TERM CURRENT USE OF INSULIN (H): Primary | ICD-10-CM

## 2020-03-25 DIAGNOSIS — L97.323 SKIN ULCER OF LEFT ANKLE WITH NECROSIS OF MUSCLE (H): ICD-10-CM

## 2020-03-25 DIAGNOSIS — E11.621 DIABETIC ULCER OF RIGHT MIDFOOT ASSOCIATED WITH TYPE 2 DIABETES MELLITUS, WITH BONE INVOLVEMENT WITHOUT EVIDENCE OF NECROSIS (H): ICD-10-CM

## 2020-03-25 DIAGNOSIS — L97.416 DIABETIC ULCER OF RIGHT MIDFOOT ASSOCIATED WITH TYPE 2 DIABETES MELLITUS, WITH BONE INVOLVEMENT WITHOUT EVIDENCE OF NECROSIS (H): ICD-10-CM

## 2020-03-25 PROCEDURE — 99212 OFFICE O/P EST SF 10 MIN: CPT | Mod: 25 | Performed by: PODIATRIST

## 2020-03-25 PROCEDURE — 29445 APPL RIGID TOT CNTC LEG CAST: CPT | Mod: XS | Performed by: PODIATRIST

## 2020-03-25 PROCEDURE — 11042 DBRDMT SUBQ TIS 1ST 20SQCM/<: CPT | Performed by: PODIATRIST

## 2020-03-25 NOTE — LETTER
3/25/2020         RE: Tiffani Tan  [address] [facility] [address]        Dear Colleague,    Thank you for referring your patient, Tiffani Tan, to the [facility]. Please see a copy of my visit note below.    Chief Complaint   Patient presents with     WOUND CARE     Left lateral malleolus and right plantar foot ulceration.             No Known Allergies      Subjective: Tiffani is a 60 year old female who presents to the clinic today for a follow up of left ankle. She tolerated the dressings well. She is taking clindamycin. The right hallux is looking improved. Left ankle is painful.     Objective  A right plantar 1st met head wound is noted measuring 1.5cm x 3cm x 0.2cm.  Inman Classification: 2     Wound base: Red/Granulation     Edges: macerated tissue     Drainage: scant/serous     Odor: no     Undermining: no     Bone Exposure: No     Clinical Signs of Infection: No - cellulitis of the hallux has resolved.        After obtaining patient consent, the wound was irrigated with copious amounts of saline. A curette was then used to debride the wound into subcutaneous tissue. The wound edges were debrided back to healthy, bleeding tissue. The wound base exhibited healthy bleeding. Given the patient's lack of sensation, no anesthesia was necessary for the procedure.     ____________________________________  Wound #2     A Charcot wound is noted at left  lateral malleolus measuring 2.2cm x 2.6cm x 2.6cm.     Inman Classification: 3     Wound base: Red/hypergranulation     Edges: epibole     Drainage: copious/serous     Odor: no     Underminin O'Clock     Bone Exposure: Yes: lateral malleolus     Clinical Signs of Infection: No     After obtaining patient consent, the wound was irrigated with copious amounts of saline. No sharp debridement performed on this ulceration.   Legs are warm with venous stasis dermatitis changes noted BL. No s/s of infection noted.    Assessment: Right foot ulceration  - this is worsening on XR and she likely has medullary cavity exposure. I agree with Dr. Donahue's assessment and have also recommended that she have a partial or total 1st ray amputation. She has significant osteolysis to the 1st met head and this will be difficult to control with antibiotics. Hallux still appears cellulitic. Will change abx to clindamycin for short term coverage.   DM2 with neuropathy.   Left Charcot foot and ankle with probing ulceration to the lateral malleolus. XR shows progressive osteolysis of the lateral malleolus. Because of the loss of most of her midfoot osseous structures, this foot will not be a stable weight bearing structure.  Venous stasis dermatitis.     Plan:   - Pt seen and evaluated  - Right foot wound debrided as described.  - Cont Cleocin 300 TID. Stop Augmentin.   - Left Profore booting applied. Cast applied to the left leg. She will need left BKA.   - If worsening over the next week, she should present to the ED.  - She should see Dr. Adams regarding left BKA and right 1st met excision. Has appt that was canceled 2/2 COVID precautions.  - Pt to return to clinic in 1 week.       Again, thank you for allowing me to participate in the care of your patient.        Sincerely,        Daniel Giron DPM

## 2020-03-25 NOTE — PROGRESS NOTES
Chief Complaint   Patient presents with     WOUND CARE     Left lateral malleolus and right plantar foot ulceration.             No Known Allergies      Subjective: Tiffani is a 60 year old female who presents to the clinic today for a follow up of left ankle. She tolerated the dressings well. She is taking clindamycin. The right hallux is looking improved. Left ankle is painful.     Objective  A right plantar 1st met head wound is noted measuring 1.5cm x 3cm x 0.2cm.  Inman Classification: 2     Wound base: Red/Granulation     Edges: macerated tissue     Drainage: scant/serous     Odor: no     Undermining: no     Bone Exposure: No     Clinical Signs of Infection: No - cellulitis of the hallux has resolved.        After obtaining patient consent, the wound was irrigated with copious amounts of saline. A curette was then used to debride the wound into subcutaneous tissue. The wound edges were debrided back to healthy, bleeding tissue. The wound base exhibited healthy bleeding. Given the patient's lack of sensation, no anesthesia was necessary for the procedure.     ____________________________________  Wound #2     A Charcot wound is noted at left  lateral malleolus measuring 2.2cm x 2.6cm x 2.6cm.     Inman Classification: 3     Wound base: Red/hypergranulation     Edges: epibole     Drainage: copious/serous     Odor: no     Underminin O'Clock     Bone Exposure: Yes: lateral malleolus     Clinical Signs of Infection: No     After obtaining patient consent, the wound was irrigated with copious amounts of saline. No sharp debridement performed on this ulceration.   Legs are warm with venous stasis dermatitis changes noted BL. No s/s of infection noted.    Assessment: Right foot ulceration - this is worsening on XR and she likely has medullary cavity exposure. I agree with Dr. Donahue's assessment and have also recommended that she have a partial or total 1st ray amputation. She has significant osteolysis to the 1st  met head and this will be difficult to control with antibiotics. Hallux still appears cellulitic. Will change abx to clindamycin for short term coverage.   DM2 with neuropathy.   Left Charcot foot and ankle with probing ulceration to the lateral malleolus. XR shows progressive osteolysis of the lateral malleolus. Because of the loss of most of her midfoot osseous structures, this foot will not be a stable weight bearing structure.  Venous stasis dermatitis.     Plan:   - Pt seen and evaluated  - Right foot wound debrided as described.  - Cont Cleocin 300 TID. Stop Augmentin.   - Left Profore booting applied. Cast applied to the left leg. She will need left BKA.   - If worsening over the next week, she should present to the ED.  - She should see Dr. Adams regarding left BKA and right 1st met excision. Has appt that was canceled 2/2 COVID precautions.  - Pt to return to clinic in 1 week.

## 2020-03-27 LAB — METHYLMALONATE SERPL-SCNC: 0.5 UMOL/L (ref 0–0.4)

## 2020-03-31 NOTE — PROGRESS NOTES
Do you have any of the following symptoms:  a)      Fever (or reported chills) No  b)      Shortness of Breath No  c)      Rash No    If a patient reports yes to any of these symptoms, obtain direction from the provider and call the patient back to let them know if they can come in or not.  1.    Provider needs to determine if this patient should still be seen in clinic.  2.    If decision to not see in clinic, call patient back and refer them to COVID- 19 [email or web address] or schedule COVID-19 phone visit.  3.    Turn in-person visit into telephone visit (FOR RETURN PATIENTS ONLY)    Remind patients that visitors are not allowed on site. Only one legal guardian who screens negative to the above questions will be allowed to accompany patients. If a patient indicates that they will be bringing a legal guardian with them to the appointment please make sure to screen both the patient and the legal guardian for symptoms using the tool above.

## 2020-04-01 ENCOUNTER — OFFICE VISIT (OUTPATIENT)
Dept: PODIATRY | Facility: CLINIC | Age: 60
End: 2020-04-01
Payer: COMMERCIAL

## 2020-04-01 DIAGNOSIS — M14.672 CHARCOT'S JOINT OF LEFT FOOT: ICD-10-CM

## 2020-04-01 DIAGNOSIS — I87.2 VENOUS STASIS ULCER OF LEFT CALF WITH FAT LAYER EXPOSED WITHOUT VARICOSE VEINS (H): ICD-10-CM

## 2020-04-01 DIAGNOSIS — L97.323 SKIN ULCER OF LEFT ANKLE WITH NECROSIS OF MUSCLE (H): ICD-10-CM

## 2020-04-01 DIAGNOSIS — E11.42 TYPE 2 DIABETES MELLITUS WITH DIABETIC POLYNEUROPATHY, WITHOUT LONG-TERM CURRENT USE OF INSULIN (H): Primary | ICD-10-CM

## 2020-04-01 DIAGNOSIS — L97.416 DIABETIC ULCER OF RIGHT MIDFOOT ASSOCIATED WITH TYPE 2 DIABETES MELLITUS, WITH BONE INVOLVEMENT WITHOUT EVIDENCE OF NECROSIS (H): ICD-10-CM

## 2020-04-01 DIAGNOSIS — L97.222 VENOUS STASIS ULCER OF LEFT CALF WITH FAT LAYER EXPOSED WITHOUT VARICOSE VEINS (H): ICD-10-CM

## 2020-04-01 DIAGNOSIS — E11.621 DIABETIC ULCER OF RIGHT MIDFOOT ASSOCIATED WITH TYPE 2 DIABETES MELLITUS, WITH BONE INVOLVEMENT WITHOUT EVIDENCE OF NECROSIS (H): ICD-10-CM

## 2020-04-01 PROCEDURE — 99213 OFFICE O/P EST LOW 20 MIN: CPT | Performed by: PODIATRIST

## 2020-04-01 RX ORDER — CLINDAMYCIN HCL 300 MG
300 CAPSULE ORAL 2 TIMES DAILY
Qty: 60 CAPSULE | Refills: 0 | Status: ON HOLD | OUTPATIENT
Start: 2020-04-01 | End: 2020-07-21

## 2020-04-01 NOTE — PATIENT INSTRUCTIONS
Thanks for coming today.  [facility]  [address] [place] [address]    To schedule future appointments in Ortho Clinic, you may call [phone].    To schedule ordered imaging by your provider:   Call Central Imaging [phone]    To schedule an injection ordered by your provider:  Call Central Imaging Injection scheduling line: [phone]  [employer] available online at:  [email or web address]    Please call if any further questions or concerns ([phone]).  Clinic hours 8 am to 5 pm.    Return to clinic (call) if symptoms worsen or fail to improve.

## 2020-04-01 NOTE — NURSING NOTE
Tiffani Tan's chief complaint for this visit includes:  Chief Complaint   Patient presents with     RECHECK     wound care     PCP: Wily Bonilla    Referring Provider:  No referring provider defined for this encounter.    There were no vitals taken for this visit.  Data Unavailable     Do you need any medication refills at today's visit? no

## 2020-04-01 NOTE — PROGRESS NOTES
No past medical history on file.  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)     GI bleed     UGIB (upper gastrointestinal bleed)     Morbid obesity (H)     Past Surgical History:   Procedure Laterality Date     ESOPHAGOSCOPY, GASTROSCOPY, DUODENOSCOPY (EGD), COMBINED N/A 2/5/2020    Procedure: ESOPHAGOGASTRODUODENOSCOPY (EGD);  Surgeon: Tanya Dias MD;  Location:  GI     Social History     Socioeconomic History     Marital status: Single     Spouse name: Not on file     Number of children: Not on file     Years of education: Not on file     Highest education level: Not on file   Occupational History     Not on file   Social Needs     Financial resource strain: Not on file     Food insecurity     Worry: Not on file     Inability: Not on file     Transportation needs     Medical: Not on file     Non-medical: Not on file   Tobacco Use     Smoking status: Current Every Day Smoker     Packs/day: 1.00     Smokeless tobacco: Never Used   Substance and Sexual Activity     Alcohol use: Yes     Drug use: Not on file     Sexual activity: Not on file   Lifestyle     Physical activity     Days per week: Not on file     Minutes per session: Not on file     Stress: Not on file   Relationships     Social connections     Talks on phone: Not on file     Gets together: Not on file     Attends [employer] service: Not on file     Active member of club or organization: Not on file     Attends meetings of clubs or organizations: Not on file     Relationship status: Not on file     Intimate  partner violence     Fear of current or ex partner: Not on file     Emotionally abused: Not on file     Physically abused: Not on file     Forced sexual activity: Not on file   Other Topics Concern     Parent/sibling w/ CABG, MI or angioplasty before 65F 55M? Not Asked   Social History Narrative     Not on file     Family History   Problem Relation Age of Onset     No Known Problems Mother      No Known Problems Father      Lab Results   Component Value Date    WBC 10.5 03/19/2020     Lab Results   Component Value Date    RBC 3.37 03/19/2020     Lab Results   Component Value Date    HGB 8.8 03/19/2020     Lab Results   Component Value Date    HCT 30.4 03/19/2020     No components found for: MCT  Lab Results   Component Value Date    MCV 90 03/19/2020     Lab Results   Component Value Date    MCH 26.1 03/19/2020     Lab Results   Component Value Date    MCHC 28.9 03/19/2020     Lab Results   Component Value Date    RDW 17.8 03/19/2020     Lab Results   Component Value Date     03/19/2020     Last Comprehensive Metabolic Panel:  Sodium   Date Value Ref Range Status   03/19/2020 131 (L) 133 - 144 mmol/L Final     Potassium   Date Value Ref Range Status   03/19/2020 5.2 3.4 - 5.3 mmol/L Final     Chloride   Date Value Ref Range Status   03/19/2020 100 94 - 109 mmol/L Final     Carbon Dioxide   Date Value Ref Range Status   03/19/2020 22 20 - 32 mmol/L Final     Anion Gap   Date Value Ref Range Status   03/19/2020 9 3 - 14 mmol/L Final     Glucose   Date Value Ref Range Status   03/19/2020 165 (H) 70 - 99 mg/dL Final     Comment:     Non Fasting     Urea Nitrogen   Date Value Ref Range Status   03/19/2020 24 7 - 30 mg/dL Final     Creatinine   Date Value Ref Range Status   03/19/2020 1.23 (H) 0.52 - 1.04 mg/dL Final     GFR Estimate   Date Value Ref Range Status   03/19/2020 47 (L) >60 mL/min/[1.73_m2] Final     Comment:     Non  GFR Calc  Starting 12/18/2018, serum creatinine based estimated GFR  (eGFR) will be   calculated using the Chronic Kidney Disease Epidemiology Collaboration   (CKD-EPI) equation.       Calcium   Date Value Ref Range Status   03/19/2020 9.0 8.5 - 10.1 mg/dL Final     Lab Results   Component Value Date    SED 97 03/19/2020     Lab Results   Component Value Date    CRP 16.8 03/19/2020     Lab Results   Component Value Date    A1C 6.5 02/04/2020               Biatain Silver and dressing supplies dispensed.  SUBJECTIVE FINDINGS:  A 60-year-old female returns to clinic for ulcer of left ankle with Charcot foot and ankle, ulcer right first MPJ.  Relates she is doing okay.  She is taking the clindamycin with no problems.  She has got a couple of those left.  Relates she would like to go out of the cast.  She likes the cast because it is stable, but she wants to maybe air this out and would like to go back to the posterior splint if she could.  Relates that she is using the Iodosorb on the right and it seems to be doing okay.      OBJECTIVE FINDINGS:  DP and PT are 2/4 bilaterally.  She has some peripheral edema on the right versus the left.  She has a right plantar first MPJ ulcer that is deep through the dermis into the subcutaneous tissues.  There is some contraction, some Iodosorb buildup.  There is no erythema, some serosanguineous drainage, no odor, no calor.  She has a left lateral ankle ulcer with some edema, some serosanguineous drainage, no erythema, no odor, no calor.  It is deep through the subcutaneous tissues with some granulation tissue on the base.      ASSESSMENT AND PLAN:  Ulcer, left ankle.  Ulcer, right plantar first MPJ.  She is diabetic with peripheral neuropathy.  Charcot foot and ankle.  Diagnosis and treatment options discussed with her.  Local wound care done upon consent today.  I am going to have her daily clean the left ankle ulcer with Biatain Silver, apply sterile Kerlix and posterior splint secured with Ace wraps.  On the right we will continue with the  Iodosorb daily, clean with Wound Vashe as well.  Continue offloading.  I am going to continue the clindamycin.  She has an appointment for late April for surgical options, although with the COVID-19 that could change again as well.  She will be rescheduled to see me or Dr. Giron in 2 weeks.  This is relatively stable.  She may have some contraction on the right first MPJ ulcer as well, and her edema is under relatively good control.  Previous notes were reviewed.

## 2020-04-01 NOTE — LETTER
4/1/2020         RE: Tiffani Tan  [address] [facility] [address]        Dear Colleague,    Thank you for referring your patient, Tiffani Tan, to the [email or web address] Please see a copy of my visit note below.    Do you have any of the following symptoms:  a)      Fever (or reported chills) No  b)      Shortness of Breath No  c)      Rash No    If a patient reports yes to any of these symptoms, obtain direction from the provider and call the patient back to let them know if they can come in or not.  1.    Provider needs to determine if this patient should still be seen in clinic.  2.    If decision to not see in clinic, call patient back and refer them to COVID- 19 [email or web address] or schedule COVID-19 phone visit.  3.    Turn in-person visit into telephone visit (FOR RETURN PATIENTS ONLY)    Remind patients that visitors are not allowed on site. Only one legal guardian who screens negative to the above questions will be allowed to accompany patients. If a patient indicates that they will be bringing a legal guardian with them to the appointment please make sure to screen both the patient and the legal guardian for symptoms using the tool above.              No past medical history on file.  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)     GI bleed     UGIB (upper gastrointestinal bleed)     Morbid obesity (H)     Past Surgical History:   Procedure Laterality Date     ESOPHAGOSCOPY, GASTROSCOPY, DUODENOSCOPY (EGD), COMBINED N/A 2/5/2020    Procedure: ESOPHAGOGASTRODUODENOSCOPY  (EGD);  Surgeon: Tanya Dias MD;  [facility]: [facility]     Social History     Socioeconomic History     Marital status: Single     Spouse name: Not on file     Number of children: Not on file     Years of education: Not on file     Highest education level: Not on file   Occupational History     Not on file   Social Needs     Financial resource strain: Not on file     Food insecurity     Worry: Not on file     Inability: Not on file     Transportation needs     Medical: Not on file     Non-medical: Not on file   Tobacco Use     Smoking status: Current Every Day Smoker     Packs/day: 1.00     Smokeless tobacco: Never Used   Substance and Sexual Activity     Alcohol use: Yes     Drug use: Not on file     Sexual activity: Not on file   Lifestyle     Physical activity     Days per week: Not on file     Minutes per session: Not on file     Stress: Not on file   Relationships     Social connections     Talks on phone: Not on file     Gets together: Not on file     Attends Zoroastrian service: Not on file     Active member of club or organization: Not on file     Attends meetings of clubs or organizations: Not on file     Relationship status: Not on file     Intimate partner violence     Fear of current or ex partner: Not on file     Emotionally abused: Not on file     Physically abused: Not on file     Forced sexual activity: Not on file   Other Topics Concern     Parent/sibling w/ CABG, MI or angioplasty before 65F 55M? Not Asked   Social History Narrative     Not on file     Family History   Problem Relation Age of Onset     No Known Problems Mother      No Known Problems Father      Lab Results   Component Value Date    WBC 10.5 03/19/2020     Lab Results   Component Value Date    RBC 3.37 03/19/2020     Lab Results   Component Value Date    HGB 8.8 03/19/2020     Lab Results   Component Value Date    HCT 30.4 03/19/2020     No components found for: MCT  Lab Results   Component Value Date    MCV 90 03/19/2020      Lab Results   Component Value Date    MCH 26.1 03/19/2020     Lab Results   Component Value Date    MCHC 28.9 03/19/2020     Lab Results   Component Value Date    RDW 17.8 03/19/2020     Lab Results   Component Value Date     03/19/2020     Last Comprehensive Metabolic Panel:  Sodium   Date Value Ref Range Status   03/19/2020 131 (L) 133 - 144 mmol/L Final     Potassium   Date Value Ref Range Status   03/19/2020 5.2 3.4 - 5.3 mmol/L Final     Chloride   Date Value Ref Range Status   03/19/2020 100 94 - 109 mmol/L Final     Carbon Dioxide   Date Value Ref Range Status   03/19/2020 22 20 - 32 mmol/L Final     Anion Gap   Date Value Ref Range Status   03/19/2020 9 3 - 14 mmol/L Final     Glucose   Date Value Ref Range Status   03/19/2020 165 (H) 70 - 99 mg/dL Final     Comment:     Non Fasting     Urea Nitrogen   Date Value Ref Range Status   03/19/2020 24 7 - 30 mg/dL Final     Creatinine   Date Value Ref Range Status   03/19/2020 1.23 (H) 0.52 - 1.04 mg/dL Final     GFR Estimate   Date Value Ref Range Status   03/19/2020 47 (L) >60 mL/min/[1.73_m2] Final     Comment:     Non  GFR Calc  Starting 12/18/2018, serum creatinine based estimated GFR (eGFR) will be   calculated using the Chronic Kidney Disease Epidemiology Collaboration   (CKD-EPI) equation.       Calcium   Date Value Ref Range Status   03/19/2020 9.0 8.5 - 10.1 mg/dL Final     Lab Results   Component Value Date    SED 97 03/19/2020     Lab Results   Component Value Date    CRP 16.8 03/19/2020     Lab Results   Component Value Date    A1C 6.5 02/04/2020               Biatain Silver and dressing supplies dispensed.  SUBJECTIVE FINDINGS:  A 60-year-old female returns to clinic for ulcer of left ankle with Charcot foot and ankle, ulcer right first MPJ.  Relates she is doing okay.  She is taking the clindamycin with no problems.  She has got a couple of those left.  Relates she would like to go out of the cast.  She likes the cast  because it is stable, but she wants to maybe air this out and would like to go back to the posterior splint if she could.  Relates that she is using the Iodosorb on the right and it seems to be doing okay.      OBJECTIVE FINDINGS:  DP and PT are 2/4 bilaterally.  She has some peripheral edema on the right versus the left.  She has a right plantar first MPJ ulcer that is deep through the dermis into the subcutaneous tissues.  There is some contraction, some Iodosorb buildup.  There is no erythema, some serosanguineous drainage, no odor, no calor.  She has a left lateral ankle ulcer with some edema, some serosanguineous drainage, no erythema, no odor, no calor.  It is deep through the subcutaneous tissues with some granulation tissue on the base.      ASSESSMENT AND PLAN:  Ulcer, left ankle.  Ulcer, right plantar first MPJ.  She is diabetic with peripheral neuropathy.  Charcot foot and ankle.  Diagnosis and treatment options discussed with her.  Local wound care done upon consent today.  I am going to have her daily clean the left ankle ulcer with Biatain Silver, apply sterile Kerlix and posterior splint secured with Ace wraps.  On the right we will continue with the Iodosorb daily, clean with Wound Vashe as well.  Continue offloading.  I am going to continue the clindamycin.  She has an appointment for late April for surgical options, although with the COVID-19 that could change again as well.  She will be rescheduled to see me or Dr. Giron in 2 weeks.  This is relatively stable.  She may have some contraction on the right first MPJ ulcer as well, and her edema is under relatively good control.  Previous notes were reviewed.           Again, thank you for allowing me to participate in the care of your patient.        Sincerely,        Nolan Whitten DPM

## 2020-04-06 ENCOUNTER — TRANSFERRED RECORDS (OUTPATIENT)
Dept: HEALTH INFORMATION MANAGEMENT | Facility: CLINIC | Age: 60
End: 2020-04-06

## 2020-04-14 ENCOUNTER — TELEPHONE (OUTPATIENT)
Dept: PODIATRY | Facility: CLINIC | Age: 60
End: 2020-04-14

## 2020-04-14 NOTE — TELEPHONE ENCOUNTER
Do you have any of the following symptoms:  a)      Fever (or reported chills) No  b)      Shortness of Breath No  c)      Rash No   D)  Cough: No    If a patient reports yes to any of these symptoms, obtain direction from the provider and call the patient back to let them know if they can come in or not.  1.    Provider needs to determine if this patient should still be seen in clinic.  2.    If decision to not see in clinic, call patient back and refer them to COVID- 19 [email or web address] or schedule COVID-19 phone visit.  3.    Turn in-person visit into telephone visit (FOR RETURN PATIENTS ONLY)    Remind patients that visitors are not allowed on site. Only one legal guardian who screens negative to the above questions will be allowed to accompany patients. If a patient indicates that they will be bringing a legal guardian with them to the appointment please make sure to screen both the patient and the legal guardian for symptoms using the tool above.

## 2020-04-15 ENCOUNTER — TELEPHONE (OUTPATIENT)
Dept: PODIATRY | Facility: CLINIC | Age: 60
End: 2020-04-15

## 2020-04-15 NOTE — TELEPHONE ENCOUNTER
Patient was contacted today to discuss that she missed her appointment this morning with Dr. Whitten, a voicemail was left.

## 2020-04-17 ENCOUNTER — TELEPHONE (OUTPATIENT)
Dept: PODIATRY | Facility: CLINIC | Age: 60
End: 2020-04-17

## 2020-04-17 NOTE — TELEPHONE ENCOUNTER
Left message for patient to return a call to the clinic to review Covid symptom screening.   Patient did not return call to the clinic.

## 2020-04-17 NOTE — PATIENT INSTRUCTIONS
Thanks for coming today.  [facility]tho[facility] Clinic  [address] [place] [address]    To schedule future appointments in Ortho Clinic, you may call [phone].    To schedule ordered imaging by your provider:   Call Central Imaging [phone]    To schedule an injection ordered by your provider:  Call Central Imaging Injection scheduling line: [phone]  [employer] available online at:  [email or web address]    Please call if any further questions or concerns ([phone]).  Clinic hours 8 am to 5 pm.    Return to clinic (call) if symptoms worsen or fail to improve.

## 2020-04-18 ENCOUNTER — TELEPHONE (OUTPATIENT)
Dept: FAMILY MEDICINE | Facility: CLINIC | Age: 60
End: 2020-04-18

## 2020-04-18 NOTE — TELEPHONE ENCOUNTER
I am receiving notes for this patient here in [facility] from [facility]  - in my outbox.  Patient if I am not mistaken was seen by me years ago at a different clinic.  We do not appear to be her primary care provider however [facility] is still forwarding her records to me here in [place].  I am requesting that coordinator call [facility] during office hours that they remove me from their record as PCP and that they update it with the patient who to send records to.    She however does see podiatry through [facility].  I believe the neurology information for her Charcot feet may be beneficial for podiatry to review for coordination of care. I have called the patient directly and she is ok with routing this information to her podiatrist.  I have it in my outbox - please route to her podiatrist Dr. Whitten.    Thank you    Sheridan Wallace M.D.

## 2020-04-20 ENCOUNTER — TELEPHONE (OUTPATIENT)
Dept: ORTHOPEDICS | Facility: CLINIC | Age: 60
End: 2020-04-20

## 2020-04-20 ENCOUNTER — TELEPHONE (OUTPATIENT)
Dept: PODIATRY | Facility: CLINIC | Age: 60
End: 2020-04-20

## 2020-04-20 ENCOUNTER — OFFICE VISIT (OUTPATIENT)
Dept: PODIATRY | Facility: CLINIC | Age: 60
End: 2020-04-20
Payer: COMMERCIAL

## 2020-04-20 DIAGNOSIS — M14.672 CHARCOT'S JOINT OF LEFT FOOT: ICD-10-CM

## 2020-04-20 DIAGNOSIS — L97.323 SKIN ULCER OF LEFT ANKLE WITH NECROSIS OF MUSCLE (H): ICD-10-CM

## 2020-04-20 DIAGNOSIS — I87.8 VENOUS STASIS: ICD-10-CM

## 2020-04-20 DIAGNOSIS — E11.621 DIABETIC ULCER OF RIGHT MIDFOOT ASSOCIATED WITH TYPE 2 DIABETES MELLITUS, WITH BONE INVOLVEMENT WITHOUT EVIDENCE OF NECROSIS (H): ICD-10-CM

## 2020-04-20 DIAGNOSIS — L97.416 DIABETIC ULCER OF RIGHT MIDFOOT ASSOCIATED WITH TYPE 2 DIABETES MELLITUS, WITH BONE INVOLVEMENT WITHOUT EVIDENCE OF NECROSIS (H): ICD-10-CM

## 2020-04-20 DIAGNOSIS — E11.42 TYPE 2 DIABETES MELLITUS WITH DIABETIC POLYNEUROPATHY, WITHOUT LONG-TERM CURRENT USE OF INSULIN (H): Primary | ICD-10-CM

## 2020-04-20 LAB
ANION GAP SERPL CALCULATED.3IONS-SCNC: 3 MMOL/L (ref 3–14)
BASOPHILS # BLD AUTO: 0.1 10E9/L (ref 0–0.2)
BASOPHILS NFR BLD AUTO: 0.9 %
BUN SERPL-MCNC: 22 MG/DL (ref 7–30)
CALCIUM SERPL-MCNC: 8.3 MG/DL (ref 8.5–10.1)
CHLORIDE SERPL-SCNC: 97 MMOL/L (ref 94–109)
CO2 SERPL-SCNC: 27 MMOL/L (ref 20–32)
CREAT SERPL-MCNC: 1 MG/DL (ref 0.52–1.04)
CRP SERPL-MCNC: 37.4 MG/L (ref 0–8)
DIFFERENTIAL METHOD BLD: ABNORMAL
EOSINOPHIL # BLD AUTO: 0.2 10E9/L (ref 0–0.7)
EOSINOPHIL NFR BLD AUTO: 1.7 %
ERYTHROCYTE [DISTWIDTH] IN BLOOD BY AUTOMATED COUNT: 18.3 % (ref 10–15)
ERYTHROCYTE [SEDIMENTATION RATE] IN BLOOD BY WESTERGREN METHOD: 108 MM/H (ref 0–30)
GFR SERPL CREATININE-BSD FRML MDRD: 61 ML/MIN/{1.73_M2}
GLUCOSE SERPL-MCNC: 90 MG/DL (ref 70–99)
HCT VFR BLD AUTO: 29.5 % (ref 35–47)
HGB BLD-MCNC: 8.4 G/DL (ref 11.7–15.7)
IMM GRANULOCYTES # BLD: 0.1 10E9/L (ref 0–0.4)
IMM GRANULOCYTES NFR BLD: 0.6 %
LYMPHOCYTES # BLD AUTO: 2.8 10E9/L (ref 0.8–5.3)
LYMPHOCYTES NFR BLD AUTO: 28.5 %
MCH RBC QN AUTO: 23.6 PG (ref 26.5–33)
MCHC RBC AUTO-ENTMCNC: 28.5 G/DL (ref 31.5–36.5)
MCV RBC AUTO: 83 FL (ref 78–100)
MONOCYTES # BLD AUTO: 0.9 10E9/L (ref 0–1.3)
MONOCYTES NFR BLD AUTO: 9.3 %
NEUTROPHILS # BLD AUTO: 5.8 10E9/L (ref 1.6–8.3)
NEUTROPHILS NFR BLD AUTO: 59 %
PLATELET # BLD AUTO: 240 10E9/L (ref 150–450)
POTASSIUM SERPL-SCNC: 5 MMOL/L (ref 3.4–5.3)
RBC # BLD AUTO: 3.56 10E12/L (ref 3.8–5.2)
SODIUM SERPL-SCNC: 127 MMOL/L (ref 133–144)
URATE SERPL-MCNC: 6.2 MG/DL (ref 2.6–6)
WBC # BLD AUTO: 9.9 10E9/L (ref 4–11)

## 2020-04-20 PROCEDURE — 29405 APPL SHORT LEG CAST: CPT | Mod: LT | Performed by: PODIATRIST

## 2020-04-20 PROCEDURE — 29580 STRAPPING UNNA BOOT: CPT | Mod: RT | Performed by: PODIATRIST

## 2020-04-20 RX ORDER — HYDROCODONE BITARTRATE AND ACETAMINOPHEN 7.5; 325 MG/1; MG/1
1 TABLET ORAL
COMMUNITY
End: 2020-04-29

## 2020-04-20 RX ORDER — HYDROCHLOROTHIAZIDE 25 MG/1
TABLET ORAL
COMMUNITY
Start: 2020-04-01

## 2020-04-20 RX ORDER — HYDROCODONE BITARTRATE AND ACETAMINOPHEN 7.5; 325 MG/1; MG/1
1-2 TABLET ORAL EVERY 8 HOURS PRN
Qty: 18 TABLET | Refills: 0 | Status: SHIPPED | OUTPATIENT
Start: 2020-04-20 | End: 2020-04-24

## 2020-04-20 RX ORDER — LOSARTAN POTASSIUM 100 MG/1
TABLET ORAL
Status: ON HOLD | COMMUNITY
Start: 2020-04-01 | End: 2020-07-27

## 2020-04-20 RX ORDER — GLIMEPIRIDE 2 MG/1
TABLET ORAL
Status: ON HOLD | COMMUNITY
Start: 2020-03-26 | End: 2020-08-04

## 2020-04-20 NOTE — TELEPHONE ENCOUNTER
[facility] Call Center    Phone Message    May a detailed message be left on voicemail: yes     Reason for Call: Requesting Results   Name/type of test:CBC with platelets differential [OSS907] (Order [id number]     Date of test: 04/20/20  Was test done at a location other than [facility] (Please fill in the location if not [facility])?: No      Action Taken: Message routed to:  Adult Clinics: Podiatry p [id number]    Travel Screening: Not Applicable

## 2020-04-20 NOTE — NURSING NOTE
Tiffani Tan's chief complaint for this visit includes:  Chief Complaint   Patient presents with     RECHECK     pressure ulcer right  plantar 1st met head, left charcot foot and left ankle ulcer     PCP: Wily Bonilla    Referring Provider:  No referring provider defined for this encounter.    There were no vitals taken for this visit.  Data Unavailable     Do you need any medication refills at today's visit? Rubina Oshea CMA

## 2020-04-20 NOTE — TELEPHONE ENCOUNTER
Will send message to Dr. Whitten to review results. He is not clinic anymore today.    Vincenzo Farah RN

## 2020-04-20 NOTE — PROGRESS NOTES
No past medical history on file.  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)     GI bleed     UGIB (upper gastrointestinal bleed)     Morbid obesity (H)     Past Surgical History:   Procedure Laterality Date     ESOPHAGOSCOPY, GASTROSCOPY, DUODENOSCOPY (EGD), COMBINED N/A 2/5/2020    Procedure: ESOPHAGOGASTRODUODENOSCOPY (EGD);  Surgeon: Tanya Dias MD;  Location:  GI     Social History     Socioeconomic History     Marital status: Single     Spouse name: Not on file     Number of children: Not on file     Years of education: Not on file     Highest education level: Not on file   Occupational History     Not on file   Social Needs     Financial resource strain: Not on file     Food insecurity     Worry: Not on file     Inability: Not on file     Transportation needs     Medical: Not on file     Non-medical: Not on file   Tobacco Use     Smoking status: Current Every Day Smoker     Packs/day: 1.00     Smokeless tobacco: Never Used   Substance and Sexual Activity     Alcohol use: Yes     Drug use: Not on file     Sexual activity: Not on file   Lifestyle     Physical activity     Days per week: Not on file     Minutes per session: Not on file     Stress: Not on file   Relationships     Social connections     Talks on phone: Not on file     Gets together: Not on file     Attends Buddhist service: Not on file     Active member of club or organization: Not on file     Attends meetings of clubs or organizations: Not on file     Relationship status: Not on file     Intimate  partner violence     Fear of current or ex partner: Not on file     Emotionally abused: Not on file     Physically abused: Not on file     Forced sexual activity: Not on file   Other Topics Concern     Parent/sibling w/ CABG, MI or angioplasty before 65F 55M? Not Asked   Social History Narrative     Not on file     Family History   Problem Relation Age of Onset     No Known Problems Mother      No Known Problems Father      Lab Results   Component Value Date    WBC 10.5 03/19/2020     Lab Results   Component Value Date    RBC 3.37 03/19/2020     Lab Results   Component Value Date    HGB 8.8 03/19/2020     Lab Results   Component Value Date    HCT 30.4 03/19/2020     No components found for: MCT  Lab Results   Component Value Date    MCV 90 03/19/2020     Lab Results   Component Value Date    MCH 26.1 03/19/2020     Lab Results   Component Value Date    MCHC 28.9 03/19/2020     Lab Results   Component Value Date    RDW 17.8 03/19/2020     Lab Results   Component Value Date     03/19/2020     Last Comprehensive Metabolic Panel:  Sodium   Date Value Ref Range Status   03/19/2020 131 (L) 133 - 144 mmol/L Final     Potassium   Date Value Ref Range Status   03/19/2020 5.2 3.4 - 5.3 mmol/L Final     Chloride   Date Value Ref Range Status   03/19/2020 100 94 - 109 mmol/L Final     Carbon Dioxide   Date Value Ref Range Status   03/19/2020 22 20 - 32 mmol/L Final     Anion Gap   Date Value Ref Range Status   03/19/2020 9 3 - 14 mmol/L Final     Glucose   Date Value Ref Range Status   03/19/2020 165 (H) 70 - 99 mg/dL Final     Comment:     Non Fasting     Urea Nitrogen   Date Value Ref Range Status   03/19/2020 24 7 - 30 mg/dL Final     Creatinine   Date Value Ref Range Status   03/19/2020 1.23 (H) 0.52 - 1.04 mg/dL Final     GFR Estimate   Date Value Ref Range Status   03/19/2020 47 (L) >60 mL/min/[1.73_m2] Final     Comment:     Non  GFR Calc  Starting 12/18/2018, serum creatinine based estimated GFR  (eGFR) will be   calculated using the Chronic Kidney Disease Epidemiology Collaboration   (CKD-EPI) equation.       Calcium   Date Value Ref Range Status   03/19/2020 9.0 8.5 - 10.1 mg/dL Final     Uric Acid   Date Value Ref Range Status   03/11/2020 5.6 2.6 - 6.0 mg/dL Final     Lab Results   Component Value Date    SED 97 03/19/2020     Lab Results   Component Value Date    CRP 16.8 03/19/2020     Lab Results   Component Value Date    WBC 9.9 04/20/2020     Lab Results   Component Value Date    RBC 3.56 04/20/2020     Lab Results   Component Value Date    HGB 8.4 04/20/2020     Lab Results   Component Value Date    HCT 29.5 04/20/2020     No components found for: MCT  Lab Results   Component Value Date    MCV 83 04/20/2020     Lab Results   Component Value Date    MCH 23.6 04/20/2020     Lab Results   Component Value Date    MCHC 28.5 04/20/2020     Lab Results   Component Value Date    RDW 18.3 04/20/2020     Lab Results   Component Value Date     04/20/2020     Lab Results   Component Value Date     04/20/2020     Lab Results   Component Value Date    CRP 37.4 04/20/2020     Uric Acid   Date Value Ref Range Status   04/20/2020 6.2 (H) 2.6 - 6.0 mg/dL Final     Last Comprehensive Metabolic Panel:  Sodium   Date Value Ref Range Status   04/20/2020 127 (L) 133 - 144 mmol/L Final     Potassium   Date Value Ref Range Status   04/20/2020 5.0 3.4 - 5.3 mmol/L Final     Chloride   Date Value Ref Range Status   04/20/2020 97 94 - 109 mmol/L Final     Carbon Dioxide   Date Value Ref Range Status   04/20/2020 27 20 - 32 mmol/L Final     Anion Gap   Date Value Ref Range Status   04/20/2020 3 3 - 14 mmol/L Final     Glucose   Date Value Ref Range Status   04/20/2020 90 70 - 99 mg/dL Final     Comment:     Non Fasting     Urea Nitrogen   Date Value Ref Range Status   04/20/2020 22 7 - 30 mg/dL Final     Creatinine   Date Value Ref Range Status   04/20/2020 1.00 0.52 - 1.04 mg/dL Final     GFR Estimate   Date  Value Ref Range Status   04/20/2020 61 >60 mL/min/[1.73_m2] Final     Comment:     Non  GFR Calc  Starting 12/18/2018, serum creatinine based estimated GFR (eGFR) will be   calculated using the Chronic Kidney Disease Epidemiology Collaboration   (CKD-EPI) equation.       Calcium   Date Value Ref Range Status   04/20/2020 8.3 (L) 8.5 - 10.1 mg/dL Final                   SUBJECTIVE FINDINGS:  A 60-year-old female returns to clinic for left ankle ulcer and right plantar first MPJ ulcer.  She is diabetic with peripheral neuropathy and Charcot foot and ankle.  She relates that she has been getting a lot of pain in her feet and legs.  This has been getting worse.  Relates no specific injury.  No specific relieving or aggravating factors other than it is making it hard to sleep.  She sees [facility]rology [facility], and they have been cutting back on her Vicodin and she feels she does not have enough pain medication.  She has taken about 2 of those a day, the hydrocodone and acetaminophen, and she denies any shortness of breath, relates no nausea, vomiting, fever or chills.  Relates she has been itching her legs.  The right one has been draining because she was scratching it the other day.  She has been using the posterior splint on the left.  She switched to the Iodosorb on the left ankle ulcer because she was not sure Biatain Silver was coming up in the ankle ulcer, so she is using the Iodosorb bilaterally with dressing changes.  She is in a wheelchair.  She relates she does have an appointment rescheduled for Dr. Adams for any surgical options.      OBJECTIVE FINDINGS:  DP and PT are 2/4 bilaterally.  She has peripheral edema bilaterally.  She has some scratches on the right leg which are weeping.  There is some erythema and shiny skin bilaterally, right more so than the left.  She has generalized pain bilaterally.  There is no gross odor, no gross calor.  She has an ulcer on the left lateral ankle that is about  2.3 x 2.5 cm, tracks deep through the subcutaneous tissues.  There is some granulation tissue on the base.  She has a right plantar first MPJ ulcer that is about 3 x 2 cm deep through the dermis into the subcutaneous tissues with a granular base, some slight hyperkeratotic tissue buildup.  There is serosanguineous drainage at both ulcer sites.  No erythema at the ulcer sites.  No odor, no calor.  She has Charcot foot and ankle with some instability on the left.  She has venous stasis edema bilaterally as well.      ASSESSMENT AND PLAN:  Ulcer, left ankle.  Ulcer, right plantar first MPJ.  She is diabetic with peripheral neuropathy and Charcot foot and ankle.  She has venous stasis disease.  She is diabetic with peripheral neuropathy.  She has a lot of swelling today.  Diagnosis and treatment options discussed with her.  Local wound care done upon consent today.  Wound Vashe wet-to-dry dressings applied to the ulcer sites.  Multilayer Unna Boot and compression boot applied to the right lower extremity upon consent.  Total contact cast applied to the left lower extremity upon consent.  Previous notes were reviewed as noted.  Labs ordered and use discussed with her.  I did refill her Vicodin at 7.5 mg 1 or 2 every 8 hours as needed for pain management.  I discussed with her hospital admission.  We opted to not do that today.  I will add Augmentin to her clindamycin, which she is taking the clindamycin with no problems.  The fluoroquinolones potentially have drug interactions.  She will return to clinic Friday.  Diagnosis and treatment options discussed with her.     Also, she relates that the [facility] did refer her back to Pain Clinic but due to the COVID-19 she has not been able to get an appointment with them.

## 2020-04-20 NOTE — LETTER
4/20/2020         RE: Tiffani Tan  [address] [facility] [address]        Dear Colleague,    Thank you for referring your patient, Tiffani Tan, to the [facility]. Please see a copy of my visit note below.    No past medical history on file.  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)     GI bleed     UGIB (upper gastrointestinal bleed)     Morbid obesity (H)     Past Surgical History:   Procedure Laterality Date     ESOPHAGOSCOPY, GASTROSCOPY, DUODENOSCOPY (EGD), COMBINED N/A 2/5/2020    Procedure: ESOPHAGOGASTRODUODENOSCOPY (EGD);  Surgeon: Tanya Dias MD;  Location: [facility]     Social History     Socioeconomic History     Marital status: Single     Spouse name: Not on file     Number of children: Not on file     Years of education: Not on file     Highest education level: Not on file   Occupational History     Not on file   Social Needs     Financial resource strain: Not on file     Food insecurity     Worry: Not on file     Inability: Not on file     Transportation needs     Medical: Not on file     Non-medical: Not on file   Tobacco Use     Smoking status: Current Every Day Smoker     Packs/day: 1.00     Smokeless tobacco: Never Used   Substance and Sexual Activity     Alcohol use: Yes     Drug use: Not on file     Sexual activity: Not on file   Lifestyle     Physical activity     Days per week: Not on file     Minutes per session: Not on file     Stress: Not on file   Relationships     Social connections     Talks on phone: Not on file     Gets  together: Not on file     Attends Jehovah's witness service: Not on file     Active member of club or organization: Not on file     Attends meetings of clubs or organizations: Not on file     Relationship status: Not on file     Intimate partner violence     Fear of current or ex partner: Not on file     Emotionally abused: Not on file     Physically abused: Not on file     Forced sexual activity: Not on file   Other Topics Concern     Parent/sibling w/ CABG, MI or angioplasty before 65F 55M? Not Asked   Social History Narrative     Not on file     Family History   Problem Relation Age of Onset     No Known Problems Mother      No Known Problems Father      Lab Results   Component Value Date    WBC 10.5 03/19/2020     Lab Results   Component Value Date    RBC 3.37 03/19/2020     Lab Results   Component Value Date    HGB 8.8 03/19/2020     Lab Results   Component Value Date    HCT 30.4 03/19/2020     No components found for: MCT  Lab Results   Component Value Date    MCV 90 03/19/2020     Lab Results   Component Value Date    MCH 26.1 03/19/2020     Lab Results   Component Value Date    MCHC 28.9 03/19/2020     Lab Results   Component Value Date    RDW 17.8 03/19/2020     Lab Results   Component Value Date     03/19/2020     Last Comprehensive Metabolic Panel:  Sodium   Date Value Ref Range Status   03/19/2020 131 (L) 133 - 144 mmol/L Final     Potassium   Date Value Ref Range Status   03/19/2020 5.2 3.4 - 5.3 mmol/L Final     Chloride   Date Value Ref Range Status   03/19/2020 100 94 - 109 mmol/L Final     Carbon Dioxide   Date Value Ref Range Status   03/19/2020 22 20 - 32 mmol/L Final     Anion Gap   Date Value Ref Range Status   03/19/2020 9 3 - 14 mmol/L Final     Glucose   Date Value Ref Range Status   03/19/2020 165 (H) 70 - 99 mg/dL Final     Comment:     Non Fasting     Urea Nitrogen   Date Value Ref Range Status   03/19/2020 24 7 - 30 mg/dL Final     Creatinine   Date Value Ref Range Status   03/19/2020 1.23  (H) 0.52 - 1.04 mg/dL Final     GFR Estimate   Date Value Ref Range Status   03/19/2020 47 (L) >60 mL/min/[1.73_m2] Final     Comment:     Non  GFR Calc  Starting 12/18/2018, serum creatinine based estimated GFR (eGFR) will be   calculated using the Chronic Kidney Disease Epidemiology Collaboration   (CKD-EPI) equation.       Calcium   Date Value Ref Range Status   03/19/2020 9.0 8.5 - 10.1 mg/dL Final     Uric Acid   Date Value Ref Range Status   03/11/2020 5.6 2.6 - 6.0 mg/dL Final     Lab Results   Component Value Date    SED 97 03/19/2020     Lab Results   Component Value Date    CRP 16.8 03/19/2020     Lab Results   Component Value Date    WBC 9.9 04/20/2020     Lab Results   Component Value Date    RBC 3.56 04/20/2020     Lab Results   Component Value Date    HGB 8.4 04/20/2020     Lab Results   Component Value Date    HCT 29.5 04/20/2020     No components found for: MCT  Lab Results   Component Value Date    MCV 83 04/20/2020     Lab Results   Component Value Date    MCH 23.6 04/20/2020     Lab Results   Component Value Date    MCHC 28.5 04/20/2020     Lab Results   Component Value Date    RDW 18.3 04/20/2020     Lab Results   Component Value Date     04/20/2020     Lab Results   Component Value Date     04/20/2020     Lab Results   Component Value Date    CRP 37.4 04/20/2020     Uric Acid   Date Value Ref Range Status   04/20/2020 6.2 (H) 2.6 - 6.0 mg/dL Final     Last Comprehensive Metabolic Panel:  Sodium   Date Value Ref Range Status   04/20/2020 127 (L) 133 - 144 mmol/L Final     Potassium   Date Value Ref Range Status   04/20/2020 5.0 3.4 - 5.3 mmol/L Final     Chloride   Date Value Ref Range Status   04/20/2020 97 94 - 109 mmol/L Final     Carbon Dioxide   Date Value Ref Range Status   04/20/2020 27 20 - 32 mmol/L Final     Anion Gap   Date Value Ref Range Status   04/20/2020 3 3 - 14 mmol/L Final     Glucose   Date Value Ref Range Status   04/20/2020 90 70 - 99 mg/dL  Final     Comment:     Non Fasting     Urea Nitrogen   Date Value Ref Range Status   04/20/2020 22 7 - 30 mg/dL Final     Creatinine   Date Value Ref Range Status   04/20/2020 1.00 0.52 - 1.04 mg/dL Final     GFR Estimate   Date Value Ref Range Status   04/20/2020 61 >60 mL/min/[1.73_m2] Final     Comment:     Non  GFR Calc  Starting 12/18/2018, serum creatinine based estimated GFR (eGFR) will be   calculated using the Chronic Kidney Disease Epidemiology Collaboration   (CKD-EPI) equation.       Calcium   Date Value Ref Range Status   04/20/2020 8.3 (L) 8.5 - 10.1 mg/dL Final                   SUBJECTIVE FINDINGS:  A 60-year-old female returns to clinic for left ankle ulcer and right plantar first MPJ ulcer.  She is diabetic with peripheral neuropathy and Charcot foot and ankle.  She relates that she has been getting a lot of pain in her feet and legs.  This has been getting worse.  Relates no specific injury.  No specific relieving or aggravating factors other than it is making it hard to sleep.  She sees Neurology Clinic, and they have been cutting back on her Vicodin and she feels she does not have enough pain medication.  She has taken about 2 of those a day, the hydrocodone and acetaminophen, and she denies any shortness of breath, relates no nausea, vomiting, fever or chills.  Relates she has been itching her legs.  The right one has been draining because she was scratching it the other day.  She has been using the posterior splint on the left.  She switched to the Iodosorb on the left ankle ulcer because she was not sure Biatain Silver was coming up in the ankle ulcer, so she is using the Iodosorb bilaterally with dressing changes.  She is in a wheelchair.  She relates she does have an appointment rescheduled for Dr. Adams for any surgical options.      OBJECTIVE FINDINGS:  DP and PT are 2/4 bilaterally.  She has peripheral edema bilaterally.  She has some scratches on the right leg which are  weeping.  There is some erythema and shiny skin bilaterally, right more so than the left.  She has generalized pain bilaterally.  There is no gross odor, no gross calor.  She has an ulcer on the left lateral ankle that is about 2.3 x 2.5 cm, tracks deep through the subcutaneous tissues.  There is some granulation tissue on the base.  She has a right plantar first MPJ ulcer that is about 3 x 2 cm deep through the dermis into the subcutaneous tissues with a granular base, some slight hyperkeratotic tissue buildup.  There is serosanguineous drainage at both ulcer sites.  No erythema at the ulcer sites.  No odor, no calor.  She has Charcot foot and ankle with some instability on the left.  She has venous stasis edema bilaterally as well.      ASSESSMENT AND PLAN:  Ulcer, left ankle.  Ulcer, right plantar first MPJ.  She is diabetic with peripheral neuropathy and Charcot foot and ankle.  She has venous stasis disease.  She is diabetic with peripheral neuropathy.  She has a lot of swelling today.  Diagnosis and treatment options discussed with her.  Local wound care done upon consent today.  Wound Vashe wet-to-dry dressings applied to the ulcer sites.  Multilayer Unna Boot and compression boot applied to the right lower extremity upon consent.  Total contact cast applied to the left lower extremity upon consent.  Previous notes were reviewed as noted.  Labs ordered and use discussed with her.  I did refill her Vicodin at 7.5 mg 1 or 2 every 8 hours as needed for pain management.  I discussed with her hospital admission.  We opted to not do that today.  I will add Augmentin to her clindamycin, which she is taking the clindamycin with no problems.  The fluoroquinolones potentially have drug interactions.  She will return to clinic Friday.  Diagnosis and treatment options discussed with her.     Also, she relates that the [facility]rology Clinic did refer her back to Pain Clinic but due to the COVID-19 she has not been able to get  an appointment with them.         Again, thank you for allowing me to participate in the care of your patient.        Sincerely,        Nolan Whitten DPM

## 2020-04-21 NOTE — TELEPHONE ENCOUNTER
"Returned call to patient to relay message regarding lab results from Dr. Whitten below:    \"Yes, her esr, crp and uric acid are elevated, and her hemoglobin-8.4 and hematocrit-29.5 are lower than her last labs with hemoglobin-8.8 and hematocrit at 30.4, she should follow up with her primary care for this, no changes in our treatment.  [place].\"    Left message for patient to return a call to the clinic.   "

## 2020-04-23 NOTE — TELEPHONE ENCOUNTER
Patient returned call. Advised she to go PCP to be evaluated.  She does not have a PCP currently and has been following with us and GI.  We will have her establish care with a PCP, she prefers [facility].  Will find list of providers to supply with patient.  Haily Heath RN

## 2020-04-23 NOTE — TELEPHONE ENCOUNTER
Called patient with a couple of names for providers for [place] location.  She will call to schedule  Haily Heath RN

## 2020-04-24 ENCOUNTER — OFFICE VISIT (OUTPATIENT)
Dept: PODIATRY | Facility: CLINIC | Age: 60
End: 2020-04-24
Payer: COMMERCIAL

## 2020-04-24 DIAGNOSIS — E11.42 TYPE 2 DIABETES MELLITUS WITH DIABETIC POLYNEUROPATHY, WITHOUT LONG-TERM CURRENT USE OF INSULIN (H): Primary | ICD-10-CM

## 2020-04-24 DIAGNOSIS — E11.621 DIABETIC ULCER OF RIGHT MIDFOOT ASSOCIATED WITH TYPE 2 DIABETES MELLITUS, WITH BONE INVOLVEMENT WITHOUT EVIDENCE OF NECROSIS (H): ICD-10-CM

## 2020-04-24 DIAGNOSIS — L97.416 DIABETIC ULCER OF RIGHT MIDFOOT ASSOCIATED WITH TYPE 2 DIABETES MELLITUS, WITH BONE INVOLVEMENT WITHOUT EVIDENCE OF NECROSIS (H): ICD-10-CM

## 2020-04-24 DIAGNOSIS — M10.071 ACUTE IDIOPATHIC GOUT OF RIGHT FOOT: ICD-10-CM

## 2020-04-24 DIAGNOSIS — I87.8 VENOUS STASIS: ICD-10-CM

## 2020-04-24 DIAGNOSIS — L97.323 SKIN ULCER OF LEFT ANKLE WITH NECROSIS OF MUSCLE (H): ICD-10-CM

## 2020-04-24 DIAGNOSIS — M10.072 ACUTE IDIOPATHIC GOUT OF LEFT FOOT: ICD-10-CM

## 2020-04-24 DIAGNOSIS — M14.672 CHARCOT'S JOINT OF LEFT FOOT: ICD-10-CM

## 2020-04-24 PROCEDURE — 29405 APPL SHORT LEG CAST: CPT | Mod: LT | Performed by: PODIATRIST

## 2020-04-24 PROCEDURE — 29580 STRAPPING UNNA BOOT: CPT | Mod: RT | Performed by: PODIATRIST

## 2020-04-24 RX ORDER — COLCHICINE 0.6 MG/1
0.6 TABLET ORAL 2 TIMES DAILY
Qty: 10 TABLET | Refills: 1 | Status: ON HOLD | OUTPATIENT
Start: 2020-04-24 | End: 2020-07-21

## 2020-04-24 RX ORDER — HYDROCODONE BITARTRATE AND ACETAMINOPHEN 7.5; 325 MG/1; MG/1
1-2 TABLET ORAL EVERY 8 HOURS PRN
Qty: 12 TABLET | Refills: 0 | Status: SHIPPED | OUTPATIENT
Start: 2020-04-24 | End: 2020-04-29

## 2020-04-24 NOTE — PROGRESS NOTES
No past medical history on file.  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)     GI bleed     UGIB (upper gastrointestinal bleed)     Morbid obesity (H)     Past Surgical History:   Procedure Laterality Date     ESOPHAGOSCOPY, GASTROSCOPY, DUODENOSCOPY (EGD), COMBINED N/A 2/5/2020    Procedure: ESOPHAGOGASTRODUODENOSCOPY (EGD);  Surgeon: Tanya Dias MD;  Location:  GI     Social History     Socioeconomic History     Marital status: Single     Spouse name: Not on file     Number of children: Not on file     Years of education: Not on file     Highest education level: Not on file   Occupational History     Not on file   Social Needs     Financial resource strain: Not on file     Food insecurity     Worry: Not on file     Inability: Not on file     Transportation needs     Medical: Not on file     Non-medical: Not on file   Tobacco Use     Smoking status: Current Every Day Smoker     Packs/day: 1.00     Smokeless tobacco: Never Used   Substance and Sexual Activity     Alcohol use: Yes     Drug use: Not on file     Sexual activity: Not on file   Lifestyle     Physical activity     Days per week: Not on file     Minutes per session: Not on file     Stress: Not on file   Relationships     Social connections     Talks on phone: Not on file     Gets together: Not on file     Attends Episcopal service: Not on file     Active member of club or organization: Not on file     Attends meetings of clubs or organizations: Not on file     Relationship status: Not on file     Intimate  partner violence     Fear of current or ex partner: Not on file     Emotionally abused: Not on file     Physically abused: Not on file     Forced sexual activity: Not on file   Other Topics Concern     Parent/sibling w/ CABG, MI or angioplasty before 65F 55M? Not Asked   Social History Narrative     Not on file     Family History   Problem Relation Age of Onset     No Known Problems Mother      No Known Problems Father      Lab Results   Component Value Date    WBC 9.9 04/20/2020     Lab Results   Component Value Date    RBC 3.56 04/20/2020     Lab Results   Component Value Date    HGB 8.4 04/20/2020     Lab Results   Component Value Date    HCT 29.5 04/20/2020     No components found for: MCT  Lab Results   Component Value Date    MCV 83 04/20/2020     Lab Results   Component Value Date    MCH 23.6 04/20/2020     Lab Results   Component Value Date    MCHC 28.5 04/20/2020     Lab Results   Component Value Date    RDW 18.3 04/20/2020     Lab Results   Component Value Date     04/20/2020     Last Comprehensive Metabolic Panel:  Sodium   Date Value Ref Range Status   04/20/2020 127 (L) 133 - 144 mmol/L Final     Potassium   Date Value Ref Range Status   04/20/2020 5.0 3.4 - 5.3 mmol/L Final     Chloride   Date Value Ref Range Status   04/20/2020 97 94 - 109 mmol/L Final     Carbon Dioxide   Date Value Ref Range Status   04/20/2020 27 20 - 32 mmol/L Final     Anion Gap   Date Value Ref Range Status   04/20/2020 3 3 - 14 mmol/L Final     Glucose   Date Value Ref Range Status   04/20/2020 90 70 - 99 mg/dL Final     Comment:     Non Fasting     Urea Nitrogen   Date Value Ref Range Status   04/20/2020 22 7 - 30 mg/dL Final     Creatinine   Date Value Ref Range Status   04/20/2020 1.00 0.52 - 1.04 mg/dL Final     GFR Estimate   Date Value Ref Range Status   04/20/2020 61 >60 mL/min/[1.73_m2] Final     Comment:     Non  GFR Calc  Starting 12/18/2018, serum creatinine based estimated GFR (eGFR) will be    calculated using the Chronic Kidney Disease Epidemiology Collaboration   (CKD-EPI) equation.       Calcium   Date Value Ref Range Status   04/20/2020 8.3 (L) 8.5 - 10.1 mg/dL Final     Uric Acid   Date Value Ref Range Status   04/20/2020 6.2 (H) 2.6 - 6.0 mg/dL Final     Lab Results   Component Value Date     04/20/2020     Lab Results   Component Value Date    CRP 37.4 04/20/2020                     SUBJECTIVE FINDINGS:  60-year-old female returns to clinic for ulcer left ankle, ulcer right first MPJ, venous stasis and peripheral edema bilaterally.   She is diabetic with peripheral neuropathy and Charcot foot and ankle.  She relates she got the Augmentin and no problems with that.  She relates she is still having pain.  It maybe has improved some.  She will need a refill of her Vicodin.  Relates she is trying to reschedule with Dr. Adams.  She relates she is not taking the Zocor, the simvastatin.  She has not been taking that for a long time but she does plan on getting that and starting that again.  No other specific relieving or aggravating factors.       OBJECTIVE FINDINGS:  DP and PT are 2/4 bilaterally.  She has decreased peripheral edema bilaterally.  There is some venous discoloration.  There is decreased thin and shiny skin bilaterally.  She has new blistering on the left anterior medial ankle.  She has a plantar first MPJ ulcer on the right.  It is deep through the dermis and into the subcutaneous tissues.  There is some flattening of the margins.  There is some maceration, some serosanguineous drainage, no odor, no calor.  She has a left lateral ankle ulcer that is deep through the subcutaneous tissues.  There is decreased serosanguineous drainage, some edema, no erythema, no odor, no calor.      ASSESSMENT/PLAN:  Ulcer, left ankle.  Ulcer, right plantar first MPJ.  She is diabetic with peripheral neuropathy and Charcot foot and ankle with venous stasis disease and gout.  Diagnosis and treatment  options discussed with her.  Local wound care done upon consent today. Wound Vashe wet-to-dry dressing applied to ulcer sites.  Total contact cast applied to left lower extremity upon consent.  A multilayer Unna boot and compression boot applied to the right lower extremity upon consent.  I refilled her Vicodin.  Prescription for colchicine given and use discussed with her for the gout.  She will hold off on restarting her Zocor.  She has not been on that she said for quite a while and she will maybe restart that next week after we take the colchicine.  Continue the Augmentin and finish the clindamycin.  Return to clinic in 1 week.

## 2020-04-24 NOTE — TELEPHONE ENCOUNTER
Discussed with Pt in clinic today with Dr. Whitten. She is ok with with rescheduling, but she would really appreciate being seen and is wanting to move forward with possible amputation. Pt prefers 7am appt and Mondays if possible. Please let her know when it gets changed to.    Vincenzo Farah RN

## 2020-04-24 NOTE — LETTER
4/24/2020         RE: Tiffani Tan  [address] [facility] [address]        Dear Colleague,    Thank you for referring your patient, Tiffani Tan, to the [facility]. Please see a copy of my visit note below.    No past medical history on file.  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)     GI bleed     UGIB (upper gastrointestinal bleed)     Morbid obesity (H)     Past Surgical History:   Procedure Laterality Date     ESOPHAGOSCOPY, GASTROSCOPY, DUODENOSCOPY (EGD), COMBINED N/A 2/5/2020    Procedure: ESOPHAGOGASTRODUODENOSCOPY (EGD);  Surgeon: Tanya Dias MD;  Location: [facility]     Social History     Socioeconomic History     Marital status: Single     Spouse name: Not on file     Number of children: Not on file     Years of education: Not on file     Highest education level: Not on file   Occupational History     Not on file   Social Needs     Financial resource strain: Not on file     Food insecurity     Worry: Not on file     Inability: Not on file     Transportation needs     Medical: Not on file     Non-medical: Not on file   Tobacco Use     Smoking status: Current Every Day Smoker     Packs/day: 1.00     Smokeless tobacco: Never Used   Substance and Sexual Activity     Alcohol use: Yes     Drug use: Not on file     Sexual activity: Not on file   Lifestyle     Physical activity     Days per week: Not on file     Minutes per session: Not on file     Stress: Not on file   Relationships     Social connections     Talks on phone: Not on file     Gets  together: Not on file     Attends Scientologist service: Not on file     Active member of club or organization: Not on file     Attends meetings of clubs or organizations: Not on file     Relationship status: Not on file     Intimate partner violence     Fear of current or ex partner: Not on file     Emotionally abused: Not on file     Physically abused: Not on file     Forced sexual activity: Not on file   Other Topics Concern     Parent/sibling w/ CABG, MI or angioplasty before 65F 55M? Not Asked   Social History Narrative     Not on file     Family History   Problem Relation Age of Onset     No Known Problems Mother      No Known Problems Father      Lab Results   Component Value Date    WBC 9.9 04/20/2020     Lab Results   Component Value Date    RBC 3.56 04/20/2020     Lab Results   Component Value Date    HGB 8.4 04/20/2020     Lab Results   Component Value Date    HCT 29.5 04/20/2020     No components found for: MCT  Lab Results   Component Value Date    MCV 83 04/20/2020     Lab Results   Component Value Date    MCH 23.6 04/20/2020     Lab Results   Component Value Date    MCHC 28.5 04/20/2020     Lab Results   Component Value Date    RDW 18.3 04/20/2020     Lab Results   Component Value Date     04/20/2020     Last Comprehensive Metabolic Panel:  Sodium   Date Value Ref Range Status   04/20/2020 127 (L) 133 - 144 mmol/L Final     Potassium   Date Value Ref Range Status   04/20/2020 5.0 3.4 - 5.3 mmol/L Final     Chloride   Date Value Ref Range Status   04/20/2020 97 94 - 109 mmol/L Final     Carbon Dioxide   Date Value Ref Range Status   04/20/2020 27 20 - 32 mmol/L Final     Anion Gap   Date Value Ref Range Status   04/20/2020 3 3 - 14 mmol/L Final     Glucose   Date Value Ref Range Status   04/20/2020 90 70 - 99 mg/dL Final     Comment:     Non Fasting     Urea Nitrogen   Date Value Ref Range Status   04/20/2020 22 7 - 30 mg/dL Final     Creatinine   Date Value Ref Range Status   04/20/2020 1.00 0.52 -  1.04 mg/dL Final     GFR Estimate   Date Value Ref Range Status   04/20/2020 61 >60 mL/min/[1.73_m2] Final     Comment:     Non  GFR Calc  Starting 12/18/2018, serum creatinine based estimated GFR (eGFR) will be   calculated using the Chronic Kidney Disease Epidemiology Collaboration   (CKD-EPI) equation.       Calcium   Date Value Ref Range Status   04/20/2020 8.3 (L) 8.5 - 10.1 mg/dL Final     Uric Acid   Date Value Ref Range Status   04/20/2020 6.2 (H) 2.6 - 6.0 mg/dL Final     Lab Results   Component Value Date     04/20/2020     Lab Results   Component Value Date    CRP 37.4 04/20/2020                     SUBJECTIVE FINDINGS:  60-year-old female returns to clinic for ulcer left ankle, ulcer right first MPJ, venous stasis and peripheral edema bilaterally.   She is diabetic with peripheral neuropathy and Charcot foot and ankle.  She relates she got the Augmentin and no problems with that.  She relates she is still having pain.  It maybe has improved some.  She will need a refill of her Vicodin.  Relates she is trying to reschedule with Dr. Adams.  She relates she is not taking the Zocor, the simvastatin.  She has not been taking that for a long time but she does plan on getting that and starting that again.  No other specific relieving or aggravating factors.       OBJECTIVE FINDINGS:  DP and PT are 2/4 bilaterally.  She has decreased peripheral edema bilaterally.  There is some venous discoloration.  There is decreased thin and shiny skin bilaterally.  She has new blistering on the left anterior medial ankle.  She has a plantar first MPJ ulcer on the right.  It is deep through the dermis and into the subcutaneous tissues.  There is some flattening of the margins.  There is some maceration, some serosanguineous drainage, no odor, no calor.  She has a left lateral ankle ulcer that is deep through the subcutaneous tissues.  There is decreased serosanguineous drainage, some edema, no erythema,  no odor, no calor.      ASSESSMENT/PLAN:  Ulcer, left ankle.  Ulcer, right plantar first MPJ.  She is diabetic with peripheral neuropathy and Charcot foot and ankle with venous stasis disease and gout.  Diagnosis and treatment options discussed with her.  Local wound care done upon consent today. Wound Vashe wet-to-dry dressing applied to ulcer sites.  Total contact cast applied to left lower extremity upon consent.  A multilayer Unna boot and compression boot applied to the right lower extremity upon consent.  I refilled her Vicodin.  Prescription for colchicine given and use discussed with her for the gout.  She will hold off on restarting her Zocor.  She has not been on that she said for quite a while and she will maybe restart that next week after we take the colchicine.  Continue the Augmentin and finish the clindamycin.  Return to clinic in 1 week.         Again, thank you for allowing me to participate in the care of your patient.        Sincerely,        Nolan Whitten DPM

## 2020-04-27 ENCOUNTER — TELEPHONE (OUTPATIENT)
Dept: ORTHOPEDICS | Facility: CLINIC | Age: 60
End: 2020-04-27

## 2020-04-27 ENCOUNTER — NURSE TRIAGE (OUTPATIENT)
Dept: NURSING | Facility: CLINIC | Age: 60
End: 2020-04-27

## 2020-04-27 NOTE — TELEPHONE ENCOUNTER
I called patient today after clarifying with Dr. Adams about the urgency of her case. Dr. Adams reported that as long as she is not ill and the foot is not threatened she will have to wait. At this point this is not the case and she will have to wait until elective procedures are allowed.    Lisette David, ATC

## 2020-04-27 NOTE — TELEPHONE ENCOUNTER
Someone just called from the office and when she called back she got our line. No notes in the chart. I told her to call in the morning during office hours.    Marita Coy RN/ [facility]        Reason for Disposition    [1] Caller requesting nonurgent health information AND [2] PCP's office is the best resource    Additional Information    Caller has already spoken to PCP or another triager    Protocols used: INFORMATION ONLY CALL - NO TRIAGE-P-AH, INFORMATION ONLY CALL-A-AH

## 2020-04-29 ENCOUNTER — OFFICE VISIT (OUTPATIENT)
Dept: PODIATRY | Facility: CLINIC | Age: 60
End: 2020-04-29
Payer: COMMERCIAL

## 2020-04-29 DIAGNOSIS — M10.071 ACUTE IDIOPATHIC GOUT OF RIGHT FOOT: ICD-10-CM

## 2020-04-29 DIAGNOSIS — R79.89 ELEVATED VITAMIN B12 LEVEL: ICD-10-CM

## 2020-04-29 DIAGNOSIS — M10.072 ACUTE IDIOPATHIC GOUT OF LEFT FOOT: ICD-10-CM

## 2020-04-29 DIAGNOSIS — K27.9 PEPTIC ULCER: ICD-10-CM

## 2020-04-29 DIAGNOSIS — L97.416 DIABETIC ULCER OF RIGHT MIDFOOT ASSOCIATED WITH TYPE 2 DIABETES MELLITUS, WITH BONE INVOLVEMENT WITHOUT EVIDENCE OF NECROSIS (H): Primary | ICD-10-CM

## 2020-04-29 DIAGNOSIS — E11.621 DIABETIC ULCER OF RIGHT MIDFOOT ASSOCIATED WITH TYPE 2 DIABETES MELLITUS, WITH BONE INVOLVEMENT WITHOUT EVIDENCE OF NECROSIS (H): Primary | ICD-10-CM

## 2020-04-29 DIAGNOSIS — I87.8 VENOUS STASIS: ICD-10-CM

## 2020-04-29 DIAGNOSIS — L97.323 SKIN ULCER OF LEFT ANKLE WITH NECROSIS OF MUSCLE (H): ICD-10-CM

## 2020-04-29 DIAGNOSIS — E11.42 TYPE 2 DIABETES MELLITUS WITH DIABETIC POLYNEUROPATHY, WITHOUT LONG-TERM CURRENT USE OF INSULIN (H): ICD-10-CM

## 2020-04-29 LAB
ALBUMIN SERPL-MCNC: 3 G/DL (ref 3.4–5)
ALP SERPL-CCNC: 122 U/L (ref 40–150)
ALT SERPL W P-5'-P-CCNC: 33 U/L (ref 0–50)
ANION GAP SERPL CALCULATED.3IONS-SCNC: 5 MMOL/L (ref 3–14)
AST SERPL W P-5'-P-CCNC: 74 U/L (ref 0–45)
BASOPHILS # BLD AUTO: 0.1 10E9/L (ref 0–0.2)
BASOPHILS NFR BLD AUTO: 1 %
BILIRUB SERPL-MCNC: 0.3 MG/DL (ref 0.2–1.3)
BUN SERPL-MCNC: 15 MG/DL (ref 7–30)
CALCIUM SERPL-MCNC: 8.8 MG/DL (ref 8.5–10.1)
CHLORIDE SERPL-SCNC: 102 MMOL/L (ref 94–109)
CO2 SERPL-SCNC: 25 MMOL/L (ref 20–32)
CREAT SERPL-MCNC: 1.07 MG/DL (ref 0.52–1.04)
CRP SERPL-MCNC: 17 MG/L (ref 0–8)
DIFFERENTIAL METHOD BLD: ABNORMAL
EOSINOPHIL # BLD AUTO: 0.1 10E9/L (ref 0–0.7)
EOSINOPHIL NFR BLD AUTO: 1.5 %
ERYTHROCYTE [DISTWIDTH] IN BLOOD BY AUTOMATED COUNT: 18.8 % (ref 10–15)
ERYTHROCYTE [SEDIMENTATION RATE] IN BLOOD BY WESTERGREN METHOD: 105 MM/H (ref 0–30)
GFR SERPL CREATININE-BSD FRML MDRD: 56 ML/MIN/{1.73_M2}
GLUCOSE SERPL-MCNC: 112 MG/DL (ref 70–99)
HCT VFR BLD AUTO: 29.8 % (ref 35–47)
HGB BLD-MCNC: 8.6 G/DL (ref 11.7–15.7)
IMM GRANULOCYTES # BLD: 0.1 10E9/L (ref 0–0.4)
IMM GRANULOCYTES NFR BLD: 0.7 %
LYMPHOCYTES # BLD AUTO: 1.9 10E9/L (ref 0.8–5.3)
LYMPHOCYTES NFR BLD AUTO: 22.8 %
MCH RBC QN AUTO: 23.6 PG (ref 26.5–33)
MCHC RBC AUTO-ENTMCNC: 28.9 G/DL (ref 31.5–36.5)
MCV RBC AUTO: 82 FL (ref 78–100)
MONOCYTES # BLD AUTO: 0.9 10E9/L (ref 0–1.3)
MONOCYTES NFR BLD AUTO: 10.3 %
NEUTROPHILS # BLD AUTO: 5.3 10E9/L (ref 1.6–8.3)
NEUTROPHILS NFR BLD AUTO: 63.7 %
PLATELET # BLD AUTO: 173 10E9/L (ref 150–450)
POTASSIUM SERPL-SCNC: 4.9 MMOL/L (ref 3.4–5.3)
PROT SERPL-MCNC: 8.7 G/DL (ref 6.8–8.8)
RBC # BLD AUTO: 3.64 10E12/L (ref 3.8–5.2)
SODIUM SERPL-SCNC: 132 MMOL/L (ref 133–144)
URATE SERPL-MCNC: 6.8 MG/DL (ref 2.6–6)
VIT B12 SERPL-MCNC: 1494 PG/ML (ref 193–986)
WBC # BLD AUTO: 8.3 10E9/L (ref 4–11)

## 2020-04-29 PROCEDURE — 85025 COMPLETE CBC W/AUTO DIFF WBC: CPT | Performed by: INTERNAL MEDICINE

## 2020-04-29 PROCEDURE — 29580 STRAPPING UNNA BOOT: CPT | Mod: RT | Performed by: PODIATRIST

## 2020-04-29 PROCEDURE — 82607 VITAMIN B-12: CPT | Performed by: INTERNAL MEDICINE

## 2020-04-29 PROCEDURE — 80053 COMPREHEN METABOLIC PANEL: CPT | Performed by: INTERNAL MEDICINE

## 2020-04-29 PROCEDURE — 36415 COLL VENOUS BLD VENIPUNCTURE: CPT | Performed by: INTERNAL MEDICINE

## 2020-04-29 PROCEDURE — 29405 APPL SHORT LEG CAST: CPT | Mod: LT | Performed by: PODIATRIST

## 2020-04-29 PROCEDURE — 83090 ASSAY OF HOMOCYSTEINE: CPT | Performed by: INTERNAL MEDICINE

## 2020-04-29 RX ORDER — HYDROCODONE BITARTRATE AND ACETAMINOPHEN 7.5; 325 MG/1; MG/1
1-2 TABLET ORAL EVERY 8 HOURS PRN
Qty: 12 TABLET | Refills: 0 | Status: SHIPPED | OUTPATIENT
Start: 2020-04-29 | End: 2020-05-08

## 2020-04-29 NOTE — PROGRESS NOTES
No past medical history on file.  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)     GI bleed     UGIB (upper gastrointestinal bleed)     Morbid obesity (H)     Past Surgical History:   Procedure Laterality Date     ESOPHAGOSCOPY, GASTROSCOPY, DUODENOSCOPY (EGD), COMBINED N/A 2/5/2020    Procedure: ESOPHAGOGASTRODUODENOSCOPY (EGD);  Surgeon: Tanya Dias MD;  Location:  GI     Social History     Socioeconomic History     Marital status: Single     Spouse name: Not on file     Number of children: Not on file     Years of education: Not on file     Highest education level: Not on file   Occupational History     Not on file   Social Needs     Financial resource strain: Not on file     Food insecurity     Worry: Not on file     Inability: Not on file     Transportation needs     Medical: Not on file     Non-medical: Not on file   Tobacco Use     Smoking status: Current Every Day Smoker     Packs/day: 1.00     Smokeless tobacco: Never Used   Substance and Sexual Activity     Alcohol use: Yes     Drug use: Not on file     Sexual activity: Not on file   Lifestyle     Physical activity     Days per week: Not on file     Minutes per session: Not on file     Stress: Not on file   Relationships     Social connections     Talks on phone: Not on file     Gets together: Not on file     Attends Voodoo service: Not on file     Active member of club or organization: Not on file     Attends meetings of clubs or organizations: Not on file     Relationship status: Not on file     Intimate  partner violence     Fear of current or ex partner: Not on file     Emotionally abused: Not on file     Physically abused: Not on file     Forced sexual activity: Not on file   Other Topics Concern     Parent/sibling w/ CABG, MI or angioplasty before 65F 55M? Not Asked   Social History Narrative     Not on file     Family History   Problem Relation Age of Onset     No Known Problems Mother      No Known Problems Father      Lab Results   Component Value Date    WBC 9.9 04/20/2020     Lab Results   Component Value Date    RBC 3.56 04/20/2020     Lab Results   Component Value Date    HGB 8.4 04/20/2020     Lab Results   Component Value Date    HCT 29.5 04/20/2020     No components found for: MCT  Lab Results   Component Value Date    MCV 83 04/20/2020     Lab Results   Component Value Date    MCH 23.6 04/20/2020     Lab Results   Component Value Date    MCHC 28.5 04/20/2020     Lab Results   Component Value Date    RDW 18.3 04/20/2020     Lab Results   Component Value Date     04/20/2020     Last Comprehensive Metabolic Panel:  Sodium   Date Value Ref Range Status   04/20/2020 127 (L) 133 - 144 mmol/L Final     Potassium   Date Value Ref Range Status   04/20/2020 5.0 3.4 - 5.3 mmol/L Final     Chloride   Date Value Ref Range Status   04/20/2020 97 94 - 109 mmol/L Final     Carbon Dioxide   Date Value Ref Range Status   04/20/2020 27 20 - 32 mmol/L Final     Anion Gap   Date Value Ref Range Status   04/20/2020 3 3 - 14 mmol/L Final     Glucose   Date Value Ref Range Status   04/20/2020 90 70 - 99 mg/dL Final     Comment:     Non Fasting     Urea Nitrogen   Date Value Ref Range Status   04/20/2020 22 7 - 30 mg/dL Final     Creatinine   Date Value Ref Range Status   04/20/2020 1.00 0.52 - 1.04 mg/dL Final     GFR Estimate   Date Value Ref Range Status   04/20/2020 61 >60 mL/min/[1.73_m2] Final     Comment:     Non  GFR Calc  Starting 12/18/2018, serum creatinine based estimated GFR (eGFR) will be    calculated using the Chronic Kidney Disease Epidemiology Collaboration   (CKD-EPI) equation.       Calcium   Date Value Ref Range Status   04/20/2020 8.3 (L) 8.5 - 10.1 mg/dL Final     Lab Results   Component Value Date     04/20/2020     Lab Results   Component Value Date    CRP 37.4 04/20/2020     Uric Acid   Date Value Ref Range Status   04/20/2020 6.2 (H) 2.6 - 6.0 mg/dL Final                     SUBJECTIVE FINDINGS:  60-year-old female returns to clinic for ulcer left ankle, ulcer right plantar first MPJ, gout, diabetes with peripheral neuropathy, Charcot foot and ankle, venous stasis disease.  She is doing okay.  She relates she finished her last colchicine today.  Relates no problems with that.  She is still taking the Augmentin.  She is trying to get rescheduled with Dr. Adams.  She has not seen her primary physician yet.  Relates she is still getting some thigh pain as well.  No other specific relieving or aggravating factors.      OBJECTIVE FINDINGS:  DP and PT are 2/4 bilaterally.  She has decreased edema bilaterally.  She has right plantar first MPJ ulcer that is deep into the subcutaneous tissues.  There is some hyperkeratotic tissue with maceration, some contraction no erythema, no odor, no calor.  She has left lateral ankle ulcer with some edema, some serosanguineous drainage, some maceration, no erythema, no odor, no calor.  It is deep through the subcutaneous tissues.  She has decreased venous stasis edema bilaterally.  She has a left anterior leg ulcer that is through the dermis with some serosanguineous drainage, no erythema, no odor, no calor there.  She relates she is still getting pain bilaterally.      ASSESSMENT/PLAN:  Ulcers, right plantar first MPJ.  Ulcer, left lateral ankle.  She is diabetic with peripheral neuropathy, Charcot foot and ankle, venous stasis disease and acute gout.  Diagnosis and treatment options discussed with her.  Local wound care done upon consent today.   Endoform applied to the right first MPJ lesion.  Wound Vashe wet-to-dry applied to all 3 lesions bilaterally.  Multilayer Unna boot and compression boot applied to the right and lower extremity upon consent.  Total contact cast applied to the left lower extremity upon consent.  She finished the colchicine today.  She can go back to her Zocor.  She relates she has to get that refilled.  She will continue Augmentin.  She will follow up with her primary physician for medications or thigh pain.  She will return to clinic and see me in 1 week.  Labs ordered and use discussed with her.  Previous notes reviewed.     Also, she relates the increased Vicodin is working well.  She has had no problems with that.  I refilled her Vicodin today as well.     Lab Results   Component Value Date     04/29/2020     Lab Results   Component Value Date    CRP 17.0 04/29/2020     Uric Acid   Date Value Ref Range Status   04/29/2020 6.8 (H) 2.6 - 6.0 mg/dL Final

## 2020-04-29 NOTE — LETTER
4/29/2020         RE: Tiffani Tan  [address] [facility] [address]        Dear Colleague,    Thank you for referring your patient, Tiffani Tan, to the [facility]. Please see a copy of my visit note below.    No past medical history on file.  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)     GI bleed     UGIB (upper gastrointestinal bleed)     Morbid obesity (H)     Past Surgical History:   Procedure Laterality Date     ESOPHAGOSCOPY, GASTROSCOPY, DUODENOSCOPY (EGD), COMBINED N/A 2/5/2020    Procedure: ESOPHAGOGASTRODUODENOSCOPY (EGD);  Surgeon: Tanya Dias MD;  Location: [facility]     Social History     Socioeconomic History     Marital status: Single     Spouse name: Not on file     Number of children: Not on file     Years of education: Not on file     Highest education level: Not on file   Occupational History     Not on file   Social Needs     Financial resource strain: Not on file     Food insecurity     Worry: Not on file     Inability: Not on file     Transportation needs     Medical: Not on file     Non-medical: Not on file   Tobacco Use     Smoking status: Current Every Day Smoker     Packs/day: 1.00     Smokeless tobacco: Never Used   Substance and Sexual Activity     Alcohol use: Yes     Drug use: Not on file     Sexual activity: Not on file   Lifestyle     Physical activity     Days per week: Not on file     Minutes per session: Not on file     Stress: Not on file   Relationships     Social connections     Talks on phone: Not on file     Gets  together: Not on file     Attends [employer] service: Not on file     Active member of club or organization: Not on file     Attends meetings of clubs or organizations: Not on file     Relationship status: Not on file     Intimate partner violence     Fear of current or ex partner: Not on file     Emotionally abused: Not on file     Physically abused: Not on file     Forced sexual activity: Not on file   Other Topics Concern     Parent/sibling w/ CABG, MI or angioplasty before 65F 55M? Not Asked   Social History Narrative     Not on file     Family History   Problem Relation Age of Onset     No Known Problems Mother      No Known Problems Father      Lab Results   Component Value Date    WBC 9.9 04/20/2020     Lab Results   Component Value Date    RBC 3.56 04/20/2020     Lab Results   Component Value Date    HGB 8.4 04/20/2020     Lab Results   Component Value Date    HCT 29.5 04/20/2020     No components found for: MCT  Lab Results   Component Value Date    MCV 83 04/20/2020     Lab Results   Component Value Date    MCH 23.6 04/20/2020     Lab Results   Component Value Date    MCHC 28.5 04/20/2020     Lab Results   Component Value Date    RDW 18.3 04/20/2020     Lab Results   Component Value Date     04/20/2020     Last Comprehensive Metabolic Panel:  Sodium   Date Value Ref Range Status   04/20/2020 127 (L) 133 - 144 mmol/L Final     Potassium   Date Value Ref Range Status   04/20/2020 5.0 3.4 - 5.3 mmol/L Final     Chloride   Date Value Ref Range Status   04/20/2020 97 94 - 109 mmol/L Final     Carbon Dioxide   Date Value Ref Range Status   04/20/2020 27 20 - 32 mmol/L Final     Anion Gap   Date Value Ref Range Status   04/20/2020 3 3 - 14 mmol/L Final     Glucose   Date Value Ref Range Status   04/20/2020 90 70 - 99 mg/dL Final     Comment:     Non Fasting     Urea Nitrogen   Date Value Ref Range Status   04/20/2020 22 7 - 30 mg/dL Final     Creatinine   Date Value Ref Range Status   04/20/2020 1.00 0.52 -  1.04 mg/dL Final     GFR Estimate   Date Value Ref Range Status   04/20/2020 61 >60 mL/min/[1.73_m2] Final     Comment:     Non  GFR Calc  Starting 12/18/2018, serum creatinine based estimated GFR (eGFR) will be   calculated using the Chronic Kidney Disease Epidemiology Collaboration   (CKD-EPI) equation.       Calcium   Date Value Ref Range Status   04/20/2020 8.3 (L) 8.5 - 10.1 mg/dL Final     Lab Results   Component Value Date     04/20/2020     Lab Results   Component Value Date    CRP 37.4 04/20/2020     Uric Acid   Date Value Ref Range Status   04/20/2020 6.2 (H) 2.6 - 6.0 mg/dL Final                     SUBJECTIVE FINDINGS:  60-year-old female returns to clinic for ulcer left ankle, ulcer right plantar first MPJ, gout, diabetes with peripheral neuropathy, Charcot foot and ankle, venous stasis disease.  She is doing okay.  She relates she finished her last colchicine today.  Relates no problems with that.  She is still taking the Augmentin.  She is trying to get rescheduled with Dr. Adams.  She has not seen her primary physician yet.  Relates she is still getting some thigh pain as well.  No other specific relieving or aggravating factors.      OBJECTIVE FINDINGS:  DP and PT are 2/4 bilaterally.  She has decreased edema bilaterally.  She has right plantar first MPJ ulcer that is deep into the subcutaneous tissues.  There is some hyperkeratotic tissue with maceration, some contraction no erythema, no odor, no calor.  She has left lateral ankle ulcer with some edema, some serosanguineous drainage, some maceration, no erythema, no odor, no calor.  It is deep through the subcutaneous tissues.  She has decreased venous stasis edema bilaterally.  She has a left anterior leg ulcer that is through the dermis with some serosanguineous drainage, no erythema, no odor, no calor there.  She relates she is still getting pain bilaterally.      ASSESSMENT/PLAN:  Ulcers, right plantar first MPJ.  Ulcer,  left lateral ankle.  She is diabetic with peripheral neuropathy, Charcot foot and ankle, venous stasis disease and acute gout.  Diagnosis and treatment options discussed with her.  Local wound care done upon consent today.  Endoform applied to the right first MPJ lesion.  Wound Vashe wet-to-dry applied to all 3 lesions bilaterally.  Multilayer Unna boot and compression boot applied to the right and lower extremity upon consent.  Total contact cast applied to the left lower extremity upon consent.  She finished the colchicine today.  She can go back to her Zocor.  She relates she has to get that refilled.  She will continue Augmentin.  She will follow up with her primary physician for medications or thigh pain.  She will return to clinic and see me in 1 week.  Labs ordered and use discussed with her.  Previous notes reviewed.     Also, she relates the increased Vicodin is working well.  She has had no problems with that.  I refilled her Vicodin today as well.     Lab Results   Component Value Date     04/29/2020     Lab Results   Component Value Date    CRP 17.0 04/29/2020     Uric Acid   Date Value Ref Range Status   04/29/2020 6.8 (H) 2.6 - 6.0 mg/dL Final       Again, thank you for allowing me to participate in the care of your patient.        Sincerely,        Nolan Whitten DPM

## 2020-05-04 ENCOUNTER — TELEPHONE (OUTPATIENT)
Dept: GASTROENTEROLOGY | Facility: CLINIC | Age: 60
End: 2020-05-04

## 2020-05-04 NOTE — TELEPHONE ENCOUNTER
6/4 2nd attempt  Provided phone number [phone] to schedule  in about 4 months (around 7/3/2020).    Jeannette [facility]   Procedure    Ortho/Sports Med/Ent/Eye   [facility]   [phone]        5/4 Provided phone number   [phone] to schedule  in about 4 months (around 7/3/2020).    Jeannette Aguilar   Procedure    Ortho/Sports Med/Ent/Eye   [facility]   [phone]

## 2020-05-06 LAB — HCYS SERPL-SCNC: 12.1 UMOL/L (ref 4–12)

## 2020-05-07 ENCOUNTER — TELEPHONE (OUTPATIENT)
Dept: PODIATRY | Facility: CLINIC | Age: 60
End: 2020-05-07

## 2020-05-07 NOTE — TELEPHONE ENCOUNTER
Do you have any of the following symptoms:  a)      Fever (or reported chills) No  b)      Shortness of Breath No  c)      Rash No   D)     Cough No    If a patient reports yes to any of these symptoms, obtain direction from the provider and call the patient back to let them know if they can come in or not.  1.    Provider needs to determine if this patient should still be seen in clinic.  2.    If decision to not see in clinic, call patient back and refer them to COVID- 19 [email or web address] or schedule COVID-19 phone visit.  3.    Turn in-person visit into telephone visit (FOR RETURN PATIENTS ONLY)    Remind patients that visitors are not allowed on site. Only one legal guardian who screens negative to the above questions will be allowed to accompany patients. If a patient indicates that they will be bringing a legal guardian with them to the appointment please make sure to screen both the patient and the legal guardian for symptoms using the tool above.

## 2020-05-08 ENCOUNTER — OFFICE VISIT (OUTPATIENT)
Dept: PODIATRY | Facility: CLINIC | Age: 60
End: 2020-05-08
Payer: COMMERCIAL

## 2020-05-08 ENCOUNTER — TELEPHONE (OUTPATIENT)
Dept: PODIATRY | Facility: CLINIC | Age: 60
End: 2020-05-08

## 2020-05-08 DIAGNOSIS — M10.071 ACUTE IDIOPATHIC GOUT OF RIGHT FOOT: ICD-10-CM

## 2020-05-08 DIAGNOSIS — E11.621 DIABETIC ULCER OF RIGHT MIDFOOT ASSOCIATED WITH TYPE 2 DIABETES MELLITUS, WITH BONE INVOLVEMENT WITHOUT EVIDENCE OF NECROSIS (H): Primary | ICD-10-CM

## 2020-05-08 DIAGNOSIS — E11.42 TYPE 2 DIABETES MELLITUS WITH DIABETIC POLYNEUROPATHY, WITHOUT LONG-TERM CURRENT USE OF INSULIN (H): ICD-10-CM

## 2020-05-08 DIAGNOSIS — L97.416 DIABETIC ULCER OF RIGHT MIDFOOT ASSOCIATED WITH TYPE 2 DIABETES MELLITUS, WITH BONE INVOLVEMENT WITHOUT EVIDENCE OF NECROSIS (H): Primary | ICD-10-CM

## 2020-05-08 DIAGNOSIS — I87.8 VENOUS STASIS: ICD-10-CM

## 2020-05-08 DIAGNOSIS — M10.072 ACUTE IDIOPATHIC GOUT OF LEFT FOOT: ICD-10-CM

## 2020-05-08 DIAGNOSIS — L97.323 SKIN ULCER OF LEFT ANKLE WITH NECROSIS OF MUSCLE (H): ICD-10-CM

## 2020-05-08 PROCEDURE — 29580 STRAPPING UNNA BOOT: CPT | Mod: RT | Performed by: PODIATRIST

## 2020-05-08 PROCEDURE — 29405 APPL SHORT LEG CAST: CPT | Mod: LT | Performed by: PODIATRIST

## 2020-05-08 RX ORDER — HYDROCODONE BITARTRATE AND ACETAMINOPHEN 7.5; 325 MG/1; MG/1
1-2 TABLET ORAL EVERY 8 HOURS PRN
Qty: 12 TABLET | Refills: 0 | Status: SHIPPED | OUTPATIENT
Start: 2020-05-08 | End: 2020-05-21

## 2020-05-08 NOTE — PROGRESS NOTES
No past medical history on file.  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)     GI bleed     UGIB (upper gastrointestinal bleed)     Morbid obesity (H)     Past Surgical History:   Procedure Laterality Date     ESOPHAGOSCOPY, GASTROSCOPY, DUODENOSCOPY (EGD), COMBINED N/A 2/5/2020    Procedure: ESOPHAGOGASTRODUODENOSCOPY (EGD);  Surgeon: Tanya Dias MD;  Location:  [facility]     Social History     Socioeconomic History     Marital status: Single     Spouse name: Not on file     Number of children: Not on file     Years of education: Not on file     Highest education level: Not on file   Occupational History     Not on file   Social Needs     Financial resource strain: Not on file     Food insecurity     Worry: Not on file     Inability: Not on file     Transportation needs     Medical: Not on file     Non-medical: Not on file   Tobacco Use     Smoking status: Current Every Day Smoker     Packs/day: 1.00     Smokeless tobacco: Never Used   Substance and Sexual Activity     Alcohol use: Yes     Drug use: Not on file     Sexual activity: Not on file   Lifestyle     Physical activity     Days per week: Not on file     Minutes per session: Not on file     Stress: Not on file   Relationships     Social connections     Talks on phone: Not on file     Gets together: Not on file     Attends Yars[employer] service: Not on file     Active member of club or organization: Not on file     Attends meetings of clubs or organizations: Not on file     Relationship status: Not on file     Intimate  partner violence     Fear of current or ex partner: Not on file     Emotionally abused: Not on file     Physically abused: Not on file     Forced sexual activity: Not on file   Other Topics Concern     Parent/sibling w/ CABG, MI or angioplasty before 65F 55M? Not Asked   Social History Narrative     Not on file     Family History   Problem Relation Age of Onset     No Known Problems Mother      No Known Problems Father      Lab Results   Component Value Date    WBC 8.3 04/29/2020     Lab Results   Component Value Date    RBC 3.64 04/29/2020     Lab Results   Component Value Date    HGB 8.6 04/29/2020     Lab Results   Component Value Date    HCT 29.8 04/29/2020     No components found for: MCT  Lab Results   Component Value Date    MCV 82 04/29/2020     Lab Results   Component Value Date    MCH 23.6 04/29/2020     Lab Results   Component Value Date    MCHC 28.9 04/29/2020     Lab Results   Component Value Date    RDW 18.8 04/29/2020     Lab Results   Component Value Date     04/29/2020     Last Comprehensive Metabolic Panel:  Sodium   Date Value Ref Range Status   04/29/2020 132 (L) 133 - 144 mmol/L Final     Potassium   Date Value Ref Range Status   04/29/2020 4.9 3.4 - 5.3 mmol/L Final     Chloride   Date Value Ref Range Status   04/29/2020 102 94 - 109 mmol/L Final     Carbon Dioxide   Date Value Ref Range Status   04/29/2020 25 20 - 32 mmol/L Final     Anion Gap   Date Value Ref Range Status   04/29/2020 5 3 - 14 mmol/L Final     Glucose   Date Value Ref Range Status   04/29/2020 112 (H) 70 - 99 mg/dL Final     Urea Nitrogen   Date Value Ref Range Status   04/29/2020 15 7 - 30 mg/dL Final     Creatinine   Date Value Ref Range Status   04/29/2020 1.07 (H) 0.52 - 1.04 mg/dL Final     GFR Estimate   Date Value Ref Range Status   04/29/2020 56 (L) >60 mL/min/[1.73_m2] Final     Comment:     Non  GFR Calc  Starting 12/18/2018, serum creatinine based estimated GFR (eGFR) will be   calculated  using the Chronic Kidney Disease Epidemiology Collaboration   (CKD-EPI) equation.       Calcium   Date Value Ref Range Status   04/29/2020 8.8 8.5 - 10.1 mg/dL Final     Lab Results   Component Value Date    AST 74 04/29/2020     Lab Results   Component Value Date    ALT 33 04/29/2020     No results found for: BILICONJ   Lab Results   Component Value Date    BILITOTAL 0.3 04/29/2020     Lab Results   Component Value Date    ALBUMIN 3.0 04/29/2020     Lab Results   Component Value Date    PROTTOTAL 8.7 04/29/2020      Lab Results   Component Value Date    ALKPHOS 122 04/29/2020     Lab Results   Component Value Date     04/29/2020     Lab Results   Component Value Date    CRP 17.0 04/29/2020     Uric Acid   Date Value Ref Range Status   04/29/2020 6.8 (H) 2.6 - 6.0 mg/dL Final               SUBJECTIVE FINDINGS:  60-year-old female returns to clinic for ulcer right plantar first MPJ and left lateral ankle.  She relates the cast got a little bit loose.  Relates she is taking the Augmentin with no problems.  Relates she is not sure when her appointment is for Dr. Adams.  Nursing did look this up and she has an appointment the end of June.  Relates no new problems.  Relates she needs a refill of the pain medications.  The Vicodin she relates works well but she has been taking 1 a day and she is concerned she is going to run out.  She usually takes 2 a day.  She relates her neurologist will not refill it because we gave her a prescription for it.  She has tried to get into the Pain Clinic but cannot because they are not taking any appointments.      OBJECTIVE FINDINGS:  DP and PT are 2/4 bilaterally.  She has decreased edema bilaterally.  She has a right plantar first MPJ ulcer with a good granular base.  There is decreased maceration, decreased hyperkeratotic tissue buildup.  Some serosanguineous drainage, no erythema, no odor, no calor.  She has a left lateral ankle ulcer with decreased maceration.  There is a  granular base.  It tracks deep through the subcutaneous tissues.  There is decreased edema, no erythema, no odor, no calor.        ASSESSMENT/PLAN:  Ulcer, right plantar first MPJ.  Ulcer, left lateral ankle.  She is diabetic with peripheral neuropathy and Charcot foot ankle, venous stasis disease and acute gout.  Diagnosis and treatment options discussed with her.  She is relatively stable.  Local wound care done upon consent today.  Neema applied to the right first MPJ lesion.  Wound Vashe wet-to-dry dressing applied to both lesions.  A multilayer Unna boot and compression boot applied to the right lower extremity upon consent.  Total contact cast applied to the left lower extremity upon consent.  Continue the Augmentin.  I refilled her Vicodin and use discussed with her.  Nursing will help her get scheduled either with Pain Clinic or primary care for management of this.  She will return to clinic and see me in 1 week.

## 2020-05-08 NOTE — LETTER
5/8/2020         RE: Tiffani Tan  [address] [facility] [address]        Dear Colleague,    Thank you for referring your patient, Tiffani Tan, to the [facility]. Please see a copy of my visit note below.    No past medical history on file.  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)     GI bleed     UGIB (upper gastrointestinal bleed)     Morbid obesity (H)     Past Surgical History:   Procedure Laterality Date     ESOPHAGOSCOPY, GASTROSCOPY, DUODENOSCOPY (EGD), COMBINED N/A 2/5/2020    Procedure: ESOPHAGOGASTRODUODENOSCOPY (EGD);  Surgeon: Tanya Dias MD;  Location: [facility]     Social History     Socioeconomic History     Marital status: Single     Spouse name: Not on file     Number of children: Not on file     Years of education: Not on file     Highest education level: Not on file   Occupational History     Not on file   Social Needs     Financial resource strain: Not on file     Food insecurity     Worry: Not on file     Inability: Not on file     Transportation needs     Medical: Not on file     Non-medical: Not on file   Tobacco Use     Smoking status: Current Every Day Smoker     Packs/day: 1.00     Smokeless tobacco: Never Used   Substance and Sexual Activity     Alcohol use: Yes     Drug use: Not on file     Sexual activity: Not on file   Lifestyle     Physical activity     Days per week: Not on file     Minutes per session: Not on file     Stress: Not on file   Relationships     Social connections     Talks on phone: Not on file     Gets  together: Not on file     Attends J[employer] service: Not on file     Active member of club or organization: Not on file     Attends meetings of clubs or organizations: Not on file     Relationship status: Not on file     Intimate partner violence     Fear of current or ex partner: Not on file     Emotionally abused: Not on file     Physically abused: Not on file     Forced sexual activity: Not on file   Other Topics Concern     Parent/sibling w/ CABG, MI or angioplasty before 65F 55M? Not Asked   Social History Narrative     Not on file     Family History   Problem Relation Age of Onset     No Known Problems Mother      No Known Problems Father      Lab Results   Component Value Date    WBC 8.3 04/29/2020     Lab Results   Component Value Date    RBC 3.64 04/29/2020     Lab Results   Component Value Date    HGB 8.6 04/29/2020     Lab Results   Component Value Date    HCT 29.8 04/29/2020     No components found for: MCT  Lab Results   Component Value Date    MCV 82 04/29/2020     Lab Results   Component Value Date    MCH 23.6 04/29/2020     Lab Results   Component Value Date    MCHC 28.9 04/29/2020     Lab Results   Component Value Date    RDW 18.8 04/29/2020     Lab Results   Component Value Date     04/29/2020     Last Comprehensive Metabolic Panel:  Sodium   Date Value Ref Range Status   04/29/2020 132 (L) 133 - 144 mmol/L Final     Potassium   Date Value Ref Range Status   04/29/2020 4.9 3.4 - 5.3 mmol/L Final     Chloride   Date Value Ref Range Status   04/29/2020 102 94 - 109 mmol/L Final     Carbon Dioxide   Date Value Ref Range Status   04/29/2020 25 20 - 32 mmol/L Final     Anion Gap   Date Value Ref Range Status   04/29/2020 5 3 - 14 mmol/L Final     Glucose   Date Value Ref Range Status   04/29/2020 112 (H) 70 - 99 mg/dL Final     Urea Nitrogen   Date Value Ref Range Status   04/29/2020 15 7 - 30 mg/dL Final     Creatinine   Date Value Ref Range Status   04/29/2020 1.07 (H) 0.52 - 1.04 mg/dL Final      GFR Estimate   Date Value Ref Range Status   04/29/2020 56 (L) >60 mL/min/[1.73_m2] Final     Comment:     Non  GFR Calc  Starting 12/18/2018, serum creatinine based estimated GFR (eGFR) will be   calculated using the Chronic Kidney Disease Epidemiology Collaboration   (CKD-EPI) equation.       Calcium   Date Value Ref Range Status   04/29/2020 8.8 8.5 - 10.1 mg/dL Final     Lab Results   Component Value Date    AST 74 04/29/2020     Lab Results   Component Value Date    ALT 33 04/29/2020     No results found for: BILICONJ   Lab Results   Component Value Date    BILITOTAL 0.3 04/29/2020     Lab Results   Component Value Date    ALBUMIN 3.0 04/29/2020     Lab Results   Component Value Date    PROTTOTAL 8.7 04/29/2020      Lab Results   Component Value Date    ALKPHOS 122 04/29/2020     Lab Results   Component Value Date     04/29/2020     Lab Results   Component Value Date    CRP 17.0 04/29/2020     Uric Acid   Date Value Ref Range Status   04/29/2020 6.8 (H) 2.6 - 6.0 mg/dL Final               SUBJECTIVE FINDINGS:  60-year-old female returns to clinic for ulcer right plantar first MPJ and left lateral ankle.  She relates the cast got a little bit loose.  Relates she is taking the Augmentin with no problems.  Relates she is not sure when her appointment is for Dr. Adams.  Nursing did look this up and she has an appointment the end of June.  Relates no new problems.  Relates she needs a refill of the pain medications.  The Vicodin she relates works well but she has been taking 1 a day and she is concerned she is going to run out.  She usually takes 2 a day.  She relates her neurologist will not refill it because we gave her a prescription for it.  She has tried to get into the Pain Clinic but cannot because they are not taking any appointments.      OBJECTIVE FINDINGS:  DP and PT are 2/4 bilaterally.  She has decreased edema bilaterally.  She has a right plantar first MPJ ulcer with a good  granular base.  There is decreased maceration, decreased hyperkeratotic tissue buildup.  Some serosanguineous drainage, no erythema, no odor, no calor.  She has a left lateral ankle ulcer with decreased maceration.  There is a granular base.  It tracks deep through the subcutaneous tissues.  There is decreased edema, no erythema, no odor, no calor.        ASSESSMENT/PLAN:  Ulcer, right plantar first MPJ.  Ulcer, left lateral ankle.  She is diabetic with peripheral neuropathy and Charcot foot ankle, venous stasis disease and acute gout.  Diagnosis and treatment options discussed with her.  She is relatively stable.  Local wound care done upon consent today.  Neema applied to the right first MPJ lesion.  Wound Vashe wet-to-dry dressing applied to both lesions.  A multilayer Unna boot and compression boot applied to the right lower extremity upon consent.  Total contact cast applied to the left lower extremity upon consent.  Continue the Augmentin.  I refilled her Vicodin and use discussed with her.  Nursing will help her get scheduled either with Pain Clinic or primary care for management of this.  She will return to clinic and see me in 1 week.         Again, thank you for allowing me to participate in the care of your patient.        Sincerely,        Nolan Whitten DPM

## 2020-05-08 NOTE — TELEPHONE ENCOUNTER
Financial Counselor Review for Apligraf, Dermagraft, Puraply AM, Affinity, NuShield:    Which product(s) to be checked:Puraply AM Affinity,     Has the patient tried any of these products before: YES    Was it for this wound: YES    Diagnosis code (include ICD-10 code): E11.621, L97.416, M10.071    Coverage and patient financial responsibility information:YES    Does patient need to be contacted by Financial Counselor:YES    Note: Do not use abbreviations and route encounter to [facility] [place] [[id number]]

## 2020-05-14 ENCOUNTER — TELEPHONE (OUTPATIENT)
Dept: PODIATRY | Facility: CLINIC | Age: 60
End: 2020-05-14

## 2020-05-14 NOTE — TELEPHONE ENCOUNTER
Left message for patient to return a call to the clinic to review 24 hr Covid symptom screening questions and campus visitor and masking policy.    Patient also requested to move her appointment to an earlier time slot if one is available.  At the time of this call Dr. Whitten had an 8:30 am appointment available.  If patient returns call please ask if she would like to change to this time slot.

## 2020-05-15 ENCOUNTER — OFFICE VISIT (OUTPATIENT)
Dept: PODIATRY | Facility: CLINIC | Age: 60
End: 2020-05-15
Payer: COMMERCIAL

## 2020-05-15 DIAGNOSIS — M14.672 CHARCOT'S JOINT OF LEFT FOOT: ICD-10-CM

## 2020-05-15 DIAGNOSIS — L97.416 DIABETIC ULCER OF RIGHT MIDFOOT ASSOCIATED WITH TYPE 2 DIABETES MELLITUS, WITH BONE INVOLVEMENT WITHOUT EVIDENCE OF NECROSIS (H): Primary | ICD-10-CM

## 2020-05-15 DIAGNOSIS — M10.072 ACUTE IDIOPATHIC GOUT OF LEFT FOOT: ICD-10-CM

## 2020-05-15 DIAGNOSIS — E11.621 DIABETIC ULCER OF RIGHT MIDFOOT ASSOCIATED WITH TYPE 2 DIABETES MELLITUS, WITH BONE INVOLVEMENT WITHOUT EVIDENCE OF NECROSIS (H): Primary | ICD-10-CM

## 2020-05-15 DIAGNOSIS — L97.323 SKIN ULCER OF LEFT ANKLE WITH NECROSIS OF MUSCLE (H): ICD-10-CM

## 2020-05-15 DIAGNOSIS — E11.42 TYPE 2 DIABETES MELLITUS WITH DIABETIC POLYNEUROPATHY, WITHOUT LONG-TERM CURRENT USE OF INSULIN (H): ICD-10-CM

## 2020-05-15 DIAGNOSIS — M10.071 ACUTE IDIOPATHIC GOUT OF RIGHT FOOT: ICD-10-CM

## 2020-05-15 DIAGNOSIS — I87.8 VENOUS STASIS: ICD-10-CM

## 2020-05-15 PROCEDURE — 29580 STRAPPING UNNA BOOT: CPT | Mod: RT | Performed by: PODIATRIST

## 2020-05-15 PROCEDURE — 29405 APPL SHORT LEG CAST: CPT | Mod: LT | Performed by: PODIATRIST

## 2020-05-15 NOTE — LETTER
5/15/2020         RE: Tiffani Tan  [address] [facility] [address]        Dear Colleague,    Thank you for referring your patient, Tiffani Tan, to the [facility]. Please see a copy of my visit note below.    No past medical history on file.  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)     GI bleed     UGIB (upper gastrointestinal bleed)     Morbid obesity (H)     Past Surgical History:   Procedure Laterality Date     ESOPHAGOSCOPY, GASTROSCOPY, DUODENOSCOPY (EGD), COMBINED N/A 2/5/2020    Procedure: ESOPHAGOGASTRODUODENOSCOPY (EGD);  Surgeon: Tanya Dias MD;  Location: [facility]     Social History     Socioeconomic History     Marital status: Single     Spouse name: Not on file     Number of children: Not on file     Years of education: Not on file     Highest education level: Not on file   Occupational History     Not on file   Social Needs     Financial resource strain: Not on file     Food insecurity     Worry: Not on file     Inability: Not on file     Transportation needs     Medical: Not on file     Non-medical: Not on file   Tobacco Use     Smoking status: Current Every Day Smoker     Packs/day: 1.00     Smokeless tobacco: Never Used   Substance and Sexual Activity     Alcohol use: Yes     Drug use: Not on file     Sexual activity: Not on file   Lifestyle     Physical activity     Days per week: Not on file     Minutes per session: Not on file     Stress: Not on file   Relationships     Social connections     Talks on phone: Not on file     Gets  together: Not on file     Attends Congregation service: Not on file     Active member of club or organization: Not on file     Attends meetings of clubs or organizations: Not on file     Relationship status: Not on file     Intimate partner violence     Fear of current or ex partner: Not on file     Emotionally abused: Not on file     Physically abused: Not on file     Forced sexual activity: Not on file   Other Topics Concern     Parent/sibling w/ CABG, MI or angioplasty before 65F 55M? Not Asked   Social History Narrative     Not on file     Family History   Problem Relation Age of Onset     No Known Problems Mother      No Known Problems Father              SUBJECTIVE FINDINGS:  60-year-old female returns to clinic for ulcer right plantar first MPJ and left lateral ankle.  She relates she has a virtual appointment with Pain Clinic.  She is getting pain in the left ankle.  Otherwise, it is doing okay.  No new problems.  No specific relieving or aggravating factors.       OBJECTIVE FINDINGS:  DP and PT are 2/4 bilaterally.  She has mild edema bilaterally.  She has a right first MPJ ulcer that is deep into the subcutaneous tissues.  There is some maceration.  No erythema, no odor, no calor.  Some serosanguineous drainage.  There is some granular base to the left lateral ankle ulcer.  It is deep through the subcutaneous tissues.  There is some slight maceration.  Some granular base.  No erythema, no odor, no calor.  She has Charcot foot and ankle present.        ASSESSMENT/PLAN:  Ulcer, right plantar first MPJ.  Ulcer, left lateral ankle.  She is diabetic with peripheral neuropathy and Charcot foot and ankle and venous stasis.  She also has had some acute gout.  Diagnosis and treatment options discussed with her.  Local wound care done upon consent today.  Neema applied to the right first MPJ lesion.  Wound Vashe wet-to-dry dressing applied to ulcers bilaterally.  A multilayer Unna boot and compression boot applied to  the right lower extremity upon consent.  Total contact cast applied to left lower extremity upon consent.  She has an appointment with Orthopedic Surgery the end of June.  We will continue casting.         Again, thank you for allowing me to participate in the care of your patient.        Sincerely,        Nolan Whitten DPM

## 2020-05-15 NOTE — PROGRESS NOTES
No past medical history on file.  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)     GI bleed     UGIB (upper gastrointestinal bleed)     Morbid obesity (H)     Past Surgical History:   Procedure Laterality Date     ESOPHAGOSCOPY, GASTROSCOPY, DUODENOSCOPY (EGD), COMBINED N/A 2/5/2020    Procedure: ESOPHAGOGASTRODUODENOSCOPY (EGD);  Surgeon: Tanya Dias MD;  Location:  GI     Social History     Socioeconomic History     Marital status: Single     Spouse name: Not on file     Number of children: Not on file     Years of education: Not on file     Highest education level: Not on file   Occupational History     Not on file   Social Needs     Financial resource strain: Not on file     Food insecurity     Worry: Not on file     Inability: Not on file     Transportation needs     Medical: Not on file     Non-medical: Not on file   Tobacco Use     Smoking status: Current Every Day Smoker     Packs/day: 1.00     Smokeless tobacco: Never Used   Substance and Sexual Activity     Alcohol use: Yes     Drug use: Not on file     Sexual activity: Not on file   Lifestyle     Physical activity     Days per week: Not on file     Minutes per session: Not on file     Stress: Not on file   Relationships     Social connections     Talks on phone: Not on file     Gets together: Not on file     Attends Amish service: Not on file     Active member of club or organization: Not on file     Attends meetings of clubs or organizations: Not on file     Relationship status: Not on file     Intimate  partner violence     Fear of current or ex partner: Not on file     Emotionally abused: Not on file     Physically abused: Not on file     Forced sexual activity: Not on file   Other Topics Concern     Parent/sibling w/ CABG, MI or angioplasty before 65F 55M? Not Asked   Social History Narrative     Not on file     Family History   Problem Relation Age of Onset     No Known Problems Mother      No Known Problems Father              SUBJECTIVE FINDINGS:  60-year-old female returns to clinic for ulcer right plantar first MPJ and left lateral ankle.  She relates she has a virtual appointment with Pain Clinic.  She is getting pain in the left ankle.  Otherwise, it is doing okay.  No new problems.  No specific relieving or aggravating factors.       OBJECTIVE FINDINGS:  DP and PT are 2/4 bilaterally.  She has mild edema bilaterally.  She has a right first MPJ ulcer that is deep into the subcutaneous tissues.  There is some maceration.  No erythema, no odor, no calor.  Some serosanguineous drainage.  There is some granular base to the left lateral ankle ulcer.  It is deep through the subcutaneous tissues.  There is some slight maceration.  Some granular base.  No erythema, no odor, no calor.  She has Charcot foot and ankle present.        ASSESSMENT/PLAN:  Ulcer, right plantar first MPJ.  Ulcer, left lateral ankle.  She is diabetic with peripheral neuropathy and Charcot foot and ankle and venous stasis.  She also has had some acute gout.  Diagnosis and treatment options discussed with her.  Local wound care done upon consent today.  Neema applied to the right first MPJ lesion.  Wound Vashe wet-to-dry dressing applied to ulcers bilaterally.  A multilayer Unna boot and compression boot applied to the right lower extremity upon consent.  Total contact cast applied to left lower extremity upon consent.  She has an appointment with Orthopedic Surgery the end of June.  We will continue casting.

## 2020-05-21 ENCOUNTER — TELEPHONE (OUTPATIENT)
Dept: PODIATRY | Facility: CLINIC | Age: 60
End: 2020-05-21

## 2020-05-21 ENCOUNTER — OFFICE VISIT (OUTPATIENT)
Dept: PODIATRY | Facility: CLINIC | Age: 60
End: 2020-05-21
Payer: COMMERCIAL

## 2020-05-21 VITALS — BODY MASS INDEX: 35.51 KG/M2 | WEIGHT: 220 LBS

## 2020-05-21 DIAGNOSIS — L97.323 SKIN ULCER OF LEFT ANKLE WITH NECROSIS OF MUSCLE (H): ICD-10-CM

## 2020-05-21 DIAGNOSIS — L97.416 DIABETIC ULCER OF RIGHT MIDFOOT ASSOCIATED WITH TYPE 2 DIABETES MELLITUS, WITH BONE INVOLVEMENT WITHOUT EVIDENCE OF NECROSIS (H): ICD-10-CM

## 2020-05-21 DIAGNOSIS — E11.621 DIABETIC ULCER OF RIGHT MIDFOOT ASSOCIATED WITH TYPE 2 DIABETES MELLITUS, WITH BONE INVOLVEMENT WITHOUT EVIDENCE OF NECROSIS (H): ICD-10-CM

## 2020-05-21 DIAGNOSIS — E11.42 TYPE 2 DIABETES MELLITUS WITH DIABETIC POLYNEUROPATHY, WITHOUT LONG-TERM CURRENT USE OF INSULIN (H): ICD-10-CM

## 2020-05-21 LAB
ANION GAP SERPL CALCULATED.3IONS-SCNC: 4 MMOL/L (ref 3–14)
BASOPHILS # BLD AUTO: 0.1 10E9/L (ref 0–0.2)
BASOPHILS NFR BLD AUTO: 0.8 %
BUN SERPL-MCNC: 16 MG/DL (ref 7–30)
CALCIUM SERPL-MCNC: 9.4 MG/DL (ref 8.5–10.1)
CHLORIDE SERPL-SCNC: 104 MMOL/L (ref 94–109)
CO2 SERPL-SCNC: 27 MMOL/L (ref 20–32)
CREAT SERPL-MCNC: 1 MG/DL (ref 0.52–1.04)
CRP SERPL-MCNC: 12.1 MG/L (ref 0–8)
DIFFERENTIAL METHOD BLD: ABNORMAL
EOSINOPHIL # BLD AUTO: 0.1 10E9/L (ref 0–0.7)
EOSINOPHIL NFR BLD AUTO: 1.1 %
ERYTHROCYTE [DISTWIDTH] IN BLOOD BY AUTOMATED COUNT: 18.7 % (ref 10–15)
ERYTHROCYTE [SEDIMENTATION RATE] IN BLOOD BY WESTERGREN METHOD: 100 MM/H (ref 0–30)
GFR SERPL CREATININE-BSD FRML MDRD: 61 ML/MIN/{1.73_M2}
GLUCOSE SERPL-MCNC: 150 MG/DL (ref 70–99)
HCT VFR BLD AUTO: 33.1 % (ref 35–47)
HGB BLD-MCNC: 9.4 G/DL (ref 11.7–15.7)
IMM GRANULOCYTES # BLD: 0.1 10E9/L (ref 0–0.4)
IMM GRANULOCYTES NFR BLD: 0.6 %
LYMPHOCYTES # BLD AUTO: 2.1 10E9/L (ref 0.8–5.3)
LYMPHOCYTES NFR BLD AUTO: 18.3 %
MCH RBC QN AUTO: 22.1 PG (ref 26.5–33)
MCHC RBC AUTO-ENTMCNC: 28.4 G/DL (ref 31.5–36.5)
MCV RBC AUTO: 78 FL (ref 78–100)
MONOCYTES # BLD AUTO: 0.8 10E9/L (ref 0–1.3)
MONOCYTES NFR BLD AUTO: 7.1 %
NEUTROPHILS # BLD AUTO: 8.3 10E9/L (ref 1.6–8.3)
NEUTROPHILS NFR BLD AUTO: 72.1 %
PLATELET # BLD AUTO: 223 10E9/L (ref 150–450)
POTASSIUM SERPL-SCNC: 5.2 MMOL/L (ref 3.4–5.3)
RBC # BLD AUTO: 4.25 10E12/L (ref 3.8–5.2)
SODIUM SERPL-SCNC: 135 MMOL/L (ref 133–144)
URATE SERPL-MCNC: 6.1 MG/DL (ref 2.6–6)
WBC # BLD AUTO: 11.6 10E9/L (ref 4–11)

## 2020-05-21 PROCEDURE — 29580 STRAPPING UNNA BOOT: CPT | Mod: RT | Performed by: PODIATRIST

## 2020-05-21 PROCEDURE — 29405 APPL SHORT LEG CAST: CPT | Mod: LT | Performed by: PODIATRIST

## 2020-05-21 RX ORDER — HYDROCODONE BITARTRATE AND ACETAMINOPHEN 7.5; 325 MG/1; MG/1
1-2 TABLET ORAL EVERY 8 HOURS PRN
Qty: 14 TABLET | Refills: 0 | Status: SHIPPED | OUTPATIENT
Start: 2020-05-21 | End: 2020-05-29

## 2020-05-21 NOTE — PROGRESS NOTES
No past medical history on file.  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)     GI bleed     UGIB (upper gastrointestinal bleed)     Morbid obesity (H)     Past Surgical History:   Procedure Laterality Date     ESOPHAGOSCOPY, GASTROSCOPY, DUODENOSCOPY (EGD), COMBINED N/A 2/5/2020    Procedure: ESOPHAGOGASTRODUODENOSCOPY (EGD);  Surgeon: Tanya Dias MD;  Location:  GI     Social History     Socioeconomic History     Marital status: Single     Spouse name: Not on file     Number of children: Not on file     Years of education: Not on file     Highest education level: Not on file   Occupational History     Not on file   Social Needs     Financial resource strain: Not on file     Food insecurity     Worry: Not on file     Inability: Not on file     Transportation needs     Medical: Not on file     Non-medical: Not on file   Tobacco Use     Smoking status: Current Every Day Smoker     Packs/day: 1.00     Smokeless tobacco: Never Used   Substance and Sexual Activity     Alcohol use: Yes     Drug use: Not on file     Sexual activity: Not on file   Lifestyle     Physical activity     Days per week: Not on file     Minutes per session: Not on file     Stress: Not on file   Relationships     Social connections     Talks on phone: Not on file     Gets together: Not on file     Attends Shinto service: Not on file     Active member of club or organization: Not on file     Attends meetings of clubs or organizations: Not on file     Relationship status: Not on file     Intimate  partner violence     Fear of current or ex partner: Not on file     Emotionally abused: Not on file     Physically abused: Not on file     Forced sexual activity: Not on file   Other Topics Concern     Parent/sibling w/ CABG, MI or angioplasty before 65F 55M? Not Asked   Social History Narrative     Not on file     Family History   Problem Relation Age of Onset     No Known Problems Mother      No Known Problems Father      Lab Results   Component Value Date    WBC 8.3 04/29/2020     Lab Results   Component Value Date    RBC 3.64 04/29/2020     Lab Results   Component Value Date    HGB 8.6 04/29/2020     Lab Results   Component Value Date    HCT 29.8 04/29/2020     No components found for: MCT  Lab Results   Component Value Date    MCV 82 04/29/2020     Lab Results   Component Value Date    MCH 23.6 04/29/2020     Lab Results   Component Value Date    MCHC 28.9 04/29/2020     Lab Results   Component Value Date    RDW 18.8 04/29/2020     Lab Results   Component Value Date     04/29/2020     Last Comprehensive Metabolic Panel:  Sodium   Date Value Ref Range Status   04/29/2020 132 (L) 133 - 144 mmol/L Final     Potassium   Date Value Ref Range Status   04/29/2020 4.9 3.4 - 5.3 mmol/L Final     Chloride   Date Value Ref Range Status   04/29/2020 102 94 - 109 mmol/L Final     Carbon Dioxide   Date Value Ref Range Status   04/29/2020 25 20 - 32 mmol/L Final     Anion Gap   Date Value Ref Range Status   04/29/2020 5 3 - 14 mmol/L Final     Glucose   Date Value Ref Range Status   04/29/2020 112 (H) 70 - 99 mg/dL Final     Urea Nitrogen   Date Value Ref Range Status   04/29/2020 15 7 - 30 mg/dL Final     Creatinine   Date Value Ref Range Status   04/29/2020 1.07 (H) 0.52 - 1.04 mg/dL Final     GFR Estimate   Date Value Ref Range Status   04/29/2020 56 (L) >60 mL/min/[1.73_m2] Final     Comment:     Non  GFR Calc  Starting 12/18/2018, serum creatinine based estimated GFR (eGFR) will be   calculated  using the Chronic Kidney Disease Epidemiology Collaboration   (CKD-EPI) equation.       Calcium   Date Value Ref Range Status   04/29/2020 8.8 8.5 - 10.1 mg/dL Final     Lab Results   Component Value Date     04/29/2020     Lab Results   Component Value Date    CRP 17.0 04/29/2020     Uric Acid   Date Value Ref Range Status   04/29/2020 6.8 (H) 2.6 - 6.0 mg/dL Final                 Lab Results   Component Value Date    WBC 11.6 05/21/2020     Lab Results   Component Value Date    RBC 4.25 05/21/2020     Lab Results   Component Value Date    HGB 9.4 05/21/2020     Lab Results   Component Value Date    HCT 33.1 05/21/2020     No components found for: MCT  Lab Results   Component Value Date    MCV 78 05/21/2020     Lab Results   Component Value Date    MCH 22.1 05/21/2020     Lab Results   Component Value Date    MCHC 28.4 05/21/2020     Lab Results   Component Value Date    RDW 18.7 05/21/2020     Lab Results   Component Value Date     05/21/2020     Last Comprehensive Metabolic Panel:  Sodium   Date Value Ref Range Status   05/21/2020 135 133 - 144 mmol/L Final     Potassium   Date Value Ref Range Status   05/21/2020 5.2 3.4 - 5.3 mmol/L Final     Chloride   Date Value Ref Range Status   05/21/2020 104 94 - 109 mmol/L Final     Carbon Dioxide   Date Value Ref Range Status   05/21/2020 27 20 - 32 mmol/L Final     Anion Gap   Date Value Ref Range Status   05/21/2020 4 3 - 14 mmol/L Final     Glucose   Date Value Ref Range Status   05/21/2020 150 (H) 70 - 99 mg/dL Final     Urea Nitrogen   Date Value Ref Range Status   05/21/2020 16 7 - 30 mg/dL Final     Creatinine   Date Value Ref Range Status   05/21/2020 1.00 0.52 - 1.04 mg/dL Final     GFR Estimate   Date Value Ref Range Status   05/21/2020 61 >60 mL/min/[1.73_m2] Final     Comment:     Non  GFR Calc  Starting 12/18/2018, serum creatinine based estimated GFR (eGFR) will be   calculated using the Chronic Kidney Disease Epidemiology  Collaboration   (CKD-EPI) equation.       Calcium   Date Value Ref Range Status   05/21/2020 9.4 8.5 - 10.1 mg/dL Final     Lab Results   Component Value Date     05/21/2020     Lab Results   Component Value Date    CRP 12.1 05/21/2020     Uric Acid   Date Value Ref Range Status   05/21/2020 6.1 (H) 2.6 - 6.0 mg/dL Final       SUBJECTIVE FINDINGS:  A 60-year-old female returns to clinic for ulcer, right plantar first MPJ, and ulcer, left lateral ankle with Charcot foot and ankle.  She is diabetic with peripheral neuropathy and Charcot foot.  She relates she is still getting pain, especially in the left ankle.  She relates she showered yesterday, and her Unna boot got wet and she took it off on the right leg.  She relates she has got 3 days of the Augmentin left.  She relates no problems with that.  She relates she did have a visit with the pain clinic, and she relates they requested we refill her hydrocodone until her 06/04 reappointment with them.  No other specific relieving or aggravating factors.  She has been using the cast on the left.      OBJECTIVE FINDINGS:  DP and PT are 2/4 bilaterally.  She has peripheral edema and some erythema on the right leg.  She has a right plantar first MPJ ulcer that is deep into the subcutaneous tissues.  There is some granular base, some serosanguineous drainage.  No erythema, no odor, no calor.  She has a left lateral ankle ulcer that is deep through the subcutaneous tissues.  There is a granular base.  There is some maceration, some edema, some serosanguineous drainage.  No erythema, no odor, no calor.      ASSESSMENT AND PLAN:  Ulcer, right plantar first MPJ.  Ulcer, left lateral ankle.  She is diabetic with peripheral neuropathy, Charcot foot, gout and venous stasis.  She is continuing to get pain.  She has increased swelling in her right leg with not having the Unna boot on for a day.  Diagnosis and treatment options discussed with the patient.   Local wound care  done upon consent bilaterally.  Neema applied to the right first MPJ ulcer.  Wound Vashe wet-to-dry dressing of the left lateral ankle ulcer.  Multilayer Unna boot compression boot applied to the right lower extremity upon consent.  Total contact cast applied to left lower extremity upon consent and use discussed with her.  I refilled her Augmentin and use discussed with her.  I refilled her hydrocodone and use discussed with her.  We did check with the pain clinic, and they related that they would like us to do this until they see her again.  She relates she has been taking one or two of those a day.  Labs ordered and use discussed with her.  Return to clinic and see me in 1 week.

## 2020-05-21 NOTE — TELEPHONE ENCOUNTER
The [facility] pain clinic was contacted today on the patients behalf.  It was discuss with the RN triage, if the patients pain contract would be cancelled if Dr. Whitten prescribed a small prescription before her follow up on June 4th.  The nurse stated that would be ok, since the pain clinic has not started a contract yet. A voicemail was also left for the [facility] Pain clinic provider.

## 2020-05-21 NOTE — NURSING NOTE
Tiffani Tan's chief complaint for this visit includes:  Chief Complaint   Patient presents with     RECHECK     bilateral unna boots      PCP: Wily Bonilla    Referring Provider:  No referring provider defined for this encounter.    Wt 99.8 kg (220 lb)   BMI 35.51 kg/m    Data Unavailable     Do you need any medication refills at today's visit? No

## 2020-05-21 NOTE — LETTER
5/21/2020         RE: Tiffani Tan  [address] [facility] [address]        Dear Colleague,    Thank you for referring your patient, Tiffani Tan, to the [facility]. Please see a copy of my visit note below.    No past medical history on file.  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)     GI bleed     UGIB (upper gastrointestinal bleed)     Morbid obesity (H)     Past Surgical History:   Procedure Laterality Date     ESOPHAGOSCOPY, GASTROSCOPY, DUODENOSCOPY (EGD), COMBINED N/A 2/5/2020    Procedure: ESOPHAGOGASTRODUODENOSCOPY (EGD);  Surgeon: Tanya Dias MD;  Location: [facility]     Social History     Socioeconomic History     Marital status: Single     Spouse name: Not on file     Number of children: Not on file     Years of education: Not on file     Highest education level: Not on file   Occupational History     Not on file   Social Needs     Financial resource strain: Not on file     Food insecurity     Worry: Not on file     Inability: Not on file     Transportation needs     Medical: Not on file     Non-medical: Not on file   Tobacco Use     Smoking status: Current Every Day Smoker     Packs/day: 1.00     Smokeless tobacco: Never Used   Substance and Sexual Activity     Alcohol use: Yes     Drug use: Not on file     Sexual activity: Not on file   Lifestyle     Physical activity     Days per week: Not on file     Minutes per session: Not on file     Stress: Not on file   Relationships     Social connections     Talks on phone: Not on file     Gets  together: Not on file     Attends Restorationist service: Not on file     Active member of club or organization: Not on file     Attends meetings of clubs or organizations: Not on file     Relationship status: Not on file     Intimate partner violence     Fear of current or ex partner: Not on file     Emotionally abused: Not on file     Physically abused: Not on file     Forced sexual activity: Not on file   Other Topics Concern     Parent/sibling w/ CABG, MI or angioplasty before 65F 55M? Not Asked   Social History Narrative     Not on file     Family History   Problem Relation Age of Onset     No Known Problems Mother      No Known Problems Father      Lab Results   Component Value Date    WBC 8.3 04/29/2020     Lab Results   Component Value Date    RBC 3.64 04/29/2020     Lab Results   Component Value Date    HGB 8.6 04/29/2020     Lab Results   Component Value Date    HCT 29.8 04/29/2020     No components found for: MCT  Lab Results   Component Value Date    MCV 82 04/29/2020     Lab Results   Component Value Date    MCH 23.6 04/29/2020     Lab Results   Component Value Date    MCHC 28.9 04/29/2020     Lab Results   Component Value Date    RDW 18.8 04/29/2020     Lab Results   Component Value Date     04/29/2020     Last Comprehensive Metabolic Panel:  Sodium   Date Value Ref Range Status   04/29/2020 132 (L) 133 - 144 mmol/L Final     Potassium   Date Value Ref Range Status   04/29/2020 4.9 3.4 - 5.3 mmol/L Final     Chloride   Date Value Ref Range Status   04/29/2020 102 94 - 109 mmol/L Final     Carbon Dioxide   Date Value Ref Range Status   04/29/2020 25 20 - 32 mmol/L Final     Anion Gap   Date Value Ref Range Status   04/29/2020 5 3 - 14 mmol/L Final     Glucose   Date Value Ref Range Status   04/29/2020 112 (H) 70 - 99 mg/dL Final     Urea Nitrogen   Date Value Ref Range Status   04/29/2020 15 7 - 30 mg/dL Final     Creatinine   Date Value Ref Range Status   04/29/2020 1.07 (H) 0.52 - 1.04 mg/dL Final      GFR Estimate   Date Value Ref Range Status   04/29/2020 56 (L) >60 mL/min/[1.73_m2] Final     Comment:     Non  GFR Calc  Starting 12/18/2018, serum creatinine based estimated GFR (eGFR) will be   calculated using the Chronic Kidney Disease Epidemiology Collaboration   (CKD-EPI) equation.       Calcium   Date Value Ref Range Status   04/29/2020 8.8 8.5 - 10.1 mg/dL Final     Lab Results   Component Value Date     04/29/2020     Lab Results   Component Value Date    CRP 17.0 04/29/2020     Uric Acid   Date Value Ref Range Status   04/29/2020 6.8 (H) 2.6 - 6.0 mg/dL Final                 Lab Results   Component Value Date    WBC 11.6 05/21/2020     Lab Results   Component Value Date    RBC 4.25 05/21/2020     Lab Results   Component Value Date    HGB 9.4 05/21/2020     Lab Results   Component Value Date    HCT 33.1 05/21/2020     No components found for: MCT  Lab Results   Component Value Date    MCV 78 05/21/2020     Lab Results   Component Value Date    MCH 22.1 05/21/2020     Lab Results   Component Value Date    MCHC 28.4 05/21/2020     Lab Results   Component Value Date    RDW 18.7 05/21/2020     Lab Results   Component Value Date     05/21/2020     Last Comprehensive Metabolic Panel:  Sodium   Date Value Ref Range Status   05/21/2020 135 133 - 144 mmol/L Final     Potassium   Date Value Ref Range Status   05/21/2020 5.2 3.4 - 5.3 mmol/L Final     Chloride   Date Value Ref Range Status   05/21/2020 104 94 - 109 mmol/L Final     Carbon Dioxide   Date Value Ref Range Status   05/21/2020 27 20 - 32 mmol/L Final     Anion Gap   Date Value Ref Range Status   05/21/2020 4 3 - 14 mmol/L Final     Glucose   Date Value Ref Range Status   05/21/2020 150 (H) 70 - 99 mg/dL Final     Urea Nitrogen   Date Value Ref Range Status   05/21/2020 16 7 - 30 mg/dL Final     Creatinine   Date Value Ref Range Status   05/21/2020 1.00 0.52 - 1.04 mg/dL Final     GFR Estimate   Date Value Ref Range Status    05/21/2020 61 >60 mL/min/[1.73_m2] Final     Comment:     Non  GFR Calc  Starting 12/18/2018, serum creatinine based estimated GFR (eGFR) will be   calculated using the Chronic Kidney Disease Epidemiology Collaboration   (CKD-EPI) equation.       Calcium   Date Value Ref Range Status   05/21/2020 9.4 8.5 - 10.1 mg/dL Final     Lab Results   Component Value Date     05/21/2020     Lab Results   Component Value Date    CRP 12.1 05/21/2020     Uric Acid   Date Value Ref Range Status   05/21/2020 6.1 (H) 2.6 - 6.0 mg/dL Final       SUBJECTIVE FINDINGS:  A 60-year-old female returns to clinic for ulcer, right plantar first MPJ, and ulcer, left lateral ankle with Charcot foot and ankle.  She is diabetic with peripheral neuropathy and Charcot foot.  She relates she is still getting pain, especially in the left ankle.  She relates she showered yesterday, and her Unna boot got wet and she took it off on the right leg.  She relates she has got 3 days of the Augmentin left.  She relates no problems with that.  She relates she did have a visit with the pain clinic, and she relates they requested we refill her hydrocodone until her 06/04 reappointment with them.  No other specific relieving or aggravating factors.  She has been using the cast on the left.      OBJECTIVE FINDINGS:  DP and PT are 2/4 bilaterally.  She has peripheral edema and some erythema on the right leg.  She has a right plantar first MPJ ulcer that is deep into the subcutaneous tissues.  There is some granular base, some serosanguineous drainage.  No erythema, no odor, no calor.  She has a left lateral ankle ulcer that is deep through the subcutaneous tissues.  There is a granular base.  There is some maceration, some edema, some serosanguineous drainage.  No erythema, no odor, no calor.      ASSESSMENT AND PLAN:  Ulcer, right plantar first MPJ.  Ulcer, left lateral ankle.  She is diabetic with peripheral neuropathy, Charcot foot, gout  and venous stasis.  She is continuing to get pain.  She has increased swelling in her right leg with not having the Unna boot on for a day.  Diagnosis and treatment options discussed with the patient.   Local wound care done upon consent bilaterally.  Neema applied to the right first MPJ ulcer.  Wound Vashe wet-to-dry dressing of the left lateral ankle ulcer.  Multilayer Unna boot compression boot applied to the right lower extremity upon consent.  Total contact cast applied to left lower extremity upon consent and use discussed with her.  I refilled her Augmentin and use discussed with her.  I refilled her hydrocodone and use discussed with her.  We did check with the pain clinic, and they related that they would like us to do this until they see her again.  She relates she has been taking one or two of those a day.  Labs ordered and use discussed with her.  Return to clinic and see me in 1 week.         Again, thank you for allowing me to participate in the care of your patient.        Sincerely,        Nolan Whitten DPM

## 2020-05-22 NOTE — PROGRESS NOTES
SUBJECTIVE FINDINGS:  A 60-year-old female returns to clinic for ulcer, right plantar first MPJ, and ulcer, left lateral ankle with Charcot foot and ankle.  She is diabetic with peripheral neuropathy and Charcot foot.  She relates she is still getting pain, especially in the left ankle.  She relates she showered yesterday and her Unna boot got wet and she took it off on the right leg.  She relates she has got 3 days of the Augmentin left.  She relates no problems with that.  She relates she did have a visit with the Pain Clinic.  She relates they requested we refill her hydrocodone until her 06/04 reappointment with them.  No other specific relieving or aggravating factors.  She has been using the cast on the left.      OBJECTIVE FINDINGS:  DP and PT are 2/4 bilaterally.  She has peripheral edema with some erythema in the right leg.  She has a right plantar first MPJ ulcer that is deep into the subcutaneous tissues.  There is some granular base, some serosanguineous drainage, no erythema, no odor, no calor.  She has a left lateral ankle ulcer that is deep through the subcutaneous tissues.  There is a granular base.  There is some maceration, some edema, some serous drainage, no erythema, no odor, no calor.      ASSESSMENT AND PLAN:  Ulcer, right plantar first MPJ, ulcer, left lateral ankle.  She is a diabetic with peripheral neuropathy, Charcot foot, Gout and venous stasis.  She is continuing to get pain.  She has increased swelling in her right leg with not having the Unna boot on for a day.  Diagnosis and treatment options discussed with her.  Local wound care done upon consent bilaterally.  Neema applied to the right first MPJ ulcer.  Wound Vashe wet-to-dry dressing to the left lateral ankle ulcer.  A multilayer Unna boot compression boot applied to the right lower extremity upon consent.  A total contact cast applied to left lower extremity upon  consent and use discussed with her.  Refilled her Augmentin and  use discussed with her.  I refilled her hydrocodone and use discussed with her.  We did check with the pain clinic and they related that they would like us to do this until they see her again.  She relates she has been taking 1 or 2 of those a day.  Labs ordered and use discussed with her.  Return to clinic and see me in 1 week.

## 2020-05-29 ENCOUNTER — OFFICE VISIT (OUTPATIENT)
Dept: PODIATRY | Facility: CLINIC | Age: 60
End: 2020-05-29
Payer: COMMERCIAL

## 2020-05-29 DIAGNOSIS — M10.071 ACUTE IDIOPATHIC GOUT OF RIGHT FOOT: ICD-10-CM

## 2020-05-29 DIAGNOSIS — L97.323 SKIN ULCER OF LEFT ANKLE WITH NECROSIS OF MUSCLE (H): ICD-10-CM

## 2020-05-29 DIAGNOSIS — I87.8 VENOUS STASIS: ICD-10-CM

## 2020-05-29 DIAGNOSIS — E11.42 TYPE 2 DIABETES MELLITUS WITH DIABETIC POLYNEUROPATHY, WITHOUT LONG-TERM CURRENT USE OF INSULIN (H): Primary | ICD-10-CM

## 2020-05-29 DIAGNOSIS — E11.621 DIABETIC ULCER OF RIGHT MIDFOOT ASSOCIATED WITH TYPE 2 DIABETES MELLITUS, WITH BONE INVOLVEMENT WITHOUT EVIDENCE OF NECROSIS (H): ICD-10-CM

## 2020-05-29 DIAGNOSIS — L97.416 DIABETIC ULCER OF RIGHT MIDFOOT ASSOCIATED WITH TYPE 2 DIABETES MELLITUS, WITH BONE INVOLVEMENT WITHOUT EVIDENCE OF NECROSIS (H): ICD-10-CM

## 2020-05-29 LAB
BASOPHILS # BLD AUTO: 0.1 10E9/L (ref 0–0.2)
BASOPHILS NFR BLD AUTO: 0.8 %
CRP SERPL-MCNC: 19.2 MG/L (ref 0–8)
DIFFERENTIAL METHOD BLD: ABNORMAL
EOSINOPHIL # BLD AUTO: 0.1 10E9/L (ref 0–0.7)
EOSINOPHIL NFR BLD AUTO: 1.5 %
ERYTHROCYTE [DISTWIDTH] IN BLOOD BY AUTOMATED COUNT: 19.1 % (ref 10–15)
ERYTHROCYTE [SEDIMENTATION RATE] IN BLOOD BY WESTERGREN METHOD: 99 MM/H (ref 0–30)
HCT VFR BLD AUTO: 32 % (ref 35–47)
HGB BLD-MCNC: 9.2 G/DL (ref 11.7–15.7)
IMM GRANULOCYTES # BLD: 0 10E9/L (ref 0–0.4)
IMM GRANULOCYTES NFR BLD: 0.4 %
LYMPHOCYTES # BLD AUTO: 2 10E9/L (ref 0.8–5.3)
LYMPHOCYTES NFR BLD AUTO: 23.5 %
MCH RBC QN AUTO: 22.1 PG (ref 26.5–33)
MCHC RBC AUTO-ENTMCNC: 28.8 G/DL (ref 31.5–36.5)
MCV RBC AUTO: 77 FL (ref 78–100)
MONOCYTES # BLD AUTO: 0.7 10E9/L (ref 0–1.3)
MONOCYTES NFR BLD AUTO: 8.6 %
NEUTROPHILS # BLD AUTO: 5.6 10E9/L (ref 1.6–8.3)
NEUTROPHILS NFR BLD AUTO: 65.2 %
PLATELET # BLD AUTO: 189 10E9/L (ref 150–450)
RBC # BLD AUTO: 4.16 10E12/L (ref 3.8–5.2)
URATE SERPL-MCNC: 6.8 MG/DL (ref 2.6–6)
WBC # BLD AUTO: 8.5 10E9/L (ref 4–11)

## 2020-05-29 PROCEDURE — 29405 APPL SHORT LEG CAST: CPT | Mod: LT | Performed by: PODIATRIST

## 2020-05-29 PROCEDURE — 29580 STRAPPING UNNA BOOT: CPT | Mod: RT | Performed by: PODIATRIST

## 2020-05-29 RX ORDER — HYDROCODONE BITARTRATE AND ACETAMINOPHEN 7.5; 325 MG/1; MG/1
1-2 TABLET ORAL EVERY 8 HOURS PRN
Qty: 14 TABLET | Refills: 0 | Status: ON HOLD | OUTPATIENT
Start: 2020-05-29 | End: 2020-07-27

## 2020-05-29 NOTE — NURSING NOTE
Tiffani Tan's chief complaint for this visit includes:  Chief Complaint   Patient presents with     RECHECK     pressure ulcer right  plantar 1st met head / left charcot foot and left ankle ulcer     PCP: Wily Bonilla    Referring Provider:  No referring provider defined for this encounter.    There were no vitals taken for this visit.  Data Unavailable     Do you need any medication refills at today's visit? Rubina Oshea CMA

## 2020-05-29 NOTE — PROGRESS NOTES
No past medical history on file.  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)     GI bleed     UGIB (upper gastrointestinal bleed)     Morbid obesity (H)     Past Surgical History:   Procedure Laterality Date     ESOPHAGOSCOPY, GASTROSCOPY, DUODENOSCOPY (EGD), COMBINED N/A 2/5/2020    Procedure: ESOPHAGOGASTRODUODENOSCOPY (EGD);  Surgeon: Tanya Dias MD;  Location:  [facility]     Social History     Socioeconomic History     Marital status: Single     Spouse name: Not on file     Number of children: Not on file     Years of education: Not on file     Highest education level: Not on file   Occupational History     Not on file   Social Needs     Financial resource strain: Not on file     Food insecurity     Worry: Not on file     Inability: Not on file     Transportation needs     Medical: Not on file     Non-medical: Not on file   Tobacco Use     Smoking status: Current Every Day Smoker     Packs/day: 1.00     Smokeless tobacco: Never Used   Substance and Sexual Activity     Alcohol use: Yes     Drug use: Not on file     Sexual activity: Not on file   Lifestyle     Physical activity     Days per week: Not on file     Minutes per session: Not on file     Stress: Not on file   Relationships     Social connections     Talks on phone: Not on file     Gets together: Not on file     Attends [employer] service: Not on file     Active member of club or organization: Not on file     Attends meetings of clubs or organizations: Not on file     Relationship status: Not on file     Intimate  partner violence     Fear of current or ex partner: Not on file     Emotionally abused: Not on file     Physically abused: Not on file     Forced sexual activity: Not on file   Other Topics Concern     Parent/sibling w/ CABG, MI or angioplasty before 65F 55M? Not Asked   Social History Narrative     Not on file     Family History   Problem Relation Age of Onset     No Known Problems Mother      No Known Problems Father      Lab Results   Component Value Date    WBC 11.6 05/21/2020     Lab Results   Component Value Date    RBC 4.25 05/21/2020     Lab Results   Component Value Date    HGB 9.4 05/21/2020     Lab Results   Component Value Date    HCT 33.1 05/21/2020     No components found for: MCT  Lab Results   Component Value Date    MCV 78 05/21/2020     Lab Results   Component Value Date    MCH 22.1 05/21/2020     Lab Results   Component Value Date    MCHC 28.4 05/21/2020     Lab Results   Component Value Date    RDW 18.7 05/21/2020     Lab Results   Component Value Date     05/21/2020     Last Comprehensive Metabolic Panel:  Sodium   Date Value Ref Range Status   05/21/2020 135 133 - 144 mmol/L Final     Potassium   Date Value Ref Range Status   05/21/2020 5.2 3.4 - 5.3 mmol/L Final     Chloride   Date Value Ref Range Status   05/21/2020 104 94 - 109 mmol/L Final     Carbon Dioxide   Date Value Ref Range Status   05/21/2020 27 20 - 32 mmol/L Final     Anion Gap   Date Value Ref Range Status   05/21/2020 4 3 - 14 mmol/L Final     Glucose   Date Value Ref Range Status   05/21/2020 150 (H) 70 - 99 mg/dL Final     Urea Nitrogen   Date Value Ref Range Status   05/21/2020 16 7 - 30 mg/dL Final     Creatinine   Date Value Ref Range Status   05/21/2020 1.00 0.52 - 1.04 mg/dL Final     GFR Estimate   Date Value Ref Range Status   05/21/2020 61 >60 mL/min/[1.73_m2] Final     Comment:     Non  GFR Calc  Starting 12/18/2018, serum creatinine based estimated GFR (eGFR) will be   calculated using the  Chronic Kidney Disease Epidemiology Collaboration   (CKD-EPI) equation.       Calcium   Date Value Ref Range Status   05/21/2020 9.4 8.5 - 10.1 mg/dL Final     Lab Results   Component Value Date     05/21/2020     Lab Results   Component Value Date    CRP 12.1 05/21/2020     Uric Acid   Date Value Ref Range Status   05/21/2020 6.1 (H) 2.6 - 6.0 mg/dL Final     Last Comprehensive Metabolic Panel:  Sodium   Date Value Ref Range Status   05/21/2020 135 133 - 144 mmol/L Final     Potassium   Date Value Ref Range Status   05/21/2020 5.2 3.4 - 5.3 mmol/L Final     Chloride   Date Value Ref Range Status   05/21/2020 104 94 - 109 mmol/L Final     Carbon Dioxide   Date Value Ref Range Status   05/21/2020 27 20 - 32 mmol/L Final     Anion Gap   Date Value Ref Range Status   05/21/2020 4 3 - 14 mmol/L Final     Glucose   Date Value Ref Range Status   05/21/2020 150 (H) 70 - 99 mg/dL Final     Urea Nitrogen   Date Value Ref Range Status   05/21/2020 16 7 - 30 mg/dL Final     Creatinine   Date Value Ref Range Status   05/21/2020 1.00 0.52 - 1.04 mg/dL Final     GFR Estimate   Date Value Ref Range Status   05/21/2020 61 >60 mL/min/[1.73_m2] Final     Comment:     Non  GFR Calc  Starting 12/18/2018, serum creatinine based estimated GFR (eGFR) will be   calculated using the Chronic Kidney Disease Epidemiology Collaboration   (CKD-EPI) equation.       Calcium   Date Value Ref Range Status   05/21/2020 9.4 8.5 - 10.1 mg/dL Final     SUBJECTIVE FINDINGS:  60-year-old female returns to clinic for ulcer right first MPJ, left leg and lateral ankle.  She relates the right one was too wet.  It was draining, the Unna boot was wet and there was some blood on there.  About 3 days ago, she took the Unna boot off.  No specific injuries.  No specific relieving or aggravating factors.  Relates she is taking her Augmentin.  She does have a primary physician.  No systemic signs of infection.  I did review her labs with her.         OBJECTIVE FINDINGS:  DP and PT are 2/4 bilaterally.  She has right leg edema with some thin, shiny, erythematous changes to the leg.  Some scratches proximally.  Right first MPJ ulcer, there is some contraction.  Mild maceration, some serosanguineous drainage, no erythema, no odor, no calor.  She has left leg anterior leg superficial weeping ulceration.  No erythema, no odor, no calor, no drainage.  She has a lateral ankle ulcer that is anne with some maceration, some serosanguineous drainage, some edema, no erythema, no odor, no calor.      ASSESSMENT/PLAN:  Ulcer, right plantar first MPJ.  Ulcer, left lateral ankle and left anterior leg.  She is diabetic with peripheral neuropathy, Charcot foot, gout and venous stasis.  Diagnosis and treatment options discussed with her.  Continue the Augmentin.  Local wound care done upon consent today.  Wound Vashe wet-to-dry dressing and total contact cast applied to left lower extremity upon consent.  She does have an appointment with Pain Clinic and with a surgeon in June.  Advised her to get an appointment with her primary physician regarding her hemoglobin and hematocrit.  She relates she would do this.  I am going to discontinue the Unna boot on the right.  She wants to keep this dry.  I am going to have her clean this daily with Wound Vashe, apply Aquacel Ag, Kerlix and Ace wraps for compression.  She will do this daily.  She will return to clinic and see me in 1 week.  I refilled her Vicodin, recheck her labs.  We will decide on further antibiotic coverage or need for IV antibiotics pending those.                 Lab Results   Component Value Date    WBC 8.5 05/29/2020     Lab Results   Component Value Date    RBC 4.16 05/29/2020     Lab Results   Component Value Date    HGB 9.2 05/29/2020     Lab Results   Component Value Date    HCT 32.0 05/29/2020     No components found for: MCT  Lab Results   Component Value Date    MCV 77 05/29/2020     Lab Results    Component Value Date    MCH 22.1 05/29/2020     Lab Results   Component Value Date    MCHC 28.8 05/29/2020     Lab Results   Component Value Date    RDW 19.1 05/29/2020     Lab Results   Component Value Date     05/29/2020     Lab Results   Component Value Date    SED 99 05/29/2020     Lab Results   Component Value Date    CRP 19.2 05/29/2020     Uric Acid   Date Value Ref Range Status   05/29/2020 6.8 (H) 2.6 - 6.0 mg/dL Final

## 2020-05-29 NOTE — LETTER
5/29/2020         RE: Tiffani Tan  [address] [facility] [address]        Dear Colleague,    Thank you for referring your patient, Tiffani Tan, to the [facility]. Please see a copy of my visit note below.    No past medical history on file.  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)     GI bleed     UGIB (upper gastrointestinal bleed)     Morbid obesity (H)     Past Surgical History:   Procedure Laterality Date     ESOPHAGOSCOPY, GASTROSCOPY, DUODENOSCOPY (EGD), COMBINED N/A 2/5/2020    Procedure: ESOPHAGOGASTRODUODENOSCOPY (EGD);  Surgeon: Tanya Dias MD;  Location: [facility]     Social History     Socioeconomic History     Marital status: Single     Spouse name: Not on file     Number of children: Not on file     Years of education: Not on file     Highest education level: Not on file   Occupational History     Not on file   Social Needs     Financial resource strain: Not on file     Food insecurity     Worry: Not on file     Inability: Not on file     Transportation needs     Medical: Not on file     Non-medical: Not on file   Tobacco Use     Smoking status: Current Every Day Smoker     Packs/day: 1.00     Smokeless tobacco: Never Used   Substance and Sexual Activity     Alcohol use: Yes     Drug use: Not on file     Sexual activity: Not on file   Lifestyle     Physical activity     Days per week: Not on file     Minutes per session: Not on file     Stress: Not on file   Relationships     Social connections     Talks on phone: Not on file     Gets  together: Not on file     Attends Restoration service: Not on file     Active member of club or organization: Not on file     Attends meetings of clubs or organizations: Not on file     Relationship status: Not on file     Intimate partner violence     Fear of current or ex partner: Not on file     Emotionally abused: Not on file     Physically abused: Not on file     Forced sexual activity: Not on file   Other Topics Concern     Parent/sibling w/ CABG, MI or angioplasty before 65F 55M? Not Asked   Social History Narrative     Not on file     Family History   Problem Relation Age of Onset     No Known Problems Mother      No Known Problems Father      Lab Results   Component Value Date    WBC 11.6 05/21/2020     Lab Results   Component Value Date    RBC 4.25 05/21/2020     Lab Results   Component Value Date    HGB 9.4 05/21/2020     Lab Results   Component Value Date    HCT 33.1 05/21/2020     No components found for: MCT  Lab Results   Component Value Date    MCV 78 05/21/2020     Lab Results   Component Value Date    MCH 22.1 05/21/2020     Lab Results   Component Value Date    MCHC 28.4 05/21/2020     Lab Results   Component Value Date    RDW 18.7 05/21/2020     Lab Results   Component Value Date     05/21/2020     Last Comprehensive Metabolic Panel:  Sodium   Date Value Ref Range Status   05/21/2020 135 133 - 144 mmol/L Final     Potassium   Date Value Ref Range Status   05/21/2020 5.2 3.4 - 5.3 mmol/L Final     Chloride   Date Value Ref Range Status   05/21/2020 104 94 - 109 mmol/L Final     Carbon Dioxide   Date Value Ref Range Status   05/21/2020 27 20 - 32 mmol/L Final     Anion Gap   Date Value Ref Range Status   05/21/2020 4 3 - 14 mmol/L Final     Glucose   Date Value Ref Range Status   05/21/2020 150 (H) 70 - 99 mg/dL Final     Urea Nitrogen   Date Value Ref Range Status   05/21/2020 16 7 - 30 mg/dL Final     Creatinine   Date Value Ref Range Status   05/21/2020 1.00 0.52 - 1.04 mg/dL Final     GFR  Estimate   Date Value Ref Range Status   05/21/2020 61 >60 mL/min/[1.73_m2] Final     Comment:     Non  GFR Calc  Starting 12/18/2018, serum creatinine based estimated GFR (eGFR) will be   calculated using the Chronic Kidney Disease Epidemiology Collaboration   (CKD-EPI) equation.       Calcium   Date Value Ref Range Status   05/21/2020 9.4 8.5 - 10.1 mg/dL Final     Lab Results   Component Value Date     05/21/2020     Lab Results   Component Value Date    CRP 12.1 05/21/2020     Uric Acid   Date Value Ref Range Status   05/21/2020 6.1 (H) 2.6 - 6.0 mg/dL Final     Last Comprehensive Metabolic Panel:  Sodium   Date Value Ref Range Status   05/21/2020 135 133 - 144 mmol/L Final     Potassium   Date Value Ref Range Status   05/21/2020 5.2 3.4 - 5.3 mmol/L Final     Chloride   Date Value Ref Range Status   05/21/2020 104 94 - 109 mmol/L Final     Carbon Dioxide   Date Value Ref Range Status   05/21/2020 27 20 - 32 mmol/L Final     Anion Gap   Date Value Ref Range Status   05/21/2020 4 3 - 14 mmol/L Final     Glucose   Date Value Ref Range Status   05/21/2020 150 (H) 70 - 99 mg/dL Final     Urea Nitrogen   Date Value Ref Range Status   05/21/2020 16 7 - 30 mg/dL Final     Creatinine   Date Value Ref Range Status   05/21/2020 1.00 0.52 - 1.04 mg/dL Final     GFR Estimate   Date Value Ref Range Status   05/21/2020 61 >60 mL/min/[1.73_m2] Final     Comment:     Non  GFR Calc  Starting 12/18/2018, serum creatinine based estimated GFR (eGFR) will be   calculated using the Chronic Kidney Disease Epidemiology Collaboration   (CKD-EPI) equation.       Calcium   Date Value Ref Range Status   05/21/2020 9.4 8.5 - 10.1 mg/dL Final     SUBJECTIVE FINDINGS:  60-year-old female returns to clinic for ulcer right first MPJ, left leg and lateral ankle.  She relates the right one was too wet.  It was draining, the Unna boot was wet and there was some blood on there.  About 3 days ago, she took the  Unna boot off.  No specific injuries.  No specific relieving or aggravating factors.  Relates she is taking her Augmentin.  She does have a primary physician.  No systemic signs of infection.  I did review her labs with her.        OBJECTIVE FINDINGS:  DP and PT are 2/4 bilaterally.  She has right leg edema with some thin, shiny, erythematous changes to the leg.  Some scratches proximally.  Right first MPJ ulcer, there is some contraction.  Mild maceration, some serosanguineous drainage, no erythema, no odor, no calor.  She has left leg anterior leg superficial weeping ulceration.  No erythema, no odor, no calor, no drainage.  She has a lateral ankle ulcer that is anne with some maceration, some serosanguineous drainage, some edema, no erythema, no odor, no calor.      ASSESSMENT/PLAN:  Ulcer, right plantar first MPJ.  Ulcer, left lateral ankle and left anterior leg.  She is diabetic with peripheral neuropathy, Charcot foot, gout and venous stasis.  Diagnosis and treatment options discussed with her.  Continue the Augmentin.  Local wound care done upon consent today.  Wound Vashe wet-to-dry dressing and total contact cast applied to left lower extremity upon consent.  She does have an appointment with Pain Clinic and with a surgeon in June.  Advised her to get an appointment with her primary physician regarding her hemoglobin and hematocrit.  She relates she would do this.  I am going to discontinue the Unna boot on the right.  She wants to keep this dry.  I am going to have her clean this daily with Wound Vashe, apply Aquacel Ag, Kerlix and Ace wraps for compression.  She will do this daily.  She will return to clinic and see me in 1 week.  I refilled her Vicodin, recheck her labs.  We will decide on further antibiotic coverage or need for IV antibiotics pending those.                 Lab Results   Component Value Date    WBC 8.5 05/29/2020     Lab Results   Component Value Date    RBC 4.16 05/29/2020      Lab Results   Component Value Date    HGB 9.2 05/29/2020     Lab Results   Component Value Date    HCT 32.0 05/29/2020     No components found for: MCT  Lab Results   Component Value Date    MCV 77 05/29/2020     Lab Results   Component Value Date    MCH 22.1 05/29/2020     Lab Results   Component Value Date    MCHC 28.8 05/29/2020     Lab Results   Component Value Date    RDW 19.1 05/29/2020     Lab Results   Component Value Date     05/29/2020     Lab Results   Component Value Date    SED 99 05/29/2020     Lab Results   Component Value Date    CRP 19.2 05/29/2020     Uric Acid   Date Value Ref Range Status   05/29/2020 6.8 (H) 2.6 - 6.0 mg/dL Final       Again, thank you for allowing me to participate in the care of your patient.        Sincerely,        Nolan Whitten DPM

## 2020-06-05 ENCOUNTER — OFFICE VISIT (OUTPATIENT)
Dept: PODIATRY | Facility: CLINIC | Age: 60
End: 2020-06-05
Payer: COMMERCIAL

## 2020-06-05 DIAGNOSIS — L97.416 DIABETIC ULCER OF RIGHT MIDFOOT ASSOCIATED WITH TYPE 2 DIABETES MELLITUS, WITH BONE INVOLVEMENT WITHOUT EVIDENCE OF NECROSIS (H): ICD-10-CM

## 2020-06-05 DIAGNOSIS — L97.323 SKIN ULCER OF LEFT ANKLE WITH NECROSIS OF MUSCLE (H): ICD-10-CM

## 2020-06-05 DIAGNOSIS — M10.072 ACUTE IDIOPATHIC GOUT OF LEFT FOOT: ICD-10-CM

## 2020-06-05 DIAGNOSIS — M10.071 ACUTE IDIOPATHIC GOUT OF RIGHT FOOT: ICD-10-CM

## 2020-06-05 DIAGNOSIS — E11.42 TYPE 2 DIABETES MELLITUS WITH DIABETIC POLYNEUROPATHY, WITHOUT LONG-TERM CURRENT USE OF INSULIN (H): Primary | ICD-10-CM

## 2020-06-05 DIAGNOSIS — E11.621 DIABETIC ULCER OF RIGHT MIDFOOT ASSOCIATED WITH TYPE 2 DIABETES MELLITUS, WITH BONE INVOLVEMENT WITHOUT EVIDENCE OF NECROSIS (H): ICD-10-CM

## 2020-06-05 DIAGNOSIS — I87.8 VENOUS STASIS: ICD-10-CM

## 2020-06-05 PROCEDURE — 29405 APPL SHORT LEG CAST: CPT | Mod: LT | Performed by: PODIATRIST

## 2020-06-05 NOTE — LETTER
6/5/2020         RE: Tiffani Tan  [address] [facility] [address]        Dear Colleague,    Thank you for referring your patient, Tiffani Tan, to the [facility]. Please see a copy of my visit note below.    No past medical history on file.  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)     GI bleed     UGIB (upper gastrointestinal bleed)     Morbid obesity (H)     Past Surgical History:   Procedure Laterality Date     ESOPHAGOSCOPY, GASTROSCOPY, DUODENOSCOPY (EGD), COMBINED N/A 2/5/2020    Procedure: ESOPHAGOGASTRODUODENOSCOPY (EGD);  Surgeon: Tanya Dias MD;  Location: [facility]     Social History     Socioeconomic History     Marital status: Single     Spouse name: Not on file     Number of children: Not on file     Years of education: Not on file     Highest education level: Not on file   Occupational History     Not on file   Social Needs     Financial resource strain: Not on file     Food insecurity     Worry: Not on file     Inability: Not on file     Transportation needs     Medical: Not on file     Non-medical: Not on file   Tobacco Use     Smoking status: Current Every Day Smoker     Packs/day: 1.00     Smokeless tobacco: Never Used   Substance and Sexual Activity     Alcohol use: Yes     Drug use: Not on file     Sexual activity: Not on file   Lifestyle     Physical activity     Days per week: Not on file     Minutes per session: Not on file     Stress: Not on file   Relationships     Social connections     Talks on phone: Not on file     Gets  together: Not on file     Attends Sabianism service: Not on file     Active member of club or organization: Not on file     Attends meetings of clubs or organizations: Not on file     Relationship status: Not on file     Intimate partner violence     Fear of current or ex partner: Not on file     Emotionally abused: Not on file     Physically abused: Not on file     Forced sexual activity: Not on file   Other Topics Concern     Parent/sibling w/ CABG, MI or angioplasty before 65F 55M? Not Asked   Social History Narrative     Not on file     Family History   Problem Relation Age of Onset     No Known Problems Mother      No Known Problems Father      Lab Results   Component Value Date    WBC 8.5 05/29/2020     Lab Results   Component Value Date    RBC 4.16 05/29/2020     Lab Results   Component Value Date    HGB 9.2 05/29/2020     Lab Results   Component Value Date    HCT 32.0 05/29/2020     No components found for: MCT  Lab Results   Component Value Date    MCV 77 05/29/2020     Lab Results   Component Value Date    MCH 22.1 05/29/2020     Lab Results   Component Value Date    MCHC 28.8 05/29/2020     Lab Results   Component Value Date    RDW 19.1 05/29/2020     Lab Results   Component Value Date     05/29/2020     Last Comprehensive Metabolic Panel:  Sodium   Date Value Ref Range Status   05/21/2020 135 133 - 144 mmol/L Final     Potassium   Date Value Ref Range Status   05/21/2020 5.2 3.4 - 5.3 mmol/L Final     Chloride   Date Value Ref Range Status   05/21/2020 104 94 - 109 mmol/L Final     Carbon Dioxide   Date Value Ref Range Status   05/21/2020 27 20 - 32 mmol/L Final     Anion Gap   Date Value Ref Range Status   05/21/2020 4 3 - 14 mmol/L Final     Glucose   Date Value Ref Range Status   05/21/2020 150 (H) 70 - 99 mg/dL Final     Urea Nitrogen   Date Value Ref Range Status   05/21/2020 16 7 - 30 mg/dL Final     Creatinine   Date Value Ref Range Status   05/21/2020 1.00 0.52 - 1.04 mg/dL Final     GFR  Estimate   Date Value Ref Range Status   05/21/2020 61 >60 mL/min/[1.73_m2] Final     Comment:     Non  GFR Calc  Starting 12/18/2018, serum creatinine based estimated GFR (eGFR) will be   calculated using the Chronic Kidney Disease Epidemiology Collaboration   (CKD-EPI) equation.       Calcium   Date Value Ref Range Status   05/21/2020 9.4 8.5 - 10.1 mg/dL Final   SUBJECTIVE FINDINGS:  A 60-year-old female returns to clinic for ulcer, right plantar first MPJ and left lateral ankle.  She relates it is doing okay.  It is about the same.  She has been doing Ace wraps on the right leg, and that seems to be doing better.  She is alternating between Neema and Iodosorb.  She may need some more Iodosorb.  She is on Augmentin.      OBJECTIVE FINDINGS:  DP and PT are 2/4 bilaterally.  She has decreased erythema and edema of the right leg.  She has a right plantar first MPJ ulcer that is deep through the dermis.  There is some slight maceration, some granulation tissue.  No odor, no calor.  Some serosanguineous drainage.  She has a left lateral ankle ulcer that is deep through the subcutaneous tissues.  Some slight maceration.  No erythema, no odor, no calor.  Some edema.  Some serosanguineous drainage.      ASSESSMENT AND PLAN:  Ulcer, right plantar first MPJ.  Ulcer, right lateral ankle, left anterior leg.  She is diabetic with peripheral neuropathy, Charcot foot, gout, venous stasis.  Diagnosis and treatment options discussed with the patient.  Continue the Augmentin.  Local wound care done upon consent today.   Wound Vashe wet-to-dry dressing and total contact cast applied to the left lower extremity upon consent.  Neema and a sterile dressing applied with Kerlix and Ace wraps to the right first MPJ ulcer and leg and use discussed with her.  She will continue the Iodosorb and Neema on the right and clean with Wound Vashe.  These are dispensed and use discussed with her.  Return to clinic and see me in 1  week.       Lab Results   Component Value Date    SED 99 05/29/2020     Lab Results   Component Value Date    CRP 19.2 05/29/2020     Uric Acid   Date Value Ref Range Status   05/29/2020 6.8 (H) 2.6 - 6.0 mg/dL Final                 Again, thank you for allowing me to participate in the care of your patient.        Sincerely,        Nolan Whitten DPM

## 2020-06-05 NOTE — PROGRESS NOTES
No past medical history on file.  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)     GI bleed     UGIB (upper gastrointestinal bleed)     Morbid obesity (H)     Past Surgical History:   Procedure Laterality Date     ESOPHAGOSCOPY, GASTROSCOPY, DUODENOSCOPY (EGD), COMBINED N/A 2/5/2020    Procedure: ESOPHAGOGASTRODUODENOSCOPY (EGD);  Surgeon: Tanya Dias MD;  Location:  [facility]     Social History     Socioeconomic History     Marital status: Single     Spouse name: Not on file     Number of children: Not on file     Years of education: Not on file     Highest education level: Not on file   Occupational History     Not on file   Social Needs     Financial resource strain: Not on file     Food insecurity     Worry: Not on file     Inability: Not on file     Transportation needs     Medical: Not on file     Non-medical: Not on file   Tobacco Use     Smoking status: Current Every Day Smoker     Packs/day: 1.00     Smokeless tobacco: Never Used   Substance and Sexual Activity     Alcohol use: Yes     Drug use: Not on file     Sexual activity: Not on file   Lifestyle     Physical activity     Days per week: Not on file     Minutes per session: Not on file     Stress: Not on file   Relationships     Social connections     Talks on phone: Not on file     Gets together: Not on file     Attends Jewish service: Not on file     Active member of club or organization: Not on file     Attends meetings of clubs or organizations: Not on file     Relationship status: Not on file     Intimate  partner violence     Fear of current or ex partner: Not on file     Emotionally abused: Not on file     Physically abused: Not on file     Forced sexual activity: Not on file   Other Topics Concern     Parent/sibling w/ CABG, MI or angioplasty before 65F 55M? Not Asked   Social History Narrative     Not on file     Family History   Problem Relation Age of Onset     No Known Problems Mother      No Known Problems Father      Lab Results   Component Value Date    WBC 8.5 05/29/2020     Lab Results   Component Value Date    RBC 4.16 05/29/2020     Lab Results   Component Value Date    HGB 9.2 05/29/2020     Lab Results   Component Value Date    HCT 32.0 05/29/2020     No components found for: MCT  Lab Results   Component Value Date    MCV 77 05/29/2020     Lab Results   Component Value Date    MCH 22.1 05/29/2020     Lab Results   Component Value Date    MCHC 28.8 05/29/2020     Lab Results   Component Value Date    RDW 19.1 05/29/2020     Lab Results   Component Value Date     05/29/2020     Last Comprehensive Metabolic Panel:  Sodium   Date Value Ref Range Status   05/21/2020 135 133 - 144 mmol/L Final     Potassium   Date Value Ref Range Status   05/21/2020 5.2 3.4 - 5.3 mmol/L Final     Chloride   Date Value Ref Range Status   05/21/2020 104 94 - 109 mmol/L Final     Carbon Dioxide   Date Value Ref Range Status   05/21/2020 27 20 - 32 mmol/L Final     Anion Gap   Date Value Ref Range Status   05/21/2020 4 3 - 14 mmol/L Final     Glucose   Date Value Ref Range Status   05/21/2020 150 (H) 70 - 99 mg/dL Final     Urea Nitrogen   Date Value Ref Range Status   05/21/2020 16 7 - 30 mg/dL Final     Creatinine   Date Value Ref Range Status   05/21/2020 1.00 0.52 - 1.04 mg/dL Final     GFR Estimate   Date Value Ref Range Status   05/21/2020 61 >60 mL/min/[1.73_m2] Final     Comment:     Non  GFR Calc  Starting 12/18/2018, serum creatinine based estimated GFR (eGFR) will be   calculated using the  Chronic Kidney Disease Epidemiology Collaboration   (CKD-EPI) equation.       Calcium   Date Value Ref Range Status   05/21/2020 9.4 8.5 - 10.1 mg/dL Final   SUBJECTIVE FINDINGS:  A 60-year-old female returns to clinic for ulcer, right plantar first MPJ and left lateral ankle.  She relates it is doing okay.  It is about the same.  She has been doing Ace wraps on the right leg, and that seems to be doing better.  She is alternating between Neema and Iodosorb.  She may need some more Iodosorb.  She is on Augmentin.      OBJECTIVE FINDINGS:  DP and PT are 2/4 bilaterally.  She has decreased erythema and edema of the right leg.  She has a right plantar first MPJ ulcer that is deep through the dermis.  There is some slight maceration, some granulation tissue.  No odor, no calor.  Some serosanguineous drainage.  She has a left lateral ankle ulcer that is deep through the subcutaneous tissues.  Some slight maceration.  No erythema, no odor, no calor.  Some edema.  Some serosanguineous drainage.      ASSESSMENT AND PLAN:  Ulcer, right plantar first MPJ.  Ulcer, right lateral ankle, left anterior leg.  She is diabetic with peripheral neuropathy, Charcot foot, gout, venous stasis.  Diagnosis and treatment options discussed with the patient.  Continue the Augmentin.  Local wound care done upon consent today.   Wound Vashe wet-to-dry dressing and total contact cast applied to the left lower extremity upon consent.  Neema and a sterile dressing applied with Kerlix and Ace wraps to the right first MPJ ulcer and leg and use discussed with her.  She will continue the Iodosorb and Neema on the right and clean with Wound Vashe.  These are dispensed and use discussed with her.  Return to clinic and see me in 1 week.       Lab Results   Component Value Date    SED 99 05/29/2020     Lab Results   Component Value Date    CRP 19.2 05/29/2020     Uric Acid   Date Value Ref Range Status   05/29/2020 6.8 (H) 2.6 - 6.0 mg/dL Final

## 2020-06-12 ENCOUNTER — OFFICE VISIT (OUTPATIENT)
Dept: PODIATRY | Facility: CLINIC | Age: 60
End: 2020-06-12
Payer: COMMERCIAL

## 2020-06-12 DIAGNOSIS — M10.072 ACUTE IDIOPATHIC GOUT OF LEFT FOOT: ICD-10-CM

## 2020-06-12 DIAGNOSIS — M10.071 ACUTE IDIOPATHIC GOUT OF RIGHT FOOT: ICD-10-CM

## 2020-06-12 DIAGNOSIS — L97.323 SKIN ULCER OF LEFT ANKLE WITH NECROSIS OF MUSCLE (H): ICD-10-CM

## 2020-06-12 DIAGNOSIS — E11.621 DIABETIC ULCER OF RIGHT MIDFOOT ASSOCIATED WITH TYPE 2 DIABETES MELLITUS, WITH BONE INVOLVEMENT WITHOUT EVIDENCE OF NECROSIS (H): ICD-10-CM

## 2020-06-12 DIAGNOSIS — I87.8 VENOUS STASIS: ICD-10-CM

## 2020-06-12 DIAGNOSIS — E11.42 TYPE 2 DIABETES MELLITUS WITH DIABETIC POLYNEUROPATHY, WITHOUT LONG-TERM CURRENT USE OF INSULIN (H): Primary | ICD-10-CM

## 2020-06-12 DIAGNOSIS — L97.416 DIABETIC ULCER OF RIGHT MIDFOOT ASSOCIATED WITH TYPE 2 DIABETES MELLITUS, WITH BONE INVOLVEMENT WITHOUT EVIDENCE OF NECROSIS (H): ICD-10-CM

## 2020-06-12 PROCEDURE — 99213 OFFICE O/P EST LOW 20 MIN: CPT | Mod: 25 | Performed by: PODIATRIST

## 2020-06-12 PROCEDURE — 29405 APPL SHORT LEG CAST: CPT | Mod: LT | Performed by: PODIATRIST

## 2020-06-12 NOTE — PATIENT INSTRUCTIONS
Thanks for coming today.  [facility]  [address] [place] [address]    To schedule future appointments in Ortho Clinic, you may call [phone].    To schedule ordered imaging by your provider:   Call Central Imaging [phone]    To schedule an injection ordered by your provider:  Call Central Imaging Injection scheduling line: [phone]  [email or web address] available online at:  [email or web address]    Please call if any further questions or concerns ([phone]).  Clinic hours 8 am to 5 pm.    Return to clinic (call) if symptoms worsen or fail to improve.

## 2020-06-12 NOTE — PROGRESS NOTES
No past medical history on file.  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)     GI bleed     UGIB (upper gastrointestinal bleed)     Morbid obesity (H)     Past Surgical History:   Procedure Laterality Date     ESOPHAGOSCOPY, GASTROSCOPY, DUODENOSCOPY (EGD), COMBINED N/A 2/5/2020    Procedure: ESOPHAGOGASTRODUODENOSCOPY (EGD);  Surgeon: Tanya Dias MD;  Location:  GI     Social History     Socioeconomic History     Marital status: Single     Spouse name: Not on file     Number of children: Not on file     Years of education: Not on file     Highest education level: Not on file   Occupational History     Not on file   Social Needs     Financial resource strain: Not on file     Food insecurity     Worry: Not on file     Inability: Not on file     Transportation needs     Medical: Not on file     Non-medical: Not on file   Tobacco Use     Smoking status: Current Every Day Smoker     Packs/day: 1.00     Smokeless tobacco: Never Used   Substance and Sexual Activity     Alcohol use: Yes     Drug use: Not on file     Sexual activity: Not on file   Lifestyle     Physical activity     Days per week: Not on file     Minutes per session: Not on file     Stress: Not on file   Relationships     Social connections     Talks on phone: Not on file     Gets together: Not on file     Attends Scientology service: Not on file     Active member of club or organization: Not on file     Attends meetings of clubs or organizations: Not on file     Relationship status: Not on file     Intimate  partner violence     Fear of current or ex partner: Not on file     Emotionally abused: Not on file     Physically abused: Not on file     Forced sexual activity: Not on file   Other Topics Concern     Parent/sibling w/ CABG, MI or angioplasty before 65F 55M? Not Asked   Social History Narrative     Not on file     Family History   Problem Relation Age of Onset     No Known Problems Mother      No Known Problems Father              SUBJECTIVE FINDINGS:  A 60-year-old female returns to clinic for ulcer, right plantar first MPJ and left lateral ankle.  She relates it is doing okay.  It is about the same.  She has been doing Ace wraps on the right leg, and that seems to be doing better.  She is alternating between Neema and Iodosorb.  She is on Augmentin.      OBJECTIVE FINDINGS:  DP and PT are 2/4 bilaterally.  She has some edema of the right leg.  She has a right plantar first MPJ ulcer that is deep through the dermis.  There is some slight maceration, some granulation tissue.  No odor, no calor.  Some serosanguineous drainage.  She has a left lateral ankle ulcer that is deep through the subcutaneous tissues.  Some slight maceration.  No erythema, no odor, no calor.  Some edema.  Some serosanguineous drainage.      ASSESSMENT AND PLAN:  Ulcer, right plantar first MPJ.  Ulcer, right lateral ankle, left anterior leg.  She is diabetic with peripheral neuropathy, Charcot foot, gout, venous stasis.  Diagnosis and treatment options discussed with the patient.  Continue the Augmentin.  Local wound care done upon consent today.   Wound Vashe wet-to-dry dressing and total contact cast applied to the left lower extremity upon consent.  Neema and a sterile dressing applied with Kerlix and Ace wraps to the right first MPJ ulcer and leg and use discussed with her.  She will continue the Iodosorb and Neema on the right and clean with Wound Vashe.  Return to clinic and see me in 1 week.

## 2020-06-12 NOTE — LETTER
6/12/2020         RE: Tiffani Tan  [address] [facility] [address]        Dear Colleague,    Thank you for referring your patient, Tiffani Tan, to the [facility]. Please see a copy of my visit note below.    No past medical history on file.  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)     GI bleed     UGIB (upper gastrointestinal bleed)     Morbid obesity (H)     Past Surgical History:   Procedure Laterality Date     ESOPHAGOSCOPY, GASTROSCOPY, DUODENOSCOPY (EGD), COMBINED N/A 2/5/2020    Procedure: ESOPHAGOGASTRODUODENOSCOPY (EGD);  Surgeon: Tanya Dias MD;  Location: [facility]     Social History     Socioeconomic History     Marital status: Single     Spouse name: Not on file     Number of children: Not on file     Years of education: Not on file     Highest education level: Not on file   Occupational History     Not on file   Social Needs     Financial resource strain: Not on file     Food insecurity     Worry: Not on file     Inability: Not on file     Transportation needs     Medical: Not on file     Non-medical: Not on file   Tobacco Use     Smoking status: Current Every Day Smoker     Packs/day: 1.00     Smokeless tobacco: Never Used   Substance and Sexual Activity     Alcohol use: Yes     Drug use: Not on file     Sexual activity: Not on file   Lifestyle     Physical activity     Days per week: Not on file     Minutes per session: Not on file     Stress: Not on file   Relationships     Social connections     Talks on phone: Not on file     Gets  together: Not on file     Attends Restorationist service: Not on file     Active member of club or organization: Not on file     Attends meetings of clubs or organizations: Not on file     Relationship status: Not on file     Intimate partner violence     Fear of current or ex partner: Not on file     Emotionally abused: Not on file     Physically abused: Not on file     Forced sexual activity: Not on file   Other Topics Concern     Parent/sibling w/ CABG, MI or angioplasty before 65F 55M? Not Asked   Social History Narrative     Not on file     Family History   Problem Relation Age of Onset     No Known Problems Mother      No Known Problems Father              SUBJECTIVE FINDINGS:  A 60-year-old female returns to clinic for ulcer, right plantar first MPJ and left lateral ankle.  She relates it is doing okay.  It is about the same.  She has been doing Ace wraps on the right leg, and that seems to be doing better.  She is alternating between Neema and Iodosorb.  She is on Augmentin.      OBJECTIVE FINDINGS:  DP and PT are 2/4 bilaterally.  She has some edema of the right leg.  She has a right plantar first MPJ ulcer that is deep through the dermis.  There is some slight maceration, some granulation tissue.  No odor, no calor.  Some serosanguineous drainage.  She has a left lateral ankle ulcer that is deep through the subcutaneous tissues.  Some slight maceration.  No erythema, no odor, no calor.  Some edema.  Some serosanguineous drainage.      ASSESSMENT AND PLAN:  Ulcer, right plantar first MPJ.  Ulcer, right lateral ankle, left anterior leg.  She is diabetic with peripheral neuropathy, Charcot foot, gout, venous stasis.  Diagnosis and treatment options discussed with the patient.  Continue the Augmentin.  Local wound care done upon consent today.   Wound Vashe wet-to-dry dressing and total contact cast applied to the left lower extremity upon consent.  Neema and a sterile dressing applied with Kerlix and Ace wraps  to the right first MPJ ulcer and leg and use discussed with her.  She will continue the Iodosorb and Neema on the right and clean with Wound Vashe.  Return to clinic and see me in 1 week.     Again, thank you for allowing me to participate in the care of your patient.        Sincerely,        Nolan Whitten DPM

## 2020-06-18 ENCOUNTER — OFFICE VISIT (OUTPATIENT)
Dept: PODIATRY | Facility: CLINIC | Age: 60
End: 2020-06-18
Payer: COMMERCIAL

## 2020-06-18 ENCOUNTER — TELEPHONE (OUTPATIENT)
Dept: GASTROENTEROLOGY | Facility: CLINIC | Age: 60
End: 2020-06-18

## 2020-06-18 VITALS
SYSTOLIC BLOOD PRESSURE: 143 MMHG | HEART RATE: 86 BPM | RESPIRATION RATE: 20 BRPM | OXYGEN SATURATION: 99 % | DIASTOLIC BLOOD PRESSURE: 72 MMHG

## 2020-06-18 DIAGNOSIS — M10.072 ACUTE IDIOPATHIC GOUT OF LEFT FOOT: ICD-10-CM

## 2020-06-18 DIAGNOSIS — M10.071 ACUTE IDIOPATHIC GOUT OF RIGHT FOOT: ICD-10-CM

## 2020-06-18 DIAGNOSIS — L97.416 DIABETIC ULCER OF RIGHT MIDFOOT ASSOCIATED WITH TYPE 2 DIABETES MELLITUS, WITH BONE INVOLVEMENT WITHOUT EVIDENCE OF NECROSIS (H): ICD-10-CM

## 2020-06-18 DIAGNOSIS — E11.42 TYPE 2 DIABETES MELLITUS WITH DIABETIC POLYNEUROPATHY, WITHOUT LONG-TERM CURRENT USE OF INSULIN (H): Primary | ICD-10-CM

## 2020-06-18 DIAGNOSIS — I87.8 VENOUS STASIS: ICD-10-CM

## 2020-06-18 DIAGNOSIS — M14.672 CHARCOT'S JOINT OF LEFT FOOT: Primary | ICD-10-CM

## 2020-06-18 DIAGNOSIS — L97.323 SKIN ULCER OF LEFT ANKLE WITH NECROSIS OF MUSCLE (H): ICD-10-CM

## 2020-06-18 DIAGNOSIS — E11.621 DIABETIC ULCER OF RIGHT MIDFOOT ASSOCIATED WITH TYPE 2 DIABETES MELLITUS, WITH BONE INVOLVEMENT WITHOUT EVIDENCE OF NECROSIS (H): ICD-10-CM

## 2020-06-18 PROCEDURE — 29405 APPL SHORT LEG CAST: CPT | Mod: LT | Performed by: PODIATRIST

## 2020-06-18 ASSESSMENT — PAIN SCALES - GENERAL: PAINLEVEL: WORST PAIN (10)

## 2020-06-18 NOTE — PATIENT INSTRUCTIONS
Thanks for coming today.  [facility]  [address] [place] [address]    To schedule future appointments in Ortho Clinic, you may call [phone].    To schedule ordered imaging by your provider:   Call Central [facility] [phone]    To schedule an injection ordered by your provider:  Call Central [facility] Injection scheduling line: [phone]  [employer] available online at:  [email or web address]    Please call if any further questions or concerns ([phone]).  Clinic hours 8 am to 5 pm.    Return to clinic (call) if symptoms worsen or fail to improve.

## 2020-06-18 NOTE — LETTER
6/18/2020         RE: Tiffani Tan  [address] [facility] [address]        Dear Colleague,    Thank you for referring your patient, Tiffani Tan, to the [facility]. Please see a copy of my visit note below.    No past medical history on file.  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)     GI bleed     UGIB (upper gastrointestinal bleed)     Morbid obesity (H)     Past Surgical History:   Procedure Laterality Date     ESOPHAGOSCOPY, GASTROSCOPY, DUODENOSCOPY (EGD), COMBINED N/A 2/5/2020    Procedure: ESOPHAGOGASTRODUODENOSCOPY (EGD);  Surgeon: Tanya Dias MD;  Location: [facility]     Social History     Socioeconomic History     Marital status: Single     Spouse name: Not on file     Number of children: Not on file     Years of education: Not on file     Highest education level: Not on file   Occupational History     Not on file   Social Needs     Financial resource strain: Not on file     Food insecurity     Worry: Not on file     Inability: Not on file     Transportation needs     Medical: Not on file     Non-medical: Not on file   Tobacco Use     Smoking status: Current Every Day Smoker     Packs/day: 1.00     Smokeless tobacco: Never Used   Substance and Sexual Activity     Alcohol use: Yes     Drug use: Not on file     Sexual activity: Not on file   Lifestyle     Physical activity     Days per week: Not on file     Minutes per session: Not on file     Stress: Not on file   Relationships     Social connections     Talks on phone: Not on file     Gets  together: Not on file     Attends Presyb[employer]ian service: Not on file     Active member of club or organization: Not on file     Attends meetings of clubs or organizations: Not on file     Relationship status: Not on file     Intimate partner violence     Fear of current or ex partner: Not on file     Emotionally abused: Not on file     Physically abused: Not on file     Forced sexual activity: Not on file   Other Topics Concern     Parent/sibling w/ CABG, MI or angioplasty before 65F 55M? Not Asked   Social History Narrative     Not on file     Family History   Problem Relation Age of Onset     No Known Problems Mother      No Known Problems Father      Lab Results   Component Value Date    WBC 8.5 05/29/2020     Lab Results   Component Value Date    RBC 4.16 05/29/2020     Lab Results   Component Value Date    HGB 9.2 05/29/2020     Lab Results   Component Value Date    HCT 32.0 05/29/2020     No components found for: MCT  Lab Results   Component Value Date    MCV 77 05/29/2020     Lab Results   Component Value Date    MCH 22.1 05/29/2020     Lab Results   Component Value Date    MCHC 28.8 05/29/2020     Lab Results   Component Value Date    RDW 19.1 05/29/2020     Lab Results   Component Value Date     05/29/2020     Lab Results   Component Value Date    SED 99 05/29/2020     Lab Results   Component Value Date    CRP 19.2 05/29/2020     Uric Acid   Date Value Ref Range Status   05/29/2020 6.8 (H) 2.6 - 6.0 mg/dL Final               SUBJECTIVE FINDINGS:  A 60-year-old female returns to clinic for ulcer, right plantar first MPJ and left lateral ankle.  She relates it is doing okay.  It is about the same.  She has been doing Ace wraps on the right leg, and that seems to be doing better.  She is alternating between Neema and Iodosorb.  She is on Augmentin. Relates she has an appointment with Dr. Adams next Tuesday.     OBJECTIVE FINDINGS:  DP and PT are 2/4 bilaterally.  She has some edema of the right leg.  She  has a right plantar first MPJ ulcer that is deep through the dermis.  There is some slight maceration, some granulation tissue, hyperkeratotic tissue build up, no odor, no calor, some serosanguineous drainage.  She has a left lateral ankle ulcer that is deep through the subcutaneous tissues, some slight maceration, no erythema, no odor, no calor, and some serosanguinous drainage.  Some edema right greater than left.      ASSESSMENT AND PLAN:  Ulcer, right plantar first MPJ.  Ulcer, left lateral ankle, left anterior leg.  She is diabetic with peripheral neuropathy, Charcot foot, gout, venous stasis.  Diagnosis and treatment options discussed with the patient.  Continue the Augmentin.  The right 1st mpj ulcer was sharp debrided with a tissue cutter, through the dermis with a tissue cutter upon consent.  Local wound care done upon consent bilaterally.   Wound Vashe wet-to-dry dressing and total contact cast applied to the left lower extremity upon consent.  Neema and a sterile dressing applied with Kerlix and Ace wraps to the right first MPJ ulcer and leg and use discussed with her.  She will continue the Iodosorb and Neema on the right and clean with Wound Vashe.  Return to clinic and see nursing for cast change in 1 and 2 weeks and see me in 3 weeks pending orthopedic surgical appointment.     Again, thank you for allowing me to participate in the care of your patient.        Sincerely,        Nolan Whitten DPM

## 2020-06-18 NOTE — TELEPHONE ENCOUNTER
The prior authorization for the CT Chest Low dose screening was denied. We have reached out to you regarding possibly completing a peer to peer. Please see your staff messages.     Writer notified the patient of the denial and she requested the appointment be cancelled for now and would like a call from the clinic if she should continue with the test and if the peer to peer is going to be done.     Please notify the  financial counselors if the peer to peer was or is going to be completed.

## 2020-06-18 NOTE — NURSING NOTE
Tiffani Tan's chief complaint for this visit includes:  Chief Complaint   Patient presents with     Left Foot - Follow Up     Right Foot - Follow Up     PCP: Wily Bonilla    Referring Provider:  No referring provider defined for this encounter.    BP (!) 143/72 (BP Location: Right arm, Patient Position: Sitting, Cuff Size: Adult Large)   Pulse 86   Resp 20   SpO2 99%   Worst Pain (10)     Do you need any medication refills at today's visit? No        Keon Worley CMA

## 2020-06-19 DIAGNOSIS — Z71.6 ENCOUNTER FOR SMOKING CESSATION COUNSELING: ICD-10-CM

## 2020-06-19 DIAGNOSIS — E66.01 MORBID OBESITY (H): ICD-10-CM

## 2020-06-19 DIAGNOSIS — R79.89 ELEVATED VITAMIN B12 LEVEL: Primary | ICD-10-CM

## 2020-06-19 NOTE — TELEPHONE ENCOUNTER
Call pt to assist in scheduling chest x ray    Carri Nur RN  Gastroenterology Care Coordinator  [place], [place]

## 2020-06-19 NOTE — TELEPHONE ENCOUNTER
Left generic voicemail for patient to return call to clinic. Will assist with scheduling xray chest.    Lisette Long LPN

## 2020-06-19 NOTE — TELEPHONE ENCOUNTER
EBENEZERM for pt informing that we will update her next week.    Carri Nur RN  Gastroenterology Care Coordinator  [place], [place]

## 2020-06-23 ENCOUNTER — ANCILLARY PROCEDURE (OUTPATIENT)
Dept: GENERAL RADIOLOGY | Facility: CLINIC | Age: 60
End: 2020-06-23
Attending: ORTHOPAEDIC SURGERY
Payer: COMMERCIAL

## 2020-06-23 ENCOUNTER — OFFICE VISIT (OUTPATIENT)
Dept: ORTHOPEDICS | Facility: CLINIC | Age: 60
End: 2020-06-23
Payer: COMMERCIAL

## 2020-06-23 VITALS — HEIGHT: 67 IN | BODY MASS INDEX: 34.53 KG/M2 | WEIGHT: 220 LBS

## 2020-06-23 DIAGNOSIS — M14.672 CHARCOT'S JOINT OF LEFT FOOT: ICD-10-CM

## 2020-06-23 DIAGNOSIS — M25.572 PAIN IN JOINT, ANKLE AND FOOT, LEFT: Primary | ICD-10-CM

## 2020-06-23 DIAGNOSIS — M14.672 CHARCOT ANKLE, LEFT: ICD-10-CM

## 2020-06-23 ASSESSMENT — MIFFLIN-ST. JEOR: SCORE: 1587.6

## 2020-06-23 NOTE — NURSING NOTE
Teaching Flowsheet   Relevant Diagnosis: Charcot foot  Teaching Topic: preop Left HEATH Nixon lives in [place] withg her .  She works, has been able to move about with electric wheelchair.  She smokes, is working on quitting, diabetic on oral meds, takes 3 tabs ES Norco/day, gabapentin for neuropathy, and in Feb was hospitalized for bleeding ulcer.  She will plan admission to hospital one day prior to surgery for Epidural cath placement.     Person(s) involved in teaching:   Patient     Motivation Level:  Asks Questions: Yes  Eager to Learn: Yes  Cooperative: Yes  Receptive (willing/able to accept information): Yes  Any cultural factors/Jehovah's witness beliefs that may influence understanding or compliance? No  Comments:      Patient demonstrates understanding of the following:  Reason for the appointment, diagnosis and treatment plan: Yes  Knowledge of proper use of medications and conditions for which they are ordered (with special attention to potential side effects or drug interactions): Yes  Which situations necessitate calling provider and whom to contact: Yes       Teaching Concerns Addressed:   Comments:      Proper use and care of prosthetic shrinkers and dressings (medical equip, care aids, etc.): Yes  Nutritional needs and diet plan: Yes  Pain management techniques: Yes  Wound Care: Yes  How and/when to access community resources: NA     Instructional Materials Used/Given: preop pkt, antiseptic soap     Time spent with patient: 15 minutes.

## 2020-06-23 NOTE — NURSING NOTE
"Reason For Visit:   Chief Complaint   Patient presents with     Consult     Charcot left foot       Ht 1.689 m (5' 6.5\")   Wt 99.8 kg (220 lb)   BMI 34.98 kg/m      Pain Assessment  Patient Currently in Pain: [place]ha[place]ies    Lisette David ATC    "

## 2020-06-23 NOTE — PROGRESS NOTES
CHIEF COMPLAINT:  Left ankle Charcot arthropathy, type 3A.      HISTORY OF PRESENT ILLNESS:  Mrs. Tan is a 60-year-old female who presents today for evaluation of her left foot.  The patient has been diagnosed with Charcot arthropathy with severe neuropathy and deformity.  The patient has been evaluated in the past and we have already concluded that she would benefit from undergoing a below-the-knee amputation.  She has been working with a CROW brace which apparently has not been successful.  Given the recent pandemic, she has had to postpone her surgery or now presents for a discussion of treatment options.      The patient reports to still continue smoking.  She reports also to be extremely functional at home with 2 electric chairs, 1 a Roll-A-Bout and a manual wheelchair as well.  She is very confident that she does not need her legs to maneuver around the house.      PAST MEDICAL HISTORY, CURRENT MEDICATIONS AND ALLERGIES:  Reviewed today as well.      PHYSICAL EXAMINATION:  On today's visit, she presented with a severe rigid deformity of the left foot with a hindfoot severe varus.  There are no skin abrasions.  There are no palpable pulses.  She presented with some induration of the soft tissues, as well as some diminished swelling when compared to the rest of the leg which is secondary to the cast that she presented today with.      Three views of the ankle were reviewed today which were significant for showing severe deformity with complete destruction of the talus and some residual bone along the calcaneus.  All these changes are chronic and unchanged when compared to the last set of x-rays from 2019.      ASSESSMENT:  Left ankle Charcot arthropathy, type 3A.      PLAN:  I discussed with the patient that at this point we continue having the opinion that the only reasonable option for her will be to undergo a below-the-knee amputation.  I discussed with her the most likely postoperative course and  complications from undergoing such intervention, which include but are not limited to infection, bleeding, nerve damage, residual pain and phantom pain.      The patient also will be required to be admitted to the hospital 12 hours prior to the procedure in order to have an epidural catheter placed which will increase our chances to avoid phantom pain.      All questions were answered.  The patient was pleased with the discussion.  On today's visit, she was placed back into a short leg cast in order to allow her to pivot on the leg.      TT 30 minutes, CT 20 minutes.

## 2020-06-23 NOTE — LETTER
6/23/2020     RE: Tiffani Tan  [address] [facility] [address]    Dear Colleague,    Thank you for referring your patient, Tiffani Tan, to the [facility]. Please see a copy of my visit note below.    CHIEF COMPLAINT:  Left ankle Charcot arthropathy, type 3A.      HISTORY OF PRESENT ILLNESS:  Mrs. Tan is a 60-year-old female who presents today for evaluation of her left foot.  The patient has been diagnosed with Charcot arthropathy with severe neuropathy and deformity.  The patient has been evaluated in the past and we have already concluded that she would benefit from undergoing a below-the-knee amputation.  She has been working with a CROW brace which apparently has not been successful.  Given the recent pandemic, she has had to postpone her surgery or now presents for a discussion of treatment options.      The patient reports to still continue smoking.  She reports also to be extremely functional at home with 2 electric chairs, 1 a Roll-A-Bout and a manual wheelchair as well.  She is very confident that she does not need her legs to maneuver around the house.      PAST MEDICAL HISTORY, CURRENT MEDICATIONS AND ALLERGIES:  Reviewed today as well.      PHYSICAL EXAMINATION:  On today's visit, she presented with a severe rigid deformity of the left foot with a hindfoot severe varus.  There are no skin abrasions.  There are no palpable pulses.  She presented with some induration of the soft tissues, as well as some diminished swelling when compared to the rest of the leg which is secondary to the cast that she presented today with.      Three views of the ankle were reviewed today which were significant for showing severe deformity with complete destruction of the talus and some residual bone along the calcaneus.  All these changes are chronic and unchanged when compared to the last set of x-rays from 2019.      ASSESSMENT:  Left ankle Charcot arthropathy, type 3A.      PLAN:  I discussed with the  patient that at this point we continue having the opinion that the only reasonable option for her will be to undergo a below-the-knee amputation.  I discussed with her the most likely postoperative course and complications from undergoing such intervention, which include but are not limited to infection, bleeding, nerve damage, residual pain and phantom pain.      The patient also will be required to be admitted to the hospital 12 hours prior to the procedure in order to have an epidural catheter placed which will increase our chances to avoid phantom pain.      All questions were answered.  The patient was pleased with the discussion.  On today's visit, she was placed back into a short leg cast in order to allow her to pivot on the leg.      TT 30 minutes, CT 20 minutes.     Again, thank you for allowing me to participate in the care of your patient.      Sincerely,      Aniceto Adams MD

## 2020-06-23 NOTE — TELEPHONE ENCOUNTER
Warm transfer to imaging for pt to schedule chest x-ray. Pt has no further questions at this time.    Carri Nur RN  Gastroenterology Care Coordinator  [place], [place]

## 2020-06-24 ENCOUNTER — PREP FOR PROCEDURE (OUTPATIENT)
Dept: ORTHOPEDICS | Facility: CLINIC | Age: 60
End: 2020-06-24

## 2020-06-24 ENCOUNTER — HOSPITAL ENCOUNTER (OUTPATIENT)
Facility: AMBULATORY SURGERY CENTER | Age: 60
End: 2020-06-24
Attending: INTERNAL MEDICINE
Payer: COMMERCIAL

## 2020-06-24 ENCOUNTER — ALLIED HEALTH/NURSE VISIT (OUTPATIENT)
Dept: NURSING | Facility: CLINIC | Age: 60
End: 2020-06-24
Payer: COMMERCIAL

## 2020-06-24 DIAGNOSIS — L97.416 DIABETIC ULCER OF RIGHT MIDFOOT ASSOCIATED WITH TYPE 2 DIABETES MELLITUS, WITH BONE INVOLVEMENT WITHOUT EVIDENCE OF NECROSIS (H): ICD-10-CM

## 2020-06-24 DIAGNOSIS — M10.072 ACUTE IDIOPATHIC GOUT OF LEFT FOOT: ICD-10-CM

## 2020-06-24 DIAGNOSIS — M25.572 PAIN IN JOINT, ANKLE AND FOOT, LEFT: Primary | ICD-10-CM

## 2020-06-24 DIAGNOSIS — E11.621 DIABETIC ULCER OF RIGHT MIDFOOT ASSOCIATED WITH TYPE 2 DIABETES MELLITUS, WITH BONE INVOLVEMENT WITHOUT EVIDENCE OF NECROSIS (H): ICD-10-CM

## 2020-06-24 DIAGNOSIS — E11.42 TYPE 2 DIABETES MELLITUS WITH DIABETIC POLYNEUROPATHY, WITHOUT LONG-TERM CURRENT USE OF INSULIN (H): Primary | ICD-10-CM

## 2020-06-24 DIAGNOSIS — M10.071 ACUTE IDIOPATHIC GOUT OF RIGHT FOOT: ICD-10-CM

## 2020-06-24 DIAGNOSIS — I87.8 VENOUS STASIS: ICD-10-CM

## 2020-06-24 DIAGNOSIS — M14.672 CHARCOT ANKLE, LEFT: ICD-10-CM

## 2020-06-24 DIAGNOSIS — Z12.11 SPECIAL SCREENING FOR MALIGNANT NEOPLASMS, COLON: Primary | ICD-10-CM

## 2020-06-24 DIAGNOSIS — Z11.59 ENCOUNTER FOR SCREENING FOR OTHER VIRAL DISEASES: Primary | ICD-10-CM

## 2020-06-24 DIAGNOSIS — L97.323 SKIN ULCER OF LEFT ANKLE WITH NECROSIS OF MUSCLE (H): ICD-10-CM

## 2020-06-24 NOTE — PROGRESS NOTES
Tiffani Tan comes into clinic today at the request of Dr. Whitten for cast check and wound care.    S: Ulcer, right plantar first MPJ.  Ulcer, left lateral ankle, left anterior leg.   Drainage noted on both ulcers. No obvious infection. Pt complains of pain from left leg cast being too tight around her calf. She is not able to elevate well and uses her left leg much more that she should as she is supposed to be non weight bearing.     O: bilateral ulcers, both draining small amount of blood. Mostly serous fluid, but copious amounts. Left leg was dripping fluid through cast. Right foot ulcer seemed to have only small amount of drainage total.     A: Cast too tight, needs to be removed, not enough padding around top below knee. Bilateral ulcers washed with wound Vashe. Applied wet to dry dressings, 4x4 gauze padding, unna boots. Did complete multi-layer unna boot on right. Left stopped at Kerlix and then did cast padding and cast. Pt tolerated well.    P: Routine wound care discussed. The patient will follow up in 1 week, per podiatry plan. If there are any concerns or signs and symptoms of infection, patient will reach out to the clinic. Patient is agreeable with plan.    This service provided today was under the direct supervision of Dr. Giron, who was available if needed.    Vincenzo Farah RN

## 2020-06-24 NOTE — PATIENT INSTRUCTIONS
Thanks for coming today.  [facility]  [address] [place] [address]    To schedule future appointments in Ortho Clinic, you may call [phone].    To schedule ordered imaging by your provider:   Call Central Imaging [phone]    To schedule an injection ordered by your provider:  Call Central Imaging Injection scheduling line: [phone]  [facility] available online at:  [email or web address]    Please call if any further questions or concerns ([phone]).  Clinic hours 8 am to 5 pm.    Return to clinic (call) if symptoms worsen or fail to improve.

## 2020-07-01 ENCOUNTER — TELEPHONE (OUTPATIENT)
Dept: ORTHOPEDICS | Facility: CLINIC | Age: 60
End: 2020-07-01

## 2020-07-01 ENCOUNTER — ALLIED HEALTH/NURSE VISIT (OUTPATIENT)
Dept: NURSING | Facility: CLINIC | Age: 60
End: 2020-07-01
Payer: COMMERCIAL

## 2020-07-01 DIAGNOSIS — L97.323 SKIN ULCER OF LEFT ANKLE WITH NECROSIS OF MUSCLE (H): ICD-10-CM

## 2020-07-01 DIAGNOSIS — E11.621 DIABETIC ULCER OF RIGHT MIDFOOT ASSOCIATED WITH TYPE 2 DIABETES MELLITUS, WITH BONE INVOLVEMENT WITHOUT EVIDENCE OF NECROSIS (H): Primary | ICD-10-CM

## 2020-07-01 DIAGNOSIS — Z47.89 AFTERCARE FOR CAST OR SPLINT CHECK OR CHANGE: ICD-10-CM

## 2020-07-01 DIAGNOSIS — L97.416 DIABETIC ULCER OF RIGHT MIDFOOT ASSOCIATED WITH TYPE 2 DIABETES MELLITUS, WITH BONE INVOLVEMENT WITHOUT EVIDENCE OF NECROSIS (H): Primary | ICD-10-CM

## 2020-07-01 NOTE — TELEPHONE ENCOUNTER
Patient was in  clinic for dressing change. She would like to get surgery for her amputation scheduled.  Let her know that I would send a message to Dr. Louis team to call her about scheduling.  Haily Heath RN

## 2020-07-02 NOTE — TELEPHONE ENCOUNTER
Phoned patient to discuss scheduling surgery with Dr Adams. I explained to Tiffani the difficulties with obtaining operating time right now, but asked her to call me back with her desired timeframe for surgery so we can begin to work on finding a spot for her. I left her my direct number to call back when she is able. [phone].

## 2020-07-02 NOTE — PROGRESS NOTES
Cast/splint application    Date/Time: 7/2/2020 9:24 AM  Performed by: Haily Heath RN  Authorized by: Nolan Whitten DPM     Consent:     Consent obtained:  Verbal    Consent given by:  Patient    Risks discussed:  Discoloration, numbness, pain and swelling  Procedure details:     Laterality:  Left    Location:  Leg    Leg:  L lower leg    Cast type:  Short leg    Supplies:  [employer]

## 2020-07-02 NOTE — PROGRESS NOTES
Tiffani Tan comes into clinic today at the request of Dr. Whitten Ordering Provider for Dressing Change Unna boot and cast change.    S: Ulcer, right plantar first MPJ.  Ulcer, left lateral ankle, left anterior leg.   Patient here for a cast and Unna boot change and dressing change on right foot.     O: bilateral ulcers, both draining small amount of blood. Mostly serous fluid, but copious amounts.  Right foot ulcer seemed to have only small amount of drainage total.      A: Bilateral legs washed with wound Vashe. Applied wet to dry dressing, 4x4 gauze padding, unna boot. Did complete multi-layer unna boot on right. Let leg Unna boot, kerlix, cling and cast applied. Pt tolerated well.     P: Routine wound care discussed. The patient will follow up in 1 week, per podiatry plan. If there are any concerns or signs and symptoms of infection, patient will reach out to the clinic. Patient is agreeable with plan.    This service provided today was under the supervising provider of the day Dr. Diamond, who was available if needed.    Haily Heath RN

## 2020-07-03 RX ORDER — SODIUM, POTASSIUM,MAG SULFATES 17.5-3.13G
1 SOLUTION, RECONSTITUTED, ORAL ORAL SEE ADMIN INSTRUCTIONS
Qty: 2 BOTTLE | Refills: 0 | Status: SHIPPED | OUTPATIENT
Start: 2020-07-03 | End: 2020-07-08 | Stop reason: HOSPADM

## 2020-07-03 RX ORDER — BISACODYL 5 MG/1
15 TABLET, DELAYED RELEASE ORAL SEE ADMIN INSTRUCTIONS
Qty: 3 TABLET | Refills: 0 | Status: SHIPPED | OUTPATIENT
Start: 2020-07-03 | End: 2020-07-08 | Stop reason: HOSPADM

## 2020-07-07 ENCOUNTER — OFFICE VISIT (OUTPATIENT)
Dept: PODIATRY | Facility: CLINIC | Age: 60
End: 2020-07-07
Payer: COMMERCIAL

## 2020-07-07 DIAGNOSIS — M10.071 ACUTE IDIOPATHIC GOUT OF RIGHT FOOT: ICD-10-CM

## 2020-07-07 DIAGNOSIS — L97.416 DIABETIC ULCER OF RIGHT MIDFOOT ASSOCIATED WITH TYPE 2 DIABETES MELLITUS, WITH BONE INVOLVEMENT WITHOUT EVIDENCE OF NECROSIS (H): ICD-10-CM

## 2020-07-07 DIAGNOSIS — E11.621 DIABETIC ULCER OF RIGHT MIDFOOT ASSOCIATED WITH TYPE 2 DIABETES MELLITUS, WITH BONE INVOLVEMENT WITHOUT EVIDENCE OF NECROSIS (H): ICD-10-CM

## 2020-07-07 DIAGNOSIS — E11.42 TYPE 2 DIABETES MELLITUS WITH DIABETIC POLYNEUROPATHY, WITHOUT LONG-TERM CURRENT USE OF INSULIN (H): Primary | ICD-10-CM

## 2020-07-07 DIAGNOSIS — I87.8 VENOUS STASIS: ICD-10-CM

## 2020-07-07 DIAGNOSIS — L97.323 SKIN ULCER OF LEFT ANKLE WITH NECROSIS OF MUSCLE (H): ICD-10-CM

## 2020-07-07 DIAGNOSIS — M14.672 CHARCOT'S JOINT OF LEFT FOOT: ICD-10-CM

## 2020-07-07 DIAGNOSIS — M10.072 ACUTE IDIOPATHIC GOUT OF LEFT FOOT: ICD-10-CM

## 2020-07-07 PROCEDURE — 29405 APPL SHORT LEG CAST: CPT | Mod: LT | Performed by: PODIATRIST

## 2020-07-07 NOTE — PROGRESS NOTES
No past medical history on file.  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)     GI bleed     UGIB (upper gastrointestinal bleed)     Morbid obesity (H)     Past Surgical History:   Procedure Laterality Date     ESOPHAGOSCOPY, GASTROSCOPY, DUODENOSCOPY (EGD), COMBINED N/A 2/5/2020    Procedure: ESOPHAGOGASTRODUODENOSCOPY (EGD);  Surgeon: Tanya Dias MD;  Location:  GI     Social History     Socioeconomic History     Marital status: Single     Spouse name: Not on file     Number of children: Not on file     Years of education: Not on file     Highest education level: Not on file   Occupational History     Not on file   Social Needs     Financial resource strain: Not on file     Food insecurity     Worry: Not on file     Inability: Not on file     Transportation needs     Medical: Not on file     Non-medical: Not on file   Tobacco Use     Smoking status: Current Every Day Smoker     Packs/day: 1.00     Smokeless tobacco: Never Used   Substance and Sexual Activity     Alcohol use: Yes     Drug use: Not on file     Sexual activity: Not on file   Lifestyle     Physical activity     Days per week: Not on file     Minutes per session: Not on file     Stress: Not on file   Relationships     Social connections     Talks on phone: Not on file     Gets together: Not on file     Attends Mu-ism service: Not on file     Active member of club or organization: Not on file     Attends meetings of clubs or organizations: Not on file     Relationship status: Not on file     Intimate  partner violence     Fear of current or ex partner: Not on file     Emotionally abused: Not on file     Physically abused: Not on file     Forced sexual activity: Not on file   Other Topics Concern     Parent/sibling w/ CABG, MI or angioplasty before 65F 55M? Not Asked   Social History Narrative     Not on file     Family History   Problem Relation Age of Onset     No Known Problems Mother      No Known Problems Father      SUBJECTIVE FINDINGS:  60-year-old female returns to clinic for Charcot foot with ulceration left ankle and foot and ulcer right plantar first MPJ.  She relates she saw Dr. Adams.  It is just a matter of scheduling for a below-the-knee amputation on the left.  She is still waiting to hear back from them.  She relates she did get a blister on her left second toe since we have seen her last.  She is not sure how that happened.  She finished her Augmentin last week.  She relates she has had no problems with that.        OBJECTIVE FINDINGS:  DP and PT are 2/4 bilaterally.  She has peripheral edema bilaterally.  She has a right plantar first MPJ ulcer.  There is no dressing on it.  She took that off this morning to shower.  Ulcer is deep through the subcutaneous tissue.   There is some serosanguineous drainage.  No erythema, no odor, no calor.  Distal margins are flattening.  She has a left lateral ankle ulcer that is deep through the subcutaneous tissues.  There is some maceration.  No erythema, no serosanguineous drainage, some edema, no odor, no calor.  She has a blister ulceration on her left distal second toe with some erythema and edema.  No odor, no calor, some serosanguineous drainage.        ASSESSMENT/PLAN:  Ulcer, left lateral ankle.  Ulcer, right plantar first MPJ.  New blister ulcer, distal left second toe.  She had walked through the bottom of her cast.  She is diabetic with peripheral neuropathy and vascular disease and Charcot foot and ankle.  Diagnosis and treatment options discussed  with her.  Local wound care done upon consent today.  Wound Vashe wet-to-dry dressing applied to the ulcer sites.  Kerlix and Ace wraps to the right lower extremity applied upon consent.  Total contact cast applied to the left lower extremity upon consent with just a Band-Aid over the second toe.  I am going to renew her Augmentin.  She will see me next week pending when she can get scheduled for her surgical intervention.

## 2020-07-07 NOTE — PATIENT INSTRUCTIONS
Thanks for coming today.  [facility] Clinic  [address] [place] [address]    To schedule future appointments in Ortho Clinic, you may call [phone].    To schedule ordered imaging by your provider:   Call Central Imaging [phone]    To schedule an injection ordered by your provider:  Call Central Imaging Injection scheduling line: [phone]  [facility] available online at:  [email or web address]    Please call if any further questions or concerns ([phone]).  Clinic hours 8 am to 5 pm.    Return to clinic (call) if symptoms worsen or fail to improve.

## 2020-07-07 NOTE — LETTER
7/7/2020         RE: Tiffani Tan  [address] [facility] [address]        Dear Colleague,    Thank you for referring your patient, Tiffani Tan, to the [facility]. Please see a copy of my visit note below.    No past medical history on file.  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)     GI bleed     UGIB (upper gastrointestinal bleed)     Morbid obesity (H)     Past Surgical History:   Procedure Laterality Date     ESOPHAGOSCOPY, GASTROSCOPY, DUODENOSCOPY (EGD), COMBINED N/A 2/5/2020    Procedure: ESOPHAGOGASTRODUODENOSCOPY (EGD);  Surgeon: Tanya Dias MD;  Location: [facility]     Social History     Socioeconomic History     Marital status: Single     Spouse name: Not on file     Number of children: Not on file     Years of education: Not on file     Highest education level: Not on file   Occupational History     Not on file   Social Needs     Financial resource strain: Not on file     Food insecurity     Worry: Not on file     Inability: Not on file     Transportation needs     Medical: Not on file     Non-medical: Not on file   Tobacco Use     Smoking status: Current Every Day Smoker     Packs/day: 1.00     Smokeless tobacco: Never Used   Substance and Sexual Activity     Alcohol use: Yes     Drug use: Not on file     Sexual activity: Not on file   Lifestyle     Physical activity     Days per week: Not on file     Minutes per session: Not on file     Stress: Not on file   Relationships     Social connections     Talks on phone: Not on file     Gets  together: Not on file     Attends Zoroastrian service: Not on file     Active member of club or organization: Not on file     Attends meetings of clubs or organizations: Not on file     Relationship status: Not on file     Intimate partner violence     Fear of current or ex partner: Not on file     Emotionally abused: Not on file     Physically abused: Not on file     Forced sexual activity: Not on file   Other Topics Concern     Parent/sibling w/ CABG, MI or angioplasty before 65F 55M? Not Asked   Social History Narrative     Not on file     Family History   Problem Relation Age of Onset     No Known Problems Mother      No Known Problems Father      SUBJECTIVE FINDINGS:  60-year-old female returns to clinic for Charcot foot with ulceration left ankle and foot and ulcer right plantar first MPJ.  She relates she saw Dr. Adams.  It is just a matter of scheduling for a below-the-knee amputation on the left.  She is still waiting to hear back from them.  She relates she did get a blister on her left second toe since we have seen her last.  She is not sure how that happened.  She finished her Augmentin last week.  She relates she has had no problems with that.        OBJECTIVE FINDINGS:  DP and PT are 2/4 bilaterally.  She has peripheral edema bilaterally.  She has a right plantar first MPJ ulcer.  There is no dressing on it.  She took that off this morning to shower.  Ulcer is deep through the subcutaneous tissue.   There is some serosanguineous drainage.  No erythema, no odor, no calor.  Distal margins are flattening.  She has a left lateral ankle ulcer that is deep through the subcutaneous tissues.  There is some maceration.  No erythema, no serosanguineous drainage, some edema, no odor, no calor.  She has a blister ulceration on her left distal second toe with some erythema and edema.  No odor, no calor, some serosanguineous drainage.        ASSESSMENT/PLAN:  Ulcer, left lateral ankle.  Ulcer, right plantar first MPJ.   New blister ulcer, distal left second toe.  She had walked through the bottom of her cast.  She is diabetic with peripheral neuropathy and vascular disease and Charcot foot and ankle.  Diagnosis and treatment options discussed with her.  Local wound care done upon consent today.  Wound Vashe wet-to-dry dressing applied to the ulcer sites.  Kerlix and Ace wraps to the right lower extremity applied upon consent.  Total contact cast applied to the left lower extremity upon consent with just a Band-Aid over the second toe.  I am going to renew her Augmentin.  She will see me next week pending when she can get scheduled for her surgical intervention.                     Again, thank you for allowing me to participate in the care of your patient.        Sincerely,        Nolan Whitten DPM

## 2020-07-10 ENCOUNTER — HOSPITAL ENCOUNTER (INPATIENT)
Facility: CLINIC | Age: 60
Setting detail: SURGERY ADMIT
End: 2020-07-10
Attending: ORTHOPAEDIC SURGERY | Admitting: ORTHOPAEDIC SURGERY
Payer: COMMERCIAL

## 2020-07-10 ENCOUNTER — TELEPHONE (OUTPATIENT)
Dept: ORTHOPEDICS | Facility: CLINIC | Age: 60
End: 2020-07-10

## 2020-07-10 DIAGNOSIS — M14.672 CHARCOT ANKLE, LEFT: ICD-10-CM

## 2020-07-10 DIAGNOSIS — M25.572 PAIN IN JOINT, ANKLE AND FOOT, LEFT: ICD-10-CM

## 2020-07-10 NOTE — TELEPHONE ENCOUNTER
Tiffani was called and we reviewed the surgery plan with day before surgery for indwelling epidural catheter 24 hrs prior to below knee amputation.  Admission form was completed online for admission 7/21/2020 at 8:00 AM.  Pt verbalized understanding of admission date and location, [facility].  Gayb Real RN

## 2020-07-10 NOTE — TELEPHONE ENCOUNTER
Patient is scheduled for surgery with Dr. Adams    Spoke or left message with: Patient    Date of Surgery: 7/22/20    Location: [facility]    Informed patient they will need an adult  : Yes, patient's  will bring her to surgery     Pre-op with surgeon (if applicable): Complete    H&P: Patient will schedule with PCP at [facility]    Additional imaging/appointments: N/A    Surgery packet: Received in clinic     Additional comments: Patient aware she will be contacted to set up COVID test prior to surgery. Patient planning to be admitted the night prior to surgery per Dr Adams's last clinic note.

## 2020-07-17 ENCOUNTER — TELEPHONE (OUTPATIENT)
Dept: ORTHOPEDICS | Facility: CLINIC | Age: 60
End: 2020-07-17

## 2020-07-17 NOTE — TELEPHONE ENCOUNTER
Health Call Center    Phone Message    May a detailed message be left on voicemail: yes     Reason for Call: Other: [facility] is calling because they performed the patient pre-op today (7/17/20) for the patients surgery on 7/22/20 and had some questions and concerns to express with the care team, her hemoglobin 9.0 today they want to know the doctors comfort level knowing this information or if additional concerns needs to be address please review and follow up with [facility] thank you.     Action Taken: Message routed to:  [facility]o    Travel Screening: Not Applicable

## 2020-07-17 NOTE — TELEPHONE ENCOUNTER
Tiffani is scheduled for left below knee amputation next week on 7/22/20 with admission day before for indwelling catheter placement.  Dr Adams was given the message regarding today's preop physical and low hemoglobin level = 9.0.  Pt's hgb hx includes:  5/29/20 = 9.2  5/21/20=9.4  4/29/20 = 8.6  4/20/20 = 8.4  3/19/20 = 8.8  We will contact the PCP office once we hear back from Dr Adams about the plan.  Gaby Real RN    5:00PM  Lelo was phoned back after response from Dr Adams asked the primary clinic to make the call if pt is medically optimized with a hgb at that level.  Lelo stated she would look through the records to get a better understanding of the pt and get back to us.  Gaby Real RN

## 2020-07-20 DIAGNOSIS — Z11.59 ENCOUNTER FOR SCREENING FOR OTHER VIRAL DISEASES: ICD-10-CM

## 2020-07-20 PROCEDURE — U0003 INFECTIOUS AGENT DETECTION BY NUCLEIC ACID (DNA OR RNA); SEVERE ACUTE RESPIRATORY SYNDROME CORONAVIRUS 2 (SARS-COV-2) (CORONAVIRUS DISEASE [COVID-19]), AMPLIFIED PROBE TECHNIQUE, MAKING USE OF HIGH THROUGHPUT TECHNOLOGIES AS DESCRIBED BY CMS-2020-01-R: HCPCS | Performed by: ORTHOPAEDIC SURGERY

## 2020-07-21 ENCOUNTER — HOSPITAL ENCOUNTER (INPATIENT)
Facility: CLINIC | Age: 60
LOS: 6 days | Discharge: NURSING, REHAB OR LONG-TERM CARE | End: 2020-07-27
Attending: ORTHOPAEDIC SURGERY | Admitting: ORTHOPAEDIC SURGERY
Payer: COMMERCIAL

## 2020-07-21 ENCOUNTER — ANESTHESIA (OUTPATIENT)
Dept: SURGERY | Facility: CLINIC | Age: 60
End: 2020-07-21
Payer: COMMERCIAL

## 2020-07-21 ENCOUNTER — ANESTHESIA EVENT (OUTPATIENT)
Dept: SURGERY | Facility: CLINIC | Age: 60
End: 2020-07-21
Payer: COMMERCIAL

## 2020-07-21 DIAGNOSIS — S88.119A BELOW-KNEE AMPUTATION (H): Primary | ICD-10-CM

## 2020-07-21 DIAGNOSIS — M14.672 CHARCOT ANKLE, LEFT: ICD-10-CM

## 2020-07-21 DIAGNOSIS — M25.572 PAIN IN JOINT, ANKLE AND FOOT, LEFT: ICD-10-CM

## 2020-07-21 LAB
GLUCOSE BLDC GLUCOMTR-MCNC: 115 MG/DL (ref 70–99)
GLUCOSE BLDC GLUCOMTR-MCNC: 45 MG/DL (ref 70–99)
GLUCOSE BLDC GLUCOMTR-MCNC: 54 MG/DL (ref 70–99)
GLUCOSE BLDC GLUCOMTR-MCNC: 63 MG/DL (ref 70–99)
GLUCOSE BLDC GLUCOMTR-MCNC: 82 MG/DL (ref 70–99)
GLUCOSE BLDC GLUCOMTR-MCNC: 88 MG/DL (ref 70–99)
GLUCOSE BLDC GLUCOMTR-MCNC: 93 MG/DL (ref 70–99)
HBA1C MFR BLD: 5.5 % (ref 0–5.6)
INR PPP: 1.29 (ref 0.86–1.14)
LACTATE BLD-SCNC: 0.9 MMOL/L (ref 0.7–2)
PLATELET # BLD AUTO: 211 10E9/L (ref 150–450)
SARS-COV-2 RNA SPEC QL NAA+PROBE: NOT DETECTED
SPECIMEN SOURCE: NORMAL

## 2020-07-21 PROCEDURE — 25000128 H RX IP 250 OP 636: Performed by: ANESTHESIOLOGY

## 2020-07-21 PROCEDURE — 83036 HEMOGLOBIN GLYCOSYLATED A1C: CPT | Performed by: HOSPITALIST

## 2020-07-21 PROCEDURE — 40000170 ZZH STATISTIC PRE-PROCEDURE ASSESSMENT II

## 2020-07-21 PROCEDURE — 25000132 ZZH RX MED GY IP 250 OP 250 PS 637: Performed by: CLINICAL NURSE SPECIALIST

## 2020-07-21 PROCEDURE — 99222 1ST HOSP IP/OBS MODERATE 55: CPT | Performed by: HOSPITALIST

## 2020-07-21 PROCEDURE — 36415 COLL VENOUS BLD VENIPUNCTURE: CPT | Performed by: ORTHOPAEDIC SURGERY

## 2020-07-21 PROCEDURE — 99207 ZZC CONSULT E&M CHANGED TO INITIAL LEVEL: CPT | Performed by: HOSPITALIST

## 2020-07-21 PROCEDURE — 25800030 ZZH RX IP 258 OP 636: Performed by: ANESTHESIOLOGY

## 2020-07-21 PROCEDURE — 00000146 ZZHCL STATISTIC GLUCOSE BY METER IP

## 2020-07-21 PROCEDURE — 83036 HEMOGLOBIN GLYCOSYLATED A1C: CPT | Performed by: ANESTHESIOLOGY

## 2020-07-21 PROCEDURE — 00HU33Z INSERTION OF INFUSION DEVICE INTO SPINAL CANAL, PERCUTANEOUS APPROACH: ICD-10-PCS | Performed by: ANESTHESIOLOGY

## 2020-07-21 PROCEDURE — 3E0R3BZ INTRODUCTION OF ANESTHETIC AGENT INTO SPINAL CANAL, PERCUTANEOUS APPROACH: ICD-10-PCS | Performed by: ANESTHESIOLOGY

## 2020-07-21 PROCEDURE — 83605 ASSAY OF LACTIC ACID: CPT | Performed by: ORTHOPAEDIC SURGERY

## 2020-07-21 PROCEDURE — 85049 AUTOMATED PLATELET COUNT: CPT | Performed by: ANESTHESIOLOGY

## 2020-07-21 PROCEDURE — 25800025 ZZH RX 258: Performed by: HOSPITALIST

## 2020-07-21 PROCEDURE — 85610 PROTHROMBIN TIME: CPT | Performed by: ANESTHESIOLOGY

## 2020-07-21 PROCEDURE — 25000132 ZZH RX MED GY IP 250 OP 250 PS 637: Performed by: HOSPITALIST

## 2020-07-21 PROCEDURE — 12000001 ZZH R&B MED SURG/OB UMMC

## 2020-07-21 RX ORDER — OXYCODONE HYDROCHLORIDE 5 MG/1
5-10 TABLET ORAL
Status: DISCONTINUED | OUTPATIENT
Start: 2020-07-21 | End: 2020-07-22

## 2020-07-21 RX ORDER — DEXTROSE MONOHYDRATE 25 G/50ML
25-50 INJECTION, SOLUTION INTRAVENOUS
Status: DISCONTINUED | OUTPATIENT
Start: 2020-07-21 | End: 2020-07-27 | Stop reason: HOSPADM

## 2020-07-21 RX ORDER — HYDROMORPHONE HCL/0.9% NACL/PF 0.2MG/0.2
0.2 SYRINGE (ML) INTRAVENOUS
Status: DISCONTINUED | OUTPATIENT
Start: 2020-07-21 | End: 2020-07-27 | Stop reason: HOSPADM

## 2020-07-21 RX ORDER — NALOXONE HYDROCHLORIDE 0.4 MG/ML
.1-.4 INJECTION, SOLUTION INTRAMUSCULAR; INTRAVENOUS; SUBCUTANEOUS
Status: DISCONTINUED | OUTPATIENT
Start: 2020-07-21 | End: 2020-07-23

## 2020-07-21 RX ORDER — GABAPENTIN 600 MG/1
600 TABLET ORAL 3 TIMES DAILY
Status: DISCONTINUED | OUTPATIENT
Start: 2020-07-21 | End: 2020-07-22

## 2020-07-21 RX ORDER — FENTANYL CITRATE 50 UG/ML
25-50 INJECTION, SOLUTION INTRAMUSCULAR; INTRAVENOUS
Status: DISCONTINUED | OUTPATIENT
Start: 2020-07-21 | End: 2020-07-21

## 2020-07-21 RX ORDER — AMOXICILLIN 250 MG
1-2 CAPSULE ORAL 2 TIMES DAILY
Status: DISCONTINUED | OUTPATIENT
Start: 2020-07-21 | End: 2020-07-27 | Stop reason: HOSPADM

## 2020-07-21 RX ORDER — NICOTINE POLACRILEX 4 MG
15-30 LOZENGE BUCCAL
Status: DISCONTINUED | OUTPATIENT
Start: 2020-07-21 | End: 2020-07-27 | Stop reason: HOSPADM

## 2020-07-21 RX ORDER — LOSARTAN POTASSIUM 100 MG/1
100 TABLET ORAL ONCE
Status: DISCONTINUED | OUTPATIENT
Start: 2020-07-21 | End: 2020-07-21

## 2020-07-21 RX ORDER — POLYETHYLENE GLYCOL 3350 17 G/17G
17 POWDER, FOR SOLUTION ORAL DAILY PRN
Status: DISCONTINUED | OUTPATIENT
Start: 2020-07-21 | End: 2020-07-27 | Stop reason: HOSPADM

## 2020-07-21 RX ORDER — SIMVASTATIN 20 MG
40 TABLET ORAL AT BEDTIME
Status: DISCONTINUED | OUTPATIENT
Start: 2020-07-21 | End: 2020-07-27 | Stop reason: HOSPADM

## 2020-07-21 RX ORDER — PANTOPRAZOLE SODIUM 40 MG/1
40 TABLET, DELAYED RELEASE ORAL
Status: DISCONTINUED | OUTPATIENT
Start: 2020-07-22 | End: 2020-07-27 | Stop reason: HOSPADM

## 2020-07-21 RX ORDER — FLUMAZENIL 0.1 MG/ML
0.2 INJECTION, SOLUTION INTRAVENOUS
Status: DISCONTINUED | OUTPATIENT
Start: 2020-07-21 | End: 2020-07-21

## 2020-07-21 RX ORDER — SIMVASTATIN 40 MG
40 TABLET ORAL AT BEDTIME
COMMUNITY

## 2020-07-21 RX ORDER — LIDOCAINE HYDROCHLORIDE AND EPINEPHRINE 15; 5 MG/ML; UG/ML
INJECTION, SOLUTION EPIDURAL PRN
Status: DISCONTINUED | OUTPATIENT
Start: 2020-07-21 | End: 2020-07-27

## 2020-07-21 RX ORDER — NALOXONE HYDROCHLORIDE 0.4 MG/ML
.1-.4 INJECTION, SOLUTION INTRAMUSCULAR; INTRAVENOUS; SUBCUTANEOUS
Status: DISCONTINUED | OUTPATIENT
Start: 2020-07-21 | End: 2020-07-21

## 2020-07-21 RX ORDER — GABAPENTIN 600 MG/1
600 TABLET ORAL ONCE
Status: COMPLETED | OUTPATIENT
Start: 2020-07-21 | End: 2020-07-22

## 2020-07-21 RX ORDER — NICOTINE POLACRILEX 4 MG
15-30 LOZENGE BUCCAL
Status: DISCONTINUED | OUTPATIENT
Start: 2020-07-21 | End: 2020-07-21

## 2020-07-21 RX ORDER — LANOLIN ALCOHOL/MO/W.PET/CERES
100 CREAM (GRAM) TOPICAL DAILY
Status: DISCONTINUED | OUTPATIENT
Start: 2020-07-22 | End: 2020-07-27 | Stop reason: HOSPADM

## 2020-07-21 RX ORDER — HYDROCHLOROTHIAZIDE 25 MG/1
25 TABLET ORAL ONCE
Status: COMPLETED | OUTPATIENT
Start: 2020-07-21 | End: 2020-07-21

## 2020-07-21 RX ORDER — ACETAMINOPHEN 325 MG/1
650 TABLET ORAL EVERY 4 HOURS PRN
Status: DISCONTINUED | OUTPATIENT
Start: 2020-07-21 | End: 2020-07-22

## 2020-07-21 RX ORDER — METHOCARBAMOL 750 MG/1
750 TABLET, FILM COATED ORAL 3 TIMES DAILY
Status: DISCONTINUED | OUTPATIENT
Start: 2020-07-21 | End: 2020-07-27 | Stop reason: HOSPADM

## 2020-07-21 RX ORDER — SODIUM CHLORIDE 9 MG/ML
1000 INJECTION, SOLUTION INTRAVENOUS CONTINUOUS
Status: DISCONTINUED | OUTPATIENT
Start: 2020-07-21 | End: 2020-07-22

## 2020-07-21 RX ORDER — DEXTROSE MONOHYDRATE 25 G/50ML
25-50 INJECTION, SOLUTION INTRAVENOUS
Status: DISCONTINUED | OUTPATIENT
Start: 2020-07-21 | End: 2020-07-21

## 2020-07-21 RX ORDER — DIAZEPAM 5 MG
5-20 TABLET ORAL EVERY 30 MIN PRN
Status: DISCONTINUED | OUTPATIENT
Start: 2020-07-21 | End: 2020-07-27 | Stop reason: HOSPADM

## 2020-07-21 RX ORDER — GABAPENTIN 600 MG/1
600 TABLET ORAL 3 TIMES DAILY
Status: ON HOLD | COMMUNITY
End: 2020-07-27

## 2020-07-21 RX ORDER — LOSARTAN POTASSIUM 100 MG/1
100 TABLET ORAL ONCE
Status: COMPLETED | OUTPATIENT
Start: 2020-07-21 | End: 2020-07-21

## 2020-07-21 RX ORDER — LANOLIN ALCOHOL/MO/W.PET/CERES
100 CREAM (GRAM) TOPICAL DAILY
Status: ON HOLD | COMMUNITY
End: 2020-08-04

## 2020-07-21 RX ADMIN — GABAPENTIN 600 MG: 600 TABLET, FILM COATED ORAL at 18:05

## 2020-07-21 RX ADMIN — DOCUSATE SODIUM 50 MG AND SENNOSIDES 8.6 MG 2 TABLET: 8.6; 5 TABLET, FILM COATED ORAL at 19:39

## 2020-07-21 RX ADMIN — FENTANYL CITRATE 50 MCG: 50 INJECTION INTRAMUSCULAR; INTRAVENOUS at 14:47

## 2020-07-21 RX ADMIN — MIDAZOLAM 1 MG: 1 INJECTION INTRAMUSCULAR; INTRAVENOUS at 14:47

## 2020-07-21 RX ADMIN — DEXTROSE MONOHYDRATE 25 ML: 25 INJECTION, SOLUTION INTRAVENOUS at 13:39

## 2020-07-21 RX ADMIN — HYDROCHLOROTHIAZIDE 25 MG: 25 TABLET ORAL at 18:05

## 2020-07-21 RX ADMIN — BUPIVACAINE HYDROCHLORIDE: 7.5 INJECTION, SOLUTION EPIDURAL; RETROBULBAR at 16:14

## 2020-07-21 RX ADMIN — LOSARTAN POTASSIUM 100 MG: 100 TABLET, FILM COATED ORAL at 18:05

## 2020-07-21 RX ADMIN — METHOCARBAMOL 750 MG: 750 TABLET, FILM COATED ORAL at 19:39

## 2020-07-21 NOTE — PHARMACY-ADMISSION MEDICATION HISTORY
Admission medication history interview status for the 7/21/2020 admission is complete. See Epic admission navigator for allergy information, pharmacy, prior to admission medications and immunization status.     Medication history interview sources:  Patient and SureScripts.    Changes made to PTA medication list (reason)  Added: Omeprazole 20 mg capsule: Take 1 capsule by mouth 2 timesdaily.  Deleted: Clindamycin 300 mg capsule: Take 1 capsule by mouth 2 times daily.    Colchicine 0.6 mg tablet: Take 1 capsule by mouth 2 times daily.    Econazole nitrate 1% external cream: Apply topically daily to toes.   Folic acid 1 mg tablet: Take 1 tablet by mouth daily.  Changed: Gabapentin 600 mg tablet changed from TK 2 TS PO  TID OK TO TK 1 ADDITIONAL T PRN to --> Take 600 mg by mouth three times daily. (Per patient use.)       Additional medication history information (including reliability of information, actions taken by pharmacist):    -Patient takes Norco more frequently than prescribed. She takes 1 tablet by mouth every 4 hours as needed for pain instead of 1-2 tablets by mouth every 8 hours as needed for pain.   -Patient takes Protonix 40 mg EC tablet by mouth twice daily as prescribed by Noam Foss MD (see note on 3/3/2020). However, insurance only covers once daily dosing. When she runs out of the Protonix, she takes Prilosec 20 mg by mouth twice daily.       Prior to Admission medications    Medication Sig Last Dose Taking? Auth Provider   amoxicillin-clavulanate (AUGMENTIN) 875-125 MG tablet Take 1 tablet by mouth 2 times daily 7/21/2020 at 0600 Yes Nolan Whitten DPM   gabapentin (NEURONTIN) 600 MG tablet Take 600 mg by mouth 3 times daily 7/21/2020 at 0600 Yes Unknown, Entered By History   glimepiride (AMARYL) 2 MG tablet TAKE 1 TABLET(2 MG) BY MOUTH EVERY MORNING BEFORE BREAKFAST 7/21/2020 at 0600 Yes Reported, Patient   hydrochlorothiazide (HYDRODIURIL) 25 MG tablet TAKE 1 TABLET BY MOUTH  DAILY WITH 100 MG LOSARTAN 7/21/2020 at 0600 Yes Reported, Patient   HYDROcodone-acetaminophen (NORCO) 7.5-325 MG per tablet Take 1-2 tablets by mouth every 8 hours as needed for severe pain  Patient taking differently: Take 1-2 tablets by mouth every 8 hours as needed for severe pain Patient takes differently: 1 T PO q4h prn pain 7/21/2020 at 0600 Yes Nolan Whitten DPM   losartan (COZAAR) 100 MG tablet TAKE 1 TABLET BY MOUTH DAILY WITH 25MG HCTZ 7/21/2020 at 0600 Yes Reported, Patient   omeprazole (PRILOSEC) 20 MG DR capsule Take 20 mg by mouth 2 times daily Patient takes when she runs out of protonix. Insurance only pays for once daily protonix. 7/21/2020 at 0600 Yes Unknown, Entered By History   simvastatin (ZOCOR) 40 MG tablet Take 40 mg by mouth At Bedtime 7/21/2020 at 0600 Yes Unknown, Entered By History   thiamine (B-1) 100 MG tablet Take 100 mg by mouth daily 7/21/2020 at 0600 Yes Unknown, Entered By History   blood glucose (CONTOUR NEXT TEST) test strip apply 1 strip by finger poke route 2 times per day. not a med at not a med  Reported, Patient   blood glucose monitoring ([employer]) lancets by subcutaneous route 2 times per day. not a med at not a med  Reported, Patient   order for DME Equipment being ordered: [id number] $35  shoe post op mens md not a med at not a med  Nolan Whitten DPM   order for DME Equipment being ordered: DME [id number] $70   Yasir  not a med at not a med  Daniel Giron DPM   pantoprazole (PROTONIX) 40 MG EC tablet Take 1 tablet (40 mg) by mouth every morning (before breakfast)  Patient taking differently: Take 40 mg by mouth every morning (before breakfast) Patient takes BID. More than a month at Unknown time  Roger Farooq MD         Medication history completed by: Dulce Quinonez, PharmD Student

## 2020-07-21 NOTE — PLAN OF CARE
VS: /54   Pulse 93   Temp 99  F (37.2  C) (Oral)   Resp 16   SpO2 98%    O2: >90% 1LPM    Output: Dueñas adequate amount.   Last BM: 7/20   Activity: Bedrest.   Skin: Would and discoloration, erythema, edema.    Pain: Epidural in place.   CMS: Baseline neuropathy.   Dressing: Cast to the LLE, and Ace wrap to RUE.   Diet: Regular diet, tolerated well.    LDA: PIV to the  R- hand infusing continued, and epidural infusing continues.    Equipment: IV pole and pump. Personal  belonging.    Plan: Surgery tomorrow.    Additional Info:

## 2020-07-21 NOTE — OR NURSING
Floor RN notified of critical sepsis criteria met;  lactic acid and q30 VS per order.  Will send patient to floor.

## 2020-07-21 NOTE — CONSULTS
[facility], [place]    Hospitalist Consultation    Date of Admission:  7/21/2020  Date of Consult (When I saw the patient): 07/21/20    Assessment & Plan      Tiffani Tan is a 60 year old female with a past medical history of hypertension, dyslipidemia, obesity, type 2 diabetes, chronic kidney disease, neuropathy in the feet, GI bleed from peptic ulcer disease, venous stasis changes in the bilateral lower extremity, Charcot foot deformity left foot.  Patient was brought in for left below-knee amputation.  She was seen for preoperative evaluation on 17 July 2020.  No clear decision was made at that visit for her preoperative eval.    # Preoperative Eval: Patient was seen for preoperative evaluation on 17 July.  Based on my assessment patient has no RCRI risk factors.  However she is not very healthy.  Her exercise tolerance is very limited.  The best it could be rated at 1-2 METS.  She smokes and she drinks alcohol.  And recent EGD there was some evidence of portal hypertensive gastropathy, she may have early liver disease.  She is quite obese as well.  Hence we would risk stratify her as moderate risk candidate for moderate risk noncardiac surgery.  She is optimized the best one could.    Echo in February 2020 showed normal global left ventricular function with EF more than 70%.  Left ventricle was hyperdynamic resulting in cavity obliteration in the cystoscopy and a peak mid cavity gradient of 50 with Valsalva maneuver.  Right ventricle was normal.  No valvular disease was noted.  Patient never had a stress test done before.    # Hypertension: Prior to admission takes hydrochlorothiazide 25 mg p.o. daily, losartan 100 mg p.o. daily.    -Can use those both today.    -But will hold these medication on the day of surgery.  In the postop.  Will resume as able based on the blood pressure and urine output.    # Dyslipidemia: Prior to admission takes Zocor 40 mg p.o. daily.  -Continue that  #  Obesity:   -Outpatient weight loss    # Type 2 diabetes, hemoglobin A1c 5.5:     Does not take insulin.  On glimepiride 2 mg once a day.  Hemoglobin A1c today was 5.5.  She took her glimepiride this morning however did not eat.  Blood sugar was 65.  She is getting IV dextrose to correct the hypoglycemia.  -We will hold glimepiride for now.  Patient will be started on medium sliding scale insulin  -We will adjust insulin regimen based on the sugars.    # Chronic kidney disease, baseline creatinine 1.9-1.1  -In February 2020 urine albumin was negative  # Neuropathy in the feet: Reports some pain in the feet chronically.  Takes 600 mg of gabapentin 3 times daily.    -Continue gabapentin  # History of GI bleed from gastric ulcers, status post EGD in February 2020 which showed 3 gastric ulcers.  She had some esophagitis 2.  She was noted to have some portal hypertensive gastropathy as well.  She is being treated with Protonix 40 mg daily.  She is supposed to have follow-up EGD in the future.   -Continue Protonix, follow-up with GI as outpatient.      # Venous stasis changes in the bilateral lower extremity:   -Lymphedema consult for leg compression on the right side.  Tomorrow going for left below-knee amputation.  -The redness in the right lower extremity is chronic in nature per her report    # Charcot foot deformity left foot.  She is going under left below-knee amputation tomorrow.    # Tobacco use disorder: Smokes more than half pack per day. No history of COPD or shortness of breath.  No wheezing on exam.    -Consider PRN albuterol   -good incentive spirometry tobacco cessation.    -Could use nicotine patches or gums    # Alcohol use disorder, Portal hypertensive gastropathy: She drinks 2 drinks of vodka daily.  February 2020 she had an ultrasound of the abdomen with duplex.  It showed hepatomegaly with echogenic, heterogenous liver parenchyma and mildly thickened gallbladder wall most compatible with chronic  intrinsic parenchymal disease.  On the EGD she had portal hypertensive gastropathy in February 2020.  Patient continues to drink alcohol.  And smoke as well.    -I think it is worth repeating ultrasound of the liver.  But that should not hold her surgery.  -Start alcohol withdrawal protocol    DVT Prophylaxis: Per primary team  Code Status: Prior    Disposition: Per primary team.    Kiki Pate MD     Reason for Consult   Reason for consult: Pre op eval    Primary Care Physician   Wily Bonilla    Chief Complaint   Left BKA    History of Present Illness      Tiffani Tan is a 60 year old female with a past medical history of hypertension, dyslipidemia, obesity, type 2 diabetes, chronic kidney disease, neuropathy in the feet, GI bleed from peptic ulcer disease, venous stasis changes in the bilateral lower extremity, Charcot foot deformity left foot.  Patient was brought in for left below-knee amputation.  She was seen for preoperative evaluation on 17 July 2020.  No clear decision was made at that visit for her preoperative eval.    Patient denies any history of coronary disease, heart attack or heart failure or heart arrhythmias.  She denies any history of liver disease.  She has chronic kidney disease but has not required dialysis in the past.  She reports smoking little more than half pack per day.  There is no history of use of inhalers in the past.  She has obesity but denies any history of snoring or sleep apnea.  Her quality of sleep is good.  She does not doze off during the daytime.    Patient uses wheelchair many times at home.  Her physical activity is limited due to pain in her bilateral lower extremity.  She has a cast in place on the left lower extremity.  She can stand on the legs for short period of time but she cannot walk for long distances.  She can walk for short distances.  She does limited chores at home like cleaning and doing the  and fixing a meal, vacuuming etc.  Her  physical activity can be related to 1 to 2 M ETS.    She denies any history of DVT or PE in the past.  There is no family history of DVT or PE.    No history of anesthetic complications or bleeding diathesis.    She has diabetes but does not use insulin.    She denies any chest pain or shortness of breath there is no cough or phlegm.  No nausea vomiting stomachache no diarrhea or constipation issues.    Her right lower extremity is reddish looking but that is chronic in nature.  She has some skin flaking.  There is no worsening pain.  There is no fever sweats or chills.  No prior infections in the right lower extremity.  She has a chronic ulcer on her right foot.      Past Medical History      Hypertension,   Dyslipidemia,   Obesity,   Type 2 diabetes, hemoglobin A1c 5.5  Chronic kidney disease, baseline creatinine 1.9-1.1  Neuropathy in the feet,   GI bleed from gastric ulcers, status post EGD in February 2020.  She was also noted to have Portal hypertensive gastropathy  venous stasis changes in the bilateral lower extremity,   Charcot foot deformity left foot.   Tobacco use disorder  Alcohol use disorder      Past Surgical History   I have reviewed this patient's surgical history and updated it with pertinent information if needed.  Past Surgical History:   Procedure Laterality Date     ESOPHAGOSCOPY, GASTROSCOPY, DUODENOSCOPY (EGD), COMBINED N/A 2/5/2020    Procedure: ESOPHAGOGASTRODUODENOSCOPY (EGD);  Surgeon: Tanya Dias MD;  Location: [facility]       Prior to Admission Medications   Prior to Admission Medications   Prescriptions Last Dose Informant Patient Reported? Taking?   HYDROcodone-acetaminophen (NORCO) 7.5-325 MG per tablet 7/21/2020 at 0600 Self No Yes   Sig: Take 1-2 tablets by mouth every 8 hours as needed for severe pain   Patient taking differently: Take 1-2 tablets by mouth every 8 hours as needed for severe pain Patient takes differently: 1 T PO q4h prn pain   amoxicillin-clavulanate  (AUGMENTIN) 875-125 MG tablet 7/21/2020 at 0600 Self No Yes   Sig: Take 1 tablet by mouth 2 times daily   blood glucose (CONTOUR NEXT TEST) test strip not a med at not a med Self Yes No   Sig: apply 1 strip by finger poke route 2 times per day.   blood glucose monitoring (MIGUEL A MICROLET) lancets not a med at not a med Self Yes No   Sig: by subcutaneous route 2 times per day.   gabapentin (NEURONTIN) 600 MG tablet 7/21/2020 at 0600 Self Yes Yes   Sig: Take 600 mg by mouth 3 times daily   glimepiride (AMARYL) 2 MG tablet 7/21/2020 at 0600 Self Yes Yes   Sig: TAKE 1 TABLET(2 MG) BY MOUTH EVERY MORNING BEFORE BREAKFAST   hydrochlorothiazide (HYDRODIURIL) 25 MG tablet 7/21/2020 at 0600 Self Yes Yes   Sig: TAKE 1 TABLET BY MOUTH DAILY WITH 100 MG LOSARTAN   losartan (COZAAR) 100 MG tablet 7/21/2020 at 0600 Self Yes Yes   Sig: TAKE 1 TABLET BY MOUTH DAILY WITH 25MG HCTZ   omeprazole (PRILOSEC) 20 MG DR capsule 7/21/2020 at 0600 Self Yes Yes   Sig: Take 20 mg by mouth 2 times daily Patient takes when she runs out of protonix. Insurance only pays for once daily protonix.   order for DME not a med at not a med Self No No   Sig: Equipment being ordered: DME [id number] $70   Shoe    order for DME not a med at not a med Self No No   Sig: Equipment being ordered: [id number] $35  shoe post op mens md   pantoprazole (PROTONIX) 40 MG EC tablet More than a month at Unknown time Self No No   Sig: Take 1 tablet (40 mg) by mouth every morning (before breakfast)   Patient taking differently: Take 40 mg by mouth every morning (before breakfast) Patient takes BID.   simvastatin (ZOCOR) 40 MG tablet 7/21/2020 at 0600 Self Yes Yes   Sig: Take 40 mg by mouth At Bedtime   thiamine (B-1) 100 MG tablet 7/21/2020 at 0600 Self Yes Yes   Sig: Take 100 mg by mouth daily      Facility-Administered Medications: None     Allergies   No Known Allergies    Social History   I have reviewed this patient's social history and updated it with  pertinent information if needed. Tiffani Tan  reports that she has been smoking. She has been smoking about 1.00 pack per day. She has never used smokeless tobacco. She reports current alcohol use.    Family History   I have reviewed this patient's family history and updated it with pertinent information if needed.   Family History   Problem Relation Age of Onset     No Known Problems Mother      No Known Problems Father    Negative for DVT or PE in the family    Review of Systems   The 10 point Review of Systems is negative other than noted in the HPI or here.     Physical Exam   Temp: 98.1  F (36.7  C)   BP: (!) 149/55 Pulse: 101   Resp: 16 SpO2: 97 % O2 Device: None (Room air)    Vital Signs with Ranges  Temp:  [98.1  F (36.7  C)] 98.1  F (36.7  C)  Pulse:  [101] 101  Resp:  [16] 16  BP: (149)/(55) 149/55  SpO2:  [97 %] 97 %  0 lbs 0 oz    Constitutional: Obese awake, alert, cooperative, no apparent distress.  Eyes: Conjunctiva and pupils examined and normal.  HEENT: Neck is thick, moist mucous membranes, difficult to do neck exam due to obesity  Respiratory: Clear to auscultation bilaterally, no crackles or wheezing.  Cardiovascular: S1-S2 normal, systolic murmur noted in the aortic area, 3 out of 6  GI: Abdomen is protuberant, bowel sounds are diminished but present.  Abdomen soft nontender.  Lymph/Hematologic: Difficult to do exam due to obesity  Skin: Bilateral lower extremity venous stasis changes noted especially the right lower extremity with lateral redness in the calf and anteriorly in the shin, skin is flaky.  No blistering noted  Musculoskeletal: Left lower extremity is in a cast, no joint swelling, erythema or tenderness.  Neurologic: Cranial nerves 2-12 intact, normal strength and sensation.  Psychiatric: Alert, oriented to person, place and time, no obvious anxiety or depression.    Data   -Data reviewed today: All pertinent laboratory and imaging results from this encounter were reviewed.    Recent  Labs   Lab 07/21/20  1245      INR 1.29*       No results found for this or any previous visit (from the past 24 hour(s)).

## 2020-07-21 NOTE — PROGRESS NOTES
Pt arrived from home to have epidural placed in preparation for surgery tomorrow.       VS: HTN    O2: >90% on RA   Output: Dueñas placed   Last BM: 7/21   Activity: Pivot transfer from  to bed.    Skin: Preexisting wound to right foot. Right foot   Pain: Baseline pain hands and feet from neuropathy    CMS: Numb/ting BLE and BUE    Dressing: Changed at right foot   Diet: NPO for epidural    LDA: Dueñas, right foot wound, right foot cast    Equipment:     Plan: Epidural being placed now and BKA tomorrow at 12pm   Additional Info:

## 2020-07-22 ENCOUNTER — ANESTHESIA (OUTPATIENT)
Dept: SURGERY | Facility: CLINIC | Age: 60
End: 2020-07-22
Payer: COMMERCIAL

## 2020-07-22 LAB
ALBUMIN SERPL-MCNC: 2.8 G/DL (ref 3.4–5)
ALP SERPL-CCNC: 141 U/L (ref 40–150)
ALT SERPL W P-5'-P-CCNC: 29 U/L (ref 0–50)
ANION GAP SERPL CALCULATED.3IONS-SCNC: 6 MMOL/L (ref 3–14)
AST SERPL W P-5'-P-CCNC: 61 U/L (ref 0–45)
BILIRUB SERPL-MCNC: 0.8 MG/DL (ref 0.2–1.3)
BUN SERPL-MCNC: 17 MG/DL (ref 7–30)
CALCIUM SERPL-MCNC: 8.6 MG/DL (ref 8.5–10.1)
CHLORIDE SERPL-SCNC: 105 MMOL/L (ref 94–109)
CO2 SERPL-SCNC: 27 MMOL/L (ref 20–32)
CREAT SERPL-MCNC: 0.82 MG/DL (ref 0.52–1.04)
GFR SERPL CREATININE-BSD FRML MDRD: 77 ML/MIN/{1.73_M2}
GLUCOSE BLDC GLUCOMTR-MCNC: 107 MG/DL (ref 70–99)
GLUCOSE BLDC GLUCOMTR-MCNC: 113 MG/DL (ref 70–99)
GLUCOSE BLDC GLUCOMTR-MCNC: 114 MG/DL (ref 70–99)
GLUCOSE BLDC GLUCOMTR-MCNC: 93 MG/DL (ref 70–99)
GLUCOSE BLDC GLUCOMTR-MCNC: 95 MG/DL (ref 70–99)
GLUCOSE BLDC GLUCOMTR-MCNC: 96 MG/DL (ref 70–99)
GLUCOSE SERPL-MCNC: 103 MG/DL (ref 70–99)
POTASSIUM SERPL-SCNC: 5 MMOL/L (ref 3.4–5.3)
PROT SERPL-MCNC: 8.5 G/DL (ref 6.8–8.8)
SODIUM SERPL-SCNC: 138 MMOL/L (ref 133–144)

## 2020-07-22 PROCEDURE — 37000009 ZZH ANESTHESIA TECHNICAL FEE, EACH ADDTL 15 MIN: Performed by: ORTHOPAEDIC SURGERY

## 2020-07-22 PROCEDURE — 71000015 ZZH RECOVERY PHASE 1 LEVEL 2 EA ADDTL HR: Performed by: ORTHOPAEDIC SURGERY

## 2020-07-22 PROCEDURE — 25000125 ZZHC RX 250: Performed by: NURSE ANESTHETIST, CERTIFIED REGISTERED

## 2020-07-22 PROCEDURE — 86900 BLOOD TYPING SEROLOGIC ABO: CPT | Performed by: CLINICAL NURSE SPECIALIST

## 2020-07-22 PROCEDURE — 37000008 ZZH ANESTHESIA TECHNICAL FEE, 1ST 30 MIN: Performed by: ORTHOPAEDIC SURGERY

## 2020-07-22 PROCEDURE — 88307 TISSUE EXAM BY PATHOLOGIST: CPT | Performed by: ORTHOPAEDIC SURGERY

## 2020-07-22 PROCEDURE — 25800030 ZZH RX IP 258 OP 636: Performed by: NURSE ANESTHETIST, CERTIFIED REGISTERED

## 2020-07-22 PROCEDURE — 71000014 ZZH RECOVERY PHASE 1 LEVEL 2 FIRST HR: Performed by: ORTHOPAEDIC SURGERY

## 2020-07-22 PROCEDURE — 25000128 H RX IP 250 OP 636: Performed by: CLINICAL NURSE SPECIALIST

## 2020-07-22 PROCEDURE — 99232 SBSQ HOSP IP/OBS MODERATE 35: CPT | Performed by: HOSPITALIST

## 2020-07-22 PROCEDURE — 25000128 H RX IP 250 OP 636: Performed by: ANESTHESIOLOGY

## 2020-07-22 PROCEDURE — 40000171 ZZH STATISTIC PRE-PROCEDURE ASSESSMENT III: Performed by: ORTHOPAEDIC SURGERY

## 2020-07-22 PROCEDURE — 36415 COLL VENOUS BLD VENIPUNCTURE: CPT | Performed by: CLINICAL NURSE SPECIALIST

## 2020-07-22 PROCEDURE — 25800030 ZZH RX IP 258 OP 636: Performed by: ANESTHESIOLOGY

## 2020-07-22 PROCEDURE — 25000128 H RX IP 250 OP 636: Performed by: NURSE ANESTHETIST, CERTIFIED REGISTERED

## 2020-07-22 PROCEDURE — 86901 BLOOD TYPING SEROLOGIC RH(D): CPT | Performed by: CLINICAL NURSE SPECIALIST

## 2020-07-22 PROCEDURE — 86923 COMPATIBILITY TEST ELECTRIC: CPT | Performed by: CLINICAL NURSE SPECIALIST

## 2020-07-22 PROCEDURE — 80053 COMPREHEN METABOLIC PANEL: CPT | Performed by: CLINICAL NURSE SPECIALIST

## 2020-07-22 PROCEDURE — 12000001 ZZH R&B MED SURG/OB UMMC

## 2020-07-22 PROCEDURE — 86850 RBC ANTIBODY SCREEN: CPT | Performed by: CLINICAL NURSE SPECIALIST

## 2020-07-22 PROCEDURE — 00000146 ZZHCL STATISTIC GLUCOSE BY METER IP

## 2020-07-22 PROCEDURE — 25000132 ZZH RX MED GY IP 250 OP 250 PS 637: Performed by: CLINICAL NURSE SPECIALIST

## 2020-07-22 PROCEDURE — 0Y6J0Z1 DETACHMENT AT LEFT LOWER LEG, HIGH, OPEN APPROACH: ICD-10-PCS | Performed by: ORTHOPAEDIC SURGERY

## 2020-07-22 PROCEDURE — 25800030 ZZH RX IP 258 OP 636: Performed by: CLINICAL NURSE SPECIALIST

## 2020-07-22 PROCEDURE — 36000059 ZZH SURGERY LEVEL 3 EA 15 ADDTL MIN UMMC: Performed by: ORTHOPAEDIC SURGERY

## 2020-07-22 PROCEDURE — 25800030 ZZH RX IP 258 OP 636

## 2020-07-22 PROCEDURE — 27210794 ZZH OR GENERAL SUPPLY STERILE: Performed by: ORTHOPAEDIC SURGERY

## 2020-07-22 PROCEDURE — 25000125 ZZHC RX 250: Performed by: ANESTHESIOLOGY

## 2020-07-22 PROCEDURE — 88307 TISSUE EXAM BY PATHOLOGIST: CPT | Mod: 26 | Performed by: ORTHOPAEDIC SURGERY

## 2020-07-22 PROCEDURE — 25000128 H RX IP 250 OP 636: Performed by: PHYSICIAN ASSISTANT

## 2020-07-22 PROCEDURE — 25000132 ZZH RX MED GY IP 250 OP 250 PS 637: Performed by: INTERNAL MEDICINE

## 2020-07-22 PROCEDURE — 36000057 ZZH SURGERY LEVEL 3 1ST 30 MIN - UMMC: Performed by: ORTHOPAEDIC SURGERY

## 2020-07-22 PROCEDURE — 25000132 ZZH RX MED GY IP 250 OP 250 PS 637: Performed by: HOSPITALIST

## 2020-07-22 PROCEDURE — 25000132 ZZH RX MED GY IP 250 OP 250 PS 637: Performed by: PHYSICIAN ASSISTANT

## 2020-07-22 RX ORDER — FENTANYL CITRATE 50 UG/ML
INJECTION, SOLUTION INTRAMUSCULAR; INTRAVENOUS PRN
Status: DISCONTINUED | OUTPATIENT
Start: 2020-07-22 | End: 2020-07-22

## 2020-07-22 RX ORDER — SODIUM CHLORIDE 9 MG/ML
INJECTION, SOLUTION INTRAVENOUS CONTINUOUS
Status: DISCONTINUED | OUTPATIENT
Start: 2020-07-22 | End: 2020-07-22 | Stop reason: HOSPADM

## 2020-07-22 RX ORDER — NALOXONE HYDROCHLORIDE 0.4 MG/ML
.1-.4 INJECTION, SOLUTION INTRAMUSCULAR; INTRAVENOUS; SUBCUTANEOUS
Status: DISCONTINUED | OUTPATIENT
Start: 2020-07-22 | End: 2020-07-27 | Stop reason: HOSPADM

## 2020-07-22 RX ORDER — FENTANYL CITRATE 50 UG/ML
25-50 INJECTION, SOLUTION INTRAMUSCULAR; INTRAVENOUS EVERY 5 MIN PRN
Status: DISCONTINUED | OUTPATIENT
Start: 2020-07-22 | End: 2020-07-22 | Stop reason: HOSPADM

## 2020-07-22 RX ORDER — ONDANSETRON 4 MG/1
4 TABLET, ORALLY DISINTEGRATING ORAL EVERY 30 MIN PRN
Status: DISCONTINUED | OUTPATIENT
Start: 2020-07-22 | End: 2020-07-22 | Stop reason: HOSPADM

## 2020-07-22 RX ORDER — CEFAZOLIN SODIUM 2 G/100ML
2 INJECTION, SOLUTION INTRAVENOUS EVERY 8 HOURS
Status: COMPLETED | OUTPATIENT
Start: 2020-07-22 | End: 2020-07-23

## 2020-07-22 RX ORDER — ONDANSETRON 2 MG/ML
4 INJECTION INTRAMUSCULAR; INTRAVENOUS EVERY 30 MIN PRN
Status: DISCONTINUED | OUTPATIENT
Start: 2020-07-22 | End: 2020-07-22 | Stop reason: HOSPADM

## 2020-07-22 RX ORDER — LIDOCAINE HYDROCHLORIDE 20 MG/ML
INJECTION, SOLUTION INFILTRATION; PERINEURAL PRN
Status: DISCONTINUED | OUTPATIENT
Start: 2020-07-22 | End: 2020-07-22

## 2020-07-22 RX ORDER — CEFAZOLIN SODIUM 1 G/3ML
1 INJECTION, POWDER, FOR SOLUTION INTRAMUSCULAR; INTRAVENOUS SEE ADMIN INSTRUCTIONS
Status: DISCONTINUED | OUTPATIENT
Start: 2020-07-22 | End: 2020-07-22 | Stop reason: HOSPADM

## 2020-07-22 RX ORDER — HYDROMORPHONE HYDROCHLORIDE 1 MG/ML
0.3 INJECTION, SOLUTION INTRAMUSCULAR; INTRAVENOUS; SUBCUTANEOUS EVERY 5 MIN PRN
Status: DISCONTINUED | OUTPATIENT
Start: 2020-07-22 | End: 2020-07-22 | Stop reason: HOSPADM

## 2020-07-22 RX ORDER — PROPOFOL 10 MG/ML
INJECTION, EMULSION INTRAVENOUS PRN
Status: DISCONTINUED | OUTPATIENT
Start: 2020-07-22 | End: 2020-07-22

## 2020-07-22 RX ORDER — CEFAZOLIN SODIUM 2 G/100ML
2 INJECTION, SOLUTION INTRAVENOUS
Status: COMPLETED | OUTPATIENT
Start: 2020-07-22 | End: 2020-07-22

## 2020-07-22 RX ORDER — SODIUM CHLORIDE 9 MG/ML
INJECTION, SOLUTION INTRAVENOUS
Status: COMPLETED
Start: 2020-07-22 | End: 2020-07-22

## 2020-07-22 RX ORDER — AMOXICILLIN 250 MG
1 CAPSULE ORAL 2 TIMES DAILY
Status: DISCONTINUED | OUTPATIENT
Start: 2020-07-22 | End: 2020-07-22

## 2020-07-22 RX ORDER — FENTANYL CITRATE 50 UG/ML
25-50 INJECTION, SOLUTION INTRAMUSCULAR; INTRAVENOUS
Status: DISCONTINUED | OUTPATIENT
Start: 2020-07-22 | End: 2020-07-22 | Stop reason: HOSPADM

## 2020-07-22 RX ORDER — LIDOCAINE 40 MG/G
CREAM TOPICAL
Status: DISCONTINUED | OUTPATIENT
Start: 2020-07-22 | End: 2020-07-27 | Stop reason: HOSPADM

## 2020-07-22 RX ORDER — ONDANSETRON 2 MG/ML
INJECTION INTRAMUSCULAR; INTRAVENOUS PRN
Status: DISCONTINUED | OUTPATIENT
Start: 2020-07-22 | End: 2020-07-22

## 2020-07-22 RX ORDER — ACETAMINOPHEN 325 MG/1
650 TABLET ORAL EVERY 4 HOURS PRN
Status: DISCONTINUED | OUTPATIENT
Start: 2020-07-25 | End: 2020-07-27 | Stop reason: HOSPADM

## 2020-07-22 RX ORDER — OXYCODONE HYDROCHLORIDE 5 MG/1
5 TABLET ORAL EVERY 4 HOURS PRN
Status: DISCONTINUED | OUTPATIENT
Start: 2020-07-22 | End: 2020-07-22

## 2020-07-22 RX ORDER — HYDROXYZINE HYDROCHLORIDE 25 MG/1
25 TABLET, FILM COATED ORAL EVERY 6 HOURS PRN
Status: DISCONTINUED | OUTPATIENT
Start: 2020-07-22 | End: 2020-07-27 | Stop reason: HOSPADM

## 2020-07-22 RX ORDER — NALOXONE HYDROCHLORIDE 0.4 MG/ML
.1-.4 INJECTION, SOLUTION INTRAMUSCULAR; INTRAVENOUS; SUBCUTANEOUS
Status: DISCONTINUED | OUTPATIENT
Start: 2020-07-22 | End: 2020-07-22 | Stop reason: HOSPADM

## 2020-07-22 RX ORDER — LABETALOL HYDROCHLORIDE 5 MG/ML
10 INJECTION, SOLUTION INTRAVENOUS
Status: DISCONTINUED | OUTPATIENT
Start: 2020-07-22 | End: 2020-07-22 | Stop reason: HOSPADM

## 2020-07-22 RX ORDER — METOCLOPRAMIDE HYDROCHLORIDE 5 MG/ML
10 INJECTION INTRAMUSCULAR; INTRAVENOUS EVERY 6 HOURS PRN
Status: DISCONTINUED | OUTPATIENT
Start: 2020-07-22 | End: 2020-07-25

## 2020-07-22 RX ORDER — PROPOFOL 10 MG/ML
INJECTION, EMULSION INTRAVENOUS CONTINUOUS PRN
Status: DISCONTINUED | OUTPATIENT
Start: 2020-07-22 | End: 2020-07-22

## 2020-07-22 RX ORDER — ACETAMINOPHEN 325 MG/1
975 TABLET ORAL EVERY 8 HOURS
Status: DISPENSED | OUTPATIENT
Start: 2020-07-22 | End: 2020-07-25

## 2020-07-22 RX ORDER — PROCHLORPERAZINE MALEATE 10 MG
10 TABLET ORAL EVERY 6 HOURS PRN
Status: DISCONTINUED | OUTPATIENT
Start: 2020-07-22 | End: 2020-07-27 | Stop reason: HOSPADM

## 2020-07-22 RX ORDER — GABAPENTIN 600 MG/1
1200 TABLET ORAL 3 TIMES DAILY
Status: DISCONTINUED | OUTPATIENT
Start: 2020-07-22 | End: 2020-07-24

## 2020-07-22 RX ORDER — ONDANSETRON 2 MG/ML
4 INJECTION INTRAMUSCULAR; INTRAVENOUS EVERY 6 HOURS PRN
Status: DISCONTINUED | OUTPATIENT
Start: 2020-07-22 | End: 2020-07-27 | Stop reason: HOSPADM

## 2020-07-22 RX ORDER — NALOXONE HYDROCHLORIDE 0.4 MG/ML
.1-.4 INJECTION, SOLUTION INTRAMUSCULAR; INTRAVENOUS; SUBCUTANEOUS
Status: ACTIVE | OUTPATIENT
Start: 2020-07-22 | End: 2020-07-23

## 2020-07-22 RX ORDER — LIDOCAINE HYDROCHLORIDE 20 MG/ML
INJECTION, SOLUTION EPIDURAL; INFILTRATION; INTRACAUDAL; PERINEURAL PRN
Status: DISCONTINUED | OUTPATIENT
Start: 2020-07-22 | End: 2020-07-22

## 2020-07-22 RX ORDER — SODIUM CHLORIDE, SODIUM LACTATE, POTASSIUM CHLORIDE, CALCIUM CHLORIDE 600; 310; 30; 20 MG/100ML; MG/100ML; MG/100ML; MG/100ML
INJECTION, SOLUTION INTRAVENOUS CONTINUOUS PRN
Status: DISCONTINUED | OUTPATIENT
Start: 2020-07-22 | End: 2020-07-22

## 2020-07-22 RX ORDER — FLUMAZENIL 0.1 MG/ML
0.2 INJECTION, SOLUTION INTRAVENOUS
Status: DISCONTINUED | OUTPATIENT
Start: 2020-07-22 | End: 2020-07-22 | Stop reason: HOSPADM

## 2020-07-22 RX ORDER — OXYCODONE HYDROCHLORIDE 5 MG/1
5-10 TABLET ORAL
Status: DISCONTINUED | OUTPATIENT
Start: 2020-07-22 | End: 2020-07-27 | Stop reason: HOSPADM

## 2020-07-22 RX ORDER — GABAPENTIN 300 MG/1
300 CAPSULE ORAL 3 TIMES DAILY
Status: DISCONTINUED | OUTPATIENT
Start: 2020-07-22 | End: 2020-07-22

## 2020-07-22 RX ORDER — AMOXICILLIN 250 MG
2 CAPSULE ORAL 2 TIMES DAILY
Status: DISCONTINUED | OUTPATIENT
Start: 2020-07-22 | End: 2020-07-22

## 2020-07-22 RX ORDER — METOCLOPRAMIDE 10 MG/1
10 TABLET ORAL EVERY 6 HOURS PRN
Status: DISCONTINUED | OUTPATIENT
Start: 2020-07-22 | End: 2020-07-25

## 2020-07-22 RX ORDER — ONDANSETRON 4 MG/1
4 TABLET, ORALLY DISINTEGRATING ORAL EVERY 6 HOURS PRN
Status: DISCONTINUED | OUTPATIENT
Start: 2020-07-22 | End: 2020-07-27 | Stop reason: HOSPADM

## 2020-07-22 RX ADMIN — METHOCARBAMOL 750 MG: 750 TABLET, FILM COATED ORAL at 20:01

## 2020-07-22 RX ADMIN — PHENYLEPHRINE HYDROCHLORIDE 200 MCG: 10 INJECTION INTRAVENOUS at 13:48

## 2020-07-22 RX ADMIN — MIDAZOLAM 1 MG: 1 INJECTION INTRAMUSCULAR; INTRAVENOUS at 11:48

## 2020-07-22 RX ADMIN — Medication 0.2 MG: at 18:59

## 2020-07-22 RX ADMIN — PROPOFOL 40 MG: 10 INJECTION, EMULSION INTRAVENOUS at 12:44

## 2020-07-22 RX ADMIN — PROPOFOL 20 MG: 10 INJECTION, EMULSION INTRAVENOUS at 13:15

## 2020-07-22 RX ADMIN — LIDOCAINE HYDROCHLORIDE 10 ML: 20 INJECTION, SOLUTION EPIDURAL; INFILTRATION; INTRACAUDAL; PERINEURAL at 12:19

## 2020-07-22 RX ADMIN — FENTANYL CITRATE 50 MCG: 50 INJECTION, SOLUTION INTRAMUSCULAR; INTRAVENOUS at 11:48

## 2020-07-22 RX ADMIN — DOCUSATE SODIUM 50 MG AND SENNOSIDES 8.6 MG 2 TABLET: 8.6; 5 TABLET, FILM COATED ORAL at 20:00

## 2020-07-22 RX ADMIN — SODIUM CHLORIDE 1000 ML: 9 INJECTION, SOLUTION INTRAVENOUS at 19:02

## 2020-07-22 RX ADMIN — LIDOCAINE HYDROCHLORIDE 60 MG: 20 INJECTION, SOLUTION INFILTRATION; PERINEURAL at 13:15

## 2020-07-22 RX ADMIN — FENTANYL CITRATE 50 MCG: 50 INJECTION INTRAMUSCULAR; INTRAVENOUS at 15:20

## 2020-07-22 RX ADMIN — LIDOCAINE HYDROCHLORIDE 5 ML: 20 INJECTION, SOLUTION EPIDURAL; INFILTRATION; INTRACAUDAL; PERINEURAL at 12:36

## 2020-07-22 RX ADMIN — SIMVASTATIN 40 MG: 20 TABLET, FILM COATED ORAL at 21:16

## 2020-07-22 RX ADMIN — ACETAMINOPHEN 975 MG: 325 TABLET, FILM COATED ORAL at 17:56

## 2020-07-22 RX ADMIN — FENTANYL CITRATE 25 MCG: 50 INJECTION, SOLUTION INTRAMUSCULAR; INTRAVENOUS at 15:10

## 2020-07-22 RX ADMIN — SODIUM CHLORIDE 1000 ML: 9 INJECTION, SOLUTION INTRAVENOUS at 02:35

## 2020-07-22 RX ADMIN — MIDAZOLAM 1 MG: 1 INJECTION INTRAMUSCULAR; INTRAVENOUS at 13:15

## 2020-07-22 RX ADMIN — HYDROXYZINE HYDROCHLORIDE 25 MG: 25 TABLET, FILM COATED ORAL at 20:01

## 2020-07-22 RX ADMIN — GABAPENTIN 1200 MG: 600 TABLET, FILM COATED ORAL at 20:00

## 2020-07-22 RX ADMIN — OXYCODONE HYDROCHLORIDE 10 MG: 5 TABLET ORAL at 04:34

## 2020-07-22 RX ADMIN — GABAPENTIN 600 MG: 600 TABLET, FILM COATED ORAL at 04:32

## 2020-07-22 RX ADMIN — HYDROMORPHONE HYDROCHLORIDE 0.3 MG: 1 INJECTION, SOLUTION INTRAMUSCULAR; INTRAVENOUS; SUBCUTANEOUS at 16:23

## 2020-07-22 RX ADMIN — HYDROMORPHONE HYDROCHLORIDE 0.3 MG: 1 INJECTION, SOLUTION INTRAMUSCULAR; INTRAVENOUS; SUBCUTANEOUS at 15:55

## 2020-07-22 RX ADMIN — PHENYLEPHRINE HYDROCHLORIDE 200 MCG: 10 INJECTION INTRAVENOUS at 13:18

## 2020-07-22 RX ADMIN — Medication 2 G: at 12:26

## 2020-07-22 RX ADMIN — SODIUM CHLORIDE, SODIUM LACTATE, POTASSIUM CHLORIDE, CALCIUM CHLORIDE: 600; 310; 30; 20 INJECTION, SOLUTION INTRAVENOUS at 14:02

## 2020-07-22 RX ADMIN — LIDOCAINE HYDROCHLORIDE 5 ML: 20 INJECTION, SOLUTION EPIDURAL; INFILTRATION; INTRACAUDAL; PERINEURAL at 13:59

## 2020-07-22 RX ADMIN — BUPIVACAINE HYDROCHLORIDE 8 ML/HR: 7.5 INJECTION, SOLUTION EPIDURAL; RETROBULBAR at 12:38

## 2020-07-22 RX ADMIN — PANTOPRAZOLE SODIUM 40 MG: 40 TABLET, DELAYED RELEASE ORAL at 07:59

## 2020-07-22 RX ADMIN — ONDANSETRON 4 MG: 2 INJECTION INTRAMUSCULAR; INTRAVENOUS at 13:47

## 2020-07-22 RX ADMIN — THIAMINE HCL TAB 100 MG 100 MG: 100 TAB at 07:59

## 2020-07-22 RX ADMIN — OXYCODONE HYDROCHLORIDE 10 MG: 5 TABLET ORAL at 17:56

## 2020-07-22 RX ADMIN — HYDROMORPHONE HYDROCHLORIDE 0.3 MG: 1 INJECTION, SOLUTION INTRAMUSCULAR; INTRAVENOUS; SUBCUTANEOUS at 16:16

## 2020-07-22 RX ADMIN — GABAPENTIN 600 MG: 600 TABLET, FILM COATED ORAL at 08:00

## 2020-07-22 RX ADMIN — FENTANYL CITRATE 50 MCG: 50 INJECTION INTRAMUSCULAR; INTRAVENOUS at 15:30

## 2020-07-22 RX ADMIN — MIDAZOLAM 1 MG: 1 INJECTION INTRAMUSCULAR; INTRAVENOUS at 12:26

## 2020-07-22 RX ADMIN — METHOCARBAMOL 750 MG: 750 TABLET, FILM COATED ORAL at 07:58

## 2020-07-22 RX ADMIN — Medication 1 G: at 14:25

## 2020-07-22 RX ADMIN — ACETAMINOPHEN 650 MG: 325 TABLET, FILM COATED ORAL at 04:35

## 2020-07-22 RX ADMIN — PHENYLEPHRINE HYDROCHLORIDE 100 MCG: 10 INJECTION INTRAVENOUS at 13:13

## 2020-07-22 RX ADMIN — HYDROMORPHONE HYDROCHLORIDE 0.3 MG: 1 INJECTION, SOLUTION INTRAMUSCULAR; INTRAVENOUS; SUBCUTANEOUS at 15:45

## 2020-07-22 RX ADMIN — Medication 0.2 MG: at 21:16

## 2020-07-22 RX ADMIN — HYDROMORPHONE HYDROCHLORIDE 0.3 MG: 1 INJECTION, SOLUTION INTRAMUSCULAR; INTRAVENOUS; SUBCUTANEOUS at 16:10

## 2020-07-22 RX ADMIN — PROPOFOL 40 MCG/KG/MIN: 10 INJECTION, EMULSION INTRAVENOUS at 12:44

## 2020-07-22 RX ADMIN — SODIUM CHLORIDE, SODIUM LACTATE, POTASSIUM CHLORIDE, CALCIUM CHLORIDE: 600; 310; 30; 20 INJECTION, SOLUTION INTRAVENOUS at 12:26

## 2020-07-22 RX ADMIN — PHENYLEPHRINE HYDROCHLORIDE 100 MCG: 10 INJECTION INTRAVENOUS at 13:27

## 2020-07-22 RX ADMIN — OXYCODONE HYDROCHLORIDE 10 MG: 5 TABLET ORAL at 07:59

## 2020-07-22 RX ADMIN — HYDROMORPHONE HYDROCHLORIDE 0.2 MG: 1 INJECTION, SOLUTION INTRAMUSCULAR; INTRAVENOUS; SUBCUTANEOUS at 16:35

## 2020-07-22 RX ADMIN — HYDROMORPHONE HYDROCHLORIDE 0.3 MG: 1 INJECTION, SOLUTION INTRAMUSCULAR; INTRAVENOUS; SUBCUTANEOUS at 15:40

## 2020-07-22 RX ADMIN — FENTANYL CITRATE 50 MCG: 50 INJECTION, SOLUTION INTRAMUSCULAR; INTRAVENOUS at 15:18

## 2020-07-22 ASSESSMENT — LIFESTYLE VARIABLES: TOBACCO_USE: 1

## 2020-07-22 NOTE — BRIEF OP NOTE
[facility], [place]    Brief Operative Note    Pre-operative diagnosis: Pain in joint, ankle and foot, left [M25.572]  Charcot ankle, left [M14.672]  Post-operative diagnosis Charcot arthropathy     Procedure: Procedure(s):  Left below knee amputation  Surgeon: Surgeon(s) and Role:     * Aniceto Adams MD - Primary     * Leighann Meneses PA-C - Assisting  Anesthesia: Choice   Estimated blood loss: Less than 100 ml  Drains: Federico-Malhotra x 2  Specimens: * No specimens in log *  Findings:   None.  Complications: Poor skin quality, difficulty closing the medial incision .  Implants: * No implants in log *        Plan:  Transfer back to Inpatient. Ortho Primary, appreciate medicine consult for medical management  Posterior Splint to remain on left lower extremity and NWB at all times.  Medial AIYANA drain = deep drain  Lateral AIYANA drain = superficial drain  Drain to be removed when output is less than 50cc in 24 hour shift. (tegaderm only thing holding drain under splint)  Dressing change per ortho on POD#1.    Splint may be removed and soft dressing applied once stump protector is available.  Prosthetic consult placed for stump protector.    F/U in clinic with Dr. Adams's team in 2 weeks for incision check and suture removal.     I was asked by Dr. Adams to assist with surgery. I positioned and prepped the patient. I retracted soft tissue.   I suctioned fluids when needed. I provided traction for dissection. I helped to ligate blood vessels. I helped Dr. Adams identify and protect important structures. The procedure was medically necessary for an assistant because Dr. Adams needed the operative exposure and assistance that I provided. This allowed him to safely and efficiently operate. It was also important that I help ligate blood vessels to maintain hemostasis and reduce the bleeding risk. I helped with the closure of the operative incisions as well as helping with the  boot/cast/splint.  The assistance that I provided reduced operative time which meant less general anesthetic for the patient. No qualified residents were available to assist.    Leighann Meneses PA-C PA-C

## 2020-07-22 NOTE — PROGRESS NOTES
[facility], [facility], [place], [place]  Internal Medicine Progress Note      Assessment and Plans:     Tiffani Tan is a 60 year old female who was admitted on 7/21/2020.     Tiffani Tan is a 60 year old female with a past medical history of hypertension, dyslipidemia, obesity, type 2 diabetes, chronic kidney disease, neuropathy in the feet, GI bleed from peptic ulcer disease, venous stasis changes in the bilateral lower extremity, Charcot foot deformity left foot.  Patient was brought in for left below-knee amputation.  She was seen for preoperative evaluation on 17 July 2020.  No clear decision was made at that visit for her preoperative eval.  # Charcot arthropathy: Undergoing left BKA 07/22:   -per primary team  -hydrate well  -good IS  -ankle pumps  -pain and DVT prophylaxis management per orthopedics  # Hypertension: Prior to admission takes hydrochlorothiazide 25 mg p.o. daily, losartan 100 mg p.o. daily.    -Hold for now, based on BP, we will resume those   # Dyslipidemia: Prior to admission takes Zocor 40 mg p.o. daily.  -Continue that  # Obesity:   -Outpatient weight loss  # Type 2 diabetes, hemoglobin A1c 5.5: Does not take insulin.  On glimepiride 2 mg once a day.  Hemoglobin A1c today was 5.5. She took her glimepiride this morning however did not eat.  Blood sugar was 65.  She is getting IV dextrose to correct the hypoglycemia.  -We will hold glimepiride for now.  Patient will be started on medium sliding scale insulin  -We will adjust insulin regimen based on the sugars.     # Chronic kidney disease, baseline creatinine 1.9-1.1  -In February 2020 urine albumin was negative    # Neuropathy in the feet: Reports some pain in the feet chronically.  Takes 600 mg of gabapentin 3 times daily.    -Continue gabapentin 1200 mg TID    # History of GI bleed from gastric ulcers, status post EGD in February 2020 which showed 3 gastric ulcers.  She had some  esophagitis 2.  She was noted to have some portal hypertensive gastropathy as well.  She is being treated with Protonix 40 mg daily.  She is supposed to have follow-up EGD in the future.   -Continue Protonix, follow-up with GI as outpatient.      # Venous stasis changes in the bilateral lower extremity:   -Lymphedema consult for leg compression on the right side.  Tomorrow going for left below-knee amputation.  -The redness in the right lower extremity is chronic in nature per her report     # Charcot foot deformity left foot.  She is going under left below-knee amputation tomorrow.     # Tobacco use disorder: Smokes more than half pack per day. No history of COPD or shortness of breath.  No wheezing on exam.    -Consider PRN albuterol   -good incentive spirometry tobacco cessation.    -Could use nicotine patches or gums     # Alcohol use disorder, Portal hypertensive gastropathy: She drinks 2 drinks of vodka daily.  February 2020 she had an ultrasound of the abdomen with duplex.  It showed hepatomegaly with echogenic, heterogenous liver parenchyma and mildly thickened gallbladder wall most compatible with chronic intrinsic parenchymal disease.  On the EGD she had portal hypertensive gastropathy in February 2020. Patient continues to drink alcohol.  And smoke as well.      -I think it is worth repeating ultrasound of the liver.  But that should not hold her surgery.  -Start alcohol withdrawal protocol     DVT Prophylaxis: Per primary team  Code Status: Prior  Disposition: Per primary team.      Kiki Pate MD  Text Page  (7am - 5pm, M-F)    Subjective:        Overnight Events reviewed, Chart Reviewed  She had a rough night from pain.   Epidural was not working.   No chest pain or sob.       -Data reviewed today: I reviewed all new labs and imaging results over the last 24 hours.     Exam:         Temp: 96.8  F (36  C) Temp src: Oral BP: (!) 148/81 Pulse: 89 Heart Rate: 93 Resp: 13 SpO2: 99 % O2 Device: Nasal  cannula Oxygen Delivery: 2 LPM  There were no vitals filed for this visit.  Vital Signs with Ranges  Temp:  [96.8  F (36  C)-99  F (37.2  C)] 96.8  F (36  C)  Pulse:  [] 89  Heart Rate:  [] 93  Resp:  [10-28] 13  BP: (116-169)/() 148/81  SpO2:  [91 %-100 %] 99 %  I/O last 3 completed shifts:  In: -   Out: 1950 [Urine:1950]    Constitutional: No distress noted, Alert, Answering questions appropriately  Respiratory: AE is goog on both sides, no wheezing onr crackles  Cardiovascular: S1S2 normal, no new murmur  GI: Soft, non tender  Skin/Integumen: No rash      Medications     EPIDURAL INFUSION 8 mL/hr at 07/22/20 0420     dextrose 5% and 0.9% NaCl       - MEDICATION INSTRUCTIONS -         ceFAZolin  1 g Intravenous See Admin Instructions     ceFAZolin  2 g Intravenous Pre-Op/Pre-procedure x 1 dose     [Auto Hold] gabapentin  1,200 mg Oral TID     [Auto Hold] insulin aspart  1-7 Units Subcutaneous TID AC     [Auto Hold] insulin aspart  1-5 Units Subcutaneous At Bedtime     [Auto Hold] methocarbamol  750 mg Oral TID     [Auto Hold] pantoprazole  40 mg Oral QAM AC     [Auto Hold] senna-docusate  1-2 tablet Oral BID     [Auto Hold] simvastatin  40 mg Oral At Bedtime     [Auto Hold] thiamine  100 mg Oral Daily       Data   Recent Labs   Lab 07/22/20  0546 07/21/20  1245   PLT  --  211   INR  --  1.29*     --    POTASSIUM 5.0  --    CHLORIDE 105  --    CO2 27  --    BUN 17  --    CR 0.82  --    ANIONGAP 6  --    NAYANA 8.6  --    *  --    ALBUMIN 2.8*  --    PROTTOTAL 8.5  --    BILITOTAL 0.8  --    ALKPHOS 141  --    ALT 29  --    AST 61*  --        No results found for this or any previous visit (from the past 24 hour(s)).

## 2020-07-22 NOTE — ANESTHESIA PROCEDURE NOTES
Epidural Procedure Note  Staff -   Anesthesiologist:  Juve Evangelista MD      Performed By: anesthesiologist          Location: OB   Pre-procedure checklist:   patient identified, IV checked, site marked, risks and benefits discussed, informed consent, monitors and equipment checked, pre-op evaluation, at physician/surgeon's request and post-op pain management      Correct Patient: Yes      Correct Position: Yes      Correct Site: Yes      Correct Procedure: Yes      Correct Laterality:  Yes    Site Marked:  Yes  Procedure:     Procedure:  Epidural catheter    ASA:  3    Diagnosis:  Post operative pain    Position:  Sitting    Sterile Prep: chloraprep, mask, sterile gloves and patient draped      Insertion site:  L3-4    Local skin infiltration:  2% lidocaine    amount (mL):  2    Approach:  Midline    Needle gauge (G):  17    Needle Length (in):  3.5    Block Needle Type:  Ruperto    Injection Technique:  LORT saline    ERIC at (cm):  6.5    Attempts:  1    Redirects:  2    Catheter gauge (G):  19    Catheter threaded easily: Yes      Threaded to cm at skin:  11.5    Threaded in epidural space (cm):  5    Paresthesias:  No    Aspiration negative for Heme or CSF: Yes      Test dose (mL):  3     Local anesthetic:  Lidocaine 1.5% w/ 1:200,000 epinephrine    Test dose negative for signs of intravascular, subdural or intrathecal injection: Yes

## 2020-07-22 NOTE — OR NURSING
Pt had earrings x3 right ear, x2 left ear and belly ring.  Dr. Adams states they need to come out or pt may get burned.  Unable to remove ear earrings- taped, Dr. Evangelista aware.  Belly button ring cut off and thrown away (per pt request)

## 2020-07-22 NOTE — OR NURSING
PACU to Inpatient Nursing Handoff    Patient Tiffani Tan is a 60 year old female who speaks English.   Procedure Procedure(s):  Left below knee amputation   Surgeon(s) Primary: Aniceto Adams MD  Assisting: Leighann Meneses PA-C     No Known Allergies    Isolation  [unfilled]     Past Medical History   has no past medical history on file.    Anesthesia Epidural   Dermatome Level     Preop Meds acetaminophen (Tylenol) - time given: 0435   Nerve block epidural .  Location:bilateral. Med:bupivacaine. Time given: 7/21 8ml/hr   Intraop Meds fentanyl (Sublimaze): 75 mcg total  ondansetron (Zofran): last given at 1347   Local Meds No   Antibiotics cefazolin (Ancef) - last given at 1226     Pain Patient Currently in Pain: yes  Comfort: tolerable with discomfort  Pain Control: partially effective   PACU meds  fentanyl (Sublimaze): 100 mcg (total dose) last given at 1530   hydromorphone (Dilaudid): 2 mg (total dose) last given at 1630    PCA / epidural Yes. Epidural - bupivacaine   Capnography  yes   Telemetry ECG Rhythm: Sinus rhythm   Inpatient Telemetry Monitor Ordered? No        Labs Glucose Lab Results   Component Value Date     07/22/2020       Hgb Lab Results   Component Value Date    HGB 9.2 05/29/2020       INR Lab Results   Component Value Date    INR 1.29 07/21/2020      PACU Imaging Not applicable     Wound/Incision Wound 02/04/20 Right Foot Ulceration Diabetes ulcer per wound RN (Active)   Site Assessment Drainage 07/22/20 0900   Drainage Amount None 07/22/20 0900   Drainage Color/Charcteristics Holley 07/21/20 1800   Wound Care/Cleansing Wound cleanser 07/21/20 2330   Dressing Status Clean, dry, intact 07/22/20 0900   Number of days: 169       Wound 02/04/20 Left Ankle Ulceration diabetes ulcer per WOC RN (Active)   Number of days: 169       Incision/Surgical Site 07/22/20 Left Leg (Active)   Incision Assessment UTV 07/22/20 1549   Dressing Intervention Clean, dry, intact 07/22/20 1549   Number  of days: 0      CMS        Equipment wedge foot pillow   Other LDA       IV Access Peripheral IV 07/21/20 Right Hand (Active)   Site Assessment WDL 07/22/20 1549   Line Status Saline locked 07/22/20 1512   Phlebitis Scale 0-->no symptoms 07/22/20 1512   Infiltration Scale 0 07/22/20 1512   Extravasation? No 07/21/20 1855   Dressing Intervention New dressing  07/21/20 1307   Number of days: 1       Peripheral IV 07/22/20 Left Hand (Active)   Site Assessment WDL 07/22/20 1549   Line Status Infusing 07/22/20 1512   Phlebitis Scale 0-->no symptoms 07/22/20 1512   Infiltration Scale 0 07/22/20 1512   Number of days: 0       Intrathecal/Epidural Catheter 07/21/20 (Active)   Site Assessment Clean, dry, intact 07/22/20 1549   Line Status Infusing 07/22/20 1512   Dressing Type Transparent 07/22/20 1512   Dressing Status Dressing reinforced 07/21/20 2330   Number of days: 1      Blood Products Not applicable  mL   Intake/Output Date 07/22/20 0700 - 07/23/20 0659   Shift 4573-1617 0019-3403 0939-1820 24 Hour Total   INTAKE   P.O.  200  200   I.V. 1000 500  1500   Shift Total 1000 700  1700   OUTPUT   Urine 500   500   Blood 100   100   Shift Total 600   600   Weight (kg)          Drains / Hanley Closed/Suction Drain 1 Left;Lateral Leg Bulb 10 Niuean (Active)   Number of days: 0       Closed/Suction Drain 2 Left;Medial Leg Bulb 10 Niuean (Active)   Number of days: 0       Urethral Catheter Silver coated 14 fr (Active)   Tube Description Positional 07/22/20 0900   Catheter Care Done;Catheter wipes 07/22/20 0930   Urine Output 200 mL 07/22/20 1220   Number of days: 1      Time of void PreOp Void Prior to Procedure: (Hanley ) (07/22/20 0930)    PostOp Voided (mL): 700 mL (07/22/20 0523)    Diapered? No   Bladder Scan  hanley    mL (07/22/20 1500)  tolerating sips     Vitals    B/P: 131/78  T: 98.1  F (36.7  C)    Temp src: Axillary  P:  Pulse: 93 (07/22/20 1545)    Heart Rate: 87 (07/22/20 1545)     R: 12  O2:  SpO2: 95  %    O2 Device: Nasal cannula (07/22/20 1545)    Oxygen Delivery: 2 LPM (07/22/20 1545)         Family/support present significant other   Patient belongings     Patient transported on bed   DC meds/scripts (obs/outpt) Not applicable   Inpatient Pain Meds Released? Yes       Special needs/considerations Inpatient, L leg on a wedge pillow, hanley, ace wrap   Tasks needing completion pt very emotional, pleasant       Nabeel Martinez RN  ASCOM [id number]

## 2020-07-22 NOTE — ANESTHESIA CARE TRANSFER NOTE
Patient: Tiffani Tan    Procedure(s):  Left below knee amputation    Diagnosis: Pain in joint, ankle and foot, left [M25.572]  Charcot ankle, left [M14.672]  Diagnosis Additional Information: No value filed.    Anesthesia Type:   MAC, CSE     Note:  Airway :Face Mask  Patient transferred to:PACU  Handoff Report: Identifed the Patient, Identified the Reponsible Provider, Reviewed the pertinent medical history, Discussed the surgical course, Reviewed Intra-OP anesthesia mangement and issues during anesthesia, Set expectations for post-procedure period and Allowed opportunity for questions and acknowledgement of understanding      Vitals: (Last set prior to Anesthesia Care Transfer)    CRNA VITALS  7/22/2020 1439 - 7/22/2020 1519      7/22/2020             Pulse:  94    SpO2:  97 %                Electronically Signed By: KELTON Rothman P.G., CRNA  July 22, 2020  3:19 PM

## 2020-07-22 NOTE — PLAN OF CARE
VS: Temp: 97.2  F (36.2  C) Temp src: Oral BP: 139/53 Pulse: 103 Heart Rate: 107 Resp: 15 SpO2: 96 %    O2: Sats > 90% with 1L of O2.   Cont pulse ox in place   Output: Adequate output in hanley catheter   Last BM: 7/21 and passing gas   Activity: Sat at edge of the bed   Skin: Swollen bilateral LE.   Pain: Continuous epidural at 8mls/hr   CMS: Numbness in BLE   Dressing: L leg-cast  R leg- dressing changed 1x   Diet: NPO   LDA: R PIV-NSS infusing   Equipment: IV pole, PCA pump, and personal belongings at bedside   Plan: Surgery today.  Surgical scrub done 1x.    Additional Info: 2330- Epidural dressing was peeling off. Site was moist.Patient complained of numbness wearing off. Informed anesthesia Daniel and instructed writer to reinforced dressing Tegaderm and continue to infuse the epidural. Will follow up in AM.     0430-epidural dressing site is leaking. Pt is crying and complained of pain. Numbness is gone per pt report. Dr Sanchez paged and instructed writer to stop epidural but NOT to removed the epidural tubing. Added moose to give\ Oxycodone PRN.

## 2020-07-22 NOTE — ANESTHESIA PREPROCEDURE EVALUATION
Anesthesia Pre-Procedure Evaluation    Patient: Tiffani Tan   MRN:     [id number] Gender:   female   Age:    60 year old :      1960        Preoperative Diagnosis: Pain in joint, ankle and foot, left [M25.572]  Charcot ankle, left [M14.672]   Procedure(s):  Left below knee amputation     LABS:  CBC:   Lab Results   Component Value Date    WBC 8.5 2020    WBC 11.6 (H) 2020    HGB 9.2 (L) 2020    HGB 9.4 (L) 2020    HCT 32.0 (L) 2020    HCT 33.1 (L) 2020     2020     2020     BMP:   Lab Results   Component Value Date     2020     2020    POTASSIUM 5.0 2020    POTASSIUM 5.2 2020    CHLORIDE 105 2020    CHLORIDE 104 2020    CO2 27 2020    CO2 27 2020    BUN 17 2020    BUN 16 2020    CR 0.82 2020    CR 1.00 2020     (H) 2020     (H) 2020     COAGS:   Lab Results   Component Value Date    INR 1.29 (H) 2020    FIBR 334 2020     POC:   Lab Results   Component Value Date     (H) 2020     OTHER:   Lab Results   Component Value Date    LACT 1.4 2020    A1C 5.5 2020    NAYANA 8.6 2020    PHOS 4.2 2020    MAG 2.5 (H) 2020    ALBUMIN 2.8 (L) 2020    PROTTOTAL 8.5 2020    ALT 29 2020    AST 61 (H) 2020    ALKPHOS 141 2020    BILITOTAL 0.8 2020    TSH 0.70 2020    CRP 19.2 (H) 2020    SED 99 (H) 2020        Preop Vitals    BP Readings from Last 3 Encounters:   20 (!) 154/63   20 (!) 143/72   20 111/65    Pulse Readings from Last 3 Encounters:   20 89   20 86   20 93      Resp Readings from Last 3 Encounters:   20 16   20 20   20 16    SpO2 Readings from Last 3 Encounters:   20 95%   20 99%   20 93%      Temp Readings from Last 1 Encounters:   20 36  C (96.8  F) (Oral)    Ht  "Readings from Last 1 Encounters:   06/23/20 1.689 m (5' 6.5\")      Wt Readings from Last 1 Encounters:   06/23/20 99.8 kg (220 lb)    Estimated body mass index is 34.98 kg/m  as calculated from the following:    Height as of 6/23/20: 1.689 m (5' 6.5\").    Weight as of 6/23/20: 99.8 kg (220 lb).     LDA:  Peripheral IV 07/21/20 Right Hand (Active)   Site Assessment WDL 07/21/20 1855   Line Status Saline locked 07/21/20 1855   Phlebitis Scale 0-->no symptoms 07/21/20 1855   Infiltration Scale 0 07/21/20 1855   Extravasation? No 07/21/20 1855   Dressing Intervention New dressing  07/21/20 1307   Number of days: 1       Intrathecal/Epidural Catheter 07/21/20 (Active)   Site Assessment Other (Comment) 07/21/20 2330   Line Status Stopped 07/22/20 0900   Dressing Type Transparent 07/21/20 2330   Dressing Status Dressing reinforced 07/21/20 2330   Number of days: 1       Urethral Catheter Silver coated 14 fr (Active)   Tube Description Positional 07/22/20 0900   Catheter Care Done;Catheter wipes 07/21/20 1800   Urine Output 300 mL 07/21/20 2222   Number of days: 1        No past medical history on file.   Past Surgical History:   Procedure Laterality Date     ESOPHAGOSCOPY, GASTROSCOPY, DUODENOSCOPY (EGD), COMBINED N/A 2/5/2020    Procedure: ESOPHAGOGASTRODUODENOSCOPY (EGD);  Surgeon: Tanya Dias MD;  Location: [facility]      No Known Allergies     Anesthesia Evaluation     . Pt has had prior anesthetic. Type: MAC    No history of anesthetic complications          ROS/MED HX    ENT/Pulmonary:     (+)tobacco use, Current use , . .    Neurologic:     (+)neuropathy     Cardiovascular:  - neg cardiovascular ROS       METS/Exercise Tolerance:  1 - Eating, dressing   Hematologic:         Musculoskeletal:   (+)  other musculoskeletal-       GI/Hepatic: Comment: Liver cirrhosis, portal hypertension    (+) liver disease, Other GI/Hepatic PUD, s/p EGD 2/20      Renal/Genitourinary:     (+) chronic renal disease, type: " CRI, Pt does not require dialysis, Pt has no history of transplant,       Endo:     (+) type II DM (5.5) Diabetic complications: nephropathy neuropathy gastroparesis, Obesity, .      Psychiatric:     (+) psychiatric history other (comment) (Alcohol abuse)      Infectious Disease:   (+) Other Infectious Disease       Malignancy:         Other:    - neg other ROS                 JZG [facility] AN PHYSICAL EXAM    Assessment:   ASA SCORE: 3    H&P: History and physical reviewed and following examination; no interval change.     Smoking Status:  Active Smoker       - patient did not smoke on day of surgery       - instructed to abstain from smoking on day of procedure   NPO Status: NPO Appropriate     Plan:   Anes. Type:  Epidural     Epidural Details:  Catheter; Lumbar   Pre-Medication: None   Induction:  IV (Standard)   Airway: Native Airway   Access/Monitoring: PIV   Maintenance: N/a     Postop Plan:   Postop Pain: Opioids  Postop Sedation/Airway: Not planned  Disposition: Inpatient/Admit     PONV Management:   Adult Risk Factors: Female, Postop Opioids   Prevention: Ondansetron     CONSENT: Direct conversation   Plan and risks discussed with: Patient          Comments for Plan/Consent:    Epidural catheter placement  All available and pertinent medical records and test results reviewed.  Risks, including but not limited to airway injury, bronchospasm,  hypoxemia, PONV, need for blood transfusion d/w patient                 Liliana Luna MD

## 2020-07-22 NOTE — ANESTHESIA POSTPROCEDURE EVALUATION
Anesthesia POST Procedure Evaluation    Patient: Tiffani Tan   MRN:     [id number] Gender:   female   Age:    60 year old :      1960        Preoperative Diagnosis: Pain in joint, ankle and foot, left [M25.572]  Charcot ankle, left [M14.672]   Procedure(s):  Left below knee amputation   Postop Comments: No value filed.     Anesthesia Type: MAC, CSE          Postop Pain Control: Uneventful            Sign Out: Well controlled pain   PONV: No   Neuro/Psych: Uneventful            Sign Out: Acceptable/Baseline neuro status   Airway/Respiratory: Uneventful            Sign Out: Acceptable/Baseline resp. status   CV/Hemodynamics: Uneventful            Sign Out: Acceptable CV status   Other NRE: NONE   DID A NON-ROUTINE EVENT OCCUR? No         Last Anesthesia Record Vitals:  CRNA VITALS  2020 1439 - 2020 1539      2020             Pulse:  94    SpO2:  97 %          Last PACU Vitals:  Vitals Value Taken Time   /57 2020  5:00 PM   Temp 36.8  C (98.2  F) 2020  4:15 PM   Pulse 96 2020  5:00 PM   Resp 10 2020  5:02 PM   SpO2 79 % 2020  5:02 PM   Temp src     NIBP     Pulse     SpO2     Resp     Temp     Ht Rate     Temp 2     Vitals shown include unvalidated device data.      Electronically Signed By: Greg Allen MD, 2020, 5:04 PM

## 2020-07-23 ENCOUNTER — APPOINTMENT (OUTPATIENT)
Dept: PHYSICAL THERAPY | Facility: CLINIC | Age: 60
End: 2020-07-23
Attending: PHYSICIAN ASSISTANT
Payer: COMMERCIAL

## 2020-07-23 ENCOUNTER — DOCUMENTATION ONLY (OUTPATIENT)
Dept: ORTHOPEDICS | Facility: CLINIC | Age: 60
End: 2020-07-23

## 2020-07-23 ENCOUNTER — APPOINTMENT (OUTPATIENT)
Dept: PHYSICAL THERAPY | Facility: CLINIC | Age: 60
End: 2020-07-23
Attending: HOSPITALIST
Payer: COMMERCIAL

## 2020-07-23 LAB
GLUCOSE BLDC GLUCOMTR-MCNC: 120 MG/DL (ref 70–99)
GLUCOSE BLDC GLUCOMTR-MCNC: 165 MG/DL (ref 70–99)
GLUCOSE BLDC GLUCOMTR-MCNC: 85 MG/DL (ref 70–99)
HGB BLD-MCNC: 7 G/DL (ref 11.7–15.7)

## 2020-07-23 PROCEDURE — 97161 PT EVAL LOW COMPLEX 20 MIN: CPT | Mod: GP | Performed by: PHYSICAL THERAPIST

## 2020-07-23 PROCEDURE — L5999 LOWR EXTREMITY PROSTHES NOS: HCPCS

## 2020-07-23 PROCEDURE — 99232 SBSQ HOSP IP/OBS MODERATE 35: CPT | Performed by: HOSPITALIST

## 2020-07-23 PROCEDURE — 97530 THERAPEUTIC ACTIVITIES: CPT | Mod: GP | Performed by: PHYSICAL THERAPIST

## 2020-07-23 PROCEDURE — 25800030 ZZH RX IP 258 OP 636: Performed by: ANESTHESIOLOGY

## 2020-07-23 PROCEDURE — 25000132 ZZH RX MED GY IP 250 OP 250 PS 637: Performed by: HOSPITALIST

## 2020-07-23 PROCEDURE — 25000132 ZZH RX MED GY IP 250 OP 250 PS 637: Performed by: CLINICAL NURSE SPECIALIST

## 2020-07-23 PROCEDURE — 36415 COLL VENOUS BLD VENIPUNCTURE: CPT | Performed by: PHYSICIAN ASSISTANT

## 2020-07-23 PROCEDURE — 12000001 ZZH R&B MED SURG/OB UMMC

## 2020-07-23 PROCEDURE — 97140 MANUAL THERAPY 1/> REGIONS: CPT | Mod: GP | Performed by: PHYSICAL THERAPIST

## 2020-07-23 PROCEDURE — 25000128 H RX IP 250 OP 636: Performed by: PHYSICIAN ASSISTANT

## 2020-07-23 PROCEDURE — 00000146 ZZHCL STATISTIC GLUCOSE BY METER IP

## 2020-07-23 PROCEDURE — L8420 PROSTHETIC SOCK MULTI PLY BK: HCPCS

## 2020-07-23 PROCEDURE — 25000132 ZZH RX MED GY IP 250 OP 250 PS 637: Performed by: PHYSICIAN ASSISTANT

## 2020-07-23 PROCEDURE — 25000128 H RX IP 250 OP 636: Performed by: ANESTHESIOLOGY

## 2020-07-23 PROCEDURE — L8440 SHRINKER BELOW KNEE: HCPCS

## 2020-07-23 PROCEDURE — L5450 POSTOP APP NON-WGT BEAR DSG: HCPCS

## 2020-07-23 PROCEDURE — L5688 BK WAIST BELT WEBBING: HCPCS

## 2020-07-23 PROCEDURE — 85018 HEMOGLOBIN: CPT | Performed by: PHYSICIAN ASSISTANT

## 2020-07-23 RX ORDER — NALOXONE HYDROCHLORIDE 0.4 MG/ML
.1-.4 INJECTION, SOLUTION INTRAMUSCULAR; INTRAVENOUS; SUBCUTANEOUS
Status: DISCONTINUED | OUTPATIENT
Start: 2020-07-23 | End: 2020-07-23

## 2020-07-23 RX ORDER — NALBUPHINE HYDROCHLORIDE 10 MG/ML
2.5-5 INJECTION, SOLUTION INTRAMUSCULAR; INTRAVENOUS; SUBCUTANEOUS EVERY 6 HOURS PRN
Status: DISCONTINUED | OUTPATIENT
Start: 2020-07-23 | End: 2020-07-23

## 2020-07-23 RX ORDER — NALBUPHINE HYDROCHLORIDE 10 MG/ML
2.5-5 INJECTION, SOLUTION INTRAMUSCULAR; INTRAVENOUS; SUBCUTANEOUS EVERY 6 HOURS PRN
Status: DISCONTINUED | OUTPATIENT
Start: 2020-07-23 | End: 2020-07-27 | Stop reason: HOSPADM

## 2020-07-23 RX ADMIN — METHOCARBAMOL 750 MG: 750 TABLET, FILM COATED ORAL at 20:00

## 2020-07-23 RX ADMIN — ACETAMINOPHEN 975 MG: 325 TABLET, FILM COATED ORAL at 20:00

## 2020-07-23 RX ADMIN — GABAPENTIN 1200 MG: 600 TABLET, FILM COATED ORAL at 15:54

## 2020-07-23 RX ADMIN — OXYCODONE HYDROCHLORIDE 10 MG: 5 TABLET ORAL at 21:11

## 2020-07-23 RX ADMIN — BUPIVACAINE HYDROCHLORIDE: 7.5 INJECTION, SOLUTION EPIDURAL; RETROBULBAR at 07:19

## 2020-07-23 RX ADMIN — DOCUSATE SODIUM 50 MG AND SENNOSIDES 8.6 MG 2 TABLET: 8.6; 5 TABLET, FILM COATED ORAL at 20:00

## 2020-07-23 RX ADMIN — OXYCODONE HYDROCHLORIDE 10 MG: 5 TABLET ORAL at 03:21

## 2020-07-23 RX ADMIN — GABAPENTIN 1200 MG: 600 TABLET, FILM COATED ORAL at 09:15

## 2020-07-23 RX ADMIN — ACETAMINOPHEN 975 MG: 325 TABLET, FILM COATED ORAL at 03:21

## 2020-07-23 RX ADMIN — GABAPENTIN 1200 MG: 600 TABLET, FILM COATED ORAL at 20:00

## 2020-07-23 RX ADMIN — DOCUSATE SODIUM 50 MG AND SENNOSIDES 8.6 MG 2 TABLET: 8.6; 5 TABLET, FILM COATED ORAL at 09:15

## 2020-07-23 RX ADMIN — PANTOPRAZOLE SODIUM 40 MG: 40 TABLET, DELAYED RELEASE ORAL at 06:46

## 2020-07-23 RX ADMIN — THIAMINE HCL TAB 100 MG 100 MG: 100 TAB at 09:15

## 2020-07-23 RX ADMIN — CEFAZOLIN SODIUM 2 G: 2 INJECTION, SOLUTION INTRAVENOUS at 00:20

## 2020-07-23 RX ADMIN — METHOCARBAMOL 750 MG: 750 TABLET, FILM COATED ORAL at 09:15

## 2020-07-23 RX ADMIN — OXYCODONE HYDROCHLORIDE 10 MG: 5 TABLET ORAL at 11:01

## 2020-07-23 RX ADMIN — OXYCODONE HYDROCHLORIDE 10 MG: 5 TABLET ORAL at 06:45

## 2020-07-23 RX ADMIN — CEFAZOLIN SODIUM 2 G: 2 INJECTION, SOLUTION INTRAVENOUS at 06:48

## 2020-07-23 RX ADMIN — ACETAMINOPHEN 975 MG: 325 TABLET, FILM COATED ORAL at 11:08

## 2020-07-23 RX ADMIN — SIMVASTATIN 40 MG: 20 TABLET, FILM COATED ORAL at 22:29

## 2020-07-23 RX ADMIN — OXYCODONE HYDROCHLORIDE 10 MG: 5 TABLET ORAL at 18:07

## 2020-07-23 RX ADMIN — OXYCODONE HYDROCHLORIDE 10 MG: 5 TABLET ORAL at 00:10

## 2020-07-23 NOTE — PLAN OF CARE
Pt is A&Ox4. VSS. LS clear, on 2L O2. Pt denies SOB and chest pain. BS active, LBM on 7/22/2020. Dueñas patent and draining well. Pain managed with epidural infusing at 8ml/hr, oxycodone, and IV dilaudid. Pt L BKA dressing is c/d/I, elevated on foam pillow. JPx2 is patent and draining well. L PIV is patent and infusing. Continue to monitor.

## 2020-07-23 NOTE — PLAN OF CARE
VS: Stable.   O2: 2L via NC, refused capno, cont pulse ox on.   Output: Dueñas.   Last BM: 7/22.   Activity: NWB, pt has not been up yet d/t epidural, pain, and drowsiness.   Skin: Incision, drains, wounds to right foot.   Pain: Aching, throbbing pain in left leg managed with oxycodone, scheduled tylenol, and ice.   CMS: Baseline neuropathy.   Dressing: CDI.   Diet: Clear liquid, carb count,  at 0213.   LDA: PIV left hand infusing TKO between abx. Epidural infusing at 8 ml/hr. AIYANA x2 with bloody/red output.   Equipment: Elevator, IS< PCD, cont pulse ox, NC, pillows, call light within reach.   Plan: Continue to monitor.   Additional Info:

## 2020-07-23 NOTE — PLAN OF CARE
Discharge Planner PT   Patient plan for discharge: Pt did not endorse  Current status: NWB LLE, Pt able to log roll to both sides and sit at EOB Min A x 1. Pt RLE was numb during standing attempt from increase in epidural infusion but was reported numb at baseline. Pt knee buckled during attempt to stand but sat back on the bed with the use of gait belt and Nursing. RECS Assist of 2 for safety. Pain management was brought in to assess RLE numbness after session.    Barriers to return to prior living situation: WB status, level of assist  Recommendations for discharge: ARU  Rationale for recommendations: Pt would be a good candidate for intensive acute rehab to promote function.        Entered by: Dong Chacon 07/23/2020 12:01 PM

## 2020-07-23 NOTE — PROGRESS NOTES
"S: Pt seen in room 531, [facility].  Pt aware she was being seen today for an RRD.    O: 61 y/o, 5' 6\", 220 lb pt sitting resting supine, dressings and drain tubes in place to L transtibial residuum. Medium length trans tibial residuum exhibited.  Mx taken for R Lee-Jluis RRD, shrinkers.  DE = 44 cm over dressing.  Bandage to R LE.    A: Pt presents s/p L transtibial amputation 7/22/20.  Pt HX of hypertension, dyslipidemia, obesity, type 2 diabetes, chronic kidney disease, neuropathy in the feet, GI bleed from peptic ulcer disease, venous stasis changes in the bilateral lower extremity, Charcot foot deformity left foot.    P: Fit/delivery of 1618-9-L Lee-Jluis RRD directly over dressing, and drains.  Pt was able to maintain relaxed extension throughout application. Post op socks (8\") provided and demonstrated for application once dressing is reduced. Size VI shrinkers and their application using a donning tube demonstrated once physician approved. Suspension belt applied w/ adjustments for waist and tension on RRD demonstrated. Protection of the limb, volume management,  and preventing a knee flexion contracture reviewed. Pt was provided clear written and oral instruction related to donning, doffing, use, maintenance, safety and potential hazards; verbalizes understanding and satisfaction with fit, comfort, and function.  Mfg instructions left inside of prosthetic introduction bag and left at bedside.  Pt will be seen prn and prosthetic care established at the clinic closest to her home.      "

## 2020-07-23 NOTE — PROGRESS NOTES
[facility], [facility], [place], [place]  Internal Medicine Progress Note      Assessment and Plans:     Tiffani Tan is a 60 year old female who was admitted on 7/21/2020.     Tiffani Tan is a 60 year old female with a past medical history of hypertension, dyslipidemia, obesity, type 2 diabetes, chronic kidney disease, neuropathy in the feet, GI bleed from peptic ulcer disease, venous stasis changes in the bilateral lower extremity, Charcot foot deformity left foot.  Patient was brought in for left below-knee amputation.  She was seen for preoperative evaluation on 17 July 2020.  No clear decision was made at that visit for her preoperative eval.    # Charcot arthropathy: SP left BKA 07/22:    -per primary team  -hydrate well  -good IS  -ankle pumps  -pain and DVT prophylaxis management per orthopedics    # Acute Blood Loss Anemia: Preop Hb is 9.2 = > 7  -Recheck in AM.   -transfuse for Hb less than 7 with symptoms.     # Hypertension: Prior to admission takes hydrochlorothiazide 25 mg p.o. daily, losartan 100 mg p.o. daily.    -Hold for now, based on BP, we will resume those     # Dyslipidemia: Prior to admission takes Zocor 40 mg p.o. daily.  -Continue that    # Obesity:   -Outpatient weight loss    # Type 2 diabetes, hemoglobin A1c 5.5: Does not take insulin.  On glimepiride 2 mg once a day.  Hemoglobin A1c today was 5.5. She took her glimepiride this morning however did not eat.  Blood sugar was 65.  She is getting IV dextrose to correct the hypoglycemia.    -We will hold glimepiride for now.  Patient will be started on medium sliding scale insulin  -Medium sliding scale insulin  -We will adjust insulin regimen based on the sugars     # Chronic kidney disease, baseline creatinine 1.9-1.1    -In February 2020 urine albumin was negative    # Neuropathy in the feet: Reports some pain in the feet chronically.  Takes 600 mg of gabapentin 3 times daily.       -Continue gabapentin 1200 mg TID    # History of GI bleed from gastric ulcers, status post EGD in February 2020 which showed 3 gastric ulcers.  She had some esophagitis 2.  She was noted to have some portal hypertensive gastropathy as well.  She is being treated with Protonix 40 mg daily.  She is supposed to have follow-up EGD in the future.     -Continue Protonix, follow-up with GI as outpatient.      # Venous stasis changes in the bilateral lower extremity:     -Lymphedema consult for leg compression on the right side.  Tomorrow going for left below-knee amputation.  -The redness in the right lower extremity is chronic in nature per her report     # Tobacco use disorder: Smokes more than half pack per day. No history of COPD or shortness of breath.  No wheezing on exam.      -Consider PRN albuterol   -Good incentive spirometry tobacco cessation.    -Could use nicotine patches or gums     # Alcohol use disorder, Portal hypertensive gastropathy: She drinks 2 drinks of vodka daily.  February 2020 she had an ultrasound of the abdomen with duplex.  It showed hepatomegaly with echogenic, heterogenous liver parenchyma and mildly thickened gallbladder wall most compatible with chronic intrinsic parenchymal disease.  On the EGD she had portal hypertensive gastropathy in February 2020. Patient continues to drink alcohol.  And smoke as well.      -I think it is worth repeating ultrasound of the liver.  But that should not hold her surgery    DVT Prophylaxis: Per primary team  Code Status: Prior  Disposition: Per primary team      Kiki Pate MD     Text Page  (7am - 5pm, M-F)    Subjective:      Overnight Events reviewed, Chart Reviewed  SP left BKA  She is doing fine. Has epidural for pain. Its controlled.   Denies sob or chest pain. No cough or phlegm. No fevers.   No nausea or vomiting    No Fever      -Data reviewed today: I reviewed all new labs and imaging results over the last 24 hours.     Exam:          Temp: 97.5  F (36.4  C) Temp src: Oral BP: 117/46 Pulse: 87 Heart Rate: 87 Resp: 16 SpO2: 100 % O2 Device: Nasal cannula Oxygen Delivery: 2 LPM  There were no vitals filed for this visit.  Vital Signs with Ranges  Temp:  [96.9  F (36.1  C)-99.3  F (37.4  C)] 97.5  F (36.4  C)  Pulse:  [85-99] 87  Heart Rate:  [] 87  Resp:  [8-23] 16  BP: (110-155)/() 117/46  SpO2:  [93 %-100 %] 100 %  I/O last 3 completed shifts:  In: 1900 [P.O.:400; I.V.:1500]  Out: 1090 [Urine:900; Drains:90; Blood:100]    Constitutional: No distress noted, Alert, Answering questions appropriately  Respiratory:  Left base insp crackles. Mid and upper lungs clear.  Cardiovascular: S1S2 normal, no new murmur  GI: Soft, non tender  Skin/Integumen: No rash  SP left BKA      Medications        EPIDURAL INFUSION 8 mL/hr at 07/23/20 0719     dextrose 5% and 0.9% NaCl       - MEDICATION INSTRUCTIONS -         acetaminophen  975 mg Oral Q8H     gabapentin  1,200 mg Oral TID     insulin aspart  1-7 Units Subcutaneous TID AC     insulin aspart  1-5 Units Subcutaneous At Bedtime     methocarbamol  750 mg Oral TID     pantoprazole  40 mg Oral QAM AC     senna-docusate  1-2 tablet Oral BID     simvastatin  40 mg Oral At Bedtime     sodium chloride (PF)  3 mL Intracatheter Q8H     thiamine  100 mg Oral Daily       Data   Recent Labs   Lab 07/23/20  0611 07/22/20  0546 07/21/20  1245   HGB 7.0*  --   --    PLT  --   --  211   INR  --   --  1.29*   NA  --  138  --    POTASSIUM  --  5.0  --    CHLORIDE  --  105  --    CO2  --  27  --    BUN  --  17  --    CR  --  0.82  --    ANIONGAP  --  6  --    NAYANA  --  8.6  --    GLC  --  103*  --    ALBUMIN  --  2.8*  --    PROTTOTAL  --  8.5  --    BILITOTAL  --  0.8  --    ALKPHOS  --  141  --    ALT  --  29  --    AST  --  61*  --        No results found for this or any previous visit (from the past 24 hour(s)).

## 2020-07-23 NOTE — PLAN OF CARE
VS: Stable.    O2: Oxygen at 3LPM while sleeping. Possible undiagnosed DIANA.    Output: Dueñas patent and draining .    Last BM: 7/22 Preop.    Activity: Has yet to get out of bed due to pain.    Skin: Preexisting wound to right foot wrapped with Kerlix and gauze. Skin chris elsewhere.    Pain: Patient reports intolerable pain although doses off often. Oxycodone and IV dilaudid given. Pain reported to be stabbing in residual limb.    CMS: Numb/Ting hands and feet at baseline.    Dressing: CDI to residual limb and right foot.    Diet: Clear liquid. Tolerating well. BG WDL    LDA: PIV infusing 100ml NS, Dueñas catheter, AIYANA drain.    Equipment: CAPNO   Plan: Continue to monitor pain and breathing.    Additional Info: Son Grabiel) in room with patient.

## 2020-07-23 NOTE — CONSULTS
[facility] Consultation  DATE OF CONSULT VISIT: 7/23/2020    REASON FOR PAIN CONSULTATION:  Tiffani Tan is a 60 year old female seen in consultation on Orthopedic service at the request of referring provider Dr. Adams for evaluation and recommendations for epidural management after left below knee amputation.     CHIEF PAIN COMPLAINT: left lower extremity pain    ASSESSMENT: Tiffani Tan is a 60 year old female with Charcot arthropathy, now POD 1 s/p left below knee amputation with an L3-4 epidural placed day prior to surgery for pain control, which dislodged overnight and was replaced on day of surgery without issues. The epidural is currently running 0.125% bupivacaine at 8ml/hr.     Patient reports adequate pain control with current regimen. Majority of pain is posterior distal aspect of stump, as well as phantom limb pain. She denies adverse side effects associated with local anesthetic.     On my initial visit today, Tiffani felt the pain control was inadequate. She had appropriate sensory block to cold in her legs. I gave a bolus of 5ml bupivacaine 0.125% through the epidural to improve coverage. Tiffani had complete relief of pain in the left leg, but also increased lower extremity weakness which prevented her from standing with PT, as well as being orthostatic. Blood pressures improved with laying down to SBP 90s-100s, and she was no longer symptomatic.     We discussed that although she previously felt the pain control could be improved upon, the weakness she experienced from the bolus would be the downside of increasing her epidural rate. She would like to keep the current epidural infusion, of 8mL/hr, as she feels her pain control today has actually been overall tolerable.                                         TREATMENT RECOMMENDATIONS/PLAN:   -  REGIONAL ANESTHESIA/ANAGLESIA: Continue current infusion: 0.125% bupivacaine at 8ml/hr    FOLLOW UP:  Will Continue to follow at this  "time      Pt case reviewed and pt seen by Dr. Abdi with [facility]        HISTORY OF PRESENT ILLNESS:   From Medicine consult note on 7/21/2020:  \"Tiffani Tan is a 60 year old female with a past medical history of hypertension, dyslipidemia, obesity, type 2 diabetes, chronic kidney disease, neuropathy in the feet, GI bleed from peptic ulcer disease, venous stasis changes in the bilateral lower extremity, Charcot foot deformity left foot.  Patient was brought in for left below-knee amputation.\"    CAPA (Clinically Aligned Pain Assessment)  Comfort (How is your pain?): Tolerable with discomfort  Change in Pain (Since your last medication/intervention?): About the same  Pain Control (How are your pain treatments working?):  Partially effective control  Functioning (Are you able to do activities to get better?) : Can do most things, but pain gets in the way of some   Sleep (Does your pain management allow you to sleep or rest?): Awake with occasional pain     INPATIENT MEDICATIONS PERTINENT TO PAIN CONSULT:   Anticoagulation: none      LABORATORY VALUES:   CMP  Recent Labs   Lab 07/22/20  0546      POTASSIUM 5.0   CHLORIDE 105   CO2 27   ANIONGAP 6   *   BUN 17   CR 0.82   GFRESTIMATED 77   GFRESTBLACK 89   NAYANA 8.6   PROTTOTAL 8.5   ALBUMIN 2.8*   BILITOTAL 0.8   ALKPHOS 141   AST 61*   ALT 29     CBC  Recent Labs   Lab 07/23/20  0611 07/21/20  1245   HGB 7.0*  --    PLT  --  211     INR  Recent Labs   Lab 07/21/20  1245   INR 1.29*       Labs above reviewed as well as additional relevant labs from the EPIC record     HOME/PREVIOUS MEDICATIONS: No current facility-administered medications on file prior to encounter.   amoxicillin-clavulanate (AUGMENTIN) 875-125 MG tablet, Take 1 tablet by mouth 2 times daily  gabapentin (NEURONTIN) 600 MG tablet, Take 600 mg by mouth 3 times daily  glimepiride (AMARYL) 2 MG tablet, TAKE 1 TABLET(2 MG) BY MOUTH EVERY MORNING BEFORE " BREAKFAST  hydrochlorothiazide (HYDRODIURIL) 25 MG tablet, TAKE 1 TABLET BY MOUTH DAILY WITH 100 MG LOSARTAN  HYDROcodone-acetaminophen (NORCO) 7.5-325 MG per tablet, Take 1-2 tablets by mouth every 8 hours as needed for severe pain (Patient taking differently: Take 1-2 tablets by mouth every 8 hours as needed for severe pain Patient takes differently: 1 T PO q4h prn pain)  losartan (COZAAR) 100 MG tablet, TAKE 1 TABLET BY MOUTH DAILY WITH 25MG HCTZ  omeprazole (PRILOSEC) 20 MG DR capsule, Take 20 mg by mouth 2 times daily Patient takes when she runs out of protonix. Insurance only pays for once daily protonix.  simvastatin (ZOCOR) 40 MG tablet, Take 40 mg by mouth At Bedtime  thiamine (B-1) 100 MG tablet, Take 100 mg by mouth daily  blood glucose (CONTOUR NEXT TEST) test strip, apply 1 strip by finger poke route 2 times per day.  blood glucose monitoring (MeituET) lancets, by subcutaneous route 2 times per day.  order for DME, Equipment being ordered: [id number] $35  shoe post op mens md  order for DME, Equipment being ordered: DME [id number] $70   Shoe LG  pantoprazole (PROTONIX) 40 MG EC tablet, Take 1 tablet (40 mg) by mouth every morning (before breakfast) (Patient taking differently: Take 40 mg by mouth every morning (before breakfast) Patient takes BID.)      PRIMARY CARE PROVIDER: Wily Bonilla  No Known Allergies      No past medical history on file.      Past Surgical History:   Procedure Laterality Date     AMPUTATE LEG BELOW KNEE Left 7/22/2020    Procedure: Left below knee amputation;  Surgeon: Aniceto Adams MD;  Location:  OR     ESOPHAGOSCOPY, GASTROSCOPY, DUODENOSCOPY (EGD), COMBINED N/A 2/5/2020    Procedure: ESOPHAGOGASTRODUODENOSCOPY (EGD);  Surgeon: Tanya Dias MD;  Location:  GI         Family History   Problem Relation Age of Onset     No Known Problems Mother      No Known Problems Father          HEALTH & LIFESTYLE PRACTICES:   Tobacco:   reports that she has been smoking. She has been smoking about 1.00 pack per day. She has never used smokeless tobacco.  Alcohol:  reports current alcohol use.  Illicit drugs:  has no history on file for drug.      REVIEW OF SYSTEMS:    See HPI for pertinent details.  Remainder of 10-point ROS negative.       COMPREHENSIVE PHYSICAL EXAMINATION:  Vitals: Temp:  [36.1  C (96.9  F)-37.4  C (99.3  F)] 36.4  C (97.5  F)  Pulse:  [86-99] 87  Heart Rate:  [] 87  Resp:  [8-16] 16  BP: (110-155)/(41-89) 117/46  SpO2:  [93 %-100 %] 100 %  Exam:  Constitutional: healthy, alert and no distress. Able to turn in bed with minimal discomfort.   Strength 5/5 and symmetric grossly in bilateral LE  Level to cold sensation in bilateral lower extremities in lumbar and sciatic distributions  Catheter site with dressing c/d/i, no erythema, heme, edema       TIME SPENT: I spent 30 minutes including 20 minutes face-to-face time counseling her about her diagnosis and treatment options, and/or coordinating care with the primary team.    Alma Abdi MD  [facility]  7/23/2020 2:41 PM    Contact Info (for in-house use only):  Job code ID: [id number]   [place] [employer] [id number]  [facility] [address]  Juan F phone: dial [phone], enter jobcode ID, then enter call-back number.    Text: Use [employer] on the Intranet <Paging/Directory> tab and enter Jobcode ID.   If no call back at any time, contact the hospital  and ask for RAPS attending or backup

## 2020-07-23 NOTE — PLAN OF CARE
VS: Stable.    O2: Oxygen at 2LPM to remain >90%. Possible undiagnosed DIANA.    Output: Dueñas patent and draining .    Last BM: 7/22 Preop.    Activity: Has yet to get out of bed due to pain and numbness. Attempted to stand today although leg was numb and patient nearly fell although gait belt aided in assisting patient back into bed.    Skin: Preexisting wound to right foot wrapped with Kerlix and gauze. Lymphedema wrap to right leg by PT. Skin chris elsewhere.    Pain: Bupivicane epidural infusing at 8ml/hr. Patient reports pain as tolerable. Oxycodone 5-10mg given.  Pain reported to be stabbing in residual limb.    CMS: Numb/Ting hands and feet at baseline.    Dressing: CDI to residual limb and right foot.    Diet: Regular diet. Tolerating well. BG WDL    LDA: PIV SL, Dueñas catheter, AIYANA drain.    Equipment: CAPNO   Plan: Continue to monitor pain and breathing.    Additional Info: Son Grabiel) in room with patient.

## 2020-07-24 ENCOUNTER — APPOINTMENT (OUTPATIENT)
Dept: PHYSICAL THERAPY | Facility: CLINIC | Age: 60
End: 2020-07-24
Attending: ORTHOPAEDIC SURGERY
Payer: COMMERCIAL

## 2020-07-24 ENCOUNTER — APPOINTMENT (OUTPATIENT)
Dept: GENERAL RADIOLOGY | Facility: CLINIC | Age: 60
End: 2020-07-24
Attending: HOSPITALIST
Payer: COMMERCIAL

## 2020-07-24 ENCOUNTER — APPOINTMENT (OUTPATIENT)
Dept: OCCUPATIONAL THERAPY | Facility: CLINIC | Age: 60
End: 2020-07-24
Attending: ORTHOPAEDIC SURGERY
Payer: COMMERCIAL

## 2020-07-24 LAB
ANION GAP SERPL CALCULATED.3IONS-SCNC: 7 MMOL/L (ref 3–14)
ANION GAP SERPL CALCULATED.3IONS-SCNC: <1 MMOL/L (ref 3–14)
BUN SERPL-MCNC: 27 MG/DL (ref 7–30)
BUN SERPL-MCNC: 30 MG/DL (ref 7–30)
CALCIUM SERPL-MCNC: 7.4 MG/DL (ref 8.5–10.1)
CALCIUM SERPL-MCNC: 7.6 MG/DL (ref 8.5–10.1)
CHLORIDE SERPL-SCNC: 105 MMOL/L (ref 94–109)
CHLORIDE SERPL-SCNC: 106 MMOL/L (ref 94–109)
CO2 SERPL-SCNC: 23 MMOL/L (ref 20–32)
CO2 SERPL-SCNC: 25 MMOL/L (ref 20–32)
COPATH REPORT: NORMAL
CREAT SERPL-MCNC: 2.03 MG/DL (ref 0.52–1.04)
CREAT SERPL-MCNC: 2.22 MG/DL (ref 0.52–1.04)
GFR SERPL CREATININE-BSD FRML MDRD: 23 ML/MIN/{1.73_M2}
GFR SERPL CREATININE-BSD FRML MDRD: 26 ML/MIN/{1.73_M2}
GLUCOSE BLDC GLUCOMTR-MCNC: 109 MG/DL (ref 70–99)
GLUCOSE BLDC GLUCOMTR-MCNC: 122 MG/DL (ref 70–99)
GLUCOSE BLDC GLUCOMTR-MCNC: 125 MG/DL (ref 70–99)
GLUCOSE BLDC GLUCOMTR-MCNC: 148 MG/DL (ref 70–99)
GLUCOSE BLDC GLUCOMTR-MCNC: 181 MG/DL (ref 70–99)
GLUCOSE SERPL-MCNC: 122 MG/DL (ref 70–99)
GLUCOSE SERPL-MCNC: 134 MG/DL (ref 70–99)
HGB BLD-MCNC: 7.1 G/DL (ref 11.7–15.7)
HGB BLD-MCNC: 7.3 G/DL (ref 11.7–15.7)
INTERPRETATION ECG - MUSE: NORMAL
MAGNESIUM SERPL-MCNC: 1.9 MG/DL (ref 1.6–2.3)
POTASSIUM SERPL-SCNC: 4.7 MMOL/L (ref 3.4–5.3)
POTASSIUM SERPL-SCNC: 5.7 MMOL/L (ref 3.4–5.3)
POTASSIUM SERPL-SCNC: 6 MMOL/L (ref 3.4–5.3)
POTASSIUM SERPL-SCNC: 6.8 MMOL/L (ref 3.4–5.3)
SODIUM SERPL-SCNC: 131 MMOL/L (ref 133–144)
SODIUM SERPL-SCNC: 135 MMOL/L (ref 133–144)

## 2020-07-24 PROCEDURE — 25000128 H RX IP 250 OP 636: Performed by: ORTHOPAEDIC SURGERY

## 2020-07-24 PROCEDURE — 97530 THERAPEUTIC ACTIVITIES: CPT | Mod: GP | Performed by: PHYSICAL THERAPIST

## 2020-07-24 PROCEDURE — 25000132 ZZH RX MED GY IP 250 OP 250 PS 637: Performed by: HOSPITALIST

## 2020-07-24 PROCEDURE — 80048 BASIC METABOLIC PNL TOTAL CA: CPT | Performed by: HOSPITALIST

## 2020-07-24 PROCEDURE — 25000128 H RX IP 250 OP 636: Performed by: ANESTHESIOLOGY

## 2020-07-24 PROCEDURE — 97140 MANUAL THERAPY 1/> REGIONS: CPT | Mod: GP | Performed by: PHYSICAL THERAPIST

## 2020-07-24 PROCEDURE — 97530 THERAPEUTIC ACTIVITIES: CPT | Mod: GO

## 2020-07-24 PROCEDURE — 36415 COLL VENOUS BLD VENIPUNCTURE: CPT | Performed by: PHYSICIAN ASSISTANT

## 2020-07-24 PROCEDURE — 93005 ELECTROCARDIOGRAM TRACING: CPT

## 2020-07-24 PROCEDURE — 85018 HEMOGLOBIN: CPT | Performed by: PHYSICIAN ASSISTANT

## 2020-07-24 PROCEDURE — 12000001 ZZH R&B MED SURG/OB UMMC

## 2020-07-24 PROCEDURE — 71045 X-RAY EXAM CHEST 1 VIEW: CPT

## 2020-07-24 PROCEDURE — 83735 ASSAY OF MAGNESIUM: CPT | Performed by: HOSPITALIST

## 2020-07-24 PROCEDURE — 25800030 ZZH RX IP 258 OP 636: Performed by: ANESTHESIOLOGY

## 2020-07-24 PROCEDURE — 25800025 ZZH RX 258: Performed by: HOSPITALIST

## 2020-07-24 PROCEDURE — 25800030 ZZH RX IP 258 OP 636: Performed by: ORTHOPAEDIC SURGERY

## 2020-07-24 PROCEDURE — 25800030 ZZH RX IP 258 OP 636: Performed by: HOSPITALIST

## 2020-07-24 PROCEDURE — 25000128 H RX IP 250 OP 636: Performed by: CLINICAL NURSE SPECIALIST

## 2020-07-24 PROCEDURE — 00000146 ZZHCL STATISTIC GLUCOSE BY METER IP

## 2020-07-24 PROCEDURE — 85018 HEMOGLOBIN: CPT | Performed by: HOSPITALIST

## 2020-07-24 PROCEDURE — 25000131 ZZH RX MED GY IP 250 OP 636 PS 637: Performed by: HOSPITALIST

## 2020-07-24 PROCEDURE — 93010 ELECTROCARDIOGRAM REPORT: CPT | Performed by: INTERNAL MEDICINE

## 2020-07-24 PROCEDURE — 99233 SBSQ HOSP IP/OBS HIGH 50: CPT | Performed by: HOSPITALIST

## 2020-07-24 PROCEDURE — 25000132 ZZH RX MED GY IP 250 OP 250 PS 637: Performed by: PHYSICIAN ASSISTANT

## 2020-07-24 PROCEDURE — 25000132 ZZH RX MED GY IP 250 OP 250 PS 637: Performed by: CLINICAL NURSE SPECIALIST

## 2020-07-24 PROCEDURE — 36415 COLL VENOUS BLD VENIPUNCTURE: CPT | Performed by: HOSPITALIST

## 2020-07-24 PROCEDURE — 97165 OT EVAL LOW COMPLEX 30 MIN: CPT | Mod: GO

## 2020-07-24 PROCEDURE — 97530 THERAPEUTIC ACTIVITIES: CPT | Mod: GP

## 2020-07-24 PROCEDURE — 84132 ASSAY OF SERUM POTASSIUM: CPT | Performed by: HOSPITALIST

## 2020-07-24 RX ORDER — GABAPENTIN 300 MG/1
300 CAPSULE ORAL 2 TIMES DAILY
Status: DISCONTINUED | OUTPATIENT
Start: 2020-07-25 | End: 2020-07-26 | Stop reason: DRUGHIGH

## 2020-07-24 RX ORDER — SODIUM POLYSTYRENE SULFONATE 4.1 MEQ/G
30 POWDER, FOR SUSPENSION ORAL; RECTAL ONCE
Status: COMPLETED | OUTPATIENT
Start: 2020-07-24 | End: 2020-07-24

## 2020-07-24 RX ORDER — SODIUM POLYSTYRENE SULFONATE 4.1 MEQ/G
30 POWDER, FOR SUSPENSION ORAL; RECTAL ONCE
Status: DISCONTINUED | OUTPATIENT
Start: 2020-07-24 | End: 2020-07-25 | Stop reason: CLARIF

## 2020-07-24 RX ORDER — SODIUM CHLORIDE 9 MG/ML
INJECTION, SOLUTION INTRAVENOUS CONTINUOUS
Status: DISCONTINUED | OUTPATIENT
Start: 2020-07-24 | End: 2020-07-25

## 2020-07-24 RX ORDER — SODIUM CHLORIDE 9 MG/ML
INJECTION, SOLUTION INTRAVENOUS
Status: DISPENSED
Start: 2020-07-24 | End: 2020-07-25

## 2020-07-24 RX ORDER — GABAPENTIN 600 MG/1
300 TABLET ORAL 2 TIMES DAILY
Status: DISCONTINUED | OUTPATIENT
Start: 2020-07-25 | End: 2020-07-24 | Stop reason: CLARIF

## 2020-07-24 RX ORDER — NICOTINE POLACRILEX 4 MG
15-30 LOZENGE BUCCAL
Status: DISCONTINUED | OUTPATIENT
Start: 2020-07-24 | End: 2020-07-24

## 2020-07-24 RX ORDER — GABAPENTIN 600 MG/1
600 TABLET ORAL 2 TIMES DAILY
Status: DISCONTINUED | OUTPATIENT
Start: 2020-07-25 | End: 2020-07-24

## 2020-07-24 RX ORDER — GABAPENTIN 600 MG/1
300 TABLET ORAL DAILY
Status: DISCONTINUED | OUTPATIENT
Start: 2020-07-25 | End: 2020-07-24

## 2020-07-24 RX ORDER — DEXTROSE MONOHYDRATE 100 MG/ML
INJECTION, SOLUTION INTRAVENOUS CONTINUOUS
Status: DISCONTINUED | OUTPATIENT
Start: 2020-07-24 | End: 2020-07-25

## 2020-07-24 RX ORDER — DEXTROSE MONOHYDRATE 25 G/50ML
25-50 INJECTION, SOLUTION INTRAVENOUS
Status: DISCONTINUED | OUTPATIENT
Start: 2020-07-24 | End: 2020-07-24

## 2020-07-24 RX ORDER — DEXTROSE MONOHYDRATE 25 G/50ML
25 INJECTION, SOLUTION INTRAVENOUS ONCE
Status: COMPLETED | OUTPATIENT
Start: 2020-07-24 | End: 2020-07-24

## 2020-07-24 RX ADMIN — SODIUM CHLORIDE 1000 ML: 9 INJECTION, SOLUTION INTRAVENOUS at 08:17

## 2020-07-24 RX ADMIN — Medication 0.2 MG: at 14:39

## 2020-07-24 RX ADMIN — GABAPENTIN 1200 MG: 600 TABLET, FILM COATED ORAL at 08:23

## 2020-07-24 RX ADMIN — OXYCODONE HYDROCHLORIDE 10 MG: 5 TABLET ORAL at 18:16

## 2020-07-24 RX ADMIN — METHOCARBAMOL 750 MG: 750 TABLET, FILM COATED ORAL at 19:55

## 2020-07-24 RX ADMIN — OXYCODONE HYDROCHLORIDE 10 MG: 5 TABLET ORAL at 07:03

## 2020-07-24 RX ADMIN — PANTOPRAZOLE SODIUM 40 MG: 40 TABLET, DELAYED RELEASE ORAL at 08:23

## 2020-07-24 RX ADMIN — SIMVASTATIN 40 MG: 20 TABLET, FILM COATED ORAL at 22:23

## 2020-07-24 RX ADMIN — BUPIVACAINE HYDROCHLORIDE: 7.5 INJECTION, SOLUTION EPIDURAL; RETROBULBAR at 13:31

## 2020-07-24 RX ADMIN — OXYCODONE HYDROCHLORIDE 10 MG: 5 TABLET ORAL at 22:23

## 2020-07-24 RX ADMIN — OXYCODONE HYDROCHLORIDE 10 MG: 5 TABLET ORAL at 03:48

## 2020-07-24 RX ADMIN — ACETAMINOPHEN 975 MG: 325 TABLET, FILM COATED ORAL at 13:15

## 2020-07-24 RX ADMIN — INSULIN ASPART 1 UNITS: 100 INJECTION, SOLUTION INTRAVENOUS; SUBCUTANEOUS at 13:17

## 2020-07-24 RX ADMIN — OXYCODONE HYDROCHLORIDE 10 MG: 5 TABLET ORAL at 00:29

## 2020-07-24 RX ADMIN — ACETAMINOPHEN 975 MG: 325 TABLET, FILM COATED ORAL at 19:54

## 2020-07-24 RX ADMIN — Medication 0.2 MG: at 19:56

## 2020-07-24 RX ADMIN — THIAMINE HCL TAB 100 MG 100 MG: 100 TAB at 08:23

## 2020-07-24 RX ADMIN — HUMAN INSULIN 10 UNITS: 100 INJECTION, SOLUTION SUBCUTANEOUS at 10:12

## 2020-07-24 RX ADMIN — METHOCARBAMOL 750 MG: 750 TABLET, FILM COATED ORAL at 13:15

## 2020-07-24 RX ADMIN — DEXTROSE MONOHYDRATE: 100 INJECTION, SOLUTION INTRAVENOUS at 10:12

## 2020-07-24 RX ADMIN — DEXTROSE MONOHYDRATE 25 G: 25 INJECTION, SOLUTION INTRAVENOUS at 10:12

## 2020-07-24 RX ADMIN — Medication 0.2 MG: at 13:02

## 2020-07-24 RX ADMIN — ACETAMINOPHEN 975 MG: 325 TABLET, FILM COATED ORAL at 03:48

## 2020-07-24 RX ADMIN — OXYCODONE HYDROCHLORIDE 10 MG: 5 TABLET ORAL at 13:52

## 2020-07-24 RX ADMIN — DOCUSATE SODIUM 50 MG AND SENNOSIDES 8.6 MG 2 TABLET: 8.6; 5 TABLET, FILM COATED ORAL at 08:23

## 2020-07-24 RX ADMIN — CALCIUM GLUCONATE 1 G: 98 INJECTION, SOLUTION INTRAVENOUS at 09:53

## 2020-07-24 RX ADMIN — SODIUM POLYSTYRENE SULFONATE 30 G: 4.1 POWDER, FOR SUSPENSION ORAL; RECTAL at 11:03

## 2020-07-24 NOTE — PLAN OF CARE
Discharge Planner OT   Patient plan for discharge: appears open to rehab, patient wants to get home to see her dogs  Current status: Patient educated on role of OT and safe activity progression. Patient alert and talkative, agreeable to sitting EOB. Shortly after, patient became less alert and more somnolent. RN present as well. Assessed O2, dropped to 79-80%, patient placed on 2L O2 and sats annette to mid 90's. BP low 72/50 with LH, hgb 7.1. Activity deferred. Patient was provided with theraband to complete UE exercises in prep for transfers.   Barriers to return to prior living situation: pain, new BKA, impaired mobility, level of assist for mobility and ADLs  Recommendations for discharge: ARU  Rationale for recommendations: anticipate patient would benefit from intense rehab post L BKA, patient is very motivated to return to home setting and independence        Entered by: Lizet Randolph 07/24/2020 8:38 AM

## 2020-07-24 NOTE — PHARMACY-CONSULT NOTE
Patient is being treated for hyperkalemia. A pharmacy consult was initiated to review the patient's medication list for possible causes of hyperkalemia.  No medications on this patient's profile are likely to have the side effect of hyperkalemia.     Continue current medication regimen.

## 2020-07-24 NOTE — PROGRESS NOTES
07/24/20 0700   Quick Adds   Type of Visit Initial Occupational Therapy Evaluation   Living Environment   Lives With spouse   Living Arrangements house   Home Accessibility stairs to enter home   Number of Stairs, Main Entrance 3   Transportation Anticipated family or friend will provide   Living Environment Comment patient and  own business, they turned an office into a bedroom and stay there because it's on one level. Handicap toilet. Has many stairs at home. Higher toilet at home. Tub/shower at home with shower chair. Walk in shower at work with built in seat.    Self-Care   Usual Activity Tolerance poor   Current Activity Tolerance poor   Regular Exercise No   Equipment Currently Used at Home wheelchair, manual;raised toilet   Functional Level   Ambulation 4-->completely dependent   Transferring 0-->independent   Toileting 1-->assistive equipment   Bathing 3-->assistive equipment and person   Dressing 0-->independent   Eating 0-->independent   Communication 0-->understands/communicates without difficulty   Swallowing 0-->swallows foods/liquids without difficulty   Cognition 0 - no cognition issues reported   Fall history within last six months yes   Number of times patient has fallen within last six months 2  (minor)   General Information   Onset of Illness/Injury or Date of Surgery - Date 07/22/20   Referring Physician Leighann Meneses PA-C   Patient/Family Goals Statement Be able to walk with grandsons   Additional Occupational Profile Info/Pertinent History of Current Problem s/p L BKA   Precautions/Limitations fall precautions;oxygen therapy device and L/min   Weight-Bearing Status - LLE nonweight-bearing   Cognitive Status Examination   Orientation orientation to person, place and time   Level of Consciousness alert  (initially, became increasingly lethargic/sleepy)   Follows Commands (Cognition) WNL   Memory intact   Visual Perception   Visual Perception Wears glasses   Sensory Examination    Sensory Comments Numbness in LE's due to epidural, numbness in hands due to neuropathy   Pain Assessment   Patient Currently in Pain Yes, see Vital Sign flowsheet   Range of Motion (ROM)   ROM Comment B UE's WFL, however patient does report pulling something in her L shoulder when repositioning   Strength   Strength Comments UE's WFL    Muscle Tone Assessment   Muscle Tone Quick Adds No deficits were identified   Coordination   Coordination Comments Patient has difficulty grasping objects due to neuropathy in hands   Mobility   Bed Mobility Comments Deferred due low hgb, low BP, decreased alertness   Transfer Skills   Transfer Comments Deferred due low hgb, low BP, decreased alertness   Transfer Skill: Toilet Transfer   Level of [place]: Toilet unable to perform   Lower Body Dressing   Level of [place]: Dress Lower Body maximum assist (25% patients effort)   Physical Assist/Nonphysical Assist: Dress Lower Body set-up required   Toileting   Level of [place]: Toilet dependent (less than 25% patients effort)   Eating/Self Feeding   Level of [place]: Eating independent   Activities of Daily Living Analysis   Impairments Contributing to Impaired Activities of Daily Living balance impaired;pain;post surgical precautions;ROM decreased;strength decreased   General Therapy Interventions   Planned Therapy Interventions ADL retraining;transfer training   Clinical Impression   Criteria for Skilled Therapeutic Interventions Met yes, treatment indicated   OT Diagnosis Decreased functional mobility and ADLs   Influenced by the following impairments pain, post op precautions, decreased strength    Assessment of Occupational Performance 3-5 Performance Deficits   Identified Performance Deficits dressing, bathing, toileting, transfers, home mgmt   Clinical Decision Making (Complexity) Low complexity   Therapy Frequency Daily   Predicted Duration of Therapy Intervention (days/wks) 4 days   Anticipated Equipment  "Needs at Discharge tub bench  (TBD)   Anticipated Discharge Disposition Acute Rehabilitation Facility   Risks and Benefits of Treatment have been explained. Yes   Patient, Family & other staff in agreement with plan of care Yes   [facility] TM \"6 Clicks\"   2016, Trustees of [facility], under license to [employer].  All rights reserved.   6 Clicks Short Forms Daily Activity Inpatient Short Form   [facility] AM-PAC  \"6 Clicks\" Daily Activity Inpatient Short Form   1. Putting on and taking off regular lower body clothing? 2 - A Lot   2. Bathing (including washing, rinsing, drying)? 2 - A Lot   3. Toileting, which includes using toilet, bedpan or urinal? 1 - Total   4. Putting on and taking off regular upper body clothing? 3 - A Little   5. Taking care of personal grooming such as brushing teeth? 4 - None   6. Eating meals? 4 - None   Daily Activity Raw Score (Score out of 24.Lower scores equate to lower levels of function) 16   Total Evaluation Time   Total Evaluation Time (Minutes) 8     "

## 2020-07-24 NOTE — PROVIDER NOTIFICATION
notified in person that pt's potassium lab level is 6.8, Hemoglobin lab is 7.3. Instructed by  that he will assess the pt.

## 2020-07-24 NOTE — PLAN OF CARE
Pt. admitted from [address] [place] [address] at 1430. Pt. accompanied by transport and arrived with personal belongings. Report was taken from Meir.     Pt. is A&Ox4. VSS. 02 sats are 99% on 3L. Lung sounds are clear bilaterally with both anterior and posterior. Bowel sounds are audible in all 4 quadrants. CMS and Neuros are intact. Denies numbness and tingling in all extremities. Has pain in the L leg and given IV dilaudid. Pt state pain is tolerably controlled. Pt. denies nausea, CP, SOB, lightheadedness, and dizziness. Pt is on a Regular diet.    L leg dressing, dried drainage noted. AIYANA drain is patent. Dueñas Catheter is patent. PIV is patent and infusing in NS @ 75mL/hr. PCDs are in place to BLEs. Pt. educated on use and purpose of the incentive spirometer. Pt. is oriented to the room and call light system and the call light is within reach. Continue to monitor.

## 2020-07-24 NOTE — PROGRESS NOTES
Orthopaedic Surgery Progress Note 07/24/2020     Subjective:   No acute events overnight. Patient complains of pain in all four extremities. Pain in surgical extremity is located primarily over the lateral aspect of the stump and is worse with straight leg raise. She states that the epidural is disconcerting and hinders her therapy as it makes both legs numb. Tolerating diet. No N/V. On supplemental O2 but denies SOB. Denies CP/fever/chills/headache/lightheadedness. No BM yetpassing flatus. Dueñas in place.      Objective:   /63 (BP Location: Left arm)   Pulse 89   Temp 98.6  F (37  C) (Oral)   Resp 11   SpO2 98%   I/O this shift:  In: -   Out: 250 [Urine:250]  Gen: No acute physical distress. Resting comfortably in bed. Cooperative.  Resp: Breathing comfortably on supplemental O2  CV: Nontachycardic  : Dueñas in place  Neuro: No focal deficits appreciated; alert and oriented  MSK:   LLE:  Dressing is intact with mild dried bloody discharge.   Stump protector in place over dressing.  Surgical incisions sutured without surrounding erythema or active discharge.  Drain is draining bloody/serous fluid.    SLR intact          Labs:  Lab Results   Component Value Date    WBC 8.5 05/29/2020    HGB 7.3 (L) 07/24/2020     07/21/2020    INR 1.29 (H) 07/21/2020        Assessment & Plan:   60 year old female admitted on 7/21/2020 for a left BKA on 7/22/20 with Dr. Adams.    Posterior Splint to remain on left lower extremity and NWB at all times.  Medial AIYANA drain = deep drain  Lateral AIYANA drain = superficial drain  Drain to be removed when output is less than 50cc in 24 hour shift. (tegaderm only thing holding drain under splint)  Stump protector in place. Change surgical dressings dry to dry BID and as needed to keep incisions dry to prevent skin maceration.  Reduce epidural by 25% q12h and adjust PO pain medications accordingly in order to transition fully to PO medication.   F/U in clinic with Dr. Adams's team  in 2 weeks for incision check and suture removal.     CRISTY Wilkins, PAHemalC  Physician Assistant, Orthopedic Surgery  Pager: [phone]    Please page inpatient resource or managing residient directly with any questions/concerns during regular weekday hours before 5pm. If there is no response, or if it is a weekend, holiday, or during evening hours then please page the orthopaedic surgery resident on call.

## 2020-07-24 NOTE — PLAN OF CARE
Discharge Planner PT   Patient plan for discharge: home with spouse  Current status: NWB LLE - sit pivot Mod A x 1 to EOB. No attempt for standing due residual RLE leg numbness. Skind check and RLE leg re-wrap for lymphedema with 8cm and 10 cm bandages at 50% overlap and 50% stretch. Pt was comfortable in supine position at EOS with needs within reach  Barriers to return to prior living situation: WB, level of assist  Recommendations for discharge: ARU  Rationale for recommendations: Pt would benefit from 3 hours of therapy to promote function and independence.        Entered by: Dong Chacon 07/24/2020 12:46 PM

## 2020-07-24 NOTE — PROGRESS NOTES
[facility], [facility], [place], [place]  Internal Medicine Progress Note      Assessment and Plans:     Tiffani Tan is a 60 year old female who was admitted on 7/21/2020.     Tiffani Tan is a 60 year old female with a past medical history of hypertension, dyslipidemia, obesity, type 2 diabetes, chronic kidney disease, neuropathy in the feet, GI bleed from peptic ulcer disease, venous stasis changes in the bilateral lower extremity, Charcot foot deformity left foot.  Patient was brought in for left below-knee amputation for charcot foot arthropathy.      # Charcot arthropathy: SP left BKA 07/22:    -per primary team  -hydrate well  -good IS  -ankle pumps  -pain and DVT prophylaxis management per orthopedics    # Hypotension: Likely from Volume loss. BMP showed hyperkalemia and ARF. Recheck Hb is 7.3.   -SP one liter of fluid bolus. BP picked up  -talk to anesthesia to cut back on epidural, may be playing a role  -get EKG    # Hyperkalemia:   -EKG stat  -Give calcium gluconate  -give dextrose and insulin and 30 g kayexalate  -Hydrate well  -Start Tele    # Acute Blood Loss Anemia: Preop Hb is 9.2 = > 7 = > 7.3  -Recheck Hb in AM.   -Transfuse for Hb less than 7 with symptoms.     # Hx of Hypertension: Prior to admission takes hydrochlorothiazide 25 mg p.o. daily, losartan 100 mg p.o. daily.    -Hold for now    # Dyslipidemia: Prior to admission takes Zocor 40 mg p.o. daily.  -Continue that    # Obesity:   -Outpatient weight loss    # Type 2 diabetes, hemoglobin A1c 5.5: Does not take insulin.  On glimepiride 2 mg once a day.  Hemoglobin A1c today was 5.5. She took her glimepiride this morning however did not eat.  Blood sugar was 65.  She is getting IV dextrose to correct the hypoglycemia.    -We will hold glimepiride for now.  Patient will be started on medium sliding scale insulin  -Medium sliding scale insulin  -We will adjust insulin regimen based on the  sugars     # LIGIA on Chronic kidney disease, baseline creatinine 1.1-1.9    -In February 2020 urine albumin was negative  -likely from volume loss  -Admit creat 0.82 => 2.03. Give more fluids. Check CK    # Neuropathy in the feet: Reports some pain in the feet chronically.  Takes 1200 mg of gabapentin 3 times daily.      -Continue gabapentin 1200 mg TID => 1200 mg BID given ARF    # History of GI bleed from gastric ulcers, status post EGD in February 2020 which showed 3 gastric ulcers.  She had some esophagitis 2.  She was noted to have some portal hypertensive gastropathy as well.  She is being treated with Protonix 40 mg daily.  She is supposed to have follow-up EGD in the future.     -Continue Protonix, follow-up with GI as outpatient.      # Venous stasis changes in the bilateral lower extremity:     -Lymphedema consult for leg compression on the right side.  Tomorrow going for left below-knee amputation.  -The redness in the right lower extremity is chronic in nature per her report     # Tobacco use disorder: Smokes more than half pack per day. No history of COPD or shortness of breath.  No wheezing on exam.      -Consider PRN albuterol   -Good incentive spirometry tobacco cessation.    -Could use nicotine patches or gums     # Alcohol use disorder, Portal hypertensive gastropathy: She drinks 2 drinks of vodka daily.  February 2020 she had an ultrasound of the abdomen with duplex.  It showed hepatomegaly with echogenic, heterogenous liver parenchyma and mildly thickened gallbladder wall most compatible with chronic intrinsic parenchymal disease.  On the EGD she had portal hypertensive gastropathy in February 2020. Patient continues to drink alcohol.  And smoke as well.      -I think it is worth repeating ultrasound of the liver.  But that should not hold her surgery    DVT Prophylaxis: Per primary team  Code Status: Prior  Disposition: Per primary team      Kiki Pate MD     Text Page  (7am - 5pm,  M-F)    Subjective:      Overnight Events reviewed, Chart Reviewed  SP left BKA  Pain is controlled on epidural. She has numbness and weakness in BL leg up till thighs on both sides.   Denies any chest pain or sob. Has mild intermittent cough.   No nausea or vomiting.   Has not had a BM yet.   No fevers.        -Data reviewed today: I reviewed all new labs and imaging results over the last 24 hours.     Exam:         Temp: 97  F (36.1  C) Temp src: Oral BP: (!) 107/39( notified at the pt's bedside.) Pulse: 97 Heart Rate: 86 Resp: 15 SpO2: 91 % O2 Device: Nasal cannula Oxygen Delivery: 2 LPM  There were no vitals filed for this visit.  Vital Signs with Ranges  Temp:  [97  F (36.1  C)-98.1  F (36.7  C)] 97  F (36.1  C)  Pulse:  [83-97] 97  Heart Rate:  [] 86  Resp:  [15-20] 15  BP: ()/(35-50) 107/39  SpO2:  [90 %-100 %] 91 %  I/O last 3 completed shifts:  In: -   Out: 385 [Urine:375; Drains:10]    Constitutional:  No distress noted, Alert, Answering questions appropriately  Respiratory:  Left base and left midlungs insp crackles. Mid and upper lungs clear on both sides. No wheezing.   Cardiovascular: S1S2 normal, no new murmur  GI: Soft, non tender  Skin/Integumen: No rash  SP left BKA  She can not move her right ankle or right knee. Reports numbness in the BL legs      Medications        EPIDURAL INFUSION 8 mL/hr at 07/24/20 0831     dextrose 10%       - MEDICATION INSTRUCTIONS -       sodium chloride         sodium chloride 0.9%  1,000 mL Intravenous Once     acetaminophen  975 mg Oral Q8H     calcium gluconate  1 g Intravenous Once     dextrose  25 g Intravenous Once     [START ON 7/25/2020] gabapentin  300 mg Oral Daily     insulin regular (HumuLIN R,NovoLIN R) for IV use  0.1 Units/kg Intravenous Once     insulin aspart  1-7 Units Subcutaneous TID AC     insulin aspart  1-5 Units Subcutaneous At Bedtime     methocarbamol  750 mg Oral TID     pantoprazole  40 mg Oral QAM AC     senna-docusate   1-2 tablet Oral BID     simvastatin  40 mg Oral At Bedtime     sodium chloride (PF)  3 mL Intracatheter Q8H     sodium polystyrene  30 g Oral Once     thiamine  100 mg Oral Daily       Data   Recent Labs   Lab 07/24/20  0827 07/24/20  0617 07/23/20  0611 07/22/20  0546 07/21/20  1245   HGB 7.3* 7.1* 7.0*  --   --    PLT  --   --   --   --  211   INR  --   --   --   --  1.29*     --   --  138  --    POTASSIUM 6.8*  --   --  5.0  --    CHLORIDE 105  --   --  105  --    CO2 23  --   --  27  --    BUN 27  --   --  17  --    CR 2.03*  --   --  0.82  --    ANIONGAP 7  --   --  6  --    NAYANA 7.6*  --   --  8.6  --    *  --   --  103*  --    ALBUMIN  --   --   --  2.8*  --    PROTTOTAL  --   --   --  8.5  --    BILITOTAL  --   --   --  0.8  --    ALKPHOS  --   --   --  141  --    ALT  --   --   --  29  --    AST  --   --   --  61*  --        No results found for this or any previous visit (from the past 24 hour(s)).

## 2020-07-24 NOTE — PROVIDER NOTIFICATION
called and informed that pt's blood pressure is 72/50, HR is 99, Hemoglobin lab is 7.1, pt is tired. Instructed by  to give a 1000ml Sodium Chloride Bolus over 2 hours and monitor the pt.

## 2020-07-24 NOTE — PLAN OF CARE
Patient A/Ox4, meliton lady. VSS. Denies CP, SOB, dizziness/LH. LSCTA. +fl/BS. Voiding through patent hanley catheter. CMS intact. Dressing to back and LLE CDI. Tolerating regular diet without NV. Activity level is fair, pt is on epidural and has reduced sensation, able to move really well in bed for the most part. IV SL. Pain rated as comfortably managed throughout shift, managed with continuous epidural and oxycodone PRN. Patient has demonstrated ability to call appropriately. Patient is resting with call light within reach. Will continue to monitor.

## 2020-07-24 NOTE — PROGRESS NOTES
"[facility] EPIDURAL NOTE  Tiffani Tan is a 60 year old female POD #2 s/p Epidural Catheter 24 hrs prior to BKA  Left below knee amputation and placement of L3-4 epidural catheter for pain management.      Subjective and Interval History Overnight events: As of this morning the epidural infusion reservoir was running low and the dry before the new cartridge came up. As a result the patient was in moderate discomfort. I went to obtain some bupivacaine to administer a bolus and the new reservoir arrived and was connected as I returned into the room. It was then decided to administer a bolus through the pump. However on checking the epidural insertion site it was noted that the dressing had come undone at it lateral edges and the insertion site was exposed. This was unwitnessed and so I decided to remove the epidural discontinue the infusion. I verified with nursing that the patient had not received any heparin or other DVT prophylaxis and removed the catheter.  Patient reported her pain control with epidural infusion was good and pain was \"mild\" and current pain was moderate and comparable to what she has at home. She also endorsed not liking her legs being \"numb and weak\". She denied any circumoral numbness, metallic taste or tinnitus.      Of note she had had some lower blood pressures earlier in the day. Also a K of >6 was recorded and a repeat of 5.7 was also reported by nursing. The plan is for her to go to a monitored floor.    Pain Intensity using Numerical Rating Scale (NRS):    mild at rest and moderate when the epidural ran dry, she could not arrive at a number to rate her discomfort.      Antithrombotic/Thrombolytic Therapy ordered:  N/A    Analgesic Medications:  Medications related to Pain Management (From now, onward)    Start     Dose/Rate Route Frequency Ordered Stop    07/25/20 0800  gabapentin (NEURONTIN) tablet 600 mg      600 mg Oral 2 TIMES DAILY 07/24/20 0954      07/25/20 " 0000  acetaminophen (TYLENOL) tablet 650 mg      650 mg Oral EVERY 4 HOURS PRN 07/22/20 1543      07/23/20 1530  bupivacaine (MARCAINE) 0.125 % in sodium chloride 0.9 % 250 mL EPIDURAL Infusion      8 mL/hr  EPIDURAL CONTINUOUS 07/23/20 1517      07/23/20 1437  nalbuphine (NUBAIN) injection 2.5-5 mg      2.5-5 mg Intravenous EVERY 6 HOURS PRN 07/23/20 1439      07/22/20 2245  lidocaine 1 % 0.1-1 mL      0.1-1 mL Other EVERY 1 HOUR PRN 07/22/20 2245      07/22/20 2245  lidocaine (LMX4) cream       Topical EVERY 1 HOUR PRN 07/22/20 2245      07/22/20 1800  acetaminophen (TYLENOL) tablet 975 mg      975 mg Oral EVERY 8 HOURS 07/22/20 1543 07/25/20 1159    07/22/20 1543  oxyCODONE (ROXICODONE) tablet 5-10 mg      5-10 mg Oral EVERY 3 HOURS PRN 07/22/20 1543      07/22/20 1543  hydrOXYzine (ATARAX) tablet 25 mg      25 mg Oral EVERY 6 HOURS PRN 07/22/20 1543      07/21/20 1518  diazepam (VALIUM) tablet 5-20 mg      5-20 mg Oral EVERY 30 MIN PRN 07/21/20 1518      07/21/20 1400  methocarbamol (ROBAXIN) tablet 750 mg      750 mg Oral 3 TIMES DAILY 07/21/20 1058      07/21/20 1100  senna-docusate (SENOKOT-S/PERICOLACE) 8.6-50 MG per tablet 1-2 tablet      1-2 tablet Oral 2 TIMES DAILY 07/21/20 1058      07/21/20 1057  polyethylene glycol (MIRALAX) Packet 17 g      17 g Oral DAILY PRN 07/21/20 1058      07/21/20 1057  HYDROmorphone (DILAUDID) injection 0.2 mg      0.2 mg Intravenous EVERY 2 HOURS PRN 07/21/20 1058             Objective:  Lab Results:   Recent Labs   Lab Test 07/24/20  0827  07/21/20  1245 05/29/20  0839   WBC  --   --   --  8.5   RBC  --   --   --  4.16   HGB 7.3*   < >  --  9.2*   HCT  --   --   --  32.0*   MCV  --   --   --  77*   MCH  --   --   --  22.1*   MCHC  --   --   --  28.8*   RDW  --   --   --  19.1*   PLT  --   --  211 189    < > = values in this interval not displayed.       Lab Results   Component Value Date    INR 1.29 07/21/2020    INR 1.33 02/05/2020    INR 1.45 02/04/2020       Vitals:     Temp:  [97  F (36.1  C)-98.1  F (36.7  C)] 97  F (36.1  C)  Pulse:  [83-97] 95  Heart Rate:  [] 86  Resp:  [9-20] 9  BP: ()/(35-92) 100/54  SpO2:  [71 %-100 %] 77 %  /54   Pulse 95   Temp 97  F (36.1  C)   Resp 9   SpO2 (!) 77%        Exam:   GEN: alert and moderate discomfort  NEURO/MSK: Extent of sensory blockade:  Not tested as the infusion had run dry.   Strength not tested.  SKIN: Epidural catheter site noted to have the dressing come undone on the lateral aspects of it with the catheter exposed.  No tenderness, erythema, heme, edema at site. Catheter removed easily with tip intact.     Assessment and Plan:  Patient was receiving inadequate analgesia with current multimodal therapy including L3-4 epidural catheter infusion bupivacaine .125% at 8 mL/hr as the infusion had run dry and a new cassette had not yet arrived.  Catheter was removed because of dressing coming undone. Pain management per primary service's regimen.    Joe Elkins MD  [facility]      RAPS Contact Info (24 hour job code pager is the last 4 digits) For in-house use only:   Job code ID: [id number]   [facility] 0599  [facility] 0602  Home-Account phone: dial * * * [phone], enter jobcode ID, then enter call-back number.    Text: Use Thwapr on the Intranet <Paging/Directory> tab and enter Jobcode ID.   If no call back at any time, contact the hospital  and ask for RAPS attending or backup

## 2020-07-24 NOTE — PLAN OF CARE
Pt A/O X 4. Afebrile. Systolic blood pressure 72/50,  notified, pt given a 1000 mL Bolus, blood pressure recheck 136/63. Hemoglobin lab 7.3, and Potassium lab 6.8,  notified. Pt given scheduled Kayexalate, IV insulin, Calcium Gluconate, and Dextrose. Lungs-Slight expiratory wheezes in the complete left lung, and clear in the right lung. IS encouraged. Bowels-Hyperactive in all four quadrants. Dueñas Catheter is patent with yellow colored urine output. Denies nausea and vomiting. Pt was not able to move right leg or feet,  and Anesthesiologist  notified. Has numbness and tingling in the BLE's. Has pain in the left leg and given IV Dilaudid, Oxycodone, and pain is moderately manageable. Epidural removed by . Is on a Regular diet and appetite was Fair this shift. Left stump dressing is C/D/I. Wound on the right foot is C/D/I. Both AIYANA drains in the left leg are patent and each had 10 mL's output this shift. Pt remained on bedrest. PIV patent in the left arm and infusing. Both legs are elevated off the bed. Pt is able to make needs known, and call light is within reach. Report called and given to NICOLE Rubio on unit 8A. Continue to monitor.

## 2020-07-25 ENCOUNTER — APPOINTMENT (OUTPATIENT)
Dept: PHYSICAL THERAPY | Facility: CLINIC | Age: 60
End: 2020-07-25
Attending: ORTHOPAEDIC SURGERY
Payer: COMMERCIAL

## 2020-07-25 PROBLEM — N17.9 ACUTE KIDNEY FAILURE, UNSPECIFIED (H): Status: ACTIVE | Noted: 2020-07-25

## 2020-07-25 LAB
ABO + RH BLD: NORMAL
ANION GAP SERPL CALCULATED.3IONS-SCNC: 6 MMOL/L (ref 3–14)
BLD GP AB SCN SERPL QL: NORMAL
BLD GP AB SCN SERPL QL: NORMAL
BLD PROD TYP BPU: NORMAL
BLD PROD TYP BPU: NORMAL
BLD UNIT ID BPU: 0
BLOOD BANK CMNT PATIENT-IMP: NORMAL
BLOOD BANK CMNT PATIENT-IMP: NORMAL
BLOOD PRODUCT CODE: NORMAL
BPU ID: NORMAL
BUN SERPL-MCNC: 34 MG/DL (ref 7–30)
CALCIUM SERPL-MCNC: 7.5 MG/DL (ref 8.5–10.1)
CHLORIDE SERPL-SCNC: 104 MMOL/L (ref 94–109)
CO2 SERPL-SCNC: 26 MMOL/L (ref 20–32)
CREAT SERPL-MCNC: 2.04 MG/DL (ref 0.52–1.04)
ERYTHROCYTE [DISTWIDTH] IN BLOOD BY AUTOMATED COUNT: 20.8 % (ref 10–15)
GFR SERPL CREATININE-BSD FRML MDRD: 26 ML/MIN/{1.73_M2}
GLUCOSE BLDC GLUCOMTR-MCNC: 111 MG/DL (ref 70–99)
GLUCOSE BLDC GLUCOMTR-MCNC: 117 MG/DL (ref 70–99)
GLUCOSE BLDC GLUCOMTR-MCNC: 129 MG/DL (ref 70–99)
GLUCOSE BLDC GLUCOMTR-MCNC: 90 MG/DL (ref 70–99)
GLUCOSE BLDC GLUCOMTR-MCNC: 92 MG/DL (ref 70–99)
GLUCOSE SERPL-MCNC: 85 MG/DL (ref 70–99)
HCT VFR BLD AUTO: 24.5 % (ref 35–47)
HGB BLD-MCNC: 6.7 G/DL (ref 11.7–15.7)
MCH RBC QN AUTO: 22.6 PG (ref 26.5–33)
MCHC RBC AUTO-ENTMCNC: 27.3 G/DL (ref 31.5–36.5)
MCV RBC AUTO: 83 FL (ref 78–100)
NUM BPU REQUESTED: 1
PLATELET # BLD AUTO: 199 10E9/L (ref 150–450)
POTASSIUM SERPL-SCNC: 4.7 MMOL/L (ref 3.4–5.3)
RBC # BLD AUTO: 2.97 10E12/L (ref 3.8–5.2)
SODIUM SERPL-SCNC: 136 MMOL/L (ref 133–144)
SPECIMEN EXP DATE BLD: NORMAL
SPECIMEN EXP DATE BLD: NORMAL
TRANSFUSION STATUS PATIENT QL: NORMAL
TRANSFUSION STATUS PATIENT QL: NORMAL
WBC # BLD AUTO: 9.8 10E9/L (ref 4–11)

## 2020-07-25 PROCEDURE — 86901 BLOOD TYPING SEROLOGIC RH(D): CPT | Performed by: STUDENT IN AN ORGANIZED HEALTH CARE EDUCATION/TRAINING PROGRAM

## 2020-07-25 PROCEDURE — 36415 COLL VENOUS BLD VENIPUNCTURE: CPT | Performed by: HOSPITALIST

## 2020-07-25 PROCEDURE — 25000132 ZZH RX MED GY IP 250 OP 250 PS 637: Performed by: PHYSICIAN ASSISTANT

## 2020-07-25 PROCEDURE — 80048 BASIC METABOLIC PNL TOTAL CA: CPT | Performed by: HOSPITALIST

## 2020-07-25 PROCEDURE — 00000146 ZZHCL STATISTIC GLUCOSE BY METER IP

## 2020-07-25 PROCEDURE — 86900 BLOOD TYPING SEROLOGIC ABO: CPT | Performed by: STUDENT IN AN ORGANIZED HEALTH CARE EDUCATION/TRAINING PROGRAM

## 2020-07-25 PROCEDURE — 86850 RBC ANTIBODY SCREEN: CPT | Performed by: STUDENT IN AN ORGANIZED HEALTH CARE EDUCATION/TRAINING PROGRAM

## 2020-07-25 PROCEDURE — 25000132 ZZH RX MED GY IP 250 OP 250 PS 637: Performed by: CLINICAL NURSE SPECIALIST

## 2020-07-25 PROCEDURE — 25000132 ZZH RX MED GY IP 250 OP 250 PS 637: Performed by: ORTHOPAEDIC SURGERY

## 2020-07-25 PROCEDURE — 97530 THERAPEUTIC ACTIVITIES: CPT | Mod: GP

## 2020-07-25 PROCEDURE — 85027 COMPLETE CBC AUTOMATED: CPT | Performed by: HOSPITALIST

## 2020-07-25 PROCEDURE — 12000001 ZZH R&B MED SURG/OB UMMC

## 2020-07-25 PROCEDURE — P9016 RBC LEUKOCYTES REDUCED: HCPCS | Performed by: CLINICAL NURSE SPECIALIST

## 2020-07-25 PROCEDURE — 99233 SBSQ HOSP IP/OBS HIGH 50: CPT | Performed by: HOSPITALIST

## 2020-07-25 PROCEDURE — 25000132 ZZH RX MED GY IP 250 OP 250 PS 637: Performed by: HOSPITALIST

## 2020-07-25 RX ORDER — METOCLOPRAMIDE HYDROCHLORIDE 5 MG/ML
5 INJECTION INTRAMUSCULAR; INTRAVENOUS EVERY 6 HOURS PRN
Status: DISCONTINUED | OUTPATIENT
Start: 2020-07-25 | End: 2020-07-26

## 2020-07-25 RX ORDER — METOCLOPRAMIDE 5 MG/1
5 TABLET ORAL EVERY 6 HOURS PRN
Status: DISCONTINUED | OUTPATIENT
Start: 2020-07-25 | End: 2020-07-26

## 2020-07-25 RX ORDER — SODIUM CHLORIDE 9 MG/ML
INJECTION, SOLUTION INTRAVENOUS
Status: DISPENSED
Start: 2020-07-25 | End: 2020-07-25

## 2020-07-25 RX ORDER — SODIUM CHLORIDE 9 MG/ML
INJECTION, SOLUTION INTRAVENOUS CONTINUOUS
Status: DISCONTINUED | OUTPATIENT
Start: 2020-07-25 | End: 2020-07-25

## 2020-07-25 RX ADMIN — METHOCARBAMOL 750 MG: 750 TABLET, FILM COATED ORAL at 08:02

## 2020-07-25 RX ADMIN — OXYCODONE HYDROCHLORIDE 10 MG: 5 TABLET ORAL at 19:23

## 2020-07-25 RX ADMIN — OXYCODONE HYDROCHLORIDE 10 MG: 5 TABLET ORAL at 08:02

## 2020-07-25 RX ADMIN — SIMVASTATIN 40 MG: 20 TABLET, FILM COATED ORAL at 21:34

## 2020-07-25 RX ADMIN — PANTOPRAZOLE SODIUM 40 MG: 40 TABLET, DELAYED RELEASE ORAL at 08:01

## 2020-07-25 RX ADMIN — ACETAMINOPHEN 975 MG: 325 TABLET, FILM COATED ORAL at 04:25

## 2020-07-25 RX ADMIN — OXYCODONE HYDROCHLORIDE 10 MG: 5 TABLET ORAL at 15:57

## 2020-07-25 RX ADMIN — THIAMINE HCL TAB 100 MG 100 MG: 100 TAB at 08:02

## 2020-07-25 RX ADMIN — DOCUSATE SODIUM 50 MG AND SENNOSIDES 8.6 MG 2 TABLET: 8.6; 5 TABLET, FILM COATED ORAL at 08:01

## 2020-07-25 RX ADMIN — METHOCARBAMOL 750 MG: 750 TABLET, FILM COATED ORAL at 21:33

## 2020-07-25 RX ADMIN — OXYCODONE HYDROCHLORIDE 10 MG: 5 TABLET ORAL at 22:24

## 2020-07-25 RX ADMIN — GABAPENTIN 300 MG: 300 CAPSULE ORAL at 08:02

## 2020-07-25 RX ADMIN — OXYCODONE HYDROCHLORIDE 10 MG: 5 TABLET ORAL at 04:25

## 2020-07-25 RX ADMIN — GABAPENTIN 300 MG: 300 CAPSULE ORAL at 21:34

## 2020-07-25 RX ADMIN — DOCUSATE SODIUM 50 MG AND SENNOSIDES 8.6 MG 2 TABLET: 8.6; 5 TABLET, FILM COATED ORAL at 21:33

## 2020-07-25 RX ADMIN — ACETAMINOPHEN 650 MG: 325 TABLET, FILM COATED ORAL at 19:23

## 2020-07-25 RX ADMIN — OXYCODONE HYDROCHLORIDE 10 MG: 5 TABLET ORAL at 11:35

## 2020-07-25 ASSESSMENT — PAIN DESCRIPTION - DESCRIPTORS
DESCRIPTORS: ACHING;DISCOMFORT;DULL
DESCRIPTORS: ACHING;CONSTANT;DISCOMFORT

## 2020-07-25 NOTE — PLAN OF CARE
Blood started 8:45, no transfusion reaction. Dressng changed, incision has some scant serous drainage.

## 2020-07-25 NOTE — PLAN OF CARE
OT: omid this am, patient getting blood transfusion for hgb 6.7, and eating breakfast. Will check back as schedule allows.

## 2020-07-25 NOTE — PROGRESS NOTES
Social Work Services Progress Note    Hospital Day: 5    Collaborated with:  Pt, 8A Charge RN Otilia, Chart Review, Tatiana in [facility]    Data:  Discharge plan    Intervention:  SW spoke w/pt via telephone. She is agreeable to having ARU placement but was hopeful there would be location closer to where she works in [place], [place]. Writer reviewed locations of ARU and offered to make referral to [facility] as she might be closer to home. Pt declined, stating she would like referral to  ARU.     SW made referral via telephone to Tatiana in [facility].     Assessment:  pt agreeable to [facility] for discharge.     Plan:    Anticipated Disposition:  Facility:   ARU    Barriers to d/c plan:  Medical stability    Follow Up:  SW con't to follow.      LISA Machado, MSW     [facility] [employer]     Text paging available through Reebee on Reebee[employer] - search SOCIAL WORK     ON CALL PAGER [phone] - [phone]   ON CALL COVERAGE AFTER 1600 [phone]

## 2020-07-25 NOTE — PLAN OF CARE
Discharge Planner PT   Patient plan for discharge: ARU  Current status: Supine-sit with SBA. Good sitting tolerance x 2 min. Sit-stand with walker and CGA x 5 total, RLE only, from progressively lower bed heights with ~1 min stand with UE support on walker each time. Sit-supine with Raina and cues for repositioning. Had received blood transfusion earlier and reported feeling better. All needs in reach at end of session in supine.  Barriers to return to prior living situation: WB status, pain, Significantly impaired functional mobility d/t amputation  Recommendations for discharge: ARU  Rationale for recommendations: Pt is motivated, has needs for multiple therapies, and would benefit from 3 hours of therapy daily to maximize function and independence.       Entered by: Lencho Moctezuma 07/25/2020 3:38 PM

## 2020-07-25 NOTE — PROGRESS NOTES
[facility], [facility], [place], [place]  Internal Medicine Progress Note      Assessment and Plans:     Tiffani Tan is a 60 year old female who was admitted on 7/21/2020.     Tiffani Tan is a 60 year old female with a past medical history of hypertension, dyslipidemia, obesity, type 2 diabetes, chronic kidney disease, neuropathy in the feet, GI bleed from peptic ulcer disease, venous stasis changes in the bilateral lower extremity, Charcot foot deformity left foot.  Patient was brought in for left below-knee amputation for charcot foot arthropathy.      # Charcot arthropathy: SP left BKA 07/22:    -per primary team  -hydrate well  -good IS  -ankle pumps  -pain and DVT prophylaxis management per orthopedics    # Hypotension: Likely from Volume loss. BMP showed hyperkalemia and ARF. Recheck Hb is 7.3.   -SP one liter of fluid bolus. BP improved. Stop IVF.     # Hyperkalemia, resolved.    -SP calcium gluconate, dextrose and insulin and two doses of 30 g kayexalate  -SP IVF, stop now.     # Acute Blood Loss Anemia: Preop Hb is 9.2 = > 7 = > 7.3 => 6.7 Give 1 PRBC  -Recheck Hb in AM.   -Transfuse for Hb less than 7 with symptoms.     # Hx of Hypertension: Prior to admission takes hydrochlorothiazide 25 mg p.o. daily, losartan 100 mg p.o. daily.    -Hold for now    # Dyslipidemia: Prior to admission takes Zocor 40 mg p.o. daily.  -Continue that    # Obesity:   -Outpatient weight loss    # Type 2 diabetes, hemoglobin A1c 5.5: Does not take insulin.  On glimepiride 2 mg once a day.  Hemoglobin A1c today was 5.5. She took her glimepiride this morning however did not eat.  Blood sugar was 65.  She is getting IV dextrose to correct the hypoglycemia.    -We will hold glimepiride for now.   -Medium sliding scale insulin     # LIGIA on Chronic kidney disease, baseline creatinine 1.1-1.9    -In February 2020 urine albumin was negative  -likely from volume loss  -Admit creat 0.82  => 2.2. After fluids Creat down to 2. Monitor.     # Neuropathy in the feet: Reports some pain in the feet chronically.  Takes 1200 mg of gabapentin 3 times daily.      -Continue gabapentin 1200 mg TID => 1200 mg BID given ARF    # History of GI bleed from gastric ulcers, status post EGD in February 2020 which showed 3 gastric ulcers.  She had some esophagitis 2.  She was noted to have some portal hypertensive gastropathy as well.  She is being treated with Protonix 40 mg daily.  She is supposed to have follow-up EGD in the future.     -Continue Protonix, follow-up with GI as outpatient.      # Venous stasis changes in the bilateral lower extremity:     -Lymphedema consult for leg compression on the right side.  Tomorrow going for left below-knee amputation.  -The redness in the right lower extremity is chronic in nature per her report     # Tobacco use disorder: Smokes more than half pack per day. No history of COPD or shortness of breath.  No wheezing on exam.      -Consider PRN albuterol   -Good incentive spirometry tobacco cessation.    -Could use nicotine patches or gums     # Alcohol use disorder, Portal hypertensive gastropathy: She drinks 2 drinks of vodka daily.  February 2020 she had an ultrasound of the abdomen with duplex.  It showed hepatomegaly with echogenic, heterogenous liver parenchyma and mildly thickened gallbladder wall most compatible with chronic intrinsic parenchymal disease.  On the EGD she had portal hypertensive gastropathy in February 2020. Patient continues to drink alcohol.  And smoke as well.      -I think it is worth repeating ultrasound of the liver.  But that should not hold her surgery    DVT Prophylaxis: Per primary team  Code Status: Prior  Disposition: Per primary team      Kiki Pate MD     Text Page  (7am - 5pm, M-F)    Subjective:      Overnight Events reviewed, Chart Reviewed  She was some what tearful about not being able to see her dogs.   She has occasional cough.  She denies sob. No chest pain. No fevers.   No nausea or vomiting.     -Data reviewed today: I reviewed all new labs and imaging results over the last 24 hours.     Exam:         Temp: 98.5  F (36.9  C) Temp src: Oral BP: 120/60 Pulse: 87 Heart Rate: 85 Resp: 16 SpO2: 99 % O2 Device: Nasal cannula Oxygen Delivery: 2 LPM  There were no vitals filed for this visit.  Vital Signs with Ranges  Temp:  [97.5  F (36.4  C)-98.5  F (36.9  C)] 98.5  F (36.9  C)  Pulse:  [87-88] 87  Heart Rate:  [83-93] 85  Resp:  [16-20] 16  BP: ()/(59-68) 120/60  SpO2:  [90 %-99 %] 99 %  I/O last 3 completed shifts:  In: 1480 [P.O.:480; I.V.:1000]  Out: 1210 [Urine:1150; Drains:60]    Constitutional:  No distress noted, Alert, Answering questions appropriately  Respiratory:  bL LL insp crackles. No wheezing.   Cardiovascular: Mild presacral edema. S1S2 normal, no new murmur  GI: Soft, non tender  Skin/Integumen: No rash  SP left BKA      Medications        - MEDICATION INSTRUCTIONS -         gabapentin  300 mg Oral BID     insulin aspart  1-7 Units Subcutaneous TID AC     insulin aspart  1-5 Units Subcutaneous At Bedtime     methocarbamol  750 mg Oral TID     pantoprazole  40 mg Oral QAM AC     senna-docusate  1-2 tablet Oral BID     simvastatin  40 mg Oral At Bedtime     sodium chloride (PF)  3 mL Intracatheter Q8H     sodium chloride         sodium polystyrene  30 g Oral Once     thiamine  100 mg Oral Daily       Data   Recent Labs   Lab 07/25/20  0606 07/24/20  1948 07/24/20  1338  07/24/20  0827 07/24/20  0617  07/22/20  0546  07/21/20  1245   WBC 9.8  --   --   --   --   --   --   --   --   --    HGB 6.7*  --   --   --  7.3* 7.1*   < >  --   --   --    MCV 83  --   --   --   --   --   --   --   --   --      --   --   --   --   --   --   --   --  211   INR  --   --   --   --   --   --   --   --   --  1.29*     --  131*  --  135  --   --  138   < >  --    POTASSIUM 4.7 4.7 6.0*   < > 6.8*  --   --  5.0   < >  --     CHLORIDE 104  --  106  --  105  --   --  105   < >  --    CO2 26  --  25  --  23  --   --  27   < >  --    BUN 34*  --  30  --  27  --   --  17   < >  --    CR 2.04*  --  2.22*  --  2.03*  --   --  0.82   < >  --    ANIONGAP 6  --  <1*  --  7  --   --  6   < >  --    NAYANA 7.5*  --  7.4*  --  7.6*  --   --  8.6   < >  --    GLC 85  --  134*  --  122*  --   --  103*   < >  --    ALBUMIN  --   --   --   --   --   --   --  2.8*  --   --    PROTTOTAL  --   --   --   --   --   --   --  8.5  --   --    BILITOTAL  --   --   --   --   --   --   --  0.8  --   --    ALKPHOS  --   --   --   --   --   --   --  141  --   --    ALT  --   --   --   --   --   --   --  29  --   --    AST  --   --   --   --   --   --   --  61*  --   --     < > = values in this interval not displayed.       No results found for this or any previous visit (from the past 24 hour(s)).

## 2020-07-25 NOTE — PLAN OF CARE
"  VS: VSS.  Tele: normal sinus rhythm.  L-lung expiratory wheezes present in each lobe.     Alert and oriented x4.   O2: Stating mid to upper 90's on 2LPM via NC. Pt dipped to upper 80's low 90's when dropped to 1.5LPM   Output: Dueñas, patent with adequate yellow output. In place from recent epidural removal.   Last BM:  on shift. Kayexalate was ordered. Pt refused to take throughout beginning of shift. Then had BM on bedpan.    Activity: Bedrest, pt did not get OOB on shift.    Skin: L-stump wound, R-foot ulcer, L-back scab.   Pain: Received scheduled tylenol, PRN 10mg Oxycodone x1, PRN 0.2mg IV Dilaudid x1.   CMS: Baseline neuropathy in BLE and bilateral hands.   Dressing: Pt refused stump dressing change, \"my son is here and I don't get to see him much\". When offered after visiting hours, pt continued to refuse. \"I just need to relax, it's been a busy afternoon\".     Pt educated on the importance of complying with dressing changes per MD orders to assure that moisture is not sitting on the skin and wound area.     Lymphedema wrap intact on RLE.    Diet: Regular.    Pt did not call for BG check when dinner tray arrived (ate early dinner).    HS B   LDA: L-AC PIV infusing NS at 125cc/hr  L-hand PIV SL (patent on shift).    X2 small Vickey's to LLE.    Equipment: IV pump and pole, post op prosthesis, leg elevating wedge, bariatric bedpan, NC, IS, lymphedema wrap, commode, personal items.    Plan: Continue with plan of care.  Continue education on importance of complying with stump dressing changes.    Additional Info:        "

## 2020-07-25 NOTE — PLAN OF CARE
Pt visiting with son. C/o pain left stump. Dressing CDI. Medicated with oxycodone 10 mg. Pt stated she was up with PT and able to stand up with walker/ gait belt and assist of 1. Pt happy with her progress. Expressed wishes to do rehab near her home/work in [place]/[facility].  updated and spoke with patient about what is available in that area.  Blood sugar monitored. Appetite good.

## 2020-07-25 NOTE — PLAN OF CARE
PT 8: Met with pt and began session, but the dressing on her LLE had become soiled and required a change. Did speak with pt, who verbalized concern regarding going to [facility]. She said she would prefer a facility closer to home and work, which is in [place]. Nursing aware of request and will relay to SW.

## 2020-07-25 NOTE — PLAN OF CARE
Alert and oriented. Able to make needs known. Call light in reach.  VSS. Left leg pain managed with Oxycodone 10 mg along with scheduled Tylenol.  Left stump dressing CDI. AIYANA patent X 2 with scant amount of bloody drainage. Repositioned for comfort.  Dueñas patent, draining sufficiently. PIV patent, IV fluids infusing as ordered. 0200 BG was 111. Denies nausea, headache, dizziness, lightheadedness, chest pain or SOB.  Appears to  be sleeping most of night. Continue with plan of care.

## 2020-07-26 ENCOUNTER — APPOINTMENT (OUTPATIENT)
Dept: OCCUPATIONAL THERAPY | Facility: CLINIC | Age: 60
End: 2020-07-26
Attending: ORTHOPAEDIC SURGERY
Payer: COMMERCIAL

## 2020-07-26 ENCOUNTER — APPOINTMENT (OUTPATIENT)
Dept: PHYSICAL THERAPY | Facility: CLINIC | Age: 60
End: 2020-07-26
Attending: ORTHOPAEDIC SURGERY
Payer: COMMERCIAL

## 2020-07-26 LAB
ANION GAP SERPL CALCULATED.3IONS-SCNC: 5 MMOL/L (ref 3–14)
BUN SERPL-MCNC: 25 MG/DL (ref 7–30)
CALCIUM SERPL-MCNC: 7.8 MG/DL (ref 8.5–10.1)
CHLORIDE SERPL-SCNC: 108 MMOL/L (ref 94–109)
CO2 SERPL-SCNC: 27 MMOL/L (ref 20–32)
CREAT SERPL-MCNC: 1.15 MG/DL (ref 0.52–1.04)
ERYTHROCYTE [DISTWIDTH] IN BLOOD BY AUTOMATED COUNT: 20.9 % (ref 10–15)
GFR SERPL CREATININE-BSD FRML MDRD: 51 ML/MIN/{1.73_M2}
GLUCOSE BLDC GLUCOMTR-MCNC: 117 MG/DL (ref 70–99)
GLUCOSE BLDC GLUCOMTR-MCNC: 123 MG/DL (ref 70–99)
GLUCOSE BLDC GLUCOMTR-MCNC: 94 MG/DL (ref 70–99)
GLUCOSE BLDC GLUCOMTR-MCNC: 98 MG/DL (ref 70–99)
GLUCOSE SERPL-MCNC: 84 MG/DL (ref 70–99)
HCT VFR BLD AUTO: 26.4 % (ref 35–47)
HGB BLD-MCNC: 7.4 G/DL (ref 11.7–15.7)
MCH RBC QN AUTO: 23.1 PG (ref 26.5–33)
MCHC RBC AUTO-ENTMCNC: 28 G/DL (ref 31.5–36.5)
MCV RBC AUTO: 82 FL (ref 78–100)
PLATELET # BLD AUTO: 177 10E9/L (ref 150–450)
POTASSIUM SERPL-SCNC: 4.6 MMOL/L (ref 3.4–5.3)
RBC # BLD AUTO: 3.21 10E12/L (ref 3.8–5.2)
SODIUM SERPL-SCNC: 140 MMOL/L (ref 133–144)
WBC # BLD AUTO: 7.8 10E9/L (ref 4–11)

## 2020-07-26 PROCEDURE — 12000001 ZZH R&B MED SURG/OB UMMC

## 2020-07-26 PROCEDURE — 25000132 ZZH RX MED GY IP 250 OP 250 PS 637: Performed by: PHYSICIAN ASSISTANT

## 2020-07-26 PROCEDURE — 97535 SELF CARE MNGMENT TRAINING: CPT | Mod: GO

## 2020-07-26 PROCEDURE — 80048 BASIC METABOLIC PNL TOTAL CA: CPT | Performed by: HOSPITALIST

## 2020-07-26 PROCEDURE — 25000128 H RX IP 250 OP 636: Performed by: CLINICAL NURSE SPECIALIST

## 2020-07-26 PROCEDURE — 99232 SBSQ HOSP IP/OBS MODERATE 35: CPT | Performed by: HOSPITALIST

## 2020-07-26 PROCEDURE — 85027 COMPLETE CBC AUTOMATED: CPT | Performed by: HOSPITALIST

## 2020-07-26 PROCEDURE — 25000132 ZZH RX MED GY IP 250 OP 250 PS 637: Performed by: ORTHOPAEDIC SURGERY

## 2020-07-26 PROCEDURE — 36415 COLL VENOUS BLD VENIPUNCTURE: CPT | Performed by: HOSPITALIST

## 2020-07-26 PROCEDURE — 97530 THERAPEUTIC ACTIVITIES: CPT | Mod: GP

## 2020-07-26 PROCEDURE — 25000132 ZZH RX MED GY IP 250 OP 250 PS 637: Performed by: CLINICAL NURSE SPECIALIST

## 2020-07-26 PROCEDURE — 25000132 ZZH RX MED GY IP 250 OP 250 PS 637: Performed by: HOSPITALIST

## 2020-07-26 PROCEDURE — 00000146 ZZHCL STATISTIC GLUCOSE BY METER IP

## 2020-07-26 RX ORDER — GABAPENTIN 600 MG/1
1200 TABLET ORAL 3 TIMES DAILY
Status: DISCONTINUED | OUTPATIENT
Start: 2020-07-26 | End: 2020-07-27 | Stop reason: HOSPADM

## 2020-07-26 RX ORDER — METOCLOPRAMIDE 10 MG/1
10 TABLET ORAL EVERY 6 HOURS PRN
Status: DISCONTINUED | OUTPATIENT
Start: 2020-07-26 | End: 2020-07-27 | Stop reason: HOSPADM

## 2020-07-26 RX ORDER — METOCLOPRAMIDE HYDROCHLORIDE 5 MG/ML
10 INJECTION INTRAMUSCULAR; INTRAVENOUS EVERY 6 HOURS PRN
Status: DISCONTINUED | OUTPATIENT
Start: 2020-07-26 | End: 2020-07-27 | Stop reason: HOSPADM

## 2020-07-26 RX ORDER — GABAPENTIN 300 MG/1
900 CAPSULE ORAL ONCE
Status: COMPLETED | OUTPATIENT
Start: 2020-07-26 | End: 2020-07-26

## 2020-07-26 RX ORDER — GABAPENTIN 600 MG/1
1200 TABLET ORAL 3 TIMES DAILY
Status: DISCONTINUED | OUTPATIENT
Start: 2020-07-26 | End: 2020-07-26

## 2020-07-26 RX ORDER — GABAPENTIN 300 MG/1
900 CAPSULE ORAL ONCE
Status: DISCONTINUED | OUTPATIENT
Start: 2020-07-26 | End: 2020-07-26 | Stop reason: CLARIF

## 2020-07-26 RX ADMIN — METHOCARBAMOL 750 MG: 750 TABLET, FILM COATED ORAL at 20:12

## 2020-07-26 RX ADMIN — GABAPENTIN 300 MG: 300 CAPSULE ORAL at 07:50

## 2020-07-26 RX ADMIN — GABAPENTIN 1200 MG: 600 TABLET, FILM COATED ORAL at 20:12

## 2020-07-26 RX ADMIN — OXYCODONE HYDROCHLORIDE 10 MG: 5 TABLET ORAL at 02:13

## 2020-07-26 RX ADMIN — OXYCODONE HYDROCHLORIDE 10 MG: 5 TABLET ORAL at 14:56

## 2020-07-26 RX ADMIN — THIAMINE HCL TAB 100 MG 100 MG: 100 TAB at 07:50

## 2020-07-26 RX ADMIN — OXYCODONE HYDROCHLORIDE 10 MG: 5 TABLET ORAL at 12:04

## 2020-07-26 RX ADMIN — SIMVASTATIN 40 MG: 20 TABLET, FILM COATED ORAL at 22:09

## 2020-07-26 RX ADMIN — GABAPENTIN 900 MG: 300 CAPSULE ORAL at 09:58

## 2020-07-26 RX ADMIN — METHOCARBAMOL 750 MG: 750 TABLET, FILM COATED ORAL at 14:56

## 2020-07-26 RX ADMIN — PANTOPRAZOLE SODIUM 40 MG: 40 TABLET, DELAYED RELEASE ORAL at 07:50

## 2020-07-26 RX ADMIN — OXYCODONE HYDROCHLORIDE 10 MG: 5 TABLET ORAL at 18:51

## 2020-07-26 RX ADMIN — Medication 0.2 MG: at 10:09

## 2020-07-26 RX ADMIN — METHOCARBAMOL 750 MG: 750 TABLET, FILM COATED ORAL at 07:50

## 2020-07-26 RX ADMIN — OXYCODONE HYDROCHLORIDE 10 MG: 5 TABLET ORAL at 05:45

## 2020-07-26 RX ADMIN — GABAPENTIN 1200 MG: 600 TABLET, FILM COATED ORAL at 14:56

## 2020-07-26 RX ADMIN — OXYCODONE HYDROCHLORIDE 10 MG: 5 TABLET ORAL at 22:09

## 2020-07-26 RX ADMIN — OXYCODONE HYDROCHLORIDE 10 MG: 5 TABLET ORAL at 08:54

## 2020-07-26 RX ADMIN — DOCUSATE SODIUM 50 MG AND SENNOSIDES 8.6 MG 2 TABLET: 8.6; 5 TABLET, FILM COATED ORAL at 07:50

## 2020-07-26 NOTE — PLAN OF CARE
VS:   /61 (BP Location: Right arm)   Pulse 89   Temp 98.7  F (37.1  C) (Oral)   Resp 17   SpO2 90%   Denies chest pain and SOB. LS clear. On room air during day, needed o2 at night. Contin. Pulse ox on.    Output:   Hanlye out at 12:00, pt voided x2 since. Still need bladder scan, pt was back to wheelchair each time unable to get BS.           Activity:   Ax2 with walker and gait belt (pivot). Pt was able to get up to commode at bedside multiple times today. Sat in w/c for lunch and dinner. Wheeled down hallway with therapy.   L BKA. Lee-tech on when OOB.    Skin: Intact ex for L stump and R leg. Some moisture in between skin folds, interdry used. Pt has open wound on RLE (Krelix in place). LLE changed this evening, dressing had moderate amount of serosanguanous drainage.    Pain:   Pain was uncontrolled this morning, pt was very upset that her gabapentin was reduced without her knowledge. Gabapentin was increased back to her regular dose today. Pain improved after 1200 mg of Gabapentin, 10 mg of Oxycodone, and one dose of IV Dilaudid. Pt pain was well controlled for the rest of the day.    Neuro/CMS:   A&Ox4, pt has neuropathy in all extremities at baseline.    Dressing(s):   Dressing changed this morning by ortho, changed again this afternoon by RN. C/d/i   Diet:   Regular diet, pt has fair appetite. Encouraging meals and fluids.    LDA:   Drains were removed this morning by ortho and hanley was removed at noon.   Equipment:   Lee -tech, commode, walker, gait belt, IS, PCD, Foam (for leg)   Plan:   Continue to monitor. Pt is able to make needs known, call light is within reach. Plan is to discharge to rehab, waiting to here back from insurance.    Additional Info:

## 2020-07-26 NOTE — PROGRESS NOTES
07/23/20 1100   Quick Adds   Type of Visit Initial PT Evaluation   Living Environment   Lives With spouse   Living Arrangements house   Home Accessibility stairs to enter home   Number of Stairs, Main Entrance 3   Stair Railings, Main Entrance none   Transportation Anticipated family or friend will provide   Self-Care   Usual Activity Tolerance poor   Current Activity Tolerance poor   Regular Exercise No   Equipment Currently Used at Home wheelchair, manual   Functional Level Prior   Ambulation 3-->assistive equipment and person   Transferring 3-->assistive equipment and person   Toileting 3-->assistive equipment and person   Bathing 3-->assistive equipment and person   Communication 0-->understands/communicates without difficulty   Swallowing 0-->swallows foods/liquids without difficulty   Cognition 0 - no cognition issues reported   Fall history within last six months yes   Number of times patient has fallen within last six months 1   Which of the above functional risks had a recent onset or change? ambulation   General Information   Onset of Illness/Injury or Date of Surgery - Date 07/23/20   Referring Physician Dr MARLEY Adams   Patient/Family Goals Statement Ambulate with prothesis   Pertinent History of Current Problem (include personal factors and/or comorbidities that impact the POC) Pt is a 60 year old fem s/p BKA for LLE and lymphedema on the RLE   Precautions/Limitations fall precautions   Weight-Bearing Status - LLE nonweight-bearing   Cognitive Status Examination   Orientation orientation to person, place and time   Level of Consciousness alert   Follows Commands and Answers Questions 100% of the time   Personal Safety and Judgment intact   Memory intact   Pain Assessment   Patient Currently in Pain Yes, see Vital Sign flowsheet   Integumentary/Edema   Integumentary/Edema no deficits were identifed   Posture    Posture Not impaired   Strength   Strength Comments Pt is below baseline for RLE strength and  "trunk strength due to being in a WC and is deconditioned   Bed Mobility   Bed Mobility Comments Mod A x 1 for log rolling   Transfer Skills   Transfer Comments Not assessed   Gait   Gait Comments not assessed   Balance   Balance Comments Pt demonstrates good trunk balance with sitting at EOB   Sensory Examination   Sensory Perception Comments Pt reports having LE neuropathy - no sensory test performed   Coordination   Coordination no deficits were identified   Modality Interventions   Planned Modality Interventions Cryotherapy   General Therapy Interventions   Planned Therapy Interventions bed mobility training;balance training;gait training;transfer training;strengthening   Clinical Impression   Criteria for Skilled Therapeutic Intervention yes, treatment indicated   PT Diagnosis weakness and pain   Influenced by the following impairments weakness and pain   Functional limitations due to impairments gait, bed mob, transfers   Clinical Presentation Evolving/Changing   Clinical Presentation Rationale PT will improve mobility status with training   Clinical Decision Making (Complexity) Low complexity   Therapy Frequency 2x/day   Predicted Duration of Therapy Intervention (days/wks) 7   Anticipated Equipment Needs at Discharge transfer board   Anticipated Discharge Disposition Acute Rehabilitation Facility   Risk & Benefits of therapy have been explained Yes   [facility] AM-PAC  \"6 Clicks\" V.2 Basic Mobility Inpatient Short Form   1. Turning from your back to your side while in a flat bed without using bedrails? 2 - A Lot   2. Moving from lying on your back to sitting on the side of a flat bed without using bedrails? 2 - A Lot   3. Moving to and from a bed to a chair (including a wheelchair)? 2 - A Lot   4. Standing up from a chair using your arms (e.g., wheelchair, or bedside chair)? 1 - Total   5. To walk in hospital room? 1 - Total   6. Climbing 3-5 steps with a railing? 1 - Total   Basic Mobility Raw Score " (Score out of 24.Lower scores equate to lower levels of function) 9   Total Evaluation Time   Total Evaluation Time (Minutes) 15     Dong Chacon, PT at 07/23/20 1100 completed PT evaluation. Pulled to note by Tatiana Aldana PT for insurance authorization needs.

## 2020-07-26 NOTE — PLAN OF CARE
Discharge Planner OT   Patient plan for discharge: ARU  Current status: Co-treat with PT to focus on safety with stand pivot transfers. Supine<>sit SBA, effortful repositioning self back in bed. Patient was able to stand and pivot to/from bedside commode with CGAx2 and vc's for safe transfer technique. Discussed sitting up in wheelchair throughout day for more upright activity, wheelchair placed in room, but deferred at moment as nursing cares needed to be done. Patient politely declined UE exercise at end of session but encouraged to complete on own time, patient in agreement.   Barriers to return to prior living situation: new BKA, pain, decreased strength and activity tolerance, below baseline with mobility and ADLs, medical status  Recommendations for discharge: ARU  Rationale for recommendations: to maximize safety and IND with functional mobility and ADLs/IADLs       Entered by: Lizet Randolph 07/26/2020 12:13 PM

## 2020-07-26 NOTE — PLAN OF CARE
Discharge Planner PT   Patient plan for discharge: ARU  Current status: Co-treated with OT. This was necessary d/t level of assist for performing new mobility skills. Supine<>sit with SBA. Noted shaking in hands, nursing aware. Sit-stand with walker and CGA x 2, good standing balance with support. Pivoted to commode with CGA of 2, then back to EOB under same conditions. Wanted to practice pivoting to wheelchair, but pt's nurse was planning to take out catheter, so this was deferred for PM session. Residual limb elevated on ramp at end of session. Slight drainage noted on bandages.  Barriers to return to prior living situation: WB status, pain, Significantly impaired functional mobility d/t amputation  Recommendations for discharge: ARU  Rationale for recommendations: Pt is motivated, has needs for multiple therapies, can tolerate and would benefit from 3 hours of therapies daily to maximize function and independence.       Entered by: Lencho Moctezuma 07/26/2020 12:02 PM

## 2020-07-26 NOTE — PROGRESS NOTES
[facility], [facility], [place], [place]  Internal Medicine Progress Note      Assessment and Plans:     Tiffani Tan is a 60 year old female who was admitted on 7/21/2020.     Tiffani Tan is a 60 year old female with a past medical history of hypertension, dyslipidemia, obesity, type 2 diabetes, chronic kidney disease, neuropathy in the feet, GI bleed from peptic ulcer disease, venous stasis changes in the bilateral lower extremity, Charcot foot deformity left foot.  Patient was brought in for left below-knee amputation for charcot foot arthropathy.      # Charcot arthropathy: SP left BKA 07/22:    -per primary team  -hydrate well  -good IS  -ankle pumps  -pain and DVT prophylaxis management per orthopedics    # Hypotension: From Volume loss. BMP showed hyperkalemia and ARF. Recheck Hb is 7.3.   -SP one liter of fluid bolus. BP improved. Stop IVF.     # Hyperkalemia, resolved.    -SP calcium gluconate, dextrose and insulin and two doses of 30 g kayexalate  -SP IVF, stop now.     # Acute Blood Loss Anemia: Preop Hb is 9.2 = > 7 = > 7.3 => 6.7 Give 1 PRBC => 7.4  -Recheck Hb in AM.   -Transfuse for Hb less than 7 with symptoms.     # Hx of Hypertension: Prior to admission takes hydrochlorothiazide 25 mg p.o. daily, losartan 100 mg p.o. daily.    -Hold for now    # Dyslipidemia: Prior to admission takes Zocor 40 mg p.o. daily.  -Continue that    # Obesity:   -Outpatient weight loss    # Type 2 diabetes, hemoglobin A1c 5.5: Does not take insulin.  On glimepiride 2 mg once a day.  Hemoglobin A1c today was 5.5. She took her glimepiride on the day of admission however did not eat.  Blood sugar was 65.  She got IV dextrose to correct the hypoglycemia.    -We will hold glimepiride for now.   -Medium sliding scale insulin     # LIGIA on Chronic kidney disease, baseline creatinine 1.1-1.9, Resolved.     -In February 2020 urine albumin was negative  -likely from volume  loss  -Admit creat 0.82 => 2.2. After fluids Creat down to 2 = > 1.15.     # Neuropathy in the feet: Reports some pain in the feet chronically.  Takes 1200 mg of gabapentin 3 times daily.      -Continue gabapentin 1200 mg TID => 1200 mg BID given ARF    # History of GI bleed from gastric ulcers, status post EGD in February 2020 which showed 3 gastric ulcers.  She had some esophagitis 2.  She was noted to have some portal hypertensive gastropathy as well.  She is being treated with Protonix 40 mg daily.  She is supposed to have follow-up EGD in the future.     -Continue Protonix, follow-up with GI as outpatient.      # Venous stasis changes in the bilateral lower extremity:     -Lymphedema consult for leg compression on the right side.  Tomorrow going for left below-knee amputation.  -The redness in the right lower extremity is chronic in nature per her report     # Tobacco use disorder: Smokes more than half pack per day. No history of COPD or shortness of breath.  No wheezing on exam.      -Consider PRN albuterol   -Good incentive spirometry tobacco cessation.    -Could use nicotine patches or gums     # Alcohol use disorder, Portal hypertensive gastropathy: She drinks 2 drinks of vodka daily.  February 2020 she had an ultrasound of the abdomen with duplex.  It showed hepatomegaly with echogenic, heterogenous liver parenchyma and mildly thickened gallbladder wall most compatible with chronic intrinsic parenchymal disease.  On the EGD she had portal hypertensive gastropathy in February 2020. Patient continues to drink alcohol.  And smoke as well.      -I think it is worth repeating ultrasound of the liver.  But that should not hold her surgery    DVT Prophylaxis: Per primary team  Code Status: Prior  Disposition: Per primary team      Kiki Pate MD     Text Page  (7am - 5pm, M-F)    Subjective:      Overnight Events reviewed, Chart Reviewed  Reports having more pain in the leg.   No chest pain or sob.   No  cough or phlegm. No fevers.     -Data reviewed today: I reviewed all new labs and imaging results over the last 24 hours.     Exam:       Temp: 98.9  F (37.2  C) Temp src: Oral BP: 133/68 Pulse: 73 Heart Rate: 89 Resp: 16 SpO2: 97 % O2 Device: None (Room air) Oxygen Delivery: 2 LPM  There were no vitals filed for this visit.  Vital Signs with Ranges  Temp:  [98.5  F (36.9  C)-98.9  F (37.2  C)] 98.9  F (37.2  C)  Pulse:  [73] 73  Heart Rate:  [89-93] 89  Resp:  [16] 16  BP: (109-133)/(53-68) 133/68  SpO2:  [97 %-98 %] 97 %  I/O last 3 completed shifts:  In: -   Out: 2600 [Urine:2600]    Constitutional: No distress noted, Alert, Answering questions appropriately  Respiratory:  bL LL insp crackles. No wheezing.   Cardiovascular: Mild presacral edema. S1S2 normal, no new murmur  GI: Soft, non tender  Skin/Integumen: No rash  SP left BKA      Medications        - MEDICATION INSTRUCTIONS -         gabapentin  1,200 mg Oral TID     insulin aspart  1-7 Units Subcutaneous TID AC     insulin aspart  1-5 Units Subcutaneous At Bedtime     methocarbamol  750 mg Oral TID     pantoprazole  40 mg Oral QAM AC     senna-docusate  1-2 tablet Oral BID     simvastatin  40 mg Oral At Bedtime     sodium chloride (PF)  3 mL Intracatheter Q8H     thiamine  100 mg Oral Daily       Data   Recent Labs   Lab 07/26/20  0600 07/25/20  0606 07/24/20  1948 07/24/20  1338  07/24/20  0827  07/22/20  0546  07/21/20  1245   WBC 7.8 9.8  --   --   --   --   --   --   --   --    HGB 7.4* 6.7*  --   --   --  7.3*   < >  --   --   --    MCV 82 83  --   --   --   --   --   --   --   --     199  --   --   --   --   --   --   --  211   INR  --   --   --   --   --   --   --   --   --  1.29*    136  --  131*  --  135  --  138   < >  --    POTASSIUM 4.6 4.7 4.7 6.0*   < > 6.8*  --  5.0   < >  --    CHLORIDE 108 104  --  106  --  105  --  105   < >  --    CO2 27 26  --  25  --  23  --  27   < >  --    BUN 25 34*  --  30  --  27  --  17   < >  --     CR 1.15* 2.04*  --  2.22*  --  2.03*  --  0.82   < >  --    ANIONGAP 5 6  --  <1*  --  7  --  6   < >  --    NAYANA 7.8* 7.5*  --  7.4*  --  7.6*  --  8.6   < >  --    GLC 84 85  --  134*  --  122*  --  103*   < >  --    ALBUMIN  --   --   --   --   --   --   --  2.8*  --   --    PROTTOTAL  --   --   --   --   --   --   --  8.5  --   --    BILITOTAL  --   --   --   --   --   --   --  0.8  --   --    ALKPHOS  --   --   --   --   --   --   --  141  --   --    ALT  --   --   --   --   --   --   --  29  --   --    AST  --   --   --   --   --   --   --  61*  --   --     < > = values in this interval not displayed.       No results found for this or any previous visit (from the past 24 hour(s)).

## 2020-07-26 NOTE — PROGRESS NOTES
Orthopaedic Surgery Progress Note 07/26/2020    S: No acute events overnight. Up working with PT yesterday, making good progress. Pain well controlled on PO medications. SW discussed acute rehab with patient yesterday who is agreeable. No dizziness.    Temp: 98.8  F (37.1  C) Temp src: Oral BP: 109/60 Pulse: 87 Heart Rate: 89 Resp: 16 SpO2: 97 % O2 Device: Nasal cannula Oxygen Delivery: 2 LPM    Exam:  Gen: No acute distress, resting comfortably in bed.  Resp: Non-labored breathing  MSK:    LLE:  - Dressings clean and dry  - Slight ecchymosis around skin edges  - Slight gapping at medial incision edge  - No flucuant areas around incision    Recent Labs   Lab 07/25/20  0606 07/24/20  0827 07/24/20  0617  07/21/20  1245   WBC 9.8  --   --   --   --    HGB 6.7* 7.3* 7.1*   < >  --      --   --   --  211    < > = values in this interval not displayed.       Assessment: Tiffani Tan is a 60 year old female s/p left BKA on 7/22/2020 with Dr. Adams. Doing well postoperatively.    Needs TCU placement once pain is well controlled on PO medications and medically stable.    Hgb 6.7 7/25 now s/p transfusion PRBC. Recheck pending.    Plan:  Orthopaedic Surgery Primary  Activity: Up with assist.  Weight bearing status: NWB LLE.  Antibiotics: Periop complete  Diet: Begin with clear fluids and progress diet as tolerated.  DVT prophylaxis: SCDs  Bracing/Splinting: Stump protector to be kept clean, dry, and intact between dressing changes  Elevation: Elevate LLE on pillows to keep at level of heart as much as possible.  Wound Care: dry to dry dressing changes BID (adaptic, 4x4, abd, kerlix)  Drains: discontinued 7/25/2020  Pain management: transition from IV to orals as tolerated.  X-rays: None  Physical Therapy:  ROM, ADL's.  Occupational Therapy: ADL's.  Labs: Trend Hgb  Consults: PT, OT, Medicine, appreciate assistance in caring for this patient.  Follow-up: Clinic with Dr. Adams in 2 weeks for wound check.  Disposition:  Pending progress with therapies, pain control on orals, and medical stability, anticipate discharge to acute rehab once placement secured.    Patient to be discussed with Dr. Adams.    Silver Salinas MD  Orthopaedic Surgery PGY-4  Pager [phone]

## 2020-07-26 NOTE — PROGRESS NOTES
Rehab Admissions:  I spoke w/ Tiffani over the phone to discuss [facility]. I discussed PMR oversight of her medical and rehab needs, daily skilled PT/OT/edema services for a total of 3 hrs per day, and skilled rehab nursing. Discussed ELOS of 12 days w/ goal of disch home mod IND w/ use of WW, and use of manual w/c for community mobility. Pt wants to disch home prior to admission to [facility] as she wants to see her 2 dogs. I informed her she must disch directly from  to [facility]. Pt will require insurance authorization. I was unable to secure this today as [employer] was unable to recognize her policy number. Her insurance will be verified tomorrow and auth can then be initiated thru the proper channel. I encouraged Tiffani to call w/ any further questions.     Thank you for the referral, we will continue to follow this patient for post acute placement.     Determination of admission is based upon the patient's need for an intensive, interdisciplinary approach to rehabilitation, their ability to progress, their ability to tolerate intensive therapies, their need for daily physician supervision, their need for twenty four hour nursing assistance, and their ability and willingness to participate in such a program.    Tatiana Aldana CM  Rehab Liaison/  [facility]  7/26/2020    3:06 PM

## 2020-07-26 NOTE — INTERIM SUMMARY
[facility] Pre-Admission Screen    Referral Source:  [facility] UNIT 8A 0829-01  Admit date to referring facility: 7/21/2020    Physical Medicine and Rehab Consult Completed: No    Rehab Diagnosis:    Amputation 05.4 Unilateral LE BKA: s/p left below knee amputation    Justification for Acute Inpatient Rehabilitation  Tiffani Tan is a 60 year old female w/ PMH of HTN, HLD, obesity, type II diabetes, CKD, venous stasis changes in BLEs, and left Charcot foot deformity, who is now s/p left BKA on 7/22/2020.  Her post-operative course has been complicated by hypotension, hyperkalemia, acute blood loss anemia requiring PRBC transfusion, and LIGIA. She has also required medical management of her new LLE residual limb. She is now medically stable and ready to discharge to acute inpatient rehab.   Patient requires an intensive inpatient rehab program to address the following acute impairments: impaired strength, impaired activity tolerance, impaired balance, pain, edema, and fatigue in setting of new L BKA and nonWB status to L residual limb. These impairments are contributing to functional limitations and affecting her safety and IND w/ bed mobility, transfers, gait, stairs, and completion of ADLs.     Current Active Medical Management Needs/Risks for Clinical Complications  The patient requires the high level of rehabilitation physician supervision that accompanies the provision of intensive rehabilitation therapy.  The patient needs the services of the rehabilitation physician to assess the patient medically and functionally and to modify the course of treatment as needed to maximize the patient's capacity to benefit from the rehabilitation process.  The patient requires medical mgmt and assessment of:  Left Below Knee Amputation: NWB LLE, stump protector, dry to dry dressing changes BID, assist w/ desensitization strategies, mgmt of phantom limb pain, assist w/ residual limb management, assess skin  integrity and wound healing;  Pain: on Gabapentin for neuropathy, acetaminophen, Robaxin, Oxycodone;  Cardiovascular status in setting of hypotension and history of HTN: Hydrochlorothiazide and Losartan currently on hold, HLD on Zocor;  Hematologic status in setting of acute blood loss anemia: monitor Hgb;  Diabetes: medium sliding scale insulin (Novolog), PTA Glimepiride on hold;  GI bleed: on Protonix; Bowel function in pt at risk of opioid induced constipation: on Senokot,  Tobacco Use Disorder: on PRN Albuterol, Nicotine patches or gum is advised,  pt on smoking cessation including risks associated w/ ongoing smoking in setting of knee L BKA (delayed wound healing, risk for infection, dehisence);  Alcohol Use Disorder: on Thiamine (B-1),  pt on alcohol cessation;  Renal function in setting of LIGIA on CKD: assess fluid and electrolyte balance, monitor Cr;  Mental Health in setting of limb loss: promote acceptance of altered body image and coping w/ lifestyle changes, pt would benefit from Health Psychology consult.   Pt is at risk for falls w/ injury, infection, skin breakdown, nonhealing incision, wound dehisence, uncontrolled pain, and DVT/VTE. Pt will require increased time for rehabilitative therapies including coordination of care and specialized equipment in setting of obesity.    Past Medical/Surgical History  Surgery in the past 100 days: Yes  Additional relevant past medical history: HTN, HLD, obesity, type II diabetes, CKD, venous stasis changes in BLEs, neuropathy in B feet, GI bleed from peptic ulcer disease, left Charcot foot deformity, s/p left BKA on 7/22/2020, tobacco use disorder, alcohol use disorder, portal hypertensive gastropathy    Level of Functioning prior to Admission:  Home Environment  Lives with: spouse  Living arrangements:    Home accessibility: stairs to enter home  Stairs to enter home: 3  Stairs to negotiate within home:    Stair railings at home:    Living  environment comments:patient and  own business, they turned an office into a bedroom and stay there because it's on one level. Handicap toilet. Has many stairs at home. Higher toilet at home. Tub/shower at home with shower chair. Walk in shower at work with built in seat.   Equipment currently used at home: wheelchair, manual, raised toilet  Activity/exercise/self-care comment:  Pt will plan to disch to alternative housing situation w/ handicap accessible entrance.     Ambulation: 4-->completely dependent  Transferrin-->independent  Toiletin-->assistive equipment  Bathing: 3-->assistive equipment and person  Dressin-->independent   Eatin-->independent  Communication: 0-->understands/communicates without difficulty  Swallowin-->swallows foods/liquids without difficulty  Cognition: 0 - no cognition issues reported  Prior Functional Level Comment:  Pt wears diabetic footwear at baseline.    Level of Function: GG Scale (Section GG Functional Ability and Goals; CMS's MALIK Version 3.0 Manual effective 10.1.2019):  PT Current Function Goals for Rehab   Bed Rolling 4 Supervision or touching assitance 6 Independent   Supine to Sit 4 Supervision or touching assitance 6 Independent   Sit to Stand 1 Dependent 6 Independent   Transfer 1 Dependent 6 Independent   Ambulation Not completed 6 Independent   Stairs Not completed 4 Supervision or touching assitance     OT Current Function Goals for Rehab   Feeding 6 Independent 6 Independent   Grooming Not completed 6 Independent   Bathing Not completed 6 Independent   Upper Body Dressing Not completed 6 Independent   Lower Body Dressing 2 Substantial/maximal assistance 6 Independent   Toileting 1 Dependent 6 Independent   Toilet Transfer 1 Dependent 6 Independent   Tub/Shower Transfer Not completed 6 Independent   Cognition Not Assessed Independent     SLP Current Function Goals for Rehab   Swallow Not Assessed Not applicable   Communication Not Impaired Not  applicable       Current Diet:  Regular diet and Thin liquids    Summary Statement:  She performs supine <> sit w/ SBA. She is able to perform STS transfers and stand pivot transfers w/ WW w/ CGAx2.  Fair standing balance noted standing at WW w/ CGAx2. She requires cues for safety w/ all functional transfers. She has yet to begin ambulation. She is able to perform w/c mobility - propelling self forward and backward w/ cues. She would benefit from full ADL assessment while admitted to acute inpt rehab. Currently she requires max A for LB dressing and dependent A for toileting. She will also require lymphedema therapy for mgmt of LE edema.     Expected Therapies and Services required during Inpatient Rehab admission  Intensity of Therapy: Patient requires intensive therapies not available in a lesser level of care. Patient is motivated, making gains, and can tolerate 3 hours of therapy a day.  Physical Therapy: 90 minutes per day, at least 5 days a week for 12 days  Occupational Therapy: 90 minutes per day, at least 5 days a week for 12 days  Speech and Language Therapy: No SLP needs anticipated at this time.  Rehabilitation Nursing Needs: Patient requires 24 hour Rehab Nursing to manage wound care, vitals, medication education, positioning, carryover of new rehab techniques, care coordination, skin integrity, blood sugar management, diabetes education, pain management, provide safe environment for patient at falls risk, edema management, provide smoking cessation education, assess LLE residual limb for s/s of infection vs healing, assist w/ shaping of LLE residual limb, provide densensitization strategies to assist w/ mgmt of phantom limb pain, and provide pain medications in timely manner for optimal participation in rehab therapies.    Precautions/restrictions/special needs:   Precautions: fall precautions and Weight bearing precautions (NWB to LLE residual limb)   Restrictions: LLE residual limb NWB   Special  Needs: bariatric equipment, pt has diabetic shoe    Expected Level of Improvement: Pt will achieve a level of IND w/ bed mobility, mod IND w/ transfers and gait using WW, CGA on stairs, and mod IND w/ all ADLs. She will require use of manual w/c for longer distance ambulation and community mobility.   Expected Length of time to achieve: 12 days    Anticipated Discharge Needs:  Anticipated Discharge Destination: Other home - handicap accessible home  Anticipated Discharge Support: Family member  24/7 support available : Yes  Identified caregiver(s):    Anticipated Discharge Needs: Home with homecare    Identified challenges/barriers: none.    Liaison Signature:     Physician statement of review and agreement:  I have reviewed and am in agreement of the need for IRF stay to address above functional and medical needs. In addition to above statements address, Patient requires intensive active and ongoing therapeutic intervention and multiple therapies; Patient requires medical supervision; Expected to actively participate in the intensive rehab program; Sufficiently stable to actively participate; Expectation for measurable improvement in functional capacity or adaption to impairments.    Physician Signature:

## 2020-07-27 ENCOUNTER — APPOINTMENT (OUTPATIENT)
Dept: OCCUPATIONAL THERAPY | Facility: CLINIC | Age: 60
End: 2020-07-27
Attending: ORTHOPAEDIC SURGERY
Payer: COMMERCIAL

## 2020-07-27 ENCOUNTER — APPOINTMENT (OUTPATIENT)
Dept: PHYSICAL THERAPY | Facility: CLINIC | Age: 60
End: 2020-07-27
Attending: ORTHOPAEDIC SURGERY
Payer: COMMERCIAL

## 2020-07-27 ENCOUNTER — HOSPITAL ENCOUNTER (INPATIENT)
Facility: CLINIC | Age: 60
LOS: 8 days | Discharge: HOME HEALTH | End: 2020-08-04
Attending: PHYSICAL MEDICINE & REHABILITATION | Admitting: PHYSICAL MEDICINE & REHABILITATION
Payer: COMMERCIAL

## 2020-07-27 VITALS
HEART RATE: 91 BPM | RESPIRATION RATE: 16 BRPM | TEMPERATURE: 98.7 F | SYSTOLIC BLOOD PRESSURE: 143 MMHG | DIASTOLIC BLOOD PRESSURE: 75 MMHG | OXYGEN SATURATION: 99 %

## 2020-07-27 DIAGNOSIS — M79.2 CHRONIC NEUROPATHIC PAIN: Primary | ICD-10-CM

## 2020-07-27 DIAGNOSIS — Z89.512 STATUS POST BELOW-KNEE AMPUTATION OF LEFT LOWER EXTREMITY (H): ICD-10-CM

## 2020-07-27 DIAGNOSIS — G89.29 CHRONIC NEUROPATHIC PAIN: Primary | ICD-10-CM

## 2020-07-27 DIAGNOSIS — I10 BENIGN ESSENTIAL HYPERTENSION: ICD-10-CM

## 2020-07-27 PROBLEM — Z89.519 S/P BKA (BELOW KNEE AMPUTATION) (H): Status: ACTIVE | Noted: 2020-07-27

## 2020-07-27 LAB
ANION GAP SERPL CALCULATED.3IONS-SCNC: 2 MMOL/L (ref 3–14)
BUN SERPL-MCNC: 18 MG/DL (ref 7–30)
CALCIUM SERPL-MCNC: 8.1 MG/DL (ref 8.5–10.1)
CHLORIDE SERPL-SCNC: 110 MMOL/L (ref 94–109)
CO2 SERPL-SCNC: 28 MMOL/L (ref 20–32)
CREAT SERPL-MCNC: 0.94 MG/DL (ref 0.52–1.04)
ERYTHROCYTE [DISTWIDTH] IN BLOOD BY AUTOMATED COUNT: 21 % (ref 10–15)
GFR SERPL CREATININE-BSD FRML MDRD: 65 ML/MIN/{1.73_M2}
GLUCOSE BLDC GLUCOMTR-MCNC: 109 MG/DL (ref 70–99)
GLUCOSE BLDC GLUCOMTR-MCNC: 89 MG/DL (ref 70–99)
GLUCOSE BLDC GLUCOMTR-MCNC: 97 MG/DL (ref 70–99)
GLUCOSE BLDC GLUCOMTR-MCNC: 99 MG/DL (ref 70–99)
GLUCOSE SERPL-MCNC: 97 MG/DL (ref 70–99)
HCT VFR BLD AUTO: 26.6 % (ref 35–47)
HGB BLD-MCNC: 7.4 G/DL (ref 11.7–15.7)
MCH RBC QN AUTO: 23 PG (ref 26.5–33)
MCHC RBC AUTO-ENTMCNC: 27.8 G/DL (ref 31.5–36.5)
MCV RBC AUTO: 83 FL (ref 78–100)
PLATELET # BLD AUTO: 180 10E9/L (ref 150–450)
POTASSIUM SERPL-SCNC: 4.9 MMOL/L (ref 3.4–5.3)
RBC # BLD AUTO: 3.22 10E12/L (ref 3.8–5.2)
SODIUM SERPL-SCNC: 140 MMOL/L (ref 133–144)
WBC # BLD AUTO: 7.3 10E9/L (ref 4–11)

## 2020-07-27 PROCEDURE — 00000146 ZZHCL STATISTIC GLUCOSE BY METER IP

## 2020-07-27 PROCEDURE — 99232 SBSQ HOSP IP/OBS MODERATE 35: CPT | Performed by: HOSPITALIST

## 2020-07-27 PROCEDURE — 97530 THERAPEUTIC ACTIVITIES: CPT | Mod: GP

## 2020-07-27 PROCEDURE — 36415 COLL VENOUS BLD VENIPUNCTURE: CPT | Performed by: HOSPITALIST

## 2020-07-27 PROCEDURE — 25000132 ZZH RX MED GY IP 250 OP 250 PS 637: Performed by: HOSPITALIST

## 2020-07-27 PROCEDURE — 25000132 ZZH RX MED GY IP 250 OP 250 PS 637: Performed by: CLINICAL NURSE SPECIALIST

## 2020-07-27 PROCEDURE — 25000132 ZZH RX MED GY IP 250 OP 250 PS 637: Performed by: PHYSICIAN ASSISTANT

## 2020-07-27 PROCEDURE — 97140 MANUAL THERAPY 1/> REGIONS: CPT | Mod: GP | Performed by: PHYSICAL THERAPIST

## 2020-07-27 PROCEDURE — 12800006 ZZH R&B REHAB

## 2020-07-27 PROCEDURE — G0463 HOSPITAL OUTPT CLINIC VISIT: HCPCS | Mod: 25

## 2020-07-27 PROCEDURE — 85027 COMPLETE CBC AUTOMATED: CPT | Performed by: HOSPITALIST

## 2020-07-27 PROCEDURE — 97602 WOUND(S) CARE NON-SELECTIVE: CPT

## 2020-07-27 PROCEDURE — 97535 SELF CARE MNGMENT TRAINING: CPT | Mod: GO

## 2020-07-27 PROCEDURE — 80048 BASIC METABOLIC PNL TOTAL CA: CPT | Performed by: HOSPITALIST

## 2020-07-27 RX ORDER — HYDROXYZINE HYDROCHLORIDE 25 MG/1
25 TABLET, FILM COATED ORAL EVERY 6 HOURS PRN
Status: DISCONTINUED | OUTPATIENT
Start: 2020-07-27 | End: 2020-08-04 | Stop reason: HOSPADM

## 2020-07-27 RX ORDER — PANTOPRAZOLE SODIUM 40 MG/1
40 TABLET, DELAYED RELEASE ORAL
Status: DISCONTINUED | OUTPATIENT
Start: 2020-07-28 | End: 2020-08-04 | Stop reason: HOSPADM

## 2020-07-27 RX ORDER — ACETAMINOPHEN 325 MG/1
650 TABLET ORAL EVERY 6 HOURS PRN
Status: DISCONTINUED | OUTPATIENT
Start: 2020-07-27 | End: 2020-07-28

## 2020-07-27 RX ORDER — GABAPENTIN 600 MG/1
1200 TABLET ORAL 3 TIMES DAILY
DISCHARGE
Start: 2020-07-27

## 2020-07-27 RX ORDER — NALOXONE HYDROCHLORIDE 0.4 MG/ML
.1-.4 INJECTION, SOLUTION INTRAMUSCULAR; INTRAVENOUS; SUBCUTANEOUS
Status: DISCONTINUED | OUTPATIENT
Start: 2020-07-27 | End: 2020-08-04 | Stop reason: HOSPADM

## 2020-07-27 RX ORDER — HYDROCHLOROTHIAZIDE 12.5 MG/1
25 TABLET ORAL DAILY
Status: DISCONTINUED | OUTPATIENT
Start: 2020-07-28 | End: 2020-08-04 | Stop reason: HOSPADM

## 2020-07-27 RX ORDER — NORTRIPTYLINE HCL 10 MG
10 CAPSULE ORAL AT BEDTIME
Status: DISCONTINUED | OUTPATIENT
Start: 2020-07-27 | End: 2020-07-30

## 2020-07-27 RX ORDER — SIMVASTATIN 40 MG
40 TABLET ORAL AT BEDTIME
Status: DISCONTINUED | OUTPATIENT
Start: 2020-07-27 | End: 2020-08-04 | Stop reason: HOSPADM

## 2020-07-27 RX ORDER — GABAPENTIN 600 MG/1
1200 TABLET ORAL 3 TIMES DAILY
Status: DISCONTINUED | OUTPATIENT
Start: 2020-07-27 | End: 2020-08-04 | Stop reason: HOSPADM

## 2020-07-27 RX ORDER — HYDROCHLOROTHIAZIDE 12.5 MG/1
25 CAPSULE ORAL DAILY
Status: DISCONTINUED | OUTPATIENT
Start: 2020-07-27 | End: 2020-07-27 | Stop reason: HOSPADM

## 2020-07-27 RX ORDER — LANOLIN ALCOHOL/MO/W.PET/CERES
100 CREAM (GRAM) TOPICAL DAILY
Status: DISCONTINUED | OUTPATIENT
Start: 2020-07-28 | End: 2020-08-04 | Stop reason: HOSPADM

## 2020-07-27 RX ORDER — OXYCODONE HYDROCHLORIDE 5 MG/1
5-10 TABLET ORAL
Status: DISCONTINUED | OUTPATIENT
Start: 2020-07-27 | End: 2020-07-28

## 2020-07-27 RX ORDER — POLYETHYLENE GLYCOL 3350 17 G/17G
17 POWDER, FOR SOLUTION ORAL DAILY PRN
Status: ON HOLD | DISCHARGE
Start: 2020-07-27 | End: 2020-08-04

## 2020-07-27 RX ORDER — METHOCARBAMOL 750 MG/1
750 TABLET, FILM COATED ORAL 3 TIMES DAILY
Qty: 30 TABLET | Refills: 0 | Status: ON HOLD | DISCHARGE
Start: 2020-07-27 | End: 2020-08-04

## 2020-07-27 RX ORDER — OXYCODONE HYDROCHLORIDE 5 MG/1
5-10 TABLET ORAL
Refills: 0 | Status: ON HOLD | DISCHARGE
Start: 2020-07-27 | End: 2020-08-04

## 2020-07-27 RX ORDER — HYDROXYZINE HYDROCHLORIDE 25 MG/1
25 TABLET, FILM COATED ORAL EVERY 6 HOURS PRN
Qty: 30 TABLET | Status: ON HOLD | DISCHARGE
Start: 2020-07-27 | End: 2020-08-04

## 2020-07-27 RX ORDER — AMOXICILLIN 250 MG
1 CAPSULE ORAL AT BEDTIME
Status: DISCONTINUED | OUTPATIENT
Start: 2020-07-27 | End: 2020-08-04 | Stop reason: HOSPADM

## 2020-07-27 RX ORDER — POLYETHYLENE GLYCOL 3350 17 G/17G
17 POWDER, FOR SOLUTION ORAL DAILY PRN
Status: DISCONTINUED | OUTPATIENT
Start: 2020-07-27 | End: 2020-08-04 | Stop reason: HOSPADM

## 2020-07-27 RX ORDER — AMOXICILLIN 250 MG
1-2 CAPSULE ORAL 2 TIMES DAILY
Status: ON HOLD | DISCHARGE
Start: 2020-07-27 | End: 2020-08-04

## 2020-07-27 RX ORDER — ACETAMINOPHEN 325 MG/1
650 TABLET ORAL EVERY 4 HOURS PRN
Qty: 1 BOTTLE | Refills: 0 | DISCHARGE
Start: 2020-07-27

## 2020-07-27 RX ADMIN — SIMVASTATIN 40 MG: 20 TABLET, FILM COATED ORAL at 21:34

## 2020-07-27 RX ADMIN — OXYCODONE HYDROCHLORIDE 10 MG: 5 TABLET ORAL at 17:48

## 2020-07-27 RX ADMIN — OXYCODONE HYDROCHLORIDE 10 MG: 5 TABLET ORAL at 00:55

## 2020-07-27 RX ADMIN — HYDROXYZINE HYDROCHLORIDE 25 MG: 25 TABLET, FILM COATED ORAL at 11:47

## 2020-07-27 RX ADMIN — OXYCODONE HYDROCHLORIDE 10 MG: 5 TABLET ORAL at 06:54

## 2020-07-27 RX ADMIN — OXYCODONE HYDROCHLORIDE 10 MG: 5 TABLET ORAL at 21:39

## 2020-07-27 RX ADMIN — OXYCODONE HYDROCHLORIDE 10 MG: 5 TABLET ORAL at 04:22

## 2020-07-27 RX ADMIN — GABAPENTIN 1200 MG: 600 TABLET, FILM COATED ORAL at 07:59

## 2020-07-27 RX ADMIN — DOCUSATE SODIUM 50 MG AND SENNOSIDES 8.6 MG 1 TABLET: 8.6; 5 TABLET, FILM COATED ORAL at 21:33

## 2020-07-27 RX ADMIN — NORTRIPTYLINE HYDROCHLORIDE 10 MG: 10 CAPSULE ORAL at 21:39

## 2020-07-27 RX ADMIN — METHOCARBAMOL 750 MG: 750 TABLET, FILM COATED ORAL at 13:29

## 2020-07-27 RX ADMIN — METHOCARBAMOL 750 MG: 750 TABLET, FILM COATED ORAL at 07:59

## 2020-07-27 RX ADMIN — OXYCODONE HYDROCHLORIDE 10 MG: 5 TABLET ORAL at 09:50

## 2020-07-27 RX ADMIN — Medication 750 MG: at 21:33

## 2020-07-27 RX ADMIN — PANTOPRAZOLE SODIUM 40 MG: 40 TABLET, DELAYED RELEASE ORAL at 08:33

## 2020-07-27 RX ADMIN — GABAPENTIN 1200 MG: 600 TABLET, FILM COATED ORAL at 16:02

## 2020-07-27 RX ADMIN — THIAMINE HCL TAB 100 MG 100 MG: 100 TAB at 07:59

## 2020-07-27 RX ADMIN — HYDROCHLOROTHIAZIDE 25 MG: 12.5 CAPSULE, GELATIN COATED ORAL at 10:54

## 2020-07-27 RX ADMIN — GABAPENTIN 1200 MG: 600 TABLET, FILM COATED ORAL at 21:30

## 2020-07-27 RX ADMIN — OXYCODONE HYDROCHLORIDE 10 MG: 5 TABLET ORAL at 12:46

## 2020-07-27 ASSESSMENT — ACTIVITIES OF DAILY LIVING (ADL)
SWALLOWING: 0-->SWALLOWS FOODS/LIQUIDS WITHOUT DIFFICULTY
FALL_HISTORY_WITHIN_LAST_SIX_MONTHS: YES
NUMBER_OF_TIMES_PATIENT_HAS_FALLEN_WITHIN_LAST_SIX_MONTHS: 1
AMBULATION: 1-->ASSISTIVE EQUIPMENT
COGNITION: 0 - NO COGNITION ISSUES REPORTED
WHICH_OF_THE_ABOVE_FUNCTIONAL_RISKS_HAD_A_RECENT_ONSET_OR_CHANGE?: AMBULATION;TRANSFERRING;TOILETING;BATHING;DRESSING;FALL HISTORY
BATHING: 1-->ASSISTIVE EQUIPMENT
RETIRED_COMMUNICATION: 0-->UNDERSTANDS/COMMUNICATES WITHOUT DIFFICULTY
RETIRED_EATING: 0-->INDEPENDENT
TOILETING: 1-->ASSISTIVE EQUIPMENT
DRESS: 0-->INDEPENDENT
TRANSFERRING: 1-->ASSISTIVE EQUIPMENT

## 2020-07-27 ASSESSMENT — MIFFLIN-ST. JEOR: SCORE: 1797.39

## 2020-07-27 NOTE — PLAN OF CARE
Discharge Planner PT   Patient plan for discharge: ARU  Current status: NWB LLE - with lymphedema wrappings on the right leg. Pt's leg was prepped and dressing with TG soft. Wet bandage and foam was placed over a skin tear on the lateral leg at the request of wound care. Pt leg was wrapped with 8cm and 10 cm wraps at 50% overalp and 50% stretch. Pt reported improved comfort at EOS.   Barriers to return to prior living situation: WB status and independence level  Recommendations for discharge: ARU  Rationale for recommendations: Pt will benefit from intense 3 hours / day program to improve independence.       Physical Therapy Discharge Summary    Reason for therapy discharge:      Discharged to acute rehabilitation facility.    Progress towards therapy goal(s). See goals on Care Plan in Epic electronic health record for goal details.  Goals partially met.  Barriers to achieving goals:   discharge from facility.    Therapy recommendation(s):    Continued therapy is recommended.  Rationale/Recommendations:  Pt transferring to ARU for rehab services.           Entered by: Dong Chacon 07/27/2020 1:39 PM

## 2020-07-27 NOTE — PLAN OF CARE
Discharge Planner PT   Patient plan for discharge: Rehab  Current status: PSpent increased time discussion rehab options, educating on ARU, provided therapeutic listening, ect. Worked on fitting and adjusting madie-tech brace with nursing to dress incision prior to mobility. Patient then completes supine to sit transfer SBA. Sat EOB with good tolerance. Completes sit<>stand transfer with initially CGA but then Raina for steadying in standing with walker. Takes a few shuffled steps from bed to w/c with Raina. Once in w/c propelled on unit about 200'. Patient preference is pushing backwards with R LE (did well paying attention to turns and objects). Ended in room with needs in reach  Barriers to return to prior living situation: safety, independence, pain, precautions, balance, activity tolerance, stairs  Recommendations for discharge: ARU  Rationale for recommendations: To progress the above stated. Patient motivated and participatory. Expect with higher level intensity of rehab patient will be able to return home in an optimal amount of time.        Entered by: Anisha Leahy 07/27/2020 12:05 PM

## 2020-07-27 NOTE — PROGRESS NOTES
[facility], [facility], [place], [place]  Internal Medicine Progress Note      Assessment and Plans:     Tiffani Tan is a 60 year old female who was admitted on 7/21/2020.     Tiffani Tan is a 60 year old female with a past medical history of hypertension, dyslipidemia, obesity, type 2 diabetes, chronic kidney disease, neuropathy in the feet, GI bleed from peptic ulcer disease, venous stasis changes in the bilateral lower extremity, Charcot foot deformity left foot.  Patient was brought in for left below-knee amputation for charcot foot arthropathy.      # Charcot arthropathy: SP left BKA 07/22:    -per primary team  -hydrate well  -good IS  -ankle pumps  -pain and DVT prophylaxis management per orthopedics    # Hypotension: From Volume loss. BMP showed hyperkalemia and ARF. Recheck Hb is 7.3.   -SP one liter of fluid bolus. BP improved. Stop IVF.     # Hyperkalemia, resolved.    -SP calcium gluconate, dextrose and insulin and two doses of 30 g kayexalate  -SP IVF, stop now.     # Acute Blood Loss Anemia: Preop Hb is 9.2 = > 7 = > 7.3 => 6.7 Give 1 PRBC => 7.4  -Recheck Hb periodically.   -Transfuse for Hb less than 7 with symptoms.     # Hx of Hypertension: Prior to admission takes hydrochlorothiazide 25 mg p.o. daily, losartan 100 mg p.o. daily.    -resume hydrochlorothiazide as there is signs of fluid built up and BP creeping up.     # Dyslipidemia: Prior to admission takes Zocor 40 mg p.o. daily.  -Continue that    # Obesity:   -Outpatient weight loss    # Type 2 diabetes, hemoglobin A1c 5.5: Does not take insulin.  On glimepiride 2 mg once a day.  Hemoglobin A1c today was 5.5. She took her glimepiride on the day of admission however did not eat.  Blood sugar was 65.  She got IV dextrose to correct the hypoglycemia.    -resume glimepiride at discharge.   -Medium sliding scale insulin     # LIGIA on Chronic kidney disease, baseline creatinine 1.1-1.9, Resolved.      -In February 2020 urine albumin was negative  -likely from volume loss  -Admit creat 0.82 => 2.2. After fluids Creat down to 2 = > 1.15 => 0.9    # Neuropathy in the feet: Reports some pain in the feet chronically.  Takes 1200 mg of gabapentin 3 times daily.      -Continue gabapentin 1200 mg TID now that ARF resolved.     # History of GI bleed from gastric ulcers, status post EGD in February 2020 which showed 3 gastric ulcers.  She had some esophagitis 2.  She was noted to have some portal hypertensive gastropathy as well.  She is being treated with Protonix 40 mg daily.  She is supposed to have follow-up EGD in the future.     -Continue Protonix, follow-up with GI as outpatient.      # Venous stasis changes in the bilateral lower extremity:     -Lymphedema consult for leg compression on the right side..  -The redness in the right lower extremity is chronic in nature per her report     # Tobacco use disorder: Smokes more than half pack per day. No history of COPD or shortness of breath.  No wheezing on exam.      -Consider PRN albuterol   -Good incentive spirometry tobacco cessation.    -Could use nicotine patches or gums     # Alcohol use disorder, Portal hypertensive gastropathy: She drinks 2 drinks of vodka daily.  February 2020 she had an ultrasound of the abdomen with duplex.  It showed hepatomegaly with echogenic, heterogenous liver parenchyma and mildly thickened gallbladder wall most compatible with chronic intrinsic parenchymal disease.  On the EGD she had portal hypertensive gastropathy in February 2020. Patient continues to drink alcohol.  And smoke as well.      -I think it is worth repeating ultrasound of the liver, but that can be done as out pt in a months time.     DVT Prophylaxis: Per primary team  Code Status: Prior   Disposition: Per primary team      Kiki Pate MD     Text Page  (7am - 5pm, M-F)    Subjective:      Overnight Events reviewed, Chart Reviewed  Feels better. Leg pain is  better. No chest pain or sob.   No nausea or vomiting.   Moved bowels.     -Data reviewed today: I reviewed all new labs and imaging results over the last 24 hours.     Exam:       Temp: 98.7  F (37.1  C) Temp src: Oral BP: (!) 143/75 Pulse: 91   Resp: 16 SpO2: 99 % O2 Device: Nasal cannula Oxygen Delivery: 1 LPM  There were no vitals filed for this visit.  Vital Signs with Ranges  Temp:  [98.5  F (36.9  C)-98.7  F (37.1  C)] 98.5  F (36.9  C)  Pulse:  [89-91] 90  Resp:  [16-17] 16  BP: (112-143)/(59-75) 139/59  SpO2:  [82 %-99 %] 87 %  I/O last 3 completed shifts:  In: -   Out: 1200 [Urine:1200]    Constitutional: No distress noted, Alert, Answering questions appropriately  Respiratory:  bL LL insp crackles. No wheezing.   Cardiovascular: Mild presacral edema. S1S2 normal, no new murmur  GI: Soft, non tender  Skin/Integumen: No rash  SP left BKA    Medications            Data   Recent Labs   Lab 07/27/20  0546 07/26/20  0600 07/25/20  0606  07/22/20  0546 07/21/20  1245   WBC 7.3 7.8 9.8  --   --   --    HGB 7.4* 7.4* 6.7*   < >  --   --    MCV 83 82 83  --   --   --     177 199  --   --  211   INR  --   --   --   --   --  1.29*    140 136   < > 138  --    POTASSIUM 4.9 4.6 4.7   < > 5.0  --    CHLORIDE 110* 108 104   < > 105  --    CO2 28 27 26   < > 27  --    BUN 18 25 34*   < > 17  --    CR 0.94 1.15* 2.04*   < > 0.82  --    ANIONGAP 2* 5 6   < > 6  --    NAYANA 8.1* 7.8* 7.5*   < > 8.6  --    GLC 97 84 85   < > 103*  --    ALBUMIN  --   --   --   --  2.8*  --    PROTTOTAL  --   --   --   --  8.5  --    BILITOTAL  --   --   --   --  0.8  --    ALKPHOS  --   --   --   --  141  --    ALT  --   --   --   --  29  --    AST  --   --   --   --  61*  --     < > = values in this interval not displayed.       No results found for this or any previous visit (from the past 24 hour(s)).

## 2020-07-27 NOTE — PLAN OF CARE
VS:   BP (!) 143/75 (BP Location: Right arm)   Pulse 91   Temp 98.7  F (37.1  C) (Oral)   Resp 16   SpO2 99%   Vss on RA,    Output:   Spontaneosuly Voiding- up to commode. LBM 7/27/20    Lungs Expiratory wheezes. Occasional dry cough.   Encouraged IS- good tolerance.     Activity:   Assist X1 W/ walker/GB   Worked with therapies, Up to commode, Up in Chair   Skin: Skin chris, slow cap refill   L BKA- small  edematous area inner leg, Brigette-incision erythema   R leg- diabetic ulcer on toe, R shin skin tear    Pain:   Partially managed with Oxycodone 10mg q 3. Repositioning, scheduled robaxin and gabapentin    Neuro/CMS:   A&Ox4, Neuropathy all extremities @ baseline   Dressing(s):   BKA- DRSG Changed, R L dressing changed by [facility] nurse. Ga stocking on R leg    Diet:   Regular- diabetic diet    before lunch    LDA:   PIV SL    Equipment:   Walker, Gait belt, IS, drsg supplies, L leg boot    Plan:   Contiue POC, [facility]    Additional Info:   Pt able to make needs known, call light within reach   [facility] nurse follow up planned weekly        [facility]30 [facility], was accompanied by Son, and left with personal belongings.  Dressing supplies sent with pt. Report was given to [facility] rehab nurse. Pt left via transport.

## 2020-07-27 NOTE — PROGRESS NOTES
Social Work Services Discharge Note      Patient Name:  Tiffani Tan     Anticipated Discharge Date:  7/27/2020 @ 1330    Discharge Disposition:   ARU:  [facility]    Following MD:  Kiki Pate MD     Additional Services/Equipment Arranged:  None, pt transport to take pt     Patient / Family response to discharge plan:  Agreeable     Persons notified of above discharge plan:  Pt, RN, provider, [facility] ARU liaison     Staff Discharge Instructions:  Please fax discharge orders and signed hard scripts for any controlled substances.  Please print a packet and send with patient.     CTS Handoff completed:  YES    Medicare Notice of Rights provided to the patient/family:  N/A    LISA Moore  Unit 5/[address] 8 Ortho/Med/Surg & [facility]  Phone: [phone] Pager: [phone]

## 2020-07-27 NOTE — CONSULTS
[facility] Nurse Inpatient Wound Assessment   Reason for consultation: Evaluate and treat right lateral lower leg and right 1st met head wounds    Assessment  Right lateral lower leg wounds due to Skin Tear  Status: initial assessment     Right 1st met head wounds due to Diabetic Ulcer  Status: initial assessment    Treatment Plan  Right lateral lower leg and right 1st met head wound: Daily : wash leg and foot with soap, water and a wash cloth taking care to cleanse between toes. Dry thoroughly. Moisturize intact skin with Sween 24 or Aquaphor. Apply Adaptic to right lateral lower leg wound and cover with Optifoam. Apply Aquacel Ag to right 1st met head wound and cover with 2x2 gauze or Primapore. Wrap legs per lymphedema therapy orders.     Orders Written  Recommended provider order: None, at this time  WO Nurse follow-up plan:weekly  Nursing to notify the Provider(s) and re-consult the [facility] Nurse if wound(s) deteriorates or new skin concern.    Patient History  According to provider note(s):  Tiffani Tan is a 60 year old female with a past medical history of hypertension, dyslipidemia, obesity, type 2 diabetes, chronic kidney disease, neuropathy in the feet, GI bleed from peptic ulcer disease, venous stasis changes in the bilateral lower extremity, Charcot foot deformity left foot.  Patient was brought in for left below-knee amputation for charcot foot arthropathy on 7/22/2020.      Objective Data  Containment of urine/stool: Continent of bladder and Continent of bowel    Active Diet Order  Orders Placed This Encounter      Advance Diet as Tolerated: Regular Diet Adult      Output:   I/O last 3 completed shifts:  In: -   Out: 1200 [Urine:1200]    Risk Assessment:   Sensory Perception: 3-->slightly limited  Moisture: 4-->rarely moist  Activity: 2-->chairfast  Mobility: 2-->very limited  Nutrition: 3-->adequate  Friction and Shear: 3-->no apparent problem  Stewart Score: 17                          Labs:   Recent Labs   Lab  07/27/20  0546  07/22/20  0546 07/21/20  1245   ALBUMIN  --   --  2.8*  --    HGB 7.4*   < >  --   --    INR  --   --   --  1.29*   WBC 7.3   < >  --   --    A1C  --   --   --  5.5    < > = values in this interval not displayed.       Physical Exam  Areas of skin assessed: focused right lower leg and foot    Wound Location:  Right lateral lower leg  Date of last photo: not available  Wound History: Per patient, skin tear happened upon removal of Parul boot upon presentation for surgery.  Wound Base: 100 % dermis     Palpation of the wound bed: normal      Drainage: scant     Description of drainage: serous     Measurements (length x width x depth, in cm) 2  x 0.5  x  0.1 cm      Tunneling N/A     Undermining N/A  Periwound skin: dry/scaly      Color: pink      Temperature: normal   Odor: none  Pain: denies   Pain intervention prior to dressing change: none     Wound Location:  Right 1st met head  Date of last photo: not available  Wound History: Present on admission  Wound Base: 100 % slimy granulation tissue     Palpation of the wound bed: normal      Drainage: scant     Description of drainage: serosanguinous     Measurements (length x width x depth, in cm) 3  x 2  x  0.3 cm      Tunneling N/A     Undermining N/A  Periwound skin: hyperkeratosis      Color: pale      Temperature: normal   Odor: none  Pain: denies   Pain intervention prior to dressing change: none    Interventions  Visual inspection and assessment completed   Wound Care Rationale Promote moist wound healing without tissue dehydration   Wound Care: done per plan of care  Supplies: floor stock  Current off-loading measures: Foam padding  Current support surface: Standard  Atmos Air mattress  Education provided to: plan of care  Discussed plan of care with Patient and Nurse    Kim Jackson RN, CWOCN

## 2020-07-27 NOTE — PLAN OF CARE
VS:   BP (!) 143/75 (BP Location: Right arm)   Pulse 91   Temp 98.7  F (37.1  C) (Oral)   Resp 16   SpO2 95% On 1L NC  Denies CP or SOB   Output: Voiding without difficulty, last BM 7/26   Activity: Assist X 2 with gait belt and walker, pivot to bedside commode, non weight bearing   Skin: Intact ex L BKA, left lateral calf skin tear, and diabetic pressure ulcer R foot.    Pain:   Pt states neuropathic pain of bilateral upper and lower extremities. Pt states pain is now well managed. 1200 mg gabapentin 3x daily and prn oxycodone 3 mg q3h, IV dilaudid for breakthrough pain not needed during shift   Neuro/CMS: A&O x4, neuropathy in all extremities at baseline    Dressing(s): L BKA, right lateral calf, and right foot CDI   Diet: Regular, tolerating well, good appetite   LDA: PIV x2 SL   Equipment: IV pole, gait belt, walker, PCD's, foam wedge, [employer]   Plan:   Continue plan of care, pt able to make needs known, call light within reach   Additional Info: Hemoglobin recheck in the morning. discharge to inpt rehab pending insurance verification

## 2020-07-27 NOTE — H&P
"    [facility]   Acute Rehabilitation Unit  Admission History and Physical    CHIEF COMPLAINT   Need to get more after amputation    HISTORY OF PRESENT ILLNESS  Tiffani Tan is a 60 year old woman with a past medical history of htn, dyslipidemia, obesity, type 2 DM, CKD, PUD, etoh use, tobacco use,  chronic venous stasis,  ble neuropathy,and Left charcot foot deformity who was admitted for planned left below knee amputation. Postoperative complications included:  anemia, acute renal failure, hyperkalemia, and hypotension  ARF and hypotension felt to be secondary to volume loss, blood pressure improved with iv fluids.      During her acute hospitalization, patient was seen and evaluated by PT and OT, who collectively recommended that patient would benefit from ongoing therapies in the acute inpatient rehabilitation setting.       In review of the therapy notes currently mod assist for donning and doffing Flotech, cga- min assist for sit to stand transfer, max assist for pericares, grooming hygine with set up assist with assist for washing back. Goals for improved independence with mobility and adls, with goals for MOD I with WC based mobility, and mod I for adls.     On admission to acute rehab reports she is doing ok, motivated to discharge home, they have office set up for single level living but home with 10 stairs needed to main level. She reports acute incisional pain and chronic \"numbness, tingling, burning\" of bilateral hands and left foot is more bothersome than incisional pain. Notes months of worsening bilateral le edema and worsening more in hospital, discussed venous insuffiencey, acute kidney injury on chronic kidney disease, IV fluids, decreased mobility etc.  Tiffani denies n/v/d, sob, headaches and dizziness. Reports she is not sleeping well due to interruptions. Describes poor appetite and limited interest in eating.         PAST MEDICAL HISTORY   Reviewed and updated " in Epic.  Past Medical History:   Diagnosis Date     CKD (chronic kidney disease)      DM type 2 (diabetes mellitus, type 2) (H)      HTN (hypertension)      Peptic ulcer disease        SURGICAL HISTORY  Reviewed and updated in Epic.  Past Surgical History:   Procedure Laterality Date     AMPUTATE LEG BELOW KNEE Left 7/22/2020    Procedure: Left below knee amputation;  Surgeon: Aniceto Adams MD;  Location:  OR     ESOPHAGOSCOPY, GASTROSCOPY, DUODENOSCOPY (EGD), COMBINED N/A 2/5/2020    Procedure: ESOPHAGOGASTRODUODENOSCOPY (EGD);  Surgeon: Tanya Dias MD;  Location:  GI       SOCIAL HISTORY  Reviewed and updated in Epic.  Marital Status: .  Living situation: lives in home can stay on one level at office. 10 matheus at home  Family support: supportive spouse and 4 children (3 live locally)  Vocational History: business owner with - [employer]  Tobacco use: smokes ~ 1/2 ppd trying to quit  Alcohol use: drinks 2-4 mixed drinks daily (vodka and lemon water or OJ)  Illicit drug use: none  Social History     Socioeconomic History     Marital status:      Spouse name: Not on file     Number of children: Not on file     Years of education: Not on file     Highest education level: Not on file   Occupational History     Not on file   Social Needs     Financial resource strain: Not on file     Food insecurity     Worry: Not on file     Inability: Not on file     Transportation needs     Medical: Not on file     Non-medical: Not on file   Tobacco Use     Smoking status: Current Every Day Smoker     Packs/day: 1.00     Smokeless tobacco: Never Used   Substance and Sexual Activity     Alcohol use: Yes     Drug use: Not on file     Sexual activity: Not on file   Lifestyle     Physical activity     Days per week: Not on file     Minutes per session: Not on file     Stress: Not on file   Relationships     Social connections     Talks on phone: Not on file     Gets together: Not  on file     Attends Scientologist service: Not on file     Active member of club or organization: Not on file     Attends meetings of clubs or organizations: Not on file     Relationship status: Not on file     Intimate partner violence     Fear of current or ex partner: Not on file     Emotionally abused: Not on file     Physically abused: Not on file     Forced sexual activity: Not on file   Other Topics Concern     Parent/sibling w/ CABG, MI or angioplasty before 65F 55M? Not Asked   Social History Narrative     Not on file       FAMILY HISTORY  Reviewed and updated in Epic.  Family History   Problem Relation Age of Onset     No Known Problems Mother      No Known Problems Father          PRIOR FUNCTIONAL HISTORY   Pt was independent with all ADLs/IADLs, transfers, mobility and gait.  Mobility limited by edema and neuropathy used walker, and electric wheel chair at home.     MEDICATIONS  Scheduled meds  Medications Prior to Admission   Medication Sig Dispense Refill Last Dose     glimepiride (AMARYL) 2 MG tablet TAKE 1 TABLET(2 MG) BY MOUTH EVERY MORNING BEFORE BREAKFAST        hydrochlorothiazide (HYDRODIURIL) 25 MG tablet TAKE 1 TABLET BY MOUTH DAILY WITH 100 MG LOSARTAN        simvastatin (ZOCOR) 40 MG tablet Take 40 mg by mouth At Bedtime        thiamine (B-1) 100 MG tablet Take 100 mg by mouth daily        blood glucose (CONTOUR NEXT TEST) test strip apply 1 strip by finger poke route 2 times per day.        blood glucose monitoring (MIGUEL A MICROLET) lancets by subcutaneous route 2 times per day.        order for DME Equipment being ordered: [id number] $35  shoe post op mens md 1 Device 0      order for DME Equipment being ordered: DME [id number] $70   Shoe  1 Device 0      pantoprazole (PROTONIX) 40 MG EC tablet Take 1 tablet (40 mg) by mouth every morning (before breakfast) (Patient taking differently: Take 40 mg by mouth every morning (before breakfast) Patient takes BID.) 120 tablet 3       "[DISCONTINUED] amoxicillin-clavulanate (AUGMENTIN) 875-125 MG tablet Take 1 tablet by mouth 2 times daily 60 tablet 0      [DISCONTINUED] gabapentin (NEURONTIN) 600 MG tablet Take 600 mg by mouth 3 times daily        [DISCONTINUED] HYDROcodone-acetaminophen (NORCO) 7.5-325 MG per tablet Take 1-2 tablets by mouth every 8 hours as needed for severe pain (Patient taking differently: Take 1-2 tablets by mouth every 8 hours as needed for severe pain Patient takes differently: 1 T PO q4h prn pain) 14 tablet 0      [DISCONTINUED] losartan (COZAAR) 100 MG tablet TAKE 1 TABLET BY MOUTH DAILY WITH 25MG HCTZ        [DISCONTINUED] omeprazole (PRILOSEC) 20 MG DR capsule Take 20 mg by mouth 2 times daily Patient takes when she runs out of protonix. Insurance only pays for once daily protonix.          ALLERGIES   No Known Allergies      REVIEW OF SYSTEMS  A 10 point ROS was performed and negative unless otherwise noted in HPI.       PHYSICAL EXAM  VITAL SIGNS:  BP (!) 144/68 (BP Location: Right arm)   Pulse 88   Temp 98.1  F (36.7  C) (Oral)   Resp 20   Ht 1.676 m (5' 6\")   SpO2 (!) 84%   BMI 35.51 kg/m    BMI:  Estimated body mass index is 34.98 kg/m  as calculated from the following:    Height as of 6/23/20: 1.689 m (5' 6.5\").    Weight as of 6/23/20: 99.8 kg (220 lb).     General: awake alert nad  HEENT: mmm  Pulmonary: non labored clear on room air  Cardiovascular: rrr  Abdominal: distended firm non tender (reports chronic stable)   Extremities: extensive edema LLE above wrapped area, RLE from hip to toes with lymphedema wrapping in place and when lifted noted chronic venous stasis changes.   MSK/neuro:   Mental Status:  alert and oriented    Cranial Nerves: grossly normal    Sensory: impaired to light touch in all 4 extremities (distal amputated extremity not tested) RLE toes to below knee sensation impaired, bilateral fingertips to forearms impaired.    Strength: 4/5 in all muscle groups of the upper and lower " extremities   Abnormal movements: mild resting tremor.    Coordination: due to impaired sensation has difficulty with rapid alternating finger. Symmetric. No difficulties with fnf   Speech: clear coherent   Cognition: linear logical   Gait: not tested.   Skin: incision wrapped, rle in lymphedema wrap.     LABS  Lab Results   Component Value Date    WBC 7.3 07/27/2020     Lab Results   Component Value Date    RBC 3.22 07/27/2020     Lab Results   Component Value Date    HGB 7.4 07/27/2020     Lab Results   Component Value Date    HCT 26.6 07/27/2020     Lab Results   Component Value Date    MCV 83 07/27/2020     Lab Results   Component Value Date    MCH 23.0 07/27/2020     Lab Results   Component Value Date    MCHC 27.8 07/27/2020     Lab Results   Component Value Date    RDW 21.0 07/27/2020     Lab Results   Component Value Date     07/27/2020     Last Comprehensive Metabolic Panel:  Sodium   Date Value Ref Range Status   07/27/2020 140 133 - 144 mmol/L Final     Potassium   Date Value Ref Range Status   07/27/2020 4.9 3.4 - 5.3 mmol/L Final     Chloride   Date Value Ref Range Status   07/27/2020 110 (H) 94 - 109 mmol/L Final     Carbon Dioxide   Date Value Ref Range Status   07/27/2020 28 20 - 32 mmol/L Final     Anion Gap   Date Value Ref Range Status   07/27/2020 2 (L) 3 - 14 mmol/L Final     Glucose   Date Value Ref Range Status   07/27/2020 97 70 - 99 mg/dL Final     Urea Nitrogen   Date Value Ref Range Status   07/27/2020 18 7 - 30 mg/dL Final     Creatinine   Date Value Ref Range Status   07/27/2020 0.94 0.52 - 1.04 mg/dL Final     GFR Estimate   Date Value Ref Range Status   07/27/2020 65 >60 mL/min/[1.73_m2] Final     Comment:     Non  GFR Calc  Starting 12/18/2018, serum creatinine based estimated GFR (eGFR) will be   calculated using the Chronic Kidney Disease Epidemiology Collaboration   (CKD-EPI) equation.       Calcium   Date Value Ref Range Status   07/27/2020 8.1 (L) 8.5 - 10.1  mg/dL Final       IMPRESSION/PLAN:  Tiffani Tan is a 60 year old woman with a past medical history of HTN, HLD, obesity, type II diabetes, CKD, venous stasis changes in BLEs, chronic neuropathy and pain, and left Charcot foot deformity, who is now s/p left BKA on 7/22/2020.  Her post-operative course has been complicated by  hypotension, hyperkalemia, acute blood loss anemia requiring PRBC transfusion, and LIGIA. Admitted for ongoing rehabilitation and medical management on 7/27.     Admission to acute inpatient rehab Left LE BKA  Impairment group code: 05.4    1. PT, OT and 90 minutes of each on a daily basis, in addition to rehab nursing and close management of physiatrist.      2. Impairment of ADL's: Noted to have impaired ADLs secondary to amputation with acute on chronic pain, transferse with cga-min assist. Max assist for lower body dressing and toileting, goals for mod I with basic adls.     3. Impairment of mobility:  Currently sit to stand transfers with cga-min assist pending level of fatigue. Self propelling wheel chair goals for WC based mod I with mobility.       4. Medical Conditions  # Charcot arthropathy S/P left BKA 07/22  NWB LLE, flotech when out of bed  Bid wound care as ordered  F/u Dr. Adams in 2 weeks.   -pain management: scheduled robaxin tid, oxycodone 5-10 mg q 3 hours prn (will need to start taper) , gabapentin continue pta 1200 mg tid   -continue PT/OT    #acute blood loss anemia with history of chronic anemia- pre operative hemoglobin ~9.2  received transfusion 7/25.  Hgb 7.4 7/27.   -trend  -transfuse hgb <7 & symptomatic    # history of HTN   #Post operative hypotension  pta on hydrochlorothiazide 25 mg daily, losartan 100 mg daily  -resumed hydrochlorothiazide 7/26- continue and monitor  -monitor BP    # Type 2 diabetes, hemoglobin A1c 5.5%  On glimepiride 2 mg once a day. with hypoglycemia during hospitalization  -monitor bg bid  -continue to hold glimepiride for now.     # LIGIA on  Chronic kidney disease baseline creatinine 1.1-1.9, LIGIA felt to be from operative volume loss improved with IV Hydration. Preoperative 0.82 7/22- Cr peak 2.22 7/24. With hyperkalemia K peaked 6.8 7/24.  Now Cr. Stable 0.94 7/27. K 4.9  -trend    # Chronic ble Neuropathy in the feet: on 1200 mg of gabapentin 3 times daily and takes norco (7.5/325 up to 4 pills daily prn)  at home as well  -continue gabapentin  -start nortriptyline 10 mg at bedtime  -on oxycodone 5-10 mg q 3 hours prn. (will need to taper)      #Dyslipidemia- continue pta zocor 40 mg daily    # PUD- History of GI bleed from gastric ulcers, status post EGD in February 2020 which showed 3 gastric ulcers.  She had some esophagitis. She was noted to have some portal hypertensive gastropathy as well.  She is being treated with Protonix 40 mg daily.  She is supposed to have follow-up EGD in the future.  -Continue Protonix, follow-up with GI as outpatient.    # Chronic Venous stasis- of ble with reported recent worsening of acute edema.   - consult Lymphedema      # Tobacco use disorder: Smokes ~ half pack per day.  Encourage cessation  -would like to have nicotine gum    # Alcohol use disorder  # Portal hypertensive gastropathy: reports 2-4 drinks of vodka daily.  February 2020  ultrasound of the abdomen with duplex- showed hepatomegaly with echogenic, heterogenous liver parenchyma and mildly thickened gallbladder wall most compatible with chronic intrinsic parenchymal disease.  On the EGD she had portal hypertensive gastropathy in February 2020.   -encourage cessation  -f/u outpatient pcp vs gi referral    5. Adjustment to disability: consult health psychology  6. FEN: reg  7. Bowel: continent  8. Bladder: continent- please check pvrs x 2 on admission  9. DVT Prophylaxis: mechanical- will clarify with ortho  10. GI Prophylaxis: ppi- given pud history  11. Code: Full   12. Disposition: goal for home/office  13. ELOS:  10-14 days.  14. Rehab prognosis:   fair  15. Follow up Appointments on Discharge: pcp, ortho, gi.        discussed with Dr. Antoine, PM&R staff physician     Brittany Quintero PA-C  Rehab Service  Pager [phone]

## 2020-07-27 NOTE — PLAN OF CARE
L leg drsg changed at 7pm.New R lateral lower leg skin tear with new drsg applied (abd pad wrapped with kerlix).Dr Salazar put in WOCN consult.R foot wound old drsg removed and applied new one(adaptic non adhesive then gauze drsg then kerlix).Neuropathic pain all over.Given scheduled robaxin,gabapentin and PRN oxycodone.Placed at 1lpm via NC tonight.Will attempt to wean off.Using IS well.HS .Wanted to be awakened when oxycodone is due.Call light is near reach.

## 2020-07-27 NOTE — PLAN OF CARE
Discharge Planner OT   Patient plan for discharge: ARU  Current status: Mod A to don/doff flowtech. Min A for sit <> stand transfer, pt able to tolerate static standing x 30 seconds. Facilitated stand pivot transfer bed <> BSC with min A and FWW. Max A for pericares as unable to reach behind. Pt completed g/h tasks with set- up  A sitting EOB and A needed for sponge bathing (A needed to wash backside). SBA for sit > supine.   Barriers to return to prior living situation: new BKA, pain, decreased strength and activity tolerance, below baseline with mobility and ADLs, medical status  Recommendations for discharge: ARU  Rationale for recommendations: Pt would benefit from intensive OT services to improve IND with ADL's and transfers as pt was IND prior. Pt will be able to tolerate 3 hours of therapies. Pt motivated to participate in therapy and has good family support. Pt would make good ARC candidate at this time.          Entered by: Estephanie Kern 07/27/2020 9:43 AM     Occupational Therapy Discharge Summary    Reason for therapy discharge:    Discharged to acute rehabilitation facility.    Progress towards therapy goal(s). See goals on Care Plan in [facility] electronic health record for goal details.  Goals not met.  Barriers to achieving goals:   discharge from facility.    Therapy recommendation(s):    Continued therapy is recommended.  Rationale/Recommendations:  see above.

## 2020-07-27 NOTE — CONSULTS
Social Work Services Progress Note    Hospital Day: 7  Collaborated with:  Chart review, [facility]    Data:  SW has been following pt for discharge. SW paged [facility] this am to discuss status of insurance auth. Per [facility], they obtained auth for pt's admission. [facility] requesting a 1330 rollover time.     Intervention:  Discharge planning    Plan:    Anticipated Disposition:  [facility]    Barriers to d/c plan:  None    Follow Up:  SW to remain available    LISA Moore  Unit 5/Unit 8 Ortho/Med/Surg & [facility] Adult ED  Phone: [phone] Pager: [phone]

## 2020-07-27 NOTE — PLAN OF CARE
Patient arrived to unit around 1330. A2 stand pivot transfers. LBM 7/27 prior to admission. Denies pain currently, does have intermittent pain in L. BKA. Up in w/c. Unit welcome complete. Wristband with full code status on. Continue with POC.

## 2020-07-28 LAB — GLUCOSE BLDC GLUCOMTR-MCNC: 123 MG/DL (ref 70–99)

## 2020-07-28 PROCEDURE — 97167 OT EVAL HIGH COMPLEX 60 MIN: CPT | Mod: GO | Performed by: STUDENT IN AN ORGANIZED HEALTH CARE EDUCATION/TRAINING PROGRAM

## 2020-07-28 PROCEDURE — 12800006 ZZH R&B REHAB

## 2020-07-28 PROCEDURE — 97162 PT EVAL MOD COMPLEX 30 MIN: CPT | Mod: GP | Performed by: PHYSICAL THERAPIST

## 2020-07-28 PROCEDURE — 97110 THERAPEUTIC EXERCISES: CPT | Mod: GP | Performed by: PHYSICAL THERAPIST

## 2020-07-28 PROCEDURE — 97530 THERAPEUTIC ACTIVITIES: CPT | Mod: GP | Performed by: PHYSICAL THERAPIST

## 2020-07-28 PROCEDURE — 97542 WHEELCHAIR MNGMENT TRAINING: CPT | Mod: GP | Performed by: PHYSICAL THERAPIST

## 2020-07-28 PROCEDURE — 25000132 ZZH RX MED GY IP 250 OP 250 PS 637: Performed by: PHYSICIAN ASSISTANT

## 2020-07-28 PROCEDURE — 97535 SELF CARE MNGMENT TRAINING: CPT | Mod: GO | Performed by: STUDENT IN AN ORGANIZED HEALTH CARE EDUCATION/TRAINING PROGRAM

## 2020-07-28 PROCEDURE — 00000146 ZZHCL STATISTIC GLUCOSE BY METER IP

## 2020-07-28 PROCEDURE — G0463 HOSPITAL OUTPT CLINIC VISIT: HCPCS

## 2020-07-28 RX ORDER — OXYCODONE HYDROCHLORIDE 5 MG/1
5-10 TABLET ORAL EVERY 4 HOURS PRN
Status: DISCONTINUED | OUTPATIENT
Start: 2020-07-28 | End: 2020-08-03

## 2020-07-28 RX ORDER — ACETAMINOPHEN 325 MG/1
650 TABLET ORAL EVERY 8 HOURS PRN
Status: DISCONTINUED | OUTPATIENT
Start: 2020-07-28 | End: 2020-08-04 | Stop reason: HOSPADM

## 2020-07-28 RX ORDER — ACETAMINOPHEN 325 MG/1
650 TABLET ORAL 3 TIMES DAILY
Status: DISCONTINUED | OUTPATIENT
Start: 2020-07-28 | End: 2020-08-03

## 2020-07-28 RX ORDER — LOSARTAN POTASSIUM 25 MG/1
25 TABLET ORAL DAILY
Status: DISCONTINUED | OUTPATIENT
Start: 2020-07-28 | End: 2020-07-29

## 2020-07-28 RX ORDER — METHOCARBAMOL 750 MG/1
750 TABLET, FILM COATED ORAL 3 TIMES DAILY PRN
Status: DISCONTINUED | OUTPATIENT
Start: 2020-07-28 | End: 2020-08-04

## 2020-07-28 RX ADMIN — ACETAMINOPHEN 650 MG: 325 TABLET, FILM COATED ORAL at 21:05

## 2020-07-28 RX ADMIN — ACETAMINOPHEN 650 MG: 325 TABLET, FILM COATED ORAL at 13:58

## 2020-07-28 RX ADMIN — HYDROCHLOROTHIAZIDE 25 MG: 25 TABLET ORAL at 08:28

## 2020-07-28 RX ADMIN — OXYCODONE HYDROCHLORIDE 10 MG: 5 TABLET ORAL at 16:15

## 2020-07-28 RX ADMIN — THIAMINE HCL TAB 100 MG 100 MG: 100 TAB at 08:27

## 2020-07-28 RX ADMIN — LOSARTAN POTASSIUM 25 MG: 25 TABLET ORAL at 11:14

## 2020-07-28 RX ADMIN — Medication 750 MG: at 08:27

## 2020-07-28 RX ADMIN — PANTOPRAZOLE SODIUM 40 MG: 40 TABLET, DELAYED RELEASE ORAL at 06:06

## 2020-07-28 RX ADMIN — OXYCODONE HYDROCHLORIDE 10 MG: 5 TABLET ORAL at 08:27

## 2020-07-28 RX ADMIN — DOCUSATE SODIUM 50 MG AND SENNOSIDES 8.6 MG 1 TABLET: 8.6; 5 TABLET, FILM COATED ORAL at 21:05

## 2020-07-28 RX ADMIN — GABAPENTIN 1200 MG: 600 TABLET, FILM COATED ORAL at 21:05

## 2020-07-28 RX ADMIN — GABAPENTIN 1200 MG: 600 TABLET, FILM COATED ORAL at 13:53

## 2020-07-28 RX ADMIN — OXYCODONE HYDROCHLORIDE 10 MG: 5 TABLET ORAL at 00:10

## 2020-07-28 RX ADMIN — ACETAMINOPHEN 650 MG: 325 TABLET, FILM COATED ORAL at 00:10

## 2020-07-28 RX ADMIN — SIMVASTATIN 40 MG: 20 TABLET, FILM COATED ORAL at 21:05

## 2020-07-28 RX ADMIN — NORTRIPTYLINE HYDROCHLORIDE 10 MG: 10 CAPSULE ORAL at 21:05

## 2020-07-28 RX ADMIN — GABAPENTIN 1200 MG: 600 TABLET, FILM COATED ORAL at 08:27

## 2020-07-28 NOTE — H&P
Post Admission Physician Evaluation:    I have compared Tiffani Tan's condition on admission to acute rehabilitation to that outlined in the preadmission screen. History and physical exam performed by GWENDOLYN Rubio, and myself.  No significant differences are identified and the patient remains appropriate for an inpatient rehabilitation facility level of care to manage medical issues and address functional impairments due to L transtibial amputation.    Comorbid medical conditions being managed: hypertension, dyslipidemia, obesity, diabetes type II, CKD, neuropathy of all extremities, chronic venous stasis and ulcers, and chronic pain     Prior functional level:   Able to ambulate short distances with a walker but used an electric wheelchair or manual wheelchair pushed by her  for longer or community distances.  Mod I for ADLs.    Present function:   PT:  NWB LLE - with lymphedema wrappings on the right leg. Pt's leg was prepped and dressing with TG soft. Wet bandage and foam was placed over a skin tear on the lateral leg at the request of wound care. Pt leg was wrapped with 8cm and 10 cm wraps at 50% overalp and 50% stretch. Pt reported improved comfort at EOS.     OT:  Mod A to don/doff flowtech. Min A for sit <> stand transfer, pt able to tolerate static standing x 30 seconds. Facilitated stand pivot transfer bed <> BSC with min A and FWW. Max A for pericares as unable to reach behind. Pt completed g/h tasks with set- up  A sitting EOB and A needed for sponge bathing (A needed to wash backside). SBA for sit > supine.     Anticipated rehabilitation course:   Anticipate discharge home at a modified independent level, wheelchair based for mobility and ADLs    Will benefit from intensive rehabilitation includin minutes each of PT and OT  Rehabilitation nursing  Close management by physiatry    Prognosis: Fair    Estimated length of stay: 10-14 days    Myron Antoine MD  Department of Rehabilitation  Medicine  Pager: [phone]

## 2020-07-28 NOTE — PLAN OF CARE
"  VS: BP (!) 143/54 (BP Location: Right arm)   Pulse 83   Temp 95.8  F (35.4  C) (Oral)   Resp 20   Ht 1.676 m (5' 6\")   Wt 121.6 kg (268 lb)   SpO2 98%   BMI 43.26 kg/m       O2: Patient's saturations dropped to 80% over night and was placed on 2 LPM. Oxygen removed this morning, currently on room air and saturations are at 90%.   Output: Voids spontaneously in bedpan without difficulty, continent.   Last BM: Continent BM 7/27/2020 in bedpan   Activity: X2 assist with liko   Skin: Incisional wound from L HEATH had a small amount of serosanguinous drainage - wound care done per plan of care. Right foot ulcer has erythema, white and pink base as well as a small amount of serosanguinous drainage - wound care performed by [facility] with RN in the room. Right calf wound is pink, with scant serous drainage noted - cleansed with wound cleanser. Perianal area pink with no drainage, wound care completed per plan of care.   Pain: Chronic pain from patient's baseline neuropathy. States there is numbness and tingling in all extremitites that feels burning and sharp. One dose of PRN oxycodone given with little relief other than \"it took the edge off\". Order was placed to begin to taper patient off of the oxycodone.   CMS: Baseline neuropathy in all extremities. AO x 4. Pleasant and cooperative with cares.   Dressing: Dressing over incision changed today per plan of care (see active orders). Right leg wound dressings changed today by WOC RN with floor RN in the room, currently all are CDI.    Diet: Regular, thin liquids, pills whole. Ate about 50% of breakfast   LDA: N/a   Equipment: Bariatric foam mattress, liko lift, personal belongings, call light within reach.   Plan: TBD. Continue to monitor.    Additional Info:        "

## 2020-07-28 NOTE — PROGRESS NOTES
07/28/20 0637   Quick Adds   Type of Visit Initial Occupational Therapy Evaluation   Living Environment   Lives With spouse   Living Arrangements house   Transportation Anticipated family or friend will provide   Living Environment Comment Pt has been living in a converted room at her place of business.  Pt has a bed and TV and had a handicap bathroom. with walk in shower. Pt stays there with  during the week and goes home on weekends. Home has split level. Pt had difficulty doing strairs. Pt has even crawled up her stairs.  is available 24/7. Pt plans on discharging back to their office rather than the home.   Self-Care   Usual Activity Tolerance fair   Current Activity Tolerance poor   Equipment Currently Used at Home grab bar, toilet;grab bar, tub/shower;shower chair;wheelchair, manual;wheelchair, power   Activity/Exercise/Self-Care Comment Pt was living in office since her home has stairs to enter. Per chart pt walked short distances but used power or manual WC in community. Pt reports she has been primarily WC based for over a year and a half. Pt has 2 power chairs, manual chairs and shower chair and GB.    Functional Level   Ambulation 1-->assistive equipment   Transferring 1-->assistive equipment   Toileting 1-->assistive equipment   Bathing 1-->assistive equipment   Dressing 0-->independent   Eating 0-->independent   Communication 0-->understands/communicates without difficulty   Swallowing 0-->swallows foods/liquids without difficulty   Cognition 0 - no cognition issues reported   Fall history within last six months yes   Number of times patient has fallen within last six months 1   Which of the above functional risks had a recent onset or change? ambulation;transferring;toileting;bathing   Prior Functional Level Comment PLOF was WC based for at leats a year and a half with some very short distance amb. PLOF was IND with ADL.    General Information   Onset of Illness/Injury or Date of  "Surgery - Date 07/27/20   Referring Physician GWENDOLYN Rubio   Patient/Family Goals Statement to get up and walk again   Additional Occupational Profile Info/Pertinent History of Current Problem Per chart \"60 year old woman with a past medical history of htn, dyslipidemia, obesity, type 2 DM, CKD, PUD, etoh use, tobacco use,  chronic venous stasis,  ble neuropathy,and Left charcot foot deformity who was admitted for planned left below knee amputation. Postoperative complications included:  anemia, acute renal failure, hyperkalemia, and hypotension  ARF and hypotension felt to be secondary to volume loss, blood pressure improved with iv fluids\"   Precautions/Limitations fall precautions;oxygen therapy device and L/min   Weight-Bearing Status - LLE nonweight-bearing   Weight-Bearing Status - RLE full weight-bearing   General Observations tearful   Cognitive Status Examination   Cognitive Comment cognitively intact however recommend to continue to assess functionally since pt does have some inconsistencies with her reports to PT vs OT   Visual Perception   Visual Perception Comments wears glasses   Sensory Examination   Sensory Comments neuropathy including burning and numbness in feet and hands   Pain Assessment   Patient Currently in Pain Yes, see Vital Sign flowsheet  (neuropathy at baseline and chronic pain)   Integumentary/Edema   Integumentary/Edema Comments pt being seen for lymphedema  in BLE   Range of Motion (ROM)   ROM Comment BUE WFL   Strength   Strength Comments  deconditioned, LE weakness as pt has not walked in over a year. UE WFL   Hand Strength   Hand Strength Comments WFL   Coordination   Coordination Comments neuropathy in hands impact coordination   ARC Assessment Only   Acute Rehab Functional Assessment See IP Rehab Daily Documentation Flowsheet for Functional Mobility/ADL Assessment   Instrumental Activities of Daily Living (IADL)   IADL Comments Pt did all IADL WC based with some standing " as needed   Activities of Daily Living Analysis   Impairments Contributing to Impaired Activities of Daily Living pain;post surgical precautions;sensation decreased;strength decreased;balance impaired  (endurance)   General Therapy Interventions   Planned Therapy Interventions strengthening;transfer training;ADL retraining;IADL retraining   Clinical Impression   Criteria for Skilled Therapeutic Interventions Met yes, treatment indicated   OT Diagnosis ADL and functional mobility deficits   Influenced by the following impairments strength, endurance, sensation, pain , post op precautions, balance   Identified Performance Deficits Deficits impact all aspects of IND with self care and mobility   Clinical Decision Making (Complexity) High complexity   Therapy Frequency Daily   Predicted Duration of Therapy Intervention (days/wks) 2 weeks   Anticipated Equipment Needs at Discharge   (TBD)   Anticipated Discharge Disposition Home with Assist;Home with Home Therapy   Risks and Benefits of Treatment have been explained. Yes   Patient, Family & other staff in agreement with plan of care Yes   Total Evaluation Time   Total Evaluation Time (Minutes) 15

## 2020-07-28 NOTE — PLAN OF CARE
FOCUS/GOAL  Medical management    ASSESSMENT, INTERVENTIONS AND CONTINUING PLAN FOR GOAL:  Pt is alert and able to tell what she needs. Complain of pain on both arms and rt leg, prn tylenol and oxycodone given. Noted for low oxygen when sleeping, 02 sat flactuates from 81%-86%. Encouraged to do deep breathing, still unable to reach 90%, put 2L/nc, 02 sat went up to 90%. Pt denies SOB or chest pain. Was able to position self to side laying to offload buttocks d/t skin alteration. Elevated rt ft w/ pillow. Continent of bladder using bed pan w/ assist of 1. PVR 0 ml. No transfer done this shift. Slept intermittently.

## 2020-07-28 NOTE — PHARMACY-MEDICATION REGIMEN REVIEW
Pharmacy Medication Regimen Review  Tiffani Tan is a 60 year old female who is currently in the Acute Rehab Unit.    Assessment: All medications have an appropriate indications, durations and no unnecessary use was found    Plan:   Continue current medication regimen.  Attending provider will be sent this note for review.  If there are any emergent issues noted above, pharmacist will contact provider directly by phone.      Pharmacy will periodically review the resident's medication regimen for any PRN medications not administered in > 72 hours and discontinue them. The pharmacist will discuss gradual dose reductions of psychopharmacologic medications with interdisciplinary team on a regular basis.    Please contact pharmacy if the above does not answer specific medication questions/concerns.    Background:  A pharmacist has reviewed all medications and pertinent medical history today.  Medications were reviewed for appropriate use and any irregularities found are listed with recommendations.      Current Facility-Administered Medications:      acetaminophen (TYLENOL) tablet 650 mg, 650 mg, Oral, Q6H PRN, Brittany Quintero PA, 650 mg at 07/28/20 0010     gabapentin (NEURONTIN) tablet 1,200 mg, 1,200 mg, Oral, TID, Brittany Quintero PA, 1,200 mg at 07/28/20 0827     hydrochlorothiazide (HYDRODIURIL) tablet 25 mg, 25 mg, Oral, Daily, Brittany Quintero PA, 25 mg at 07/28/20 0828     hydrOXYzine (ATARAX) tablet 25 mg, 25 mg, Oral, Q6H PRN, Brittany Quintero PA     methocarbamol (ROBAXIN) half-tab 750 mg, 750 mg, Oral, TID, Brittany Quintero PA, 750 mg at 07/28/20 0827     naloxone (NARCAN) injection 0.1-0.4 mg, 0.1-0.4 mg, Intravenous, Q2 Min PRN, Cierra Antoine MD     nicotine (NICORETTE) gum 2 mg, 2 mg, Buccal, Q1H PRN, Brittany Quintero PA     nortriptyline (PAMELOR) capsule 10 mg, 10 mg, Oral, At Bedtime, Brittany Quintero PA, 10 mg at 07/27/20 2139     oxyCODONE (ROXICODONE) tablet 5-10 mg,  5-10 mg, Oral, Q3H PRN, Brittany Quintero PA, 10 mg at 07/28/20 0827     pantoprazole (PROTONIX) EC tablet 40 mg, 40 mg, Oral, QAM AC, Brittany Quintero PA, 40 mg at 07/28/20 0606     polyethylene glycol (MIRALAX) Packet 17 g, 17 g, Oral, Daily PRN, Brittany Quintero PA     senna-docusate (SENOKOT-S/PERICOLACE) 8.6-50 MG per tablet 1 tablet, 1 tablet, Oral, At Bedtime, Brittany Quintero PA, 1 tablet at 07/27/20 2133     simvastatin (ZOCOR) tablet 40 mg, 40 mg, Oral, At Bedtime, Brittany Quintero PA, 40 mg at 07/27/20 2134     sodium chloride (PF) 0.9% PF flush 3 mL, 3 mL, Intracatheter, q1 min prn, Cierra Antoine MD     sodium chloride (PF) 0.9% PF flush 3 mL, 3 mL, Intracatheter, Q8H, Cierra Antoine MD, 3 mL at 07/27/20 1749     thiamine (B-1) tablet 100 mg, 100 mg, Oral, Daily, Brittany Quintero PA, 100 mg at 07/28/20 0827  No current outpatient prescriptions on file.   Radha Peralta, PharmD, [facility]

## 2020-07-28 NOTE — PLAN OF CARE
Patient is alert and oriented x4. Complains of generalized pain which is chronic pain per pt report and also complains of L residual limb pain. PRN oxy given x2 with some relief per patient report. Ax2 stand pivot, WC based. Also utilized liko lift x1 this shift for a transfer when pt was too fatigued. Continent of bladder using BSC, no BM this shift. 1 PVR obtained= 42. Tolerating diet well with good appetite, AC BG= 89. Murali crackers and orange juice given. Dressing to L BKA done this shift as well as dressings to RLE. Lymphedema wraps on RLE. Redness blanchable with open area on L buttock noted, mepilex applied. Sticky note left for MD to assess as well as WOC consult placed. PCD in place on RLE at HS. Weight done may be inaccurate and will re-assess in the morning. Bed alarm on for safety, call light within reach. Ok to continue with care plan.

## 2020-07-28 NOTE — CONSULTS
20 1200   Living Arrangements   Lives With spouse   Living Arrangements house   Able to Return to Prior Arrangements other (see comments)   Living Arrangement Comments Will d/c to her place of work where a room is set up and handicap accessible   Home Safety   Patient Feels Safe Living in Home? yes   Discharge Planning   Patient/Family Anticipates Transition to home with family   Disposition Comments See above for D/C location    Concerns to be Addressed no discharge needs identified;denies needs/concerns at this time   Plan   Plan Home with [facility] pending progress   Patient/Family in Agreement with Plan yes   Discharge Needs Assessment   Transportation Anticipated family or friend will provide       Social Work: Initial Assessment with Discharge Plan    Patient Name: Tiffani Tan  : 1960  Age: 60 year old  MRN: [id number]  Completed assessment with: Chart review and pt interview   Admitted to ARU: 2020    Presenting Information   Date of SW assessment: 2020  Health Care Directive: Health Care Directive Agent (if patient not able to make decisions)  Primary Health Care Agent: Patient/self   Secondary Health Care Agent: Pt , LIANEK   Living Situation: Lives with her  and 2 dogs in a home. PTA pt has been staying at her place of business (turned into a bedroom) which is 0 stairs to navigate and 0 SHELDON, and plan is to discharge here after rehab. Work space has a handicap toilet with a walk-in shower and built in seat. Her home has 10 steps which she says she does once per week. Higher toilet at home. Tub/shower at home with shower chair.    Previous Functional Status: PTA-was able to ambulate short distances with a walker but used an electric wheelchair or manual wheelchair pushed by her  for longer or community distances. Needed help with getting stuff out of pantry and cooking,  was assisting. Managing own medications and finances. Denied and falls at home.   DME  "available: 4WW, 2 electrical w/c, 2 manual w/c, knee scooter and raised toilet seat, reacher   Patient and family understanding of hospitalization: appropriate, appears A&Ox4 and pleasant.   Cultural/Language/Spiritual Considerations: 61 y/o   woman, English-speaking. No Jehovah's witness on face sheet, \"undesginated\".       Physical Health  Reason for admission: 60-year-old woman who is now status post left transtibial amputation due to Charcot arthropathy.  She has a past medical history of hypertension, dyslipidemia, obesity, diabetes type II, CKD, neuropathy of all extremities, chronic venous stasis and ulcers, and chronic pain for which she follows a pain management specialist and is on Norco 4 times per day at baseline.      Provider Information   Primary Care Physician:[facility] [address], [place]   PH: [phone]  : None reported    Mental Health/Chemical Dependency:   Diagnosis: Pt reported \"anxiety once in a while in the middle of the night and thinking about it all\". Would get up in the middle of the night and get fresh air outside.   Alcohol/Tobacco/Narcotis: smokes ~ 1/2 ppd trying to quit, drinks 2-4 mixed drinks daily (vodka and lemon water or OJ).   Support/Services in Place: None reported   Services Needed/Recommended: Health psych consulted. Spiritual care available as well.   Sexuality/Intimacy: Not discussed     Support System  Marital Status: , runs a business with .  3 years but known each other since 1999. Was previously coworkers.  well and able to physically assist at time of discharge. Will stay with pt in the work space at discharge. \"It won't be that much different, he always had done everything for me\".   Family support: 4 adult children, 3 live local.   Other support available: Friends and coworkers     Community Resources  Current in home services: None reported   Previous services: None " "reported     Financial/Employment/Education  Employment Status: business owner with - [employer]   Income Source: Salary/wages   Education: Not discussed   Financial Concerns:  None reported   Insurance: [employer] [place]      Discharge Plan   Patient and family discharge goal: \"Home\" aka work space that has been converted into a handicap bedroom/space. HHC anticipated.   Provided Education on discharge plan: YES  Patient agreeable to discharge plan:  YES  Provided education and attained signature for Medicare IM and IRF Patient Rights and Privacy Information provided to patient : IRF given in welcome packet. IMM-N/A.   Provided patient with [place] [facility] [place] Resources: N/A   Barriers to discharge: None identified at this time     Discharge Recommendations   Disposition: \"Home\" aka work space that has been converted into a handicap bedroom/space. HHC anticipated.   Transportation Needs: Family   Name of Transportation Company and Phone: N/A    Additional comments   Per H&P--Goals will be for modified independence at a wheelchair level for mobility and ADLs.  Has PT and OT needs. ELOS 10-14 days. Pt shared that she is motivated and hoping to discharge home sooner than 14 days. Pt stated that she has good support. Pt reported waking up in the middle of the night and difficulty sleeping due to anxiety. Discussed amputation resources and support, information added to pt AVS for reference. Pt appreciative, denied any additional needs or concerns at this time. Discussed team rounds and HHC vs OP. Will follow.     Please invite to Care Conference:  Pt        Yoselin Argueta, LICSANDY, [facility] Acute Rehab Unit   Phone: [phone]  I   Pager: [phone]          "

## 2020-07-28 NOTE — PLAN OF CARE
Discharge Planner PT   Patient plan for discharge: discharge to converted office/business apartment with   Current status: Evaluation completed and treatment initiated. Pt SBA for bed mobility with railing, Raina for transfers with FWW.  Pt hopped 5 feet in // bars with Raina and wc follow. Current L BKA brace is too small/tight for pt. Need to determine if DARCO shoe is needed for R foot/1st met head wound.   Barriers to return to prior living situation: weakness, unsteady balance, stairs  Recommendations for discharge: home to business/office apartment with  and home PT  Rationale for recommendations: pt unable to complete stairs therefore unable to discharge to her house therefore discharging to pt's business/office apartment.   Recommend 14-16 days for ELOS       Entered by: Josiah Moore 07/28/2020 5:02 PM

## 2020-07-28 NOTE — PROGRESS NOTES
"  [facility]   Acute Rehabilitation Unit  Daily progress note        interval history  Tiffani Tan was seen and examined at bedside.  Reports sleep was interrupted by nursing, no new sob,dizziness, fevers, ongoing chronic pain. We discussed pain regimen today- changes for today- oxycodone to q 4 prn, tylenol to scheduled tid, robaxin to prn.  Incision and right medial foot wound visualized today.   Undergoing therapy evaluations.     medications    acetaminophen  650 mg Oral TID     gabapentin  1,200 mg Oral TID     hydrochlorothiazide  25 mg Oral Daily     nortriptyline  10 mg Oral At Bedtime     pantoprazole  40 mg Oral QAM AC     senna-docusate  1 tablet Oral At Bedtime     simvastatin  40 mg Oral At Bedtime     sodium chloride (PF)  3 mL Intracatheter Q8H     thiamine  100 mg Oral Daily        acetaminophen, hydrOXYzine, methocarbamol, naloxone, nicotine, oxyCODONE, polyethylene glycol, sodium chloride (PF)     physical exam  BP (!) 143/54 (BP Location: Right arm)   Pulse 83   Temp 95.8  F (35.4  C) (Oral)   Resp 20   Ht 1.676 m (5' 6\")   Wt 121.6 kg (268 lb)   SpO2 98%   BMI 43.26 kg/m    Gen: awake alert  HEENT: mucus membranes dry  Cardio: rrr  Pulm: non labored clear diminished on room air  Abd: obese distended non tender  Ext: bl edema lymphedema wrap on right le.   Neuro/MSK: alert nad  Skin:     labs  Lab Results   Component Value Date    WBC 7.3 07/27/2020     Lab Results   Component Value Date    RBC 3.22 07/27/2020     Lab Results   Component Value Date    HGB 7.4 07/27/2020     Lab Results   Component Value Date    HCT 26.6 07/27/2020     Lab Results   Component Value Date    MCV 83 07/27/2020     Lab Results   Component Value Date    MCH 23.0 07/27/2020     Lab Results   Component Value Date    MCHC 27.8 07/27/2020     Lab Results   Component Value Date    RDW 21.0 07/27/2020     Lab Results   Component Value Date     07/27/2020     Last " Comprehensive Metabolic Panel:  Sodium   Date Value Ref Range Status   07/27/2020 140 133 - 144 mmol/L Final     Potassium   Date Value Ref Range Status   07/27/2020 4.9 3.4 - 5.3 mmol/L Final     Chloride   Date Value Ref Range Status   07/27/2020 110 (H) 94 - 109 mmol/L Final     Carbon Dioxide   Date Value Ref Range Status   07/27/2020 28 20 - 32 mmol/L Final     Anion Gap   Date Value Ref Range Status   07/27/2020 2 (L) 3 - 14 mmol/L Final     Glucose   Date Value Ref Range Status   07/27/2020 97 70 - 99 mg/dL Final     Urea Nitrogen   Date Value Ref Range Status   07/27/2020 18 7 - 30 mg/dL Final     Creatinine   Date Value Ref Range Status   07/27/2020 0.94 0.52 - 1.04 mg/dL Final     GFR Estimate   Date Value Ref Range Status   07/27/2020 65 >60 mL/min/[1.73_m2] Final     Comment:     Non  GFR Calc  Starting 12/18/2018, serum creatinine based estimated GFR (eGFR) will be   calculated using the Chronic Kidney Disease Epidemiology Collaboration   (CKD-EPI) equation.       Calcium   Date Value Ref Range Status   07/27/2020 8.1 (L) 8.5 - 10.1 mg/dL Final         Rehabilitation - continue comprehensive acute inpatient rehabilitation program with multidisciplinary approach including therapies, rehab nursing, and physiatry following. See interval history for updates.      assessment and plan    Tiffani dela cruz is a 60 year old woman with a past medical history of HTN, HLD, obesity, type II diabetes, CKD, venous stasis changes in BLEs, chronic neuropathy and pain, and left Charcot foot deformity, who is now s/p left BKA on 7/22/2020.  Her post-operative course has been complicated by  hypotension, hyperkalemia, acute blood loss anemia requiring PRBC transfusion, and LIGIA. Admitted for ongoing rehabilitation and medical management on 7/27.     # Charcot arthropathy S/P left BKA 07/22  MELISSA NOVOA, eddy when out of bed  Bid wound care as ordered  F/u Dr. Adams in 2 weeks.   -pain management:  oxycodone 5-10 mg q  4 hours prn ( start taper) , gabapentin continue pta 1200 mg tid, robaxin prn, tylenol tid & prn.   -continue PT/OTStarted nortriptyline 10 mg 7/27- up titrate q 3 days as tolerated.     #acute blood loss anemia with history of chronic anemia- pre operative hemoglobin ~9.2  received transfusion 7/25.  Hgb 7.4 7/27.   -trend  -transfuse hgb <7 & symptomatic     # history of HTN   #Post operative hypotension  pta on hydrochlorothiazide 25 mg daily, losartan 100 mg daily  -resumed hydrochlorothiazide 7/26- continue and monitor  -restart losartan 25 mg daily- titrate as indicated.  -monitor BP     # Type 2 diabetes, hemoglobin A1c 5.5%  On glimepiride 2 mg once a day. with hypoglycemia during hospitalization  -monitor bg bid  -continue to hold glimepiride for now.      # LIGIA on Chronic kidney disease baseline creatinine 1.1-1.9, LIGIA felt to be from operative volume loss improved with IV Hydration. Preoperative 0.82 7/22- Cr peak 2.22 7/24. With hyperkalemia K peaked 6.8 7/24.  Now Cr. Stable 0.94 7/27. K 4.9  -trend     # Chronic ble Neuropathy in the feet: on 1200 mg of gabapentin 3 times daily and takes norco (7.5/325 up to 4 pills daily prn)  at home as well  -continue gabapentin  - nortriptyline 10 mg at bedtime started 7/27-   -oxy as above.     #Dyslipidemia- continue pta zocor 40 mg daily     # PUD- History of GI bleed from gastric ulcers, status post EGD in February 2020 which showed 3 gastric ulcers.  She had some esophagitis. She was noted to have some portal hypertensive gastropathy as well.  She is being treated with Protonix 40 mg daily.  She is supposed to have follow-up EGD in the future.  -Continue Protonix, follow-up with GI as outpatient.     # Chronic Venous stasis- of ble with reported recent worsening of acute edema.   - consult Lymphedema       # Tobacco use disorder: Smokes ~ half pack per day.  Encourage cessation  -would like to have nicotine gum     # Alcohol use disorder  # Portal hypertensive  gastropathy: reports 2-4 drinks of vodka daily.  February 2020  ultrasound of the abdomen with duplex- showed hepatomegaly with echogenic, heterogenous liver parenchyma and mildly thickened gallbladder wall most compatible with chronic intrinsic parenchymal disease.  On the EGD she had portal hypertensive gastropathy in February 2020.   -encourage cessation  -f/u outpatient pcp vs gi referral      1. Adjustment to disability: psychology consult   2. FEN: reg  3. Bowel: continent  4. Bladder: continent  5. DVT Prophylaxis: mechanical   6. GI Prophylaxis: ppi  7. Code: full  8. Disposition: home   9. ELOS: 14 days  10. Follow up Appointments on Discharge: ortho, pcp, neurology      discussed with Dr. Antoine  PM&R Staff Physician    Brittany Quintero PA-C  Physical Medicine & Rehabilitation   Pager: [phone]

## 2020-07-28 NOTE — PLAN OF CARE
Discharge Planner OT   Patient plan for discharge: Home (handicap accessible room at her place of business) with assist  Current status: BEST Assist of 2 for transfers, max A for LB dressing, total assist for toileting, set up for UB cares. WC based.   Barriers to return to prior living situation: level of assist and mobility status, will need to do family training closer to discharge  Recommendations for discharge: Recommend assist with ADL pending progress. Pt is early in her stay but does plan on going back to her place of business rather than her home which has many stairs. Will need to do family training closer to discharge. MI 2 weeks  Rationale for recommendations: pt presentation       Entered by: Lillie Roberson 07/28/2020 2:22 PM

## 2020-07-28 NOTE — PROGRESS NOTES
Received call from joie Sr CM PH: [phone] offering assistance with discharge planning. Update provided. Will continue to update as needed.     NAVARRO Hannah, [facility] Acute Rehab Unit   Phone: [phone]  I   Pager: [phone]

## 2020-07-28 NOTE — CONSULTS
[facility] Nurse Inpatient Wound Assessment   Reason for consultation: Evaluate and treat right lateral lower leg and right 1st met head wounds  New consult 7/28 for perianal wound    Assessment  Right lateral lower leg wounds due to Skin Tear  Status:stable    Right 1st met head wounds due to Diabetic Ulcer  Status: stable     Perianal wound: unknown etiology ( possibly Moisture associated dermatitis)  Status  Initial assessment   Treatment Plan  Right lateral lower leg and right 1st met head wound: Daily : wash leg and foot with soap, water and a wash cloth taking care to cleanse between toes. Dry thoroughly. Moisturize intact skin with Sween 24 or Aquaphor. Apply Adaptic to right lateral lower leg wound and cover with Optifoam. Apply Aquacel Ag to right 1st met head wound and cover with 2x2 gauze or Primapore. Wrap legs per lymphedema therapy orders.      Perianal wound:  Daily cleanse with saline, paint area with No sting and allow to dry , no dressings due to inability to keep moisture away form area with bedpan use and close proximety to anus      Orders Written  Recommended provider order: None, at this time  [facility] Nurse follow-up plan:weekly  Nursing to notify the Provider(s) and re-consult the [facility] Nurse if wound(s) deteriorates or new skin concern.    Patient History  According to provider note(s):  Tiffani Tan is a 60 year old female with a past medical history of hypertension, dyslipidemia, obesity, type 2 diabetes, chronic kidney disease, neuropathy in the feet, GI bleed from peptic ulcer disease, venous stasis changes in the bilateral lower extremity, Charcot foot deformity left foot.  Patient was brought in for left below-knee amputation for charcot foot arthropathy on 7/22/2020.      Objective Data  Containment of urine/stool: Continent of bladder and Continent of bowel    Active Diet Order  Orders Placed This Encounter      Combination Diet Regular Diet Adult      Output:   I/O last 3 completed shifts:  In: -    Out: 250 [Urine:250]    Risk Assessment:   Sensory Perception: 3-->slightly limited  Moisture: 4-->rarely moist  Activity: 3-->walks occasionally  Mobility: 3-->slightly limited  Nutrition: 3-->adequate  Friction and Shear: 2-->potential problem  Stewart Score: 18                          Labs:   Recent Labs   Lab 07/27/20  0546  07/22/20  0546 07/21/20  1245   ALBUMIN  --   --  2.8*  --    HGB 7.4*   < >  --   --    INR  --   --   --  1.29*   WBC 7.3   < >  --   --    A1C  --   --   --  5.5    < > = values in this interval not displayed.       Physical Exam  Areas of skin assessed: focused right lower leg and foot        Wound Location:  Right lateral lower leg  Date of last photo: 7/28  Wound History: Per patient, skin tear happened upon removal of Parul boot upon presentation for surgery.  Wound Base: 100 % dermis     Palpation of the wound bed: normal      Drainage: scant     Description of drainage: serous     Measurements (length x width x depth, in cm) 3 separate areas of epidermis loss (0.8 to 2.0cm each)    Periwound skin:moist      Color: pink      Temperature: normal   Odor: none  Pain: denies   Pain intervention prior to dressing change: none     Wound Location:  Right 1st met head        Date of last photo: 7/28  Wound History: Present on admission; per patient has been present x 2 years and she is followed at an outpatient clininc  Wound Base: red viable and adherent fibrin     Palpation of the wound bed: normal      Drainage: scant     Description of drainage: serosanguinous     Measurements (length x width x depth, in cm) 3  x 2  x  0.3 cm      Tunneling N/A     Undermining N/A  Periwound skin: hyperkeratosis      Color: pale      Temperature: normal   Odor: none  Pain: denies   Pain intervention prior to dressing change: none      Wound Location:  Perianal skin      Date of last photo: 7/28  Wound History: unknown, presently uses bedpan and occasionally this does cause moisture to area  Wound Base:  100 % dermis and forming this layer of new epidermis     Palpation of the wound bed: normal      Drainage:none     Description of drainage: NA     Measurements (length x width x depth, in cm)1.5 x 1.5 x <0.1cm  Periwound skin: tan  Odor: none  Pain: denies   Pain intervention prior to dressing change: none   Interventions  Visual inspection and assessment completed   Wound Care Rationale Promote moist wound healing without tissue dehydration , protect skin form incontinence  Wound Care: done per plan of care  Supplies: floor stock  Current off-loading measures: Foam padding  Current support surface: Standard  Atmos Air mattress  Education provided to: plan of care  Discussed plan of care with Patient and Nurse    MADELIN DUPREE RN, CWON

## 2020-07-29 ENCOUNTER — APPOINTMENT (OUTPATIENT)
Dept: PHYSICAL THERAPY | Facility: CLINIC | Age: 60
End: 2020-07-29
Payer: COMMERCIAL

## 2020-07-29 ENCOUNTER — APPOINTMENT (OUTPATIENT)
Dept: OCCUPATIONAL THERAPY | Facility: CLINIC | Age: 60
End: 2020-07-29
Payer: COMMERCIAL

## 2020-07-29 ENCOUNTER — DOCUMENTATION ONLY (OUTPATIENT)
Dept: ORTHOPEDICS | Facility: CLINIC | Age: 60
End: 2020-07-29

## 2020-07-29 LAB
ANION GAP SERPL CALCULATED.3IONS-SCNC: 4 MMOL/L (ref 3–14)
BUN SERPL-MCNC: 13 MG/DL (ref 7–30)
CALCIUM SERPL-MCNC: 8.5 MG/DL (ref 8.5–10.1)
CHLORIDE SERPL-SCNC: 108 MMOL/L (ref 94–109)
CO2 SERPL-SCNC: 28 MMOL/L (ref 20–32)
CREAT SERPL-MCNC: 0.88 MG/DL (ref 0.52–1.04)
ERYTHROCYTE [DISTWIDTH] IN BLOOD BY AUTOMATED COUNT: 20.6 % (ref 10–15)
GFR SERPL CREATININE-BSD FRML MDRD: 70 ML/MIN/{1.73_M2}
GLUCOSE BLDC GLUCOMTR-MCNC: 104 MG/DL (ref 70–99)
GLUCOSE BLDC GLUCOMTR-MCNC: 110 MG/DL (ref 70–99)
GLUCOSE BLDC GLUCOMTR-MCNC: 114 MG/DL (ref 70–99)
GLUCOSE SERPL-MCNC: 109 MG/DL (ref 70–99)
HCT VFR BLD AUTO: 27.6 % (ref 35–47)
HGB BLD-MCNC: 7.8 G/DL (ref 11.7–15.7)
MCH RBC QN AUTO: 22.8 PG (ref 26.5–33)
MCHC RBC AUTO-ENTMCNC: 28.3 G/DL (ref 31.5–36.5)
MCV RBC AUTO: 81 FL (ref 78–100)
PLATELET # BLD AUTO: 164 10E9/L (ref 150–450)
POTASSIUM SERPL-SCNC: 4.6 MMOL/L (ref 3.4–5.3)
RBC # BLD AUTO: 3.42 10E12/L (ref 3.8–5.2)
SODIUM SERPL-SCNC: 140 MMOL/L (ref 133–144)
WBC # BLD AUTO: 6.3 10E9/L (ref 4–11)

## 2020-07-29 PROCEDURE — 85027 COMPLETE CBC AUTOMATED: CPT | Performed by: PHYSICIAN ASSISTANT

## 2020-07-29 PROCEDURE — 97110 THERAPEUTIC EXERCISES: CPT | Mod: GP

## 2020-07-29 PROCEDURE — 97535 SELF CARE MNGMENT TRAINING: CPT | Mod: GO

## 2020-07-29 PROCEDURE — 36415 COLL VENOUS BLD VENIPUNCTURE: CPT | Performed by: PHYSICIAN ASSISTANT

## 2020-07-29 PROCEDURE — 25000132 ZZH RX MED GY IP 250 OP 250 PS 637: Performed by: PHYSICIAN ASSISTANT

## 2020-07-29 PROCEDURE — 97530 THERAPEUTIC ACTIVITIES: CPT | Mod: GP

## 2020-07-29 PROCEDURE — 12800006 ZZH R&B REHAB

## 2020-07-29 PROCEDURE — 97110 THERAPEUTIC EXERCISES: CPT | Mod: GO | Performed by: OCCUPATIONAL THERAPIST

## 2020-07-29 PROCEDURE — 97116 GAIT TRAINING THERAPY: CPT | Mod: GP

## 2020-07-29 PROCEDURE — 80048 BASIC METABOLIC PNL TOTAL CA: CPT | Performed by: PHYSICIAN ASSISTANT

## 2020-07-29 PROCEDURE — 00000146 ZZHCL STATISTIC GLUCOSE BY METER IP

## 2020-07-29 RX ORDER — LOSARTAN POTASSIUM 25 MG/1
25 TABLET ORAL ONCE
Status: COMPLETED | OUTPATIENT
Start: 2020-07-29 | End: 2020-07-29

## 2020-07-29 RX ORDER — LOSARTAN POTASSIUM 50 MG/1
50 TABLET ORAL DAILY
Status: DISCONTINUED | OUTPATIENT
Start: 2020-07-30 | End: 2020-07-30

## 2020-07-29 RX ADMIN — DOCUSATE SODIUM 50 MG AND SENNOSIDES 8.6 MG 1 TABLET: 8.6; 5 TABLET, FILM COATED ORAL at 21:03

## 2020-07-29 RX ADMIN — HYDROCHLOROTHIAZIDE 25 MG: 25 TABLET ORAL at 07:40

## 2020-07-29 RX ADMIN — PANTOPRAZOLE SODIUM 40 MG: 40 TABLET, DELAYED RELEASE ORAL at 06:37

## 2020-07-29 RX ADMIN — OXYCODONE HYDROCHLORIDE 10 MG: 5 TABLET ORAL at 21:07

## 2020-07-29 RX ADMIN — ACETAMINOPHEN 650 MG: 325 TABLET, FILM COATED ORAL at 07:40

## 2020-07-29 RX ADMIN — METHOCARBAMOL 750 MG: 750 TABLET, FILM COATED ORAL at 07:39

## 2020-07-29 RX ADMIN — LOSARTAN POTASSIUM 25 MG: 25 TABLET ORAL at 07:40

## 2020-07-29 RX ADMIN — GABAPENTIN 1200 MG: 600 TABLET, FILM COATED ORAL at 21:04

## 2020-07-29 RX ADMIN — NORTRIPTYLINE HYDROCHLORIDE 10 MG: 10 CAPSULE ORAL at 21:03

## 2020-07-29 RX ADMIN — ACETAMINOPHEN 650 MG: 325 TABLET, FILM COATED ORAL at 16:07

## 2020-07-29 RX ADMIN — GABAPENTIN 1200 MG: 600 TABLET, FILM COATED ORAL at 07:40

## 2020-07-29 RX ADMIN — LOSARTAN POTASSIUM 25 MG: 25 TABLET, FILM COATED ORAL at 09:07

## 2020-07-29 RX ADMIN — OXYCODONE HYDROCHLORIDE 10 MG: 5 TABLET ORAL at 12:19

## 2020-07-29 RX ADMIN — OXYCODONE HYDROCHLORIDE 10 MG: 5 TABLET ORAL at 04:46

## 2020-07-29 RX ADMIN — ACETAMINOPHEN 650 MG: 325 TABLET, FILM COATED ORAL at 14:02

## 2020-07-29 RX ADMIN — SIMVASTATIN 40 MG: 20 TABLET, FILM COATED ORAL at 21:04

## 2020-07-29 RX ADMIN — THIAMINE HCL TAB 100 MG 100 MG: 100 TAB at 07:40

## 2020-07-29 RX ADMIN — GABAPENTIN 1200 MG: 600 TABLET, FILM COATED ORAL at 14:02

## 2020-07-29 RX ADMIN — ACETAMINOPHEN 650 MG: 325 TABLET, FILM COATED ORAL at 21:05

## 2020-07-29 ASSESSMENT — MIFFLIN-ST. JEOR: SCORE: 1774.71

## 2020-07-29 NOTE — PROGRESS NOTES
"  [facility]   Acute Rehabilitation Unit  Daily progress note        interval history  Tiffani Tan was seen upon completion of shower with OT, min assist with sit to stand transfer feeling good after shower, had better sleep though quite uncomfortable in bed, pursuing foam mattress overlay as she found this more comfortable at hospital.  Some incisional pain, mentions area of dehiscence wondering if anything will be done notes present since surgery and unchanged with decreased drainage will continue to monitor discussed signs and symptoms of infection and will continue to monitor at this time.     medications    acetaminophen  650 mg Oral TID     gabapentin  1,200 mg Oral TID     hydrochlorothiazide  25 mg Oral Daily     [START ON 7/30/2020] losartan  50 mg Oral Daily     nortriptyline  10 mg Oral At Bedtime     pantoprazole  40 mg Oral QAM AC     senna-docusate  1 tablet Oral At Bedtime     simvastatin  40 mg Oral At Bedtime     thiamine  100 mg Oral Daily        acetaminophen, hydrOXYzine, methocarbamol, naloxone, nicotine, oxyCODONE, polyethylene glycol, sodium chloride (PF)     physical exam  BP (!) 157/72 (BP Location: Right arm)   Pulse 88   Temp 97.3  F (36.3  C) (Oral)   Resp 19   Ht 1.676 m (5' 6\")   Wt 119.3 kg (263 lb)   SpO2 96%   BMI 42.45 kg/m    Gen: awake alert  HEENT: mucus membranes dry  Cardio: rrr  Pulm: non labored clear diminished on room air  Abd: obese distended non tender  Ext: bl edema lymphedema wrap on right le.   Neuro/MSK: alert nad up with walker with min assist.     labs  Lab Results   Component Value Date    WBC 7.3 07/27/2020     Lab Results   Component Value Date    RBC 3.22 07/27/2020     Lab Results   Component Value Date    HGB 7.4 07/27/2020     Lab Results   Component Value Date    HCT 26.6 07/27/2020     Lab Results   Component Value Date    MCV 83 07/27/2020     Lab Results   Component Value Date    MCH 23.0 07/27/2020     Lab " Results   Component Value Date    MCHC 27.8 07/27/2020     Lab Results   Component Value Date    RDW 21.0 07/27/2020     Lab Results   Component Value Date     07/27/2020     Last Comprehensive Metabolic Panel:  Sodium   Date Value Ref Range Status   07/29/2020 140 133 - 144 mmol/L Final     Potassium   Date Value Ref Range Status   07/29/2020 4.6 3.4 - 5.3 mmol/L Final     Chloride   Date Value Ref Range Status   07/29/2020 108 94 - 109 mmol/L Final     Carbon Dioxide   Date Value Ref Range Status   07/29/2020 28 20 - 32 mmol/L Final     Anion Gap   Date Value Ref Range Status   07/29/2020 4 3 - 14 mmol/L Final     Glucose   Date Value Ref Range Status   07/29/2020 109 (H) 70 - 99 mg/dL Final     Urea Nitrogen   Date Value Ref Range Status   07/29/2020 13 7 - 30 mg/dL Final     Creatinine   Date Value Ref Range Status   07/29/2020 0.88 0.52 - 1.04 mg/dL Final     GFR Estimate   Date Value Ref Range Status   07/29/2020 70 >60 mL/min/[1.73_m2] Final     Comment:     Non  GFR Calc  Starting 12/18/2018, serum creatinine based estimated GFR (eGFR) will be   calculated using the Chronic Kidney Disease Epidemiology Collaboration   (CKD-EPI) equation.       Calcium   Date Value Ref Range Status   07/29/2020 8.5 8.5 - 10.1 mg/dL Final         Rehabilitation - continue comprehensive acute inpatient rehabilitation program with multidisciplinary approach including therapies, rehab nursing, and physiatry following. See interval history for updates.      assessment and plan    Tiffani dela cruz is a 60 year old woman with a past medical history of HTN, HLD, obesity, type II diabetes, CKD, venous stasis changes in BLEs, chronic neuropathy and pain, and left Charcot foot deformity, who is now s/p left BKA on 7/22/2020.  Her post-operative course has been complicated by  hypotension, hyperkalemia, acute blood loss anemia requiring PRBC transfusion, and LIGIA. Admitted for ongoing rehabilitation and medical management  on 7/27.     # Charcot arthropathy S/P left BKA 07/22  NWB LLE, flotech when out of bed  Bid wound care as ordered  F/u Dr. Adams in 2 weeks.   -pain management:  oxycodone 5-10 mg q 4 hours prn ( start taper) , gabapentin continue pta 1200 mg tid, robaxin prn, tylenol tid & prn.   -continue PT/OTStarted nortriptyline 10 mg 7/27- up titrate q 3 days as tolerated.     #acute blood loss anemia with history of chronic anemia- pre operative hemoglobin ~9.2  received transfusion 7/25.  Hgb 7.8 7/29 stable.   -trend  -transfuse hgb <7 & symptomatic     # history of HTN   #Post operative hypotension  pta on hydrochlorothiazide 25 mg daily, losartan 100 mg daily  -resumed hydrochlorothiazide 7/26- continue and monitor  -restart losartan 25 mg daily 7/28 increase to 50 mg daily 7/29 if bp remains elevated increase to home dose 7/30  -monitor BP     # Type 2 diabetes, hemoglobin A1c 5.5%  On glimepiride 2 mg once a day PtA. with hypoglycemia during hospitalization. Checks BG daily at home in afternoon glucose ~140  -monitor bg bid  -continue to hold glimepiride for now.      # LIGIA on Chronic kidney disease baseline creatinine 1.1-1.9, LIGIA felt to be from operative volume loss improved with IV Hydration. Preoperative 0.82 7/22- Cr peak 2.22 7/24. With hyperkalemia K peaked 6.8 7/24.  Now Cr. Stable 0.88 7/29. K 4.6  -trend     # Chronic ble Neuropathy in the feet: on 1200 mg of gabapentin 3 times daily and takes norco (7.5/325 up to 4 pills daily prn)  at home as well  -continue gabapentin  - nortriptyline 10 mg at bedtime started 7/27-   -oxy as above.     #Dyslipidemia- continue pta zocor 40 mg daily     # PUD- History of GI bleed from gastric ulcers, status post EGD in February 2020 which showed 3 gastric ulcers.  She had some esophagitis. She was noted to have some portal hypertensive gastropathy as well.  She is being treated with Protonix 40 mg daily.  She is supposed to have follow-up EGD in the future.  -Continue  Protonix, follow-up with GI as outpatient.     # Chronic Venous stasis- of ble with reported recent worsening of acute edema.   - consult Lymphedema       # Tobacco use disorder: Smokes ~ half pack per day.  Encourage cessation  -would like to have nicotine gum     # Alcohol use disorder  # Portal hypertensive gastropathy: reports 2-4 drinks of vodka daily.  February 2020  ultrasound of the abdomen with duplex- showed hepatomegaly with echogenic, heterogenous liver parenchyma and mildly thickened gallbladder wall most compatible with chronic intrinsic parenchymal disease.  On the EGD she had portal hypertensive gastropathy in February 2020.   -encourage cessation  -f/u outpatient pcp vs gi referral      1. Adjustment to disability: psychology consult   2. FEN: reg  3. Bowel: continent  4. Bladder: continent  5. DVT Prophylaxis: mechanical - paged ortho 7/29 awaiting response.   6. GI Prophylaxis: ppi  7. Code: full  8. Disposition: home   9. ELOS: 14 days  10. Follow up Appointments on Discharge: ortho, pcp, neurology      discussed with Dr. Antoine  PM&R Staff Physician    Brittany Quintero PA-C  Physical Medicine & Rehabilitation   Pager: [phone]

## 2020-07-29 NOTE — PROGRESS NOTES
"S: Pt seen in room 502, ARC for a larger RRD.  Pt expecting consult.      O: 59 y/o, 5' 6\", 220 lb pt sitting in chair.  Large dressing in place to L transtibial residuum. Medium length trans tibial residuum exhibited. Mx taken for L Lee-Tech RRD.  DE = 49 cm over dressing, 5 cm larger than previous measurement. Bandage to R LE.    A: Pt presents s/p L transtibial amputation 7/22/20. Pt HX of hypertension, dyslipidemia, obesity, type 2 diabetes, chronic kidney disease, neuropathy in the feet, GI bleed from peptic ulcer disease, venous stasis changes in the bilateral lower extremity, Charcot foot deformity left foot.    P: Fit/delivery of 1821-9-L Lee-Jluis RRD directly over dressing.  Larger RRD required to accommodate increased volume of residuum.  Pt was able to maintain relaxed extension throughout application.  Suspension belt applied w/ adjustments for waist and tension on RRD demonstrated. Protection of the limb, volume management, and preventing a knee flexion contracture reviewed.  Pt verbalizes understanding and satisfaction with fit, comfort, and function.   F/U prn.      "

## 2020-07-29 NOTE — DISCHARGE SUMMARY
Admit Date:     07/21/2020   Discharge Date:     07/27/2020      DISCHARGE SUMMARY      DATE OF ADMISSION:  07/21/2020      DATE OF DISCHARGE:  07/27/2020      DISCHARGE DIAGNOSIS:  Status post left below-the-knee amputation performed on 07/22/2020.        HISTORY OF PRESENT ILLNESS:  Mrs. Tan is a 60-year-old female who presents with a long history of Charcot deformity of the left lower extremity at the level of the ankle joint.  After failure of treating her foot deformity through bracing as well as prevention of ulcerations, it was concluded that she would benefit from undergoing a below-the-knee amputation.  We discussed with her the pros and cons of the surgery as well as the most likely postoperative course.      PAST MEDICAL HISTORY:  Significant for Charcot foot, type 2 diabetes, venous incompetency, venous stasis, GI bleed, morbid obesity, acute kidney failure.      PAST SURGICAL HISTORY:  Reviewed.      DRUG ALLERGIES:  None.      CURRENT MEDICATIONS:  Multiple.  Please refer to discharge nursing forms.      HOSPITAL COURSE:  The patient was admitted on 07/21/2020 and underwent placement of an epidural catheter in order to decrease the chances for phantom pain.  Unfortunately, the epidural catheter had to be replaced prior to surgery given the fact that it got pulled out.      On 07/22/2020 she underwent a procedure of a left below-the-knee amputation without any short-term complications.  The patient required a transfusion on 07/25/2020 given the fact that she presented with a hemoglobin of 6.7 on that date.  The patient was able to bounce up her hemoglobin to 7.4 on postop day #4.      Drains were pulled on postop day #3 given the fact that she had a very low output.  The patient proceeded also with dressing changes dry-to-dry given the amount of edema that she presents.      The patient was discharged to a transitional care unit on postop day #5.  The patient will proceed with an evaluation within 2  weeks for suture removal if indicated, and if any wound dehiscence is present, she will be evaluated by Dr. Giron.      Please refer to operative note plan for further details with regards to disposition of the patient.         ABIOLA LIGHT MD             D: 2020   T: 2020   MT: GUI      Name:     ZURDO ALEGRIA   MRN:      [id number]        Account:        [id number]   :      1960           Admit Date:     2020                                  Discharge Date: 2020      Document: [id number]

## 2020-07-29 NOTE — PLAN OF CARE
PTA:    Issued handouts and instructed pt in BKA HEP for L LE including:  Supine quad sets 15 x5 second holds, SLR x15-20, hip flexor stretch with 20 second hold, unilateral hip extension bridge x10 with 3 second hold, hamstring sets 15x5 second hold, isometric hip abduction/adduction 15x5 second hold, Sidelying hip abduction x15 with tactile cues to maintain alignment, prone hip extension x15, prone quad sets 15x5 seconds, prone knee flexion with assist to complete range x10, w/c push-ups 10x5 second holds.  Completed 2 sets of each exercise cues and instruction provided to achieve the most benefit.  Pt would benefit from continued education and progress with additional exercises as appropriate.

## 2020-07-29 NOTE — OP NOTE
Procedure Date: 07/22/2020      PREOPERATIVE DIAGNOSIS:  Left foot Charcot deformity.      POSTOPERATIVE DIAGNOSIS:  Left foot Charcot deformity.      PROCEDURE:  Left below-the-knee amputation.      SURGEON:  Aniceto Adams MD      ASSISTANT:  DIONISIO Sales Ms.'s assistance was required in order to provide help with positioning of the patient, the surgery itself, holding retractors, closure of the wound and application of immobilization devices.  At the time of surgery, there was no available help from an orthopedic trainee.      COMPLICATIONS:  None.      DRAINS:  None.      ESTIMATED BLOOD LOSS:  Less than 20 mL      ANESTHESIA:  Epidural catheter.      INDICATIONS:  Please refer to clinic notes for further details regarding indications for Ms. Tan's case.      DESCRIPTION OF PROCEDURE:  On 07/22/2020, patient was taken to surgery.  Preoperative antibiotics were administered to the patient prior to arrival to the OR.      After placement of a successful epidural catheter, the patient was placed supine on the operating table.  The left lower extremity was prepped and draped in a sterile fashion.  After exsanguination by gravity, the tourniquet cuff was inflated to 300 mmHg on the proximal third of the left thigh.      The pause for the cause was performed according to our institution's policy, which confirmed laterality of the procedure.      We proceeded then with a fishmouth incision along the upper third of the lower leg.  Subcutaneous tissues were dissected.  The patient presented with a large amount of edema and subcutaneous edema through the wound.  Eventually, we proceeded with identifying the tibia and we transected the tibia proximally 11 cm from the joint line, and slightly more proximal was performed a transection of the fibula.  With an amputation knife, we proceeded then with transection of the posterior structures, and the leg was placed onto the back table.      Through  careful dissection, we proceeded with ligation of the deep neurovascular bundles, paying special attention to transect the nerves under full tension and sharply with a #10 blade.      The ligation of the neurovascular bundle was quite complicated given the quality of tissues and how friable the venous structures were.      The tourniquet cuff was deflated.  Satisfactory hemostasis was accomplished.  We proceeded then with a blanca-tenodesis of the posterior fascia onto the anterior cortex of the tibia, which was done with #2 FiberWire suture material x2.      A deep drain was placed along the wound with a medial exit and a superficial drain along the lateral exit.  We proceeded then with closure of the wound.  Unfortunately, could not have a full closure of the medial aspect secondary to both edema as well as the poor quality of the tissues and given the fact that the sutures were ripping through.  Sterile dressings were applied.  The patient was placed in a posterior splint and transferred in stable condition to PACU.      PLAN:  The patient will remain nonweightbearing until satisfactory healing of the wound is present.  The patient will proceed with evaluation within 24-48 hours for wound management.  The drains will be pulled out after putting out less than 50 mL per 24 hours.      The patient will have an evaluation by PM&R in order to start making arrangements for fitting of a prosthesis.      The patient will require physical therapy for overall strengthening and management of the nonweightbearing status.      The patient will not require any plain x-rays during the postoperative course.      At the 2-week appointment, sutures will be removed if indicated.  In the presence of a wound dehiscence or poor healing of the wound, she will be evaluated by Dr. Giron for wound management and further recommendations.      The patient will be reevaluated at 6 weeks from surgery, and at that time, no x-rays will be  obtained.         ABIOLA LIGHT MD             D: 2020   T: 2020   MT: GUI      Name:     ZURDO ALEGRIA   MRN:      [id number]        Account:        [id number]   :      1960           Procedure Date: 2020      Document: [id number]

## 2020-07-29 NOTE — PLAN OF CARE
FOCUS/GOAL  Bowel management, Bladder management, Pain management, and Mobility    ASSESSMENT, INTERVENTIONS AND CONTINUING PLAN FOR GOAL:    Alert and oriented X 4 calling appropriately to verbalize needs. VSS.Complains of generalized pain PRN oxy given x1 scheduled tylenol with some relief per patient report. Patient also reported having dry eye and discomfort, warm wash cloth utilized with minimal relief. Continent of bowel and bladder using the BSC. LBM 07/27. New dressing  applied to L BKA done. Assist of 2 pivot transfer.  before dinner. Call light within reach. Continue to monitor.

## 2020-07-29 NOTE — PROGRESS NOTES
"VS: BP (!) 176/75 (BP Location: Right arm)   Pulse 94   Temp 96.8  F (36  C) (Oral)   Resp 16   Ht 1.676 m (5' 6\")   Wt 121.6 kg (268 lb)   SpO2 93%   BMI 43.26 kg/m   pt denies CP. N/V and SOB.    O2: RA <90   Output: Voiding adequate amount in bedpan without difficulty   Last BM: 07/27/2020. Continent.   Activity: AX 2 pivot transfer to bedside commode.   Skin: Intact. Ex stump on left and lymphedema on right leg. Back redness blanchable.   Pain: Manage with PRN Oxycodone.   CMS: Neuropathy baseline per patient report.   Dressing: CDI   Diet: Regular, thin liquid   LDA: None   Equipment:  personal belongings. Call light in reach.   Plan: Continue with plan of care.   Additional Info:       Alert and oriented, able to make needs known.  "

## 2020-07-29 NOTE — PLAN OF CARE
Denies headache or dizziness. C/o left BKA incision pain, oxycodone 10 mg given with good effect. Left BKA incision dressing changed, moderate amount of serosanguinous drainage noted. Most drainage seem to be coming from the right side of the incision were there is no sutures holding the skin together. Surrounding skin to incision is red, will continue POC

## 2020-07-30 LAB — GLUCOSE BLDC GLUCOMTR-MCNC: 95 MG/DL (ref 70–99)

## 2020-07-30 PROCEDURE — 97110 THERAPEUTIC EXERCISES: CPT | Mod: GP | Performed by: STUDENT IN AN ORGANIZED HEALTH CARE EDUCATION/TRAINING PROGRAM

## 2020-07-30 PROCEDURE — 97530 THERAPEUTIC ACTIVITIES: CPT | Mod: GP | Performed by: STUDENT IN AN ORGANIZED HEALTH CARE EDUCATION/TRAINING PROGRAM

## 2020-07-30 PROCEDURE — 97530 THERAPEUTIC ACTIVITIES: CPT | Mod: GO | Performed by: STUDENT IN AN ORGANIZED HEALTH CARE EDUCATION/TRAINING PROGRAM

## 2020-07-30 PROCEDURE — 97535 SELF CARE MNGMENT TRAINING: CPT | Mod: GO | Performed by: STUDENT IN AN ORGANIZED HEALTH CARE EDUCATION/TRAINING PROGRAM

## 2020-07-30 PROCEDURE — 25000132 ZZH RX MED GY IP 250 OP 250 PS 637: Performed by: PHYSICIAN ASSISTANT

## 2020-07-30 PROCEDURE — 12800006 ZZH R&B REHAB

## 2020-07-30 PROCEDURE — 97530 THERAPEUTIC ACTIVITIES: CPT | Mod: GP

## 2020-07-30 PROCEDURE — 00000146 ZZHCL STATISTIC GLUCOSE BY METER IP

## 2020-07-30 RX ORDER — LOSARTAN POTASSIUM 25 MG/1
25 TABLET ORAL ONCE
Status: COMPLETED | OUTPATIENT
Start: 2020-07-30 | End: 2020-07-30

## 2020-07-30 RX ORDER — LOSARTAN POTASSIUM 50 MG/1
100 TABLET ORAL DAILY
Status: DISCONTINUED | OUTPATIENT
Start: 2020-07-31 | End: 2020-08-04 | Stop reason: HOSPADM

## 2020-07-30 RX ORDER — NORTRIPTYLINE HCL 25 MG
25 CAPSULE ORAL AT BEDTIME
Status: DISCONTINUED | OUTPATIENT
Start: 2020-07-30 | End: 2020-08-04 | Stop reason: HOSPADM

## 2020-07-30 RX ADMIN — GABAPENTIN 1200 MG: 600 TABLET, FILM COATED ORAL at 07:49

## 2020-07-30 RX ADMIN — LOSARTAN POTASSIUM 25 MG: 25 TABLET ORAL at 08:59

## 2020-07-30 RX ADMIN — NORTRIPTYLINE HYDROCHLORIDE 25 MG: 25 CAPSULE ORAL at 21:34

## 2020-07-30 RX ADMIN — GABAPENTIN 1200 MG: 600 TABLET, FILM COATED ORAL at 20:19

## 2020-07-30 RX ADMIN — OXYCODONE HYDROCHLORIDE 10 MG: 5 TABLET ORAL at 21:34

## 2020-07-30 RX ADMIN — THIAMINE HCL TAB 100 MG 100 MG: 100 TAB at 07:49

## 2020-07-30 RX ADMIN — GABAPENTIN 1200 MG: 600 TABLET, FILM COATED ORAL at 13:29

## 2020-07-30 RX ADMIN — HYDROCHLOROTHIAZIDE 25 MG: 25 TABLET ORAL at 06:35

## 2020-07-30 RX ADMIN — SIMVASTATIN 40 MG: 20 TABLET, FILM COATED ORAL at 21:34

## 2020-07-30 RX ADMIN — ACETAMINOPHEN 650 MG: 325 TABLET, FILM COATED ORAL at 20:19

## 2020-07-30 RX ADMIN — LOSARTAN POTASSIUM 50 MG: 50 TABLET, FILM COATED ORAL at 06:36

## 2020-07-30 RX ADMIN — ACETAMINOPHEN 650 MG: 325 TABLET, FILM COATED ORAL at 13:29

## 2020-07-30 RX ADMIN — OXYCODONE HYDROCHLORIDE 10 MG: 5 TABLET ORAL at 06:27

## 2020-07-30 RX ADMIN — OXYCODONE HYDROCHLORIDE 10 MG: 5 TABLET ORAL at 12:19

## 2020-07-30 RX ADMIN — ACETAMINOPHEN 650 MG: 325 TABLET, FILM COATED ORAL at 06:28

## 2020-07-30 RX ADMIN — PANTOPRAZOLE SODIUM 40 MG: 40 TABLET, DELAYED RELEASE ORAL at 06:06

## 2020-07-30 RX ADMIN — DOCUSATE SODIUM 50 MG AND SENNOSIDES 8.6 MG 1 TABLET: 8.6; 5 TABLET, FILM COATED ORAL at 21:34

## 2020-07-30 ASSESSMENT — MIFFLIN-ST. JEOR: SCORE: 1775.16

## 2020-07-30 NOTE — PLAN OF CARE
"FOCUS/GOAL  Medical management    ASSESSMENT, INTERVENTIONS AND CONTINUING PLAN FOR GOAL:  Pt is alert and oriented. No complaints of pain stating \"I'm doing pretty good\". Continent of bladder. Pt requested to use bedpan overnight. Assist of 2 pivot. Pt frustrated with room change and bed change, but doing better now.     Pt /91 this AM. No PRN medications available. Declined symptoms initially, but when approached again, pt complained of a headache. On call and Dr. Freire agreed that course of action is to give scheduled BP meds early and recheck in about 1 hour. Nursing to monitor for any new symptoms or changes in condition.     "

## 2020-07-30 NOTE — PROGRESS NOTES
"  [facility]   Acute Rehabilitation Unit  Daily progress note        interval history  No acute events overnight but BPs remain elevated.  Discussed increasing Cozaar back up to her home dose of 100 mg daily.  Pain at this time is tolerable and she has been limiting her use of Opioids to 3x/day.  Nortryptiline may be having somewhat of a beneficial effect, but she is not sure.  Denies chest pain or shortness of breath.  Residual limb examined today, appears stable but Orthopedics will try to examine the limb in the next few days.      medications    acetaminophen  650 mg Oral TID     gabapentin  1,200 mg Oral TID     hydrochlorothiazide  25 mg Oral Daily     [START ON 7/31/2020] losartan  100 mg Oral Daily     nortriptyline  25 mg Oral At Bedtime     pantoprazole  40 mg Oral QAM AC     senna-docusate  1 tablet Oral At Bedtime     simvastatin  40 mg Oral At Bedtime     thiamine  100 mg Oral Daily        acetaminophen, hydrOXYzine, methocarbamol, naloxone, nicotine, oxyCODONE, polyethylene glycol, sodium chloride (PF)     physical exam  BP (!) 154/74   Pulse 92   Temp 96.2  F (35.7  C) (Oral)   Resp 18   Ht 1.676 m (5' 6\")   Wt 119.3 kg (263 lb 1.6 oz)   SpO2 90%   BMI 42.47 kg/m    Gen: awake alert  HEENT: mucus membranes dry  Cardio: rrr  Pulm: non labored clear diminished on room air  Abd: obese distended non tender  Ext: bl edema lymphedema wrap on right le.   L TTA: Incision close with sutures, drainage is improving.  +edema with erythema around the entire limb, looks to be more consistent with stasis (vs. Cellulitis) but will continue to monitor.  No areas of dehiscence.  Medial aspect has an area of superficial skin splitting but incision itself is intact  Neuro/MSK: alert nad up with walker with min assist.     labs  CBC RESULTS:   Recent Labs   Lab Test 07/29/20  0528   WBC 6.3   RBC 3.42*   HGB 7.8*   HCT 27.6*   MCV 81   MCH 22.8*   MCHC 28.3*   RDW 20.6*   PLT " 164       Last Comprehensive Metabolic Panel:  Sodium   Date Value Ref Range Status   07/29/2020 140 133 - 144 mmol/L Final     Potassium   Date Value Ref Range Status   07/29/2020 4.6 3.4 - 5.3 mmol/L Final     Chloride   Date Value Ref Range Status   07/29/2020 108 94 - 109 mmol/L Final     Carbon Dioxide   Date Value Ref Range Status   07/29/2020 28 20 - 32 mmol/L Final     Anion Gap   Date Value Ref Range Status   07/29/2020 4 3 - 14 mmol/L Final     Glucose   Date Value Ref Range Status   07/29/2020 109 (H) 70 - 99 mg/dL Final     Urea Nitrogen   Date Value Ref Range Status   07/29/2020 13 7 - 30 mg/dL Final     Creatinine   Date Value Ref Range Status   07/29/2020 0.88 0.52 - 1.04 mg/dL Final     GFR Estimate   Date Value Ref Range Status   07/29/2020 70 >60 mL/min/[1.73_m2] Final     Comment:     Non  GFR Calc  Starting 12/18/2018, serum creatinine based estimated GFR (eGFR) will be   calculated using the Chronic Kidney Disease Epidemiology Collaboration   (CKD-EPI) equation.       Calcium   Date Value Ref Range Status   07/29/2020 8.5 8.5 - 10.1 mg/dL Final         Rehabilitation - continue comprehensive acute inpatient rehabilitation program with multidisciplinary approach including therapies, rehab nursing, and physiatry following. See interval history for updates.      assessment and plan    Tiffani dela cruz is a 60 year old woman with a past medical history of HTN, HLD, obesity, type II diabetes, CKD, venous stasis changes in BLEs, chronic neuropathy and pain, and left Charcot foot deformity, who is now s/p left BKA on 7/22/2020.  Her post-operative course has been complicated by  hypotension, hyperkalemia, acute blood loss anemia requiring PRBC transfusion, and LIGIA. Admitted for ongoing rehabilitation and medical management on 7/27.     # Charcot arthropathy S/P left BKA 07/22  NWDONG NOVOA, eddy when out of bed  Bid wound care as ordered  F/u Dr. Adams in 2 weeks.   -pain management:  oxycodone  5-10 mg q 4 hours prn ( start taper) , gabapentin continue pta 1200 mg tid, robaxin prn, tylenol tid & prn.   -continue PT/OT  -No acute concerns with incision but pt anxious about appearance and orthopedic team will evaluate in the next few days.     #acute blood loss anemia with history of chronic anemia- pre operative hemoglobin ~9.2  received transfusion 7/25.  Hgb 7.8 7/29 stable.   -trend  -transfuse hgb <7 & symptomatic     # history of HTN   #Post operative hypotension  pta on hydrochlorothiazide 25 mg daily, losartan 100 mg daily  -resumed hydrochlorothiazide 7/26- continue and monitor  -Increase Losartan back to home dose of 100 mg daily  -monitor BP     # Type 2 diabetes, hemoglobin A1c 5.5%  On glimepiride 2 mg once a day PtA. with hypoglycemia during hospitalization. Checks BG daily at home in afternoon glucose ~140  -monitor bg bid  -continue to hold glimepiride for now.      # LIGIA on Chronic kidney disease baseline creatinine 1.1-1.9, LIGIA felt to be from operative volume loss improved with IV Hydration. Preoperative 0.82 7/22- Cr peak 2.22 7/24. With hyperkalemia K peaked 6.8 7/24.  Now Cr. Stable 0.88 7/29. K 4.6  -trend     # Chronic ble Neuropathy in the feet: on 1200 mg of gabapentin 3 times daily and takes norco (7.5/325 up to 4 pills daily prn)  at home as well  -continue gabapentin  -Started nortriptyline 10 mg 7/27, increase to 25 mg tonight  -oxy as above.     #Dyslipidemia- continue pta zocor 40 mg daily     # PUD- History of GI bleed from gastric ulcers, status post EGD in February 2020 which showed 3 gastric ulcers.  She had some esophagitis. She was noted to have some portal hypertensive gastropathy as well.  She is being treated with Protonix 40 mg daily.  She is supposed to have follow-up EGD in the future.  -Continue Protonix, follow-up with GI as outpatient.     # Chronic Venous stasis- of ble with reported recent worsening of acute edema.   - consult Lymphedema       # Tobacco use  disorder: Smokes ~ half pack per day.  Encourage cessation  -would like to have nicotine gum     # Alcohol use disorder  # Portal hypertensive gastropathy: reports 2-4 drinks of vodka daily.  February 2020  ultrasound of the abdomen with duplex- showed hepatomegaly with echogenic, heterogenous liver parenchyma and mildly thickened gallbladder wall most compatible with chronic intrinsic parenchymal disease.  On the EGD she had portal hypertensive gastropathy in February 2020.   -encourage cessation  -f/u outpatient pcp vs gi referral      1. Adjustment to disability: psychology consult   2. FEN: reg  3. Bowel: continent  4. Bladder: continent  5. DVT Prophylaxis: mechanical - Ortho does not recommend chemoprophylaxis at this time  6. GI Prophylaxis: ppi  7. Code: full  8. Disposition: home   9. ELOS: 14 days  10. Follow up Appointments on Discharge: ortho, pcp, neurology    Myron Antoine MD  Department of Rehabilitation Medicine  Pager: [phone]    Time Spent on this Encounter   I, Myron Antoine, spent a total of 25 minutes face-to-face or managing the care of Tiffani Tan. Over 50% of my time on the unit was spent counseling the patient and coordinating care. See note for details.

## 2020-07-30 NOTE — PROGRESS NOTES
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 Spent time talking about leisure education and participating in her leisure activities of choice after an amputation.  Talked about gardening, which is very important to pt, and how to adapt a short cart or wheeled item to be able to continue gardening. Pt stated she's very stubborn and won't let her amputation get in the way of her interests. Did get a bit teary eyed, stating that it did get her down at first.  Also discussed resources online for adaptations. Provided pt with magazines for use while on unit, will continue to monitor.   15:30 Provided pt with picture found on internet to make a wheeled cart for her to sit on while gardening.  Pt very appreciative, statting, \"this excites me\".  "

## 2020-07-30 NOTE — PLAN OF CARE
OT: Pt with high BP this AM. Nurse approved light ADL. Modified ADL in bed dand pt did well. Rechecked BP after ADL and it was still high. See vital flow sheet for details. Nurse aware. -15 min    Second session went well, Used slide board for transfer with BEST and worked on standing tolerance.

## 2020-07-30 NOTE — PLAN OF CARE
"Patient is alert and oriented x4. Denies headache and shortness of breath.  Complains of pain after a \"tough\" day of therapy.  Pain related to the the hands bilateraly and right foot due to neuropathy as well as the left BKA incision.  PRN tylenol 650mg given at 1600 with good effect.  LLE BKA dressing changed, small amount of drainage on the old dressing. Reddend and edema around the incision site. RLE ulcer dressing changed, small amount of drainage on the old dressing.  Dressing change to the right lower extremity skin abraision, scant amount of drainage. PRN oxycodone 10mg used to treat increased pain after dressing changes and for comfort to sleep. Patient was overwhelmed this shift due to changes in her surroundings including a bed and room change. Using call light appropriately, calm and cooperative with cares.  "

## 2020-07-30 NOTE — PLAN OF CARE
Patient A&O x4 and able to make needs known using call light. Blood pressure elevated throughout the day with intermittent slight headache; MD aware. Losartan increased today by 25 mg and will again tomorrow to return to PTA dosage of 100 mg daily. Lung sounds clear and equal bilaterally. Bowel sounds active and passing gas; LBM yesterday. Denies chest pain, lightheadedness, dizziness, and SOB. Drinking well and tolerating regular diet. Voiding spontaneously without difficulties. Baseline numbness and tingling to bilateral hands and right foot due to neuropathy. Left BKA; dressing changed with MD present due to patient concerns about redness/drainage. Incision is sutured with scant serosanguinous drainage. There is a small open area on lateral incision, very shallow and no s/sx infection. Ortho to see patient in next few days and assess incision. Pain in L BKA and taking PRN Oxycodone with relief. Able to transfer with assist of 1 and slide board from bed to wheelchair. Pivot transfer from bed or wheelchair to bedside commode. Requires total assist with pericare and clothing management. Will continue to monitor blood pressure and incision closely and provide interventions as needed.

## 2020-07-31 ENCOUNTER — APPOINTMENT (OUTPATIENT)
Dept: PHYSICAL THERAPY | Facility: CLINIC | Age: 60
End: 2020-07-31
Payer: COMMERCIAL

## 2020-07-31 ENCOUNTER — APPOINTMENT (OUTPATIENT)
Dept: OCCUPATIONAL THERAPY | Facility: CLINIC | Age: 60
End: 2020-07-31
Payer: COMMERCIAL

## 2020-07-31 ENCOUNTER — DOCUMENTATION ONLY (OUTPATIENT)
Dept: ORTHOPEDICS | Facility: CLINIC | Age: 60
End: 2020-07-31

## 2020-07-31 LAB
GLUCOSE BLDC GLUCOMTR-MCNC: 104 MG/DL (ref 70–99)
GLUCOSE BLDC GLUCOMTR-MCNC: 111 MG/DL (ref 70–99)
GLUCOSE BLDC GLUCOMTR-MCNC: 118 MG/DL (ref 70–99)

## 2020-07-31 PROCEDURE — 97530 THERAPEUTIC ACTIVITIES: CPT | Mod: GP | Performed by: STUDENT IN AN ORGANIZED HEALTH CARE EDUCATION/TRAINING PROGRAM

## 2020-07-31 PROCEDURE — 12800006 ZZH R&B REHAB

## 2020-07-31 PROCEDURE — 25000132 ZZH RX MED GY IP 250 OP 250 PS 637: Performed by: PHYSICIAN ASSISTANT

## 2020-07-31 PROCEDURE — 97535 SELF CARE MNGMENT TRAINING: CPT | Mod: GO | Performed by: OCCUPATIONAL THERAPIST

## 2020-07-31 PROCEDURE — L3260 AMBULATORY SURGICAL BOOT EAC: HCPCS

## 2020-07-31 PROCEDURE — 97535 SELF CARE MNGMENT TRAINING: CPT | Mod: GO

## 2020-07-31 PROCEDURE — 00000146 ZZHCL STATISTIC GLUCOSE BY METER IP

## 2020-07-31 PROCEDURE — 40000187 ZZH STATISTIC PATIENT MED CONFERENCE < 30 MIN

## 2020-07-31 PROCEDURE — 40000183 ZZH STATISTIC PT MED CONFERENCE < 30 MIN: Performed by: STUDENT IN AN ORGANIZED HEALTH CARE EDUCATION/TRAINING PROGRAM

## 2020-07-31 RX ADMIN — GABAPENTIN 1200 MG: 600 TABLET, FILM COATED ORAL at 21:30

## 2020-07-31 RX ADMIN — ACETAMINOPHEN 650 MG: 325 TABLET, FILM COATED ORAL at 08:02

## 2020-07-31 RX ADMIN — ACETAMINOPHEN 650 MG: 325 TABLET, FILM COATED ORAL at 13:52

## 2020-07-31 RX ADMIN — OXYCODONE HYDROCHLORIDE 10 MG: 5 TABLET ORAL at 13:56

## 2020-07-31 RX ADMIN — LOSARTAN POTASSIUM 100 MG: 50 TABLET, FILM COATED ORAL at 07:59

## 2020-07-31 RX ADMIN — THIAMINE HCL TAB 100 MG 100 MG: 100 TAB at 08:02

## 2020-07-31 RX ADMIN — ACETAMINOPHEN 650 MG: 325 TABLET, FILM COATED ORAL at 21:29

## 2020-07-31 RX ADMIN — SIMVASTATIN 40 MG: 20 TABLET, FILM COATED ORAL at 21:30

## 2020-07-31 RX ADMIN — OXYCODONE HYDROCHLORIDE 10 MG: 5 TABLET ORAL at 08:02

## 2020-07-31 RX ADMIN — PANTOPRAZOLE SODIUM 40 MG: 40 TABLET, DELAYED RELEASE ORAL at 06:27

## 2020-07-31 RX ADMIN — HYDROCHLOROTHIAZIDE 25 MG: 25 TABLET ORAL at 08:03

## 2020-07-31 RX ADMIN — OXYCODONE HYDROCHLORIDE 10 MG: 5 TABLET ORAL at 21:29

## 2020-07-31 RX ADMIN — DOCUSATE SODIUM 50 MG AND SENNOSIDES 8.6 MG 1 TABLET: 8.6; 5 TABLET, FILM COATED ORAL at 21:30

## 2020-07-31 RX ADMIN — GABAPENTIN 1200 MG: 600 TABLET, FILM COATED ORAL at 13:52

## 2020-07-31 RX ADMIN — NORTRIPTYLINE HYDROCHLORIDE 25 MG: 25 CAPSULE ORAL at 21:30

## 2020-07-31 RX ADMIN — GABAPENTIN 1200 MG: 600 TABLET, FILM COATED ORAL at 08:03

## 2020-07-31 NOTE — PROGRESS NOTES
"VS: BP (!) 167/71 (BP Location: Left arm)   Pulse 72   Temp 97.1  F (36.2  C) (Oral)   Resp 16   Ht 1.676 m (5' 6\")   Wt 119.3 kg (263 lb 1.6 oz)   SpO2 96%   BMI 42.47 kg/m   pt denies CP or SOB.    O2: Room air sat. > 90%.    Output: Voiding adequate amount commode and bedpan   Last BM: 07/30/20   Activity: AX 2 pivot to transfer.   Skin: Intact.Ex stump on left lower leg and lymphedema on right lower leg. Back redness blanchable.   Pain: Tolerating Tylenol and Oxycodone for pain.   CMS: Neuropathy baseline for patient   Dressing: CDI   Diet: Regular diet   LDA: None   Equipment: Personal belonging. Call light in reach.   Plan: TBD.Continue with the plan of care   Additional Info:       "

## 2020-07-31 NOTE — PROGRESS NOTES
"  [facility]   Acute Rehabilitation Unit  Daily progress note        interval history  Pt seen in team rounds today.  No acute events overnight and this morning has no concerns or complaints.  Her BPs have been elevated and today her BP regimen was increased back to her home dosings.  If continues to be elevated will plan to add Norvasc.  Is making excellent functional improvements, currently SBA for slideboard transfers, CGA for stand pivot transfers, Mod I for WC mobility, but needs assist for clothing management after toileting and for donning [employer].  WC eval referral has been placed and anticipate will occur on Tuesday.  With progress, have moved up tentative discharge date to 8/7, and she would like it to be moved up even more if possible.  Will monitor progress and will need WC delivered.      medications    acetaminophen  650 mg Oral TID     gabapentin  1,200 mg Oral TID     hydrochlorothiazide  25 mg Oral Daily     losartan  100 mg Oral Daily     nortriptyline  25 mg Oral At Bedtime     pantoprazole  40 mg Oral QAM AC     senna-docusate  1 tablet Oral At Bedtime     simvastatin  40 mg Oral At Bedtime     thiamine  100 mg Oral Daily        acetaminophen, hydrOXYzine, methocarbamol, naloxone, nicotine, oxyCODONE, polyethylene glycol, sodium chloride (PF)     physical exam  BP (!) 175/84 (BP Location: Left arm)   Pulse 90   Temp 96.7  F (35.9  C) (Oral)   Resp 17   Ht 1.676 m (5' 6\")   Wt 119.3 kg (263 lb 1.6 oz)   SpO2 94%   BMI 42.47 kg/m    Gen: awake alert  HEENT: mucus membranes dry  Pulm: non labored breathing  Abd: obese distended  Ext: bl edema lymphedema wrap on right le.   L TTA: Flotech in place  Neuro/MSK: alert, awake, answers appropriately, follows commands    labs  CBC RESULTS:   Recent Labs   Lab Test 07/29/20  0528   WBC 6.3   RBC 3.42*   HGB 7.8*   HCT 27.6*   MCV 81   MCH 22.8*   MCHC 28.3*   RDW 20.6*            Last Comprehensive " Metabolic Panel:  Sodium   Date Value Ref Range Status   07/29/2020 140 133 - 144 mmol/L Final     Potassium   Date Value Ref Range Status   07/29/2020 4.6 3.4 - 5.3 mmol/L Final     Chloride   Date Value Ref Range Status   07/29/2020 108 94 - 109 mmol/L Final     Carbon Dioxide   Date Value Ref Range Status   07/29/2020 28 20 - 32 mmol/L Final     Anion Gap   Date Value Ref Range Status   07/29/2020 4 3 - 14 mmol/L Final     Glucose   Date Value Ref Range Status   07/29/2020 109 (H) 70 - 99 mg/dL Final     Urea Nitrogen   Date Value Ref Range Status   07/29/2020 13 7 - 30 mg/dL Final     Creatinine   Date Value Ref Range Status   07/29/2020 0.88 0.52 - 1.04 mg/dL Final     GFR Estimate   Date Value Ref Range Status   07/29/2020 70 >60 mL/min/[1.73_m2] Final     Comment:     Non  GFR Calc  Starting 12/18/2018, serum creatinine based estimated GFR (eGFR) will be   calculated using the Chronic Kidney Disease Epidemiology Collaboration   (CKD-EPI) equation.       Calcium   Date Value Ref Range Status   07/29/2020 8.5 8.5 - 10.1 mg/dL Final         Rehabilitation - continue comprehensive acute inpatient rehabilitation program with multidisciplinary approach including therapies, rehab nursing, and physiatry following. See interval history for updates.      assessment and plan    Tiffani dela cruz is a 60 year old woman with a past medical history of HTN, HLD, obesity, type II diabetes, CKD, venous stasis changes in BLEs, chronic neuropathy and pain, and left Charcot foot deformity, who is now s/p left BKA on 7/22/2020.  Her post-operative course has been complicated by  hypotension, hyperkalemia, acute blood loss anemia requiring PRBC transfusion, and LIGIA. Admitted for ongoing rehabilitation and medical management on 7/27.     # Charcot arthropathy S/P left BKA 07/22  NWDONG NOVOA, eddy when out of bed  Bid wound care as ordered  F/u Dr. Adams in 2 weeks.   -pain management:  oxycodone 5-10 mg q 4 hours prn ( start  taper) , gabapentin continue pta 1200 mg tid, robaxin prn, tylenol tid & prn.   -continue PT/OT  -No acute concerns with incision but pt anxious about appearance and orthopedic team will evaluate in the next few days.     #acute blood loss anemia with history of chronic anemia- pre operative hemoglobin ~9.2  received transfusion 7/25.  Hgb 7.8 7/29 stable.   -trend  -transfuse hgb <7 & symptomatic     # history of HTN   #Post operative hypotension  pta on hydrochlorothiazide 25 mg daily, losartan 100 mg daily  -resumed hydrochlorothiazide 7/26- continue and monitor  -Increase Losartan back to home dose of 100 mg daily.  If BPs remain elevated, will plan to add Norvasc in 2 days  -monitor BP     # Type 2 diabetes, hemoglobin A1c 5.5%  On glimepiride 2 mg once a day PtA. with hypoglycemia during hospitalization. Checks BG daily at home in afternoon glucose ~140  -monitor bg bid  -continue to hold glimepiride for now.      # LIGIA on Chronic kidney disease baseline creatinine 1.1-1.9, LIGIA felt to be from operative volume loss improved with IV Hydration. Preoperative 0.82 7/22- Cr peak 2.22 7/24. With hyperkalemia K peaked 6.8 7/24.  Now Cr. Stable 0.88 7/29. K 4.6  -trend     # Chronic ble Neuropathy in the feet: on 1200 mg of gabapentin 3 times daily and takes norco (7.5/325 up to 4 pills daily prn)  at home as well  -continue gabapentin  -Started nortriptyline 10 mg 7/27, increase to 25 mg 7/30  -oxy as above.     #Dyslipidemia- continue pta zocor 40 mg daily     # PUD- History of GI bleed from gastric ulcers, status post EGD in February 2020 which showed 3 gastric ulcers.  She had some esophagitis. She was noted to have some portal hypertensive gastropathy as well.  She is being treated with Protonix 40 mg daily.  She is supposed to have follow-up EGD in the future.  -Continue Protonix, follow-up with GI as outpatient.     # Chronic Venous stasis- of ble with reported recent worsening of acute edema.   - consult  Lymphedema       # Tobacco use disorder: Smokes ~ half pack per day.  Encourage cessation  -would like to have nicotine gum     # Alcohol use disorder  # Portal hypertensive gastropathy: reports 2-4 drinks of vodka daily.  February 2020  ultrasound of the abdomen with duplex- showed hepatomegaly with echogenic, heterogenous liver parenchyma and mildly thickened gallbladder wall most compatible with chronic intrinsic parenchymal disease.  On the EGD she had portal hypertensive gastropathy in February 2020.   -encourage cessation  -f/u outpatient pcp vs gi referral      1. Adjustment to disability: psychology consult   2. FEN: reg  3. Bowel: continent  4. Bladder: continent  5. DVT Prophylaxis: mechanical - Ortho does not recommend chemoprophylaxis at this time  6. GI Prophylaxis: ppi  7. Code: full  8. Disposition: home   9. ELOS: Tentative discharge date 8/7/20  10. Follow up Appointments on Discharge: ortho, pcp, neurology    Myron Antoine MD  Department of Rehabilitation Medicine  Pager: [phone]    Time Spent on this Encounter   I, Myron Antoine, spent a total of 35 minutes face-to-face or managing the care of Tiffani Tan. Over 50% of my time on the unit was spent counseling the patient and coordinating care. See note for details.

## 2020-07-31 NOTE — CONSULTS
Health Psychology                  Clinic    Department of Medicine  Georgie Sawant, Ph.D., L.P. [phone]                          [facility]  [facility] Pedro Mayen, Ph.D.,  L.P. [phone]                 3rd Floor  [place] Mail Code [address]   Myranda Velasco, Ph.D., L.P. [phone]    [address] Anisha Zaragoza, Ph.D., L.P. [phone]            [id number]  [place] [address]          Javi Enrique, Ph.D., A.B.P.P., L.P. [phone]      Sarah Julien, Ph.D., L.P. [phone]      This telehealth service is appropriate and effective for delivering services in light of the necessity for social distancing to mitigate the COVID-19 epidemic and for conservation of PPE.     Patient has agreed to receiving telehealth services after being informed about it: Yes    Time service started:  1112  Time service ended:    1132    Mode of transmission: telephone    Location of originating:  Hospital room of the patient    Distance site:  Home office of provider for [facility]    The patient has been notified that:  Video/telephone visits will be conducted via a call with their psychologist to provide the care they need with a video/telephone conversation. Video/telephone visits may be billed at different rates depending on insurance coverage.  Patients are advised to please contact their insurance provider with any questions about their health insurance coverage. If during the course of a call the psychologist feels a video/telephone visit is not appropriate, patients will not be charged for this service.      Inpatient Health Psychology Consultation*    DOS: 7/31/2020    Clinical Information:  Tiffani Tan is a 60 year old woman with a past medical history of HTN, HLD, obesity, type II diabetes, CKD, venous stasis changes in BLEs, chronic neuropathy and pain, and left Charcot foot deformity, who is now s/p left BKA on 7/22/2020.  " Her post-operative course has been complicated by  hypotension, hyperkalemia, acute blood loss anemia requiring PRBC transfusion, and LIGIA. Admitted for ongoing rehabilitation and medical management on 7/27.  A psychology consultation was requested to assess her need for emotional support regarding acceptance of altered body image and coping with lifestyle changes resulting from amputation.  Ms Tan was very open to this contact. answered all questions clearly and directly. Stated her belief that she is doing well emotionally, not feeling any change from baseline. Denies history of significant depression or anxiety that affects her functioning. Reports some anxiety at baseline that manifests in the form of anxious thinking at night. Does not feel that it is severe or has a negative impact on most activities, but notices it at night and can interfere with reinitiation of sleep. Does not believe that it is elevated since hospitalization. However, she does talk about how difficult it is to be alone, and the fact that her sleep while hospitalized has not been as good as usual.  I note documentation in Care Everywhere indicating that over the years she has been thought to have had symptoms of depression related to her foot and impact on her functioning. Diagnosis of Adjustment disorder with depression in 7/2018 and Rx of Wellbutrin that indicates that this was not a new Rx for her. Documentation form later that year suggests she did not continue using that medication. Unclear from her EMR if she has a consistent primary care provider.  States that she feels she is doing well emotionally and attributes that primarily to the fact that she has been using a wheelchair at home with somewhat similar limitations that she has now, so is working with PT/OT and feeling that she knows much of what she is learning, while appreciative of new strategies that she had not thought of herself. Tells me that she is used to \"cruising " "around in a wheelchair.\" Very happy to have been told this morning that she may be able to discharge home sooner than next Friday depending on her new wheelchair being ready. Looking forward to fitting later today. States that her mood is even more relaxed, less anxious since transferring to [facility] and being able to have visit from  and dogs every evening outside. Very happy with how meliton the weather has been this week that has made the visits even more wonderful. Tells me about how excited one of her dogs was to see her for the first time, and how hard she thinks it is for the dog who is always with her through the workday.  Pain reported to be no worse or better than baseline, telling me that she has had peripheral neuropathy for 30 years. Reports that phantom sensation are very minimal and not concerning.  Smoking cessation while in hospital has been easier than she anticipated. She does acknowledge urges to smoke. Notes that it has not been hard to not drink while hospitalized but clear that she looks forward to resuming both habits when she discharges home. She has reported intake of 2-4 alcoholic drinks/night No sense of even contemplation of working toward change in either activity. Comments suggest that she does not have concerns about the potential sequelae of these habits.  Ms Tan is pleased with her care for the most part. However, she does note being very upset when staff chose to weigh her at 11 pm one night. Did not give any negative feedback but in telling me about it today she was clearly upset and angry. Would appreciate cares being consolidated overnight.  Assessment: Ms Tan appears to be at her baseline emotionally, with some anxious thinking noticeable at night. Does not feel that the anxiety she experiences at night is at a level that concerns her. Pleased with her positive progress in rehabilitation therapies. Not apparently ready for focus on smoking cessation or moderating her " drinking.  Diagnosis:    Adjustment disorder with anxiety (F43.22)  Nicotine dependence, cigarettes, uncomplicated (F17.210)  Unspecified alcohol use disorder (F10.99)  Recommendations/Plan: Ms Tan feels that her emotional status is good currently. Level of anxiety is familiar and not presenting her with challenges. Although her level of cigarette and alcohol use may be concerning in terms of impact on her chronic medical issues, she is not apparently in a place currently of contemplating changing these behaviors. Although I would encourage working on cessation or at least moderation of both, if she is not willing to consider this, it will not be effective to recommend establishing care with a provider in order to work on this.  Encourage ongoing monitoring by her medical providers who may be able to keep her informed of potential negative consequences of those habits. Encourage establishing care with one primary care provider if that is not in place currently. No specific recommendations from a psychological perspective prior to discharge.  Time Spent with Patient: 20 minutes of telephone contact.  Service Provided: Individual psychotherapy   Provider: Georgie Sawant, Ph.D,    Provider Pager: [phone]   Provider Phone: [phone]        *In accordance with the Rules of the [place] [employer], it is noted that psychological descriptions and scientific procedures underlying psychological evaluations have limitations.  Absolute predictions cannot be made based on information in this report.

## 2020-07-31 NOTE — PLAN OF CARE
"FOCUS/GOAL  Bowel management, Bladder management, Pain management, Wound care management, and Safety management    ASSESSMENT, INTERVENTIONS AND CONTINUING PLAN FOR GOAL:    Pt is alert and oriented by 4. Complaint of incisional pain and pt also has chronic pain due to neuropathy. PRN oxycodone 10 MG given at bedtime. Is an assist of one with a slide board for all transfers, requires an assist of two for transfers to the commode. Continent of bowel and bladder. Had BM today. Appetite good, ate 100% of dinner this evening. Writer went to pts room to change dressing because it is BID per order, but pt refused stating \"I don't know, I'm just relaxed right now, I'll have it done in the morning.\" Alarms. Continue with plan of care.    "

## 2020-07-31 NOTE — CARE CONFERENCE
Acute Rehab Care Conference/Team Rounds      Type: Team Rounds    Present: Myron Antoine MD, Patria Dobbs RN, Balwinder DELGADO, Brittany Del Toro OT, Maureen Rao PT      Discharge Barriers/Treatment/Education    Rehab Diagnosis: L transtibial amputation.    Active Medical Co-morbidities/Prognosis: hypertension, dyslipidemia, obesity, diabetes type II, CKD, neuropathy of all extremities, chronic venous stasis and ulcers, and chronic pain     Safety: Alert and oriented, call light at reach.    Pain: Patient complain of neuropathic pain. Managed withTylenol and Oxycodone.      Medications, Skin, Tubes/Lines: Patient with left lower leg amputation, right side lymphedema and left side buttock blanchable.     Swallowing/Nutrition:    Bowel/Bladder:Continent of bowel and bladder with bedside commode    Psychosocial: Pt , lives in a multi-level home with  (or 3 years) and 2 dogs. Plan to D/C to work space where pt has set up a living area that is w/c accessible. Independent PTA. Reported good support. Denied financial concerns, substance abuse or mental health.       ADLs/IADLs: Progressing quickly with compensatory strategies for L AKA, now requires CGA/SBA with slide board after setup and min A with stand pivot transfers at grab bar to toilet. D/t R foot wound recommending wc based mobility. Now requires setup for dressing, min A to don madie-tech, min A with toileting, mod I for grooming seated at sink. Anticipate 1 more week to progress to mod I with ADLs and transfers, will need slide board, wc, and HH OT    Mobility: pt motivated, making good gains. Has progressed from liko lift to CGA/SBA for SB transfers. D/t wound R forefoot, goals will be for wc based mobility, Planning for wc eval early next week if possible. Pt has accessible home, prn support from . Anticipate 1 more week to meet POC goals, follow up with home PT.     Cognition/Language: no concerns in this domain    Community Re-Entry:  wc based    Transportation: family training for transfer    Plan of Care and goals reviewed and updated.    Discharge Plan/Recommendations    Fall Precautions: continue    Overall plan for the patient:   BPs remain elevated, Losartan now increased back to home dose and will continue to titrate as needed.  Glucose levels well controlled.  Functionally has made excellent progress.  SBA for slideboard transfers, CGA for stand pivot transfers, Mod I for WC mobility, but needs assist for clothing management after toileting and for donning [employer].  Upon admission she was dependent for mobility and transfers.   WC eval referral has been placed and anticipate will occur on Tuesday.  Have moved up tentative discharge date to 8/7, and may be able to move up even further pending progress and WC delivery.  HH PT, OT, and RN initially.        Utilization Review and Continued Stay Justification    Medical Necessity Criteria:    For any criteria that is not met, please document reason and plan for discharge, transfer, or modification of plan of care to address.    Requires intensive rehabilitation program to treat functional deficits?: Yes    Requires 3x per week or greater involvement of rehabilitation physician to oversee rehabilitation program?: Yes    Requires rehabilitation nursing interventions?: Yes    Patient is making functional progress?: Yes    There is a potential for additional functional progress? Yes    Patient is participating in therapy 3 hours per day a minimum of 5 days per week or 15 hours per week in 7 day period?:Yes    Has discharge needs that require coordinated discharge planning approach?:Yes      Barriers/Concerns related to meeting medical necessity criteria:  None    Team Plan to Address Concern:  N/A      Final Physician Sign off    Statement of Approval: I have reviewed and agree with the recommendations and documentation in this care conference note.       Patient Goals  SW: Confirm discharge  recommendations with therapy, coordinate safe discharge plan and remain available to support and assist as needed.        OT goal: lower body dressing: Modified independent  OT goal: lower body bathing: Modified independent  OT Goal: transfer: Supervision/stand-by assist(walk in shower)  OT goal: toilet transfer/toileting: Modified independent, cleaning and garment management, toilet transfer  OT goal: meal preparation: Modified independent, with simple meal preparation(WC based)  OT goal: home management: Modified independent, with light demand household tasks(WC based)  OT goal 1: Pt will be IND with UE HEP to improve strength for functional mobility.        PT Frequency: daily up to  minutes  PT goal: bed mobility: Independent, Supine to/from sit, Rolling(HOB flat and no railing)  PT goal: transfers: Modified independent, Sit to/from stand, Bed to/from chair, Assistive device(FWW)  PT goal: perform aerobic activity with stable cardiovascular response: continuous activity, 10 minutes, NuStep  PT goal 1: Pt will propel/manage manual wheelchair x 150' with mod I  PT Goal 2: Pt will be independent with a BKA HEP  PT goal 3: will participate with [facility] for wc eval for seating and mobility needs

## 2020-07-31 NOTE — PROGRESS NOTES
"S: Pt seen in room 548, ARC for adjustments to RRD, R offloading shoe. Pt expecting consult; c/o RRD being too long and impinging in groin.    O: 61 y/o, 5' 6\", 220 lb pt sitting bedside.  L Lee-Tech in place.  Bandages to R LE.       A: Pt presents s/p L transtibial amputation 7/22/20, R foot ulcers. Pt HX of hypertension, dyslipidemia, obesity, type 2 diabetes, chronic kidney disease, neuropathy in the feet, GI bleed from peptic ulcer disease, venous stasis changes in the bilateral lower extremity, Charcot foot deformity left foot.    P: Additional distal pad added to L RRD, reducing overall length of RRD and relieving groin.  Fit/delivery of medium  [id number] offloading shoe to reduce pressure to ulcers.  Pt is experienced w/ this type of shoe;  verbalizes understanding and satisfaction with fit, comfort, and function. F/U prn.  "

## 2020-07-31 NOTE — PLAN OF CARE
Patient alert and oriented. Continent of bladder, no bowel movement this shift. Uses slide board to transfer to bedside commode, assist of 1. Twice daily blood sugars. Regular thin diet tolerated well. Dressings all changed on this shift (stump, lateral thigh wound, and first metatarsal wound) as directed per order. Prn oxy given twice this shift with positive pain relief. Chair alarm on for safety, call light within reach, ok to continue the plan of care.

## 2020-08-01 ENCOUNTER — APPOINTMENT (OUTPATIENT)
Dept: PHYSICAL THERAPY | Facility: CLINIC | Age: 60
End: 2020-08-01
Payer: COMMERCIAL

## 2020-08-01 ENCOUNTER — APPOINTMENT (OUTPATIENT)
Dept: OCCUPATIONAL THERAPY | Facility: CLINIC | Age: 60
End: 2020-08-01
Payer: COMMERCIAL

## 2020-08-01 LAB
ANION GAP SERPL CALCULATED.3IONS-SCNC: 6 MMOL/L (ref 3–14)
BASOPHILS # BLD AUTO: 0.1 10E9/L (ref 0–0.2)
BASOPHILS NFR BLD AUTO: 0.6 %
BUN SERPL-MCNC: 11 MG/DL (ref 7–30)
CALCIUM SERPL-MCNC: 9 MG/DL (ref 8.5–10.1)
CHLORIDE SERPL-SCNC: 104 MMOL/L (ref 94–109)
CO2 SERPL-SCNC: 26 MMOL/L (ref 20–32)
CREAT SERPL-MCNC: 0.83 MG/DL (ref 0.52–1.04)
CRP SERPL-MCNC: 20 MG/L (ref 0–8)
DIFFERENTIAL METHOD BLD: ABNORMAL
EOSINOPHIL # BLD AUTO: 0.2 10E9/L (ref 0–0.7)
EOSINOPHIL NFR BLD AUTO: 2.1 %
ERYTHROCYTE [DISTWIDTH] IN BLOOD BY AUTOMATED COUNT: 20.3 % (ref 10–15)
ERYTHROCYTE [SEDIMENTATION RATE] IN BLOOD BY WESTERGREN METHOD: 104 MM/H (ref 0–30)
GFR SERPL CREATININE-BSD FRML MDRD: 75 ML/MIN/{1.73_M2}
GLUCOSE BLDC GLUCOMTR-MCNC: 126 MG/DL (ref 70–99)
GLUCOSE BLDC GLUCOMTR-MCNC: 148 MG/DL (ref 70–99)
GLUCOSE BLDC GLUCOMTR-MCNC: 91 MG/DL (ref 70–99)
GLUCOSE BLDC GLUCOMTR-MCNC: 99 MG/DL (ref 70–99)
GLUCOSE SERPL-MCNC: 82 MG/DL (ref 70–99)
HCT VFR BLD AUTO: 29.3 % (ref 35–47)
HGB BLD-MCNC: 8.3 G/DL (ref 11.7–15.7)
IMM GRANULOCYTES # BLD: 0 10E9/L (ref 0–0.4)
IMM GRANULOCYTES NFR BLD: 0.3 %
LYMPHOCYTES # BLD AUTO: 2.1 10E9/L (ref 0.8–5.3)
LYMPHOCYTES NFR BLD AUTO: 23.6 %
MCH RBC QN AUTO: 22.6 PG (ref 26.5–33)
MCHC RBC AUTO-ENTMCNC: 28.3 G/DL (ref 31.5–36.5)
MCV RBC AUTO: 80 FL (ref 78–100)
MONOCYTES # BLD AUTO: 0.7 10E9/L (ref 0–1.3)
MONOCYTES NFR BLD AUTO: 7.7 %
NEUTROPHILS # BLD AUTO: 5.9 10E9/L (ref 1.6–8.3)
NEUTROPHILS NFR BLD AUTO: 65.7 %
NRBC # BLD AUTO: 0 10*3/UL
NRBC BLD AUTO-RTO: 0 /100
PLATELET # BLD AUTO: 228 10E9/L (ref 150–450)
POTASSIUM SERPL-SCNC: 4.5 MMOL/L (ref 3.4–5.3)
RBC # BLD AUTO: 3.68 10E12/L (ref 3.8–5.2)
SODIUM SERPL-SCNC: 136 MMOL/L (ref 133–144)
WBC # BLD AUTO: 8.9 10E9/L (ref 4–11)

## 2020-08-01 PROCEDURE — 36415 COLL VENOUS BLD VENIPUNCTURE: CPT | Performed by: PHYSICAL MEDICINE & REHABILITATION

## 2020-08-01 PROCEDURE — 00000146 ZZHCL STATISTIC GLUCOSE BY METER IP

## 2020-08-01 PROCEDURE — 25000132 ZZH RX MED GY IP 250 OP 250 PS 637: Performed by: PHYSICIAN ASSISTANT

## 2020-08-01 PROCEDURE — 80048 BASIC METABOLIC PNL TOTAL CA: CPT | Performed by: PHYSICAL MEDICINE & REHABILITATION

## 2020-08-01 PROCEDURE — 86140 C-REACTIVE PROTEIN: CPT | Performed by: PHYSICAL MEDICINE & REHABILITATION

## 2020-08-01 PROCEDURE — 97535 SELF CARE MNGMENT TRAINING: CPT | Mod: GO

## 2020-08-01 PROCEDURE — 97535 SELF CARE MNGMENT TRAINING: CPT | Mod: GO | Performed by: OCCUPATIONAL THERAPIST

## 2020-08-01 PROCEDURE — 97112 NEUROMUSCULAR REEDUCATION: CPT | Mod: GO | Performed by: OCCUPATIONAL THERAPIST

## 2020-08-01 PROCEDURE — 97110 THERAPEUTIC EXERCISES: CPT | Mod: GP

## 2020-08-01 PROCEDURE — 85652 RBC SED RATE AUTOMATED: CPT | Performed by: PHYSICAL MEDICINE & REHABILITATION

## 2020-08-01 PROCEDURE — 97530 THERAPEUTIC ACTIVITIES: CPT | Mod: GP

## 2020-08-01 PROCEDURE — 12800006 ZZH R&B REHAB

## 2020-08-01 PROCEDURE — 97110 THERAPEUTIC EXERCISES: CPT | Mod: GO

## 2020-08-01 PROCEDURE — 85025 COMPLETE CBC W/AUTO DIFF WBC: CPT | Performed by: PHYSICAL MEDICINE & REHABILITATION

## 2020-08-01 PROCEDURE — 25000132 ZZH RX MED GY IP 250 OP 250 PS 637: Performed by: STUDENT IN AN ORGANIZED HEALTH CARE EDUCATION/TRAINING PROGRAM

## 2020-08-01 RX ORDER — AMLODIPINE BESYLATE 5 MG/1
5 TABLET ORAL DAILY
Status: DISCONTINUED | OUTPATIENT
Start: 2020-08-01 | End: 2020-08-03

## 2020-08-01 RX ADMIN — THIAMINE HCL TAB 100 MG 100 MG: 100 TAB at 08:33

## 2020-08-01 RX ADMIN — GABAPENTIN 1200 MG: 600 TABLET, FILM COATED ORAL at 14:15

## 2020-08-01 RX ADMIN — OXYCODONE HYDROCHLORIDE 10 MG: 5 TABLET ORAL at 19:21

## 2020-08-01 RX ADMIN — LOSARTAN POTASSIUM 100 MG: 50 TABLET, FILM COATED ORAL at 08:33

## 2020-08-01 RX ADMIN — SIMVASTATIN 40 MG: 20 TABLET, FILM COATED ORAL at 21:41

## 2020-08-01 RX ADMIN — ACETAMINOPHEN 650 MG: 325 TABLET, FILM COATED ORAL at 19:21

## 2020-08-01 RX ADMIN — HYDROCHLOROTHIAZIDE 25 MG: 25 TABLET ORAL at 08:32

## 2020-08-01 RX ADMIN — GABAPENTIN 1200 MG: 600 TABLET, FILM COATED ORAL at 19:21

## 2020-08-01 RX ADMIN — AMLODIPINE BESYLATE 5 MG: 5 TABLET ORAL at 10:15

## 2020-08-01 RX ADMIN — PANTOPRAZOLE SODIUM 40 MG: 40 TABLET, DELAYED RELEASE ORAL at 06:50

## 2020-08-01 RX ADMIN — OXYCODONE HYDROCHLORIDE 10 MG: 5 TABLET ORAL at 06:54

## 2020-08-01 RX ADMIN — DOCUSATE SODIUM 50 MG AND SENNOSIDES 8.6 MG 1 TABLET: 8.6; 5 TABLET, FILM COATED ORAL at 21:40

## 2020-08-01 RX ADMIN — NORTRIPTYLINE HYDROCHLORIDE 25 MG: 25 CAPSULE ORAL at 21:41

## 2020-08-01 RX ADMIN — GABAPENTIN 1200 MG: 600 TABLET, FILM COATED ORAL at 08:32

## 2020-08-01 RX ADMIN — ACETAMINOPHEN 650 MG: 325 TABLET, FILM COATED ORAL at 08:33

## 2020-08-01 RX ADMIN — ACETAMINOPHEN 650 MG: 325 TABLET, FILM COATED ORAL at 14:15

## 2020-08-01 NOTE — PLAN OF CARE
PT- during session, PTA noticed fresh incision discharge on residual limb approx silver dollar size.  MD arrived at end of session and was notified of incision discharge  Pt denies pain or injury and stated nursing had just changed the dressing this AM

## 2020-08-01 NOTE — PLAN OF CARE
FOCUS/GOAL  Bowel management, Bladder management, Pain management, Wound care management, Discharge planning, Mobility and Skin integrity    ASSESSMENT, INTERVENTIONS AND CONTINUING PLAN FOR GOAL:   Patient is alert and oriented x 4. Able to communicate needs. Used call light appropriately. Reported pain to right foot and left residual limb. Took scheduled pain medications. Pt reported decreased in pain and declined prn medication. VSS except SBP has been elevated. Pt asymptomatic. Denied headache and symptoms of HTN.  Pt stated, BP taken after activity and right after therapy. Pt received scheduled HTN medications as ordered. Will continue to monitor. Denied SOB, denied difficulty breathing, denied chest pain, denied N/V. BG prior to breakfast was 99. A of 1 with sliding board wheelchair based.  Right leg lateral wound dressing changed. Brigette-wound appeared pink. No drainage noted to old dressing and at wound. Right lateral big toe ulcer wound dressing changed per POC. Dressing to left BKA changed per order.    Left residual limp evaluated by Dr. Freire at bedside due to drainage concern. Please refer to note from Ortho. Labs done.  Dressing  changed and wraped with ACE per new order. Pt was updated. Old dressing had small amount serious drainage.     Pt had shower this shift. BG prior to dinner was 91. Pt ate everything on dinner tray. Reported pain to right leg, foot and left residual limp. Received scheduled medications and prn oxycodone 10 mg. Pt reported decreased in pain when re-assessed. Pt used call light and communicated needs. Call light with in reach. Will continue to monitor.

## 2020-08-01 NOTE — PROGRESS NOTES
[facility], [place]   Physical Medicine and Rehabilitation Daily Note           Assessment and Plan of Care:   Tiffani dela cruz is a 60 year old woman with a past medical history of HTN, HLD, obesity, type II diabetes, CKD, venous stasis changes in BLEs, chronic neuropathy and pain, and left Charcot foot deformity, who is now s/p left BKA on 7/22/2020.  Her post-operative course has been complicated by  hypotension, hyperkalemia, acute blood loss anemia requiring PRBC transfusion, and LIGIA. Admitted for ongoing rehabilitation and medical management on 7/27.     --Remains hypertensive today, however comfortable and asymptomatic. No lab today.  --Continue ongoing medical management.  --Continue therapies and plan of care.  -- Added Norvasc 5mg PO daily, Hold SBP <110           Interval history:   No acute events overnight. Participating in therapies. Otherwise without complaints. Discussed persistently high BP, and consideration for trial of Norvasc to which she is amenable. Discussed risk/ benefit of new medication including possible side effect of BLE edema. She endorses ongoing intermittent phantom limb sensation and pain which last a few seconds then resolve. Discussed contracture prevention strategies which the patient will try. Denies fever, chills, CP, SOB, N/V, abdominal pain, new pain or weakness/numbness/tingling.             Physical Exam:     Vitals:    07/31/20 0804 07/31/20 1723 08/01/20 0757 08/01/20 1012   BP: (!) 175/84 (!) 188/84 (!) 162/67 (!) 155/77   BP Location: Left arm Right arm Left arm Left arm   Pulse: 90 86 90 85   Resp: 17 20 20 19   Temp: 96.7  F (35.9  C) 96.6  F (35.9  C) 97.1  F (36.2  C)    TempSrc: Oral Oral Oral    SpO2: 94% 95% 92% 94%   Weight:       Height:         Gen: NAD, resting in bed, obese  Heart: RRR  Lungs: decreased air movement from body habitus otherwise CTAB  Abd: soft and non-tender  Ext: s/p LLE TTA, residual limb dressing with slight  serosanguinous drainage, RLE with 2+ edema. ACE wrap and boot in place RLE  MSK/neuro: alert and oriented. speech fluent. moves BUE and BLE volitionally.                            Data:   Scheduled meds    acetaminophen  650 mg Oral TID     amLODIPine  5 mg Oral Daily     gabapentin  1,200 mg Oral TID     hydrochlorothiazide  25 mg Oral Daily     losartan  100 mg Oral Daily     nortriptyline  25 mg Oral At Bedtime     pantoprazole  40 mg Oral QAM AC     senna-docusate  1 tablet Oral At Bedtime     simvastatin  40 mg Oral At Bedtime     thiamine  100 mg Oral Daily       PRN meds:  acetaminophen, hydrOXYzine, methocarbamol, naloxone, nicotine, oxyCODONE, polyethylene glycol, sodium chloride (PF)    Patient seen and discussed with PM&R attending, Dr. Toussaint, whom agrees with my assessment and plan    Sd Waller DO   PGY-3  Physical Medicine & Rehabilitation  8/1/2020 on 12:27 PM         I, Mouna Toussaint, saw this patient with my resident Dr. Waller  and agree with his findings and plan of care as documented in his note.     I personally reviewed the chart (vitals signs, medications, and labs).     My key findings and bennett manegement decisions made by me:   Doing well. Added amlodipine for better control of BP. Some concerns regarding wound healing. No acute s/s of infection. Took pictures as above and paged ortho team. Dr. Adams is on call this weekend so will get some guidance.   Working on transfers and still requires help.     I spent a total of 30 minutes face-to-face and managing the care of the patient. Over 50% of my time on the unit was spent counseling the patient and coordinating care. See note for details.

## 2020-08-01 NOTE — PLAN OF CARE
OT: Th instructed pt on leaning over to place transfer board vs standing to sit on top of it.  SBA from bed to w/c.  Th assisted with w/c set-up.

## 2020-08-01 NOTE — PLAN OF CARE
FOCUS/GOAL  Bowel management, Bladder management, and Pain management    ASSESSMENT, INTERVENTIONS AND CONTINUING PLAN FOR GOAL:  Pt slept well with Left residual elevated.   C/o residual pain and fingers pain in the morning, PRN oxycodone applied.   Call light in reach, bed alarm on.   Con of Bladder, uses toilet.  Ax 1 with Gb and slide board to transfer, w/c based for mobility.   L-BKA dressing changed this morning due to saturated.

## 2020-08-01 NOTE — PLAN OF CARE
A/Ox4, A1 w/slide board to tx, LS clear, BS+, stump dressing changed this shift, other dressings C/D/I, c/o numbness, baseline per patient, c/o leg pain, PRN oxy given X1, continent of B/B, BID BG checks, tolerating regular diet, takes pills whol with water. POC reviewed with patient, questions answered.

## 2020-08-01 NOTE — PROGRESS NOTES
Called by TCU staff given concerns about wound appearance. Per report, there is some serous drainage from the wound - no purulence. Otherwise, AF and HD stable.    Imaging from today's progress note reviewed with Dr. Adams and compared to most recent images of wound from last week. There is interval hyperemia of the stump however medial and lateral areas of dehiscence appear improved compared to prior images. Dr. Adams felt hypermia most likely be due to pronounced swelling, which was also present at the time of surgery with significant associated serous drainage.     Recommendations:  - Obtain baseline postop labs (CBC, ESR, CRP) - expect elevations in ESR and CRP. These labs may be used for trend in the future if further concerns arise  - Wound care: cover small areas of dehiscence with adaptic, gauze, and wrap ACE over stump to shrink stump. Change dressings and inspect wound daily.   - Elevate stump at all times for edema reduction  - No antibiotics at this time  - F/u as scheduled this week (8/5)    Ortho will continue to follow peripherally. Please call/page Ortho with any further questions or concerns.    Hu Brown MD  PGY-4, Orthopaedic Surgery

## 2020-08-02 ENCOUNTER — APPOINTMENT (OUTPATIENT)
Dept: PHYSICAL THERAPY | Facility: CLINIC | Age: 60
End: 2020-08-02
Payer: COMMERCIAL

## 2020-08-02 ENCOUNTER — APPOINTMENT (OUTPATIENT)
Dept: OCCUPATIONAL THERAPY | Facility: CLINIC | Age: 60
End: 2020-08-02
Payer: COMMERCIAL

## 2020-08-02 LAB
GLUCOSE BLDC GLUCOMTR-MCNC: 108 MG/DL (ref 70–99)
GLUCOSE BLDC GLUCOMTR-MCNC: 109 MG/DL (ref 70–99)
GLUCOSE BLDC GLUCOMTR-MCNC: 115 MG/DL (ref 70–99)
GLUCOSE BLDC GLUCOMTR-MCNC: 135 MG/DL (ref 70–99)

## 2020-08-02 PROCEDURE — 12800006 ZZH R&B REHAB

## 2020-08-02 PROCEDURE — 25000132 ZZH RX MED GY IP 250 OP 250 PS 637: Performed by: PHYSICIAN ASSISTANT

## 2020-08-02 PROCEDURE — 97110 THERAPEUTIC EXERCISES: CPT | Mod: GO

## 2020-08-02 PROCEDURE — 97530 THERAPEUTIC ACTIVITIES: CPT | Mod: GP

## 2020-08-02 PROCEDURE — 00000146 ZZHCL STATISTIC GLUCOSE BY METER IP

## 2020-08-02 PROCEDURE — 97110 THERAPEUTIC EXERCISES: CPT | Mod: GP

## 2020-08-02 PROCEDURE — 25000132 ZZH RX MED GY IP 250 OP 250 PS 637: Performed by: STUDENT IN AN ORGANIZED HEALTH CARE EDUCATION/TRAINING PROGRAM

## 2020-08-02 PROCEDURE — 97535 SELF CARE MNGMENT TRAINING: CPT | Mod: GO

## 2020-08-02 RX ADMIN — OXYCODONE HYDROCHLORIDE 10 MG: 5 TABLET ORAL at 04:46

## 2020-08-02 RX ADMIN — OXYCODONE HYDROCHLORIDE 10 MG: 5 TABLET ORAL at 19:37

## 2020-08-02 RX ADMIN — GABAPENTIN 1200 MG: 600 TABLET, FILM COATED ORAL at 19:36

## 2020-08-02 RX ADMIN — AMLODIPINE BESYLATE 5 MG: 5 TABLET ORAL at 08:05

## 2020-08-02 RX ADMIN — GABAPENTIN 1200 MG: 600 TABLET, FILM COATED ORAL at 14:34

## 2020-08-02 RX ADMIN — PANTOPRAZOLE SODIUM 40 MG: 40 TABLET, DELAYED RELEASE ORAL at 07:06

## 2020-08-02 RX ADMIN — ACETAMINOPHEN 650 MG: 325 TABLET, FILM COATED ORAL at 08:05

## 2020-08-02 RX ADMIN — THIAMINE HCL TAB 100 MG 100 MG: 100 TAB at 08:05

## 2020-08-02 RX ADMIN — ACETAMINOPHEN 650 MG: 325 TABLET, FILM COATED ORAL at 19:36

## 2020-08-02 RX ADMIN — HYDROCHLOROTHIAZIDE 25 MG: 25 TABLET ORAL at 08:05

## 2020-08-02 RX ADMIN — GABAPENTIN 1200 MG: 600 TABLET, FILM COATED ORAL at 08:05

## 2020-08-02 RX ADMIN — NORTRIPTYLINE HYDROCHLORIDE 25 MG: 25 CAPSULE ORAL at 21:51

## 2020-08-02 RX ADMIN — DOCUSATE SODIUM 50 MG AND SENNOSIDES 8.6 MG 1 TABLET: 8.6; 5 TABLET, FILM COATED ORAL at 21:51

## 2020-08-02 RX ADMIN — ACETAMINOPHEN 650 MG: 325 TABLET, FILM COATED ORAL at 14:34

## 2020-08-02 RX ADMIN — LOSARTAN POTASSIUM 100 MG: 50 TABLET, FILM COATED ORAL at 08:05

## 2020-08-02 RX ADMIN — SIMVASTATIN 40 MG: 20 TABLET, FILM COATED ORAL at 21:51

## 2020-08-02 NOTE — PLAN OF CARE
FOCUS/GOAL  Bowel management, Bladder management, and Pain management    ASSESSMENT, INTERVENTIONS AND CONTINUING PLAN FOR GOAL:  Pt slept well with L residual limb elevated.  Called for pain meds around 0500, PRN oxycodone applied.  Ax 1 with slide board to transfer, w/c based for mobility.  Cont of bladder in the toilet, LBM on 8/1.  R/T diet, taking pill as whole with water,  A & o x 4.   Call light in reach, refused bed alarm on.   Calm and cooperative.

## 2020-08-02 NOTE — PLAN OF CARE
PT- pt  c/o L leg rest uncomfortable and crooked.  Rehab tech was able to re-align leg rest so pt's residual limb did not slide off leg rest.

## 2020-08-02 NOTE — PLAN OF CARE
OT: Focus of ADL tx was room mobility and kitchen mobility from w/c.  Th offered instruction re: setting up environment to access from w/c and pt verb and demo understanding.  Pt had a shower with nsg yesterday, the set-up she describes at home most fits the bench she used in the walk in shower, no AE needs at home as pt has bars and built in bench.

## 2020-08-02 NOTE — PLAN OF CARE
FOCUS/GOAL  Wound care management and Medical management    ASSESSMENT, INTERVENTIONS AND CONTINUING PLAN FOR GOAL:  Scheduled tylenol and scheduled gabapentin assisting with L stump pain/discomfort. Patient denied SOB, denied difficulty breathing. No neuro changes. VSS ex /75. Scheduled BP medications given. BP recheck 168/75. Patient also used toilet in bathroom prior to BP. Stump dressing CDI during inspection during shift. MD spoke with patient regarding once a day dressing change and as needed to keep dry. . Alarms on for safety purposes. Call light within reach. Continue with POC.

## 2020-08-03 ENCOUNTER — APPOINTMENT (OUTPATIENT)
Dept: PHYSICAL THERAPY | Facility: CLINIC | Age: 60
End: 2020-08-03
Payer: COMMERCIAL

## 2020-08-03 ENCOUNTER — APPOINTMENT (OUTPATIENT)
Dept: OCCUPATIONAL THERAPY | Facility: CLINIC | Age: 60
End: 2020-08-03
Payer: COMMERCIAL

## 2020-08-03 LAB
ANION GAP SERPL CALCULATED.3IONS-SCNC: 5 MMOL/L (ref 3–14)
BUN SERPL-MCNC: 11 MG/DL (ref 7–30)
CALCIUM SERPL-MCNC: 9.2 MG/DL (ref 8.5–10.1)
CHLORIDE SERPL-SCNC: 105 MMOL/L (ref 94–109)
CO2 SERPL-SCNC: 28 MMOL/L (ref 20–32)
CREAT SERPL-MCNC: 0.79 MG/DL (ref 0.52–1.04)
ERYTHROCYTE [DISTWIDTH] IN BLOOD BY AUTOMATED COUNT: 20.3 % (ref 10–15)
GFR SERPL CREATININE-BSD FRML MDRD: 81 ML/MIN/{1.73_M2}
GLUCOSE BLDC GLUCOMTR-MCNC: 93 MG/DL (ref 70–99)
GLUCOSE SERPL-MCNC: 106 MG/DL (ref 70–99)
HCT VFR BLD AUTO: 28.6 % (ref 35–47)
HGB BLD-MCNC: 8.2 G/DL (ref 11.7–15.7)
MCH RBC QN AUTO: 22.7 PG (ref 26.5–33)
MCHC RBC AUTO-ENTMCNC: 28.7 G/DL (ref 31.5–36.5)
MCV RBC AUTO: 79 FL (ref 78–100)
PLATELET # BLD AUTO: 232 10E9/L (ref 150–450)
POTASSIUM SERPL-SCNC: 4 MMOL/L (ref 3.4–5.3)
RBC # BLD AUTO: 3.61 10E12/L (ref 3.8–5.2)
SODIUM SERPL-SCNC: 138 MMOL/L (ref 133–144)
WBC # BLD AUTO: 7.5 10E9/L (ref 4–11)

## 2020-08-03 PROCEDURE — 80048 BASIC METABOLIC PNL TOTAL CA: CPT | Performed by: PHYSICIAN ASSISTANT

## 2020-08-03 PROCEDURE — 97110 THERAPEUTIC EXERCISES: CPT | Mod: GP

## 2020-08-03 PROCEDURE — 12800006 ZZH R&B REHAB

## 2020-08-03 PROCEDURE — 97530 THERAPEUTIC ACTIVITIES: CPT | Mod: GP | Performed by: STUDENT IN AN ORGANIZED HEALTH CARE EDUCATION/TRAINING PROGRAM

## 2020-08-03 PROCEDURE — 25000132 ZZH RX MED GY IP 250 OP 250 PS 637: Performed by: PHYSICIAN ASSISTANT

## 2020-08-03 PROCEDURE — 25000132 ZZH RX MED GY IP 250 OP 250 PS 637: Performed by: STUDENT IN AN ORGANIZED HEALTH CARE EDUCATION/TRAINING PROGRAM

## 2020-08-03 PROCEDURE — 85027 COMPLETE CBC AUTOMATED: CPT | Performed by: PHYSICIAN ASSISTANT

## 2020-08-03 PROCEDURE — 97530 THERAPEUTIC ACTIVITIES: CPT | Mod: GP

## 2020-08-03 PROCEDURE — 36415 COLL VENOUS BLD VENIPUNCTURE: CPT | Performed by: PHYSICIAN ASSISTANT

## 2020-08-03 PROCEDURE — 97535 SELF CARE MNGMENT TRAINING: CPT | Mod: GO | Performed by: STUDENT IN AN ORGANIZED HEALTH CARE EDUCATION/TRAINING PROGRAM

## 2020-08-03 PROCEDURE — 00000146 ZZHCL STATISTIC GLUCOSE BY METER IP

## 2020-08-03 RX ORDER — HYDROCODONE BITARTRATE AND ACETAMINOPHEN 7.5; 325 MG/1; MG/1
1 TABLET ORAL 4 TIMES DAILY PRN
Status: DISCONTINUED | OUTPATIENT
Start: 2020-08-03 | End: 2020-08-04

## 2020-08-03 RX ORDER — AMLODIPINE BESYLATE 10 MG/1
10 TABLET ORAL DAILY
Status: DISCONTINUED | OUTPATIENT
Start: 2020-08-04 | End: 2020-08-04 | Stop reason: HOSPADM

## 2020-08-03 RX ADMIN — LOSARTAN POTASSIUM 100 MG: 50 TABLET, FILM COATED ORAL at 07:52

## 2020-08-03 RX ADMIN — OXYCODONE HYDROCHLORIDE 10 MG: 5 TABLET ORAL at 07:51

## 2020-08-03 RX ADMIN — GABAPENTIN 1200 MG: 600 TABLET, FILM COATED ORAL at 14:01

## 2020-08-03 RX ADMIN — THIAMINE HCL TAB 100 MG 100 MG: 100 TAB at 07:52

## 2020-08-03 RX ADMIN — HYDROCODONE BITARTRATE AND ACETAMINOPHEN 1 TABLET: 7.5; 325 TABLET ORAL at 19:46

## 2020-08-03 RX ADMIN — GABAPENTIN 1200 MG: 600 TABLET, FILM COATED ORAL at 07:52

## 2020-08-03 RX ADMIN — DOCUSATE SODIUM 50 MG AND SENNOSIDES 8.6 MG 1 TABLET: 8.6; 5 TABLET, FILM COATED ORAL at 21:18

## 2020-08-03 RX ADMIN — NORTRIPTYLINE HYDROCHLORIDE 25 MG: 25 CAPSULE ORAL at 21:18

## 2020-08-03 RX ADMIN — GABAPENTIN 1200 MG: 600 TABLET, FILM COATED ORAL at 19:46

## 2020-08-03 RX ADMIN — ACETAMINOPHEN 650 MG: 325 TABLET, FILM COATED ORAL at 07:52

## 2020-08-03 RX ADMIN — SIMVASTATIN 40 MG: 20 TABLET, FILM COATED ORAL at 21:18

## 2020-08-03 RX ADMIN — HYDROCHLOROTHIAZIDE 25 MG: 25 TABLET ORAL at 07:52

## 2020-08-03 RX ADMIN — AMLODIPINE BESYLATE 5 MG: 5 TABLET ORAL at 07:52

## 2020-08-03 RX ADMIN — PANTOPRAZOLE SODIUM 40 MG: 40 TABLET, DELAYED RELEASE ORAL at 07:52

## 2020-08-03 ASSESSMENT — MIFFLIN-ST. JEOR: SCORE: 1771.99

## 2020-08-03 NOTE — PLAN OF CARE
Pt mod I wc based.     Pt on track for safe discharge home with prn A from , will benefit from home safety eval, then likely just HEP until ready to progress to OP orthotics/prothestics clinic. WC eval to be completed tomorrow, potential vendor may have demo  to issue pt at time of eval. Pt declines need for family training - states  has been providing SBA/CGA for functional mobility PTA.    Family working on obtaining all other DME/A.D. in time for discharge

## 2020-08-03 NOTE — PROGRESS NOTES
"  [facility]   Acute Rehabilitation Unit  Daily progress note        interval history  Pt seen in therapy gym today, she is doing well and feeling more ready to discharge home. Ongoing neuropathic pain, minimal incisional pain is overall doing well. Ortho to see later today to visualize incision.  Discussed pain regimen and will transition to home regimen today. Unclear if nortriptyline is helping.      medications    [START ON 8/4/2020] amLODIPine  10 mg Oral Daily     gabapentin  1,200 mg Oral TID     hydrochlorothiazide  25 mg Oral Daily     losartan  100 mg Oral Daily     nortriptyline  25 mg Oral At Bedtime     pantoprazole  40 mg Oral QAM AC     senna-docusate  1 tablet Oral At Bedtime     simvastatin  40 mg Oral At Bedtime     thiamine  100 mg Oral Daily        acetaminophen, HYDROcodone-acetaminophen, hydrOXYzine, methocarbamol, naloxone, nicotine, polyethylene glycol, sodium chloride (PF)     physical exam  BP (!) 151/72 (BP Location: Left arm)   Pulse 84   Temp 97.4  F (36.3  C) (Oral)   Resp 16   Ht 1.676 m (5' 6\")   Wt 119 kg (262 lb 6.4 oz)   SpO2 94%   BMI 42.35 kg/m    Gen: awake alert  Pulm: non labored breathing  Abd: obese distended  Ext: bl edema lymphedema wrap on right le.   L TTA: [employer] in place  Neuro/MSK: alert, awake, answers appropriately, follows commands transfers with sba.     labs  Lab Results   Component Value Date    WBC 7.5 08/03/2020     Lab Results   Component Value Date    RBC 3.61 08/03/2020     Lab Results   Component Value Date    HGB 8.2 08/03/2020     Lab Results   Component Value Date    HCT 28.6 08/03/2020     Lab Results   Component Value Date    MCV 79 08/03/2020     Lab Results   Component Value Date    MCH 22.7 08/03/2020     Lab Results   Component Value Date    MCHC 28.7 08/03/2020     Lab Results   Component Value Date    RDW 20.3 08/03/2020     Lab Results   Component Value Date     08/03/2020         Last " Comprehensive Metabolic Panel:  Sodium   Date Value Ref Range Status   08/03/2020 138 133 - 144 mmol/L Final     Potassium   Date Value Ref Range Status   08/03/2020 4.0 3.4 - 5.3 mmol/L Final     Chloride   Date Value Ref Range Status   08/03/2020 105 94 - 109 mmol/L Final     Carbon Dioxide   Date Value Ref Range Status   08/03/2020 28 20 - 32 mmol/L Final     Anion Gap   Date Value Ref Range Status   08/03/2020 5 3 - 14 mmol/L Final     Glucose   Date Value Ref Range Status   08/03/2020 106 (H) 70 - 99 mg/dL Final     Urea Nitrogen   Date Value Ref Range Status   08/03/2020 11 7 - 30 mg/dL Final     Creatinine   Date Value Ref Range Status   08/03/2020 0.79 0.52 - 1.04 mg/dL Final     GFR Estimate   Date Value Ref Range Status   08/03/2020 81 >60 mL/min/[1.73_m2] Final     Comment:     Non  GFR Calc  Starting 12/18/2018, serum creatinine based estimated GFR (eGFR) will be   calculated using the Chronic Kidney Disease Epidemiology Collaboration   (CKD-EPI) equation.       Calcium   Date Value Ref Range Status   08/03/2020 9.2 8.5 - 10.1 mg/dL Final         Rehabilitation - continue comprehensive acute inpatient rehabilitation program with multidisciplinary approach including therapies, rehab nursing, and physiatry following. See interval history for updates.      assessment and plan    Tiffani dela cruz is a 60 year old woman with a past medical history of HTN, HLD, obesity, type II diabetes, CKD, venous stasis changes in BLEs, chronic neuropathy and pain, and left Charcot foot deformity, who is now s/p left BKA on 7/22/2020.  Her post-operative course has been complicated by  hypotension, hyperkalemia, acute blood loss anemia requiring PRBC transfusion, and LIGIA. Admitted for ongoing rehabilitation and medical management on 7/27.     # Charcot arthropathy S/P left BKA 07/22  NWDONG NOVOA, eddy when out of bed  Bid wound care as ordered  F/u Dr. Adams in 2 weeks.   -pain management:  Transition to home [place]  regimen, gabapentin continue pta 1200 mg tid, robaxin prn, tylenol tid & prn.   -continue PT/OT  -No acute concerns with incision ortho saw and evaluated today- (8/3) no concerns.     #acute blood loss anemia with history of chronic anemia- pre operative hemoglobin ~9.2  received transfusion 7/25.  Hgb 7.8 7/29 stable.-->8.2 8/3    -trend  -transfuse hgb <7 & symptomatic     # history of HTN   #Post operative hypotension  pta on hydrochlorothiazide 25 mg daily, losartan 100 mg daily  Now on PTA meds and amlodipine started 8/1 will increase to 10 mg 8/4.   -monitor BP     # Type 2 diabetes, hemoglobin A1c 5.5%  On glimepiride 2 mg once a day PtA. with hypoglycemia during hospitalization. Checks BG daily at home in afternoon glucose ~140  -monitor bg bid (135-106)  -continue to hold glimepiride for now.      # LIGIA on Chronic kidney disease baseline creatinine 1.1-1.9, LIGIA felt to be from operative volume loss improved with IV Hydration. Preoperative 0.82 7/22- Cr peak 2.22 7/24. With hyperkalemia K peaked 6.8 7/24.  Now Cr. Stable 0.79 8/3 K 4.0  -trend     # Chronic ble Neuropathy in the feet: on 1200 mg of gabapentin 3 times daily and takes norco (7.5/325 up to 4 pills daily prn)  at home as well  -continue gabapentin  -Started nortriptyline 10 mg 7/27, increased to 25 mg 7/30  -resume home norco regimen     #Dyslipidemia- continue pta zocor 40 mg daily     # PUD- History of GI bleed from gastric ulcers, status post EGD in February 2020 which showed 3 gastric ulcers.  She had some esophagitis. She was noted to have some portal hypertensive gastropathy as well.  She is being treated with Protonix 40 mg daily.  She is supposed to have follow-up EGD in the future.  -Continue Protonix, follow-up with GI as outpatient.     # Chronic Venous stasis- of ble with reported recent worsening of acute edema.   - consult Lymphedema       # Tobacco use disorder: Smokes ~ half pack per day.  Encourage cessation  -would like to have  nicotine gum     # Alcohol use disorder  # Portal hypertensive gastropathy: reports 2-4 drinks of vodka daily.  February 2020  ultrasound of the abdomen with duplex- showed hepatomegaly with echogenic, heterogenous liver parenchyma and mildly thickened gallbladder wall most compatible with chronic intrinsic parenchymal disease.  On the EGD she had portal hypertensive gastropathy in February 2020.   -encourage cessation  -f/u outpatient pcp vs gi referral      1. Adjustment to disability: psychology consult   2. FEN: reg  3. Bowel: continent  4. Bladder: continent  5. DVT Prophylaxis: mechanical - Ortho does not recommend chemoprophylaxis at this time  6. GI Prophylaxis: ppi  7. Code: full  8. Disposition: home   9. ELOS: Tentative discharge date 8/7/20  10. Follow up Appointments on Discharge: ortho, pcp, neurology    Brittany Quintero PA-C  PM&R    Case discussed with .

## 2020-08-03 NOTE — PROGRESS NOTES
Brief Resident Note    Paged by RN ~2020 regarding patient's elevated BP. Noted to have SBP in the 160s throughout the day. She has been asymptomatic throughout. RN requests addition or increase in current HTN medication dose. Discussed that patient has baseline HTN and takes PTA Losartan-hydrochlorothiazide 100-25mg daily. Recently started Amlodipine 5mg daily. Discussed with RN that most medications take 3-4 days before reaches steady state, and may not see benefit of this med addition until then. Reviewed discussion with primary team for call sign out on Friday noting that they did not recommend PRN antihypertensives for now. As patient is asymptomatic with her hypertension I will defer to primary team to make any further changes to her HTN regimen. Advised to continue monitoring BP, and to keep resident apprised of any changes. RN agreeable to plan.    Sd Waller DO   PGY-3  Physical Medicine & Rehabilitation  8/2/2020 on 8:34 PM      .

## 2020-08-03 NOTE — PLAN OF CARE
FOCUS/GOAL  Bowel management, Bladder management and Mobility    ASSESSMENT, INTERVENTIONS AND CONTINUING PLAN FOR GOAL:   patient is alert and oriented x4. Able to communicate needs using call light. Denied pain, denied SOB, denied difficulty breathing, denied chest pain. A of 1 with sliding board for transfers wheelchair based. continent of  Bladder on toilet. Left BKA dressing CDI. Dressing to right and lymphedema wraps intact. (L) BKA and right LE elevated. Slept well. Safety rounds provided. Call light with in reach. Continue with POC.

## 2020-08-03 NOTE — PLAN OF CARE
Discharge Planner OT   Patient plan for discharge: [place] with assist as needed  Current status: MOD I during the day for transfer to bed and toilet. SBA at night. MOD I WC based for other ADL.   Barriers to return to prior living situation: Family training, DME. Pt's  most likely will not be able to come in during working hours for therapy training. Pt will be able to talk him through a SB transfer tot he shower. Pt reports her  has been helping her prior to admission. Recommend DME including RTS, WC, tub bench, reacher, bedrail, walker. Family is working on getting these items  Recommendations for discharge: Recommend SBA for shower transfer. Do not anticipate OT needs at discharge.   Rationale for recommendations: pt presentation       Entered by: Lillie Roberson 08/03/2020 2:37 PM

## 2020-08-03 NOTE — PLAN OF CARE
FOCUS/GOAL  Pain management, Wound care management, and Medical management    ASSESSMENT, INTERVENTIONS AND CONTINUING PLAN FOR GOAL:  Patient reported generalized discomfort and residual limb pain, requesting PRN oxycodone 10 mg, given x1 to assist with pain. Scheduled tylenol given. Orthopedics took picture at bedside. Re-dressed dressing per plan of care. Small drainage present. Medial side of stump moist and slough noted. Patient denied SOB, denied difficulty breathing. No neuro changes. VSS ex /73, scheduled BP medication given, BP recheck 151/72, HR 89. Scheduled amlodipine increased to 10 mg start 8/4/20 per MD. PRN norco added for pain; scheduled oxycodone and tylenol discontinued per MD. Patient using call light appropriately to express needs. Call light within reach. Alarms on for safety purposes. Continue with POC. Discharge plan 8/7 or earlier per team assessment.     Patient's spouse needs education on stump dressing change. Can complete during spouse's visit or when spouse on unit for family training with therapy. Patient planning to keep staff informed of timing to complete stump teaching.

## 2020-08-03 NOTE — PLAN OF CARE
FOCUS/GOAL  Pain management, Wound care management, and Mobility    ASSESSMENT, INTERVENTIONS AND CONTINUING PLAN FOR GOAL:  A/Ox4. Postoperative LLE pain managed with scheduled gabapentin and tylenol and PRN oxycodone x1 this shift. Denies SOB and chest pain. VSS ex high BP. On-call provider notified, sticky note completed. No neuro changes. Continent of B&B on toilet in bathroom. Assist x1 with GB and pivot to bed. Actively participating in therapies. LLE incisional dressing changed, small amount serosanguinous drainage, covered with ACE wrap and white . RLE foot ulcer dressing changed, scant amount drainage. Mepilex in place on left calf wound. Plan for discharge home 8/4, pt states her primary caregiver as her . Able to make needs known. Continue with plan of care.

## 2020-08-03 NOTE — PROGRESS NOTES
Phong Marxr met with pt on Friday and set up HHC services. Spoke with pt over the phone, offered HHA and pt declined. Stated that her  will assist her. Confirmed discharge address (below) and updated [facility]. No additional needs voiced at this time. Discharge end of this week.     [facility]   [address] [place] [address]    [facility] Ph: [phone]  RN, PT, OT     NAVARRO Hannah, [facility]note 3[facility] Acute Rehab Unit   Phone: [phone]  I   Pager: [phone]

## 2020-08-04 ENCOUNTER — MEDICAL CORRESPONDENCE (OUTPATIENT)
Dept: HEALTH INFORMATION MANAGEMENT | Facility: CLINIC | Age: 60
End: 2020-08-04

## 2020-08-04 ENCOUNTER — APPOINTMENT (OUTPATIENT)
Dept: OCCUPATIONAL THERAPY | Facility: CLINIC | Age: 60
End: 2020-08-04
Payer: COMMERCIAL

## 2020-08-04 ENCOUNTER — APPOINTMENT (OUTPATIENT)
Dept: PHYSICAL THERAPY | Facility: CLINIC | Age: 60
End: 2020-08-04
Payer: COMMERCIAL

## 2020-08-04 VITALS
TEMPERATURE: 96.8 F | BODY MASS INDEX: 36.27 KG/M2 | OXYGEN SATURATION: 96 % | RESPIRATION RATE: 20 BRPM | HEART RATE: 83 BPM | DIASTOLIC BLOOD PRESSURE: 60 MMHG | WEIGHT: 225.7 LBS | SYSTOLIC BLOOD PRESSURE: 140 MMHG | HEIGHT: 66 IN

## 2020-08-04 LAB — GLUCOSE BLDC GLUCOMTR-MCNC: 103 MG/DL (ref 70–99)

## 2020-08-04 PROCEDURE — 97110 THERAPEUTIC EXERCISES: CPT | Mod: GP

## 2020-08-04 PROCEDURE — 97530 THERAPEUTIC ACTIVITIES: CPT | Mod: GO | Performed by: STUDENT IN AN ORGANIZED HEALTH CARE EDUCATION/TRAINING PROGRAM

## 2020-08-04 PROCEDURE — 00000146 ZZHCL STATISTIC GLUCOSE BY METER IP

## 2020-08-04 PROCEDURE — 97530 THERAPEUTIC ACTIVITIES: CPT | Mod: GP

## 2020-08-04 PROCEDURE — G0463 HOSPITAL OUTPT CLINIC VISIT: HCPCS

## 2020-08-04 PROCEDURE — 97535 SELF CARE MNGMENT TRAINING: CPT | Mod: GO | Performed by: STUDENT IN AN ORGANIZED HEALTH CARE EDUCATION/TRAINING PROGRAM

## 2020-08-04 PROCEDURE — 25000132 ZZH RX MED GY IP 250 OP 250 PS 637: Performed by: PHYSICIAN ASSISTANT

## 2020-08-04 RX ORDER — HYDROCODONE BITARTRATE AND ACETAMINOPHEN 7.5; 325 MG/1; MG/1
1-2 TABLET ORAL 4 TIMES DAILY PRN
Status: DISCONTINUED | OUTPATIENT
Start: 2020-08-04 | End: 2020-08-04 | Stop reason: HOSPADM

## 2020-08-04 RX ORDER — HYDROCODONE BITARTRATE AND ACETAMINOPHEN 7.5; 325 MG/1; MG/1
TABLET ORAL
Refills: 0
Start: 2020-08-04

## 2020-08-04 RX ORDER — AMLODIPINE BESYLATE 10 MG/1
10 TABLET ORAL DAILY
Qty: 30 TABLET | Refills: 0 | Status: SHIPPED | OUTPATIENT
Start: 2020-08-05

## 2020-08-04 RX ORDER — NORTRIPTYLINE HCL 25 MG
25 CAPSULE ORAL AT BEDTIME
Qty: 30 CAPSULE | Refills: 0 | Status: SHIPPED | OUTPATIENT
Start: 2020-08-04 | End: 2020-10-20

## 2020-08-04 RX ORDER — HYDROCODONE BITARTRATE AND ACETAMINOPHEN 7.5; 325 MG/1; MG/1
TABLET ORAL
Refills: 0
Start: 2020-08-04 | End: 2020-08-04

## 2020-08-04 RX ORDER — LOSARTAN POTASSIUM 100 MG/1
50 TABLET ORAL DAILY
COMMUNITY
Start: 2020-08-05

## 2020-08-04 RX ADMIN — GABAPENTIN 1200 MG: 600 TABLET, FILM COATED ORAL at 13:09

## 2020-08-04 RX ADMIN — PANTOPRAZOLE SODIUM 40 MG: 40 TABLET, DELAYED RELEASE ORAL at 06:20

## 2020-08-04 RX ADMIN — AMLODIPINE BESYLATE 10 MG: 10 TABLET ORAL at 07:41

## 2020-08-04 RX ADMIN — GABAPENTIN 1200 MG: 600 TABLET, FILM COATED ORAL at 07:41

## 2020-08-04 RX ADMIN — LOSARTAN POTASSIUM 100 MG: 50 TABLET, FILM COATED ORAL at 07:41

## 2020-08-04 RX ADMIN — HYDROCODONE BITARTRATE AND ACETAMINOPHEN 1 TABLET: 7.5; 325 TABLET ORAL at 06:23

## 2020-08-04 RX ADMIN — THIAMINE HCL TAB 100 MG 100 MG: 100 TAB at 07:41

## 2020-08-04 RX ADMIN — HYDROCODONE BITARTRATE AND ACETAMINOPHEN 2 TABLET: 7.5; 325 TABLET ORAL at 12:21

## 2020-08-04 RX ADMIN — HYDROCHLOROTHIAZIDE 25 MG: 25 TABLET ORAL at 07:41

## 2020-08-04 ASSESSMENT — MIFFLIN-ST. JEOR: SCORE: 1605.52

## 2020-08-04 NOTE — PLAN OF CARE
FOCUS/GOAL  Pain management, Wound care management, Discharge planning, and Mobility    ASSESSMENT, INTERVENTIONS AND CONTINUING PLAN FOR GOAL:  A/Ox4. VSS ex trending high BP. Provider aware, new Amlodipine to start tomorrow in AM. Denies SOB, chest pain. No neuro changes. LLE postoperative pain controlled with PRN Norco x1. Pt stated she used oxycodone at home and stated Norco not as effective. Sticky note completed. MOD I in room, stump protector to be in place whenever out of bed. Continent of B&B on toilet in bathroom. LLE dressing CDI. RLE wound dressings changed this shift, small serosanguinous drainage on L foot ulcer, healing WNL. BG 93 at dinnertime. Plan for DSC by 8/7 pending home wheelchair available and wound dressing education for  who is primary caregiver. Pt able to make needs known. Continue plan of care.

## 2020-08-04 NOTE — PROGRESS NOTES
Notified HE C that pt may discharge home as early as tonight vs tomorrow pending w/c. No additional SW needs.     Discharge Address:   [facility]   [address]   Ph: [phone]  RN, PT, OT     NAVARRO Hannah, [facility] Acute Rehab Unit   Phone: [phone]  I   Pager: [phone]

## 2020-08-04 NOTE — PLAN OF CARE
FOCUS/GOAL  Medication management, Pain management, and Medical management    ASSESSMENT, INTERVENTIONS AND CONTINUING PLAN FOR GOAL:  Patient A&O x4. Mod I, w/c based. Continent of bowel and bladder. LBM 8/4 per pt report. Pt BP elevated before AM meds given. Nursing assistant checked vitals 40 min later and BP still elevated. BP was 146/60 at 1308. Able to have shower. [facility] nurse changed Family training to occur this PM w/ pt  to learn BKA dressing care. Pt's pain managed w/ PRN 1 tab Norco. Stated ineffective. Provider notified, changed orders to PRN [place] 1-2 tab. PRN 2 tab given and pt stated there was a slight increase in relief. Plan to discharge later today.

## 2020-08-04 NOTE — PLAN OF CARE
Discharge Planner OT   Patient plan for discharge: Home with assist as needed  Current status: MOD I WC based for ADL routine. SBA for shower transfer.  Barriers to return to prior living situation: Needs WC, WC eval to be completed today with hope that she can get a loaner immediately  Recommendations for discharge: Recommend SBA for shower transfer and assist with IADL as needed. No further OT necessary. Pt hopes to discharge today if WC is available  Rationale for recommendations: pt presentation       Entered by: Lillie Roberson 08/04/2020 9:17 AM

## 2020-08-04 NOTE — PLAN OF CARE
Patient given discharge paperwork at bedside with  present. Wound care education done at bedside with both  and patient. Both verbalized understanding. Discharge instructions reviewed with no questions and understanding verbalized. Belongings all sent home with patient including wheelchair and all personal belongings, as well as three days of wound care supplies and dressings. Patient left unit via wheelchair and was assisted into personal vehicle with assist of 1.

## 2020-08-04 NOTE — PLAN OF CARE
Physical Therapy Discharge Summary    Reason for therapy discharge:    Discharged to home with outpatient therapy.    Progress towards therapy goal(s). See goals on Care Plan in [facility] electronic health record for goal details.  Goals met    Therapy recommendation(s):    Continued therapy is recommended.  Rationale/Recommendations:  Has written HEP for pre-prosthetic strengthening. Has met goals to allow for safe discharge home Mod I w/c based w/ assist from her .  Will benefit from ongoing skilled PT services to progress HEP and functional mobility prior to prosthetic fitting.     Error in above information: Patient will discharge home w/ home care and progress to OP therapy as deemed appropriate by the home care team.

## 2020-08-04 NOTE — PROGRESS NOTES
Called and left  with [employer]  PH: [phone] and provided discharge update. No further  needs identified at this time. [facility]     Discharge Address:   [place] [address] [address] Care   Ph: [phone]  RN, PT, OT     Yoselin Argueta, [facility], [facility] Acute Rehab Unit   Phone: [phone]  I   Pager: [phone]

## 2020-08-04 NOTE — DISCHARGE INSTRUCTIONS
Follow Up Appointments    - Follow up with primary care provider, Rhea Torres CNP  You are scheduled to see Rhea Torres CNP on August 13th at 10:00 AM.    Address  [facility]                          [address]                           [address]                           [place] [address]  Phone   [phone]  Fax                  [phone]    - Follow up with orthopedics  You are scheduled to see Dr. Adams on September 1st at 1:00 PM. Please arrive for check in at 12:45 PM.    Address  [facility]                          [facility]                          [address]                          [address]                          [place] [address]  Phone   [phone]    Home Health Care:  [facility] Ph: [phone]  Nurse, physical therapy     Amputation Resources:   [employer]  https://www."[employer].org/  PH: [phone]  Info@"[employer].org  Local agency with support, resources, financial assistance, home modifications and education    [employer] Amputation Support Group   Tuesday nights at 6:30pm  [facility] [place]  [address] [place] [address]  Held by: Dina Hodgson PH: [phone] (a bilateral amputee as well, great resource and contact for additional support)     [employer]putation Coalition   https://www.amputee-coalition.org/resources/[place]-2/  PH: [phone]  National agency where you can find support groups, connect with peers and get education

## 2020-08-04 NOTE — DISCHARGE SUMMARY
[facility]   Acute Rehabilitation Unit  Discharge summary     Date of Admission: 7/27/2020  Date of Discharge: 8/4/20  Disposition: home  Primary Care Physician: Wily Bonilla  Attending physician: Cierra Antoine MD    discharge diagnosis  S/p Left BKA  Acute blood loss anemia  HTN  Type 2 dm  elinor on ckd  Chronic neuropathy    brief summary  Tiffani dela cruz is a 60 year old woman with a past medical history of HTN, HLD, obesity, type II diabetes, CKD, venous stasis changes in BLEs, chronic neuropathy and pain, and left Charcot foot deformity, who is now s/p left BKA on 7/22/2020.  Her post-operative course has been complicated by  hypotension, hyperkalemia, acute blood loss anemia requiring PRBC transfusion, and ELINOR. Admitted for ongoing rehabilitation and medical management on 7/27.     rehabilitation course  OT: No further therapy is recommended. Pt is WC based and able to manage self cares MOD I. Recommend assist with shower transfer and to use SB for shower transfer. Otherwise pt has other recommended DME. Pt got her GENBAND WC today after having her WC eval. Pt and  declined family training.  is available to assist as needed and has in the past assisted her. No further OT needed.     PT: Continued therapy is recommended.  Rationale/Recommendations:  Has written HEP for pre-prosthetic strengthening. Has met goals to allow for safe discharge home Mod I w/c based w/ assist from her .  Will benefit from ongoing skilled PT services to progress HEP and functional mobility prior to prosthetic fitting. Discharge home with home care.     mEDICAL COURSE    # Charcot arthropathy S/P left BKA 07/22  eddy CARRILLO when out of bed  Bid wound care as ordered  F/u Dr. Adams in 2 weeks.   -pain management:  Transition to home norco regimen, gabapentin continue pta 1200 mg tid, robaxin prn, tylenol tid & prn.   -continue PT/OT  -No acute concerns with incision  ortho saw and evaluated today- (8/3) no concerns.     #acute blood loss anemia with history of chronic anemia- pre operative hemoglobin ~9.2  received transfusion 7/25.  Hgb 7.8 7/29 stable.-->8.2 8/3    -trend  -transfuse hgb <7 & symptomatic     # history of HTN   #Post operative hypotension  pta on hydrochlorothiazide 25 mg daily, losartan 100 mg daily  Now on PTA meds and amlodipine started 8/1 will increase to 10 mg 8/4.   -monitor BP     # Type 2 diabetes, hemoglobin A1c 5.5%  On glimepiride 2 mg once a day PtA. with hypoglycemia during hospitalization. Checks BG daily at home in afternoon glucose ~140  -monitor bg bid (135-106)  -continue to hold glimepiride for now.      # LIGIA on Chronic kidney disease baseline creatinine 1.1-1.9, LIGIA felt to be from operative volume loss improved with IV Hydration. Preoperative 0.82 7/22- Cr peak 2.22 7/24. With hyperkalemia K peaked 6.8 7/24.  Now Cr. Stable 0.79 8/3 K 4.0  -trend     # Chronic  Neuropathy: on 1200 mg of gabapentin 3 times daily and takes norco (7.5/325 up to 4 pills daily prn)  at home as well  -continue gabapentin  -Started nortriptyline 10 mg 7/27, increased to 25 mg 7/30  -resume home norco regimen     #Dyslipidemia- continue pta zocor 40 mg daily     # PUD- History of GI bleed from gastric ulcers, status post EGD in February 2020 which showed 3 gastric ulcers.  She had some esophagitis. She was noted to have some portal hypertensive gastropathy as well.  She is being treated with Protonix 40 mg daily.  She is supposed to have follow-up EGD in the future.  -Continue Protonix, follow-up with GI as outpatient.     # Chronic Venous stasis- of ble with reported recent worsening of acute edema.   - consult Lymphedema       # Tobacco use disorder: Smokes ~ half pack per day.  Encourage cessation  -would like to have nicotine gum     # Alcohol use disorder  # Portal hypertensive gastropathy: reports 2-4 drinks of vodka daily.  February 2020  ultrasound of the  abdomen with duplex- showed hepatomegaly with echogenic, heterogenous liver parenchyma and mildly thickened gallbladder wall most compatible with chronic intrinsic parenchymal disease.  On the EGD she had portal hypertensive gastropathy in February 2020.   -encourage cessation  -f/u outpatient pcp vs gi referral     dISCHARGE MEDICATIONS  Current Discharge Medication List      START taking these medications    Details   amLODIPine (NORVASC) 10 MG tablet Take 1 tablet (10 mg) by mouth daily  Qty: 30 tablet, Refills: 0    Associated Diagnoses: Benign essential hypertension      HYDROcodone-acetaminophen (NORCO) 7.5-325 MG per tablet 1-2 tablets by mouth up to 4 times daily as needed- for up to  4 tablets a day x 1 week  (8/5-8/11)  then return to prior to admission regimen of 1 tablet by mouth up to 4 times daily.  Qty:  , Refills: 0    Associated Diagnoses: Status post below-knee amputation of left lower extremity (H)      losartan (COZAAR) 100 MG tablet Take 1 tablet (100 mg) by mouth daily  Qty:        nortriptyline (PAMELOR) 25 MG capsule Take 1 capsule (25 mg) by mouth At Bedtime  Qty: 30 capsule, Refills: 0    Associated Diagnoses: Chronic neuropathic pain         CONTINUE these medications which have NOT CHANGED    Details   acetaminophen (TYLENOL) 325 MG tablet Take 2 tablets (650 mg) by mouth every 4 hours as needed for other  Qty: 1 Bottle, Refills: 0    Associated Diagnoses: Below-knee amputation (H)      blood glucose (CONTOUR NEXT TEST) test strip apply 1 strip by finger poke route 2 times per day.      blood glucose monitoring (MIGUEL A MICROLET) lancets by subcutaneous route 2 times per day.      gabapentin (NEURONTIN) 600 MG tablet Take 2 tablets (1,200 mg) by mouth 3 times daily  Qty:      Associated Diagnoses: Below-knee amputation (H)      hydrochlorothiazide (HYDRODIURIL) 25 MG tablet TAKE 1 TABLET BY MOUTH DAILY WITH 100 MG LOSARTAN      !! order for DME Equipment being ordered: [id number] $35   shoe post op mens md  Qty: 1 Device, Refills: 0    Associated Diagnoses: Diabetic ulcer of right midfoot associated with type 2 diabetes mellitus, with bone involvement without evidence of necrosis (H); Charcot's joint of left foot; Skin ulcer of left ankle with necrosis of muscle (H); Type 2 diabetes mellitus with diabetic polyneuropathy, without long-term current use of insulin (H); Venous incompetence      !! order for DME Equipment being ordered: [id number] [id number] $70  DH Shoe LG  Qty: 1 Device, Refills: 0    Associated Diagnoses: Bilateral foot pain; Diabetic ulcer of right midfoot associated with type 2 diabetes mellitus, with necrosis of bone (H); Type 2 diabetes mellitus with diabetic polyneuropathy, unspecified whether long term insulin use (H)      pantoprazole (PROTONIX) 40 MG EC tablet Take 1 tablet (40 mg) by mouth every morning (before breakfast)  Qty: 120 tablet, Refills: 3    Associated Diagnoses: Gastrointestinal hemorrhage associated with gastric ulcer      simvastatin (ZOCOR) 40 MG tablet Take 40 mg by mouth At Bedtime       !! - Potential duplicate medications found. Please discuss with provider.      STOP taking these medications       glimepiride (AMARYL) 2 MG tablet Comments:   Reason for Stopping:         hydrOXYzine (ATARAX) 25 MG tablet Comments:   Reason for Stopping:         methocarbamol (ROBAXIN) 750 MG tablet Comments:   Reason for Stopping:         oxyCODONE (ROXICODONE) 5 MG tablet Comments:   Reason for Stopping:         polyethylene glycol (MIRALAX) 17 g packet Comments:   Reason for Stopping:         senna-docusate (SENOKOT-S/PERICOLACE) 8.6-50 MG tablet Comments:   Reason for Stopping:         thiamine (B-1) 100 MG tablet Comments:   Reason for Stopping:                 DISCHARGE INSTRUCTIONS AND FOLLOW UP  Discharge Procedure Orders   Home care nursing referral   Referral Priority: Routine Referral Type: Home Health Therapies & Aides   Number of Visits Requested: 1     Home  Care PT Referral for Hospital Discharge   Referral Priority: Routine Referral Type: Home Health Therapies & Aides   Number of Visits Requested: 1     Reason for your hospital stay   Order Comments: Admitted for rehabilitation following Left below knee amputation.     Adult [facility] Follow-up and recommended labs and tests   Order Comments: Please follow up with primary care within one week  Follow up with pain management specialist as previously outlined   Follow up with orthopedics 8/5, and 9/1.    Appointments on [place] and/or [facility] (with [facility] or [facility] provider or service). Call [phone] if you haven't heard regarding these appointments within 7 days of discharge.     Activity   Order Comments: Your activity upon discharge: activity as tolerated.   Non weightbearing on left lower extremity.     Order Specific Question Answer Comments   Is discharge order? Yes      Wound care and dressings   Order Comments: Instructions to care for your wound at home:   Left below knee amputation incision:   - Wound care: cover small areas of dehiscence with adaptic, gauze, and wrap ACE over stump to shrink stump. Change dressings and inspect wound daily and as needed to keep it dry.   - Elevate stump at all times for edema reduction    Perianal wound:  Daily; cleanse with saline, apply No sting  Barrier, allow to dry completely before releasing skin    Right lateral lower leg and right 1st met head wound: Daily : wash leg and foot with soap, water and soft cloth taking care to cleanse between toes. Cleanse wounds with Microklenz,  Dry  Intact skin thoroughly, cover R lateral leg wound  with Optifoam border dressing ( item # [id number]) if drainage is minimal can decrease this dressing to every other day  Apply Aquacel Ag to right 1st met head wound and cover with 2x2 gauze or Primapore. Moisturize intact skin with Sween 24 or Aquaphor,   Wrap legs per lymphedema therapy orders.     MD face to face encounter   Order  Comments: Documentation of Face to Face and Certification for Home Health Services    I certify that patient: Tiffani Tan is under my care and that I, or a nurse practitioner or physician's assistant working with me, had a face-to-face encounter that meets the physician face-to-face encounter requirements with this patient on: 8/4/2020.    This encounter with the patient was in whole, or in part, for the following medical condition, which is the primary reason for home health care: s/p Left BKA.    I certify that, based on my findings, the following services are medically necessary home health services: Nursing and Physical Therapy.    My clinical findings support the need for the above services because: Nurse is needed: assess home safety wound care. and Physical Therapy Services are needed to assess and treat the following functional impairments: s/p BKA mobility, home safety.    Further, I certify that my clinical findings support that this patient is homebound (i.e. absences from home require considerable and taxing effort and are for medical reasons or [employer] services or infrequently or of short duration when for other reasons) because: Requires assistance of another person or specialized equipment to access medical services because patient: Is unable to operate assistive equipment on their own...    Based on the above findings. I certify that this patient is confined to the home and needs intermittent skilled nursing care, physical therapy and/or speech therapy.  The patient is under my care, and I have initiated the establishment of the plan of care.  This patient will be followed by a physician who will periodically review the plan of care.  Physician/Provider to provide follow up care: Wily Bonilla    Attending hospital physician (the Medicare certified P[facility] provider): Cierra Antoine MD  Physician Signature: See electronic signature associated with these discharge orders.  Date: 8/4/2020      Full Code     Order Specific Question Answer Comments   Code status determined by: Discussion with patient/ legal decision maker      Diet   Order Comments: Follow this diet upon discharge:Regular Diet Adult     Order Specific Question Answer Comments   Is discharge order? Yes           physical examination    Most recent Vital Signs:   Vitals:    08/04/20 0617 08/04/20 0738 08/04/20 0818 08/04/20 1308   BP:  (!) 155/67 (!) 175/90 (!) 146/60   BP Location:  Left arm Right arm Left arm   Pulse:  86 102    Resp:  14 20    Temp:  96.3  F (35.7  C) 96.9  F (36.1  C)    TempSrc:  Oral Oral    SpO2:  92% 95%    Weight: 102.4 kg (225 lb 11.2 oz)      Height:       General: awake alert nad  REsp: non labored clear on room air  Ab: soft non distended non tender  CV: rrr  Extremities: rle in orthotic boot lle in flotech.   MSK/Neuro: up with sba self propelling chair.       40 minutes spent in discharge, including >50% in counseling and coordination of care, medication review and plan of care recommended on follow up.     Patient was evaluated on day of discharge by attending physician, Cierra Antoine MD, who agrees with plan of care.    Discharge summary was forwarded to Wily Bonilla (PCP) at the time of discharge, so as to bridge from hospital to outpatient care.     It was our pleasure to care for Tiffani Tan during this hospitalization. Please do not hesitate to contact me should there be questions regarding the hospital course or discharge plan.          Brittany Quintero PA-C  Physical Medicine and Rehabilitation   [phone]

## 2020-08-04 NOTE — PROGRESS NOTES
[facility] Nurse Inpatient Wound Assessment   Reason for consultation: Evaluate and treat right lateral lower leg and right 1st met head wounds  New consult 7/28 for perianal wound    Assessment  Right lateral lower leg wounds due to Skin Tear  Status:stable    Right 1st met head wounds due to Diabetic Ulcer  Status: stable     Perianal wound: unknown etiology ( possibly Moisture associated dermatitis)  Status  Initial assessment   Treatment Plan  Right lateral lower leg and right 1st met head wound: Daily : wash leg and foot with soap, water and a wash cloth taking care to cleanse between toes. Dry thoroughly. Moisturize intact skin with Sween 24 or Aquaphor. Apply Adaptic to right lateral lower leg wound and cover with Optifoam. Apply Aquacel Ag to right 1st met head wound and cover with 2x2 gauze or Primapore. Wrap legs per lymphedema therapy orders.      Perianal wound:  Daily cleanse with saline, paint area with No sting and allow to dry , no dressings due to inability to keep moisture away form area with bedpan use and close proximety to anus      Orders Written  Recommended provider order: None, at this time  [facility] Nurse follow-up plan:weekly  Nursing to notify the Provider(s) and re-consult the [facility] Nurse if wound(s) deteriorates or new skin concern.    Patient History  According to provider note(s):  Tiffani Tan is a 60 year old female with a past medical history of hypertension, dyslipidemia, obesity, type 2 diabetes, chronic kidney disease, neuropathy in the feet, GI bleed from peptic ulcer disease, venous stasis changes in the bilateral lower extremity, Charcot foot deformity left foot.  Patient was brought in for left below-knee amputation for charcot foot arthropathy on 7/22/2020.      Objective Data  Containment of urine/stool: Continent of bladder and Continent of bowel    Active Diet Order  Orders Placed This Encounter      Combination Diet Regular Diet Adult      Output:   I/O last 3 completed shifts:  In:  240 [P.O.:240]  Out: -     Risk Assessment:   Sensory Perception: 3-->slightly limited  Moisture: 4-->rarely moist  Activity: 3-->walks occasionally  Mobility: 3-->slightly limited  Nutrition: 3-->adequate  Friction and Shear: 2-->potential problem  Stewart Score: 18                          Labs:   Recent Labs   Lab 08/03/20  0637 08/01/20  1519   HGB 8.2* 8.3*   WBC 7.5 8.9   CRP  --  20.0*       Physical Exam  Areas of skin assessed: focused right lower leg and foot        Wound Location:  Right lateral lower leg  Date of last photo: 7/28  Wound History: Per patient, skin tear happened upon removal of Parul boot upon presentation for surgery. 8/4: Nearly healed.    Wound Base: 100 % dermis     Palpation of the wound bed: normal      Drainage: scant     Description of drainage: serous     Measurements (length x width x depth, in cm) 3 separate areas of epidermis loss (0.8 to 2.0cm each)    Periwound skin:moist      Color: pink      Temperature: normal   Odor: none  Pain: denies   Pain intervention prior to dressing change: none     Wound Location:  Right 1st met head        Date of last photo: 7/28  Wound History: Present on admission; per patient has been present x 2 years and she is followed at an outpatient clinic, 8/4: stable  Wound Base: red viable and adherent fibrin     Palpation of the wound bed: normal      Drainage: scant     Description of drainage: serosanguinous     Measurements (length x width x depth, in cm) 3  x 2  x  0.3 cm      Tunneling N/A     Undermining N/A  Periwound skin: hyperkeratosis      Color: pale      Temperature: normal   Odor: none  Pain: denies   Pain intervention prior to dressing change: none      Wound Location:  Perianal skin      Date of last photo: 7/28  Wound History: unknown, presently uses bedpan and occasionally this does cause moisture to area. 8/4: pt declined assessment stating it's healed.    Wound Base: 100 % dermis and forming this layer of new epidermis     Palpation  of the wound bed: normal      Drainage:none     Description of drainage: NA     Measurements (length x width x depth, in cm)1.5 x 1.5 x <0.1cm  Periwound skin: tan  Odor: none  Pain: denies   Pain intervention prior to dressing change: none   Interventions  Visual inspection and assessment completed   Wound Care Rationale Promote moist wound healing without tissue dehydration , protect skin form incontinence  Wound Care: done per plan of care  Supplies: floor stock  Current off-loading measures: Foam padding  Current support surface: Standard  Atmos Air mattress  Education provided to: plan of care  Discussed plan of care with Patient and Nurse    Rose Murdock RN, CWOCN

## 2020-08-04 NOTE — PLAN OF CARE
FOCUS/GOAL  Bladder management, Pain management, Mobility, Cognition/Memory/Judgment/Problem solving, and Safety management    ASSESSMENT, INTERVENTIONS AND CONTINUING PLAN FOR GOAL:  Pt is alert and oriented. Continent of bladder, voiding without difficulty using toilet. Pt up Mod I with wheel chair, SBA with toileting. Norco for pain management. Pt slept well between cares. Uses call light appropriately, able to make needs known. Will continue with POC.

## 2020-08-05 ENCOUNTER — TELEPHONE (OUTPATIENT)
Dept: ORTHOPEDICS | Facility: CLINIC | Age: 60
End: 2020-08-05

## 2020-08-05 DIAGNOSIS — Z89.519 S/P BKA (BELOW KNEE AMPUTATION) (H): Primary | ICD-10-CM

## 2020-08-05 NOTE — TELEPHONE ENCOUNTER
I called Tiffani today because she no showed her appointment with me this morning. I called to check in and see how things were going. She did just get released from the hospital last night around 6:30pm. She relates things are going well. She apologized for not coming to her appointment but she was unable to get a ride. Home care was ordered for the patient that should help with managing her wounds. I did help coordinate an appointment for her to come and see me next week for a post op visit to check in. I have placed an orthotics order for her today to be fitted for a prosthesis. She will call the [place] location later today to set up an appointment.    Lisette David, ATC

## 2020-08-05 NOTE — PROGRESS NOTES
Reason for visit:    Tiffani Tan came in to the clinic for a post op check.    Her surgery was done 7/22/20 by Dr Adams.  She had left below the knee amputation     Assessment:    Tiffani came into the clinic Non-WB. Tiffani had no recollection of our phone call last week when I asked her if she was able to see orthotics or if the wound nurse had taken a look at her left leg. She said that no one has talked to her about anything to do with the left leg. I reminded her about our phone call last week and she said she does not remember me calling.  Gaby Real RN did give verbal orders last week, 8/6/20, to Larissa with  home care for wound care as well as PT and OT s/p the above procedure.     The Surgical wounds were exposed and found to be not sufficiently healed. Sutures were removed. CMS was found to be intact.      Plan:     She was placed in an ace wrap.  She was told to remain Non-WB. She will see Dr. Giron this afternoon to address the medial and lateral wounds.      She will be evaluated by orthotics for fitting of a prosthesis. She has an appointment to see Dr. Adams at that time Dr. Adams will determine further restrictions.    She has our phone number and will call with questions or problems.      Lisette David, ATC

## 2020-08-06 ENCOUNTER — TELEPHONE (OUTPATIENT)
Dept: ORTHOPEDICS | Facility: CLINIC | Age: 60
End: 2020-08-06

## 2020-08-06 ENCOUNTER — MEDICAL CORRESPONDENCE (OUTPATIENT)
Dept: HEALTH INFORMATION MANAGEMENT | Facility: CLINIC | Age: 60
End: 2020-08-06

## 2020-08-06 NOTE — TELEPHONE ENCOUNTER
[facility] Call Center    Phone Message    May a detailed message be left on voicemail: yes     Reason for Call: Other:    Larissa, Nurse with  Home Care calling for orders for   Skilled nursing:  Twice a wk for 3 wks  3 prn visits  PT and OT eval and treat  Wound care orders as prescribed at discharge    2 medication issues Larissa wants to address:   Amlopine and Simvastatin are level 1 contraindicated.     Tylenol and Norco are 'duplicate therapy'. Pt hardly takes the tylenol.     Please call for verbal order.     Action Taken: Other:   Ortho    Travel Screening: Not Applicable

## 2020-08-06 NOTE — TELEPHONE ENCOUNTER
AMI Dias home care RN was called back by RN and voicemail left giving verbal orders for home care as requested including P.T and O. T, and wound care as prescribed.  Message included instructions to have primary MD discuss Amlodipine and Simvastatin.  Message included tylenol when not taking Norco, keeping total tylenol under 3500mg/24hr period.  Gaby Real RN

## 2020-08-10 ENCOUNTER — TELEPHONE (OUTPATIENT)
Dept: PODIATRY | Facility: CLINIC | Age: 60
End: 2020-08-10

## 2020-08-10 NOTE — TELEPHONE ENCOUNTER
RN called and left a detailed VM to patient. Telling patient her appt is made for weds and she will be seen by Lisette Castro RN       Health Call Center    Phone Message    May a detailed message be left on voicemail: yes     Reason for Call: Other: Patient calling to schedule an appointment for this week to get sutures out in left leg and also to look at sores on left foot. There was no appointment available this week to get sutures out. Please advise.     Action Taken: Message routed to:  Adult Clinics: Podiatry p [phone]    Travel Screening: Not Applicable

## 2020-08-11 ENCOUNTER — TELEPHONE (OUTPATIENT)
Dept: ORTHOPEDICS | Facility: CLINIC | Age: 60
End: 2020-08-11

## 2020-08-11 NOTE — TELEPHONE ENCOUNTER
Saw patient today for WOC evaluation to left stump (BKA) on 7/22/20. Patient was noted to have 2 areas opened. 1 area on the medial side of stump measuring 6.0 cm x 3.7 cm x 0.2 cm with approx. 60% yellow slough and 40% clean non granulating tissue and lateral side measuring 3.8 cm x 3.1 cm x 0.3 cm with 70% yellow slough and 30% clean non granulating tissue.  Both wounds have maceration to the periwound.      Recommending the following changes for wound care:    Wound care to left stump wounds  1. Cleanse with normal saline or wound cleanser, pat dry  2. Apply skin prep barrier/barrier cream to periwound  3. Apply Aquacel AG to wound bed, this is to be changed daily.  4. Wrap with Kerlix gauze, secure with tape. Wrap with Ace wrap.  5. Family to change on non nursing days.    You may route your orders/recommendations back through [facility],    Thank you,    JOE Goode, RN, [facility]   08/11/20  2:04 PM   [facility]  [phone]   [email or web address]

## 2020-08-12 ENCOUNTER — OFFICE VISIT (OUTPATIENT)
Dept: PODIATRY | Facility: CLINIC | Age: 60
End: 2020-08-12
Payer: COMMERCIAL

## 2020-08-12 ENCOUNTER — OFFICE VISIT (OUTPATIENT)
Dept: ORTHOPEDICS | Facility: CLINIC | Age: 60
End: 2020-08-12
Payer: COMMERCIAL

## 2020-08-12 VITALS — SYSTOLIC BLOOD PRESSURE: 140 MMHG | OXYGEN SATURATION: 94 % | DIASTOLIC BLOOD PRESSURE: 69 MMHG | HEART RATE: 98 BPM

## 2020-08-12 DIAGNOSIS — Z48.02 VISIT FOR SUTURE REMOVAL: ICD-10-CM

## 2020-08-12 DIAGNOSIS — L97.416 DIABETIC ULCER OF RIGHT MIDFOOT ASSOCIATED WITH TYPE 2 DIABETES MELLITUS, WITH BONE INVOLVEMENT WITHOUT EVIDENCE OF NECROSIS (H): ICD-10-CM

## 2020-08-12 DIAGNOSIS — E11.621 DIABETIC ULCER OF RIGHT MIDFOOT ASSOCIATED WITH TYPE 2 DIABETES MELLITUS, WITH BONE INVOLVEMENT WITHOUT EVIDENCE OF NECROSIS (H): ICD-10-CM

## 2020-08-12 DIAGNOSIS — Z89.512 STATUS POST BELOW-KNEE AMPUTATION OF LEFT LOWER EXTREMITY (H): ICD-10-CM

## 2020-08-12 DIAGNOSIS — Z51.89 VISIT FOR WOUND CHECK: Primary | ICD-10-CM

## 2020-08-12 DIAGNOSIS — L97.922 SKIN ULCER OF LEFT LOWER LEG WITH FAT LAYER EXPOSED (H): ICD-10-CM

## 2020-08-12 DIAGNOSIS — L03.115 CELLULITIS OF RIGHT LOWER EXTREMITY: Primary | ICD-10-CM

## 2020-08-12 PROCEDURE — 99213 OFFICE O/P EST LOW 20 MIN: CPT | Mod: 25 | Performed by: PODIATRIST

## 2020-08-12 PROCEDURE — 11045 DBRDMT SUBQ TISS EACH ADDL: CPT | Performed by: PODIATRIST

## 2020-08-12 PROCEDURE — 11042 DBRDMT SUBQ TIS 1ST 20SQCM/<: CPT | Performed by: PODIATRIST

## 2020-08-12 NOTE — LETTER
8/12/2020         RE: Tiffani Tan  [address] [facility] [address]        Dear Colleague,    Thank you for referring your patient, Tiffani Tan, to the [facility]. Please see a copy of my visit note below.    Patient presents with:  WOUND CARE: Left BKA and right plantar foot         No Known Allergies      Subjective: Tiffani is a 60 year old female who presents to the clinic today for a follow up of wound on the left BKA stump and wound on the right plantar first metatarsal head.  On July 22, she underwent a left BKA for severe Charcot foot and nonhealing wounds with osteomyelitis.  This is been a long decision making process for her.  She relates that she is happy that she went through with this.  She went to rehab after her surgery and was discharged a week later.  She is currently living back at home.  She relates that it is quite easy for her to get around at home and at work.  However, today at work, she did slam her BKA stump into some concrete and it is bleeding through the dressings today.  She relates that she has some discomfort to the anterior part of the BKA site.  She does have some open areas on the BKA site and she is dressing these every day.  On the right side, she continues to have a wound on the plantar first metatarsal head.  She does transfer on the right foot.  She does not have a prosthesis yet, however this should be the next 2 weeks after the wounds are healed.    Objective    Hemoglobin A1C   Date Value Ref Range Status   07/21/2020 5.5 0 - 5.6 % Final     Comment:     Normal <5.7% Prediabetes 5.7-6.4%  Diabetes 6.5% or higher - adopted from ADA   consensus guidelines.     02/04/2020 6.5 (H) 0 - 5.6 % Final     Comment:     Normal <5.7% Prediabetes 5.7-6.4%  Diabetes 6.5% or higher - adopted from ADA   consensus guidelines.           Data Unavailable 98 Data Unavailable 140/69 Data Unavailable 0 lbs 0 oz        A dehiscence wound is noted at left  lateral BKA site  measuring 4.5cm x 2cm x 2cm.    Inman Classification: 2    Wound base: Red/Granulation    Edges: intact    Drainage: moderate/serosanguinous 2/2 recent trauma to the area.    Odor: no    Undermining: no    Tunnelin cm at the proximal edge and tracking proximally.     Bone Exposure: No    Clinical Signs of Infection: No    After obtaining patient consent, the wound was irrigated with copious amounts of saline. A scalpel was then used to debride the wound into the subcutaneous tissue. The wound edges were debrided to healthy, bleeding tissue. Given the patient's lack of sensation, no anesthesia was necessary for the procedure.     Barriers to Healing: Tiffani is already had a trauma to the area today.  She bumps the area.  Unsure of what this is done to the incision site, however there is sanguinous drainage and strikethrough on the dressing.    Treatment Plan: Daily dressing changes    Pt Ability to Follow Plan: Good  ___________________  Wound #2      A dehiscence wound is noted at left  lateral BKA site measuring 6cm x 3cm x 0.1cm.    Inman Classification: 2    Wound base: Pink/Granulation    Edges: intact    Drainage: small/serous    Odor: no    Undermining: no    Bone Exposure: No    Clinical Signs of Infection: No    After obtaining patient consent, the wound was irrigated with copious amounts of saline.  ______________  Wound #3          A diabetic pressure wound is noted at right  plantar 1st met head measuring 1.5cm x 3cm x 0.2cm.    Inman Classification: 2    Wound base: Red/Granulation    Edges: hyperkeratotic    Drainage: moderate/serous    Odor: no    Undermining: around entire [id number]    Bone Exposure: No    Clinical Signs of Infection: Yes. Cellulitis of the leg.     After obtaining patient consent, the wound was irrigated with copious amounts of saline. A scalpel and curette were then used to debride the wound into subcutaneous tissue. The wound edges were debrided back to healthy, bleeding  tissue. The wound base exhibited healthy bleeding. Given the patient's lack of sensation, no anesthesia was necessary for the procedure.    Barriers to Healing: Tiffani is a type II diabetic with severe neuropathy in the right leg.  She has to use this leg at this point and put pressure on the wound in order to transfer.  This decreases the chance that this will heal.  This wound is been open for a very long time, years.    Treatment Plan: Daily dressing changes with Aquacel.    Pt Ability to Follow Plan: For with weightbearing status secondary to BKA on the left.  Good with dressing changes.        Assessment: Tiffani is a 60-year-old type II diabetic with recent left BKA with wound dehiscence and right first metatarsal head pressure ulceration with duration of years.  She has had some recent trauma to the left stump.  Her right leg also looks cellulitic to me.  There is warmth and edema to the leg.  She does not have any pain to the leg.    Plan:   - Pt seen and evaluated  -We will start some Aquacel dressings on all of the wounds.  She can cover these with a dry sterile dressing.  -Unfortunately, nonweightbearing on the right foot is not available for her right now.  We will have to make do with using the DH shoe on that side.  -Cellulitis noted to the right leg.  I sent in a prescription for Augmentin.  I have asked her to watch the area closely.  If she sees any worsening over the next 2 days, she should go to emergency room.  - Pt to return to clinic in 1 week with Dr. Whitten.        Again, thank you for allowing me to participate in the care of your patient.        Sincerely,        Daniel Giron DPM

## 2020-08-13 NOTE — PROGRESS NOTES
Patient presents with:  WOUND CARE: Left BKA and right plantar foot         No Known Allergies      Subjective: Tiffani is a 60 year old female who presents to the clinic today for a follow up of wound on the left BKA stump and wound on the right plantar first metatarsal head.  On , she underwent a left BKA for severe Charcot foot and nonhealing wounds with osteomyelitis.  This is been a long decision making process for her.  She relates that she is happy that she went through with this.  She went to rehab after her surgery and was discharged a week later.  She is currently living back at home.  She relates that it is quite easy for her to get around at home and at work.  However, today at work, she did slam her BKA stump into some concrete and it is bleeding through the dressings today.  She relates that she has some discomfort to the anterior part of the BKA site.  She does have some open areas on the BKA site and she is dressing these every day.  On the right side, she continues to have a wound on the plantar first metatarsal head.  She does transfer on the right foot.  She does not have a prosthesis yet, however this should be the next 2 weeks after the wounds are healed.    Objective    Hemoglobin A1C   Date Value Ref Range Status   2020 5.5 0 - 5.6 % Final     Comment:     Normal <5.7% Prediabetes 5.7-6.4%  Diabetes 6.5% or higher - adopted from ADA   consensus guidelines.     2020 6.5 (H) 0 - 5.6 % Final     Comment:     Normal <5.7% Prediabetes 5.7-6.4%  Diabetes 6.5% or higher - adopted from ADA   consensus guidelines.           Data Unavailable 98 Data Unavailable 140/69 Data Unavailable 0 lbs 0 oz        A dehiscence wound is noted at left  lateral BKA site measuring 4.5cm x 2cm x 2cm.    Inman Classification: 2    Wound base: Red/Granulation    Edges: intact    Drainage: moderate/serosanguinous 2/2 recent trauma to the area.    Odor: no    Undermining: no    Tunnelin cm at the  proximal edge and tracking proximally.     Bone Exposure: No    Clinical Signs of Infection: No    After obtaining patient consent, the wound was irrigated with copious amounts of saline. A scalpel was then used to debride the wound into the subcutaneous tissue. The wound edges were debrided to healthy, bleeding tissue. Given the patient's lack of sensation, no anesthesia was necessary for the procedure.     Barriers to Healing: Tiffani is already had a trauma to the area today.  She bumps the area.  Unsure of what this is done to the incision site, however there is sanguinous drainage and strikethrough on the dressing.    Treatment Plan: Daily dressing changes    Pt Ability to Follow Plan: Good  ___________________  Wound #2      A dehiscence wound is noted at left  lateral BKA site measuring 6cm x 3cm x 0.1cm.    Inman Classification: 2    Wound base: Pink/Granulation    Edges: intact    Drainage: small/serous    Odor: no    Undermining: no    Bone Exposure: No    Clinical Signs of Infection: No    After obtaining patient consent, the wound was irrigated with copious amounts of saline.  ______________  Wound #3          A diabetic pressure wound is noted at right  plantar 1st met head measuring 1.5cm x 3cm x 0.2cm.    Inman Classification: 2    Wound base: Red/Granulation    Edges: hyperkeratotic    Drainage: moderate/serous    Odor: no    Undermining: around entire [id number]    Bone Exposure: No    Clinical Signs of Infection: Yes. Cellulitis of the leg.     After obtaining patient consent, the wound was irrigated with copious amounts of saline. A scalpel and curette were then used to debride the wound into subcutaneous tissue. The wound edges were debrided back to healthy, bleeding tissue. The wound base exhibited healthy bleeding. Given the patient's lack of sensation, no anesthesia was necessary for the procedure.    Barriers to Healing: Tiffani is a type II diabetic with severe neuropathy in the right leg.  She  has to use this leg at this point and put pressure on the wound in order to transfer.  This decreases the chance that this will heal.  This wound is been open for a very long time, years.    Treatment Plan: Daily dressing changes with Aquacel.    Pt Ability to Follow Plan: For with weightbearing status secondary to BKA on the left.  Good with dressing changes.        Assessment: Tiffani is a 60-year-old type II diabetic with recent left BKA with wound dehiscence and right first metatarsal head pressure ulceration with duration of years.  She has had some recent trauma to the left stump.  Her right leg also looks cellulitic to me.  There is warmth and edema to the leg.  She does not have any pain to the leg.    Plan:   - Pt seen and evaluated  -We will start some Aquacel dressings on all of the wounds.  She can cover these with a dry sterile dressing.  -Unfortunately, nonweightbearing on the right foot is not available for her right now.  We will have to make do with using the DH shoe on that side.  -Cellulitis noted to the right leg.  I sent in a prescription for Augmentin.  I have asked her to watch the area closely.  If she sees any worsening over the next 2 days, she should go to emergency room.  - Pt to return to clinic in 1 week with Dr. Whitten.

## 2020-08-17 ENCOUNTER — TELEPHONE (OUTPATIENT)
Dept: PODIATRY | Facility: CLINIC | Age: 60
End: 2020-08-17

## 2020-08-17 NOTE — TELEPHONE ENCOUNTER
NICOLE Doss from [facility] is the patients ,     If wound care orders changed please leave a message for Juan Antonio at [phone].

## 2020-08-24 ENCOUNTER — NURSE TRIAGE (OUTPATIENT)
Dept: NURSING | Facility: CLINIC | Age: 60
End: 2020-08-24

## 2020-08-24 NOTE — TELEPHONE ENCOUNTER
Tiffani was called back by RN.  She states the wound drainage is clear, no odor.  She was offered clinic appt and she stated that it's difficult to find transportation to [place], would rather see Dr Whitten on Wednesday this week in [place].  Pt stated she would call them to set up an appt.  We reviewed dressing changes as needed, elevation, compression, monitor temperature.  Pt agreed with the plan.  Gaby Real RN

## 2020-08-24 NOTE — TELEPHONE ENCOUNTER
60 year old female s/p left below-the-knee amputation performed on 07/22/2020.   She calls today along with her home health nurse reporting increased drainage from her left lateral BKA site   The drainage is copious and thick-serosanguinous.  She was able to contain the drainage with guaze before but now has applied an ABD pad.  She is also running a temperature of 100.6. Currently taking amoxicillin-clavulanate (AUGMENTIN) 875-125 MG tablet -1 BID..     Is unable to come in to clinic today   Could most likely get a ride tomorrow  Will forward to ortho to call back with recommendations/[possible  Appointment    Tiffani is aware to go to ER if her temperature rises and starts to feel weak,SOB,confused

## 2020-08-25 ENCOUNTER — TELEPHONE (OUTPATIENT)
Dept: PODIATRY | Facility: CLINIC | Age: 60
End: 2020-08-25

## 2020-08-25 NOTE — TELEPHONE ENCOUNTER
[facility] Call Center    Phone Message    May a detailed message be left on voicemail: yes     Reason for Call: Other: pt called regarding home care nurse told pt to get into clinic 8/26/20 due to too much drainage at amputation site on left leg. please advise     Action Taken: Message routed to:  Adult Clinics: Podiatry p [id number]

## 2020-08-25 NOTE — TELEPHONE ENCOUNTER
Per Dr. Giron, patient can come in tomorrow morning for an appointment with him.  Called patient and let her know. She will come at 9:40 tomorrow.  Haily Heath RN

## 2020-08-27 ENCOUNTER — TELEPHONE (OUTPATIENT)
Dept: ORTHOPEDICS | Facility: CLINIC | Age: 60
End: 2020-08-27

## 2020-08-27 NOTE — TELEPHONE ENCOUNTER
Good afternoon, saw patient today for [facility] follow up.  Patient was to see Dr. Giron yesterday, however, patient states that she was 10 min late and they would not see her. Patient states appointment was at 3:40 pm, however, Epic states 9:40 am.    Of note today wound care dressings are extremely wet with greenish/yellowish drainage on left stump and right great toe wound.    Left stump has Copious amounts of drainage, maceration noted to periwounds.  Left medial wound measures 9.5 cm x 4.5 cm x 0.2 cm with approx. 20% yellow slough and 80% non granular tissue.    Left lateral wound measures 6.4 cm x 3.7 cm x 0.3 cm with 90% yellow slough and 5% granular and 5% non granular tissue.  Patient has copious amounts of greenish/yellow drainage and odor after cleansing.  Maceration and denudement noted to boris wound.    Right great toe measures: 2.0 cm x 3.8 cm x 0.3 cm, and odor after cleansing. Patient also has multiple open areas on right leg and right 2nd toe d/t maceration and denudement.    When talking with patient she states that she hasn't been putting the Aquacel AG onto the stump wound. Asked patient about this and she was supposedly told not to put it on there and do gauze only.  Patient states that she has been taking the Augment per Dr. Giron.  Patient has temp of 99.7 today and states it was 100.4 yesterday and 8/24/20  nurse note states 100.6.  Patient states that these are not temps for her. However, on 8/17 temp was 96.8.    Patient has visit with you on 9/1/20.  Please advise if you would like patient on different on ATBX?(Pharmacy is [facility] in [place])  This nurse does have pictures if you would like emailed.    Also requesting continuation of SN  SN 2 x W x 3, 1 x W x 2, 3 PRN      You may route your orders/recommendations back through [facility],    Thank you,    JOE Goode, RN, CWOCN   08/27/20  12:44 PM   [facility]  [phone]   [email or web address]

## 2020-08-28 NOTE — TELEPHONE ENCOUNTER
Home care nurse Nayely was phoned by clinic RN.  She will be sending photos of Tiffani's wound to Dr Giron.  Nayely stated that the pt has not been doing wound care as directed.        Dr Giron viewed the images from pt's wound and said although the dressing is growing something, the skin looks good and he would not restart antibiotics due to possible causing resistance.  As long as Tiffani has no nausea/vomiting, fever/chills, he would keep her off antibiotics and have her be diligent doing dry to dry dressing changes THREE TIMES PER DAY.  The above message was sent to home care NICOLE Adler.  Tiffani has clinic appt next week.  Pt was called and also given these directions and she agrees to the plan.  Pt states her  does the dressing changes and will now do them more often, minimally three times/day.  Gaby Real RN

## 2020-08-28 NOTE — TELEPHONE ENCOUNTER
[facility] Call Center    Phone Message    May a detailed message be left on voicemail: yes     Reason for Call: Medication Question or concern regarding medication   Prescription Clarification  Name of Medication: ATBX   Prescribing Provider: Yousuf Adams   Pharmacy: [facility] in [place]    What on the order needs clarification? If she should  Switch to a different form.           Action Taken: Message routed to:  [facility] Yousuf Adams     Travel Screening: Not Applicable

## 2020-09-01 ENCOUNTER — OFFICE VISIT (OUTPATIENT)
Dept: ORTHOPEDICS | Facility: CLINIC | Age: 60
End: 2020-09-01
Payer: COMMERCIAL

## 2020-09-01 DIAGNOSIS — M25.572 PAIN IN JOINT, ANKLE AND FOOT, LEFT: Primary | ICD-10-CM

## 2020-09-01 NOTE — LETTER
9/1/2020         RE: Tiffani Tan  [address] [facility] [address]        Dear Colleague,    Thank you for referring your patient, Tiffani Tan, to the [facility]. Please see a copy of my visit note below.    CHIEF COMPLAINT:  Status post left below-the-knee amputation performed on 07/22/2020.      HISTORY OF PRESENT ILLNESS:  Tiffani presents today for further followup.  Denies to have any problems.  Reports to have occasional proximal tibia pain.  Denies to have any phantom pain.      PHYSICAL EXAMINATION:  On today's visit, she presents with overall a good feeling of the surgical incision, although a bit of some lack of epithelium located along the medial and lateral aspect of the leg being more pronounced on the lateral aspect of the stump.  There is no odor.  There is no drainage.  There is overall granulation tissue along the surgical incision.      ASSESSMENT:  Status post left below-the-knee amputation with wound dehiscence.      PLAN:  I discussed with the patient that at this point I would like her to proceed with a regular evaluation by Dr. Whitten at [facility] in order to have some ability to monitor her progress.      The patient has been referred to PM&R tomorrow to start thinking about molding of the leg and fitting for a prosthesis.      The patient will follow up on a p.r.n. basis with us.  All questions were answered.     Sincerely,    Aniceto Adams MD

## 2020-09-01 NOTE — NURSING NOTE
Reason For Visit:   Chief Complaint   Patient presents with     RECHECK     6 week POP SILVIA JOE FOD: 7/22/20       There were no vitals taken for this visit.    Pain Assessment  Patient Currently in Pain: Joshua David, ATC

## 2020-09-01 NOTE — PROGRESS NOTES
CHIEF COMPLAINT:  Status post left below-the-knee amputation performed on 07/22/2020.      HISTORY OF PRESENT ILLNESS:  Tiffani presents today for further followup.  Denies to have any problems.  Reports to have occasional proximal tibia pain.  Denies to have any phantom pain.      PHYSICAL EXAMINATION:  On today's visit, she presents with overall a good feeling of the surgical incision, although a bit of some lack of epithelium located along the medial and lateral aspect of the leg being more pronounced on the lateral aspect of the stump.  There is no odor.  There is no drainage.  There is overall granulation tissue along the surgical incision.      ASSESSMENT:  Status post left below-the-knee amputation with wound dehiscence.      PLAN:  I discussed with the patient that at this point I would like her to proceed with a regular evaluation by Dr. Whitten at [facility] in order to have some ability to monitor her progress.      The patient has been referred to PM&R tomorrow to start thinking about molding of the leg and fitting for a prosthesis.      The patient will follow up on a p.r.n. basis with us.  All questions were answered.

## 2020-09-02 ENCOUNTER — TELEPHONE (OUTPATIENT)
Dept: PODIATRY | Facility: CLINIC | Age: 60
End: 2020-09-02

## 2020-09-02 NOTE — TELEPHONE ENCOUNTER
Reason for Call:  Other call back    Detailed comments: Selma from [facility] calling. Wanted to update Dr. Whitten on patient's wound. She continues to have a large amount of heavy green drainage that comes and goes. There is still swelling, slight redness but nothing that has gotten worse. Not warm to the touch, no fever. 134/60 for BP, pulse is 82. Respiration is 18. 02 level is 91%. Last week was supposed to be seen but came late due to construction. Patient did see operating doctor, Dr. Adams yesterday who doesn't seem to be too concerned. This is regarding left BKA. She is concerned because she feels like the patient is not being addressed. Just wanted to update Dr. Whitten. Please contact her if you have any questions. Thank you.      Phone Number Patient can be reached at: Other phone number:  [phone]     Best Time: Anytime    Can we leave a detailed message on this number? YES    Call taken on 9/2/2020 at 5:15 PM by Riley Cardoso

## 2020-09-03 ENCOUNTER — TELEPHONE (OUTPATIENT)
Dept: ORTHOPEDICS | Facility: CLINIC | Age: 60
End: 2020-09-03

## 2020-09-03 NOTE — TELEPHONE ENCOUNTER
Called and talked with Selam, she states that patient is having a lot of drainage and patient is unsure of what to do. Dr. Louis note said no drainage was note, Lurdesbisifabi stated that they are changing the dressing 3 times per day because of the amount of drainage. I asked Daysifabi if patient want to stay with Dr. Giron or Dr. Whitten, we should not switch between the 2.  Daysifabi did not know which provider the patient wanted to see.  Advised I would call patient and discuss with her.  Called patient and left message saying that she can see Dr. Whitten tomorrow at 12:30 to have the drainage assessed. We can discuss with Dr. Giron if we can overbook her next Wednesday, but I am not sure this would be an option. Awaiting call back from patient.  Haily Heath RN

## 2020-09-03 NOTE — TELEPHONE ENCOUNTER
Health Call Center    Phone Message    May a detailed message be left on voicemail: yes     Reason for Call: Order(s): Other: Larissa from [facility] states she faxed over and order that needs to be completed on August 7th & 26th for this patient. She would like a callback about this. [phone]  Reason for requested: order needs to be completed  Date needed: Today  Provider name: Dr. Adams      Action Taken: Message routed to:  [facility]    Travel Screening: Not Applicable

## 2020-09-04 NOTE — TELEPHONE ENCOUNTER
Patient called and said that her left stump was good. It had one day of greenish discharge and Dr. Adams was aware and called it slough. They were not concerned.  Her  is changing it 3 times per day because he thinks to have the drainage sit on her wound is worse, but not because the dressing is saturated. There is no redness or warmth. She is not concerned about infection and Dr. Adams was not the other day and there has been no change since then.  We made her a follow up appointment on Tuesday as she needed to be seen next week.  Haily Heath RN

## 2020-09-08 ENCOUNTER — TELEPHONE (OUTPATIENT)
Dept: PODIATRY | Facility: CLINIC | Age: 60
End: 2020-09-08

## 2020-09-08 ENCOUNTER — OFFICE VISIT (OUTPATIENT)
Dept: PODIATRY | Facility: CLINIC | Age: 60
End: 2020-09-08
Payer: COMMERCIAL

## 2020-09-08 VITALS — HEART RATE: 82 BPM | SYSTOLIC BLOOD PRESSURE: 125 MMHG | DIASTOLIC BLOOD PRESSURE: 65 MMHG

## 2020-09-08 DIAGNOSIS — I87.8 VENOUS STASIS: ICD-10-CM

## 2020-09-08 DIAGNOSIS — L97.512 SKIN ULCER OF RIGHT FOOT WITH FAT LAYER EXPOSED (H): ICD-10-CM

## 2020-09-08 DIAGNOSIS — L97.922 SKIN ULCER OF LEFT LOWER LEG WITH FAT LAYER EXPOSED (H): Primary | ICD-10-CM

## 2020-09-08 DIAGNOSIS — E11.42 TYPE 2 DIABETES MELLITUS WITH DIABETIC POLYNEUROPATHY, WITHOUT LONG-TERM CURRENT USE OF INSULIN (H): ICD-10-CM

## 2020-09-08 PROCEDURE — 99213 OFFICE O/P EST LOW 20 MIN: CPT | Performed by: PODIATRIST

## 2020-09-08 NOTE — PROGRESS NOTES
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)     GI bleed     UGIB (upper gastrointestinal bleed)     Morbid obesity (H)     Pain in joint, ankle and foot, left     Charcot ankle, left     Below-knee amputation (H)     Acute kidney failure, unspecified (H)     S/P BKA (below knee amputation) (H)     Past Surgical History:   Procedure Laterality Date     AMPUTATE LEG BELOW KNEE Left 7/22/2020    Procedure: Left below knee amputation;  Surgeon: Aniceto Adams MD;  [facility]     ESOPHAGOSCOPY, GASTROSCOPY, DUODENOSCOPY (EGD), COMBINED N/A 2/5/2020    Procedure: ESOPHAGOGASTRODUODENOSCOPY (EGD);  Surgeon: Tanya Dias MD;  Location: [facility]     Social History     Socioeconomic History     Marital status:      Spouse name: Not on file     Number of children: Not on file     Years of education: Not on file     Highest education level: Not on file   Occupational History     Not on file   Social Needs     Financial resource strain: Not on file     Food insecurity     Worry: Not on file     Inability: Not on file     Transportation needs     Medical: Not on file     Non-medical: Not on file   Tobacco Use     Smoking status: Current Every Day Smoker     Packs/day: 1.00     Smokeless tobacco: Never Used   Substance and Sexual Activity     Alcohol use: Yes     Drug use: Not on file     Sexual  activity: Not on file   Lifestyle     Physical activity     Days per week: Not on file     Minutes per session: Not on file     Stress: Not on file   Relationships     Social connections     Talks on phone: Not on file     Gets together: Not on file     Attends Taoist service: Not on file     Active member of club or organization: Not on file     Attends meetings of clubs or organizations: Not on file     Relationship status: Not on file     Intimate partner violence     Fear of current or ex partner: Not on file     Emotionally abused: Not on file     Physically abused: Not on file     Forced sexual activity: Not on file   Other Topics Concern     Parent/sibling w/ CABG, MI or angioplasty before 65F 55M? Not Asked   Social History Narrative     Not on file     Family History   Problem Relation Age of Onset     No Known Problems Mother      No Known Problems Father      Lab Results   Component Value Date    WBC 7.5 08/03/2020     Lab Results   Component Value Date    RBC 3.61 08/03/2020     Lab Results   Component Value Date    HGB 8.2 08/03/2020     Lab Results   Component Value Date    HCT 28.6 08/03/2020     No components found for: MCT  Lab Results   Component Value Date    MCV 79 08/03/2020     Lab Results   Component Value Date    MCH 22.7 08/03/2020     Lab Results   Component Value Date    MCHC 28.7 08/03/2020     Lab Results   Component Value Date    RDW 20.3 08/03/2020     Lab Results   Component Value Date     08/03/2020     Last Comprehensive Metabolic Panel:  Sodium   Date Value Ref Range Status   08/03/2020 138 133 - 144 mmol/L Final     Potassium   Date Value Ref Range Status   08/03/2020 4.0 3.4 - 5.3 mmol/L Final     Chloride   Date Value Ref Range Status   08/03/2020 105 94 - 109 mmol/L Final     Carbon Dioxide   Date Value Ref Range Status   08/03/2020 28 20 - 32 mmol/L Final     Anion Gap   Date Value Ref Range Status   08/03/2020 5 3 - 14 mmol/L Final     Glucose   Date Value Ref Range  Status   08/03/2020 106 (H) 70 - 99 mg/dL Final     Urea Nitrogen   Date Value Ref Range Status   08/03/2020 11 7 - 30 mg/dL Final     Creatinine   Date Value Ref Range Status   08/03/2020 0.79 0.52 - 1.04 mg/dL Final     GFR Estimate   Date Value Ref Range Status   08/03/2020 81 >60 mL/min/[1.73_m2] Final     Comment:     Non  GFR Calc  Starting 12/18/2018, serum creatinine based estimated GFR (eGFR) will be   calculated using the Chronic Kidney Disease Epidemiology Collaboration   (CKD-EPI) equation.       Calcium   Date Value Ref Range Status   08/03/2020 9.2 8.5 - 10.1 mg/dL Final                 SUBJECTIVE FINDINGS:  A 60-year-old female returns to clinic for ulcer, right plantar first MPJ, right second toe, left below-the-knee amputation.  She relates that she had a below-knee amputation and that seems to be doing okay.  It seems to be healing.  She is using the Aquacel Ag.  She had used some Neema, but she is out of that.  Relates they are cleaning with MicroKlenz.  Her  is doing the dressings.  She is changing it up to 3 times a day for drainage.  She had seen Dr. Giron.  I reviewed those notes.  She relates she is not currently on any antibiotics.  She did kind of fall and hit her leg.  She is using her wheelchair.  The one wheelchair is a little too wide for her house, but she has been staying in her shop.      OBJECTIVE FINDINGS:  DP and PT are 2/4, right.  She has a right plantar first MPJ ulcer that is deep through the subcutaneous tissues with some of the margins flattening.  There is some serosanguineous drainage.  It is about 3 x 2 cm.  There is no erythema, no odor, no calor.  She has a dorsal right second toe ulcer that is through the dermis into the subcutaneous tissues, some serosanguineous drainage.  No gross erythema, no odor, no calor.  She has decreased edema on her leg.  She had her upper leg wrapped, although it appears the lesions there are closed.  She has left  below-the-knee amputation site ulcers medially and laterally.  They are deep through the dermis into the subcutaneous tissues.  There is some granulation tissue and fibrous tissue and some serosanguineous drainage, mild edema, no odor, no calor.      ASSESSMENT AND PLAN:  Ulcer, right plantar first MPJ.  Ulcers, left below-the-knee amputation site.  She is diabetic with peripheral neuropathy.  Diagnosis and treatment options discussed with her.  Local wound care done upon consent today.  I applied Neema to the wounds.  She also has a right second toe ulcer.  I am going to have her continue cleaning these with MicroKlenz and applying Aquacel Ag.  I applied Neema and Aquacel Ag to the wounds today, and she will apply Neema weekly.  Neema, Aquacel Ag and MicroKlenz dressings dispensed and use discussed with her.  She will return to clinic and see me in 2-3 weeks.

## 2020-09-08 NOTE — TELEPHONE ENCOUNTER
Financial Counselor Review for Apligraf, Dermagraft, Puraply AM, Affinity, NuShield:    Which product(s) to be checked: Apligraf, Dermagraft, Puraply AM, Affinity, NuShield    Has the patient tried any of these products before: YES    Was it for this wound: YES, Dermagraft and Apligraf in 2019    Diagnosis code (include ICD-10 code): E11.42, I87.8, L97.512    Coverage and patient financial responsibility information:YES    Does patient need to be contacted by Financial Counselor:YES    Note: Do not use abbreviations and route encounter to [facility] [place] [87015]

## 2020-09-08 NOTE — LETTER
9/8/2020         RE: Tiffani Tan  [address] [facility] [address]        Dear Colleague,    Thank you for referring your patient, Tiffani Tan, to the [facility]. Please see a copy of my visit note below.    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)     GI bleed     UGIB (upper gastrointestinal bleed)     Morbid obesity (H)     Pain in joint, ankle and foot, left     Charcot ankle, left     Below-knee amputation (H)     Acute kidney failure, unspecified (H)     S/P BKA (below knee amputation) (H)     Past Surgical History:   Procedure Laterality Date     AMPUTATE LEG BELOW KNEE Left 7/22/2020    Procedure: Left below knee amputation;  Surgeon: Aniceto Adams MD;  [facility]     ESOPHAGOSCOPY, GASTROSCOPY, DUODENOSCOPY (EGD), COMBINED N/A 2/5/2020    Procedure: ESOPHAGOGASTRODUODENOSCOPY (EGD);  Surgeon: Tanya Dias MD;  [facility]: [facility]     Social History     Socioeconomic History     Marital status:      Spouse name: Not on file     Number of children: Not on file     Years of education: Not on file     Highest education level: Not on file   Occupational History     Not on file   Social Needs     Financial resource strain: Not on file     Food insecurity     Worry: Not on file     Inability: Not on file     Transportation needs     Medical: Not on file      Non-medical: Not on file   Tobacco Use     Smoking status: Current Every Day Smoker     Packs/day: 1.00     Smokeless tobacco: Never Used   Substance and Sexual Activity     Alcohol use: Yes     Drug use: Not on file     Sexual activity: Not on file   Lifestyle     Physical activity     Days per week: Not on file     Minutes per session: Not on file     Stress: Not on file   Relationships     Social connections     Talks on phone: Not on file     Gets together: Not on file     Attends Episcopal service: Not on file     Active member of club or organization: Not on file     Attends meetings of clubs or organizations: Not on file     Relationship status: Not on file     Intimate partner violence     Fear of current or ex partner: Not on file     Emotionally abused: Not on file     Physically abused: Not on file     Forced sexual activity: Not on file   Other Topics Concern     Parent/sibling w/ CABG, MI or angioplasty before 65F 55M? Not Asked   Social History Narrative     Not on file     Family History   Problem Relation Age of Onset     No Known Problems Mother      No Known Problems Father      Lab Results   Component Value Date    WBC 7.5 08/03/2020     Lab Results   Component Value Date    RBC 3.61 08/03/2020     Lab Results   Component Value Date    HGB 8.2 08/03/2020     Lab Results   Component Value Date    HCT 28.6 08/03/2020     No components found for: MCT  Lab Results   Component Value Date    MCV 79 08/03/2020     Lab Results   Component Value Date    MCH 22.7 08/03/2020     Lab Results   Component Value Date    MCHC 28.7 08/03/2020     Lab Results   Component Value Date    RDW 20.3 08/03/2020     Lab Results   Component Value Date     08/03/2020     Last Comprehensive Metabolic Panel:  Sodium   Date Value Ref Range Status   08/03/2020 138 133 - 144 mmol/L Final     Potassium   Date Value Ref Range Status   08/03/2020 4.0 3.4 - 5.3 mmol/L Final     Chloride   Date Value Ref Range Status    08/03/2020 105 94 - 109 mmol/L Final     Carbon Dioxide   Date Value Ref Range Status   08/03/2020 28 20 - 32 mmol/L Final     Anion Gap   Date Value Ref Range Status   08/03/2020 5 3 - 14 mmol/L Final     Glucose   Date Value Ref Range Status   08/03/2020 106 (H) 70 - 99 mg/dL Final     Urea Nitrogen   Date Value Ref Range Status   08/03/2020 11 7 - 30 mg/dL Final     Creatinine   Date Value Ref Range Status   08/03/2020 0.79 0.52 - 1.04 mg/dL Final     GFR Estimate   Date Value Ref Range Status   08/03/2020 81 >60 mL/min/[1.73_m2] Final     Comment:     Non  GFR Calc  Starting 12/18/2018, serum creatinine based estimated GFR (eGFR) will be   calculated using the Chronic Kidney Disease Epidemiology Collaboration   (CKD-EPI) equation.       Calcium   Date Value Ref Range Status   08/03/2020 9.2 8.5 - 10.1 mg/dL Final                 SUBJECTIVE FINDINGS:  A 60-year-old female returns to clinic for ulcer, right plantar first MPJ, right second toe, left below-the-knee amputation.  She relates that she had a below-knee amputation and that seems to be doing okay.  It seems to be healing.  She is using the Aquacel Ag.  She had used some Neema, but she is out of that.  Relates they are cleaning with MicroKlenz.  Her  is doing the dressings.  She is changing it up to 3 times a day for drainage.  She had seen Dr. Giron.  I reviewed those notes.  She relates she is not currently on any antibiotics.  She did kind of fall and hit her leg.  She is using her wheelchair.  The one wheelchair is a little too wide for her house, but she has been staying in her shop.      OBJECTIVE FINDINGS:  DP and PT are 2/4, right.  She has a right plantar first MPJ ulcer that is deep through the subcutaneous tissues with some of the margins flattening.  There is some serosanguineous drainage.  It is about 3 x 2 cm.  There is no erythema, no odor, no calor.  She has a dorsal right second toe ulcer that is through the  dermis into the subcutaneous tissues, some serosanguineous drainage.  No gross erythema, no odor, no calor.  She has decreased edema on her leg.  She had her upper leg wrapped, although it appears the lesions there are closed.  She has left below-the-knee amputation site ulcers medially and laterally.  They are deep through the dermis into the subcutaneous tissues.  There is some granulation tissue and fibrous tissue and some serosanguineous drainage, mild edema, no odor, no calor.      ASSESSMENT AND PLAN:  Ulcer, right plantar first MPJ.  Ulcers, left below-the-knee amputation site.  She is diabetic with peripheral neuropathy.  Diagnosis and treatment options discussed with her.  Local wound care done upon consent today.  I applied Neema to the wounds.  She also has a right second toe ulcer.  I am going to have her continue cleaning these with MicroKlenz and applying Aquacel Ag.  I applied Neema and Aquacel Ag to the wounds today, and she will apply Neema weekly.  Neema, Aquacel Ag and MicroKlenz dressings dispensed and use discussed with her.  She will return to clinic and see me in 2-3 weeks.         Again, thank you for allowing me to participate in the care of your patient.        Sincerely,        Nolan Whitten DPM

## 2020-09-08 NOTE — TELEPHONE ENCOUNTER
Larissa was called back by RN.  We have not received forms from [employer].  Voicemail was left with Larissa about the above.  Gaby Real RN

## 2020-09-15 NOTE — TELEPHONE ENCOUNTER
[employer] benefits came back patient is Approved for 5 applications of Apligraf, 8 applications of Dermagraft and 10 applications of PuraPly AM, PuraPly , PuraPly XT. Patient is not covered for [facility] or [facility] under their health plan.

## 2020-09-16 NOTE — TELEPHONE ENCOUNTER
No benefit information was supplied by company. Discussed with Trey and he also did not have any information. Called patient and left a message to see if her deductible has been met for the year. If so, we can order Chitra AM 5 sheet to be used at the office visit on 9/29  Haily Heath RN

## 2020-09-18 NOTE — TELEPHONE ENCOUNTER
Fax has been received from [employer] for wound supplies and was forwarded to  Anabela for Dr Adams's signature.  Gaby Real RN

## 2020-09-24 ENCOUNTER — TELEPHONE (OUTPATIENT)
Dept: ORTHOPEDICS | Facility: CLINIC | Age: 60
End: 2020-09-24

## 2020-09-24 NOTE — TELEPHONE ENCOUNTER
Please call Bee from [employer], at [phone], in regards to the pt's order for [facility] Wound Supp[facility].

## 2020-09-24 NOTE — TELEPHONE ENCOUNTER
After receiving the message below I attempted to reach Bee @ [employer] but was unsuccessful. I left a VM asking that she call Dr. Adams's Admin. Back to further discuss thi paperwork as it seems that she should have it according to prev. Documentation by Dr. Adams's RN, Gaby REES I provided her with the clinic call back number and Anabela CHAIDEZ's contact as well.

## 2020-09-28 DIAGNOSIS — S88.012A: Primary | ICD-10-CM

## 2020-09-29 ENCOUNTER — OFFICE VISIT (OUTPATIENT)
Dept: PODIATRY | Facility: CLINIC | Age: 60
End: 2020-09-29
Payer: COMMERCIAL

## 2020-09-29 VITALS — HEART RATE: 76 BPM | DIASTOLIC BLOOD PRESSURE: 73 MMHG | OXYGEN SATURATION: 91 % | SYSTOLIC BLOOD PRESSURE: 149 MMHG

## 2020-09-29 DIAGNOSIS — E11.42 TYPE 2 DIABETES MELLITUS WITH DIABETIC POLYNEUROPATHY, WITHOUT LONG-TERM CURRENT USE OF INSULIN (H): ICD-10-CM

## 2020-09-29 DIAGNOSIS — L97.512 SKIN ULCER OF RIGHT FOOT WITH FAT LAYER EXPOSED (H): ICD-10-CM

## 2020-09-29 DIAGNOSIS — I87.8 VENOUS STASIS: ICD-10-CM

## 2020-09-29 DIAGNOSIS — L97.922 SKIN ULCER OF LEFT LOWER LEG WITH FAT LAYER EXPOSED (H): Primary | ICD-10-CM

## 2020-09-29 PROCEDURE — 99213 OFFICE O/P EST LOW 20 MIN: CPT | Performed by: PODIATRIST

## 2020-09-29 NOTE — NURSING NOTE
Tiffani Tan's chief complaint for this visit includes:  Chief Complaint   Patient presents with     RECHECK     follow up L BKA site and right plantar ucler     PCP: Wily Bonilla    Referring Provider:  No referring provider defined for this encounter.    BP (!) 149/73 (BP Location: Left arm, Patient Position: Sitting, Cuff Size: Adult Regular)   Pulse 76   SpO2 91%   Data Unavailable     Do you need any medication refills at today's visit? No

## 2020-09-29 NOTE — PATIENT INSTRUCTIONS
Thanks for coming today.  [facility]  [address] [place] [address]    To schedule future appointments in Ortho Clinic, you may call [phone].    To schedule ordered imaging by your provider:   Call Central Imaging [phone]    To schedule an injection ordered by your provider:  Call Central Imaging Injection scheduling line: [phone]  [facility] available online at:  [email or web address]    Please call if any further questions or concerns ([phone]).  Clinic hours 8 am to 5 pm.    Return to clinic (call) if symptoms worsen or fail to improve.

## 2020-09-29 NOTE — LETTER
9/29/2020         RE: Tiffani Tan  [address] [facility] [address]        Dear Colleague,    Thank you for referring your patient, Tiffani Tan, to the [facility]. Please see a copy of my visit note below.    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)     GI bleed     UGIB (upper gastrointestinal bleed)     Morbid obesity (H)     Pain in joint, ankle and foot, left     Charcot ankle, left     Below-knee amputation (H)     Acute kidney failure, unspecified (H)     S/P BKA (below knee amputation) (H)     Past Surgical History:   Procedure Laterality Date     AMPUTATE LEG BELOW KNEE Left 7/22/2020    Procedure: Left below knee amputation;  Surgeon: Aniceto Adams MD;  [facility]     ESOPHAGOSCOPY, GASTROSCOPY, DUODENOSCOPY (EGD), COMBINED N/A 2/5/2020    Procedure: ESOPHAGOGASTRODUODENOSCOPY (EGD);  Surgeon: Tanya Dias MD;  Location: [facility]     Social History     Socioeconomic History     Marital status:      Spouse name: Not on file     Number of children: Not on file     Years of education: Not on file     Highest education level: Not on file   Occupational History     Not on file   Social Needs     Financial resource strain: Not on file     Food insecurity     Worry: Not on file     Inability: Not on file     Transportation needs     Medical: Not on file      Non-medical: Not on file   Tobacco Use     Smoking status: Current Every Day Smoker     Packs/day: 1.00     Smokeless tobacco: Never Used   Substance and Sexual Activity     Alcohol use: Yes     Drug use: Not on file     Sexual activity: Not on file   Lifestyle     Physical activity     Days per week: Not on file     Minutes per session: Not on file     Stress: Not on file   Relationships     Social connections     Talks on phone: Not on file     Gets together: Not on file     Attends Buddhist service: Not on file     Active member of club or organization: Not on file     Attends meetings of clubs or organizations: Not on file     Relationship status: Not on file     Intimate partner violence     Fear of current or ex partner: Not on file     Emotionally abused: Not on file     Physically abused: Not on file     Forced sexual activity: Not on file   Other Topics Concern     Parent/sibling w/ CABG, MI or angioplasty before 65F 55M? Not Asked   Social History Narrative     Not on file     Family History   Problem Relation Age of Onset     No Known Problems Mother      No Known Problems Father      SUBJECTIVE FINDINGS:  60-year-old female returns to clinic for ulcer right plantar first MPJ, left below-the-knee amputation site.  She is diabetic with peripheral neuropathy and vascular disease.  Relates it is doing okay.  The right leg she may have bumped.  She has some new sores there.  The top of the foot was looking good yesterday.  They are using the Aquacel Ag.  They ran out of the Neema.  They are doing sterile dressings.      OBJECTIVE FINDINGS:  DP and PT are 2/4 right.  Vascular status intact left.  She has a left below-the-knee amputation site lateral ulceration that is about 6.5 x 3 cm.  It is deep through the dermis into the subcutaneous tissues.  There is decreased fibrous tissue buildup, increased granulation tissue, some serosanguineous drainage, no erythema, no odor, no calor.  She has left medial  below-the-knee amputation site ulcerations.  There are 2 of them.  The distal one is about 4 x 3 cm and the proximal one is about 3 x 1 cm.  They are deep through the dermis into the subcutaneous tissues.  There is decreased fibrous tissue, increased granulation tissue, some serosanguineous drainage, no gross erythema, no odor, no calor.  She has a right plantar first MPJ ulceration that is about 3.6 x 2 cm that is deep through the subcutaneous tissues, some serosanguineous drainage, some hyperkeratotic tissue buildup.  No erythema, no odor, no calor.  She has a right second toe ulcer that is about 3.5 x 2.5, no odor, no calor.  She has a right second toe ulcer that is about 3.5 x 2.5 cm.  It is through the dermis into the subcutaneous tissues.  Some maceration, some fibrous tissue on the base.  No odor, no calor, some serosanguineous drainage, some edema.  She has a right dorsal foot ulceration that is about 1 x 2 cm with fibrous tissue buildup, some serosanguineous drainage, no erythema, no odor, no calor.  She has right leg weeping areas.  These are either abrasions or edema blisters.  She does have venous stasis with venous stasis edema, right leg.  There is some pain on palpation of the ulcer sites as well bilaterally.      ASSESSMENT/PLAN:  Ulcer, right first MPJ, right second toe, right dorsal foot, left below-the-knee amputation site.  She has venous stasis and venous hypertension.  She is diabetic with peripheral neuropathy and vascular disease.  Diagnosis and treatment options discussed with her.  Local wound care done to all the ulcer sites upon consent today.  Neema and Biatain Silver applied to the ulcer sites.  A multilayer Unna boot and compression boot with light compression applied to the right lower extremity upon consent, sterile gauze dressing and Ace wrap to the left.  Plan will be to apply PuraPly Antimicrobial next week.  She is in her wheelchair.  She will continue this with offloading.  She  has a 2 shoe for the right as well.  She will return to clinic and see me in 1 week.     Lab Results   Component Value Date    A1C 5.5 07/21/2020    A1C 6.5 02/04/2020                             Again, thank you for allowing me to participate in the care of your patient.        Sincerely,        Nolan Whitten DPM

## 2020-09-29 NOTE — PROGRESS NOTES
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)     GI bleed     UGIB (upper gastrointestinal bleed)     Morbid obesity (H)     Pain in joint, ankle and foot, left     Charcot ankle, left     Below-knee amputation (H)     Acute kidney failure, unspecified (H)     S/P BKA (below knee amputation) (H)     Past Surgical History:   Procedure Laterality Date     AMPUTATE LEG BELOW KNEE Left 7/22/2020    Procedure: Left below knee amputation;  Surgeon: Aniceto Adams MD;  Location: [facility]     ESOPHAGOSCOPY, GASTROSCOPY, DUODENOSCOPY (EGD), COMBINED N/A 2/5/2020    Procedure: ESOPHAGOGASTRODUODENOSCOPY (EGD);  Surgeon: Tanya Dias MD;  Location: [facility]     Social History     Socioeconomic History     Marital status:      Spouse name: Not on file     Number of children: Not on file     Years of education: Not on file     Highest education level: Not on file   Occupational History     Not on file   Social Needs     Financial resource strain: Not on file     Food insecurity     Worry: Not on file     Inability: Not on file     Transportation needs     Medical: Not on file     Non-medical: Not on file   Tobacco Use     Smoking status: Current Every Day Smoker     Packs/day: 1.00     Smokeless tobacco: Never Used   Substance and Sexual Activity     Alcohol use: Yes     Drug use: Not on file     Sexual  activity: Not on file   Lifestyle     Physical activity     Days per week: Not on file     Minutes per session: Not on file     Stress: Not on file   Relationships     Social connections     Talks on phone: Not on file     Gets together: Not on file     Attends [employer] service: Not on file     Active member of club or organization: Not on file     Attends meetings of clubs or organizations: Not on file     Relationship status: Not on file     Intimate partner violence     Fear of current or ex partner: Not on file     Emotionally abused: Not on file     Physically abused: Not on file     Forced sexual activity: Not on file   Other Topics Concern     Parent/sibling w/ CABG, MI or angioplasty before 65F 55M? Not Asked   Social History Narrative     Not on file     Family History   Problem Relation Age of Onset     No Known Problems Mother      No Known Problems Father      SUBJECTIVE FINDINGS:  60-year-old female returns to clinic for ulcer right plantar first MPJ, left below-the-knee amputation site.  She is diabetic with peripheral neuropathy and vascular disease.  Relates it is doing okay.  The right leg she may have bumped.  She has some new sores there.  The top of the foot was looking good yesterday.  They are using the Aquacel Ag.  They ran out of the Neema.  They are doing sterile dressings.      OBJECTIVE FINDINGS:  DP and PT are 2/4 right.  Vascular status intact left.  She has a left below-the-knee amputation site lateral ulceration that is about 6.5 x 3 cm.  It is deep through the dermis into the subcutaneous tissues.  There is decreased fibrous tissue buildup, increased granulation tissue, some serosanguineous drainage, no erythema, no odor, no calor.  She has left medial below-the-knee amputation site ulcerations.  There are 2 of them.  The distal one is about 4 x 3 cm and the proximal one is about 3 x 1 cm.  They are deep through the dermis into the subcutaneous tissues.  There is decreased fibrous  tissue, increased granulation tissue, some serosanguineous drainage, no gross erythema, no odor, no calor.  She has a right plantar first MPJ ulceration that is about 3.6 x 2 cm that is deep through the subcutaneous tissues, some serosanguineous drainage, some hyperkeratotic tissue buildup.  No erythema, no odor, no calor.  She has a right second toe ulcer that is about 3.5 x 2.5, no odor, no calor.  She has a right second toe ulcer that is about 3.5 x 2.5 cm.  It is through the dermis into the subcutaneous tissues.  Some maceration, some fibrous tissue on the base.  No odor, no calor, some serosanguineous drainage, some edema.  She has a right dorsal foot ulceration that is about 1 x 2 cm with fibrous tissue buildup, some serosanguineous drainage, no erythema, no odor, no calor.  She has right leg weeping areas.  These are either abrasions or edema blisters.  She does have venous stasis with venous stasis edema, right leg.  There is some pain on palpation of the ulcer sites as well bilaterally.      ASSESSMENT/PLAN:  Ulcer, right first MPJ, right second toe, right dorsal foot, left below-the-knee amputation site.  She has venous stasis and venous hypertension.  She is diabetic with peripheral neuropathy and vascular disease.  Diagnosis and treatment options discussed with her.  Local wound care done to all the ulcer sites upon consent today.  Neema and Biatain Silver applied to the ulcer sites.  A multilayer Unna boot and compression boot with light compression applied to the right lower extremity upon consent, sterile gauze dressing and Ace wrap to the left.  Plan will be to apply PuraPly Antimicrobial next week.  She is in her wheelchair.  She will continue this with offloading.  She has a DH2 shoe for the right as well.  She will return to clinic and see me in 1 week.     Lab Results   Component Value Date    A1C 5.5 07/21/2020    A1C 6.5 02/04/2020

## 2020-10-01 ENCOUNTER — OFFICE VISIT - HEALTHEAST (OUTPATIENT)
Dept: ENDOCRINOLOGY | Facility: CLINIC | Age: 60
End: 2020-10-01

## 2020-10-01 DIAGNOSIS — E11.621 DIABETIC ULCER OF TOE OF LEFT FOOT ASSOCIATED WITH TYPE 2 DIABETES MELLITUS, WITH FAT LAYER EXPOSED (H): ICD-10-CM

## 2020-10-01 DIAGNOSIS — R26.2 IMPAIRED AMBULATION: ICD-10-CM

## 2020-10-01 DIAGNOSIS — E11.610 CHARCOT FOOT DUE TO DIABETES MELLITUS (H): ICD-10-CM

## 2020-10-01 DIAGNOSIS — G62.9 NEUROPATHY: ICD-10-CM

## 2020-10-01 DIAGNOSIS — Z74.09 IMPAIRED MOBILITY: ICD-10-CM

## 2020-10-01 DIAGNOSIS — L97.522 DIABETIC ULCER OF TOE OF LEFT FOOT ASSOCIATED WITH TYPE 2 DIABETES MELLITUS, WITH FAT LAYER EXPOSED (H): ICD-10-CM

## 2020-10-01 DIAGNOSIS — Z89.512 LEFT BELOW-KNEE AMPUTEE (H): ICD-10-CM

## 2020-10-05 ENCOUNTER — MEDICAL CORRESPONDENCE (OUTPATIENT)
Dept: HEALTH INFORMATION MANAGEMENT | Facility: CLINIC | Age: 60
End: 2020-10-05

## 2020-10-06 DIAGNOSIS — S88.012A: Primary | ICD-10-CM

## 2020-10-07 ENCOUNTER — OFFICE VISIT (OUTPATIENT)
Dept: PODIATRY | Facility: CLINIC | Age: 60
End: 2020-10-07
Payer: COMMERCIAL

## 2020-10-07 VITALS — DIASTOLIC BLOOD PRESSURE: 60 MMHG | OXYGEN SATURATION: 96 % | SYSTOLIC BLOOD PRESSURE: 119 MMHG | HEART RATE: 101 BPM

## 2020-10-07 DIAGNOSIS — L97.912 SKIN ULCER OF RIGHT LOWER LEG WITH FAT LAYER EXPOSED (H): ICD-10-CM

## 2020-10-07 DIAGNOSIS — E11.42 TYPE 2 DIABETES MELLITUS WITH DIABETIC POLYNEUROPATHY, WITHOUT LONG-TERM CURRENT USE OF INSULIN (H): ICD-10-CM

## 2020-10-07 DIAGNOSIS — I87.8 VENOUS STASIS: ICD-10-CM

## 2020-10-07 DIAGNOSIS — L97.512 SKIN ULCER OF RIGHT FOOT WITH FAT LAYER EXPOSED (H): ICD-10-CM

## 2020-10-07 DIAGNOSIS — Z89.512 STATUS POST BELOW-KNEE AMPUTATION OF LEFT LOWER EXTREMITY (H): ICD-10-CM

## 2020-10-07 DIAGNOSIS — L97.922 SKIN ULCER OF LEFT LOWER LEG WITH FAT LAYER EXPOSED (H): Primary | ICD-10-CM

## 2020-10-07 PROCEDURE — 15275 SKIN SUB GRAFT FACE/NK/HF/G: CPT | Performed by: PODIATRIST

## 2020-10-07 PROCEDURE — 15271 SKIN SUB GRAFT TRNK/ARM/LEG: CPT | Mod: 51 | Performed by: PODIATRIST

## 2020-10-07 PROCEDURE — 99207 PR DROP WITH A PROCEDURE: CPT | Performed by: PODIATRIST

## 2020-10-07 NOTE — PROGRESS NOTES
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)     GI bleed     UGIB (upper gastrointestinal bleed)     Morbid obesity (H)     Pain in joint, ankle and foot, left     Charcot ankle, left     Below-knee amputation (H)     Acute kidney failure, unspecified (H)     S/P BKA (below knee amputation) (H)     Past Surgical History:   Procedure Laterality Date     AMPUTATE LEG BELOW KNEE Left 7/22/2020    Procedure: Left below knee amputation;  Surgeon: Aniceto Adams MD;  [facility]: [facility]     ESOPHAGOSCOPY, GASTROSCOPY, DUODENOSCOPY (EGD), COMBINED N/A 2/5/2020    Procedure: ESOPHAGOGASTRODUODENOSCOPY (EGD);  Surgeon: Tanya Dias MD;  Location: [facility]     Social History     Socioeconomic History     Marital status:      Spouse name: Not on file     Number of children: Not on file     Years of education: Not on file     Highest education level: Not on file   Occupational History     Not on file   Social Needs     Financial resource strain: Not on file     Food insecurity     Worry: Not on file     Inability: Not on file     Transportation needs     Medical: Not on file     Non-medical: Not on file   Tobacco Use     Smoking status: Current Every Day Smoker     Packs/day: 1.00     Smokeless tobacco: Never Used   Substance and Sexual Activity     Alcohol use: Yes     Drug use: Not on file     Sexual  activity: Not on file   Lifestyle     Physical activity     Days per week: Not on file     Minutes per session: Not on file     Stress: Not on file   Relationships     Social connections     Talks on phone: Not on file     Gets together: Not on file     Attends [employer] service: Not on file     Active member of club or organization: Not on file     Attends meetings of clubs or organizations: Not on file     Relationship status: Not on file     Intimate partner violence     Fear of current or ex partner: Not on file     Emotionally abused: Not on file     Physically abused: Not on file     Forced sexual activity: Not on file   Other Topics Concern     Parent/sibling w/ CABG, MI or angioplasty before 65F 55M? Not Asked   Social History Narrative     Not on file     Family History   Problem Relation Age of Onset     No Known Problems Mother      No Known Problems Father      Last Comprehensive Metabolic Panel:  Sodium   Date Value Ref Range Status   08/03/2020 138 133 - 144 mmol/L Final     Potassium   Date Value Ref Range Status   08/03/2020 4.0 3.4 - 5.3 mmol/L Final     Chloride   Date Value Ref Range Status   08/03/2020 105 94 - 109 mmol/L Final     Carbon Dioxide   Date Value Ref Range Status   08/03/2020 28 20 - 32 mmol/L Final     Anion Gap   Date Value Ref Range Status   08/03/2020 5 3 - 14 mmol/L Final     Glucose   Date Value Ref Range Status   08/03/2020 106 (H) 70 - 99 mg/dL Final     Urea Nitrogen   Date Value Ref Range Status   08/03/2020 11 7 - 30 mg/dL Final     Creatinine   Date Value Ref Range Status   08/03/2020 0.79 0.52 - 1.04 mg/dL Final     GFR Estimate   Date Value Ref Range Status   08/03/2020 81 >60 mL/min/[1.73_m2] Final     Comment:     Non  GFR Calc  Starting 12/18/2018, serum creatinine based estimated GFR (eGFR) will be   calculated using the Chronic Kidney Disease Epidemiology Collaboration   (CKD-EPI) equation.       Calcium   Date Value Ref Range Status   08/03/2020  9.2 8.5 - 10.1 mg/dL Final     Lab Results   Component Value Date    WBC 7.5 08/03/2020     Lab Results   Component Value Date    RBC 3.61 08/03/2020     Lab Results   Component Value Date    HGB 8.2 08/03/2020     Lab Results   Component Value Date    HCT 28.6 08/03/2020     No components found for: MCT  Lab Results   Component Value Date    MCV 79 08/03/2020     Lab Results   Component Value Date    MCH 22.7 08/03/2020     Lab Results   Component Value Date    MCHC 28.7 08/03/2020     Lab Results   Component Value Date    RDW 20.3 08/03/2020     Lab Results   Component Value Date     08/03/2020     Lab Results   Component Value Date    A1C 5.5 07/21/2020    A1C 6.5 02/04/2020                             SUBJECTIVE FINDINGS:  60-year-old female returns to clinic for ulcers, right first MPJ, right foot and leg, left below-the-knee amputation site.  Relates she is doing okay.  She is using the Aquacel Ag.  She is doing the wound cares.  Presents in her wheelchair.  She relates she did see [facility] and they are waiting for the wounds to close for her prosthetics.      OBJECTIVE FINDINGS:  DP and PT are 2/4 right.  She has a right dorsal foot ulceration that is about 1.5 x 1 cm.  She has a right plantar first MPJ ulcer that is about 3.8 x 2 cm.  She has a right second toe ulcer that is about 1 x 0.5 cm.  Right lateral leg ulcer is about 2 x 1.  Right anterior leg ulcer, the larger one is about 3 x 1.1 and a smaller one is 2 x 1 and a more proximal one is about 1 cm.  She has a left below-the-knee amputation medial ulceration that is about 3.7 x 2.5 cm and the lateral one is about 6.5 x 2.5 cm.  There is granulation tissue and some fibrous tissue on all the wound sites.  The ulcers are deep through the dermis into the subcutaneous tissues at their deepest points.  She has decreased edema on the right leg.  There is no odor, no calor.  Minimal erythema at any of the ulcer sites.      ASSESSMENT  AND PLAN:  Ulcer, right first MPJ, right second toe, right dorsal foot and right lower leg and left below-the-knee amputation site.  She has venous stasis and venous hypertension.  She is diabetic with peripheral neuropathy and vascular disease.  Diagnosis and treatment discussed with her.  Local wound care done and the wounds were prepped for PuraPly Antimicrobial.  PuraPly Antimicrobial was applied to all the wound sites, secured with Wound Veil and Steri-Strips.  Aquacel Ag was applied over the wound sites.  Multilayer Unna boot and compression boot applied to the right lower extremity upon consent and use discussed with her.  She also relates she is having problems with her surgical shoe.  It is too loose.  She cannot stand on it without falling over the side of it.  Below-the-knee CAM boot dispensed and use discussed with her.  This is the first PuraPly Antimicrobial we have applied.  Three 3.76 x 3.76 cm PuraPly Antimicrobial was used to cover the wounds and margins.  None was discarded.  She will return to clinic and see me in 1 week.

## 2020-10-07 NOTE — LETTER
10/7/2020         RE: Tifafni Tan  [address] [facility] [address]        Dear Colleague,    Thank you for referring your patient, Tiffani Tan, to the [facility]. Please see a copy of my visit note below.    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)     GI bleed     UGIB (upper gastrointestinal bleed)     Morbid obesity (H)     Pain in joint, ankle and foot, left     Charcot ankle, left     Below-knee amputation (H)     Acute kidney failure, unspecified (H)     S/P BKA (below knee amputation) (H)     Past Surgical History:   Procedure Laterality Date     AMPUTATE LEG BELOW KNEE Left 7/22/2020    Procedure: Left below knee amputation;  Surgeon: Aniceto Adams MD;  [facility]     ESOPHAGOSCOPY, GASTROSCOPY, DUODENOSCOPY (EGD), COMBINED N/A 2/5/2020    Procedure: ESOPHAGOGASTRODUODENOSCOPY (EGD);  Surgeon: Tanya Dias MD;  Location: [facility]     Social History     Socioeconomic History     Marital status:      Spouse name: Not on file     Number of children: Not on file     Years of education: Not on file     Highest education level: Not on file   Occupational History     Not on file   Social Needs     Financial resource strain: Not on file     Food insecurity     Worry: Not on file     Inability: Not on file     Transportation needs     Medical: Not on file      Non-medical: Not on file   Tobacco Use     Smoking status: Current Every Day Smoker     Packs/day: 1.00     Smokeless tobacco: Never Used   Substance and Sexual Activity     Alcohol use: Yes     Drug use: Not on file     Sexual activity: Not on file   Lifestyle     Physical activity     Days per week: Not on file     Minutes per session: Not on file     Stress: Not on file   Relationships     Social connections     Talks on phone: Not on file     Gets together: Not on file     Attends [employer] service: Not on file     Active member of club or organization: Not on file     Attends meetings of clubs or organizations: Not on file     Relationship status: Not on file     Intimate partner violence     Fear of current or ex partner: Not on file     Emotionally abused: Not on file     Physically abused: Not on file     Forced sexual activity: Not on file   Other Topics Concern     Parent/sibling w/ CABG, MI or angioplasty before 65F 55M? Not Asked   Social History Narrative     Not on file     Family History   Problem Relation Age of Onset     No Known Problems Mother      No Known Problems Father      Last Comprehensive Metabolic Panel:  Sodium   Date Value Ref Range Status   08/03/2020 138 133 - 144 mmol/L Final     Potassium   Date Value Ref Range Status   08/03/2020 4.0 3.4 - 5.3 mmol/L Final     Chloride   Date Value Ref Range Status   08/03/2020 105 94 - 109 mmol/L Final     Carbon Dioxide   Date Value Ref Range Status   08/03/2020 28 20 - 32 mmol/L Final     Anion Gap   Date Value Ref Range Status   08/03/2020 5 3 - 14 mmol/L Final     Glucose   Date Value Ref Range Status   08/03/2020 106 (H) 70 - 99 mg/dL Final     Urea Nitrogen   Date Value Ref Range Status   08/03/2020 11 7 - 30 mg/dL Final     Creatinine   Date Value Ref Range Status   08/03/2020 0.79 0.52 - 1.04 mg/dL Final     GFR Estimate   Date Value Ref Range Status   08/03/2020 81 >60 mL/min/[1.73_m2] Final     Comment:     Non  GFR  Calc  Starting 12/18/2018, serum creatinine based estimated GFR (eGFR) will be   calculated using the Chronic Kidney Disease Epidemiology Collaboration   (CKD-EPI) equation.       Calcium   Date Value Ref Range Status   08/03/2020 9.2 8.5 - 10.1 mg/dL Final     Lab Results   Component Value Date    WBC 7.5 08/03/2020     Lab Results   Component Value Date    RBC 3.61 08/03/2020     Lab Results   Component Value Date    HGB 8.2 08/03/2020     Lab Results   Component Value Date    HCT 28.6 08/03/2020     No components found for: MCT  Lab Results   Component Value Date    MCV 79 08/03/2020     Lab Results   Component Value Date    MCH 22.7 08/03/2020     Lab Results   Component Value Date    MCHC 28.7 08/03/2020     Lab Results   Component Value Date    RDW 20.3 08/03/2020     Lab Results   Component Value Date     08/03/2020     Lab Results   Component Value Date    A1C 5.5 07/21/2020    A1C 6.5 02/04/2020                             SUBJECTIVE FINDINGS:  60-year-old female returns to clinic for ulcers, right first MPJ, right foot and leg, left below-the-knee amputation site.  Relates she is doing okay.  She is using the Aquacel Ag.  She is doing the wound cares.  Presents in her wheelchair.  She relates she did see [facility] and they are waiting for the wounds to close for her prosthetics.      OBJECTIVE FINDINGS:  DP and PT are 2/4 right.  She has a right dorsal foot ulceration that is about 1.5 x 1 cm.  She has a right plantar first MPJ ulcer that is about 3.8 x 2 cm.  She has a right second toe ulcer that is about 1 x 0.5 cm.  Right lateral leg ulcer is about 2 x 1.  Right anterior leg ulcer, the larger one is about 3 x 1.1 and a smaller one is 2 x 1 and a more proximal one is about 1 cm.  She has a left below-the-knee amputation medial ulceration that is about 3.7 x 2.5 cm and the lateral one is about 6.5 x 2.5 cm.  There is granulation tissue and some fibrous tissue on all the wound sites.   The ulcers are deep through the dermis into the subcutaneous tissues at their deepest points.  She has decreased edema on the right leg.  There is no odor, no calor.  Minimal erythema at any of the ulcer sites.      ASSESSMENT AND PLAN:  Ulcer, right first MPJ, right second toe, right dorsal foot and right lower leg and left below-the-knee amputation site.  She has venous stasis and venous hypertension.  She is diabetic with peripheral neuropathy and vascular disease.  Diagnosis and treatment discussed with her.  Local wound care done and the wounds were prepped for PuraPly Antimicrobial.  PuraPly Antimicrobial was applied to all the wound sites, secured with Wound Veil and Steri-Strips.  Aquacel Ag was applied over the wound sites.  Multilayer Unna boot and compression boot applied to the right lower extremity upon consent and use discussed with her.  She also relates she is having problems with her surgical shoe.  It is too loose.  She cannot stand on it without falling over the side of it.  Below-the-knee CAM boot dispensed and use discussed with her.  This is the first PuraPly Antimicrobial we have applied.  Three 3.76 x 3.76 cm PuraPly Antimicrobial was used to cover the wounds and margins.  None was discarded.  She will return to clinic and see me in 1 week.           Again, thank you for allowing me to participate in the care of your patient.        Sincerely,        Nolan Whitten DPM

## 2020-10-07 NOTE — NURSING NOTE
Tiffani Tan's chief complaint for this visit includes:  Chief Complaint   Patient presents with     RECHECK     Follow up from [facility]     PCP: Wily Bonilla    Referring Provider:  No referring provider defined for this encounter.    /60 (BP Location: Left arm, Patient Position: Sitting, Cuff Size: Adult Regular)   Pulse 101   SpO2 96%   Data Unavailable     Do you need any medication refills at today's visit? No

## 2020-10-07 NOTE — PATIENT INSTRUCTIONS
Thanks for coming today.  [facility]  [address] [place] [address]    To schedule future appointments in Ortho Clinic, you may call [phone].    To schedule ordered imaging by your provider:   Call Central Imaging [phone]    To schedule an injection ordered by your provider:  Call Central Imaging Injection scheduling line: [phone]  ShowEvidencehart available online at:  [email or web address]    Please call if any further questions or concerns ([phone]).  Clinic hours 8 am to 5 pm.    Return to clinic (call) if symptoms worsen or fail to improve.

## 2020-10-08 ENCOUNTER — TELEPHONE (OUTPATIENT)
Dept: PODIATRY | Facility: CLINIC | Age: 60
End: 2020-10-08

## 2020-10-08 NOTE — TELEPHONE ENCOUNTER
[facility] Call Center    Phone Message    May a detailed message be left on voicemail: yes     Reason for Call:  Order(s): Home Care Orders: Other: Bao from  Home Care is requesting Dr Whitten be the Dr to sign off on any orders as her PCP will not sign off on any verbal orders until patient is seen. It is unclear from  nurse if patient will be seen by PCP. Requesting Fish sign off on orders as they have been focused around wound care anyway. Bao also requesting verbal orders for: Skilled nursing 1 time per week 9 weeks, 3 as needed visit for wound management, also needs current would care orders.     Action Taken: Message routed to:  Adult Clinics: Podiatry p [phone]    Travel Screening: Not Applicable

## 2020-10-09 NOTE — TELEPHONE ENCOUNTER
Message was sent to Dr. Whitten, he will sign off on wound care orders, but he I can sign off on wound care orders but cant manage her medications or systemic medical conditions.   Called and left message for Bao, let her know that above. Also gave the ok for nurse visit and realyed last wound care noted of right leg clean with wound cleanser, aquacel ag, unna boot and compression boot.   Asked her to call back with questions.  Haily Heath RN

## 2020-10-13 ENCOUNTER — OFFICE VISIT (OUTPATIENT)
Dept: PODIATRY | Facility: CLINIC | Age: 60
End: 2020-10-13
Payer: COMMERCIAL

## 2020-10-13 VITALS — SYSTOLIC BLOOD PRESSURE: 131 MMHG | HEART RATE: 90 BPM | OXYGEN SATURATION: 92 % | DIASTOLIC BLOOD PRESSURE: 68 MMHG

## 2020-10-13 DIAGNOSIS — L97.512 SKIN ULCER OF RIGHT FOOT WITH FAT LAYER EXPOSED (H): ICD-10-CM

## 2020-10-13 DIAGNOSIS — E11.42 TYPE 2 DIABETES MELLITUS WITH DIABETIC POLYNEUROPATHY, WITHOUT LONG-TERM CURRENT USE OF INSULIN (H): ICD-10-CM

## 2020-10-13 DIAGNOSIS — L97.922 SKIN ULCER OF LEFT LOWER LEG WITH FAT LAYER EXPOSED (H): Primary | ICD-10-CM

## 2020-10-13 DIAGNOSIS — I87.8 VENOUS STASIS: ICD-10-CM

## 2020-10-13 DIAGNOSIS — L97.912 SKIN ULCER OF RIGHT LOWER LEG WITH FAT LAYER EXPOSED (H): ICD-10-CM

## 2020-10-13 DIAGNOSIS — Z89.512 STATUS POST BELOW-KNEE AMPUTATION OF LEFT LOWER EXTREMITY (H): ICD-10-CM

## 2020-10-13 PROCEDURE — 99207 PR DROP WITH A PROCEDURE: CPT | Performed by: PODIATRIST

## 2020-10-13 PROCEDURE — 15275 SKIN SUB GRAFT FACE/NK/HF/G: CPT | Performed by: PODIATRIST

## 2020-10-13 PROCEDURE — 15271 SKIN SUB GRAFT TRNK/ARM/LEG: CPT | Mod: 51 | Performed by: PODIATRIST

## 2020-10-13 NOTE — NURSING NOTE
Tiffani Tan's chief complaint for this visit includes:  Chief Complaint   Patient presents with     RECHECK     Follow up right leg uclers, UNNA boot/left leg     PCP: Wily Bonilla    Referring Provider:  No referring provider defined for this encounter.    /68 (BP Location: Left arm, Patient Position: Sitting, Cuff Size: Adult Regular)   Pulse 90   SpO2 92%   Data Unavailable     Do you need any medication refills at today's visit? No

## 2020-10-13 NOTE — PROGRESS NOTES
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)     GI bleed     UGIB (upper gastrointestinal bleed)     Morbid obesity (H)     Pain in joint, ankle and foot, left     Charcot ankle, left     Below-knee amputation (H)     Acute kidney failure, unspecified (H)     S/P BKA (below knee amputation) (H)     Past Surgical History:   Procedure Laterality Date     AMPUTATE LEG BELOW KNEE Left 7/22/2020    Procedure: Left below knee amputation;  Surgeon: Aniceto Adams MD;  [facility]     ESOPHAGOSCOPY, GASTROSCOPY, DUODENOSCOPY (EGD), COMBINED N/A 2/5/2020    Procedure: ESOPHAGOGASTRODUODENOSCOPY (EGD);  Surgeon: Tanya Dias MD;  Location: [facility]     Social History     Socioeconomic History     Marital status:      Spouse name: Not on file     Number of children: Not on file     Years of education: Not on file     Highest education level: Not on file   Occupational History     Not on file   Social Needs     Financial resource strain: Not on file     Food insecurity     Worry: Not on file     Inability: Not on file     Transportation needs     Medical: Not on file     Non-medical: Not on file   Tobacco Use     Smoking status: Current Every Day Smoker     Packs/day: 1.00     Smokeless tobacco: Never Used   Substance and Sexual Activity     Alcohol use: Yes     Drug use: Not on file     Sexual  activity: Not on file   Lifestyle     Physical activity     Days per week: Not on file     Minutes per session: Not on file     Stress: Not on file   Relationships     Social connections     Talks on phone: Not on file     Gets together: Not on file     Attends Faith service: Not on file     Active member of club or organization: Not on file     Attends meetings of clubs or organizations: Not on file     Relationship status: Not on file     Intimate partner violence     Fear of current or ex partner: Not on file     Emotionally abused: Not on file     Physically abused: Not on file     Forced sexual activity: Not on file   Other Topics Concern     Parent/sibling w/ CABG, MI or angioplasty before 65F 55M? Not Asked   Social History Narrative     Not on file     Family History   Problem Relation Age of Onset     No Known Problems Mother      No Known Problems Father                          SUBJECTIVE FINDINGS:  60-year-old female returns to clinic for ulcers, right first MPJ, right foot and leg, left below-the-knee amputation site.  Relates she is doing okay with no new problems.  Presents in her wheelchair.        OBJECTIVE FINDINGS:  DP and PT are 2/4 right.  She has a right dorsal foot ulceration that is eschared.  She has a right plantar first MPJ ulcer that is about 2.7 x 2 cm.  She has a right lateral Hallux ulcer that is about 1 x 0.5 cm.  Right lateral leg ulcer is eschared.  Right anterior leg ulcers are eschared.  Right leg and dorsal foot ulcers are eshared with Puraply Am intact.  She has a left below-the-knee amputation medial ulceration that is about 2x1.5 cm and the lateral one is about 6 x 2 cm.  There is granulation tissue and some fibrous tissue on all the wound sites.  The ulcers are deep through the dermis into the subcutaneous tissues at their deepest points.  She has decreased edema.  There is minimal odor, no calor.  Minimal erythema at any of the ulcer sites.      ASSESSMENT AND PLAN:   Ulcer, right first MPJ, right second toe, right dorsal foot and right lower leg and left below-the-knee amputation site.  She has venous stasis and venous hypertension.  She is diabetic with peripheral neuropathy and vascular disease.  Diagnosis and treatment discussed with her.  Local wound care done and the wounds were prepped for PuraPly Antimicrobial.  PuraPly Antimicrobial was applied to the left ulcers and the right plantar 1st mpj and lateral Hallux ulcers, secured with Wound Veil and Steri-Strips.  Aquacel Ag was applied over the wound sites.  Multilayer Unna boot and compression boot applied to the right lower extremity upon consent and use discussed with her.  Continue the Below-the-knee CAM boot.  This is the second PuraPly Antimicrobial we have applied.  Two 3.76 x 3.76 cm PuraPly Antimicrobial was used to cover the wounds and margins.  None was discarded.  She will return to clinic and see me in 1 week.

## 2020-10-13 NOTE — LETTER
10/13/2020         RE: Tiffani Tan  [address] [facility] [address]        Dear Colleague,    Thank you for referring your patient, Tiffani Tan, to the [facility]. Please see a copy of my visit note below.    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)     GI bleed     UGIB (upper gastrointestinal bleed)     Morbid obesity (H)     Pain in joint, ankle and foot, left     Charcot ankle, left     Below-knee amputation (H)     Acute kidney failure, unspecified (H)     S/P BKA (below knee amputation) (H)     Past Surgical History:   Procedure Laterality Date     AMPUTATE LEG BELOW KNEE Left 7/22/2020    Procedure: Left below knee amputation;  Surgeon: Aniceto Adams MD;  [facility]     ESOPHAGOSCOPY, GASTROSCOPY, DUODENOSCOPY (EGD), COMBINED N/A 2/5/2020    Procedure: ESOPHAGOGASTRODUODENOSCOPY (EGD);  Surgeon: Tanya Dias MD;  Location: [facility]     Social History     Socioeconomic History     Marital status:      Spouse name: Not on file     Number of children: Not on file     Years of education: Not on file     Highest education level: Not on file   Occupational History     Not on file   Social Needs     Financial resource strain: Not on file     Food insecurity     Worry: Not on file     Inability: Not on file     Transportation needs     Medical: Not on  file     Non-medical: Not on file   Tobacco Use     Smoking status: Current Every Day Smoker     Packs/day: 1.00     Smokeless tobacco: Never Used   Substance and Sexual Activity     Alcohol use: Yes     Drug use: Not on file     Sexual activity: Not on file   Lifestyle     Physical activity     Days per week: Not on file     Minutes per session: Not on file     Stress: Not on file   Relationships     Social connections     Talks on phone: Not on file     Gets together: Not on file     Attends Mormonism service: Not on file     Active member of club or organization: Not on file     Attends meetings of clubs or organizations: Not on file     Relationship status: Not on file     Intimate partner violence     Fear of current or ex partner: Not on file     Emotionally abused: Not on file     Physically abused: Not on file     Forced sexual activity: Not on file   Other Topics Concern     Parent/sibling w/ CABG, MI or angioplasty before 65F 55M? Not Asked   Social History Narrative     Not on file     Family History   Problem Relation Age of Onset     No Known Problems Mother      No Known Problems Father                          SUBJECTIVE FINDINGS:  60-year-old female returns to clinic for ulcers, right first MPJ, right foot and leg, left below-the-knee amputation site.  Relates she is doing okay with no new problems.  Presents in her wheelchair.        OBJECTIVE FINDINGS:  DP and PT are 2/4 right.  She has a right dorsal foot ulceration that is eschared.  She has a right plantar first MPJ ulcer that is about 2.7 x 2 cm.  She has a right lateral Hallux ulcer that is about 1 x 0.5 cm.  Right lateral leg ulcer is eschared.  Right anterior leg ulcers are eschared.  Right leg and dorsal foot ulcers are eshared with Puraply Am intact.  She has a left below-the-knee amputation medial ulceration that is about 2x1.5 cm and the lateral one is about 6 x 2 cm.  There is granulation tissue and some fibrous tissue on all the wound  sites.  The ulcers are deep through the dermis into the subcutaneous tissues at their deepest points.  She has decreased edema.  There is minimal odor, no calor.  Minimal erythema at any of the ulcer sites.      ASSESSMENT AND PLAN:  Ulcer, right first MPJ, right second toe, right dorsal foot and right lower leg and left below-the-knee amputation site.  She has venous stasis and venous hypertension.  She is diabetic with peripheral neuropathy and vascular disease.  Diagnosis and treatment discussed with her.  Local wound care done and the wounds were prepped for PuraPly Antimicrobial.  PuraPly Antimicrobial was applied to the left ulcers and the right plantar 1st mpj and lateral Hallux ulcers, secured with Wound Veil and Steri-Strips.  Aquacel Ag was applied over the wound sites.  Multilayer Unna boot and compression boot applied to the right lower extremity upon consent and use discussed with her.  Continue the Below-the-knee CAM boot.  This is the second PuraPly Antimicrobial we have applied.  Two 3.76 x 3.76 cm PuraPly Antimicrobial was used to cover the wounds and margins.  None was discarded.  She will return to clinic and see me in 1 week.       Again, thank you for allowing me to participate in the care of your patient.        Sincerely,        Nolan Whitten DPM

## 2020-10-20 ENCOUNTER — OFFICE VISIT (OUTPATIENT)
Dept: PODIATRY | Facility: CLINIC | Age: 60
End: 2020-10-20
Payer: COMMERCIAL

## 2020-10-20 ENCOUNTER — TELEPHONE (OUTPATIENT)
Dept: PODIATRY | Facility: CLINIC | Age: 60
End: 2020-10-20

## 2020-10-20 VITALS — OXYGEN SATURATION: 96 % | HEART RATE: 94 BPM | DIASTOLIC BLOOD PRESSURE: 67 MMHG | SYSTOLIC BLOOD PRESSURE: 134 MMHG

## 2020-10-20 DIAGNOSIS — L97.922 SKIN ULCER OF LEFT LOWER LEG WITH FAT LAYER EXPOSED (H): Primary | ICD-10-CM

## 2020-10-20 DIAGNOSIS — L97.512 SKIN ULCER OF RIGHT FOOT WITH FAT LAYER EXPOSED (H): ICD-10-CM

## 2020-10-20 DIAGNOSIS — E11.42 TYPE 2 DIABETES MELLITUS WITH DIABETIC POLYNEUROPATHY, WITHOUT LONG-TERM CURRENT USE OF INSULIN (H): ICD-10-CM

## 2020-10-20 DIAGNOSIS — Z89.512 STATUS POST BELOW-KNEE AMPUTATION OF LEFT LOWER EXTREMITY (H): ICD-10-CM

## 2020-10-20 DIAGNOSIS — I87.8 VENOUS STASIS: ICD-10-CM

## 2020-10-20 PROCEDURE — 15271 SKIN SUB GRAFT TRNK/ARM/LEG: CPT | Performed by: PODIATRIST

## 2020-10-20 PROCEDURE — 99207 PR DROP WITH A PROCEDURE: CPT | Performed by: PODIATRIST

## 2020-10-20 NOTE — PROGRESS NOTES
Tiffani Tan's chief complaint for this visit includes:  Chief Complaint   Patient presents with     RECHECK     PCP: Wily Bonilla    Referring Provider:  No referring provider defined for this encounter.    /67 (BP Location: Left arm, Cuff Size: Adult Regular)   Pulse 94   SpO2 96%   Data Unavailable     Denise Shook RN

## 2020-10-20 NOTE — PATIENT INSTRUCTIONS
Thanks for coming today.  Ortho/Sports Medicine Clinic  [address] [place] [address]    To schedule future appointments in Ortho Clinic, you may call [phone].    To schedule ordered imaging by your provider:   Call [facility] [phone]    To schedule an injection ordered by your provider:  Call Central Imaging Injection scheduling line: [phone]  [employer] available online at:  [email or web address]    Please call if any further questions or concerns ([phone]).  Clinic hours 8 am to 5 pm.    Return to clinic (call) if symptoms worsen or fail to improve.

## 2020-10-20 NOTE — PROGRESS NOTES
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)     GI bleed     UGIB (upper gastrointestinal bleed)     Morbid obesity (H)     Pain in joint, ankle and foot, left     Charcot ankle, left     Below-knee amputation (H)     Acute kidney failure, unspecified (H)     S/P BKA (below knee amputation) (H)     Past Surgical History:   Procedure Laterality Date     AMPUTATE LEG BELOW KNEE Left 7/22/2020    Procedure: Left below knee amputation;  Surgeon: Aniceto Adams MD;  [facility]     ESOPHAGOSCOPY, GASTROSCOPY, DUODENOSCOPY (EGD), COMBINED N/A 2/5/2020    Procedure: ESOPHAGOGASTRODUODENOSCOPY (EGD);  Surgeon: Tanya iDas MD;  Location: [facility]     Social History     Socioeconomic History     Marital status:      Spouse name: Not on file     Number of children: Not on file     Years of education: Not on file     Highest education level: Not on file   Occupational History     Not on file   Social Needs     Financial resource strain: Not on file     Food insecurity     Worry: Not on file     Inability: Not on file     Transportation needs     Medical: Not on file     Non-medical: Not on file   Tobacco Use     Smoking status: Current Every Day Smoker     Packs/day: 1.00     Smokeless tobacco: Never Used   Substance and Sexual Activity     Alcohol use: Yes     Drug use: Not on file     Sexual  activity: Not on file   Lifestyle     Physical activity     Days per week: Not on file     Minutes per session: Not on file     Stress: Not on file   Relationships     Social connections     Talks on phone: Not on file     Gets together: Not on file     Attends Moravian service: Not on file     Active member of club or organization: Not on file     Attends meetings of clubs or organizations: Not on file     Relationship status: Not on file     Intimate partner violence     Fear of current or ex partner: Not on file     Emotionally abused: Not on file     Physically abused: Not on file     Forced sexual activity: Not on file   Other Topics Concern     Parent/sibling w/ CABG, MI or angioplasty before 65F 55M? Not Asked   Social History Narrative     Not on file     Family History   Problem Relation Age of Onset     No Known Problems Mother      No Known Problems Father      Lab Results   Component Value Date    A1C 5.5 07/21/2020    A1C 6.5 02/04/2020                     SUBJECTIVE FINDINGS:  60-year-old female returns to clinic for ulcers, right first MPJ, right foot and leg, left below-the-knee amputation site.  Relates she is doing okay with no new problems.  Presents in her wheelchair.  Relates to taking unna boot and Puraply am off yesterday and applying Neema to ulcers.        OBJECTIVE FINDINGS:  DP and PT are 2/4 right.  She has a right dorsal foot ulceration that is through the Epidermis and 0.6cm.  She has a right plantar first MPJ ulcer that is about 3 x 1.6 cm.  She has a right lateral Hallux ulcer that is closed.  She has right 1st interspace maceration.  Right lateral leg ulcers are eschared.  Right anterior leg ulcers are eschared.  Right leg ulcers are eshared with Puraply Am intact.  She has a left below-the-knee amputation medial ulceration that is eschared and lateral ulcers have contracted to two smaller ulcers with the distal one 2x1cm and proximal one 3x1 cm.  There is granulation tissue and  some fibrous tissue on all the wound sites.  The ulcers are deep through the dermis into the subcutaneous tissues at their deepest points.  She has decreased edema.  There is no odor, no calor.  Minimal erythema at any of the ulcer sites.      ASSESSMENT AND PLAN:  Ulcer, right first MPJ, right second toe, right dorsal foot and right lower leg and left below-the-knee amputation site.  She has venous stasis and venous hypertension.  She is diabetic with peripheral neuropathy and vascular disease.  Diagnosis and treatment discussed with her.  Local wound care done and the wounds were prepped for PuraPly Antimicrobial.  PuraPly Antimicrobial was applied to the left ulcers and the right plantar 1st mpj and dorsal foot ulcers, secured with Wound Veil and Steri-Strips.  Aquacel Ag was applied over the wound sites.  Multilayer Unna boot and compression boot applied to the right lower extremity upon consent and use discussed with her.  Biatain Silver applied to right 1st interspace and use discussed with her.  Continue the Below-the-knee CAM boot.  This is the third PuraPly Antimicrobial we have applied.  One 3.76 x 3.76 cm PuraPly Antimicrobial was used to cover the wounds and margins.  None was discarded.  She will return to clinic and see me in 1 week.

## 2020-10-20 NOTE — LETTER
10/20/2020         RE: Tiffani Tan  [address] [facility] [address]        Dear Colleague,    Thank you for referring your patient, Tiffani Tan, to the [facility]. Please see a copy of my visit note below.    Tiffani Tan's chief complaint for this visit includes:  Chief Complaint   Patient presents with     RECHECK     PCP: Wily Bonilla    Referring Provider:  No referring provider defined for this encounter.    /67 (BP Location: Left arm, Cuff Size: Adult Regular)   Pulse 94   SpO2 96%   Data Unavailable     Denise Shook RN        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)     GI bleed     UGIB (upper gastrointestinal bleed)     Morbid obesity (H)     Pain in joint, ankle and foot, left     Charcot ankle, left     Below-knee amputation (H)     Acute kidney failure, unspecified (H)     S/P BKA (below knee amputation) (H)     Past Surgical History:   Procedure Laterality Date     AMPUTATE LEG BELOW KNEE Left 7/22/2020    Procedure: Left below knee amputation;  Surgeon: Aniceto Adams MD;  [facility]     ESOPHAGOSCOPY, GASTROSCOPY, DUODENOSCOPY (EGD), COMBINED N/A 2/5/2020    Procedure: ESOPHAGOGASTRODUODENOSCOPY (EGD);  Surgeon: Tanya Dias MD;  [facility]     Social History     Socioeconomic History     Marital status:      Spouse  name: Not on file     Number of children: Not on file     Years of education: Not on file     Highest education level: Not on file   Occupational History     Not on file   Social Needs     Financial resource strain: Not on file     Food insecurity     Worry: Not on file     Inability: Not on file     Transportation needs     Medical: Not on file     Non-medical: Not on file   Tobacco Use     Smoking status: Current Every Day Smoker     Packs/day: 1.00     Smokeless tobacco: Never Used   Substance and Sexual Activity     Alcohol use: Yes     Drug use: Not on file     Sexual activity: Not on file   Lifestyle     Physical activity     Days per week: Not on file     Minutes per session: Not on file     Stress: Not on file   Relationships     Social connections     Talks on phone: Not on file     Gets together: Not on file     Attends Shinto service: Not on file     Active member of club or organization: Not on file     Attends meetings of clubs or organizations: Not on file     Relationship status: Not on file     Intimate partner violence     Fear of current or ex partner: Not on file     Emotionally abused: Not on file     Physically abused: Not on file     Forced sexual activity: Not on file   Other Topics Concern     Parent/sibling w/ CABG, MI or angioplasty before 65F 55M? Not Asked   Social History Narrative     Not on file     Family History   Problem Relation Age of Onset     No Known Problems Mother      No Known Problems Father      Lab Results   Component Value Date    A1C 5.5 07/21/2020    A1C 6.5 02/04/2020                     SUBJECTIVE FINDINGS:  60-year-old female returns to clinic for ulcers, right first MPJ, right foot and leg, left below-the-knee amputation site.  Relates she is doing okay with no new problems.  Presents in her wheelchair.  Relates to taking unna boot and Puraply am off yesterday and applying Neema to ulcers.        OBJECTIVE FINDINGS:  DP and PT are 2/4 right.  She has a right  dorsal foot ulceration that is through the Epidermis and 0.6cm.  She has a right plantar first MPJ ulcer that is about 3 x 1.6 cm.  She has a right lateral Hallux ulcer that is closed.  She has right 1st interspace maceration.  Right lateral leg ulcers are eschared.  Right anterior leg ulcers are eschared.  Right leg ulcers are eshared with Puraply Am intact.  She has a left below-the-knee amputation medial ulceration that is eschared and lateral ulcers have contracted to two smaller ulcers with the distal one 2x1cm and proximal one 3x1 cm.  There is granulation tissue and some fibrous tissue on all the wound sites.  The ulcers are deep through the dermis into the subcutaneous tissues at their deepest points.  She has decreased edema.  There is no odor, no calor.  Minimal erythema at any of the ulcer sites.      ASSESSMENT AND PLAN:  Ulcer, right first MPJ, right second toe, right dorsal foot and right lower leg and left below-the-knee amputation site.  She has venous stasis and venous hypertension.  She is diabetic with peripheral neuropathy and vascular disease.  Diagnosis and treatment discussed with her.  Local wound care done and the wounds were prepped for PuraPly Antimicrobial.  PuraPly Antimicrobial was applied to the left ulcers and the right plantar 1st mpj and dorsal foot ulcers, secured with Wound Veil and Steri-Strips.  Aquacel Ag was applied over the wound sites.  Multilayer Unna boot and compression boot applied to the right lower extremity upon consent and use discussed with her.  Biatain Silver applied to right 1st interspace and use discussed with her.  Continue the Below-the-knee CAM boot.  This is the third PuraPly Antimicrobial we have applied.  One 3.76 x 3.76 cm PuraPly Antimicrobial was used to cover the wounds and margins.  None was discarded.  She will return to clinic and see me in 1 week.       Again, thank you for allowing me to participate in the care of your patient.         Sincerely,        Noaln Whitten DPM

## 2020-10-20 NOTE — TELEPHONE ENCOUNTER
10/20 Called and left voicemail. Provided phone number [phone]  To schedule 2 week follow up with Dr. Whitten around 11/4/20.     Jeannette Aguilar   Procedure    Ortho/Sports Med/Pod/Ent/Eye  [facility]   [phone]

## 2020-10-21 ENCOUNTER — TELEPHONE (OUTPATIENT)
Dept: PODIATRY | Facility: CLINIC | Age: 60
End: 2020-10-21

## 2020-10-21 NOTE — TELEPHONE ENCOUNTER
[facility] Call Center    Phone Message    May a detailed message be left on voicemail: yes     Reason for Call: Patient called wanting to schedule a 2 week follow up. Patient stated she cannot wait until his next available appointment on 11/10. Please advise. Thank you.    Action Taken: Message routed to:  Adult Clinics: Podiatry p [phone]    Travel Screening: Not Applicable

## 2020-10-21 NOTE — TELEPHONE ENCOUNTER
10/21 Called and spoke to patient. She is currently scheduled for a follow up with Dr. Whitten.     Jeannette Aguilar   Procedure    Ortho/Sports Med/Pod/Ent/Eye  [facility]   [phone]

## 2020-10-22 ENCOUNTER — PATIENT OUTREACH (OUTPATIENT)
Dept: PHYSICAL MEDICINE AND REHAB | Facility: CLINIC | Age: 60
End: 2020-10-22

## 2020-10-22 NOTE — PROGRESS NOTES
The Standard Written Order for lightweight manual wheelchair was signed by Dr Antoine and faxed back to [facility] [phone]

## 2020-10-27 NOTE — TELEPHONE ENCOUNTER
Called [facility] RN (Dora) and clarified home care orders. Left detailed VM with cllback. Pt has PuraPly on wounds with wound veil and steri-strips. All of this is to stay in place. OK to remove unna boot and Aquacel Ag from all wounds. Do not rinse legs, do not contaminate the PuraPly areas. Replace Aquacel Ag to all wounds over the veil, Biatin Silver to right 2st interspace, re-apply unna boot.     Vincenzo Farah RN

## 2020-11-03 ENCOUNTER — OFFICE VISIT (OUTPATIENT)
Dept: PODIATRY | Facility: CLINIC | Age: 60
End: 2020-11-03
Payer: COMMERCIAL

## 2020-11-03 VITALS — DIASTOLIC BLOOD PRESSURE: 62 MMHG | SYSTOLIC BLOOD PRESSURE: 123 MMHG | OXYGEN SATURATION: 96 % | HEART RATE: 86 BPM

## 2020-11-03 DIAGNOSIS — L97.512 SKIN ULCER OF RIGHT FOOT WITH FAT LAYER EXPOSED (H): ICD-10-CM

## 2020-11-03 DIAGNOSIS — Z89.512 STATUS POST BELOW-KNEE AMPUTATION OF LEFT LOWER EXTREMITY (H): ICD-10-CM

## 2020-11-03 DIAGNOSIS — I87.8 VENOUS STASIS: ICD-10-CM

## 2020-11-03 DIAGNOSIS — E11.42 TYPE 2 DIABETES MELLITUS WITH DIABETIC POLYNEUROPATHY, WITHOUT LONG-TERM CURRENT USE OF INSULIN (H): ICD-10-CM

## 2020-11-03 DIAGNOSIS — L97.922 SKIN ULCER OF LEFT LOWER LEG WITH FAT LAYER EXPOSED (H): Primary | ICD-10-CM

## 2020-11-03 PROCEDURE — 99207 PR DROP WITH A PROCEDURE: CPT | Performed by: PODIATRIST

## 2020-11-03 PROCEDURE — 15275 SKIN SUB GRAFT FACE/NK/HF/G: CPT | Performed by: PODIATRIST

## 2020-11-03 NOTE — PROGRESS NOTES
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)     GI bleed     UGIB (upper gastrointestinal bleed)     Morbid obesity (H)     Pain in joint, ankle and foot, left     Charcot ankle, left     Below-knee amputation (H)     Acute kidney failure, unspecified (H)     S/P BKA (below knee amputation) (H)     Past Surgical History:   Procedure Laterality Date     AMPUTATE LEG BELOW KNEE Left 7/22/2020    Procedure: Left below knee amputation;  Surgeon: Aniceto Adams MD;  Location: [facility]     ESOPHAGOSCOPY, GASTROSCOPY, DUODENOSCOPY (EGD), COMBINED N/A 2/5/2020    Procedure: ESOPHAGOGASTRODUODENOSCOPY (EGD);  Surgeon: Tanya Dias MD;  Location: [facility]     Social History     Socioeconomic History     Marital status:      Spouse name: Not on file     Number of children: Not on file     Years of education: Not on file     Highest education level: Not on file   Occupational History     Not on file   Social Needs     Financial resource strain: Not on file     Food insecurity     Worry: Not on file     Inability: Not on file     Transportation needs     Medical: Not on file     Non-medical: Not on file   Tobacco Use     Smoking status: Current Every Day Smoker     Packs/day: 1.00     Smokeless tobacco: Never Used   Substance and Sexual Activity     Alcohol use: Yes     Drug use: Not on file     Sexual  activity: Not on file   Lifestyle     Physical activity     Days per week: Not on file     Minutes per session: Not on file     Stress: Not on file   Relationships     Social connections     Talks on phone: Not on file     Gets together: Not on file     Attends Sabianist service: Not on file     Active member of club or organization: Not on file     Attends meetings of clubs or organizations: Not on file     Relationship status: Not on file     Intimate partner violence     Fear of current or ex partner: Not on file     Emotionally abused: Not on file     Physically abused: Not on file     Forced sexual activity: Not on file   Other Topics Concern     Parent/sibling w/ CABG, MI or angioplasty before 65F 55M? Not Asked   Social History Narrative     Not on file     Family History   Problem Relation Age of Onset     No Known Problems Mother      No Known Problems Father                      Lab Results   Component Value Date    A1C 5.5 07/21/2020    A1C 6.5 02/04/2020     SUBJECTIVE FINDINGS:  60-year-old female returns to clinic for ulcers left below-the-knee amputation site, first MPJ right leg, right first interspace.  She relates it is doing better.  They are using the Unna boot and Aquacel Ag between the toes.  They also put some Betadine between the toes.      OBJECTIVE FINDINGS:  Vascular status intact bilaterally.  She has a right plantar first MPJ ulcer that is about 2.5 x 1.2 cm.  It is deep through the dermis, into the subcutaneous tissues.  There is some serosanguineous drainage.  She has serosanguineous drainage and maceration in the first interspace.  Mild edema, no odor, no calor, no pustular drainage.  The other leg ulcers are closed.  She has a left below-the-knee amputation site eschar present medially and laterally.  There is 1 small area that has some drainage.  Otherwise, no erythema, no drainage, no odor, no calor.  There is some PuraPly Antimicrobial on the wound as well.      ASSESSMENT/PLAN:   Ulcer, right first MPJ, right second toe, right dorsal foot, right lower leg, left below-the-knee amputation site.  She has venous stasis and venous hypertension.  She is diabetic with peripheral neuropathy and vascular disease.  Diagnosis and treatment discussed with her.  Local wound care done upon consent today.  PuraPly Antimicrobial is applied to the plantar right first MPJ lesion and secured with a Steri-Strip.  Aquacel Ag and a multilayer Unna boot and compression boot applied to the right lower extremity upon consent.  This is the fourth PuraPly Antimicrobial we have applied.  A 3.76 x 3.76 cm PuraPly Antimicrobial was used to cover the wounds and margins.  I did have more than I needed for the first MPJ so I applied the remainder on the eschar scab on left lateral below-the-knee amputation site stump.  The entire PuraPly Antimicrobial was used for wounds and margins.  The left leg, clean with Wound Vashe, apply Aquacel Ag, Mepilex Border and Kerlix and Ace wraps for compression.  She will bring in her compression sock next week.  I will see her back in 1 week.

## 2020-11-03 NOTE — PATIENT INSTRUCTIONS
Thanks for coming today.  [facility]  [address] [place] [address]    To schedule future appointments in Ortho Clinic, you may call [phone].    To schedule ordered imaging by your provider:   Call [facility] [phone]    To schedule an injection ordered by your provider:  Call [facility] Injection scheduling line: [phone]  [facility] available online at:  [email or web address]    Please call if any further questions or concerns ([phone]).  Clinic hours 8 am to 5 pm.    Return to clinic (call) if symptoms worsen or fail to improve.

## 2020-11-03 NOTE — LETTER
11/3/2020         RE: Tiffani Tan  [address] [facility] [address]        Dear Colleague,    Thank you for referring your patient, Tiffani Tan, to the [facility]. Please see a copy of my visit note below.    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)     GI bleed     UGIB (upper gastrointestinal bleed)     Morbid obesity (H)     Pain in joint, ankle and foot, left     Charcot ankle, left     Below-knee amputation (H)     Acute kidney failure, unspecified (H)     S/P BKA (below knee amputation) (H)     Past Surgical History:   Procedure Laterality Date     AMPUTATE LEG BELOW KNEE Left 7/22/2020    Procedure: Left below knee amputation;  Surgeon: Aniceto Adams MD;  [facility]: [facility]     ESOPHAGOSCOPY, GASTROSCOPY, DUODENOSCOPY (EGD), COMBINED N/A 2/5/2020    Procedure: ESOPHAGOGASTRODUODENOSCOPY (EGD);  Surgeon: Tanya Dias MD;  Location: [facility]     Social History     Socioeconomic History     Marital status:      Spouse name: Not on file     Number of children: Not on file     Years of education: Not on file     Highest education level: Not on file   Occupational History     Not on file   Social Needs     Financial resource strain: Not on file     Food insecurity     Worry: Not on file     Inability: Not on file     Transportation needs     Medical: Not on file      Non-medical: Not on file   Tobacco Use     Smoking status: Current Every Day Smoker     Packs/day: 1.00     Smokeless tobacco: Never Used   Substance and Sexual Activity     Alcohol use: Yes     Drug use: Not on file     Sexual activity: Not on file   Lifestyle     Physical activity     Days per week: Not on file     Minutes per session: Not on file     Stress: Not on file   Relationships     Social connections     Talks on phone: Not on file     Gets together: Not on file     Attends Baptism service: Not on file     Active member of club or organization: Not on file     Attends meetings of clubs or organizations: Not on file     Relationship status: Not on file     Intimate partner violence     Fear of current or ex partner: Not on file     Emotionally abused: Not on file     Physically abused: Not on file     Forced sexual activity: Not on file   Other Topics Concern     Parent/sibling w/ CABG, MI or angioplasty before 65F 55M? Not Asked   Social History Narrative     Not on file     Family History   Problem Relation Age of Onset     No Known Problems Mother      No Known Problems Father                      Lab Results   Component Value Date    A1C 5.5 07/21/2020    A1C 6.5 02/04/2020     SUBJECTIVE FINDINGS:  60-year-old female returns to clinic for ulcers left below-the-knee amputation site, first MPJ right leg, right first interspace.  She relates it is doing better.  They are using the Unna boot and Aquacel Ag between the toes.  They also put some Betadine between the toes.      OBJECTIVE FINDINGS:  Vascular status intact bilaterally.  She has a right plantar first MPJ ulcer that is about 2.5 x 1.2 cm.  It is deep through the dermis, into the subcutaneous tissues.  There is some serosanguineous drainage.  She has serosanguineous drainage and maceration in the first interspace.  Mild edema, no odor, no calor, no pustular drainage.  The other leg ulcers are closed.  She has a left below-the-knee  amputation site eschar present medially and laterally.  There is 1 small area that has some drainage.  Otherwise, no erythema, no drainage, no odor, no calor.  There is some PuraPly Antimicrobial on the wound as well.      ASSESSMENT/PLAN:  Ulcer, right first MPJ, right second toe, right dorsal foot, right lower leg, left below-the-knee amputation site.  She has venous stasis and venous hypertension.  She is diabetic with peripheral neuropathy and vascular disease.  Diagnosis and treatment discussed with her.  Local wound care done upon consent today.  PuraPly Antimicrobial is applied to the plantar right first MPJ lesion and secured with a Steri-Strip.  Aquacel Ag and a multilayer Unna boot and compression boot applied to the right lower extremity upon consent.  This is the fourth PuraPly Antimicrobial we have applied.  A 3.76 x 3.76 cm PuraPly Antimicrobial was used to cover the wounds and margins.  I did have more than I needed for the first MPJ so I applied the remainder on the eschar scab on left lateral below-the-knee amputation site stump.  The entire PuraPly Antimicrobial was used for wounds and margins.  The left leg, clean with Wound Vashe, apply Aquacel Ag, Mepilex Border and Kerlix and Ace wraps for compression.  She will bring in her compression sock next week.  I will see her back in 1 week.             Again, thank you for allowing me to participate in the care of your patient.        Sincerely,        Nolan Whitten DPM

## 2020-11-10 ENCOUNTER — TELEPHONE (OUTPATIENT)
Dept: PODIATRY | Facility: CLINIC | Age: 60
End: 2020-11-10

## 2020-11-10 NOTE — TELEPHONE ENCOUNTER
Spoke with Selam with [facility]. They need clarification on how often patient should be changing her dressings.  She said that they are not in the home very often, but they need to know this for wound care supply ordering.    Discussed with Dr. Machuca, PuraPly AM was applied at previous appointment and this type of dressing is meant to be kept on for at least 1 week, but the outer dressing may be changed as needed for drainage.  Patient had previously reported that she had been needing to change dressing 2-3 times per week due to drainage.  Patient's drainage tends to fluctuate, so we want to make sure that she has enough supplies to be able to change her dressing up to 5 times a week.    Called  [facility] RN back and relayed this information.    Vincenzo Farah, RN

## 2020-11-23 ENCOUNTER — MEDICAL CORRESPONDENCE (OUTPATIENT)
Dept: HEALTH INFORMATION MANAGEMENT | Facility: CLINIC | Age: 60
End: 2020-11-23

## 2020-12-03 ENCOUNTER — OFFICE VISIT - HEALTHEAST (OUTPATIENT)
Dept: ENDOCRINOLOGY | Facility: CLINIC | Age: 60
End: 2020-12-03

## 2020-12-03 DIAGNOSIS — Z74.09 IMPAIRED MOBILITY: ICD-10-CM

## 2020-12-03 DIAGNOSIS — Z89.512 LEFT BELOW-KNEE AMPUTEE (H): ICD-10-CM

## 2020-12-03 DIAGNOSIS — G62.9 NEUROPATHY: ICD-10-CM

## 2020-12-03 DIAGNOSIS — E11.610 CHARCOT FOOT DUE TO DIABETES MELLITUS (H): ICD-10-CM

## 2020-12-03 DIAGNOSIS — R26.2 IMPAIRED AMBULATION: ICD-10-CM

## 2020-12-03 DIAGNOSIS — L97.522 DIABETIC ULCER OF TOE OF LEFT FOOT ASSOCIATED WITH TYPE 2 DIABETES MELLITUS, WITH FAT LAYER EXPOSED (H): ICD-10-CM

## 2020-12-03 DIAGNOSIS — E11.621 DIABETIC ULCER OF TOE OF LEFT FOOT ASSOCIATED WITH TYPE 2 DIABETES MELLITUS, WITH FAT LAYER EXPOSED (H): ICD-10-CM

## 2021-01-11 ENCOUNTER — TELEPHONE (OUTPATIENT)
Dept: PODIATRY | Facility: CLINIC | Age: 61
End: 2021-01-11

## 2021-01-11 NOTE — TELEPHONE ENCOUNTER
Attempted to return call to patient regarding appointment.  She is scheduled for 1/12/2021 at 1pm.  There are no other open appointments, Dr. Whitten is down in the [facility] clinic today.

## 2021-01-11 NOTE — TELEPHONE ENCOUNTER
[facility] Call Center    Phone Message    May a detailed message be left on voicemail: yes     Reason for Call: Other: pt calling and is wanting writer to double book her cause she says thats how they do it please advise with her , thank you     Action Taken:     Travel Screening: Not Applicable

## 2021-01-12 ENCOUNTER — OFFICE VISIT (OUTPATIENT)
Dept: PODIATRY | Facility: CLINIC | Age: 61
End: 2021-01-12
Payer: COMMERCIAL

## 2021-01-12 ENCOUNTER — TELEPHONE (OUTPATIENT)
Dept: PODIATRY | Facility: CLINIC | Age: 61
End: 2021-01-12

## 2021-01-12 VITALS — SYSTOLIC BLOOD PRESSURE: 135 MMHG | HEART RATE: 79 BPM | DIASTOLIC BLOOD PRESSURE: 75 MMHG | OXYGEN SATURATION: 98 %

## 2021-01-12 DIAGNOSIS — Z89.512 STATUS POST BELOW-KNEE AMPUTATION OF LEFT LOWER EXTREMITY (H): ICD-10-CM

## 2021-01-12 DIAGNOSIS — I87.8 VENOUS STASIS: ICD-10-CM

## 2021-01-12 DIAGNOSIS — E11.51 DIABETES MELLITUS WITH PERIPHERAL VASCULAR DISEASE (H): ICD-10-CM

## 2021-01-12 DIAGNOSIS — E11.42 TYPE 2 DIABETES MELLITUS WITH DIABETIC POLYNEUROPATHY, WITHOUT LONG-TERM CURRENT USE OF INSULIN (H): Primary | ICD-10-CM

## 2021-01-12 DIAGNOSIS — L97.512 SKIN ULCER OF RIGHT FOOT WITH FAT LAYER EXPOSED (H): ICD-10-CM

## 2021-01-12 PROCEDURE — 97597 DBRDMT OPN WND 1ST 20 CM/<: CPT | Performed by: PODIATRIST

## 2021-01-12 PROCEDURE — 99213 OFFICE O/P EST LOW 20 MIN: CPT | Mod: 25 | Performed by: PODIATRIST

## 2021-01-12 NOTE — TELEPHONE ENCOUNTER
BV form and patient's demographic facesheet faxed to [facility] for review. Pending

## 2021-01-12 NOTE — TELEPHONE ENCOUNTER
Financial Counselor Review for Apligraf, Dermagraft, Puraply AM, Affinity, NuShield:    Which product(s) to be checked: Apligraf, Dermagraft, Puraply AM, Affinity, NuShield    Has the patient tried any of these products before: YES    Was it for this wound: YES    Diagnosis code (include ICD-10 code): L97.512, E11.42, E11.51, I87.8    Coverage and patient financial responsibility information:YES    Does patient need to be contacted by Financial Counselor:YES    Note: Do not use abbreviations and route encounter to [facility] [place] [[id number]]

## 2021-01-12 NOTE — PROGRESS NOTES
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)     GI bleed     UGIB (upper gastrointestinal bleed)     Morbid obesity (H)     Pain in joint, ankle and foot, left     Charcot ankle, left     Below-knee amputation (H)     Acute kidney failure, unspecified (H)     S/P BKA (below knee amputation) (H)     Past Surgical History:   Procedure Laterality Date     AMPUTATE LEG BELOW KNEE Left 7/22/2020    Procedure: Left below knee amputation;  Surgeon: Aniceto Adams MD;  [facility]     ESOPHAGOSCOPY, GASTROSCOPY, DUODENOSCOPY (EGD), COMBINED N/A 2/5/2020    Procedure: ESOPHAGOGASTRODUODENOSCOPY (EGD);  Surgeon: Tanya Dias MD;  Location: [facility]     Social History     Socioeconomic History     Marital status:      Spouse name: Not on file     Number of children: Not on file     Years of education: Not on file     Highest education level: Not on file   Occupational History     Not on file   Social Needs     Financial resource strain: Not on file     Food insecurity     Worry: Not on file     Inability: Not on file     Transportation needs     Medical: Not on file     Non-medical: Not on file   Tobacco Use     Smoking status: Current Every Day Smoker     Packs/day: 1.00     Smokeless tobacco: Never Used   Substance and Sexual Activity     Alcohol use: Yes     Drug use: Not on file     Sexual  activity: Not on file   Lifestyle     Physical activity     Days per week: Not on file     Minutes per session: Not on file     Stress: Not on file   Relationships     Social connections     Talks on phone: Not on file     Gets together: Not on file     Attends [employer] service: Not on file     Active member of club or organization: Not on file     Attends meetings of clubs or organizations: Not on file     Relationship status: Not on file     Intimate partner violence     Fear of current or ex partner: Not on file     Emotionally abused: Not on file     Physically abused: Not on file     Forced sexual activity: Not on file   Other Topics Concern     Parent/sibling w/ CABG, MI or angioplasty before 65F 55M? Not Asked   Social History Narrative     Not on file     Family History   Problem Relation Age of Onset     No Known Problems Mother      No Known Problems Father      Lab Results   Component Value Date    A1C 5.5 07/21/2020    A1C 6.5 02/04/2020             SUBJECTIVE FINDINGS:  A 61-year-old female returns to clinic for ulcer, right first MPJ, maceration in the interspaces, left below-the-knee amputation site ulcers.  She relates she is doing well.  She relates that she was using the Aquacel, but she ran out.  She is kind of letting them air out and putting a dressing on them at times.  She relates the left below-the-knee amputation site ulcers are healed.  She is going to get her below-the-knee prosthesis this week.  She relates that she is having no problems with that.  She relates that the right one she feels needs to be debrided.      OBJECTIVE FINDINGS:  Vascular status:  DP and PT are 2/4 right.  She has a right plantar first MPJ ulcer that is about 3 x 1.5 cm.  There is hyperkeratotic tissue buildup.  Some serosanguineous drainage.  No erythema, no odor, no calor.  She has interdigital maceration in the first interspace with hyperkeratotic tissue buildup.  She has a fissure on the plantar right fourth  toe.  The first MPJ ulcer is through the dermis into the subcutaneous tissues.  She has a sock on her below-the-knee amputation on the left.      ASSESSMENT AND PLAN:  Ulcer, right first MPJ, right first interspace, right fourth toe.  Left below-the-knee amputation site ulcer is doing well.  Diagnosis and treatment options discussed with the patient.  She has venous stasis.  She is diabetic with peripheral neuropathy and vascular disease.  Diagnosis and treatment options discussed with the patient.  Sharp ulcer debridement into the first MPJ ulcer through the dermis into the subcutaneous tissues.  No anesthesia needed and local wound care done upon consent today with a tissue cutter.  I also debrided off the hyperkeratotic tissue on the first interspace upon consent.  Local wound care done upon consent today.  I dispensed Biatain Silver and Wound Vashe.  I am going to have her clean these with Wound Vashe, apply Biatain Silver between the toes, on the fourth plantar toe fissure and the first MPJ and wrap with a sterile gauze.  Light ACE wrap for compression.  She will continue the CAM boot, which she is wearing.  She has been in a wheelchair, so she relates she is also going to be starting physical therapy.  Plan will be to reapply PuraPly antimicrobial to the right foot.  Return to clinic and see me in 1-2 weeks.  For risk of complications of patient management, she is at high risk for medical decision-making.

## 2021-01-12 NOTE — LETTER
1/12/2021         RE: Tiffani Tan  [address] [facility] [address]        Dear Colleague,    Thank you for referring your patient, Tiffani Tan, to the [facility]. Please see a copy of my visit note below.    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)     GI bleed     UGIB (upper gastrointestinal bleed)     Morbid obesity (H)     Pain in joint, ankle and foot, left     Charcot ankle, left     Below-knee amputation (H)     Acute kidney failure, unspecified (H)     S/P BKA (below knee amputation) (H)     Past Surgical History:   Procedure Laterality Date     AMPUTATE LEG BELOW KNEE Left 7/22/2020    Procedure: Left below knee amputation;  Surgeon: Aniceto Adams MD;  [facility]     ESOPHAGOSCOPY, GASTROSCOPY, DUODENOSCOPY (EGD), COMBINED N/A 2/5/2020    Procedure: ESOPHAGOGASTRODUODENOSCOPY (EGD);  Surgeon: Tanya Dias MD;  Location[facility]     Social History     Socioeconomic History     Marital status:      Spouse name: Not on file     Number of children: Not on file     Years of education: Not on file     Highest education level: Not on file   Occupational History     Not on file   Social Needs     Financial resource strain: Not on file     Food insecurity     Worry: Not on file     Inability: Not on file     Transportation needs     Medical: Not on file      Non-medical: Not on file   Tobacco Use     Smoking status: Current Every Day Smoker     Packs/day: 1.00     Smokeless tobacco: Never Used   Substance and Sexual Activity     Alcohol use: Yes     Drug use: Not on file     Sexual activity: Not on file   Lifestyle     Physical activity     Days per week: Not on file     Minutes per session: Not on file     Stress: Not on file   Relationships     Social connections     Talks on phone: Not on file     Gets together: Not on file     Attends Voodoo service: Not on file     Active member of club or organization: Not on file     Attends meetings of clubs or organizations: Not on file     Relationship status: Not on file     Intimate partner violence     Fear of current or ex partner: Not on file     Emotionally abused: Not on file     Physically abused: Not on file     Forced sexual activity: Not on file   Other Topics Concern     Parent/sibling w/ CABG, MI or angioplasty before 65F 55M? Not Asked   Social History Narrative     Not on file     Family History   Problem Relation Age of Onset     No Known Problems Mother      No Known Problems Father      Lab Results   Component Value Date    A1C 5.5 07/21/2020    A1C 6.5 02/04/2020             SUBJECTIVE FINDINGS:  A 61-year-old female returns to clinic for ulcer, right first MPJ, maceration in the interspaces, left below-the-knee amputation site ulcers.  She relates she is doing well.  She relates that she was using the Aquacel, but she ran out.  She is kind of letting them air out and putting a dressing on them at times.  She relates the left below-the-knee amputation site ulcers are healed.  She is going to get her below-the-knee prosthesis this week.  She relates that she is having no problems with that.  She relates that the right one she feels needs to be debrided.      OBJECTIVE FINDINGS:  Vascular status:  DP and PT are 2/4 right.  She has a right plantar first MPJ ulcer that is about 3 x 1.5 cm.  There is  hyperkeratotic tissue buildup.  Some serosanguineous drainage.  No erythema, no odor, no calor.  She has interdigital maceration in the first interspace with hyperkeratotic tissue buildup.  She has a fissure on the plantar right fourth toe.  The first MPJ ulcer is through the dermis into the subcutaneous tissues.  She has a sock on her below-the-knee amputation on the left.      ASSESSMENT AND PLAN:  Ulcer, right first MPJ, right first interspace, right fourth toe.  Left below-the-knee amputation site ulcer is doing well.  Diagnosis and treatment options discussed with the patient.  She has venous stasis.  She is diabetic with peripheral neuropathy and vascular disease.  Diagnosis and treatment options discussed with the patient.  Sharp ulcer debridement into the first MPJ ulcer through the dermis into the subcutaneous tissues.  No anesthesia needed and local wound care done upon consent today with a tissue cutter.  I also debrided off the hyperkeratotic tissue on the first interspace upon consent.  Local wound care done upon consent today.  I dispensed Biatain Silver and Wound Vashe.  I am going to have her clean these with Wound Vashe, apply Biatain Silver between the toes, on the fourth plantar toe fissure and the first MPJ and wrap with a sterile gauze.  Light ACE wrap for compression.  She will continue the CAM boot, which she is wearing.  She has been in a wheelchair, so she relates she is also going to be starting physical therapy.  Plan will be to reapply PuraPly antimicrobial to the right foot.  Return to clinic and see me in 1-2 weeks.  For risk of complications of patient management, she is at high risk for medical decision-making.           Again, thank you for allowing me to participate in the care of your patient.        Sincerely,        Nolan Whitten DPM

## 2021-01-21 ENCOUNTER — OFFICE VISIT (OUTPATIENT)
Dept: PODIATRY | Facility: CLINIC | Age: 61
End: 2021-01-21
Payer: COMMERCIAL

## 2021-01-21 ENCOUNTER — HOSPITAL ENCOUNTER (OUTPATIENT)
Dept: PHYSICAL THERAPY | Facility: CLINIC | Age: 61
Setting detail: THERAPIES SERIES
End: 2021-01-21
Attending: PHYSICAL MEDICINE & REHABILITATION
Payer: COMMERCIAL

## 2021-01-21 VITALS — DIASTOLIC BLOOD PRESSURE: 54 MMHG | SYSTOLIC BLOOD PRESSURE: 129 MMHG | HEART RATE: 97 BPM | OXYGEN SATURATION: 94 %

## 2021-01-21 DIAGNOSIS — E11.42 TYPE 2 DIABETES MELLITUS WITH DIABETIC POLYNEUROPATHY, WITHOUT LONG-TERM CURRENT USE OF INSULIN (H): Primary | ICD-10-CM

## 2021-01-21 DIAGNOSIS — I87.8 VENOUS STASIS: ICD-10-CM

## 2021-01-21 DIAGNOSIS — L97.512 SKIN ULCER OF RIGHT FOOT WITH FAT LAYER EXPOSED (H): ICD-10-CM

## 2021-01-21 PROCEDURE — 97110 THERAPEUTIC EXERCISES: CPT | Mod: GP | Performed by: PHYSICAL THERAPIST

## 2021-01-21 PROCEDURE — 97162 PT EVAL MOD COMPLEX 30 MIN: CPT | Mod: GP | Performed by: PHYSICAL THERAPIST

## 2021-01-21 PROCEDURE — 99214 OFFICE O/P EST MOD 30 MIN: CPT | Performed by: PODIATRIST

## 2021-01-21 NOTE — LETTER
1/21/2021         RE: Tiffani Tan  [address] [facility] [address]        Dear Colleague,    Thank you for referring your patient, Tiffani Tan, to the [facility]. Please see a copy of my visit note below.    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)     GI bleed     UGIB (upper gastrointestinal bleed)     Morbid obesity (H)     Pain in joint, ankle and foot, left     Charcot ankle, left     Below-knee amputation (H)     Acute kidney failure, unspecified (H)     S/P BKA (below knee amputation) (H)     Past Surgical History:   Procedure Laterality Date     AMPUTATE LEG BELOW KNEE Left 7/22/2020    Procedure: Left below knee amputation;  Surgeon: Aniceto Adams MD;  [facility]     ESOPHAGOSCOPY, GASTROSCOPY, DUODENOSCOPY (EGD), COMBINED N/A 2/5/2020    Procedure: ESOPHAGOGASTRODUODENOSCOPY (EGD);  Surgeon: Tanya Dias MD;  Location: [facility]     Social History     Socioeconomic History     Marital status:      Spouse name: Not on file     Number of children: Not on file     Years of education: Not on file     Highest education level: Not on file   Occupational History     Not on file   Social Needs     Financial resource strain: Not on file     Food insecurity     Worry: Not on file     Inability: Not on file     Transportation needs     Medical: Not on file      Non-medical: Not on file   Tobacco Use     Smoking status: Current Every Day Smoker     Packs/day: 1.00     Smokeless tobacco: Never Used   Substance and Sexual Activity     Alcohol use: Yes     Drug use: Not on file     Sexual activity: Not on file   Lifestyle     Physical activity     Days per week: Not on file     Minutes per session: Not on file     Stress: Not on file   Relationships     Social connections     Talks on phone: Not on file     Gets together: Not on file     Attends Congregational service: Not on file     Active member of club or organization: Not on file     Attends meetings of clubs or organizations: Not on file     Relationship status: Not on file     Intimate partner violence     Fear of current or ex partner: Not on file     Emotionally abused: Not on file     Physically abused: Not on file     Forced sexual activity: Not on file   Other Topics Concern     Parent/sibling w/ CABG, MI or angioplasty before 65F 55M? Not Asked   Social History Narrative     Not on file     Family History   Problem Relation Age of Onset     No Known Problems Mother      No Known Problems Father      Lab Results   Component Value Date    A1C 5.5 07/21/2020    A1C 6.5 02/04/2020     SUBJECTIVE FINDINGS:  A 61-year-old female returns to clinic for ulcer, right first MPJ, first interspace, right fourth toe.  She relates it is doing okay.  She relates it does not change a lot.  She is using the Wound Vashe and Biatain Silver.  She relates she got her prosthesis yesterday for her left leg.      OBJECTIVE FINDINGS:  Vascular status is intact, right.  She has a right plantar first MPJ ulcer that is about 3 x 1.5 cm.  It is deep through the dermis into the subcutaneous tissues.  Some serosanguineous drainage.  No erythema, no edema, no odor, no calor.  She has serosanguineous drainage in the interspace with some maceration of the first interspace.      ASSESSMENT AND PLAN:  Ulcer, right first MPJ, right first interspace,  right fourth toe.  She is diabetic with peripheral neuropathy and vascular disease.  Diagnosis and treatment options discussed with the patient.  Local wound care done upon consent today.  I am going to have her clean these daily with Wound Vashe, apply Biatain Silver, between the toes and over the wound and then every other day apply Endoform to the ulcer site.  These are dispensed and use discussed with her.  Return to clinic and see me in 2 weeks.  We are still waiting for PuraPly antimicrobial and Apligraf coverage determination.           Again, thank you for allowing me to participate in the care of your patient.        Sincerely,        Nolan Whitten DPM

## 2021-01-21 NOTE — NURSING NOTE
Tiffani Tan's chief complaint for this visit includes:  Chief Complaint   Patient presents with     RECHECK     right plantar ulcer     PCP: Wily Bonilla    Referring Provider:  No referring provider defined for this encounter.    /54 (BP Location: Right arm, Patient Position: Sitting, Cuff Size: Adult Regular)   Pulse 97   SpO2 94%   Data Unavailable     Do you need any medication refills at today's visit? Rubina Oshea CMA

## 2021-01-21 NOTE — PROGRESS NOTES
01/21/21 1300   General Information   Start of Care Date 01/21/21   Referring Physician DR Messina   Orders Evaluate and Treat as Indicated   Order Date 09/29/20   Medical Diagnosis left Below knee amputation (traumatic)   Onset of illness/injury or Date of Surgery 07/22/20   Special Instructions right foot has plantar ulcer, wearing CAM boot, WT bear as tolerated. left amputation incision took a long time to heal.    Surgical/Medical history reviewed Yes  (DM, peripheral neuropathy, obesity, )   Pertinent history of current problem (include personal factors and/or comorbidities that impact the POC) got her prosthesis yesterday. I haven't walked for how long now (6months). CAM on right foot WBAT. She reports right foot ulcer is healing. hard to put on prosthesis with her with her hands having neuropathy.  helped her today.   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 OR could go in the basement to stairs, 8 steps and then at entryway then 6-7 more steps to living room level/kitchen level.  Bedroom on lower level. Bathroom is closer to guest room on second floor.    Current Assistive Devices Front Wheeled Walker   Assistive Devices Comments has 3 power w/cs. hoverround and 2 power chairs. They bought a hitch/platform for transporting power w/cs on back of vehicle.    Patient/Family Goals Statement get home and do steps. I have got to get up and lose this wt I put on sitting in this damn chair.    General Information Comments currently living at work, handicap bathroom, lunch room with microwave/toasting oven.    Fall Risk Screen   Fall screen completed by PT   Have you fallen 2 or more times in the past year? Yes   Have  you fallen and had an injury in the past year? No   Is patient a fall risk? Yes   Abuse Screen (yes response referral indicated)   Feels Unsafe at Home or Work/School no   Feels Threatened by Someone no   Does Anyone Try to Keep You From Having Contact with Others or Doing Things Outside Your Home? no   Physical Signs of Abuse Present no   Pain   Patient currently in pain Yes   Pain location hands and right foot neuropathy (T/N) and pain   Pain description comment left ankle always feels twisted, sensation was like this before surgery.    Pain comments left anterior tib pain mild to moderate discomfort with ambulation. Sittiing down not wt bearing its better.    Observation   Observation obese   Integumentary   Integumentary Other   Integumentary Comments right foot has ulcer being treated by podiatry at [facility]   Range of Motion (ROM)   ROM Comment CROM WNLs, U/E AROM WNLS.    Gait   Gait Comments She reports that she has been doing some steps on her buttocks and crawling on knees on steps.   Gait Special Tests 25 Foot Timed Walk   Seconds 28.47   Steps 28 Steps   Comments 2WW, left leg too loose prosthesis ( i was holding it in place during most of the gait), right CAM boot. Seh was sweating ALOT fro this walk.   Balance   Balance Comments she is wearing a 3 ply sock and a 1 ply sock. She has a 5 ply but not with her today.   Clinical Impression   Criteria for Skilled Therapeutic Interventions Met yes, treatment indicated   PT Diagnosis impaired mobility secondary to amputation on left and right foot ulcer wearing CAM boot.   Influenced by the following impairments right foot ulcer, weakness entire body, fatigue, decreased activity tolerance and obesity   Functional limitations due to impairments affects ADLs/IADLS/MRADLS, household/community mobility, recreational activity, WORK   Clinical Presentation Evolving/Changing   Clinical Presentation Rationale medical history ( right foot ulcer is a factor in her  recovery/prognosis), gait speed/quality, stairs, and clinical judgement   Clinical Decision Making (Complexity) Moderate complexity   Therapy Frequency 2 times/Week  (we will decrease over time)   Predicted Duration of Therapy Intervention (days/wks) > 3 months   Risk & Benefits of therapy have been explained Yes   Patient, Family & other staff in agreement with plan of care Yes   Clinical Impression Comments Tiffani is very motivated to get walking now that she has her prosthesis for left leg. Complications of neuropathy in her hands and right foot. Plus wound on right foot.  She also wants to lose wt.    Goal 1   Goal Identifier 6MWT   Goal Description amb with 2WW and prosthesis 6MWT at .9 m/s consistently   Target Date 21   Goal 2   Goal Identifier stairs   Goal Description Able to go up/down 14 steps wth railing and crutch with SBA for household mobility   Target Date 21   Goal 3   Goal Identifier further ambulation goals to be set, depends on right leg ulcer healing   Total Evaluation Time   PT Eval, Moderate Complexity Minutes (51254) 35   Katie Wheeler DPT, MPT, NCS  Physical Therapist   Board Certified Neurologic Clinical Specialist     [facility]   [address] [place] [address]   [email or web address]   [phone]   Clinic: [phone] //   Fax: [phone]   EMOTIONAL SUPPORT TO PT/FAMILY; PRAVEEN ROACH PRESENT AS WELL; PT MORE ALERT;  FAMILY  REQUESTED 10mg VALIUM YESTERDAY, SPOUSE SHOWED PA BOTTLE : "I GIVE THIS TO HIM AT HOME."; RN ABLE TO GIVE po MEDS

## 2021-01-21 NOTE — PROGRESS NOTES
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)     GI bleed     UGIB (upper gastrointestinal bleed)     Morbid obesity (H)     Pain in joint, ankle and foot, left     Charcot ankle, left     Below-knee amputation (H)     Acute kidney failure, unspecified (H)     S/P BKA (below knee amputation) (H)     Past Surgical History:   Procedure Laterality Date     AMPUTATE LEG BELOW KNEE Left 7/22/2020    Procedure: Left below knee amputation;  Surgeon: Aniceto Adams MD;  [facility]     ESOPHAGOSCOPY, GASTROSCOPY, DUODENOSCOPY (EGD), COMBINED N/A 2/5/2020    Procedure: ESOPHAGOGASTRODUODENOSCOPY (EGD);  Surgeon: Tanya Dias MD;  [facility]: [facility]     Social History     Socioeconomic History     Marital status:      Spouse name: Not on file     Number of children: Not on file     Years of education: Not on file     Highest education level: Not on file   Occupational History     Not on file   Social Needs     Financial resource strain: Not on file     Food insecurity     Worry: Not on file     Inability: Not on file     Transportation needs     Medical: Not on file     Non-medical: Not on file   Tobacco Use     Smoking status: Current Every Day Smoker     Packs/day: 1.00     Smokeless tobacco: Never Used   Substance and Sexual Activity     Alcohol use: Yes     Drug use: Not on file     Sexual  activity: Not on file   Lifestyle     Physical activity     Days per week: Not on file     Minutes per session: Not on file     Stress: Not on file   Relationships     Social connections     Talks on phone: Not on file     Gets together: Not on file     Attends Mormon service: Not on file     Active member of club or organization: Not on file     Attends meetings of clubs or organizations: Not on file     Relationship status: Not on file     Intimate partner violence     Fear of current or ex partner: Not on file     Emotionally abused: Not on file     Physically abused: Not on file     Forced sexual activity: Not on file   Other Topics Concern     Parent/sibling w/ CABG, MI or angioplasty before 65F 55M? Not Asked   Social History Narrative     Not on file     Family History   Problem Relation Age of Onset     No Known Problems Mother      No Known Problems Father      Lab Results   Component Value Date    A1C 5.5 07/21/2020    A1C 6.5 02/04/2020     SUBJECTIVE FINDINGS:  A 61-year-old female returns to clinic for ulcer, right first MPJ, first interspace, right fourth toe.  She relates it is doing okay.  She relates it does not change a lot.  She is using the Wound Vashe and Biatain Silver.  She relates she got her prosthesis yesterday for her left leg.      OBJECTIVE FINDINGS:  Vascular status is intact, right.  She has a right plantar first MPJ ulcer that is about 3 x 1.5 cm.  It is deep through the dermis into the subcutaneous tissues.  Some serosanguineous drainage.  No erythema, no edema, no odor, no calor.  She has serosanguineous drainage in the interspace with some maceration of the first interspace.      ASSESSMENT AND PLAN:  Ulcer, right first MPJ, right first interspace, right fourth toe.  She is diabetic with peripheral neuropathy and vascular disease.  Diagnosis and treatment options discussed with the patient.  Local wound care done upon consent today.  I am going to have her clean these daily with  Wound Vashe, apply Biatain Silver, between the toes and over the wound and then every other day apply Endoform to the ulcer site.  These are dispensed and use discussed with her.  Return to clinic and see me in 2 weeks.  We are still waiting for PuraPly antimicrobial and Apligraf coverage determination.

## 2021-01-26 ENCOUNTER — HOSPITAL ENCOUNTER (OUTPATIENT)
Dept: PHYSICAL THERAPY | Facility: CLINIC | Age: 61
Setting detail: THERAPIES SERIES
End: 2021-01-26
Attending: PHYSICAL MEDICINE & REHABILITATION
Payer: COMMERCIAL

## 2021-01-26 PROCEDURE — 97116 GAIT TRAINING THERAPY: CPT | Mod: GP | Performed by: PHYSICAL THERAPIST

## 2021-01-27 ENCOUNTER — HOSPITAL ENCOUNTER (OUTPATIENT)
Dept: PHYSICAL THERAPY | Facility: CLINIC | Age: 61
Setting detail: THERAPIES SERIES
End: 2021-01-27
Attending: PHYSICAL MEDICINE & REHABILITATION
Payer: COMMERCIAL

## 2021-01-27 PROCEDURE — 97535 SELF CARE MNGMENT TRAINING: CPT | Mod: GP | Performed by: PHYSICAL THERAPIST

## 2021-01-27 PROCEDURE — 97116 GAIT TRAINING THERAPY: CPT | Mod: GP | Performed by: PHYSICAL THERAPIST

## 2021-01-27 NOTE — TELEPHONE ENCOUNTER
Paitent is approved for the following treatments:    apligraf 5 applications  dermagraft 8 applications

## 2021-01-28 NOTE — TELEPHONE ENCOUNTER
Left message for patient to let her know she is approved for Apligraf.  Would like to schedule an appointment for her to apply this.

## 2021-02-02 ENCOUNTER — TELEPHONE (OUTPATIENT)
Dept: ORTHOPEDICS | Facility: CLINIC | Age: 61
End: 2021-02-02

## 2021-02-02 NOTE — TELEPHONE ENCOUNTER
[facility] Call Center    Phone Message    May a detailed message be left on voicemail: yes     Reason for Call: Other: Rox is calling from [employer], she faxed some forms over to the clinic for Dr. Adams to fill out and still has not received them back. She is hoping to have him sign and date on nuring notes from his origional auth form for patient. Please send filled out forms to fax # [phone]     Action Taken: Message routed to:  [facility] ortho    Travel Screening: Not Applicable

## 2021-02-02 NOTE — TELEPHONE ENCOUNTER
Patient is scheduled for Apligraf application on 2/10/2021.  Apligraf has been requested from supply chain.

## 2021-02-04 ENCOUNTER — HOSPITAL ENCOUNTER (OUTPATIENT)
Dept: PHYSICAL THERAPY | Facility: CLINIC | Age: 61
Setting detail: THERAPIES SERIES
End: 2021-02-04
Attending: PHYSICAL MEDICINE & REHABILITATION
Payer: COMMERCIAL

## 2021-02-04 PROCEDURE — 97116 GAIT TRAINING THERAPY: CPT | Mod: GP | Performed by: PHYSICAL THERAPIST

## 2021-02-04 NOTE — DISCHARGE INSTRUCTIONS
Call to see if they have made you an appt with Dr Messina at [facility] or if you can make one     Last seen by him on 10/1/2020    Katie Wheeler DPT, MPT, NCS  Physical Therapist   Board Certified Neurologic Clinical Specialist     [facility]   [address] [place] [address]   [email or web address]   [phone]   Clinic: [phone] //   Fax: [phone]

## 2021-02-04 NOTE — IP AVS SNAPSHOT
MRN:[id number]                      After Visit Summary   2/4/2021    Tiffani Tan    MRN: [id number]           Visit Information        Provider Department      2/4/2021 11:00 AM Kristina Wheeler, PT [facility]        Your next 10 appointments already scheduled    Feb 08, 2021 11:45 AM  Amputee Treatment with Kristina Wheeler, PT  [facility] ([facility] ) [address] [facility] [address]  [phone]      Feb 10, 2021 11:15 AM  Amputee Treatment with Kristina Wheeler, PT  [facility] ([facility] ) [address] [facility] [address]  [phone]      Feb 10, 2021 12:30 PM  Return Visit with VALORIE ReevesM  [facility] ([facility] ) [address] [facility] [address]  [phone]      Feb 15, 2021 10:15 AM  Amputee Treatment with Kristina Wheeler, PT  [facility] ([facility] ) [address] [facility] [address]  [phone]      Feb 17, 2021 10:45 AM  Amputee Treatment with Kristina Wheeler, PT  [facility] ([facility] ) [address] [facility] [address]  [phone]      Feb 23, 2021  1:45 PM  Amputee Treatment with Anushka Marie, PT  [facility] ([facility] ) [address] [facility] [address]  [phone]      Feb 25, 2021 11:45 AM  Amputee Treatment with Kristina Wheleer, PT  [facility] ([facility] ) [address] [facility] [address]  [phone]      Mar 01, 2021 11:00 AM  Amputee Treatment with RAUL Watson [facility] [place] [place] ([facility]  "[facility] ) [address] [facility] [address]  [phone]      Mar 03, 2021 11:15 AM  Amputee Treatment with Kristina Wheeler PT  [facility] ([facility] ) [address] [facility] [address]  [phone]           Further instructions from your care team       Call to see if they have made you an appt with Dr Messina at [facility] or if you can make one     Last seen by him on 10/1/2020    Katie Wheeler DPT, MPT, NCS  Physical Therapist   Board Certified Neurologic Clinical Specialist     [facility]   [address] [place] [address]   [email or web address]   [phone]   Clinic: [phone] //   Fax: [phone]    MyChart Information    [employer] lets you send messages to your doctor, view your test results, renew your prescriptions, schedule appointments and more. To sign up, go to [email or web address] . Click on \"Log in\" on the left side of the screen, which will take you to the Welcome page. Then click on \"Sign up Now\" on the right side of the page.     You will be asked to enter the access code listed below, as well as some personal information. Please follow the directions to create your username and password.     Your access code is: [email or web address]  Expires: 3/13/2021 11:49 AM     Your access code will  in 60 days. If you need help or a new code, please call your   [facility] or [phone].       Care EveryWhere ID    This is your Care EveryWhere ID. This could be used by other organizations to access your [facility] medical records  [id number]       Equal Access to Services    GAYE VALDERRAMA AH: Ashley Arciniega, marilin judd, qasandra kaalerrol turcios. [facility] [phone].    ATENCIÓN: " Si habla alexandra, tiene a hodge disposición servicios gratuitos de asistencia lingüística. Sushil al [phone].    We comply with applicable federal and state civil rights laws, including the [place] Human Rights Act. We do not discriminate on the basis of race, color, creed, Yarsanism, national origin, marital status, age, disability, sex, sexual orientation, or gender identity.    If you would like an itemization of your charges they will now be available in [employer] 30 days after discharge. To access the itemized statements in [employer] go to billing/billing summary. From there select view account. There will be multiple tabs showing an overview of your account, detail, payments, and communications. From the communications tab you can see your monthly statements, your itemized statements, and any billing letters generated for your account. If you do not have a [employer] account and need help getting access please contact [employer] support at [phone].  If you would prefer to have your itemized statements mailed please contact our automated itemized bill request line at [phone] option  2.

## 2021-02-04 NOTE — TELEPHONE ENCOUNTER
Forms sent to Dr Adams from [facility] were received and sent to  with Urgent message for Dr Adams's signature, date, and to refax.  Gaby Real RN

## 2021-02-15 ENCOUNTER — HOSPITAL ENCOUNTER (OUTPATIENT)
Dept: PHYSICAL THERAPY | Facility: CLINIC | Age: 61
Setting detail: THERAPIES SERIES
End: 2021-02-15
Attending: PHYSICAL MEDICINE & REHABILITATION
Payer: COMMERCIAL

## 2021-02-15 PROCEDURE — 97116 GAIT TRAINING THERAPY: CPT | Mod: GP | Performed by: PHYSICAL THERAPIST

## 2021-02-15 PROCEDURE — 97110 THERAPEUTIC EXERCISES: CPT | Mod: GP | Performed by: PHYSICAL THERAPIST

## 2021-02-17 ENCOUNTER — HOSPITAL ENCOUNTER (OUTPATIENT)
Dept: PHYSICAL THERAPY | Facility: CLINIC | Age: 61
Setting detail: THERAPIES SERIES
End: 2021-02-17
Attending: PHYSICAL MEDICINE & REHABILITATION
Payer: COMMERCIAL

## 2021-02-17 PROCEDURE — 97116 GAIT TRAINING THERAPY: CPT | Mod: GP | Performed by: PHYSICAL THERAPIST

## 2021-02-17 PROCEDURE — 97140 MANUAL THERAPY 1/> REGIONS: CPT | Mod: GP | Performed by: PHYSICAL THERAPIST

## 2021-02-19 DIAGNOSIS — L97.512 SKIN ULCER OF RIGHT FOOT WITH FAT LAYER EXPOSED (H): ICD-10-CM

## 2021-02-19 DIAGNOSIS — E11.42 TYPE 2 DIABETES MELLITUS WITH DIABETIC POLYNEUROPATHY, WITHOUT LONG-TERM CURRENT USE OF INSULIN (H): Primary | ICD-10-CM

## 2021-02-24 ENCOUNTER — ANCILLARY PROCEDURE (OUTPATIENT)
Dept: GENERAL RADIOLOGY | Facility: CLINIC | Age: 61
End: 2021-02-24
Attending: PODIATRIST
Payer: COMMERCIAL

## 2021-02-24 ENCOUNTER — OFFICE VISIT (OUTPATIENT)
Dept: PODIATRY | Facility: CLINIC | Age: 61
End: 2021-02-24
Payer: COMMERCIAL

## 2021-02-24 VITALS — HEART RATE: 102 BPM | DIASTOLIC BLOOD PRESSURE: 86 MMHG | SYSTOLIC BLOOD PRESSURE: 166 MMHG | OXYGEN SATURATION: 94 %

## 2021-02-24 DIAGNOSIS — E11.42 TYPE 2 DIABETES MELLITUS WITH DIABETIC POLYNEUROPATHY, WITHOUT LONG-TERM CURRENT USE OF INSULIN (H): ICD-10-CM

## 2021-02-24 DIAGNOSIS — L97.512 SKIN ULCER OF RIGHT FOOT WITH FAT LAYER EXPOSED (H): ICD-10-CM

## 2021-02-24 DIAGNOSIS — M14.672 CHARCOT'S JOINT OF LEFT FOOT: ICD-10-CM

## 2021-02-24 DIAGNOSIS — I87.8 VENOUS STASIS: ICD-10-CM

## 2021-02-24 DIAGNOSIS — E11.51 DIABETES MELLITUS WITH PERIPHERAL VASCULAR DISEASE (H): ICD-10-CM

## 2021-02-24 DIAGNOSIS — E11.42 TYPE 2 DIABETES MELLITUS WITH DIABETIC POLYNEUROPATHY, WITHOUT LONG-TERM CURRENT USE OF INSULIN (H): Primary | ICD-10-CM

## 2021-02-24 PROCEDURE — 73630 X-RAY EXAM OF FOOT: CPT | Mod: RT | Performed by: RADIOLOGY

## 2021-02-24 PROCEDURE — 99213 OFFICE O/P EST LOW 20 MIN: CPT | Mod: 25 | Performed by: PODIATRIST

## 2021-02-24 PROCEDURE — 15275 SKIN SUB GRAFT FACE/NK/HF/G: CPT | Performed by: PODIATRIST

## 2021-02-24 NOTE — PROGRESS NOTES
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)     GI bleed     UGIB (upper gastrointestinal bleed)     Morbid obesity (H)     Pain in joint, ankle and foot, left     Charcot ankle, left     Below-knee amputation (H)     Acute kidney failure, unspecified (H)     S/P BKA (below knee amputation) (H)     Past Surgical History:   Procedure Laterality Date     AMPUTATE LEG BELOW KNEE Left 7/22/2020    Procedure: Left below knee amputation;  Surgeon: Aniceto Adams MD;  [facility]     ESOPHAGOSCOPY, GASTROSCOPY, DUODENOSCOPY (EGD), COMBINED N/A 2/5/2020    Procedure: ESOPHAGOGASTRODUODENOSCOPY (EGD);  Surgeon: Tanya Dias MD;  [facility]     Social History     Socioeconomic History     Marital status:      Spouse name: Not on file     Number of children: Not on file     Years of education: Not on file     Highest education level: Not on file   Occupational History     Not on file   Social Needs     Financial resource strain: Not on file     Food insecurity     Worry: Not on file     Inability: Not on file     Transportation needs     Medical: Not on file     Non-medical: Not on file   Tobacco Use     Smoking status: Current Every Day Smoker     Packs/day: 1.00     Smokeless tobacco: Never Used   Substance and Sexual Activity     Alcohol use: Yes     Drug use: Not on file     Sexual  activity: Not on file   Lifestyle     Physical activity     Days per week: Not on file     Minutes per session: Not on file     Stress: Not on file   Relationships     Social connections     Talks on phone: Not on file     Gets together: Not on file     Attends [employer] service: Not on file     Active member of club or organization: Not on file     Attends meetings of clubs or organizations: Not on file     Relationship status: Not on file     Intimate partner violence     Fear of current or ex partner: Not on file     Emotionally abused: Not on file     Physically abused: Not on file     Forced sexual activity: Not on file   Other Topics Concern     Parent/sibling w/ CABG, MI or angioplasty before 65F 55M? Not Asked   Social History Narrative     Not on file     Family History   Problem Relation Age of Onset     No Known Problems Mother      No Known Problems Father      Lab Results   Component Value Date    A1C 5.5 07/21/2020    A1C 6.5 02/04/2020     Uric Acid   Date Value Ref Range Status   05/29/2020 6.8 (H) 2.6 - 6.0 mg/dL Final       SUBJECTIVE FINDINGS:  A 61-year-old female returns to clinic for ulcer, right first MPJ, first interspace.  She relates it is doing okay.  She presents for [employer] application.      OBJECTIVE FINDINGS:  X-rays were reviewed with the patient in the clinic today.  X-rays from today, 01/09/2019, 02/04/2020 and 03/11/2020 were reviewed with the patient in the clinic today.  She does have some resorption of the first, second and fifth MPJs.  It is relatively unchanged today from previous x-rays on 02/04 and 03/11/2020.  She does have a laterally deviated hallux and diffuse joint space narrowing and spurring noted and what appears to be healed either surgical or fractures on the second and fifth metatarsal.  Joint and cortical margins are intact.  Ulcer was visualized best on the lateral view today.  ABIs from 02/04/2019 were 1.0 on the right.  Also, she has some what could be  punched-out lesions on the x-rays, which could be consistent with gout as well and the resorption could also be from Charcot foot.  It does not appear to be active osteomyelitis.  She has an ulcer in the plantar first MPJ.  It is about 2.7 x 1.5 cm.  It is through the dermis into the subcutaneous tissues.  There is some serosanguineous drainage.  Some edema, no erythema, no odor, no calor.  Some hyperkeratotic tissue buildup.  She has some venous stasis present.  She has dorsally contracted digits.  She has some maceration in the first interspace noted.      ASSESSMENT AND PLAN:  Ulcer, right first MPJ, right first interspace.  She is diabetic with peripheral neuropathy, vascular disease and Charcot foot.  Diagnosis and treatment options discussed with her.  Sharp ulcer debridement done upon consent and the ulcer was prepped for Apligraf.  Apligraf was applied to the first MPJ ulcer and secured with Wound Veil and Steri-Strips.  Saline wet-to-dry dressing was applied and secured with Kerlix and ACE wrap.  The Apligraf was secured with Wound Veil and Steri-Strips.  She will keep the dressing dry and intact, change the outer dressing as needed for drainage or problems.  Put a dry gauze in the first interspace.  Local wound care done upon consent there as well.  She will return to clinic and see me in 1 week.  This is the first Apligraf we have applied to this.  About half the Apligraf was used to cover the wounds and margins.  None was discarded.  Previous notes were reviewed.

## 2021-02-24 NOTE — LETTER
2/24/2021         RE: Tiffani Tan  [address] [facility] [address]        Dear Colleague,    Thank you for referring your patient, Tiffani Tan, to the [facility]. Please see a copy of my visit note below.    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)     GI bleed     UGIB (upper gastrointestinal bleed)     Morbid obesity (H)     Pain in joint, ankle and foot, left     Charcot ankle, left     Below-knee amputation (H)     Acute kidney failure, unspecified (H)     S/P BKA (below knee amputation) (H)     Past Surgical History:   Procedure Laterality Date     AMPUTATE LEG BELOW KNEE Left 7/22/2020    Procedure: Left below knee amputation;  Surgeon: Aniceto Adams MD;  [facility]     ESOPHAGOSCOPY, GASTROSCOPY, DUODENOSCOPY (EGD), COMBINED N/A 2/5/2020    Procedure: ESOPHAGOGASTRODUODENOSCOPY (EGD);  Surgeon: Tanya Dias MD;  [facility]     Social History     Socioeconomic History     Marital status:      Spouse name: Not on file     Number of children: Not on file     Years of education: Not on file     Highest education level: Not on file   Occupational History     Not on file   Social Needs     Financial resource strain: Not on file     Food insecurity     Worry: Not on file     Inability: Not on file     Transportation needs     Medical: Not on file      Non-medical: Not on file   Tobacco Use     Smoking status: Current Every Day Smoker     Packs/day: 1.00     Smokeless tobacco: Never Used   Substance and Sexual Activity     Alcohol use: Yes     Drug use: Not on file     Sexual activity: Not on file   Lifestyle     Physical activity     Days per week: Not on file     Minutes per session: Not on file     Stress: Not on file   Relationships     Social connections     Talks on phone: Not on file     Gets together: Not on file     Attends Church service: Not on file     Active member of club or organization: Not on file     Attends meetings of clubs or organizations: Not on file     Relationship status: Not on file     Intimate partner violence     Fear of current or ex partner: Not on file     Emotionally abused: Not on file     Physically abused: Not on file     Forced sexual activity: Not on file   Other Topics Concern     Parent/sibling w/ CABG, MI or angioplasty before 65F 55M? Not Asked   Social History Narrative     Not on file     Family History   Problem Relation Age of Onset     No Known Problems Mother      No Known Problems Father      Lab Results   Component Value Date    A1C 5.5 07/21/2020    A1C 6.5 02/04/2020     Uric Acid   Date Value Ref Range Status   05/29/2020 6.8 (H) 2.6 - 6.0 mg/dL Final       SUBJECTIVE FINDINGS:  A 61-year-old female returns to clinic for ulcer, right first MPJ, first interspace.  She relates it is doing okay.  She presents for A[employer]raf application.      OBJECTIVE FINDINGS:  X-rays were reviewed with the patient in the clinic today.  X-rays from today, 01/09/2019, 02/04/2020 and 03/11/2020 were reviewed with the patient in the clinic today.  She does have some resorption of the first, second and fifth MPJs.  It is relatively unchanged today from previous x-rays on 02/04 and 03/11/2020.  She does have a laterally deviated hallux and diffuse joint space narrowing and spurring noted and what appears to be healed either  surgical or fractures on the second and fifth metatarsal.  Joint and cortical margins are intact.  Ulcer was visualized best on the lateral view today.  ABIs from 02/04/2019 were 1.0 on the right.  Also, she has some what could be punched-out lesions on the x-rays, which could be consistent with gout as well and the resorption could also be from Charcot foot.  It does not appear to be active osteomyelitis.  She has an ulcer in the plantar first MPJ.  It is about 2.7 x 1.5 cm.  It is through the dermis into the subcutaneous tissues.  There is some serosanguineous drainage.  Some edema, no erythema, no odor, no calor.  Some hyperkeratotic tissue buildup.  She has some venous stasis present.  She has dorsally contracted digits.  She has some maceration in the first interspace noted.      ASSESSMENT AND PLAN:  Ulcer, right first MPJ, right first interspace.  She is diabetic with peripheral neuropathy, vascular disease and Charcot foot.  Diagnosis and treatment options discussed with her.  Sharp ulcer debridement done upon consent and the ulcer was prepped for Apligraf.  Apligraf was applied to the first MPJ ulcer and secured with Wound Veil and Steri-Strips.  Saline wet-to-dry dressing was applied and secured with Kerlix and ACE wrap.  The Apligraf was secured with Wound Veil and Steri-Strips.  She will keep the dressing dry and intact, change the outer dressing as needed for drainage or problems.  Put a dry gauze in the first interspace.  Local wound care done upon consent there as well.  She will return to clinic and see me in 1 week.  This is the first Apligraf we have applied to this.  About half the Apligraf was used to cover the wounds and margins.  None was discarded.  Previous notes were reviewed.               Again, thank you for allowing me to participate in the care of your patient.        Sincerely,        Nolan Whitten DPM

## 2021-03-01 ENCOUNTER — OFFICE VISIT (OUTPATIENT)
Dept: PODIATRY | Facility: CLINIC | Age: 61
End: 2021-03-01
Payer: COMMERCIAL

## 2021-03-01 ENCOUNTER — HOSPITAL ENCOUNTER (OUTPATIENT)
Dept: PHYSICAL THERAPY | Facility: CLINIC | Age: 61
Setting detail: THERAPIES SERIES
End: 2021-03-01
Attending: PHYSICAL MEDICINE & REHABILITATION
Payer: COMMERCIAL

## 2021-03-01 VITALS — HEART RATE: 84 BPM | SYSTOLIC BLOOD PRESSURE: 124 MMHG | DIASTOLIC BLOOD PRESSURE: 78 MMHG | OXYGEN SATURATION: 94 %

## 2021-03-01 DIAGNOSIS — E11.51 DIABETES MELLITUS WITH PERIPHERAL VASCULAR DISEASE (H): ICD-10-CM

## 2021-03-01 DIAGNOSIS — I87.8 VENOUS STASIS: ICD-10-CM

## 2021-03-01 DIAGNOSIS — E11.42 TYPE 2 DIABETES MELLITUS WITH DIABETIC POLYNEUROPATHY, WITHOUT LONG-TERM CURRENT USE OF INSULIN (H): Primary | ICD-10-CM

## 2021-03-01 DIAGNOSIS — L97.512 SKIN ULCER OF RIGHT FOOT WITH FAT LAYER EXPOSED (H): ICD-10-CM

## 2021-03-01 PROCEDURE — 15275 SKIN SUB GRAFT FACE/NK/HF/G: CPT | Performed by: PODIATRIST

## 2021-03-01 PROCEDURE — 99207 PR DROP WITH A PROCEDURE: CPT | Performed by: PODIATRIST

## 2021-03-01 PROCEDURE — 97542 WHEELCHAIR MNGMENT TRAINING: CPT | Mod: GP | Performed by: PHYSICAL THERAPIST

## 2021-03-01 PROCEDURE — 97140 MANUAL THERAPY 1/> REGIONS: CPT | Mod: GP | Performed by: PHYSICAL THERAPIST

## 2021-03-01 NOTE — PATIENT INSTRUCTIONS
Thanks for coming today.  [facility]  [address] [place] [address]    To schedule future appointments in Ortho Clinic, you may call [phone].    To schedule ordered imaging by your provider:   Call [facility] [phone]    To schedule an injection ordered by your provider:  Call [facility] Injection scheduling line: [phone]  [place] [facility] available online at:  [email or web address]    Please call if any further questions or concerns ([phone]).  Clinic hours 8 am to 5 pm.    Return to clinic (call) if symptoms worsen or fail to improve.

## 2021-03-01 NOTE — LETTER
3/1/2021         RE: Tiffani Tan  [address] [facility] [address]        Dear Colleague,    Thank you for referring your patient, Tiffani Tan, to the [facility]. Please see a copy of my visit note below.    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)     GI bleed     UGIB (upper gastrointestinal bleed)     Morbid obesity (H)     Pain in joint, ankle and foot, left     Charcot ankle, left     Below-knee amputation (H)     Acute kidney failure, unspecified (H)     S/P BKA (below knee amputation) (H)     Past Surgical History:   Procedure Laterality Date     AMPUTATE LEG BELOW KNEE Left 7/22/2020    Procedure: Left below knee amputation;  Surgeon: Aniceto Adams MD;  [facility]     ESOPHAGOSCOPY, GASTROSCOPY, DUODENOSCOPY (EGD), COMBINED N/A 2/5/2020    Procedure: ESOPHAGOGASTRODUODENOSCOPY (EGD);  Surgeon: Tanya Dias MD;  [facility]     Social History     Socioeconomic History     Marital status:      Spouse name: Not on file     Number of children: Not on file     Years of education: Not on file     Highest education level: Not on file   Occupational History     Not on file   Social Needs     Financial resource strain: Not on file     Food insecurity     Worry: Not on file     Inability: Not on file     Transportation needs     Medical: Not on file      Non-medical: Not on file   Tobacco Use     Smoking status: Current Every Day Smoker     Packs/day: 1.00     Smokeless tobacco: Never Used   Substance and Sexual Activity     Alcohol use: Yes     Drug use: Not on file     Sexual activity: Not on file   Lifestyle     Physical activity     Days per week: Not on file     Minutes per session: Not on file     Stress: Not on file   Relationships     Social connections     Talks on phone: Not on file     Gets together: Not on file     Attends [employer] service: Not on file     Active member of club or organization: Not on file     Attends meetings of clubs or organizations: Not on file     Relationship status: Not on file     Intimate partner violence     Fear of current or ex partner: Not on file     Emotionally abused: Not on file     Physically abused: Not on file     Forced sexual activity: Not on file   Other Topics Concern     Parent/sibling w/ CABG, MI or angioplasty before 65F 55M? Not Asked   Social History Narrative     Not on file     Family History   Problem Relation Age of Onset     No Known Problems Mother      No Known Problems Father      Lab Results   Component Value Date    A1C 5.5 07/21/2020    A1C 6.5 02/04/2020     Uric Acid   Date Value Ref Range Status   05/29/2020 6.8 (H) 2.6 - 6.0 mg/dL Final     SUBJECTIVE FINDINGS:  61-year-old female returns to clinic for ulcer right first MPJ, first interspace weeping.  She relates it is draining.  Last week they did have to change the outer dressing.  Presents in her wheelchair.      OBJECTIVE FINDINGS:  Right plantar first MPJ ulcer.  It is deep through the dermis, into the subcutaneous tissues.  It is about 2 x 1.3 cm at its widest margins.  There is some slight maceration, some serosanguineous drainage.  No erythema, mild edema, no odor, no calor.  She still has some interspace weeping that is decreased.      ASSESSMENT/PLAN:  Ulcer, right first MPJ, right first interspace.  She is diabetic with  peripheral neuropathy and vascular disease and Charcot foot.  Diagnosis and treatment options discussed with her.  The ulcer was prepped for Apligraf.  The Apligraf was applied and secured with Wound Veil and Steri-Strips.  Saline wet-to-dry dressing was applied, Kerlix and Ace wrap. She had some venous stasis disease.  I will have her do the Ace wrap all the way up to below the knee.  Change the outer dressing as needed for drainage.  This is the second Apligraf we applied.  About half the Apligraf was used to cover the wounds and margins.  The other half was discarded.  She will return to clinic in 1 week.           Again, thank you for allowing me to participate in the care of your patient.        Sincerely,        Nolan Whitten DPM

## 2021-03-01 NOTE — PROGRESS NOTES
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)     GI bleed     UGIB (upper gastrointestinal bleed)     Morbid obesity (H)     Pain in joint, ankle and foot, left     Charcot ankle, left     Below-knee amputation (H)     Acute kidney failure, unspecified (H)     S/P BKA (below knee amputation) (H)     Past Surgical History:   Procedure Laterality Date     AMPUTATE LEG BELOW KNEE Left 7/22/2020    Procedure: Left below knee amputation;  Surgeon: Aniceto Adams MD;  [facility]     ESOPHAGOSCOPY, GASTROSCOPY, DUODENOSCOPY (EGD), COMBINED N/A 2/5/2020    Procedure: ESOPHAGOGASTRODUODENOSCOPY (EGD);  Surgeon: Tanya Dias MD;  Location: [facility]     Social History     Socioeconomic History     Marital status:      Spouse name: Not on file     Number of children: Not on file     Years of education: Not on file     Highest education level: Not on file   Occupational History     Not on file   Social Needs     Financial resource strain: Not on file     Food insecurity     Worry: Not on file     Inability: Not on file     Transportation needs     Medical: Not on file     Non-medical: Not on file   Tobacco Use     Smoking status: Current Every Day Smoker     Packs/day: 1.00     Smokeless tobacco: Never Used   Substance and Sexual Activity     Alcohol use: Yes     Drug use: Not on file     Sexual  activity: Not on file   Lifestyle     Physical activity     Days per week: Not on file     Minutes per session: Not on file     Stress: Not on file   Relationships     Social connections     Talks on phone: Not on file     Gets together: Not on file     Attends Anabaptism service: Not on file     Active member of club or organization: Not on file     Attends meetings of clubs or organizations: Not on file     Relationship status: Not on file     Intimate partner violence     Fear of current or ex partner: Not on file     Emotionally abused: Not on file     Physically abused: Not on file     Forced sexual activity: Not on file   Other Topics Concern     Parent/sibling w/ CABG, MI or angioplasty before 65F 55M? Not Asked   Social History Narrative     Not on file     Family History   Problem Relation Age of Onset     No Known Problems Mother      No Known Problems Father      Lab Results   Component Value Date    A1C 5.5 07/21/2020    A1C 6.5 02/04/2020     Uric Acid   Date Value Ref Range Status   05/29/2020 6.8 (H) 2.6 - 6.0 mg/dL Final     SUBJECTIVE FINDINGS:  61-year-old female returns to clinic for ulcer right first MPJ, first interspace weeping.  She relates it is draining.  Last week they did have to change the outer dressing.  Presents in her wheelchair.      OBJECTIVE FINDINGS:  Right plantar first MPJ ulcer.  It is deep through the dermis, into the subcutaneous tissues.  It is about 2 x 1.3 cm at its widest margins.  There is some slight maceration, some serosanguineous drainage.  No erythema, mild edema, no odor, no calor.  She still has some interspace weeping that is decreased.      ASSESSMENT/PLAN:  Ulcer, right first MPJ, right first interspace.  She is diabetic with peripheral neuropathy and vascular disease and Charcot foot.  Diagnosis and treatment options discussed with her.  The ulcer was prepped for Apligraf.  The Apligraf was applied and secured with Wound Veil and Steri-Strips.  Saline  wet-to-dry dressing was applied, Kerlix and Ace wrap. She had some venous stasis disease.  I will have her do the Ace wrap all the way up to below the knee.  Change the outer dressing as needed for drainage.  This is the second Apligraf we applied.  About half the Apligraf was used to cover the wounds and margins.  The other half was discarded.  She will return to clinic in 1 week.

## 2021-03-01 NOTE — NURSING NOTE
Tiffani Tan's chief complaint for this visit includes:  Chief Complaint   Patient presents with     RECHECK     right plantar ulcer Apligraf #2     PCP: Wily Bonilla    Referring Provider:  No referring provider defined for this encounter.    /78 (BP Location: Right arm, Patient Position: Sitting, Cuff Size: Adult Regular)   Pulse 84   SpO2 94%   Data Unavailable     Do you need any medication refills at today's visit? No    Christina Oshea CMA

## 2021-03-03 ENCOUNTER — HOSPITAL ENCOUNTER (OUTPATIENT)
Dept: PHYSICAL THERAPY | Facility: CLINIC | Age: 61
Setting detail: THERAPIES SERIES
End: 2021-03-03
Attending: PHYSICAL MEDICINE & REHABILITATION
Payer: COMMERCIAL

## 2021-03-03 PROCEDURE — 97542 WHEELCHAIR MNGMENT TRAINING: CPT | Mod: GP | Performed by: PHYSICAL THERAPIST

## 2021-03-09 ENCOUNTER — OFFICE VISIT (OUTPATIENT)
Dept: PODIATRY | Facility: CLINIC | Age: 61
End: 2021-03-09
Payer: COMMERCIAL

## 2021-03-09 DIAGNOSIS — E11.42 TYPE 2 DIABETES MELLITUS WITH DIABETIC POLYNEUROPATHY, WITHOUT LONG-TERM CURRENT USE OF INSULIN (H): Primary | ICD-10-CM

## 2021-03-09 DIAGNOSIS — I87.8 VENOUS STASIS: ICD-10-CM

## 2021-03-09 DIAGNOSIS — L97.512 SKIN ULCER OF RIGHT FOOT WITH FAT LAYER EXPOSED (H): ICD-10-CM

## 2021-03-09 DIAGNOSIS — E11.51 DIABETES MELLITUS WITH PERIPHERAL VASCULAR DISEASE (H): ICD-10-CM

## 2021-03-09 PROCEDURE — 99207 PR DROP WITH A PROCEDURE: CPT | Performed by: PODIATRIST

## 2021-03-09 PROCEDURE — 15275 SKIN SUB GRAFT FACE/NK/HF/G: CPT | Performed by: PODIATRIST

## 2021-03-09 NOTE — NURSING NOTE
Tiffani Tan's chief complaint for this visit includes:  Chief Complaint   Patient presents with     RECHECK     Right plantar ulcer Apligraft #3     PCP: Wily Bonilla    Referring Provider:  No referring provider defined for this encounter.    There were no vitals taken for this visit.  Data Unavailable     Do you need any medication refills at today's visit? No    Yuridia Rangel MA

## 2021-03-09 NOTE — LETTER
3/9/2021         RE: Tiffani Tan  [address] [facility] [address]        Dear Colleague,    Thank you for referring your patient, Tiffani Tan, to the [facility]. Please see a copy of my visit note below.    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)     GI bleed     UGIB (upper gastrointestinal bleed)     Morbid obesity (H)     Pain in joint, ankle and foot, left     Charcot ankle, left     Below-knee amputation (H)     Acute kidney failure, unspecified (H)     S/P BKA (below knee amputation) (H)     Past Surgical History:   Procedure Laterality Date     AMPUTATE LEG BELOW KNEE Left 7/22/2020    Procedure: Left below knee amputation;  Surgeon: Aniceto Adams MD;  [facility]     ESOPHAGOSCOPY, GASTROSCOPY, DUODENOSCOPY (EGD), COMBINED N/A 2/5/2020    Procedure: ESOPHAGOGASTRODUODENOSCOPY (EGD);  Surgeon: Tanya Dias MD;  [facility]: [facility]     Social History     Socioeconomic History     Marital status:      Spouse name: Not on file     Number of children: Not on file     Years of education: Not on file     Highest education level: Not on file   Occupational History     Not on file   Social Needs     Financial resource strain: Not on file     Food insecurity     Worry: Not on file     Inability: Not on file     Transportation needs     Medical: Not on file      Non-medical: Not on file   Tobacco Use     Smoking status: Current Every Day Smoker     Packs/day: 1.00     Smokeless tobacco: Never Used   Substance and Sexual Activity     Alcohol use: Yes     Drug use: Not on file     Sexual activity: Not on file   Lifestyle     Physical activity     Days per week: Not on file     Minutes per session: Not on file     Stress: Not on file   Relationships     Social connections     Talks on phone: Not on file     Gets together: Not on file     Attends Zoroastrian service: Not on file     Active member of club or organization: Not on file     Attends meetings of clubs or organizations: Not on file     Relationship status: Not on file     Intimate partner violence     Fear of current or ex partner: Not on file     Emotionally abused: Not on file     Physically abused: Not on file     Forced sexual activity: Not on file   Other Topics Concern     Parent/sibling w/ CABG, MI or angioplasty before 65F 55M? Not Asked   Social History Narrative     Not on file     Family History   Problem Relation Age of Onset     No Known Problems Mother      No Known Problems Father      Lab Results   Component Value Date    A1C 5.5 07/21/2020    A1C 6.5 02/04/2020         SUBJECTIVE FINDINGS:  61-year-old female returns to clinic for ulcer right first MPJ, first interspace weeping.  She relates it is draining.  Last week they did have to change the outer dressing.  Presents in her wheelchair.      OBJECTIVE FINDINGS:  Right plantar first MPJ ulcer.  It is deep through the dermis, into the subcutaneous tissues with flattening margins.  It is about 2 x 1.3 cm at its widest margins.  There is some slight maceration, some serosanguineous drainage.  No erythema, mild edema, no odor, no calor.  She still has some interspace weeping that is decreased.      ASSESSMENT/PLAN:  Ulcer, right first MPJ, right first interspace.  She is diabetic with peripheral neuropathy and vascular disease and Charcot foot.   Diagnosis and treatment options discussed with her.  The ulcer was prepped for Apligraf.  The Apligraf was applied and secured with Wound Veil and Steri-Strips.  Saline wet-to-dry dressing was applied, Kerlix and Ace wrap.  Change the outer dressing as needed for drainage.  This is the third Apligraf we applied.  About half the Apligraf was used to cover the wounds and margins.  The other half was discarded.  She will return to clinic in 1 week.       Again, thank you for allowing me to participate in the care of your patient.        Sincerely,        Nolan Whitten DPM

## 2021-03-09 NOTE — PROGRESS NOTES
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)     GI bleed     UGIB (upper gastrointestinal bleed)     Morbid obesity (H)     Pain in joint, ankle and foot, left     Charcot ankle, left     Below-knee amputation (H)     Acute kidney failure, unspecified (H)     S/P BKA (below knee amputation) (H)     Past Surgical History:   Procedure Laterality Date     AMPUTATE LEG BELOW KNEE Left 7/22/2020    Procedure: Left below knee amputation;  Surgeon: Aniceto Adams MD;  [facility]     ESOPHAGOSCOPY, GASTROSCOPY, DUODENOSCOPY (EGD), COMBINED N/A 2/5/2020    Procedure: ESOPHAGOGASTRODUODENOSCOPY (EGD);  Surgeon: Tanya Dias MD;  Location: [facility]     Social History     Socioeconomic History     Marital status:      Spouse name: Not on file     Number of children: Not on file     Years of education: Not on file     Highest education level: Not on file   Occupational History     Not on file   Social Needs     Financial resource strain: Not on file     Food insecurity     Worry: Not on file     Inability: Not on file     Transportation needs     Medical: Not on file     Non-medical: Not on file   Tobacco Use     Smoking status: Current Every Day Smoker     Packs/day: 1.00     Smokeless tobacco: Never Used   Substance and Sexual Activity     Alcohol use: Yes     Drug use: Not on file     Sexual  activity: Not on file   Lifestyle     Physical activity     Days per week: Not on file     Minutes per session: Not on file     Stress: Not on file   Relationships     Social connections     Talks on phone: Not on file     Gets together: Not on file     Attends Zoroastrian service: Not on file     Active member of club or organization: Not on file     Attends meetings of clubs or organizations: Not on file     Relationship status: Not on file     Intimate partner violence     Fear of current or ex partner: Not on file     Emotionally abused: Not on file     Physically abused: Not on file     Forced sexual activity: Not on file   Other Topics Concern     Parent/sibling w/ CABG, MI or angioplasty before 65F 55M? Not Asked   Social History Narrative     Not on file     Family History   Problem Relation Age of Onset     No Known Problems Mother      No Known Problems Father      Lab Results   Component Value Date    A1C 5.5 07/21/2020    A1C 6.5 02/04/2020         SUBJECTIVE FINDINGS:  61-year-old female returns to clinic for ulcer right first MPJ, first interspace weeping.  She relates it is draining.  Last week they did have to change the outer dressing.  Presents in her wheelchair.      OBJECTIVE FINDINGS:  Right plantar first MPJ ulcer.  It is deep through the dermis, into the subcutaneous tissues with flattening margins.  It is about 2 x 1.3 cm at its widest margins.  There is some slight maceration, some serosanguineous drainage.  No erythema, mild edema, no odor, no calor.  She still has some interspace weeping that is decreased.      ASSESSMENT/PLAN:  Ulcer, right first MPJ, right first interspace.  She is diabetic with peripheral neuropathy and vascular disease and Charcot foot.  Diagnosis and treatment options discussed with her.  The ulcer was prepped for Apligraf.  The Apligraf was applied and secured with Wound Veil and Steri-Strips.  Saline wet-to-dry dressing was applied, Kerlix and Ace wrap.  Change the  outer dressing as needed for drainage.  This is the third Apligraf we applied.  About half the Apligraf was used to cover the wounds and margins.  The other half was discarded.  She will return to clinic in 1 week.

## 2021-03-10 ENCOUNTER — HOSPITAL ENCOUNTER (OUTPATIENT)
Dept: PHYSICAL THERAPY | Facility: CLINIC | Age: 61
Setting detail: THERAPIES SERIES
End: 2021-03-10
Attending: PHYSICAL MEDICINE & REHABILITATION
Payer: COMMERCIAL

## 2021-03-10 DIAGNOSIS — L97.416 DIABETIC ULCER OF RIGHT MIDFOOT ASSOCIATED WITH TYPE 2 DIABETES MELLITUS, WITH BONE INVOLVEMENT WITHOUT EVIDENCE OF NECROSIS (H): ICD-10-CM

## 2021-03-10 DIAGNOSIS — E11.621 DIABETIC ULCER OF RIGHT MIDFOOT ASSOCIATED WITH TYPE 2 DIABETES MELLITUS, WITH BONE INVOLVEMENT WITHOUT EVIDENCE OF NECROSIS (H): ICD-10-CM

## 2021-03-10 DIAGNOSIS — Z89.512 STATUS POST BELOW-KNEE AMPUTATION OF LEFT LOWER EXTREMITY (H): Primary | ICD-10-CM

## 2021-03-10 PROCEDURE — 97110 THERAPEUTIC EXERCISES: CPT | Mod: GP | Performed by: PHYSICAL THERAPIST

## 2021-03-10 PROCEDURE — 97116 GAIT TRAINING THERAPY: CPT | Mod: GP | Performed by: PHYSICAL THERAPIST

## 2021-03-10 PROCEDURE — 97542 WHEELCHAIR MNGMENT TRAINING: CPT | Mod: GP | Performed by: PHYSICAL THERAPIST

## 2021-03-10 NOTE — PROGRESS NOTES
"Wheelchair measurements    Height: 5'6\"  Weight: 230 lbs  Dominance: right  Knee-heel: 16.5  Hip-knee: 19  Hip-elbow:12  Hip-axilla: 17  Hip-shoulder: 23  Hip-head: 32  Hip width: 19  Chest width: 17  Shoulder width: 20    Katie Wheeler DPT, MPT, NCS  Physical Therapist   Board Certified Neurologic Clinical Specialist     [facility]   [address] [place] [address]   [email or web address]   [phone]   Clinic: [phone] //   Fax: [phone]    "

## 2021-03-15 ENCOUNTER — OFFICE VISIT (OUTPATIENT)
Dept: PODIATRY | Facility: CLINIC | Age: 61
End: 2021-03-15
Payer: COMMERCIAL

## 2021-03-15 VITALS — SYSTOLIC BLOOD PRESSURE: 92 MMHG | HEART RATE: 91 BPM | OXYGEN SATURATION: 94 % | DIASTOLIC BLOOD PRESSURE: 51 MMHG

## 2021-03-15 DIAGNOSIS — E11.51 DIABETES MELLITUS WITH PERIPHERAL VASCULAR DISEASE (H): ICD-10-CM

## 2021-03-15 DIAGNOSIS — E11.42 TYPE 2 DIABETES MELLITUS WITH DIABETIC POLYNEUROPATHY, WITHOUT LONG-TERM CURRENT USE OF INSULIN (H): Primary | ICD-10-CM

## 2021-03-15 DIAGNOSIS — L97.512 SKIN ULCER OF RIGHT FOOT WITH FAT LAYER EXPOSED (H): ICD-10-CM

## 2021-03-15 PROCEDURE — 99207 PR DROP WITH A PROCEDURE: CPT | Performed by: PODIATRIST

## 2021-03-15 PROCEDURE — 15275 SKIN SUB GRAFT FACE/NK/HF/G: CPT | Performed by: PODIATRIST

## 2021-03-15 NOTE — LETTER
3/15/2021         RE: Tiffani Tan  [address] [facility] [address]        Dear Colleague,    Thank you for referring your patient, Tiffani Tan, to the [facility]. Please see a copy of my visit note below.    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)     GI bleed     UGIB (upper gastrointestinal bleed)     Morbid obesity (H)     Pain in joint, ankle and foot, left     Charcot ankle, left     Below-knee amputation (H)     Acute kidney failure, unspecified (H)     S/P BKA (below knee amputation) (H)     Past Surgical History:   Procedure Laterality Date     AMPUTATE LEG BELOW KNEE Left 7/22/2020    Procedure: Left below knee amputation;  Surgeon: Aniceto Adams MD;  [facility]     ESOPHAGOSCOPY, GASTROSCOPY, DUODENOSCOPY (EGD), COMBINED N/A 2/5/2020    Procedure: ESOPHAGOGASTRODUODENOSCOPY (EGD);  Surgeon: Tanya Dias MD;  Location: [facility]     Social History     Socioeconomic History     Marital status:      Spouse name: Not on file     Number of children: Not on file     Years of education: Not on file     Highest education level: Not on file   Occupational History     Not on file   Social Needs     Financial resource strain: Not on file     Food insecurity     Worry: Not on file     Inability: Not on file     Transportation needs     Medical: Not on file      Non-medical: Not on file   Tobacco Use     Smoking status: Current Every Day Smoker     Packs/day: 1.00     Smokeless tobacco: Never Used   Substance and Sexual Activity     Alcohol use: Yes     Drug use: Not on file     Sexual activity: Not on file   Lifestyle     Physical activity     Days per week: Not on file     Minutes per session: Not on file     Stress: Not on file   Relationships     Social connections     Talks on phone: Not on file     Gets together: Not on file     Attends Adventist service: Not on file     Active member of club or organization: Not on file     Attends meetings of clubs or organizations: Not on file     Relationship status: Not on file     Intimate partner violence     Fear of current or ex partner: Not on file     Emotionally abused: Not on file     Physically abused: Not on file     Forced sexual activity: Not on file   Other Topics Concern     Parent/sibling w/ CABG, MI or angioplasty before 65F 55M? Not Asked   Social History Narrative     Not on file     Family History   Problem Relation Age of Onset     No Known Problems Mother      No Known Problems Father      Lab Results   Component Value Date    A1C 5.5 07/21/2020    A1C 6.5 02/04/2020         SUBJECTIVE FINDINGS:  61-year-old female returns to clinic for ulcer right first MPJ, first interspace weeping.  She relates it is draining.  Last week they did not change the dressing.  Presents in her wheelchair.      OBJECTIVE FINDINGS:  Right plantar first MPJ ulcer.  It is deep through the dermis, into the subcutaneous tissues with flattening margins.  It is about 2.3 x 1 cm at its widest margins.  There is some slight maceration, some serosanguineous drainage.  No erythema, mild edema, no odor, no calor.  She still has some interspace weeping and maceration that has increased from last visit.      ASSESSMENT/PLAN:  Ulcer, right first MPJ, right first interspace.  She is diabetic with peripheral neuropathy and vascular disease  and Charcot foot.  Diagnosis and treatment options discussed with her.  The ulcer was prepped for Apligraf.  The Apligraf was applied and secured with Wound Veil and Steri-Strips.  Biatain Silver and a sterile gauze dressing was applied, Kerlix and Ace wrap.  Change the outer dressing as needed for drainage.  This is the fourth Apligraf we applied.  About half the Apligraf was used to cover the wounds and margins.  The other half was discarded.  She will return to clinic in 1 week.       Again, thank you for allowing me to participate in the care of your patient.        Sincerely,        Nolan Whitten DPM

## 2021-03-15 NOTE — PROGRESS NOTES
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)     GI bleed     UGIB (upper gastrointestinal bleed)     Morbid obesity (H)     Pain in joint, ankle and foot, left     Charcot ankle, left     Below-knee amputation (H)     Acute kidney failure, unspecified (H)     S/P BKA (below knee amputation) (H)     Past Surgical History:   Procedure Laterality Date     AMPUTATE LEG BELOW KNEE Left 7/22/2020    Procedure: Left below knee amputation;  Surgeon: Aniceto Adams MD;  [facility]     ESOPHAGOSCOPY, GASTROSCOPY, DUODENOSCOPY (EGD), COMBINED N/A 2/5/2020    Procedure: ESOPHAGOGASTRODUODENOSCOPY (EGD);  Surgeon: Tanya Dias MD;  [facility]: [facility]     Social History     Socioeconomic History     Marital status:      Spouse name: Not on file     Number of children: Not on file     Years of education: Not on file     Highest education level: Not on file   Occupational History     Not on file   Social Needs     Financial resource strain: Not on file     Food insecurity     Worry: Not on file     Inability: Not on file     Transportation needs     Medical: Not on file     Non-medical: Not on file   Tobacco Use     Smoking status: Current Every Day Smoker     Packs/day: 1.00     Smokeless tobacco: Never Used   Substance and Sexual Activity     Alcohol use: Yes     Drug use: Not on file     Sexual  activity: Not on file   Lifestyle     Physical activity     Days per week: Not on file     Minutes per session: Not on file     Stress: Not on file   Relationships     Social connections     Talks on phone: Not on file     Gets together: Not on file     Attends Mu-ism service: Not on file     Active member of club or organization: Not on file     Attends meetings of clubs or organizations: Not on file     Relationship status: Not on file     Intimate partner violence     Fear of current or ex partner: Not on file     Emotionally abused: Not on file     Physically abused: Not on file     Forced sexual activity: Not on file   Other Topics Concern     Parent/sibling w/ CABG, MI or angioplasty before 65F 55M? Not Asked   Social History Narrative     Not on file     Family History   Problem Relation Age of Onset     No Known Problems Mother      No Known Problems Father      Lab Results   Component Value Date    A1C 5.5 07/21/2020    A1C 6.5 02/04/2020         SUBJECTIVE FINDINGS:  61-year-old female returns to clinic for ulcer right first MPJ, first interspace weeping.  She relates it is draining.  Last week they did not change the dressing.  Presents in her wheelchair.      OBJECTIVE FINDINGS:  Right plantar first MPJ ulcer.  It is deep through the dermis, into the subcutaneous tissues with flattening margins.  It is about 2.3 x 1 cm at its widest margins.  There is some slight maceration, some serosanguineous drainage.  No erythema, mild edema, no odor, no calor.  She still has some interspace weeping and maceration that has increased from last visit.      ASSESSMENT/PLAN:  Ulcer, right first MPJ, right first interspace.  She is diabetic with peripheral neuropathy and vascular disease and Charcot foot.  Diagnosis and treatment options discussed with her.  The ulcer was prepped for Apligraf.  The Apligraf was applied and secured with Wound Veil and Steri-Strips.  Biatain Silver and a sterile gauze dressing was  applied, Kerlix and Ace wrap.  Change the outer dressing as needed for drainage.  This is the fourth Apligraf we applied.  About half the Apligraf was used to cover the wounds and margins.  The other half was discarded.  She will return to clinic in 1 week.

## 2021-03-15 NOTE — PATIENT INSTRUCTIONS
Thanks for coming today.  [facility]  [address] [place] [address]    To schedule future appointments in Ortho Clinic, you may call [phone].    To schedule ordered imaging by your provider:   Call [facility] [phone]    To schedule an injection ordered by your provider:  Call [facility] Injection scheduling line: [phone]  [email or web address] available online at:  [email or web address]    Please call if any further questions or concerns ([phone]).  Clinic hours 8 am to 5 pm.    Return to clinic (call) if symptoms worsen or fail to improve.

## 2021-03-15 NOTE — NURSING NOTE
Tiffani Tan's chief complaint for this visit includes:  Chief Complaint   Patient presents with     RECHECK     Right plantar ulcer Apligraf #4     PCP: Wily Bonilla    Referring Provider:  No referring provider defined for this encounter.    BP (!) 89/46 (BP Location: Right arm, Patient Position: Sitting, Cuff Size: Adult Regular)   Pulse 91   SpO2 94%   Data Unavailable     Do you need any medication refills at today's visit? No    Christina Oshea CMA

## 2021-03-17 DIAGNOSIS — R60.0 LOWER EXTREMITY EDEMA: Primary | ICD-10-CM

## 2021-03-23 ENCOUNTER — TELEPHONE (OUTPATIENT)
Dept: PODIATRY | Facility: CLINIC | Age: 61
End: 2021-03-23

## 2021-03-23 ENCOUNTER — MEDICAL CORRESPONDENCE (OUTPATIENT)
Dept: HEALTH INFORMATION MANAGEMENT | Facility: CLINIC | Age: 61
End: 2021-03-23

## 2021-03-23 ENCOUNTER — OFFICE VISIT (OUTPATIENT)
Dept: PODIATRY | Facility: CLINIC | Age: 61
End: 2021-03-23
Payer: COMMERCIAL

## 2021-03-23 VITALS — DIASTOLIC BLOOD PRESSURE: 59 MMHG | OXYGEN SATURATION: 94 % | HEART RATE: 101 BPM | SYSTOLIC BLOOD PRESSURE: 94 MMHG

## 2021-03-23 DIAGNOSIS — E11.51 DIABETES MELLITUS WITH PERIPHERAL VASCULAR DISEASE (H): ICD-10-CM

## 2021-03-23 DIAGNOSIS — I87.8 VENOUS STASIS: ICD-10-CM

## 2021-03-23 DIAGNOSIS — L97.512 SKIN ULCER OF RIGHT FOOT WITH FAT LAYER EXPOSED (H): ICD-10-CM

## 2021-03-23 DIAGNOSIS — E11.42 TYPE 2 DIABETES MELLITUS WITH DIABETIC POLYNEUROPATHY, WITHOUT LONG-TERM CURRENT USE OF INSULIN (H): Primary | ICD-10-CM

## 2021-03-23 PROCEDURE — 99207 PR DROP WITH A PROCEDURE: CPT | Performed by: PODIATRIST

## 2021-03-23 PROCEDURE — 15275 SKIN SUB GRAFT FACE/NK/HF/G: CPT | Performed by: PODIATRIST

## 2021-03-23 RX ORDER — POLYETHYLENE GLYCOL 3350 17 G
POWDER IN PACKET (EA) ORAL
COMMUNITY
Start: 2021-03-20

## 2021-03-23 RX ORDER — GLIMEPIRIDE 2 MG/1
TABLET ORAL
COMMUNITY
Start: 2021-03-02

## 2021-03-23 RX ORDER — NYSTATIN 100000 [USP'U]/G
POWDER TOPICAL
COMMUNITY
Start: 2021-03-20

## 2021-03-23 RX ORDER — LACTULOSE 10 G/15ML
SOLUTION ORAL
COMMUNITY
Start: 2021-03-20

## 2021-03-23 RX ORDER — DULOXETIN HYDROCHLORIDE 20 MG/1
CAPSULE, DELAYED RELEASE ORAL
COMMUNITY
Start: 2021-03-03

## 2021-03-23 RX ORDER — NICOTINE 21 MG/24HR
1 PATCH, TRANSDERMAL 24 HOURS TRANSDERMAL
COMMUNITY
Start: 2021-03-21

## 2021-03-23 RX ORDER — NALOXONE HYDROCHLORIDE 4 MG/.1ML
SPRAY NASAL
COMMUNITY
Start: 2021-03-03

## 2021-03-23 RX ORDER — FERROUS SULFATE 325(65) MG
325 TABLET ORAL
COMMUNITY
Start: 2021-03-20

## 2021-03-23 RX ORDER — FLUCONAZOLE 100 MG/1
100 TABLET ORAL
COMMUNITY
Start: 2021-03-20 | End: 2021-03-23

## 2021-03-23 RX ORDER — BUMETANIDE 2 MG/1
2 TABLET ORAL
COMMUNITY
Start: 2021-03-21

## 2021-03-23 NOTE — TELEPHONE ENCOUNTER
Financial Counselor Review for Apligraf, Dermagraft, Puraply AM, Affinity, NuShield:    PROVIDER REQUESTING ADDITIONAL APPLICATIONS OF APLIGRAF.    Which product(s) to be checked: Apligraf, Dermagraft, Puraply AM, Affinity, NuShield    Has the patient tried any of these products before: YES , Apligraf    Was it for this wound: YES    Diagnosis code (include ICD-10 code): L97.512, E11.42, I87.8, E11.51    Coverage and patient financial responsibility information:YES    Does patient need to be contacted by Financial Counselor:YES    Note: Do not use abbreviations and route encounter to [facility] [place] [[id number]]

## 2021-03-23 NOTE — LETTER
3/23/2021         RE: Tiffani Tan  [address] [facility] [address]        Dear Colleague,    Thank you for referring your patient, Tiffani Tan, to the [facility]. Please see a copy of my visit note below.    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)     GI bleed     UGIB (upper gastrointestinal bleed)     Morbid obesity (H)     Pain in joint, ankle and foot, left     Charcot ankle, left     Below-knee amputation (H)     Acute kidney failure, unspecified (H)     S/P BKA (below knee amputation) (H)     Past Surgical History:   Procedure Laterality Date     AMPUTATE LEG BELOW KNEE Left 7/22/2020    Procedure: Left below knee amputation;  Surgeon: Aniceto Adams MD;  [facility]     ESOPHAGOSCOPY, GASTROSCOPY, DUODENOSCOPY (EGD), COMBINED N/A 2/5/2020    Procedure: ESOPHAGOGASTRODUODENOSCOPY (EGD);  Surgeon: Tanya Dias MD;  [facility]     Social History     Socioeconomic History     Marital status:      Spouse name: Not on file     Number of children: Not on file     Years of education: Not on file     Highest education level: Not on file   Occupational History     Not on file   Social Needs     Financial resource strain: Not on file     Food insecurity     Worry: Not on file     Inability: Not on file     Transportation needs     Medical: Not on file      Non-medical: Not on file   Tobacco Use     Smoking status: Current Every Day Smoker     Packs/day: 1.00     Smokeless tobacco: Never Used   Substance and Sexual Activity     Alcohol use: Yes     Drug use: Not on file     Sexual activity: Not on file   Lifestyle     Physical activity     Days per week: Not on file     Minutes per session: Not on file     Stress: Not on file   Relationships     Social connections     Talks on phone: Not on file     Gets together: Not on file     Attends Buddhism service: Not on file     Active member of club or organization: Not on file     Attends meetings of clubs or organizations: Not on file     Relationship status: Not on file     Intimate partner violence     Fear of current or ex partner: Not on file     Emotionally abused: Not on file     Physically abused: Not on file     Forced sexual activity: Not on file   Other Topics Concern     Parent/sibling w/ CABG, MI or angioplasty before 65F 55M? Not Asked   Social History Narrative     Not on file     Family History   Problem Relation Age of Onset     No Known Problems Mother      No Known Problems Father      Lab Results   Component Value Date    A1C 5.5 07/21/2020    A1C 6.5 02/04/2020         SUBJECTIVE FINDINGS:  61-year-old female returns to clinic for ulcer right first MPJ, maceration in the interdigital space.  Relates it is doing okay.  Relates she was hospitalized since we saw her last.  I reviewed those notes from 03/16/2021, hospital discharge note.  She relates they did change the dressing.      OBJECTIVE FINDINGS:  She has a right plantar first MPJ ulcer that is deep through the dermis into the subcutaneous tissues.  It is about 1.8 x 1.2 cm.  Some serosanguineous drainage, some hyperkeratotic tissue buildup.  No erythema, mild edema, no odor, no calor.  Some interdigital maceration.  No erythema, no odor, no calor there.  She has decreased peripheral edema.      ASSESSMENT/PLAN:  Right first MPJ ulcer.  Right  first interspace ulcer.  She is diabetic with peripheral neuropathy and vascular disease.  Diagnosis and treatment options discussed with her.  The ulcer was debrided and prepped for Apligraf.  Apligraf was applied and secured with Wound Veil and Steri-Strips.  Biatain Silver was applied interdigitally and over the ulcer site, wrapped with a sterile Kerlix gauze.  She will keep the dressing dry and intact.  Change the outer dressing as needed for drainage.  This is the fifth Apligraf we have applied.  About half the Apligraf was used to cover the wounds and margins.  The other half was discarded.  I reviewed previous notes and labs from 03/20/2021, her hospital labs.  She will return to clinic and see me in 1 week.  Improvement seen.           Again, thank you for allowing me to participate in the care of your patient.        Sincerely,        Nolan Whitten DPM

## 2021-03-23 NOTE — PATIENT INSTRUCTIONS
Thanks for coming today.  Ortho/Sports Medicine Clinic  [address] [place] [address]    To schedule future appointments in Ortho Clinic, you may call [phone].    To schedule ordered imaging by your provider:   Call Central Imaging [phone]    To schedule an injection ordered by your provider:  Call Central Imaging Injection scheduling line: [phone]  [employer] available online at:  [email or web address]    Please call if any further questions or concerns ([phone]).  Clinic hours 8 am to 5 pm.    Return to clinic (call) if symptoms worsen or fail to improve.

## 2021-03-23 NOTE — TELEPHONE ENCOUNTER
Benefit request form and patient's demographics facesheet faxed to [facility] for review. Pending

## 2021-03-23 NOTE — PROGRESS NOTES
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)     GI bleed     UGIB (upper gastrointestinal bleed)     Morbid obesity (H)     Pain in joint, ankle and foot, left     Charcot ankle, left     Below-knee amputation (H)     Acute kidney failure, unspecified (H)     S/P BKA (below knee amputation) (H)     Past Surgical History:   Procedure Laterality Date     AMPUTATE LEG BELOW KNEE Left 7/22/2020    Procedure: Left below knee amputation;  Surgeon: Aniceto Adams MD;  [facility]     ESOPHAGOSCOPY, GASTROSCOPY, DUODENOSCOPY (EGD), COMBINED N/A 2/5/2020    Procedure: ESOPHAGOGASTRODUODENOSCOPY (EGD);  Surgeon: Tanya Dias MD;  [facility]     Social History     Socioeconomic History     Marital status:      Spouse name: Not on file     Number of children: Not on file     Years of education: Not on file     Highest education level: Not on file   Occupational History     Not on file   Social Needs     Financial resource strain: Not on file     Food insecurity     Worry: Not on file     Inability: Not on file     Transportation needs     Medical: Not on file     Non-medical: Not on file   Tobacco Use     Smoking status: Current Every Day Smoker     Packs/day: 1.00     Smokeless tobacco: Never Used   Substance and Sexual Activity     Alcohol use: Yes     Drug use: Not on file     Sexual  activity: Not on file   Lifestyle     Physical activity     Days per week: Not on file     Minutes per session: Not on file     Stress: Not on file   Relationships     Social connections     Talks on phone: Not on file     Gets together: Not on file     Attends Scientology service: Not on file     Active member of club or organization: Not on file     Attends meetings of clubs or organizations: Not on file     Relationship status: Not on file     Intimate partner violence     Fear of current or ex partner: Not on file     Emotionally abused: Not on file     Physically abused: Not on file     Forced sexual activity: Not on file   Other Topics Concern     Parent/sibling w/ CABG, MI or angioplasty before 65F 55M? Not Asked   Social History Narrative     Not on file     Family History   Problem Relation Age of Onset     No Known Problems Mother      No Known Problems Father      Lab Results   Component Value Date    A1C 5.5 07/21/2020    A1C 6.5 02/04/2020         SUBJECTIVE FINDINGS:  61-year-old female returns to clinic for ulcer right first MPJ, maceration in the interdigital space.  Relates it is doing okay.  Relates she was hospitalized since we saw her last.  I reviewed those notes from 03/16/2021, hospital discharge note.  She relates they did change the dressing.      OBJECTIVE FINDINGS:  She has a right plantar first MPJ ulcer that is deep through the dermis into the subcutaneous tissues.  It is about 1.8 x 1.2 cm.  Some serosanguineous drainage, some hyperkeratotic tissue buildup.  No erythema, mild edema, no odor, no calor.  Some interdigital maceration.  No erythema, no odor, no calor there.  She has decreased peripheral edema.      ASSESSMENT/PLAN:  Right first MPJ ulcer.  Right first interspace ulcer.  She is diabetic with peripheral neuropathy and vascular disease.  Diagnosis and treatment options discussed with her.  The ulcer was debrided and prepped for Apligraf.  Apligraf was applied and secured with  Wound Veil and Steri-Strips.  Biatain Silver was applied interdigitally and over the ulcer site, wrapped with a sterile Kerlix gauze.  She will keep the dressing dry and intact.  Change the outer dressing as needed for drainage.  This is the fifth Apligraf we have applied.  About half the Apligraf was used to cover the wounds and margins.  The other half was discarded.  I reviewed previous notes and labs from 03/20/2021, her hospital labs.  She will return to clinic and see me in 1 week.  Improvement seen.

## 2021-03-23 NOTE — TELEPHONE ENCOUNTER
[facility] Call Center    Phone Message    May a detailed message be left on voicemail: yes     Reason for Call: [facility] requesting home care orders:    Skilled Nursing once weekly for 3 weeks.  2 PRN's and a PT evaluation.      Action Taken: Message routed to:  Adult Clinics: Podiatry p [phone]    Travel Screening: Not Applicable

## 2021-03-25 ENCOUNTER — OFFICE VISIT - HEALTHEAST (OUTPATIENT)
Dept: ENDOCRINOLOGY | Facility: CLINIC | Age: 61
End: 2021-03-25

## 2021-03-25 DIAGNOSIS — Z74.09 IMPAIRED MOBILITY: ICD-10-CM

## 2021-03-25 DIAGNOSIS — L97.519 DIABETIC ULCER OF RIGHT FOOT ASSOCIATED WITH TYPE 2 DIABETES MELLITUS, UNSPECIFIED PART OF FOOT, UNSPECIFIED ULCER STAGE (H): ICD-10-CM

## 2021-03-25 DIAGNOSIS — Z89.512 LEFT BELOW-KNEE AMPUTEE (H): ICD-10-CM

## 2021-03-25 DIAGNOSIS — R26.2 IMPAIRED AMBULATION: ICD-10-CM

## 2021-03-25 DIAGNOSIS — E11.621 DIABETIC ULCER OF RIGHT FOOT ASSOCIATED WITH TYPE 2 DIABETES MELLITUS, UNSPECIFIED PART OF FOOT, UNSPECIFIED ULCER STAGE (H): ICD-10-CM

## 2021-03-25 DIAGNOSIS — R60.0 BILATERAL LEG EDEMA: ICD-10-CM

## 2021-03-25 DIAGNOSIS — G62.9 NEUROPATHY: ICD-10-CM

## 2021-03-25 NOTE — TELEPHONE ENCOUNTER
We were looking for approval for additional Apligraf beyond the 5.  Was a quantity limit exception requested?

## 2021-03-30 ENCOUNTER — OFFICE VISIT (OUTPATIENT)
Dept: PODIATRY | Facility: CLINIC | Age: 61
End: 2021-03-30
Payer: COMMERCIAL

## 2021-03-30 VITALS — HEART RATE: 99 BPM | OXYGEN SATURATION: 93 % | SYSTOLIC BLOOD PRESSURE: 121 MMHG | DIASTOLIC BLOOD PRESSURE: 69 MMHG

## 2021-03-30 DIAGNOSIS — E11.42 TYPE 2 DIABETES MELLITUS WITH DIABETIC POLYNEUROPATHY, WITHOUT LONG-TERM CURRENT USE OF INSULIN (H): Primary | ICD-10-CM

## 2021-03-30 DIAGNOSIS — E11.51 DIABETES MELLITUS WITH PERIPHERAL VASCULAR DISEASE (H): ICD-10-CM

## 2021-03-30 DIAGNOSIS — L97.512 SKIN ULCER OF RIGHT FOOT WITH FAT LAYER EXPOSED (H): ICD-10-CM

## 2021-03-30 PROCEDURE — 99214 OFFICE O/P EST MOD 30 MIN: CPT | Performed by: PODIATRIST

## 2021-03-30 RX ORDER — AMMONIUM LACTATE 12 G/100G
CREAM TOPICAL 2 TIMES DAILY
Qty: 140 G | Refills: 5 | Status: SHIPPED | OUTPATIENT
Start: 2021-03-30

## 2021-03-30 NOTE — LETTER
3/30/2021         RE: Tiffani Tan  [address] [facility] [address]        Dear Colleague,    Thank you for referring your patient, Tiffani Tan, to the [facility]. Please see a copy of my visit note below.    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)     GI bleed     UGIB (upper gastrointestinal bleed)     Morbid obesity (H)     Pain in joint, ankle and foot, left     Charcot ankle, left     Below-knee amputation (H)     Acute kidney failure, unspecified (H)     S/P BKA (below knee amputation) (H)     Past Surgical History:   Procedure Laterality Date     AMPUTATE LEG BELOW KNEE Left 7/22/2020    Procedure: Left below knee amputation;  Surgeon: Aniceto Adams MD;  [facility]     ESOPHAGOSCOPY, GASTROSCOPY, DUODENOSCOPY (EGD), COMBINED N/A 2/5/2020    Procedure: ESOPHAGOGASTRODUODENOSCOPY (EGD);  Surgeon: Tanya Dias MD;  [facility]     Social History     Socioeconomic History     Marital status:      Spouse name: Not on file     Number of children: Not on file     Years of education: Not on file     Highest education level: Not on file   Occupational History     Not on file   Social Needs     Financial resource strain: Not on file     Food insecurity     Worry: Not on file     Inability: Not on file     Transportation needs     Medical: Not on file      Non-medical: Not on file   Tobacco Use     Smoking status: Current Every Day Smoker     Packs/day: 1.00     Smokeless tobacco: Never Used   Substance and Sexual Activity     Alcohol use: Yes     Drug use: Not on file     Sexual activity: Not on file   Lifestyle     Physical activity     Days per week: Not on file     Minutes per session: Not on file     Stress: Not on file   Relationships     Social connections     Talks on phone: Not on file     Gets together: Not on file     Attends Denominational service: Not on file     Active member of club or organization: Not on file     Attends meetings of clubs or organizations: Not on file     Relationship status: Not on file     Intimate partner violence     Fear of current or ex partner: Not on file     Emotionally abused: Not on file     Physically abused: Not on file     Forced sexual activity: Not on file   Other Topics Concern     Parent/sibling w/ CABG, MI or angioplasty before 65F 55M? Not Asked   Social History Narrative     Not on file     Family History   Problem Relation Age of Onset     No Known Problems Mother      No Known Problems Father      Lab Results   Component Value Date    A1C 5.5 07/21/2020    A1C 6.5 02/04/2020         SUBJECTIVE FINDINGS:  61-year-old female returns to clinic for ulcer right first MPJ, maceration in the interdigital space.  Relates it is doing okay.  Relates she did not have to change the dressing.      OBJECTIVE FINDINGS:  She has a right plantar first MPJ ulcer that is deep through the dermis into the subcutaneous tissues.  It is about 1.5 x 1.2 cm.  Some serosanguineous drainage, some hyperkeratotic tissue buildup.  No erythema, mild edema, mild maceration, no odor, no calor.  Interdigital maceration is dried.  No erythema, no odor, no calor there.  She has decreased peripheral edema and dry skin on foot and leg.  3/25/21 Basic metabolic panel reviewed as noted in emr.     ASSESSMENT/PLAN:  Right first MPJ ulcer.  Right first  interspace ulcer that is closed.  She is diabetic with peripheral neuropathy and vascular disease.  Diagnosis and treatment options discussed with her.  Local wound cares and Endoform applied and secured with steri strip and sterile gauze to 1st mpj ulcer upon consent.  Aquacel Silver was applied interdigitally, wrapped with a sterile Kerlix gauze.  She will keep the dressing dry and intact.  Change the outer dressing as needed for drainage.  Return to clinic and see me in 1 week.  Improvement seen.  Prescription for Amlactin given for dry skin and use discussed with her.          Again, thank you for allowing me to participate in the care of your patient.        Sincerely,        Nolan Whitten DPM

## 2021-03-30 NOTE — PATIENT INSTRUCTIONS
Thanks for coming today.  Ortho/Sports Medicine Clinic  [address] [place] [address]    To schedule future appointments in Ortho Clinic, you may call [phone].    To schedule ordered imaging by your provider:   Call Central Imaging [phone]    To schedule an injection ordered by your provider:  Call Central Imaging Injection scheduling line: [phone]  [employer]hart available online at:  [email or web address]    Please call if any further questions or concerns ([phone]).  Clinic hours 8 am to 5 pm.    Return to clinic (call) if symptoms worsen or fail to improve.

## 2021-03-30 NOTE — NURSING NOTE
Tiffani Tan's chief complaint for this visit includes:  Chief Complaint   Patient presents with     RECHECK     right plantar ulcer     PCP: Wily Bonilla    Referring Provider:  No referring provider defined for this encounter.    /69   Pulse 99   SpO2 93%   Data Unavailable     Do you need any medication refills at today's visit? No    Christina Oshea CMA

## 2021-03-30 NOTE — PROGRESS NOTES
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)     GI bleed     UGIB (upper gastrointestinal bleed)     Morbid obesity (H)     Pain in joint, ankle and foot, left     Charcot ankle, left     Below-knee amputation (H)     Acute kidney failure, unspecified (H)     S/P BKA (below knee amputation) (H)     Past Surgical History:   Procedure Laterality Date     AMPUTATE LEG BELOW KNEE Left 7/22/2020    Procedure: Left below knee amputation;  Surgeon: Aniceto Adams MD;  [facility]     ESOPHAGOSCOPY, GASTROSCOPY, DUODENOSCOPY (EGD), COMBINED N/A 2/5/2020    Procedure: ESOPHAGOGASTRODUODENOSCOPY (EGD);  Surgeon: Tanya Dias MD;  [facility]: [facility]     Social History     Socioeconomic History     Marital status:      Spouse name: Not on file     Number of children: Not on file     Years of education: Not on file     Highest education level: Not on file   Occupational History     Not on file   Social Needs     Financial resource strain: Not on file     Food insecurity     Worry: Not on file     Inability: Not on file     Transportation needs     Medical: Not on file     Non-medical: Not on file   Tobacco Use     Smoking status: Current Every Day Smoker     Packs/day: 1.00     Smokeless tobacco: Never Used   Substance and Sexual Activity     Alcohol use: Yes     Drug use: Not on file     Sexual  activity: Not on file   Lifestyle     Physical activity     Days per week: Not on file     Minutes per session: Not on file     Stress: Not on file   Relationships     Social connections     Talks on phone: Not on file     Gets together: Not on file     Attends Church service: Not on file     Active member of club or organization: Not on file     Attends meetings of clubs or organizations: Not on file     Relationship status: Not on file     Intimate partner violence     Fear of current or ex partner: Not on file     Emotionally abused: Not on file     Physically abused: Not on file     Forced sexual activity: Not on file   Other Topics Concern     Parent/sibling w/ CABG, MI or angioplasty before 65F 55M? Not Asked   Social History Narrative     Not on file     Family History   Problem Relation Age of Onset     No Known Problems Mother      No Known Problems Father      Lab Results   Component Value Date    A1C 5.5 07/21/2020    A1C 6.5 02/04/2020         SUBJECTIVE FINDINGS:  61-year-old female returns to clinic for ulcer right first MPJ, maceration in the interdigital space.  Relates it is doing okay.  Relates she did not have to change the dressing.      OBJECTIVE FINDINGS:  She has a right plantar first MPJ ulcer that is deep through the dermis into the subcutaneous tissues.  It is about 1.5 x 1.2 cm.  Some serosanguineous drainage, some hyperkeratotic tissue buildup.  No erythema, mild edema, mild maceration, no odor, no calor.  Interdigital maceration is dried.  No erythema, no odor, no calor there.  She has decreased peripheral edema and dry skin on foot and leg.  3/25/21 Basic metabolic panel reviewed as noted in emr.     ASSESSMENT/PLAN:  Right first MPJ ulcer.  Right first interspace ulcer that is closed.  She is diabetic with peripheral neuropathy and vascular disease.  Diagnosis and treatment options discussed with her.  Local wound cares and Endoform applied and secured with steri strip and sterile  gauze to 1st mpj ulcer upon consent.  Aquacel Silver was applied interdigitally, wrapped with a sterile Kerlix gauze.  She will keep the dressing dry and intact.  Change the outer dressing as needed for drainage.  Return to clinic and see me in 1 week.  Improvement seen.  Prescription for Amlactin given for dry skin and use discussed with her.

## 2021-04-02 NOTE — PROGRESS NOTES
Outpatient Physical Therapy Discharge Note     Patient: Tiffani Tan  : 1960    Beginning/End Dates of Reporting Period:  2021 to 3/10/2021    Referring Provider: Dr Antoine    Therapy Diagnosis: left BKA (2020) Hx of neuropathy in hands/feet, has a right foot plantar ulcer in a CAM boot, DM2.     Client Self Report: 3/10/2020: Blood sugars were really low for a few days (in the 30s). I was weak. I was shaking. Had to cancel Dr Messina appt rescheduled to next week. Today wasnt as bad for left leg prosthesis getting it on.  and wrapping it has helped to get in.     Objective Measurements:     Objective Measure: 25fTwalk with 2WW in 2021: 17.56 seconds and 18 steps.        Goals:  Goal Identifier 6MWT-not progressing due to pain in left leg   Goal Description amb with 2WW and prosthesis 6MWT at .9 m/s consistently   Target Date 21   Date Met      Progress:     Goal Identifier stairs-not progressing due to pain   Goal Description Able to go up/down 14 steps wth railing and crutch with SBA for household mobility   Target Date 21   Date Met      Progress:     Goal Identifier w/c fit-progressing   Goal Description Tiffani will idenify if use of a amputee board for left leg is beneficial for left leg swelling AND is functional for her at work.    Target Date 21   Date Met      Progress:       Progress Toward Goals: Tiffani made progress in PT from January to February and then started having swelling and pain in left limb that made it difficult for her to wear the prosthesis. She was also having trouble with her blood sugars. She was admitted to [facility] from 3/16/2021 to 3/21/2021 with sepsis, weakness and fluid overload. AFter discharge she is having home care rehab. Goasl were progressign until she became ill.     Plan:Discharge from therapy.    Reason for Discharge: Change in medical status.    Discharge Plan: Other services: After hospitalization she is having home care  rehab.She will return to outpt theppay in approximately a month.    Katie Wheeler DPT, MPT, NCS  Physical Therapist   Board Certified Neurologic Clinical Specialist     [facility]   [address] [place] [address]   [email or web address]   [phone]   Clinic: [phone] //   Fax: [phone]

## 2021-04-05 ENCOUNTER — TELEPHONE (OUTPATIENT)
Dept: PODIATRY | Facility: CLINIC | Age: 61
End: 2021-04-05

## 2021-04-05 NOTE — TELEPHONE ENCOUNTER
[facility] Call Center    Phone Message    May a detailed message be left on voicemail: yes     Reason for Call: The patient would like a call to discuss if the skin is in for her ulcer.  Please advise.  Thank you.     Action Taken: Message routed to:  Adult Clinics: Podiatry p [phone]    Travel Screening: Not Applicable

## 2021-04-05 NOTE — TELEPHONE ENCOUNTER
Spoke with patient.  We do have an Apligraf on hand that we can use for her appointment 4/6/2021.

## 2021-04-06 ENCOUNTER — OFFICE VISIT (OUTPATIENT)
Dept: PODIATRY | Facility: CLINIC | Age: 61
End: 2021-04-06
Payer: COMMERCIAL

## 2021-04-06 VITALS — HEART RATE: 98 BPM | DIASTOLIC BLOOD PRESSURE: 58 MMHG | OXYGEN SATURATION: 89 % | SYSTOLIC BLOOD PRESSURE: 102 MMHG

## 2021-04-06 DIAGNOSIS — E11.42 TYPE 2 DIABETES MELLITUS WITH DIABETIC POLYNEUROPATHY, WITHOUT LONG-TERM CURRENT USE OF INSULIN (H): ICD-10-CM

## 2021-04-06 DIAGNOSIS — L97.512 SKIN ULCER OF RIGHT FOOT WITH FAT LAYER EXPOSED (H): Primary | ICD-10-CM

## 2021-04-06 DIAGNOSIS — I87.8 VENOUS STASIS: ICD-10-CM

## 2021-04-06 DIAGNOSIS — E11.51 DIABETES MELLITUS WITH PERIPHERAL VASCULAR DISEASE (H): ICD-10-CM

## 2021-04-06 DIAGNOSIS — Z53.9 DIAGNOSIS NOT YET DEFINED: Primary | ICD-10-CM

## 2021-04-06 PROCEDURE — 99207 PR DROP WITH A PROCEDURE: CPT | Performed by: PODIATRIST

## 2021-04-06 PROCEDURE — 15275 SKIN SUB GRAFT FACE/NK/HF/G: CPT | Performed by: PODIATRIST

## 2021-04-06 PROCEDURE — G0180 MD CERTIFICATION HHA PATIENT: HCPCS | Performed by: PODIATRIST

## 2021-04-06 NOTE — PATIENT INSTRUCTIONS
Thanks for coming today.  Ortho[facility] Medicine Clinic  [address] [place] [address]    To schedule future appointments in Ortho Clinic, you may call [phone].    To schedule ordered imaging by your provider:   Call Central Imaging [phone]    To schedule an injection ordered by your provider:  Call Central Imaging Injection scheduling line: [phone]  [employer] available online at:  [email or web address]    Please call if any further questions or concerns ([phone]).  Clinic hours 8 am to 5 pm.    Return to clinic (call) if symptoms worsen or fail to improve.

## 2021-04-06 NOTE — PROGRESS NOTES
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)     GI bleed     UGIB (upper gastrointestinal bleed)     Morbid obesity (H)     Pain in joint, ankle and foot, left     Charcot ankle, left     Below-knee amputation (H)     Acute kidney failure, unspecified (H)     S/P BKA (below knee amputation) (H)     Past Surgical History:   Procedure Laterality Date     AMPUTATE LEG BELOW KNEE Left 7/22/2020    Procedure: Left below knee amputation;  Surgeon: Aniceto Adams MD;  Location: [facility]     ESOPHAGOSCOPY, GASTROSCOPY, DUODENOSCOPY (EGD), COMBINED N/A 2/5/2020    Procedure: ESOPHAGOGASTRODUODENOSCOPY (EGD);  Surgeon: Tanya Dias MD;  Location: [facility]     Social History     Socioeconomic History     Marital status:      Spouse name: Not on file     Number of children: Not on file     Years of education: Not on file     Highest education level: Not on file   Occupational History     Not on file   Social Needs     Financial resource strain: Not on file     Food insecurity     Worry: Not on file     Inability: Not on file     Transportation needs     Medical: Not on file     Non-medical: Not on file   Tobacco Use     Smoking status: Current Every Day Smoker     Packs/day: 1.00     Smokeless tobacco: Never Used   Substance and Sexual Activity     Alcohol use: Yes     Drug use: Not on file     Sexual  activity: Not on file   Lifestyle     Physical activity     Days per week: Not on file     Minutes per session: Not on file     Stress: Not on file   Relationships     Social connections     Talks on phone: Not on file     Gets together: Not on file     Attends Druze service: Not on file     Active member of club or organization: Not on file     Attends meetings of clubs or organizations: Not on file     Relationship status: Not on file     Intimate partner violence     Fear of current or ex partner: Not on file     Emotionally abused: Not on file     Physically abused: Not on file     Forced sexual activity: Not on file   Other Topics Concern     Parent/sibling w/ CABG, MI or angioplasty before 65F 55M? Not Asked   Social History Narrative     Not on file     Family History   Problem Relation Age of Onset     No Known Problems Mother      No Known Problems Father      Lab Results   Component Value Date    A1C 5.5 07/21/2020    A1C 6.5 02/04/2020         SUBJECTIVE FINDINGS:  61-year-old female returns to clinic for ulcer right first MPJ, maceration in the interdigital space.  Relates it is doing okay.  She relates they did change the dressing.      OBJECTIVE FINDINGS:  She has a right plantar first MPJ ulcer that is deep through the dermis into the subcutaneous tissues.  It is about 1.8 x 0.8 cm.  Some serosanguineous drainage, some hyperkeratotic tissue buildup.  No erythema, mild edema, no odor, no calor.  First interspace with no erythema, no odor, no calor, no maceration, no drainage.  She has decreased peripheral edema.      ASSESSMENT/PLAN:  Right first MPJ ulcer.  Right first interspace ulcer.  She is diabetic with peripheral neuropathy and vascular disease.  Diagnosis and treatment options discussed with her.  The ulcer was prepped for Apligraf.  Apligraf was applied and secured with Wound Veil and Steri-Strips.  Aquacel Ag was applied interdigitally and over the ulcer site, wrapped with a sterile  Kerlix gauze.  She will keep the dressing dry and intact.  Change the outer dressing as needed for drainage.  This is the sixth Apligraf we have applied.  Half the Apligraf was used to cover the wounds and margins.  The other half was discarded.  I reviewed previous notes.  She will return to clinic and see me in 1 week.  Improvement seen.

## 2021-04-06 NOTE — NURSING NOTE
Tiffani Tan's chief complaint for this visit includes:  Chief Complaint   Patient presents with     RECHECK     Right plantar ulcer     PCP: Wily Bonilla    Referring Provider:  No referring provider defined for this encounter.    /58 (BP Location: Right arm, Patient Position: Sitting, Cuff Size: Adult Regular)   Pulse 98   SpO2 (!) 89%   Data Unavailable     Do you need any medication refills at today's visit? No    Christina Oshea CMA

## 2021-04-06 NOTE — LETTER
4/6/2021         RE: Tiffani Tan  [address] [facility] [address]        Dear Colleague,    Thank you for referring your patient, Tiffani Tna, to the [facility]. Please see a copy of my visit note below.    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)     GI bleed     UGIB (upper gastrointestinal bleed)     Morbid obesity (H)     Pain in joint, ankle and foot, left     Charcot ankle, left     Below-knee amputation (H)     Acute kidney failure, unspecified (H)     S/P BKA (below knee amputation) (H)     Past Surgical History:   Procedure Laterality Date     AMPUTATE LEG BELOW KNEE Left 7/22/2020    Procedure: Left below knee amputation;  Surgeon: Aniceto Adams MD;  [facility]     ESOPHAGOSCOPY, GASTROSCOPY, DUODENOSCOPY (EGD), COMBINED N/A 2/5/2020    Procedure: ESOPHAGOGASTRODUODENOSCOPY (EGD);  Surgeon: Tanya Dias MD;  Location: [facility]     Social History     Socioeconomic History     Marital status:      Spouse name: Not on file     Number of children: Not on file     Years of education: Not on file     Highest education level: Not on file   Occupational History     Not on file   Social Needs     Financial resource strain: Not on file     Food insecurity     Worry: Not on file     Inability: Not on file     Transportation needs     Medical: Not on file      Non-medical: Not on file   Tobacco Use     Smoking status: Current Every Day Smoker     Packs/day: 1.00     Smokeless tobacco: Never Used   Substance and Sexual Activity     Alcohol use: Yes     Drug use: Not on file     Sexual activity: Not on file   Lifestyle     Physical activity     Days per week: Not on file     Minutes per session: Not on file     Stress: Not on file   Relationships     Social connections     Talks on phone: Not on file     Gets together: Not on file     Attends Restorationism service: Not on file     Active member of club or organization: Not on file     Attends meetings of clubs or organizations: Not on file     Relationship status: Not on file     Intimate partner violence     Fear of current or ex partner: Not on file     Emotionally abused: Not on file     Physically abused: Not on file     Forced sexual activity: Not on file   Other Topics Concern     Parent/sibling w/ CABG, MI or angioplasty before 65F 55M? Not Asked   Social History Narrative     Not on file     Family History   Problem Relation Age of Onset     No Known Problems Mother      No Known Problems Father      Lab Results   Component Value Date    A1C 5.5 07/21/2020    A1C 6.5 02/04/2020         SUBJECTIVE FINDINGS:  61-year-old female returns to clinic for ulcer right first MPJ, maceration in the interdigital space.  Relates it is doing okay.  She relates they did change the dressing.      OBJECTIVE FINDINGS:  She has a right plantar first MPJ ulcer that is deep through the dermis into the subcutaneous tissues.  It is about 1.8 x 0.8 cm.  Some serosanguineous drainage, some hyperkeratotic tissue buildup.  No erythema, mild edema, no odor, no calor.  First interspace with no erythema, no odor, no calor, no maceration, no drainage.  She has decreased peripheral edema.      ASSESSMENT/PLAN:  Right first MPJ ulcer.  Right first interspace ulcer.  She is diabetic with peripheral neuropathy and vascular disease.  Diagnosis and  treatment options discussed with her.  The ulcer was prepped for Apligraf.  Apligraf was applied and secured with Wound Veil and Steri-Strips.  Aquacel Ag was applied interdigitally and over the ulcer site, wrapped with a sterile Kerlix gauze.  She will keep the dressing dry and intact.  Change the outer dressing as needed for drainage.  This is the sixth Apligraf we have applied.  Half the Apligraf was used to cover the wounds and margins.  The other half was discarded.  I reviewed previous notes.  She will return to clinic and see me in 1 week.  Improvement seen.       Again, thank you for allowing me to participate in the care of your patient.        Sincerely,        Nolan Whitten DPM

## 2021-04-13 ENCOUNTER — OFFICE VISIT (OUTPATIENT)
Dept: PODIATRY | Facility: CLINIC | Age: 61
End: 2021-04-13
Payer: COMMERCIAL

## 2021-04-13 VITALS — HEART RATE: 92 BPM | OXYGEN SATURATION: 92 % | DIASTOLIC BLOOD PRESSURE: 73 MMHG | SYSTOLIC BLOOD PRESSURE: 120 MMHG

## 2021-04-13 DIAGNOSIS — I87.8 VENOUS STASIS: ICD-10-CM

## 2021-04-13 DIAGNOSIS — E11.51 DIABETES MELLITUS WITH PERIPHERAL VASCULAR DISEASE (H): ICD-10-CM

## 2021-04-13 DIAGNOSIS — L97.512 SKIN ULCER OF RIGHT FOOT WITH FAT LAYER EXPOSED (H): Primary | ICD-10-CM

## 2021-04-13 DIAGNOSIS — E11.42 TYPE 2 DIABETES MELLITUS WITH DIABETIC POLYNEUROPATHY, WITHOUT LONG-TERM CURRENT USE OF INSULIN (H): ICD-10-CM

## 2021-04-13 PROCEDURE — 99207 PR DROP WITH A PROCEDURE: CPT | Performed by: PODIATRIST

## 2021-04-13 PROCEDURE — 15275 SKIN SUB GRAFT FACE/NK/HF/G: CPT | Performed by: PODIATRIST

## 2021-04-13 NOTE — NURSING NOTE
Tiffani Tan's chief complaint for this visit includes:  Chief Complaint   Patient presents with     RECHECK     Right plantar ulcer     PCP: Wliy Bonilla    Referring Provider:  No referring provider defined for this encounter.    /73 (BP Location: Right arm, Patient Position: Sitting, Cuff Size: Adult Regular)   Pulse 92   SpO2 92%   Data Unavailable     Do you need any medication refills at today's visit? Rubina Oshea CMA

## 2021-04-13 NOTE — PROGRESS NOTES
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)     GI bleed     UGIB (upper gastrointestinal bleed)     Morbid obesity (H)     Pain in joint, ankle and foot, left     Charcot ankle, left     Below-knee amputation (H)     Acute kidney failure, unspecified (H)     S/P BKA (below knee amputation) (H)     Past Surgical History:   Procedure Laterality Date     AMPUTATE LEG BELOW KNEE Left 7/22/2020    Procedure: Left below knee amputation;  Surgeon: Aniceto Adams MD;  [facility]     ESOPHAGOSCOPY, GASTROSCOPY, DUODENOSCOPY (EGD), COMBINED N/A 2/5/2020    Procedure: ESOPHAGOGASTRODUODENOSCOPY (EGD);  Surgeon: Tanya Dias MD;  Location: [facility]     Social History     Socioeconomic History     Marital status:      Spouse name: Not on file     Number of children: Not on file     Years of education: Not on file     Highest education level: Not on file   Occupational History     Not on file   Social Needs     Financial resource strain: Not on file     Food insecurity     Worry: Not on file     Inability: Not on file     Transportation needs     Medical: Not on file     Non-medical: Not on file   Tobacco Use     Smoking status: Current Every Day Smoker     Packs/day: 1.00     Smokeless tobacco: Never Used   Substance and Sexual Activity     Alcohol use: Yes     Drug use: Not on file     Sexual  activity: Not on file   Lifestyle     Physical activity     Days per week: Not on file     Minutes per session: Not on file     Stress: Not on file   Relationships     Social connections     Talks on phone: Not on file     Gets together: Not on file     Attends Hindu service: Not on file     Active member of club or organization: Not on file     Attends meetings of clubs or organizations: Not on file     Relationship status: Not on file     Intimate partner violence     Fear of current or ex partner: Not on file     Emotionally abused: Not on file     Physically abused: Not on file     Forced sexual activity: Not on file   Other Topics Concern     Parent/sibling w/ CABG, MI or angioplasty before 65F 55M? Not Asked   Social History Narrative     Not on file     Family History   Problem Relation Age of Onset     No Known Problems Mother      No Known Problems Father      Lab Results   Component Value Date    A1C 5.5 07/21/2020    A1C 6.5 02/04/2020     A1c                  4.5                   4/08/21  Wbc                 5.6                   4/08/21          SUBJECTIVE FINDINGS:  61-year-old female returns to clinic for ulcer right first MPJ, maceration in the interdigital space.  Relates it is doing okay.  She relates they did change the dressing.      OBJECTIVE FINDINGS:  She has a right plantar first MPJ ulcer that is deep through the dermis into the subcutaneous tissues.  It is about 1.5 x 0.7 cm.  Some serosanguineous drainage, some hyperkeratotic tissue buildup.  No erythema, mild edema, no odor, no calor.  First interspace with no erythema, no odor, no calor, no maceration, no drainage.  She has decreased peripheral edema.  Labs reviewed as in the emr.     ASSESSMENT/PLAN:  Right first MPJ ulcer.  Right first interspace ulcer.  She is diabetic with peripheral neuropathy and vascular disease.  Diagnosis and treatment options discussed with her.  The ulcer was prepped for Apligraf.  Apligraf was applied  and secured with Wound Veil and Steri-Strips.  Biatain Silver was applied interdigitally and over the ulcer site, wrapped with a sterile Kerlix gauze.  She will keep the dressing dry and intact.  Change the outer dressing as needed for drainage.  This is the seventh Apligraf we have applied.  Half the Apligraf was used to cover the wounds and margins.  The other half was discarded.  I reviewed previous notes.  She will return to clinic and see me in 1 week.  Improvement seen.

## 2021-04-13 NOTE — LETTER
4/13/2021         RE: Tiffani Tan  [address] [facility] [address]        Dear Colleague,    Thank you for referring your patient, Tiffani Tan, to the [facility]. Please see a copy of my visit note below.    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)     GI bleed     UGIB (upper gastrointestinal bleed)     Morbid obesity (H)     Pain in joint, ankle and foot, left     Charcot ankle, left     Below-knee amputation (H)     Acute kidney failure, unspecified (H)     S/P BKA (below knee amputation) (H)     Past Surgical History:   Procedure Laterality Date     AMPUTATE LEG BELOW KNEE Left 7/22/2020    Procedure: Left below knee amputation;  Surgeon: Aniceto Adams MD;  [facility]     ESOPHAGOSCOPY, GASTROSCOPY, DUODENOSCOPY (EGD), COMBINED N/A 2/5/2020    Procedure: ESOPHAGOGASTRODUODENOSCOPY (EGD);  Surgeon: Tanya Dias MD;  Location: [facility]     Social History     Socioeconomic History     Marital status:      Spouse name: Not on file     Number of children: Not on file     Years of education: Not on file     Highest education level: Not on file   Occupational History     Not on file   Social Needs     Financial resource strain: Not on file     Food insecurity     Worry: Not on file     Inability: Not on file     Transportation needs     Medical: Not on file      Non-medical: Not on file   Tobacco Use     Smoking status: Current Every Day Smoker     Packs/day: 1.00     Smokeless tobacco: Never Used   Substance and Sexual Activity     Alcohol use: Yes     Drug use: Not on file     Sexual activity: Not on file   Lifestyle     Physical activity     Days per week: Not on file     Minutes per session: Not on file     Stress: Not on file   Relationships     Social connections     Talks on phone: Not on file     Gets together: Not on file     Attends Evangelical service: Not on file     Active member of club or organization: Not on file     Attends meetings of clubs or organizations: Not on file     Relationship status: Not on file     Intimate partner violence     Fear of current or ex partner: Not on file     Emotionally abused: Not on file     Physically abused: Not on file     Forced sexual activity: Not on file   Other Topics Concern     Parent/sibling w/ CABG, MI or angioplasty before 65F 55M? Not Asked   Social History Narrative     Not on file     Family History   Problem Relation Age of Onset     No Known Problems Mother      No Known Problems Father      Lab Results   Component Value Date    A1C 5.5 07/21/2020    A1C 6.5 02/04/2020     A1c                  4.5                   4/08/21  Wbc                 5.6                   4/08/21          SUBJECTIVE FINDINGS:  61-year-old female returns to clinic for ulcer right first MPJ, maceration in the interdigital space.  Relates it is doing okay.  She relates they did change the dressing.      OBJECTIVE FINDINGS:  She has a right plantar first MPJ ulcer that is deep through the dermis into the subcutaneous tissues.  It is about 1.5 x 0.7 cm.  Some serosanguineous drainage, some hyperkeratotic tissue buildup.  No erythema, mild edema, no odor, no calor.  First interspace with no erythema, no odor, no calor, no maceration, no drainage.  She has decreased peripheral edema.  Labs reviewed as in the emr.     ASSESSMENT/PLAN:   Right first MPJ ulcer.  Right first interspace ulcer.  She is diabetic with peripheral neuropathy and vascular disease.  Diagnosis and treatment options discussed with her.  The ulcer was prepped for Apligraf.  Apligraf was applied and secured with Wound Veil and Steri-Strips.  Biatain Silver was applied interdigitally and over the ulcer site, wrapped with a sterile Kerlix gauze.  She will keep the dressing dry and intact.  Change the outer dressing as needed for drainage.  This is the seventh Apligraf we have applied.  Half the Apligraf was used to cover the wounds and margins.  The other half was discarded.  I reviewed previous notes.  She will return to clinic and see me in 1 week.  Improvement seen.       Again, thank you for allowing me to participate in the care of your patient.        Sincerely,        Nolan Whitten DPM

## 2021-04-13 NOTE — PATIENT INSTRUCTIONS
Thanks for coming today.  Ortho[facility] Medicine Clinic  [address] [place] [address]    To schedule future appointments in Ortho Clinic, you may call [phone].    To schedule ordered imaging by your provider:   Call Central Imaging [phone]    To schedule an injection ordered by your provider:  Call Central Imaging Injection scheduling line: [phone]  [email or web address] available online at:  [email or web address]    Please call if any further questions or concerns ([phone]).  Clinic hours 8 am to 5 pm.    Return to clinic (call) if symptoms worsen or fail to improve.

## 2021-04-20 ENCOUNTER — OFFICE VISIT (OUTPATIENT)
Dept: PODIATRY | Facility: CLINIC | Age: 61
End: 2021-04-20
Payer: COMMERCIAL

## 2021-04-20 VITALS — DIASTOLIC BLOOD PRESSURE: 72 MMHG | OXYGEN SATURATION: 97 % | HEART RATE: 93 BPM | SYSTOLIC BLOOD PRESSURE: 137 MMHG

## 2021-04-20 DIAGNOSIS — E11.51 DIABETES MELLITUS WITH PERIPHERAL VASCULAR DISEASE (H): ICD-10-CM

## 2021-04-20 DIAGNOSIS — L97.512 SKIN ULCER OF RIGHT FOOT WITH FAT LAYER EXPOSED (H): Primary | ICD-10-CM

## 2021-04-20 DIAGNOSIS — E11.42 TYPE 2 DIABETES MELLITUS WITH DIABETIC POLYNEUROPATHY, WITHOUT LONG-TERM CURRENT USE OF INSULIN (H): ICD-10-CM

## 2021-04-20 PROCEDURE — 15275 SKIN SUB GRAFT FACE/NK/HF/G: CPT | Performed by: PODIATRIST

## 2021-04-20 PROCEDURE — 99207 PR DROP WITH A PROCEDURE: CPT | Performed by: PODIATRIST

## 2021-04-20 NOTE — LETTER
4/20/2021         RE: Tiffani Tan  [address] [facility] [address]        Dear Colleague,    Thank you for referring your patient, Tiffani Tan, to the [facility]. Please see a copy of my visit note below.    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)     GI bleed     UGIB (upper gastrointestinal bleed)     Morbid obesity (H)     Pain in joint, ankle and foot, left     Charcot ankle, left     Below-knee amputation (H)     Acute kidney failure, unspecified (H)     S/P BKA (below knee amputation) (H)     Past Surgical History:   Procedure Laterality Date     AMPUTATE LEG BELOW KNEE Left 7/22/2020    Procedure: Left below knee amputation;  Surgeon: Aniceto Adams MD;  [facility]: [facility]     ESOPHAGOSCOPY, GASTROSCOPY, DUODENOSCOPY (EGD), COMBINED N/A 2/5/2020    Procedure: ESOPHAGOGASTRODUODENOSCOPY (EGD);  Surgeon: Tanya Dias MD;  Location: [facility]     Social History     Socioeconomic History     Marital status:      Spouse name: Not on file     Number of children: Not on file     Years of education: Not on file     Highest education level: Not on file   Occupational History     Not on file   Social Needs     Financial resource strain: Not on file     Food insecurity     Worry: Not on file     Inability: Not on file     Transportation needs     Medical: Not on file      Non-medical: Not on file   Tobacco Use     Smoking status: Current Every Day Smoker     Packs/day: 1.00     Smokeless tobacco: Never Used   Substance and Sexual Activity     Alcohol use: Yes     Drug use: Not on file     Sexual activity: Not on file   Lifestyle     Physical activity     Days per week: Not on file     Minutes per session: Not on file     Stress: Not on file   Relationships     Social connections     Talks on phone: Not on file     Gets together: Not on file     Attends Jehovah's witness service: Not on file     Active member of club or organization: Not on file     Attends meetings of clubs or organizations: Not on file     Relationship status: Not on file     Intimate partner violence     Fear of current or ex partner: Not on file     Emotionally abused: Not on file     Physically abused: Not on file     Forced sexual activity: Not on file   Other Topics Concern     Parent/sibling w/ CABG, MI or angioplasty before 65F 55M? Not Asked   Social History Narrative     Not on file     Family History   Problem Relation Age of Onset     No Known Problems Mother      No Known Problems Father      Lab Results   Component Value Date    A1C 5.5 07/21/2020    A1C 6.5 02/04/2020     A1c              4.5            4/08/21  Wbc             5.6            4/08/21  Cr                 1.22          4/08/21        SUBJECTIVE FINDINGS:  61-year-old female returns to clinic for ulcer right first MPJ, maceration in the interdigital space.  Relates it is doing okay.  She relates they did not change the dressing.      OBJECTIVE FINDINGS:  She has a right plantar first MPJ ulcer that is deep through the dermis into the subcutaneous tissues.  It is about 1.5 x 0.7 cm.  Some serosanguineous drainage, some hyperkeratotic tissue buildup.  No erythema, mild edema, no odor, no calor.  First interspace with no erythema, no odor, no calor, no maceration, no drainage.  She has decreased peripheral edema.  Labs reviewed as in the  emr.     ASSESSMENT/PLAN:  Right first MPJ ulcer.  Right first interspace ulcer.  She is diabetic with peripheral neuropathy and vascular disease.  Diagnosis and treatment options discussed with her.  The ulcer was debrided with a 15 blade and prepped for Apligraf.  Apligraf was applied and secured with Wound Veil and Steri-Strips.  Biatain Silver was applied interdigitally and over the ulcer site, wrapped with a sterile Kerlix gauze.  She will keep the dressing dry and intact.  Change the outer dressing as needed for drainage.  This is the eighth Apligraf we have applied.  Half the Apligraf was used to cover the wounds and margins.  The other half was discarded.  I reviewed previous notes.  She will return to clinic and see me in 1 week.  Improvement seen.       Again, thank you for allowing me to participate in the care of your patient.        Sincerely,        Nolan Whitten DPM

## 2021-04-20 NOTE — NURSING NOTE
Tiffani Tan's chief complaint for this visit includes:  Chief Complaint   Patient presents with     RECHECK     Right plantar ulcer - Apligraf     PCP: Wily Bonilla    Referring Provider:  No referring provider defined for this encounter.    /72 (BP Location: Right arm, Patient Position: Sitting, Cuff Size: Adult Regular)   Pulse 93   SpO2 97%   Data Unavailable     Do you need any medication refills at today's visit? No    Christina Oshea CMA

## 2021-04-20 NOTE — PATIENT INSTRUCTIONS
Thanks for coming today.  [facility]  [address] [place] [address]    To schedule future appointments in Ortho Clinic, you may call [phone].    To schedule ordered imaging by your provider:   Call [facility] [phone]    To schedule an injection ordered by your provider:  Call [facility] Injection scheduling line: [phone]  [facility] available online at:  [email or web address]    Please call if any further questions or concerns ([phone]).  Clinic hours 8 am to 5 pm.    Return to clinic (call) if symptoms worsen or fail to improve.

## 2021-04-20 NOTE — PROGRESS NOTES
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)     GI bleed     UGIB (upper gastrointestinal bleed)     Morbid obesity (H)     Pain in joint, ankle and foot, left     Charcot ankle, left     Below-knee amputation (H)     Acute kidney failure, unspecified (H)     S/P BKA (below knee amputation) (H)     Past Surgical History:   Procedure Laterality Date     AMPUTATE LEG BELOW KNEE Left 7/22/2020    Procedure: Left below knee amputation;  Surgeon: Aniceto Adams MD;  [facility]     ESOPHAGOSCOPY, GASTROSCOPY, DUODENOSCOPY (EGD), COMBINED N/A 2/5/2020    Procedure: ESOPHAGOGASTRODUODENOSCOPY (EGD);  Surgeon: Tanya Dias MD;  Location: [facility]     Social History     Socioeconomic History     Marital status:      Spouse name: Not on file     Number of children: Not on file     Years of education: Not on file     Highest education level: Not on file   Occupational History     Not on file   Social Needs     Financial resource strain: Not on file     Food insecurity     Worry: Not on file     Inability: Not on file     Transportation needs     Medical: Not on file     Non-medical: Not on file   Tobacco Use     Smoking status: Current Every Day Smoker     Packs/day: 1.00     Smokeless tobacco: Never Used   Substance and Sexual Activity     Alcohol use: Yes     Drug use: Not on file     Sexual  activity: Not on file   Lifestyle     Physical activity     Days per week: Not on file     Minutes per session: Not on file     Stress: Not on file   Relationships     Social connections     Talks on phone: Not on file     Gets together: Not on file     Attends Anabaptist service: Not on file     Active member of club or organization: Not on file     Attends meetings of clubs or organizations: Not on file     Relationship status: Not on file     Intimate partner violence     Fear of current or ex partner: Not on file     Emotionally abused: Not on file     Physically abused: Not on file     Forced sexual activity: Not on file   Other Topics Concern     Parent/sibling w/ CABG, MI or angioplasty before 65F 55M? Not Asked   Social History Narrative     Not on file     Family History   Problem Relation Age of Onset     No Known Problems Mother      No Known Problems Father      Lab Results   Component Value Date    A1C 5.5 07/21/2020    A1C 6.5 02/04/2020     A1c              4.5            4/08/21  Wbc             5.6            4/08/21  Cr                 1.22          4/08/21        SUBJECTIVE FINDINGS:  61-year-old female returns to clinic for ulcer right first MPJ, maceration in the interdigital space.  Relates it is doing okay.  She relates they did not change the dressing.      OBJECTIVE FINDINGS:  She has a right plantar first MPJ ulcer that is deep through the dermis into the subcutaneous tissues.  It is about 1.5 x 0.7 cm.  Some serosanguineous drainage, some hyperkeratotic tissue buildup.  No erythema, mild edema, no odor, no calor.  First interspace with no erythema, no odor, no calor, no maceration, no drainage.  She has decreased peripheral edema.  Labs reviewed as in the emr.     ASSESSMENT/PLAN:  Right first MPJ ulcer.  Right first interspace ulcer.  She is diabetic with peripheral neuropathy and vascular disease.  Diagnosis and treatment options discussed with her.  The ulcer was debrided with a 15 blade  and prepped for Apligraf.  Apligraf was applied and secured with Wound Veil and Steri-Strips.  Biatain Silver was applied interdigitally and over the ulcer site, wrapped with a sterile Kerlix gauze.  She will keep the dressing dry and intact.  Change the outer dressing as needed for drainage.  This is the eighth Apligraf we have applied.  Half the Apligraf was used to cover the wounds and margins.  The other half was discarded.  I reviewed previous notes.  She will return to clinic and see me in 1 week.  Improvement seen.

## 2021-04-21 ENCOUNTER — HOSPITAL ENCOUNTER (OUTPATIENT)
Dept: PHYSICAL THERAPY | Facility: CLINIC | Age: 61
Setting detail: THERAPIES SERIES
End: 2021-04-21
Attending: PHYSICAL MEDICINE & REHABILITATION
Payer: COMMERCIAL

## 2021-04-21 PROCEDURE — 97161 PT EVAL LOW COMPLEX 20 MIN: CPT | Mod: GP | Performed by: PHYSICAL THERAPIST

## 2021-04-21 PROCEDURE — 97542 WHEELCHAIR MNGMENT TRAINING: CPT | Mod: GP | Performed by: PHYSICAL THERAPIST

## 2021-04-27 NOTE — PROGRESS NOTES
04/21/21 1500   General Information   Start of Care Date 04/21/21   Referring Physician DR Antoine   Orders Evaluate and Treat as Indicated   Additional Orders I sent inCumulus Networkset message to Dr Antoine for new orders on 4/19 since she was hospitalized.    Order Date 04/21/21   Medical Diagnosis left knee below knee amputation   Special Instructions Recent hosptilization for metabolic encephalopathy, acute on chronic CHF, chronic kidney disease   Surgical/Medical history reviewed Yes  (DM2, HTN, CKD, obesity, neuropathy, )   Previous/Current Treatment Physical Therapy   Improvement after PT Other  (outpt here and home care in April 2021)   Current Community Support Family/friend caregiver  ([facility] Marcos, her kids)   Patient role/Employment history Employed  (full time. owner of body shop, )   Living environment House/townhome;Other, comments   Home/Community Accessibility Comments one level with entry through rear garage stall is level. Front of bldg has sidewalk ramp and curb cut on other side. It is all one level inside.   Current Assistive Devices Front Wheeled Walker;Four Wheeled Walker;Manual Wheel Chair;Power Wheel Chair   Patient/Family Goals Statement walk with prosthesis at home   General Information Comments wearing prosthesis about 6 hours a day. Only walking about 5% of her day due to pain at amputation.    Fall Risk Screen   Fall screen comments no falls per Tiffani   Observation   Observation Prothesis is not tight while seated. You can move her leg side to side. She is wearing 3 ply only.   Integumentary   Integumentary Comments right foot in CAM boot (WBAT). Podiatrist note has picture of wounds on right foot.    Gait   Gait Comments She reports having gone ot her dtrs for Easter and climbed steps using railing and help of others.    Gait Special Tests 25 Foot Timed Walk   Seconds 21.5   Steps 26 Steps   Comments 2WW with swivel wheels. Only 3 ply on the prosthesis and the foot turns out to about 70 degrees by the  end of the short walk. (total 50 feet)   Balance   Balance Comments she cannot walk more then 50 feet for my test due to poor fit of prosthesis.   Sensory Examination   Sensory Perception Comments neuropathy of hands and feet. Difficulty holding onto anything with hands.   Clinical Impression   Criteria for Skilled Therapeutic Interventions Met yes, treatment indicated   PT Diagnosis left below knee amputation, neuropathy all 4 extremities and other medical issues   Influenced by the following impairments sensation in hands/feet, pain multiple sites of the body (specifically left lower leg), weakness of arms/legs/core, dynamic postural control, activity tolerance is limited,    Functional limitations due to impairments affects ADLS/IADLS/MRADLS, household/community mobility, WORK and recreational activity.   Clinical Presentation Stable/Uncomplicated   Clinical Presentation Rationale medical history (many chronic issues and acute issues), gait speed/quality/tolerance   Clinical Decision Making (Complexity) Moderate complexity   Therapy Frequency 1 time/week   Predicted Duration of Therapy Intervention (days/wks) > 3 months  (wean down over time.)   Risk & Benefits of therapy have been explained Yes   Patient, Family & other staff in agreement with plan of care Yes   Clinical Impression Comments Tiffani has lost > 30 lbs (water weight) in the last month after treatment for her CHF and metabolic encephalopathy. The fit of her prosthesis has changed and she needs to use more ply. She is still having left lower residual leg pain with wt bearing. The right leg wounds are going to take many months to heal. Tiffani will be limited in her ambulation distance/tolerance due to right foot wound, left leg amputation, and neuropathy. She is unable to use a manual w/c due to her hand neuropathy. She is going to need a power w/c for some of her mobility.   Goal 1   Goal Identifier 3MWT   Goal Description Amb with 2ww, CAMboot and  prosthesis for 3MWT at .7 m/s   Target Date 21   Goal 2   Goal Identifier stairs   Goal Description She will be able to ascend/descend a full flight of stairs (12 steps) with a railing and cane IND for household moblity    Target Date 21   Goal 3   Goal Identifier prosthesis   Goal Description She will be able to tolerate wearing the prosthesis for >10 hours a day for work and household activity   Target Date 21   Total Evaluation Time   PT Eval, Low Complexity Minutes (12704) 25   Katie Wheeler DPT, MPT, NCS  Physical Therapist   Board Certified Neurologic Clinical Specialist     [facility]   [address] [place] [address]   [email or web address]  [email or web address]   [phone]   Clinic: [phone] //   Fax: [phone]   Additional Notes: Refer to Dr. Avila

## 2021-04-28 ENCOUNTER — HOSPITAL ENCOUNTER (OUTPATIENT)
Dept: PHYSICAL THERAPY | Facility: CLINIC | Age: 61
Setting detail: THERAPIES SERIES
End: 2021-04-28
Attending: PHYSICAL MEDICINE & REHABILITATION
Payer: COMMERCIAL

## 2021-04-28 PROCEDURE — 97110 THERAPEUTIC EXERCISES: CPT | Mod: GP | Performed by: PHYSICAL THERAPIST

## 2021-04-28 PROCEDURE — 97116 GAIT TRAINING THERAPY: CPT | Mod: GP | Performed by: PHYSICAL THERAPIST

## 2021-04-30 ENCOUNTER — OFFICE VISIT (OUTPATIENT)
Dept: PODIATRY | Facility: CLINIC | Age: 61
End: 2021-04-30
Payer: COMMERCIAL

## 2021-04-30 VITALS — HEART RATE: 94 BPM | DIASTOLIC BLOOD PRESSURE: 71 MMHG | SYSTOLIC BLOOD PRESSURE: 109 MMHG

## 2021-04-30 DIAGNOSIS — E11.51 DIABETES MELLITUS WITH PERIPHERAL VASCULAR DISEASE (H): ICD-10-CM

## 2021-04-30 DIAGNOSIS — L97.512 SKIN ULCER OF RIGHT FOOT WITH FAT LAYER EXPOSED (H): Primary | ICD-10-CM

## 2021-04-30 DIAGNOSIS — E11.42 TYPE 2 DIABETES MELLITUS WITH DIABETIC POLYNEUROPATHY, WITHOUT LONG-TERM CURRENT USE OF INSULIN (H): ICD-10-CM

## 2021-04-30 DIAGNOSIS — I87.8 VENOUS STASIS: ICD-10-CM

## 2021-04-30 PROCEDURE — 99207 PR DROP WITH A PROCEDURE: CPT | Performed by: PODIATRIST

## 2021-04-30 PROCEDURE — 15275 SKIN SUB GRAFT FACE/NK/HF/G: CPT | Performed by: PODIATRIST

## 2021-04-30 NOTE — PROGRESS NOTES
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)     GI bleed     UGIB (upper gastrointestinal bleed)     Morbid obesity (H)     Pain in joint, ankle and foot, left     Charcot ankle, left     Below-knee amputation (H)     Acute kidney failure, unspecified (H)     S/P BKA (below knee amputation) (H)     Past Surgical History:   Procedure Laterality Date     AMPUTATE LEG BELOW KNEE Left 7/22/2020    Procedure: Left below knee amputation;  Surgeon: Aniceto Adams MD;  [facility]     ESOPHAGOSCOPY, GASTROSCOPY, DUODENOSCOPY (EGD), COMBINED N/A 2/5/2020    Procedure: ESOPHAGOGASTRODUODENOSCOPY (EGD);  Surgeon: Tanya Dias MD;  Location: [facility]     Social History     Socioeconomic History     Marital status:      Spouse name: Not on file     Number of children: Not on file     Years of education: Not on file     Highest education level: Not on file   Occupational History     Not on file   Social Needs     Financial resource strain: Not on file     Food insecurity     Worry: Not on file     Inability: Not on file     Transportation needs     Medical: Not on file     Non-medical: Not on file   Tobacco Use     Smoking status: Current Every Day Smoker     Packs/day: 1.00     Smokeless tobacco: Never Used   Substance and Sexual Activity     Alcohol use: Yes     Drug use: Not on file     Sexual  activity: Not on file   Lifestyle     Physical activity     Days per week: Not on file     Minutes per session: Not on file     Stress: Not on file   Relationships     Social connections     Talks on phone: Not on file     Gets together: Not on file     Attends Alevism service: Not on file     Active member of club or organization: Not on file     Attends meetings of clubs or organizations: Not on file     Relationship status: Not on file     Intimate partner violence     Fear of current or ex partner: Not on file     Emotionally abused: Not on file     Physically abused: Not on file     Forced sexual activity: Not on file   Other Topics Concern     Parent/sibling w/ CABG, MI or angioplasty before 65F 55M? Not Asked   Social History Narrative     Not on file     Family History   Problem Relation Age of Onset     No Known Problems Mother      No Known Problems Father      Lab Results   Component Value Date    A1C 5.5 07/21/2020    A1C 6.5 02/04/2020     A1c              4.5             4/08/21        SUBJECTIVE FINDINGS:  61-year-old female returns to clinic for ulcer right first MPJ, maceration in the interdigital space.  Relates it is doing okay.  She relates they did not change the dressing.      OBJECTIVE FINDINGS:  She has a right plantar first MPJ ulcer that is deep through the dermis into the subcutaneous tissues.  It is about 1.5 x 0.6 cm.  Some serosanguineous drainage, some hyperkeratotic tissue buildup.  No erythema, mild edema, no odor, no calor.  First interspace with no erythema, no odor, no calor, no maceration, no drainage.  Labs reviewed as in the emr.     ASSESSMENT/PLAN:  Right first MPJ ulcer.  Right first interspace ulcer.  She is diabetic with peripheral neuropathy and vascular disease.  Diagnosis and treatment options discussed with her.  The ulcer was prepped for Apligraf.  Apligraf was applied and secured with Wound Veil and Steri-Strips.  Aquacel Ag was applied interdigitally and over the  ulcer site, wrapped with a sterile Kerlix gauze and ace wrap.  She will keep the dressing dry and intact.  Change the outer dressing as needed for drainage.  This is the ninth Apligraf we have applied.  Half the Apligraf was used to cover the wounds and margins.  The other half was discarded.  I reviewed previous notes.  She will return to clinic and see me in 1 week.  Slow Improvement seen.

## 2021-04-30 NOTE — LETTER
4/30/2021         RE: Tiffani Tan  [address] [facility] [id number]        Dear Colleague,    Thank you for referring your patient, Tiffani Tan, to the [facility]. Please see a copy of my visit note below.    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)     GI bleed     UGIB (upper gastrointestinal bleed)     Morbid obesity (H)     Pain in joint, ankle and foot, left     Charcot ankle, left     Below-knee amputation (H)     Acute kidney failure, unspecified (H)     S/P BKA (below knee amputation) (H)     Past Surgical History:   Procedure Laterality Date     AMPUTATE LEG BELOW KNEE Left 7/22/2020    Procedure: Left below knee amputation;  Surgeon: Aniceto Adams MD;  Location: [facility]     ESOPHAGOSCOPY, GASTROSCOPY, DUODENOSCOPY (EGD), COMBINED N/A 2/5/2020    Procedure: ESOPHAGOGASTRODUODENOSCOPY (EGD);  Surgeon: Tanya Dias MD;  Location: [facility]     Social History     Socioeconomic History     Marital status:      Spouse name: Not on file     Number of children: Not on file     Years of education: Not on file     Highest education level: Not on file   Occupational History     Not on file   Social Needs     Financial resource strain: Not on file     Food insecurity     Worry: Not on file     Inability: Not on file     Transportation needs     Medical: Not on file      Non-medical: Not on file   Tobacco Use     Smoking status: Current Every Day Smoker     Packs/day: 1.00     Smokeless tobacco: Never Used   Substance and Sexual Activity     Alcohol use: Yes     Drug use: Not on file     Sexual activity: Not on file   Lifestyle     Physical activity     Days per week: Not on file     Minutes per session: Not on file     Stress: Not on file   Relationships     Social connections     Talks on phone: Not on file     Gets together: Not on file     Attends Worship service: Not on file     Active member of club or organization: Not on file     Attends meetings of clubs or organizations: Not on file     Relationship status: Not on file     Intimate partner violence     Fear of current or ex partner: Not on file     Emotionally abused: Not on file     Physically abused: Not on file     Forced sexual activity: Not on file   Other Topics Concern     Parent/sibling w/ CABG, MI or angioplasty before 65F 55M? Not Asked   Social History Narrative     Not on file     Family History   Problem Relation Age of Onset     No Known Problems Mother      No Known Problems Father      Lab Results   Component Value Date    A1C 5.5 07/21/2020    A1C 6.5 02/04/2020     A1c              4.5             4/08/21        SUBJECTIVE FINDINGS:  61-year-old female returns to clinic for ulcer right first MPJ, maceration in the interdigital space.  Relates it is doing okay.  She relates they did not change the dressing.      OBJECTIVE FINDINGS:  She has a right plantar first MPJ ulcer that is deep through the dermis into the subcutaneous tissues.  It is about 1.5 x 0.6 cm.  Some serosanguineous drainage, some hyperkeratotic tissue buildup.  No erythema, mild edema, no odor, no calor.  First interspace with no erythema, no odor, no calor, no maceration, no drainage.  Labs reviewed as in the emr.     ASSESSMENT/PLAN:  Right first MPJ ulcer.  Right first interspace ulcer.  She is diabetic with peripheral neuropathy  and vascular disease.  Diagnosis and treatment options discussed with her.  The ulcer was prepped for Apligraf.  Apligraf was applied and secured with Wound Veil and Steri-Strips.  Aquacel Ag was applied interdigitally and over the ulcer site, wrapped with a sterile Kerlix gauze and ace wrap.  She will keep the dressing dry and intact.  Change the outer dressing as needed for drainage.  This is the ninth Apligraf we have applied.  Half the Apligraf was used to cover the wounds and margins.  The other half was discarded.  I reviewed previous notes.  She will return to clinic and see me in 1 week.  Slow Improvement seen.       Again, thank you for allowing me to participate in the care of your patient.        Sincerely,        Nolan Whitten DPM

## 2021-05-05 ENCOUNTER — HOSPITAL ENCOUNTER (OUTPATIENT)
Dept: PHYSICAL THERAPY | Facility: CLINIC | Age: 61
Setting detail: THERAPIES SERIES
End: 2021-05-05
Attending: PHYSICAL MEDICINE & REHABILITATION
Payer: COMMERCIAL

## 2021-05-05 PROCEDURE — 97116 GAIT TRAINING THERAPY: CPT | Mod: GP | Performed by: PHYSICAL THERAPIST

## 2021-05-05 PROCEDURE — 97110 THERAPEUTIC EXERCISES: CPT | Mod: GP | Performed by: PHYSICAL THERAPIST

## 2021-05-14 ENCOUNTER — OFFICE VISIT (OUTPATIENT)
Dept: PODIATRY | Facility: CLINIC | Age: 61
End: 2021-05-14
Payer: COMMERCIAL

## 2021-05-14 VITALS — SYSTOLIC BLOOD PRESSURE: 119 MMHG | OXYGEN SATURATION: 93 % | DIASTOLIC BLOOD PRESSURE: 65 MMHG | HEART RATE: 87 BPM

## 2021-05-14 DIAGNOSIS — E11.51 DIABETES MELLITUS WITH PERIPHERAL VASCULAR DISEASE (H): ICD-10-CM

## 2021-05-14 DIAGNOSIS — E11.42 TYPE 2 DIABETES MELLITUS WITH DIABETIC POLYNEUROPATHY, WITHOUT LONG-TERM CURRENT USE OF INSULIN (H): ICD-10-CM

## 2021-05-14 DIAGNOSIS — L97.512 SKIN ULCER OF RIGHT FOOT WITH FAT LAYER EXPOSED (H): Primary | ICD-10-CM

## 2021-05-14 PROCEDURE — 15275 SKIN SUB GRAFT FACE/NK/HF/G: CPT | Performed by: PODIATRIST

## 2021-05-14 PROCEDURE — 99207 PR DROP WITH A PROCEDURE: CPT | Performed by: PODIATRIST

## 2021-05-14 RX ORDER — CEPHALEXIN 500 MG/1
500 CAPSULE ORAL 2 TIMES DAILY
Qty: 28 CAPSULE | Refills: 0 | Status: SHIPPED | OUTPATIENT
Start: 2021-05-14 | End: 2021-05-28

## 2021-05-14 NOTE — NURSING NOTE
Tiffani Tan's chief complaint for this visit includes:  Chief Complaint   Patient presents with     RECHECK     right plantar ulcer     PCP: Wily Bonilla    Referring Provider:  No referring provider defined for this encounter.    /65 (BP Location: Right arm, Patient Position: Sitting, Cuff Size: Adult Regular)   Pulse 87   SpO2 93%   Data Unavailable     Do you need any medication refills at today's visit? Rubina Oshea CMA

## 2021-05-14 NOTE — PROGRESS NOTES
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)     GI bleed     UGIB (upper gastrointestinal bleed)     Morbid obesity (H)     Pain in joint, ankle and foot, left     Charcot ankle, left     Below-knee amputation (H)     Acute kidney failure, unspecified (H)     S/P BKA (below knee amputation) (H)     Past Surgical History:   Procedure Laterality Date     AMPUTATE LEG BELOW KNEE Left 7/22/2020    Procedure: Left below knee amputation;  Surgeon: Aniceto Adams MD;  Location: [facility]     ESOPHAGOSCOPY, GASTROSCOPY, DUODENOSCOPY (EGD), COMBINED N/A 2/5/2020    Procedure: ESOPHAGOGASTRODUODENOSCOPY (EGD);  Surgeon: Tanya Dias MD;  Location: [facility]     Social History     Socioeconomic History     Marital status:      Spouse name: Not on file     Number of children: Not on file     Years of education: Not on file     Highest education level: Not on file   Occupational History     Not on file   Social Needs     Financial resource strain: Not on file     Food insecurity     Worry: Not on file     Inability: Not on file     Transportation needs     Medical: Not on file     Non-medical: Not on file   Tobacco Use     Smoking status: Current Every Day Smoker     Packs/day: 1.00     Smokeless tobacco: Never Used   Substance and Sexual Activity     Alcohol use: Yes     Drug use: Not on file     Sexual  activity: Not on file   Lifestyle     Physical activity     Days per week: Not on file     Minutes per session: Not on file     Stress: Not on file   Relationships     Social connections     Talks on phone: Not on file     Gets together: Not on file     Attends Yazidi service: Not on file     Active member of club or organization: Not on file     Attends meetings of clubs or organizations: Not on file     Relationship status: Not on file     Intimate partner violence     Fear of current or ex partner: Not on file     Emotionally abused: Not on file     Physically abused: Not on file     Forced sexual activity: Not on file   Other Topics Concern     Parent/sibling w/ CABG, MI or angioplasty before 65F 55M? Not Asked   Social History Narrative     Not on file     Family History   Problem Relation Age of Onset     No Known Problems Mother      No Known Problems Father      Lab Results   Component Value Date    A1C 5.5 07/21/2020    A1C 6.5 02/04/2020     A1c                  4.5                 4/08/21      SUBJECTIVE FINDINGS:  61-year-old female returns to clinic for ulcer right first MPJ, maceration in the interdigital space.  Relates it is doing good.  She relate to changing the dressing with gauze.  Relates she is concerned about the redness in he leg.      OBJECTIVE FINDINGS:  She has a right plantar first MPJ ulcer that is deep through the dermis into the subcutaneous tissues.  It is about 1.4 x 0.6 cm.  Decreased serosanguineous drainage, hyperkeratotic tissue buildup.  No erythema, mild edema, no odor, no calor.  First interspace with no erythema, no odor, no calor, no maceration, no drainage.  She has rigth leg venous erythema and edema, no open lesions, no odor, no calor.  Labs reviewed as in the emr.     ASSESSMENT/PLAN:  Right first MPJ ulcer.  Right first interspace ulcer.  Venous stasis with venous stasis vs Cellulitic changes.  She is diabetic with peripheral neuropathy and vascular disease.   Diagnosis and treatment options discussed with her.  The ulcer was debrided with a 15 blade and prepped for Apligraf.  Apligraf was applied and secured with Wound Veil and Steri-Strips.  Biatain Silver was applied interdigitally and over the ulcer site, wrapped with a sterile Kerlix gauze and ace wrap.  She will keep the dressing dry and intact.  Change the outer dressing as needed for drainage.  This is the tenth Apligraf we have applied.  One fourth of the Apligraf was used to cover the wounds and margins.  The other three fourths was discarded.  I reviewed previous notes.  right leg was wrapped with Kerlix and ace wrap and use discussed with her.  Prescription for Keflex given and use discussed with her.  She will return to clinic and see me in 1 week.  Slow Improvement seen.

## 2021-05-14 NOTE — LETTER
5/14/2021         RE: Tiffani Tan  [address] [facility] [address]        Dear Colleague,    Thank you for referring your patient, Tiffani Tan, to the [facility]. Please see a copy of my visit note below.    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)     GI bleed     UGIB (upper gastrointestinal bleed)     Morbid obesity (H)     Pain in joint, ankle and foot, left     Charcot ankle, left     Below-knee amputation (H)     Acute kidney failure, unspecified (H)     S/P BKA (below knee amputation) (H)     Past Surgical History:   Procedure Laterality Date     AMPUTATE LEG BELOW KNEE Left 7/22/2020    Procedure: Left below knee amputation;  Surgeon: Aniceto Adams MD;  Location: [facility]     ESOPHAGOSCOPY, GASTROSCOPY, DUODENOSCOPY (EGD), COMBINED N/A 2/5/2020    Procedure: ESOPHAGOGASTRODUODENOSCOPY (EGD);  Surgeon: Tanya Dias MD;  Location: [facility]     Social History     Socioeconomic History     Marital status:      Spouse name: Not on file     Number of children: Not on file     Years of education: Not on file     Highest education level: Not on file   Occupational History     Not on file   Social Needs     Financial resource strain: Not on file     Food insecurity     Worry: Not on file     Inability: Not on file     Transportation needs     Medical: Not on file      Non-medical: Not on file   Tobacco Use     Smoking status: Current Every Day Smoker     Packs/day: 1.00     Smokeless tobacco: Never Used   Substance and Sexual Activity     Alcohol use: Yes     Drug use: Not on file     Sexual activity: Not on file   Lifestyle     Physical activity     Days per week: Not on file     Minutes per session: Not on file     Stress: Not on file   Relationships     Social connections     Talks on phone: Not on file     Gets together: Not on file     Attends Gnosticism service: Not on file     Active member of club or organization: Not on file     Attends meetings of clubs or organizations: Not on file     Relationship status: Not on file     Intimate partner violence     Fear of current or ex partner: Not on file     Emotionally abused: Not on file     Physically abused: Not on file     Forced sexual activity: Not on file   Other Topics Concern     Parent/sibling w/ CABG, MI or angioplasty before 65F 55M? Not Asked   Social History Narrative     Not on file     Family History   Problem Relation Age of Onset     No Known Problems Mother      No Known Problems Father      Lab Results   Component Value Date    A1C 5.5 07/21/2020    A1C 6.5 02/04/2020     A1c                  4.5                 4/08/21      SUBJECTIVE FINDINGS:  61-year-old female returns to clinic for ulcer right first MPJ, maceration in the interdigital space.  Relates it is doing good.  She relate to changing the dressing with gauze.  Relates she is concerned about the redness in he leg.      OBJECTIVE FINDINGS:  She has a right plantar first MPJ ulcer that is deep through the dermis into the subcutaneous tissues.  It is about 1.4 x 0.6 cm.  Decreased serosanguineous drainage, hyperkeratotic tissue buildup.  No erythema, mild edema, no odor, no calor.  First interspace with no erythema, no odor, no calor, no maceration, no drainage.  She has rigth leg venous erythema and edema, no open lesions, no odor, no calor.  Labs  reviewed as in the emr.     ASSESSMENT/PLAN:  Right first MPJ ulcer.  Right first interspace ulcer.  Venous stasis with venous stasis vs Cellulitic changes.  She is diabetic with peripheral neuropathy and vascular disease.  Diagnosis and treatment options discussed with her.  The ulcer was debrided with a 15 blade and prepped for Apligraf.  Apligraf was applied and secured with Wound Veil and Steri-Strips.  Biatain Silver was applied interdigitally and over the ulcer site, wrapped with a sterile Kerlix gauze and ace wrap.  She will keep the dressing dry and intact.  Change the outer dressing as needed for drainage.  This is the tenth Apligraf we have applied.  One fourth of the Apligraf was used to cover the wounds and margins.  The other three fourths was discarded.  I reviewed previous notes.  right leg was wrapped with Kerlix and ace wrap and use discussed with her.  Prescription for Keflex given and use discussed with her.  She will return to clinic and see me in 1 week.  Slow Improvement seen.       Again, thank you for allowing me to participate in the care of your patient.        Sincerely,        Nolan Whitten DPM

## 2021-06-11 NOTE — PROGRESS NOTES
[facility] PM&R Clinic - Follow Up Patient Note (Amputee Clinic)  Tiffani Tan  [id number]  Date of Evaluation: 10/1/2020  Reason for consult: Establish care for amputation needs s/p L TTA  HPI:  Tiffani Tan is a 59 y/o F with a PMH of HTN, HLD, obesity, DM II, CKD, and chronic neuropathy who presents to the PM&R clinic today to establish care for her amputation needs s/p L TTA on 7/22/20 by Dr. Adams due to long standing Charcot arthropathy.  After her amputation she was admitted to the [facility] acute inpatient rehabilitation unit from 7/27/20 to 8/4/20 where she participated in PT and OT for 3 hrs/day and discharged to her work shop which is all on one level.  Discharge to her home was not possible due to stairs.   At the time of discharge she was independent at a wheelchair level for mobility, transfers, and ADLs.    Since discharge to her work shop where she has been staying, she has been able to do some standing and bear weight on the right leg, however continues to mainly use the wheelchair for all mobility and ADLs, except showers which her  provides supervision only.  She has not had any home therapies, and she admits that she does not do her home exercises as much as she should.  She denies any residual limb pain, but continues to have her baseline pain from neuropathy.  The residual limb has been healing well, but she did have some opening on the edges of the incision line.   Prior to amputation, Tiffani spent much of her time in a cast due to her ankle/foot issues, and still required a wheelchair for much of her mobility.  However she was able to ambulate short distances, and was able to navigate the stairs in her home on her knees.      PMH:  HTN, HLD, obesity, DM II, CKD, chronic neuropathy, Charcot arthropathy  PSH:  L TTA  Allergies:  Not on File    Medications:  No current outpatient medications on file prior to visit.     No current facility-administered medications on file prior to visit.         Social History:  Social History     Socioeconomic History     Marital status:      Spouse name: Not on file     Number of children: Not on file     Years of education: Not on file     Highest education level: Not on file   Occupational History     Not on file   Social Needs     Financial resource strain: Not on file     Food insecurity     Worry: Not on file     Inability: Not on file     Transportation needs     Medical: Not on file     Non-medical: Not on file   Tobacco Use     Smoking status: Not on file   Substance and Sexual Activity     Alcohol use: Not on file     Drug use: Not on file     Sexual activity: Not on file   Lifestyle     Physical activity     Days per week: Not on file     Minutes per session: Not on file     Stress: Not on file   Relationships     Social connections     Talks on phone: Not on file     Gets together: Not on file     Attends Episcopal service: Not on file     Active member of club or organization: Not on file     Attends meetings of clubs or organizations: Not on file     Relationship status: Not on file     Intimate partner violence     Fear of current or ex partner: Not on file     Emotionally abused: Not on file     Physically abused: Not on file     Forced sexual activity: Not on file   Other Topics Concern     Not on file   Social History Narrative     Not on file       Review of Systems - General ROS: negative for - chills or fever  Respiratory ROS: no cough, shortness of breath, or wheezing  Cardiovascular ROS: no chest pain or dyspnea on exertion  Gastrointestinal ROS: no abdominal pain, change in bowel habits, or black or bloody stools  Genito-Urinary ROS: no dysuria, trouble voiding, or hematuria  Musculoskeletal ROS: positive for - L TTA  Neurological ROS: positive for - Neuropathy  Dermatological ROS: Negative for rash.  +L foot ulcers  Functional Hx:  As per HPI    Physical Exam:  There were no vitals taken for this visit.    Gen: NAD, pleasant and  cooperative, obese  Skin: No rashes noted  HEENT: NC/AT  Pulm: Non-labored breathing  GI: Soft, NT/ND  Neuro: AAOx3, follows commands, answers appropriately  RLE:  ACE wrapped, +edema present. +Ulcer on ball of foot and in between first two toes with drainage present  L TTA: Cylindrical shape with edema present.  Middle portion of the incision is healing well, however medial and lateral aspects are open with significant drainage.    Stasis changes present on lower extremities    Assessment:  Tiffani Tan is a 59 y/o F with a PMH of HTN, HLD, obesity, DM II, CKD, and chronic neuropathy who presents to the PM&R clinic today to establish care for her amputation needs s/p L TTA on 7/22/20 by Dr. Adams due to long standing Charcot arthropathy    Recommendations:  -Given ongoing opening of the incision, we are unable to fit her with a prosthesis just yet and she will need further healing of the incision.  Given her current course, I expect this will still take several weeks.  In the mean time, she should continue to keep the limb wrapped with the ACE bandage to help with edema control, and wear a [employer] brace for protection.  -Given ulcer on the ball of her foot and in between the toes, I advised her to discuss with podiatry if she should be bearing weight on the right foot.  It is imperative to maintain the right foot, and she is at high risk for further complications  -Prior to the amputation, Tiffani was able to ambulate short distances with a walker, perform stairs in a modified manner on her knees, and used a wheelchair for longer distances.  This was due to ongoing pain and skin issues as a result of her Charcot arthropathy, however this is now removed with the amputation.  She is a K1 level ambulator, however it is not unreasonable to suspect she could move up to a K2 with the removal of her Charcot foot.    -RTC in 2 months for follow up  Myron Antoine MD  Department of Rehabilitation Medicine  IMyron, spent a  total of 30 minutes face to face or managing the care of Tiffani Tan.  Over 50% of my time was spent counseling the patient and coordinating care including discussion with the amputee team.  See note for details.

## 2021-06-13 NOTE — PROGRESS NOTES
"[facility] PM&R Clinic - Follow Up Patient Note (Amputee Clinic)  Tiffani Tan  [id number]  Date of Evaluation: 12/3/2020  Date of Last PM&R Clinic Visit: 10/1/2020  Reason for consult: Establish care for amputation needs s/p L TTA  Recall:  Tiffani Tan is a 59 y/o F with a PMH of HTN, HLD, obesity, DM II, CKD, and chronic neuropathy who presents to the PM&R clinic today to establish care for her amputation needs s/p L TTA on 7/22/20 by Dr. Adams due to long standing Charcot arthropathy.    Interval History:  Mrs. Tan presents today for routine follow up.  At the last visit, she was not yet ready for fitting of a prosthesis as she had continued open areas of her incision.  Today she reports the incision has now completely closed.  She has not been wearing a  yet, but is compliant with wearing her RRD except during sleep.  She denies residual limb pain but does have phantom sensations, describes it as if her \"ankle is twisted\".   She continues to follow with podiatry for R foot ulcers as well.  She is allowed to bear weight but has a CAM boot she must  wear.      Functionally she remains mostly dependent on the wheelchair for mobility, but occasionally will get up with the walker if someone is with her.  She is still living at her shop, but is planning on moving back home soon.  She is also trying to lose weight for which we provided ongoing encouragement.      Medications:  Amlodipine 10 mg daily  Cozaar 100 mg daily  Neurontin 1200 mg three times a day  hydrochlorothiazide 25 mg daily  Protonix 40 mg daily  Zocor 40 mg daily    Review of Systems   +R foot ulcers  +Phantom sensation  Negative for fevers, chills, chest pain, SOB  Physical Exam:  There were no vitals taken for this visit.    Gen: NAD, pleasant and cooperative, obese  Skin: No rashes noted  HEENT: NC/AT  Pulm: Non-labored breathing  GI: Soft, NT/ND  Neuro: AAOx3, follows commands, answers appropriately  RLE: CAM boot in place  L TTA: Cylindrical " shape with edema present.  Incision is closed without drainage  FROM ext, Fl 90 degree.   MMT 5/5 HF, hip flexion, hip abduction, hip adduction, KE, and KF.  Stasis changes present on b/l lower extremities.    Assessment:  Tiffani Tan is a 59 y/o F with a PMH of HTN, HLD, obesity, DM II, CKD, and chronic neuropathy who presents to the PM&R clinic today to establish care for her amputation needs s/p L TTA on 7/22/20 by Dr. Adams due to long standing Charcot arthropathy.      Recommendations:  -With the residual limb now healed, we will clear her to begin the prosthetic process.  We will provide her with a  today which she was instructed to wear as much as possible and should only remove for cleaning and hygiene if she can tolerate.    -After having some decrease in volume, she will be cast for her socket.  Anticipate this will occur in about 2 weeks or so.   She is a K1 level ambulator, however it is not unreasonable to suspect she could move up to a K2 level with the removal of her Charcot foot.  -After obtaining socket she will begin outpatient physical therapy, which she will do in [place] which is more convenient for her.  -RTC in 3 months for follow up    Myron Antoine MD  Department of Rehabilitation Medicine  I, Myron Antoine, spent a total of 25 minutes face to face or managing the care of Tiffani Tan.  Over 50% of my time was spent counseling the patient and coordinating care including discussion with the amputee team.  See note for details.
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
## 2021-06-20 NOTE — PROGRESS NOTES
"Coverage Criteria Per Local Coverage Determination    A) Tiffani has mobility limitations due to left leg below knee amputation, chronic right foot wound and many other chronic medical condiitons that significantly impair her ability to participate in all of her mobility-related activities of daily living (MRADL). Her medical history includes: DM2 with peripheral neuropathy of her hands and feet, chronic kidney disease, anemia, peptic ulcer disease, morbid obesity, congestive heart failure and HTN. She was hospitalized in April of 2021 with acute CHF and hepatic encephalopathy. Her right foot ulcer is at the first metatarsal phalangeal joint and the right first interdigital space. The wound goes deep into the subcutaneous space. She wears a CAM boot and has to limit her weight bearing on right foot. Her left leg amputation was on 7/22/2020 . She is 5'6\" and weighs 211 lbs. She is on chronic pain meds for her neuropathy.    Specifically affected are toileting (being able to get there in time to prevent accidents), dressing (being able to get to her clothes), and bathing (getting into the bathroom of designated place). Current equipment used is power w/c that she paid for out of pocket. It is breaking down often and does not have all the features she needs. Tiffani can briefly ambulate with a 2WW if she is wearing her prosthesis and CAM boot on right side. She is not able to wear her prosthesis for more then a few hours a day due to pain in left leg in the prosthesis. This patient needs the new equipment requested to be able to complete her MRADLS safely for an entire day and to prevent falls/injuries.    Tiffani had a successful trial at home with the recommended equipment. She is very willing and physically / cognitively able to use the recommended equipment to assist her with mobility related activities of daily living and general mobility.     B) Tiffani's mobility limitation cannot be sufficiently and safely resolved by the use " of an appropriately fitted cane or walker because she has a left leg amputation and a chronic ulcer of right foot with limited weight bearing. She can ambulate 25 feet with a 4 wheeled walker, prosthesis and CAM boot in 13.18 seconds and 16 steps on 5/5/2021. This is well below normal for this distance and sometimes she cannot complete 25 feet due to left lower leg pain in the prosthesis and right foot pain due to wound. Her ambulation distance varies day to day.  Fatigue also impacts this patient's ability to ambulate, regardless of the gait aid.    C) She does not have sufficient upper extremity function to self-propel an optimally-configured manual wheelchair in her home to perform MRADLs during a typical day due to limitations in strength, endurance, range of motion, and coordination. She has pain and tightness in her hands from the chronic neuropathy in her hands. She is unable to propel a wheelchair at all for any distance.    D)  Tiffani is not able to use a POV/scooter because it will not fit in her home environment. She needs more appropriate seating and positioning than any scooter seat provides.    E) The need for this equipment is LIFETIME.     RECOMMENDATIONS FOR MOBILITY BASE, SEATING SYSTEM AND COMPONENTS    Lisa J4 2 SP-sliding scale with multi-power drive through- this mid wheel drive power wheelchair is needed for this patient to continue to have independent mobility and to be able to allow her to complete or assist in all of her mobility related activities of daily living (MRADLs). This wheelchair will have the seating and positioning system and seat function she needs to be able to use and tolerate the wheelchair full time. Plus have functional and comfortable positioning for a full day's activities. She has may medical issues which impairs her ability to move in her home without the use of the requested wheelchair. She lives in an accessible home with handicap access (ramps) and uses a vehicle with  a hitch system for transportation.    NE+6 KeyJoystick - this is the joystick needed to be used by this patient for moving this wheelchair and also controlling the movement of the seat functions.    NE/NE+ swing away joystick mount/bracket - needed to be able to move the joystick out of the way during transfers, or when at the desk using the computer, or at the table eating, so as not to inadvertently hit the joystick, thus moving the wheelchair or turning the power on or off without her knowledge.      A power tilt seat: Tiffani requires a powered seating system with tilt to optimize pressure distribution and postural repositioning. Tilt allows her to change her position by rotating rearward without changing the angle of her hips or knees.  Tiffani is at high risk of developing pressure ulcers if not able to change her position. Due to compromised ability to exert herself for weight shifting, the power tilt seat allows her to do this by operating it through the joystick. She can also use a slight degree of tilt for assisting in her seating and positioning for normal functions during the day a to assist keeping her in a midline, erect and upright position by using the effect of gravity.  Tilt increases her tolerance and to be able to remain in the requested wheelchair for a complete day.  The medical justification for these seat functions is consistent with the [employer] Position Papers regarding these seat function interventions (Diciano et al., 2009). These seat functions will also reduce upper extremity strain per the Paralyzed 's of Judy Guidelines for upper limb preservation (Boninger, et al., 2005).    2 post, full length, height adjustable, armrests - needed for arm support at appropriate height and to allow them to be moved out of the way for safe transfers, not available with standard armrests.    NF22 Gel cell interceptor battery - needed to supply power to the wheelchair for mobility and seat functions and  to be able to recharge the wheelchair    Elite E2 Solid curved and padded back support - firm and contoured back support is needed to support Tiffani's thoracolumbar area in an upright and midline position, with appropriate support pads as needed. This will provide support whether she is in the upright or tilted position. This back support is essential to provide sufficient lateral contour to maximize her postural alignment and minimize her tendency to develop scoliosis and other secondary complications.    [employer] select seat cushion 18x20 inches - this pressure distribution and positioning seat cushion will optimally  distribute seating pressures to prevent pressure ulcers, but also provide a stable base of support for her to use during MRADLs.    [place] REITThiAQH Guides 4 X 8 pads to hold thighs straight and not splay out to the side. [place] REIT 7 inch ht adjustable removable hardware to be able to remove the pads for transfers     Headrest pad and mounting hardware - needed to keep Tiffani's head in an erect, midline and upright position whether she is in the upright, tilted or reclined position. Her head and neck need to be supported as well as the rest of her body.    Headrest mounting hardware - needed for mounting the requested headrest in the most appropriate position on the back of the wheelchair for optimal head and neck support and to be able to be removed if needed.     Power articulating elevating foot platform - The legs of this wheelchair user acts as a reservoir for fluid accumulation due to lack of movement and CHF. Elevation of this patient's legs above the level of the heart (left atrium) is recommended as part of the management of edema. This patient has lower extremity dependent edema while sitting in her wheelchair, which resolves with elevation of her legs. This feature, when used with the power recline back or tilt seat can increase Tiffani's sitting tolerance while positioning her in a more natural  position. This can also be helpful if she fatigues and requires rests throughout the day, without transferring her back to bed.   Per RESNA white paper on elevating legrests, using elevating legrests and tilting more than 30 degrees in combination with full recline significantly improves lower leg hemodynamics status as measured by near-infrared spectroscopy (Sherwin et al., 2010)    Unoccupied transit feature is needed to tie the chair down for safety while being transported.     This equipment is reasonable and necessary with reference to accepted standards of medical practice and treatment of this patient's condition and is not being recommended as a convenience item. Without this recommended equipment, Tiffani is highly likely to sustain injuries from falls, develop pressure sores or postural compensation, and/or be bed confined, which those costs far exceed the cost of the requested equipment.       Katie Wheeler DPT, MPT, NCS  Physical Therapist   Board Certified Neurologic Clinical Specialist     [facility]   [address] [place] [address]           Dr Cierra Antoine  Department of Rehabilitation Medicine  [facility]

## 2021-08-04 ENCOUNTER — MEDICAL CORRESPONDENCE (OUTPATIENT)
Dept: HEALTH INFORMATION MANAGEMENT | Facility: CLINIC | Age: 61
End: 2021-08-04

## 2021-08-04 ENCOUNTER — PATIENT OUTREACH (OUTPATIENT)
Dept: PHYSICAL MEDICINE AND REHAB | Facility: CLINIC | Age: 61
End: 2021-08-04

## 2021-08-04 NOTE — PROGRESS NOTES
The letter of  Medical necessity and Standard Written Order for power wheelchair was signed by Dr Antoine and faxed back to [facility] [phone].

## 2021-08-20 ENCOUNTER — TELEPHONE (OUTPATIENT)
Dept: PODIATRY | Facility: CLINIC | Age: 61
End: 2021-08-20

## 2021-08-20 NOTE — TELEPHONE ENCOUNTER
[facility] Call Center    Phone Message    May a detailed message be left on voicemail: no     Reason for Call: Other: Nurse asking for a call back. Regarding wound on right foot.  Pt has been in hospital.  Maggots are in the wound and have created another area that is open.  Please call back.          Travel Screening: Not Applicable

## 2021-08-23 NOTE — TELEPHONE ENCOUNTER
Eliane with [facility] returned call.    They have been seeing patient twice weekly.  At last visit, maggots were found in the wound. She has been encouraging patient to schedule a follow up appointment with Dr. Whitten.     Reached out to patient and scheduled a follow up for 8/25/2021.

## 2021-08-23 NOTE — TELEPHONE ENCOUNTER
Patient last seen by Dr. Whitten on 5/14/2021.    Left message for Eliane with [facility] to return a call to the clinic.

## 2021-08-25 ENCOUNTER — OFFICE VISIT (OUTPATIENT)
Dept: PODIATRY | Facility: CLINIC | Age: 61
End: 2021-08-25
Payer: COMMERCIAL

## 2021-08-25 VITALS — DIASTOLIC BLOOD PRESSURE: 63 MMHG | OXYGEN SATURATION: 90 % | SYSTOLIC BLOOD PRESSURE: 113 MMHG | HEART RATE: 111 BPM

## 2021-08-25 DIAGNOSIS — E11.51 DIABETES MELLITUS WITH PERIPHERAL VASCULAR DISEASE (H): ICD-10-CM

## 2021-08-25 DIAGNOSIS — L97.912 NONHEALING ULCER OF RIGHT LOWER LEG WITH FAT LAYER EXPOSED (H): ICD-10-CM

## 2021-08-25 DIAGNOSIS — I87.8 VENOUS STASIS: ICD-10-CM

## 2021-08-25 DIAGNOSIS — L97.512 SKIN ULCER OF RIGHT FOOT WITH FAT LAYER EXPOSED (H): Primary | ICD-10-CM

## 2021-08-25 DIAGNOSIS — E11.42 TYPE 2 DIABETES MELLITUS WITH DIABETIC POLYNEUROPATHY, WITHOUT LONG-TERM CURRENT USE OF INSULIN (H): ICD-10-CM

## 2021-08-25 PROCEDURE — 87077 CULTURE AEROBIC IDENTIFY: CPT | Performed by: PODIATRIST

## 2021-08-25 PROCEDURE — 87075 CULTR BACTERIA EXCEPT BLOOD: CPT | Performed by: PODIATRIST

## 2021-08-25 PROCEDURE — 87186 SC STD MICRODIL/AGAR DIL: CPT | Performed by: PODIATRIST

## 2021-08-25 PROCEDURE — 87070 CULTURE OTHR SPECIMN AEROBIC: CPT | Performed by: PODIATRIST

## 2021-08-25 PROCEDURE — 99215 OFFICE O/P EST HI 40 MIN: CPT | Performed by: PODIATRIST

## 2021-08-25 RX ORDER — CLINDAMYCIN HCL 300 MG
300 CAPSULE ORAL 2 TIMES DAILY
Qty: 20 CAPSULE | Refills: 0 | Status: SHIPPED | OUTPATIENT
Start: 2021-08-25

## 2021-08-25 RX ORDER — LEVOFLOXACIN 750 MG/1
750 TABLET, FILM COATED ORAL DAILY
Qty: 30 TABLET | Refills: 0 | Status: SHIPPED | OUTPATIENT
Start: 2021-08-25

## 2021-08-25 NOTE — PROGRESS NOTES
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)     GI bleed     UGIB (upper gastrointestinal bleed)     Morbid obesity (H)     Pain in joint, ankle and foot, left     Charcot ankle, left     Below-knee amputation (H)     Acute kidney failure, unspecified (H)     S/P BKA (below knee amputation) (H)     Past Surgical History:   Procedure Laterality Date     AMPUTATE LEG BELOW KNEE Left 7/22/2020    Procedure: Left below knee amputation;  Surgeon: Aniceto Adams MD;  [facility]     ESOPHAGOSCOPY, GASTROSCOPY, DUODENOSCOPY (EGD), COMBINED N/A 2/5/2020    Procedure: ESOPHAGOGASTRODUODENOSCOPY (EGD);  Surgeon: Tanya Dias MD;  Location: [facility]     Social History     Socioeconomic History     Marital status:      Spouse name: Not on file     Number of children: Not on file     Years of education: Not on file     Highest education level: Not on file   Occupational History     Not on file   Tobacco Use     Smoking status: Current Every Day Smoker     Packs/day: 1.00     Smokeless tobacco: Never Used   Substance and Sexual Activity     Alcohol use: Yes     Drug use: Not on file     Sexual activity: Not on file   Other Topics Concern     Parent/sibling w/ CABG, MI or angioplasty before 65F 55M? Not Asked   Social History Narrative     Not on file     Social Determinants of Health     Financial  Resource Strain:      Difficulty of Paying Living Expenses:    Food Insecurity:      Worried About Running Out of Food in the Last Year:      Ran Out of Food in the Last Year:    Transportation Needs:      Lack of Transportation (Medical):      Lack of Transportation (Non-Medical):    Physical Activity:      Days of Exercise per Week:      Minutes of Exercise per Session:    Stress:      Feeling of Stress :    Social Connections:      Frequency of Communication with Friends and Family:      Frequency of Social Gatherings with Friends and Family:      Attends Catholic Services:      Active Member of Clubs or Organizations:      Attends Club or Organization Meetings:      Marital Status:    Intimate Partner Violence:      Fear of Current or Ex-Partner:      Emotionally Abused:      Physically Abused:      Sexually Abused:      Family History   Problem Relation Age of Onset     No Known Problems Mother      No Known Problems Father      Lab Results   Component Value Date    A1C 5.5 07/21/2020    A1C 6.5 02/04/2020 8/02/21  Bmp reviewed as in emr.  7/26/21  Cbc reviewed as in emr.  Darcie   1.0-R    L-1.0   2/04/19              SUBJECTIVE FINDINGS:  A 61-year-old female returns to clinic for right foot ulcer.  She relates it has been getting bigger.  She relates it was doing well, it was nearly closed and then about a month ago she hit her leg on a wheelchair and had a laceration.  She relates that has been draining like crazy.  It has been draining out onto her foot.  Home care has been coming out and they have been doing dressing changes.  She relates they had maggots in her foot last week.  Home care nursing had called us to see if we could see her.  She relates she was seen in the Emergency Room when she injured her legs.  I did review previous notes, including the 07/14/2021 ED note, 07/19/2021 admission notes from [facility].  She relates she has not had any nausea, vomiting, fever or chills but her  leg is painful.  She relates she did injure her foot and sprained it last week and that hurts.  She did have 08/19/2021 x-rays.  Reviewed from Care Everywhere.  Report had soft tissue swelling with mild to moderate degenerative changes and tiny enthesophyte and plantar calcaneal spur noted.    OBJECTIVE FINDINGS:  DP and PT are 1/4 right.  She has peripheral edema right with lymphedema.  She has a right anterior leg ulcer and a circumferential macerated ulceration with serosanguineous drainage.  There is erythema.  There is edema.  There is tenderness to palpation.  No odor, no calor.  She has a plantar right first MPJ ulceration that is through the dermis into the subcutaneous tissues.  There is no erythema here, some edema, positive serosanguineous drainage, no odor, no calor, measures 3.5 x 2.4 cm.  She does have some foot edema as well.  The anterior leg ulcer where her laceration is, is deep through the dermis into the subcutaneous tissues.  She has dry, scaly skin on the foot.  Decreased hair growth.    ASSESSMENT AND PLAN:  Ulcer right plantar first MPJ, ulcer right leg with venous stasis and lymphedema.  Diagnosis and treatment options discussed with her.  Local wound care done upon consent today.  Hydrofera Blue applied to the ulcer sites.  Multilayer Unna Boot compression boot with light compression applied to the right lower extremity upon consent.  Prescription for Levaquin and clindamycin given and use discussed with her.  Prior to the Unna Boot application the legs were rinsed with saline and superficial swab anaerobic and aerobic cultures taken of the anterior leg ulcer upon consent.  Previous notes reviewed.  Return to clinic and see me on Friday.        High risk medical decision making.

## 2021-08-25 NOTE — LETTER
8/25/2021         RE: Tiffani Tan  [address] [facility] [address]        Dear Colleague,    Thank you for referring your patient, Tiffani Tan, to the [facility]. Please see a copy of my visit note below.    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)     GI bleed     UGIB (upper gastrointestinal bleed)     Morbid obesity (H)     Pain in joint, ankle and foot, left     Charcot ankle, left     Below-knee amputation (H)     Acute kidney failure, unspecified (H)     S/P BKA (below knee amputation) (H)     Past Surgical History:   Procedure Laterality Date     AMPUTATE LEG BELOW KNEE Left 7/22/2020    Procedure: Left below knee amputation;  Surgeon: Aniceto Adams MD;  [facility]     ESOPHAGOSCOPY, GASTROSCOPY, DUODENOSCOPY (EGD), COMBINED N/A 2/5/2020    Procedure: ESOPHAGOGASTRODUODENOSCOPY (EGD);  Surgeon: Tanya Dias MD;  [facility]     Social History     Socioeconomic History     Marital status:      Spouse name: Not on file     Number of children: Not on file     Years of education: Not on file     Highest education level: Not on file   Occupational History     Not on file   Tobacco Use     Smoking status: Current Every Day Smoker     Packs/day: 1.00     Smokeless tobacco: Never Used   Substance and Sexual Activity     Alcohol use: Yes     Drug  use: Not on file     Sexual activity: Not on file   Other Topics Concern     Parent/sibling w/ CABG, MI or angioplasty before 65F 55M? Not Asked   Social History Narrative     Not on file     Social Determinants of Health     Financial Resource Strain:      Difficulty of Paying Living Expenses:    Food Insecurity:      Worried About Running Out of Food in the Last Year:      Ran Out of Food in the Last Year:    Transportation Needs:      Lack of Transportation (Medical):      Lack of Transportation (Non-Medical):    Physical Activity:      Days of Exercise per Week:      Minutes of Exercise per Session:    Stress:      Feeling of Stress :    Social Connections:      Frequency of Communication with Friends and Family:      Frequency of Social Gatherings with Friends and Family:      Attends Tenriism Services:      Active Member of Clubs or Organizations:      Attends Club or Organization Meetings:      Marital Status:    Intimate Partner Violence:      Fear of Current or Ex-Partner:      Emotionally Abused:      Physically Abused:      Sexually Abused:      Family History   Problem Relation Age of Onset     No Known Problems Mother      No Known Problems Father      Lab Results   Component Value Date    A1C 5.5 07/21/2020    A1C 6.5 02/04/2020 8/02/21  Bmp reviewed as in emr.  7/26/21  Cbc reviewed as in emr.  Darcie   1.0-R    L-1.0   2/04/19              SUBJECTIVE FINDINGS:  A 61-year-old female returns to clinic for right foot ulcer.  She relates it has been getting bigger.  She relates it was doing well, it was nearly closed and then about a month ago she hit her leg on a wheelchair and had a laceration.  She relates that has been draining like crazy.  It has been draining out onto her foot.  Home care has been coming out and they have been doing dressing changes.  She relates they had maggots in her foot last week.  Home care nursing had called us to see if we could see her.  She relates she was seen in the  Emergency Room when she injured her legs.  I did review previous notes, including the 07/14/2021 ED note, 07/19/2021 admission notes from [facility].  She relates she has not had any nausea, vomiting, fever or chills but her leg is painful.  She relates she did injure her foot and sprained it last week and that hurts.  She did have 08/19/2021 x-rays.  Reviewed from Care Everywhere.  Report had soft tissue swelling with mild to moderate degenerative changes and tiny enthesophyte and plantar calcaneal spur noted.    OBJECTIVE FINDINGS:  DP and PT are 1/4 right.  She has peripheral edema right with lymphedema.  She has a right anterior leg ulcer and a circumferential macerated ulceration with serosanguineous drainage.  There is erythema.  There is edema.  There is tenderness to palpation.  No odor, no calor.  She has a plantar right first MPJ ulceration that is through the dermis into the subcutaneous tissues.  There is no erythema here, some edema, positive serosanguineous drainage, no odor, no calor, measures 3.5 x 2.4 cm.  She does have some foot edema as well.  The anterior leg ulcer where her laceration is, is deep through the dermis into the subcutaneous tissues.  She has dry, scaly skin on the foot.  Decreased hair growth.    ASSESSMENT AND PLAN:  Ulcer right plantar first MPJ, ulcer right leg with venous stasis and lymphedema.  Diagnosis and treatment options discussed with her.  Local wound care done upon consent today.  Hydrofera Blue applied to the ulcer sites.  Multilayer Unna Boot compression boot with light compression applied to the right lower extremity upon consent.  Prescription for Levaquin and clindamycin given and use discussed with her.  Prior to the Unna Boot application the legs were rinsed with saline and superficial swab anaerobic and aerobic cultures taken of the anterior leg ulcer upon consent.  Previous notes reviewed.  Return to clinic and see me on Friday.        High risk  medical decision making.      Again, thank you for allowing me to participate in the care of your patient.        Sincerely,        Nolan Whitten DPM

## 2021-08-25 NOTE — PATIENT INSTRUCTIONS
Thanks for coming today.  [facility]  [address] [place] [address]    To schedule future appointments in Ortho Clinic, you may call [phone].    To schedule ordered imaging by your provider:   Call Central Imaging [phone]    To schedule an injection ordered by your provider:  Call Central [facility] Injection scheduling line: [phone]  [employer] available online at:  [email or web address]    Please call if any further questions or concerns ([phone]).  Clinic hours 8 am to 5 pm.    Return to clinic (call) if symptoms worsen or fail to improve.

## 2021-08-25 NOTE — NURSING NOTE
Tiffani Tan's chief complaint for this visit includes:  Chief Complaint   Patient presents with     RECHECK     wound care right foot     PCP: Wily Bonilla    Referring Provider:  No referring provider defined for this encounter.    /63   Pulse 111   SpO2 90%   Data Unavailable     Do you need any medication refills at today's visit? No    Christina Oshea CMA

## 2021-08-27 ENCOUNTER — OFFICE VISIT (OUTPATIENT)
Dept: PODIATRY | Facility: CLINIC | Age: 61
End: 2021-08-27
Payer: COMMERCIAL

## 2021-08-27 DIAGNOSIS — E11.51 DIABETES MELLITUS WITH PERIPHERAL VASCULAR DISEASE (H): ICD-10-CM

## 2021-08-27 DIAGNOSIS — E11.42 TYPE 2 DIABETES MELLITUS WITH DIABETIC POLYNEUROPATHY, WITHOUT LONG-TERM CURRENT USE OF INSULIN (H): ICD-10-CM

## 2021-08-27 DIAGNOSIS — L97.512 SKIN ULCER OF RIGHT FOOT WITH FAT LAYER EXPOSED (H): Primary | ICD-10-CM

## 2021-08-27 DIAGNOSIS — L97.912 NONHEALING ULCER OF RIGHT LOWER LEG WITH FAT LAYER EXPOSED (H): ICD-10-CM

## 2021-08-27 DIAGNOSIS — I87.8 VENOUS STASIS: ICD-10-CM

## 2021-08-27 LAB — BACTERIA WND CULT: NORMAL

## 2021-08-27 PROCEDURE — 99215 OFFICE O/P EST HI 40 MIN: CPT | Performed by: PODIATRIST

## 2021-08-27 NOTE — LETTER
8/27/2021         RE: Tiffani Tan  [address] [facility] [address]        Dear Colleague,    Thank you for referring your patient, Tiffani Tan, to the [facility]. Please see a copy of my visit note below.    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)     GI bleed     UGIB (upper gastrointestinal bleed)     Morbid obesity (H)     Pain in joint, ankle and foot, left     Charcot ankle, left     Below-knee amputation (H)     Acute kidney failure, unspecified (H)     S/P BKA (below knee amputation) (H)     Past Surgical History:   Procedure Laterality Date     AMPUTATE LEG BELOW KNEE Left 7/22/2020    Procedure: Left below knee amputation;  Surgeon: Aniceto Adams MD;  [facility]     ESOPHAGOSCOPY, GASTROSCOPY, DUODENOSCOPY (EGD), COMBINED N/A 2/5/2020    Procedure: ESOPHAGOGASTRODUODENOSCOPY (EGD);  Surgeon: Tanya Dias MD;  [facility]     Social History     Socioeconomic History     Marital status:      Spouse name: Not on file     Number of children: Not on file     Years of education: Not on file     Highest education level: Not on file   Occupational History     Not on file   Tobacco Use     Smoking status: Current Every Day Smoker     Packs/day: 1.00     Smokeless tobacco: Never Used   Substance and Sexual Activity     Alcohol use: Yes     Drug  use: Not on file     Sexual activity: Not on file   Other Topics Concern     Parent/sibling w/ CABG, MI or angioplasty before 65F 55M? Not Asked   Social History Narrative     Not on file     Social Determinants of Health     Financial Resource Strain:      Difficulty of Paying Living Expenses:    Food Insecurity:      Worried About Running Out of Food in the Last Year:      Ran Out of Food in the Last Year:    Transportation Needs:      Lack of Transportation (Medical):      Lack of Transportation (Non-Medical):    Physical Activity:      Days of Exercise per Week:      Minutes of Exercise per Session:    Stress:      Feeling of Stress :    Social Connections:      Frequency of Communication with Friends and Family:      Frequency of Social Gatherings with Friends and Family:      Attends Voodoo Services:      Active Member of Clubs or Organizations:      Attends Club or Organization Meetings:      Marital Status:    Intimate Partner Violence:      Fear of Current or Ex-Partner:      Emotionally Abused:      Physically Abused:      Sexually Abused:      Family History   Problem Relation Age of Onset     No Known Problems Mother      No Known Problems Father      Lab Results   Component Value Date    A1C 5.5 07/21/2020                        SUBJECTIVE FINDINGS:  A 61-year-old female returns to clinic for ulcer, right foot and right leg, with venous stasis infection.  She is diabetic with peripheral neuropathy and vascular disease.  She relates she is taking the Levaquin and clindamycin with no problems.  She relates she has not been taking her Amaryl diabetic medication for the last couple of months.  Relates no problems with the Unna Boot.  Presents in her wheelchair.    OBJECTIVE FINDINGS:  DP and PT are 1/4 right.  She has decreased edema, decreased drainage, decreased erythema, no odor, no calor.  Decreased tenderness to palpation.  She relates it feels much better.    ASSESSMENT AND PLAN:  Ulcer, right  first MPJ, ulcer with venous stasis, right leg, and lymphedema, right leg.  She is diabetic with peripheral vascular disease.  She is diabetic with peripheral neuropathy.  This is improved.  I am going to have her continue the Levaquin and clindamycin.  Local wound care done upon consent today.  Hydrofera Blue applied to the ulcer sites.  Multilayer Unna Boot compression boot applied to the right lower extremity upon consent.  We are still awaiting final culture results.  Preliminary results were non-lactose fermenting gram-negative bacilli.  Return to clinic Monday or Tuesday for recheck.    High risk medical decision making.    8/30/21-Amoxicillin prescription sent to pharmacy.      Again, thank you for allowing me to participate in the care of your patient.        Sincerely,        Nolan Whitten DPM

## 2021-08-27 NOTE — NURSING NOTE
Tiffani Tan's chief complaint for this visit includes:  Chief Complaint   Patient presents with     RECHECK     Right foot wound care     PCP: Wily Bonilla    Referring Provider:  No referring provider defined for this encounter.    There were no vitals taken for this visit.  Data Unavailable     Do you need any medication refills at today's visit? No    Liliana Bridges MA, ATC

## 2021-08-27 NOTE — PROGRESS NOTES
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)     GI bleed     UGIB (upper gastrointestinal bleed)     Morbid obesity (H)     Pain in joint, ankle and foot, left     Charcot ankle, left     Below-knee amputation (H)     Acute kidney failure, unspecified (H)     S/P BKA (below knee amputation) (H)     Past Surgical History:   Procedure Laterality Date     AMPUTATE LEG BELOW KNEE Left 7/22/2020    Procedure: Left below knee amputation;  Surgeon: Aniceto Adams MD;  [facility]     ESOPHAGOSCOPY, GASTROSCOPY, DUODENOSCOPY (EGD), COMBINED N/A 2/5/2020    Procedure: ESOPHAGOGASTRODUODENOSCOPY (EGD);  Surgeon: Tanya Dias MD;  Location: [facility]     Social History     Socioeconomic History     Marital status:      Spouse name: Not on file     Number of children: Not on file     Years of education: Not on file     Highest education level: Not on file   Occupational History     Not on file   Tobacco Use     Smoking status: Current Every Day Smoker     Packs/day: 1.00     Smokeless tobacco: Never Used   Substance and Sexual Activity     Alcohol use: Yes     Drug use: Not on file     Sexual activity: Not on file   Other Topics Concern     Parent/sibling w/ CABG, MI or angioplasty before 65F 55M? Not Asked   Social History Narrative     Not on file     Social Determinants of Health     Financial  Resource Strain:      Difficulty of Paying Living Expenses:    Food Insecurity:      Worried About Running Out of Food in the Last Year:      Ran Out of Food in the Last Year:    Transportation Needs:      Lack of Transportation (Medical):      Lack of Transportation (Non-Medical):    Physical Activity:      Days of Exercise per Week:      Minutes of Exercise per Session:    Stress:      Feeling of Stress :    Social Connections:      Frequency of Communication with Friends and Family:      Frequency of Social Gatherings with Friends and Family:      Attends Shinto Services:      Active Member of Clubs or Organizations:      Attends Club or Organization Meetings:      Marital Status:    Intimate Partner Violence:      Fear of Current or Ex-Partner:      Emotionally Abused:      Physically Abused:      Sexually Abused:      Family History   Problem Relation Age of Onset     No Known Problems Mother      No Known Problems Father      Lab Results   Component Value Date    A1C 5.5 07/21/2020                        SUBJECTIVE FINDINGS:  A 61-year-old female returns to clinic for ulcer, right foot and right leg, with venous stasis infection.  She is diabetic with peripheral neuropathy and vascular disease.  She relates she is taking the Levaquin and clindamycin with no problems.  She relates she has not been taking her Amaryl diabetic medication for the last couple of months.  Relates no problems with the Unna Boot.  Presents in her wheelchair.    OBJECTIVE FINDINGS:  DP and PT are 1/4 right.  She has decreased edema, decreased drainage, decreased erythema, no odor, no calor.  Decreased tenderness to palpation.  She relates it feels much better.    ASSESSMENT AND PLAN:  Ulcer, right first MPJ, ulcer with venous stasis, right leg, and lymphedema, right leg.  She is diabetic with peripheral vascular disease.  She is diabetic with peripheral neuropathy.  This is improved.  I am going to have her continue the Levaquin and  clindamycin.  Local wound care done upon consent today.  Hydrofera Blue applied to the ulcer sites.  Multilayer Unna Boot compression boot applied to the right lower extremity upon consent.  We are still awaiting final culture results.  Preliminary results were non-lactose fermenting gram-negative bacilli.  Return to clinic Monday or Tuesday for recheck.    High risk medical decision making.    8/30/21-Amoxicillin prescription sent to pharmacy.

## 2021-08-30 ENCOUNTER — TELEPHONE (OUTPATIENT)
Dept: ORTHOPEDICS | Facility: CLINIC | Age: 61
End: 2021-08-30

## 2021-08-30 RX ORDER — AMOXICILLIN 500 MG/1
500 CAPSULE ORAL 2 TIMES DAILY
Qty: 28 CAPSULE | Refills: 0 | Status: SHIPPED | OUTPATIENT
Start: 2021-08-30 | End: 2021-09-10

## 2021-08-30 NOTE — TELEPHONE ENCOUNTER
Wound cultures back, Dr. Whitten reviewed and he would like Pt to stop the Clindamycin and start Amoxicillin (this was sent to pharmacy). Pt should continue the Levaquin as well.    Called Pt and relayed this. She is agreeable with plan and will pick that up today.    Vincenzo Farah RN

## 2021-08-31 ENCOUNTER — OFFICE VISIT (OUTPATIENT)
Dept: PODIATRY | Facility: CLINIC | Age: 61
End: 2021-08-31
Payer: COMMERCIAL

## 2021-08-31 VITALS — SYSTOLIC BLOOD PRESSURE: 123 MMHG | OXYGEN SATURATION: 100 % | HEART RATE: 95 BPM | DIASTOLIC BLOOD PRESSURE: 63 MMHG

## 2021-08-31 DIAGNOSIS — E11.42 TYPE 2 DIABETES MELLITUS WITH DIABETIC POLYNEUROPATHY, WITHOUT LONG-TERM CURRENT USE OF INSULIN (H): ICD-10-CM

## 2021-08-31 DIAGNOSIS — E11.51 DIABETES MELLITUS WITH PERIPHERAL VASCULAR DISEASE (H): ICD-10-CM

## 2021-08-31 DIAGNOSIS — L97.512 SKIN ULCER OF RIGHT FOOT WITH FAT LAYER EXPOSED (H): Primary | ICD-10-CM

## 2021-08-31 DIAGNOSIS — I87.8 VENOUS STASIS: ICD-10-CM

## 2021-08-31 PROCEDURE — 99214 OFFICE O/P EST MOD 30 MIN: CPT | Performed by: PODIATRIST

## 2021-08-31 NOTE — NURSING NOTE
Tiffani Tan's chief complaint for this visit includes:  Chief Complaint   Patient presents with     RECHECK     right foot wound care     PCP: Wily Bonilla    Referring Provider:  No referring provider defined for this encounter.    /63   Pulse 95   SpO2 100%   Data Unavailable     Do you need any medication refills at today's visit? No    Christina Oshea CMA

## 2021-08-31 NOTE — PROGRESS NOTES
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)     GI bleed     UGIB (upper gastrointestinal bleed)     Morbid obesity (H)     Pain in joint, ankle and foot, left     Charcot ankle, left     Below-knee amputation (H)     Acute kidney failure, unspecified (H)     S/P BKA (below knee amputation) (H)     Past Surgical History:   Procedure Laterality Date     AMPUTATE LEG BELOW KNEE Left 7/22/2020    Procedure: Left below knee amputation;  Surgeon: Aniceto Adams MD;  [facility]     ESOPHAGOSCOPY, GASTROSCOPY, DUODENOSCOPY (EGD), COMBINED N/A 2/5/2020    Procedure: ESOPHAGOGASTRODUODENOSCOPY (EGD);  Surgeon: Tanya Dias MD;  Location: [facility]     Social History     Socioeconomic History     Marital status:      Spouse name: Not on file     Number of children: Not on file     Years of education: Not on file     Highest education level: Not on file   Occupational History     Not on file   Tobacco Use     Smoking status: Current Every Day Smoker     Packs/day: 1.00     Smokeless tobacco: Never Used   Substance and Sexual Activity     Alcohol use: Yes     Drug use: Not on file     Sexual activity: Not on file   Other Topics Concern     Parent/sibling w/ CABG, MI or angioplasty before 65F 55M? Not Asked   Social History Narrative     Not on file     Social Determinants of Health     Financial  Resource Strain:      Difficulty of Paying Living Expenses:    Food Insecurity:      Worried About Running Out of Food in the Last Year:      Ran Out of Food in the Last Year:    Transportation Needs:      Lack of Transportation (Medical):      Lack of Transportation (Non-Medical):    Physical Activity:      Days of Exercise per Week:      Minutes of Exercise per Session:    Stress:      Feeling of Stress :    Social Connections:      Frequency of Communication with Friends and Family:      Frequency of Social Gatherings with Friends and Family:      Attends Spiritism Services:      Active Member of Clubs or Organizations:      Attends Club or Organization Meetings:      Marital Status:    Intimate Partner Violence:      Fear of Current or Ex-Partner:      Emotionally Abused:      Physically Abused:      Sexually Abused:      Family History   Problem Relation Age of Onset     No Known Problems Mother      No Known Problems Father      8/25/21 Anaerobic and Aerobic cultures reviewed.    SUBJECTIVE FINDINGS:  A 61-year-old female returns to clinic for ulcer, right first MPJ, right leg with venous stasis and lymphedema.  She relates she got bleeding from the Unna Boot, so she took it off and it was wet yesterday.  They put a dressing on it.  Relates she started the amoxicillin.  She has taken 2 of those with no problems.    OBJECTIVE FINDINGS:  DP and PT are 1/4, right.  She has some increased edema from last visit.  She has weeping ulcerations circumferentially on the right leg.  She has an area of ulceration that is through the dermis into the subcutaneous tissue at its deepest point.  She has a right plantar second toe and first MPJ ulcers that are through the dermis into subcutaneous tissues.  There is serosanguineous drainage, no odor, no calor.  She has some peripheral edema.  She has decreased erythema.    ASSESSMENT AND PLAN:  Ulcer, right first MPJ, right second toe, right leg.  She has venous stasis,  lymphedema.  She is diabetic with peripheral neuropathy.  Diagnosis and treatment options discussed with her.  I am going to continue the Levaquin and the amoxicillin.  She has discontinued the clindamycin.  We will continue to discontinue that.  Local wound care done upon consent today.  Hydrofera and a multilayer Unna Boot compression boot applied to the ulcers on the right leg upon consent.  I am going to continue the Unna Boot.  She will return to clinic Friday.  Overall, we see improvement on this, but without the compression her leg swells up quite a bit.  Return to clinic Friday as scheduled.    Moderate level of medical decision making.

## 2021-08-31 NOTE — LETTER
8/31/2021         RE: Tiffani Tan  [address] [facility] [address]        Dear Colleague,    Thank you for referring your patient, Tiffani Tan, to the [facility]. Please see a copy of my visit note below.    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)     GI bleed     UGIB (upper gastrointestinal bleed)     Morbid obesity (H)     Pain in joint, ankle and foot, left     Charcot ankle, left     Below-knee amputation (H)     Acute kidney failure, unspecified (H)     S/P BKA (below knee amputation) (H)     Past Surgical History:   Procedure Laterality Date     AMPUTATE LEG BELOW KNEE Left 7/22/2020    Procedure: Left below knee amputation;  Surgeon: Aniceto Adams MD;  [facility]     ESOPHAGOSCOPY, GASTROSCOPY, DUODENOSCOPY (EGD), COMBINED N/A 2/5/2020    Procedure: ESOPHAGOGASTRODUODENOSCOPY (EGD);  Surgeon: Tanya Dias MD;  [facility]     Social History     Socioeconomic History     Marital status:      Spouse name: Not on file     Number of children: Not on file     Years of education: Not on file     Highest education level: Not on file   Occupational History     Not on file   Tobacco Use     Smoking status: Current Every Day Smoker     Packs/day: 1.00     Smokeless tobacco: Never Used   Substance and Sexual Activity     Alcohol use: Yes     Drug  use: Not on file     Sexual activity: Not on file   Other Topics Concern     Parent/sibling w/ CABG, MI or angioplasty before 65F 55M? Not Asked   Social History Narrative     Not on file     Social Determinants of Health     Financial Resource Strain:      Difficulty of Paying Living Expenses:    Food Insecurity:      Worried About Running Out of Food in the Last Year:      Ran Out of Food in the Last Year:    Transportation Needs:      Lack of Transportation (Medical):      Lack of Transportation (Non-Medical):    Physical Activity:      Days of Exercise per Week:      Minutes of Exercise per Session:    Stress:      Feeling of Stress :    Social Connections:      Frequency of Communication with Friends and Family:      Frequency of Social Gatherings with Friends and Family:      Attends Christianity Services:      Active Member of Clubs or Organizations:      Attends Club or Organization Meetings:      Marital Status:    Intimate Partner Violence:      Fear of Current or Ex-Partner:      Emotionally Abused:      Physically Abused:      Sexually Abused:      Family History   Problem Relation Age of Onset     No Known Problems Mother      No Known Problems Father      8/25/21 Anaerobic and Aerobic cultures reviewed.    SUBJECTIVE FINDINGS:  A 61-year-old female returns to clinic for ulcer, right first MPJ, right leg with venous stasis and lymphedema.  She relates she got bleeding from the Unna Boot, so she took it off and it was wet yesterday.  They put a dressing on it.  Relates she started the amoxicillin.  She has taken 2 of those with no problems.    OBJECTIVE FINDINGS:  DP and PT are 1/4, right.  She has some increased edema from last visit.  She has weeping ulcerations circumferentially on the right leg.  She has an area of ulceration that is through the dermis into the subcutaneous tissue at its deepest point.  She has a right plantar second toe and first MPJ ulcers that are through the dermis into subcutaneous  tissues.  There is serosanguineous drainage, no odor, no calor.  She has some peripheral edema.  She has decreased erythema.    ASSESSMENT AND PLAN:  Ulcer, right first MPJ, right second toe, right leg.  She has venous stasis, lymphedema.  She is diabetic with peripheral neuropathy.  Diagnosis and treatment options discussed with her.  I am going to continue the Levaquin and the amoxicillin.  She has discontinued the clindamycin.  We will continue to discontinue that.  Local wound care done upon consent today.  Hydrofera and a multilayer Unna Boot compression boot applied to the ulcers on the right leg upon consent.  I am going to continue the Unna Boot.  She will return to clinic Friday.  Overall, we see improvement on this, but without the compression her leg swells up quite a bit.  Return to clinic Friday as scheduled.    Moderate level of medical decision making.                        Again, thank you for allowing me to participate in the care of your patient.        Sincerely,        Nolan Whitten DPM

## 2021-08-31 NOTE — PATIENT INSTRUCTIONS
Thanks for coming today.  Ortho/[facility] Medicine Clinic  [address] [place] [address]    To schedule future appointments in Ortho Clinic, you may call [phone].    To schedule ordered imaging by your provider:   Call [facility] [phone]    To schedule an injection ordered by your provider:  Call [facility] Injection scheduling line: [phone]  [email or web address] available online at:  [email or web address]    Please call if any further questions or concerns ([phone]).  Clinic hours 8 am to 5 pm.    Return to clinic (call) if symptoms worsen or fail to improve.

## 2021-09-01 ENCOUNTER — TELEPHONE (OUTPATIENT)
Dept: INFECTIOUS DISEASES | Facility: CLINIC | Age: 61
End: 2021-09-01

## 2021-09-01 NOTE — TELEPHONE ENCOUNTER
[facility]    Phone Message    May a detailed message be left on voicemail: yes     Reason for Call: Other: . Per Madeline is wanting the patient to get in sooner than 10/14/2021 for an in person appt. Patient was referred for Skin ulcer of right foot with fat layer exposed, Diabetes mellitus with peripheral vascular disease. Referred had a note stating Please evaluate and manage for antibiotic management for Diabetic with foot and leg ulcer. Madeline is wanting the clinic to reach out to the patient to get patient in sooner, please advise    Action Taken: Message routed to:  [facility] ID    Travel Screening: Not Applicable

## 2021-09-01 NOTE — TELEPHONE ENCOUNTER
9/1 Called and left voicemail. Provided phone number [phone] to schedule with Infectious disease.     Jeannette hernandes Procedure   Orthopedics, Podiatry, Sports Medicine, ENT/Eye Specialties  [facility]   [phone]

## 2021-09-01 NOTE — TELEPHONE ENCOUNTER
Patient's wound cultures came back with another bacteria that might not be covered by current antibiotics.  Dr. Machuca would like to refer patient to the infectious disease clinic at the [facility].     Called and left detailed VM with callback number. Will route to our procedure coordinator to help set up this appt.    Vincenzo Farah RN

## 2021-09-02 LAB
BACTERIA WND CULT: ABNORMAL

## 2021-09-03 ENCOUNTER — OFFICE VISIT (OUTPATIENT)
Dept: PODIATRY | Facility: CLINIC | Age: 61
End: 2021-09-03
Payer: COMMERCIAL

## 2021-09-03 DIAGNOSIS — I87.8 VENOUS STASIS: ICD-10-CM

## 2021-09-03 DIAGNOSIS — L97.512 SKIN ULCER OF RIGHT FOOT WITH FAT LAYER EXPOSED (H): Primary | ICD-10-CM

## 2021-09-03 DIAGNOSIS — E11.51 DIABETES MELLITUS WITH PERIPHERAL VASCULAR DISEASE (H): ICD-10-CM

## 2021-09-03 DIAGNOSIS — E11.42 TYPE 2 DIABETES MELLITUS WITH DIABETIC POLYNEUROPATHY, WITHOUT LONG-TERM CURRENT USE OF INSULIN (H): ICD-10-CM

## 2021-09-03 PROCEDURE — 99214 OFFICE O/P EST MOD 30 MIN: CPT | Performed by: PODIATRIST

## 2021-09-03 NOTE — TELEPHONE ENCOUNTER
Called to give pt apt with Dr Baker on 10/6 IN PERSON. Pt was amenable to this.  Frances King RN

## 2021-09-03 NOTE — LETTER
9/3/2021         RE: Tiffani Tan  [address] [facility] [address]        Dear Colleague,    Thank you for referring your patient, Tiffani Tan, to the [facility]. Please see a copy of my visit note below.    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)     GI bleed     UGIB (upper gastrointestinal bleed)     Morbid obesity (H)     Pain in joint, ankle and foot, left     Charcot ankle, left     Below-knee amputation (H)     Acute kidney failure, unspecified (H)     S/P BKA (below knee amputation) (H)     Past Surgical History:   Procedure Laterality Date     AMPUTATE LEG BELOW KNEE Left 7/22/2020    Procedure: Left below knee amputation;  Surgeon: Aniceto Adams MD;  [facility]     ESOPHAGOSCOPY, GASTROSCOPY, DUODENOSCOPY (EGD), COMBINED N/A 2/5/2020    Procedure: ESOPHAGOGASTRODUODENOSCOPY (EGD);  Surgeon: Tanya Dias MD;  [facility]     Social History     Socioeconomic History     Marital status:      Spouse name: Not on file     Number of children: Not on file     Years of education: Not on file     Highest education level: Not on file   Occupational History     Not on file   Tobacco Use     Smoking status: Current Every Day Smoker     Packs/day: 1.00     Smokeless tobacco: Never Used   Substance and Sexual Activity     Alcohol use: Yes     Drug  use: Not on file     Sexual activity: Not on file   Other Topics Concern     Parent/sibling w/ CABG, MI or angioplasty before 65F 55M? Not Asked   Social History Narrative     Not on file     Social Determinants of Health     Financial Resource Strain:      Difficulty of Paying Living Expenses:    Food Insecurity:      Worried About Running Out of Food in the Last Year:      Ran Out of Food in the Last Year:    Transportation Needs:      Lack of Transportation (Medical):      Lack of Transportation (Non-Medical):    Physical Activity:      Days of Exercise per Week:      Minutes of Exercise per Session:    Stress:      Feeling of Stress :    Social Connections:      Frequency of Communication with Friends and Family:      Frequency of Social Gatherings with Friends and Family:      Attends Sabianist Services:      Active Member of Clubs or Organizations:      Attends Club or Organization Meetings:      Marital Status:    Intimate Partner Violence:      Fear of Current or Ex-Partner:      Emotionally Abused:      Physically Abused:      Sexually Abused:      Family History   Problem Relation Age of Onset     No Known Problems Mother      No Known Problems Father                      SUBJECTIVE FINDINGS:  A 61-year-old female returns to clinic for ulcer, right first MPJ, right leg with venous stasis and lymphedema.  She relates to taking Levaquin and Amoxicillin with no problems.     OBJECTIVE FINDINGS:  DP and PT are 1/4, right.  She has some decreased edema from last visit.  She has decreased weeping ulcerations circumferentially on the right leg.  She has right leg ulceration that is through the dermis into the subcutaneous tissue at its deepest point.  She has a right plantar second toe and first MPJ ulcers that are through the dermis into subcutaneous tissues.  There is serosanguineous drainage, no odor, no calor.  She has some peripheral edema.  She has decreased erythema.     ASSESSMENT AND PLAN:  Ulcer, right  first MPJ, right second toe, right leg.  She has venous stasis, lymphedema.  She is diabetic with peripheral neuropathy.  Diagnosis and treatment options discussed with her.  I am going to continue the Levaquin and the amoxicillin.  She has discontinued the clindamycin.  Local wound care done upon consent today.  Hydrofera and a multilayer Unna Boot compression boot applied to the ulcers on the right leg and 1st mpj and Biatain silver to the 2nd toe ulcer and between the toes upon consent.  I am going to continue the Unna Boot.  She will return to clinic next week as scheduled.  Overall, we see improvement on this, but without the compression her leg swells up quite a bit.  Follow up with Infectious disease as scheduled.     Moderate level of medical decision making.      Again, thank you for allowing me to participate in the care of your patient.        Sincerely,        Nolan Whitten DPM

## 2021-09-03 NOTE — PATIENT INSTRUCTIONS
Thanks for coming today.  [facility] Clinic  [address] [place] [address]    To schedule future appointments in Ortho Clinic, you may call [phone].    To schedule ordered imaging by your provider:   Call Central Imaging [phone]    To schedule an injection ordered by your provider:  Call [facility] Injection scheduling line: [phone]  [email or web address] available online at:  [email or web address]    Please call if any further questions or concerns ([phone]).  Clinic hours 8 am to 5 pm.    Return to clinic (call) if symptoms worsen or fail to improve.

## 2021-09-03 NOTE — PROGRESS NOTES
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)     GI bleed     UGIB (upper gastrointestinal bleed)     Morbid obesity (H)     Pain in joint, ankle and foot, left     Charcot ankle, left     Below-knee amputation (H)     Acute kidney failure, unspecified (H)     S/P BKA (below knee amputation) (H)     Past Surgical History:   Procedure Laterality Date     AMPUTATE LEG BELOW KNEE Left 7/22/2020    Procedure: Left below knee amputation;  Surgeon: Aniceto Adams MD;  [facility]     ESOPHAGOSCOPY, GASTROSCOPY, DUODENOSCOPY (EGD), COMBINED N/A 2/5/2020    Procedure: ESOPHAGOGASTRODUODENOSCOPY (EGD);  Surgeon: Tanya Dias MD;  Location: [facility]     Social History     Socioeconomic History     Marital status:      Spouse name: Not on file     Number of children: Not on file     Years of education: Not on file     Highest education level: Not on file   Occupational History     Not on file   Tobacco Use     Smoking status: Current Every Day Smoker     Packs/day: 1.00     Smokeless tobacco: Never Used   Substance and Sexual Activity     Alcohol use: Yes     Drug use: Not on file     Sexual activity: Not on file   Other Topics Concern     Parent/sibling w/ CABG, MI or angioplasty before 65F 55M? Not Asked   Social History Narrative     Not on file     Social Determinants of Health     Financial  Resource Strain:      Difficulty of Paying Living Expenses:    Food Insecurity:      Worried About Running Out of Food in the Last Year:      Ran Out of Food in the Last Year:    Transportation Needs:      Lack of Transportation (Medical):      Lack of Transportation (Non-Medical):    Physical Activity:      Days of Exercise per Week:      Minutes of Exercise per Session:    Stress:      Feeling of Stress :    Social Connections:      Frequency of Communication with Friends and Family:      Frequency of Social Gatherings with Friends and Family:      Attends Orthodoxy Services:      Active Member of Clubs or Organizations:      Attends Club or Organization Meetings:      Marital Status:    Intimate Partner Violence:      Fear of Current or Ex-Partner:      Emotionally Abused:      Physically Abused:      Sexually Abused:      Family History   Problem Relation Age of Onset     No Known Problems Mother      No Known Problems Father                      SUBJECTIVE FINDINGS:  A 61-year-old female returns to clinic for ulcer, right first MPJ, right leg with venous stasis and lymphedema.  She relates to taking Levaquin and Amoxicillin with no problems.     OBJECTIVE FINDINGS:  DP and PT are 1/4, right.  She has some decreased edema from last visit.  She has decreased weeping ulcerations circumferentially on the right leg.  She has right leg ulceration that is through the dermis into the subcutaneous tissue at its deepest point.  She has a right plantar second toe and first MPJ ulcers that are through the dermis into subcutaneous tissues.  There is serosanguineous drainage, no odor, no calor.  She has some peripheral edema.  She has decreased erythema.     ASSESSMENT AND PLAN:  Ulcer, right first MPJ, right second toe, right leg.  She has venous stasis, lymphedema.  She is diabetic with peripheral neuropathy.  Diagnosis and treatment options discussed with her.  I am going to continue the Levaquin and the amoxicillin.  She  has discontinued the clindamycin.  Local wound care done upon consent today.  Hydrofera and a multilayer Unna Boot compression boot applied to the ulcers on the right leg and 1st mpj and Biatain silver to the 2nd toe ulcer and between the toes upon consent.  I am going to continue the Unna Boot.  She will return to clinic next week as scheduled.  Overall, we see improvement on this, but without the compression her leg swells up quite a bit.  Follow up with Infectious disease as scheduled.     Moderate level of medical decision making.

## 2021-09-10 ENCOUNTER — OFFICE VISIT (OUTPATIENT)
Dept: PODIATRY | Facility: CLINIC | Age: 61
End: 2021-09-10
Payer: COMMERCIAL

## 2021-09-10 VITALS — OXYGEN SATURATION: 96 % | SYSTOLIC BLOOD PRESSURE: 129 MMHG | DIASTOLIC BLOOD PRESSURE: 72 MMHG | HEART RATE: 84 BPM

## 2021-09-10 DIAGNOSIS — I87.8 VENOUS STASIS: ICD-10-CM

## 2021-09-10 DIAGNOSIS — L97.512 SKIN ULCER OF RIGHT FOOT WITH FAT LAYER EXPOSED (H): Primary | ICD-10-CM

## 2021-09-10 DIAGNOSIS — E11.42 TYPE 2 DIABETES MELLITUS WITH DIABETIC POLYNEUROPATHY, WITHOUT LONG-TERM CURRENT USE OF INSULIN (H): ICD-10-CM

## 2021-09-10 DIAGNOSIS — L97.912 NONHEALING ULCER OF RIGHT LOWER LEG WITH FAT LAYER EXPOSED (H): ICD-10-CM

## 2021-09-10 DIAGNOSIS — E11.51 DIABETES MELLITUS WITH PERIPHERAL VASCULAR DISEASE (H): ICD-10-CM

## 2021-09-10 PROCEDURE — 99214 OFFICE O/P EST MOD 30 MIN: CPT | Performed by: PODIATRIST

## 2021-09-10 RX ORDER — AMOXICILLIN 500 MG/1
500 CAPSULE ORAL 2 TIMES DAILY
Qty: 28 CAPSULE | Refills: 0 | Status: SHIPPED | OUTPATIENT
Start: 2021-09-10

## 2021-09-10 NOTE — PROGRESS NOTES
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)     GI bleed     UGIB (upper gastrointestinal bleed)     Morbid obesity (H)     Pain in joint, ankle and foot, left     Charcot ankle, left     Below-knee amputation (H)     Acute kidney failure, unspecified (H)     S/P BKA (below knee amputation) (H)     Past Surgical History:   Procedure Laterality Date     AMPUTATE LEG BELOW KNEE Left 7/22/2020    Procedure: Left below knee amputation;  Surgeon: Aniceto Adams MD;  [facility]     ESOPHAGOSCOPY, GASTROSCOPY, DUODENOSCOPY (EGD), COMBINED N/A 2/5/2020    Procedure: ESOPHAGOGASTRODUODENOSCOPY (EGD);  Surgeon: Tanya Dias MD;  Location: [facility]     Social History     Socioeconomic History     Marital status:      Spouse name: Not on file     Number of children: Not on file     Years of education: Not on file     Highest education level: Not on file   Occupational History     Not on file   Tobacco Use     Smoking status: Current Every Day Smoker     Packs/day: 1.00     Smokeless tobacco: Never Used   Substance and Sexual Activity     Alcohol use: Yes     Drug use: Not on file     Sexual activity: Not on file   Other Topics Concern     Parent/sibling w/ CABG, MI or angioplasty before 65F 55M? Not Asked   Social History Narrative     Not on file     Social Determinants of Health     Financial  Resource Strain:      Difficulty of Paying Living Expenses:    Food Insecurity:      Worried About Running Out of Food in the Last Year:      Ran Out of Food in the Last Year:    Transportation Needs:      Lack of Transportation (Medical):      Lack of Transportation (Non-Medical):    Physical Activity:      Days of Exercise per Week:      Minutes of Exercise per Session:    Stress:      Feeling of Stress :    Social Connections:      Frequency of Communication with Friends and Family:      Frequency of Social Gatherings with Friends and Family:      Attends Jainism Services:      Active Member of Clubs or Organizations:      Attends Club or Organization Meetings:      Marital Status:    Intimate Partner Violence:      Fear of Current or Ex-Partner:      Emotionally Abused:      Physically Abused:      Sexually Abused:      Family History   Problem Relation Age of Onset     No Known Problems Mother      No Known Problems Father                              SUBJECTIVE FINDINGS:  A 61-year-old female returns to clinic for ulcer, right first MPJ, right leg with venous stasis and lymphedema.  She relates to taking Levaquin and Amoxicillin with no problems.  Relates to keeping Unna boot dry and intact.  Presents in wheelchair.     OBJECTIVE FINDINGS:  DP and PT are 1/4, right.  She has some decreased edema from last visit.  She has decreased weeping ulcerations circumferentially on the right leg.  She has right leg ulcerations that are through the dermis into the subcutaneous tissue at its deepest point.  She has a right plantar second toe and first MPJ ulcers that are through the dermis into subcutaneous tissues.  There is serosanguineous drainage, no odor, no calor.  She has decreased peripheral edema.  She has decreased erythema.     ASSESSMENT AND PLAN:  Ulcer, right first MPJ, right second toe, right leg.  She has venous stasis, lymphedema.  She is diabetic with peripheral neuropathy.  Diagnosis and treatment  options discussed with her.  I am going to continue the Levaquin and the amoxicillin.  She relates has appointment with infectious disease in October.  Loose skin debrided off of leg and ulcers upon consent.  Local wound care done upon consent today.  Hydrofera and a multilayer Unna Boot compression boot applied to the ulcers on the right leg and 1st mpj and Biatain silver to the 2nd toe ulcer and between the toes upon consent.  I am going to continue the Unna Boot.  She will return to clinic next week as scheduled.  Overall, we see improvement on this.     Moderate level of medical decision making.

## 2021-09-10 NOTE — LETTER
9/10/2021         RE: Tiffani Tan  [address] [facility] [address]        Dear Colleague,    Thank you for referring your patient, Tiffani Tan, to the [facility]. Please see a copy of my visit note below.    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)     GI bleed     UGIB (upper gastrointestinal bleed)     Morbid obesity (H)     Pain in joint, ankle and foot, left     Charcot ankle, left     Below-knee amputation (H)     Acute kidney failure, unspecified (H)     S/P BKA (below knee amputation) (H)     Past Surgical History:   Procedure Laterality Date     AMPUTATE LEG BELOW KNEE Left 7/22/2020    Procedure: Left below knee amputation;  Surgeon: Aniceto Adams MD;  [facility]     ESOPHAGOSCOPY, GASTROSCOPY, DUODENOSCOPY (EGD), COMBINED N/A 2/5/2020    Procedure: ESOPHAGOGASTRODUODENOSCOPY (EGD);  Surgeon: Tanya Dias MD;  [facility]     Social History     Socioeconomic History     Marital status:      Spouse name: Not on file     Number of children: Not on file     Years of education: Not on file     Highest education level: Not on file   Occupational History     Not on file   Tobacco Use     Smoking status: Current Every Day Smoker     Packs/day: 1.00     Smokeless tobacco: Never Used   Substance and Sexual Activity     Alcohol use: Yes     Drug  use: Not on file     Sexual activity: Not on file   Other Topics Concern     Parent/sibling w/ CABG, MI or angioplasty before 65F 55M? Not Asked   Social History Narrative     Not on file     Social Determinants of Health     Financial Resource Strain:      Difficulty of Paying Living Expenses:    Food Insecurity:      Worried About Running Out of Food in the Last Year:      Ran Out of Food in the Last Year:    Transportation Needs:      Lack of Transportation (Medical):      Lack of Transportation (Non-Medical):    Physical Activity:      Days of Exercise per Week:      Minutes of Exercise per Session:    Stress:      Feeling of Stress :    Social Connections:      Frequency of Communication with Friends and Family:      Frequency of Social Gatherings with Friends and Family:      Attends Orthodoxy Services:      Active Member of Clubs or Organizations:      Attends Club or Organization Meetings:      Marital Status:    Intimate Partner Violence:      Fear of Current or Ex-Partner:      Emotionally Abused:      Physically Abused:      Sexually Abused:      Family History   Problem Relation Age of Onset     No Known Problems Mother      No Known Problems Father                              SUBJECTIVE FINDINGS:  A 61-year-old female returns to clinic for ulcer, right first MPJ, right leg with venous stasis and lymphedema.  She relates to taking Levaquin and Amoxicillin with no problems.  Relates to keeping Unna boot dry and intact.  Presents in wheelchair.     OBJECTIVE FINDINGS:  DP and PT are 1/4, right.  She has some decreased edema from last visit.  She has decreased weeping ulcerations circumferentially on the right leg.  She has right leg ulcerations that are through the dermis into the subcutaneous tissue at its deepest point.  She has a right plantar second toe and first MPJ ulcers that are through the dermis into subcutaneous tissues.  There is serosanguineous drainage, no odor, no calor.  She has decreased  peripheral edema.  She has decreased erythema.     ASSESSMENT AND PLAN:  Ulcer, right first MPJ, right second toe, right leg.  She has venous stasis, lymphedema.  She is diabetic with peripheral neuropathy.  Diagnosis and treatment options discussed with her.  I am going to continue the Levaquin and the amoxicillin.  She relates has appointment with infectious disease in October.  Loose skin debrided off of leg and ulcers upon consent.  Local wound care done upon consent today.  Hydrofera and a multilayer Unna Boot compression boot applied to the ulcers on the right leg and 1st mpj and Biatain silver to the 2nd toe ulcer and between the toes upon consent.  I am going to continue the Unna Boot.  She will return to clinic next week as scheduled.  Overall, we see improvement on this.     Moderate level of medical decision making.      Again, thank you for allowing me to participate in the care of your patient.        Sincerely,        Nolan Whitten DPM

## 2021-09-10 NOTE — PATIENT INSTRUCTIONS
Thanks for coming today.  [facility]  [address] [place] [address]    To schedule future appointments in Ortho Clinic, you may call [phone].    To schedule ordered imaging by your provider:   Call [facility] [phone]    To schedule an injection ordered by your provider:  Call Central [facility] Injection scheduling line: [phone]  [facility] [employer] available online at:  [email or web address]    Please call if any further questions or concerns ([phone]).  Clinic hours 8 am to 5 pm.    Return to clinic (call) if symptoms worsen or fail to improve.

## 2021-09-17 ENCOUNTER — OFFICE VISIT (OUTPATIENT)
Dept: PODIATRY | Facility: CLINIC | Age: 61
End: 2021-09-17
Payer: COMMERCIAL

## 2021-09-17 VITALS — OXYGEN SATURATION: 93 % | DIASTOLIC BLOOD PRESSURE: 66 MMHG | SYSTOLIC BLOOD PRESSURE: 115 MMHG | HEART RATE: 87 BPM

## 2021-09-17 DIAGNOSIS — L97.912 SKIN ULCER OF RIGHT LOWER LEG WITH FAT LAYER EXPOSED (H): ICD-10-CM

## 2021-09-17 DIAGNOSIS — I87.8 VENOUS STASIS: ICD-10-CM

## 2021-09-17 DIAGNOSIS — E11.42 TYPE 2 DIABETES MELLITUS WITH DIABETIC POLYNEUROPATHY, WITHOUT LONG-TERM CURRENT USE OF INSULIN (H): ICD-10-CM

## 2021-09-17 DIAGNOSIS — L97.512 SKIN ULCER OF RIGHT FOOT WITH FAT LAYER EXPOSED (H): Primary | ICD-10-CM

## 2021-09-17 DIAGNOSIS — E11.51 DIABETES MELLITUS WITH PERIPHERAL VASCULAR DISEASE (H): ICD-10-CM

## 2021-09-17 PROCEDURE — 99214 OFFICE O/P EST MOD 30 MIN: CPT | Performed by: PODIATRIST

## 2021-09-17 NOTE — PROGRESS NOTES
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)     GI bleed     UGIB (upper gastrointestinal bleed)     Morbid obesity (H)     Pain in joint, ankle and foot, left     Charcot ankle, left     Below-knee amputation (H)     Acute kidney failure, unspecified (H)     S/P BKA (below knee amputation) (H)     Past Surgical History:   Procedure Laterality Date     AMPUTATE LEG BELOW KNEE Left 7/22/2020    Procedure: Left below knee amputation;  Surgeon: Aniceto Adams MD;  [facility]     ESOPHAGOSCOPY, GASTROSCOPY, DUODENOSCOPY (EGD), COMBINED N/A 2/5/2020    Procedure: ESOPHAGOGASTRODUODENOSCOPY (EGD);  Surgeon: Tanya Dias MD;  [facility]: [facility]     Social History     Socioeconomic History     Marital status:      Spouse name: Not on file     Number of children: Not on file     Years of education: Not on file     Highest education level: Not on file   Occupational History     Not on file   Tobacco Use     Smoking status: Current Every Day Smoker     Packs/day: 1.00     Smokeless tobacco: Never Used   Substance and Sexual Activity     Alcohol use: Yes     Drug use: Not on file     Sexual activity: Not on file   Other Topics Concern     Parent/sibling w/ CABG, MI or angioplasty before 65F 55M? Not Asked   Social History Narrative     Not on file     Social Determinants of Health     Financial  Resource Strain:      Difficulty of Paying Living Expenses:    Food Insecurity:      Worried About Running Out of Food in the Last Year:      Ran Out of Food in the Last Year:    Transportation Needs:      Lack of Transportation (Medical):      Lack of Transportation (Non-Medical):    Physical Activity:      Days of Exercise per Week:      Minutes of Exercise per Session:    Stress:      Feeling of Stress :    Social Connections:      Frequency of Communication with Friends and Family:      Frequency of Social Gatherings with Friends and Family:      Attends Confucianist Services:      Active Member of Clubs or Organizations:      Attends Club or Organization Meetings:      Marital Status:    Intimate Partner Violence:      Fear of Current or Ex-Partner:      Emotionally Abused:      Physically Abused:      Sexually Abused:      Family History   Problem Relation Age of Onset     No Known Problems Mother      No Known Problems Father                  A1c      4.5            4/08/21    SUBJECTIVE FINDINGS:  A 61-year-old female returns to clinic for ulcers, right leg, with venous stasis, peripheral edema, and diabetes with peripheral neuropathy and vascular disease.  She relates she is doing okay.  She is taking the amoxicillin and Levaquin with no problems.    OBJECTIVE FINDINGS:  She has decreased edema.  She has a right anterior leg ulcer, medial ankle and leg ulcer, plantar foot ulcer and second toe ulcer.  These have contracted.  There is decreased drainage.  There is no erythema, no odor, no calor.  Loose skin, which was debrided upon consent, with underlying skin intact.    ASSESSMENT AND PLAN:  Ulcer, right first MPJ.  Ulcer, right second toe.  Ulcers, right leg.  She has venous stasis, lymphedema, diabetes with peripheral neuropathy and vascular disease.  Diagnosis and treatment discussed with her.  Local wound care done upon consent today.  I am going to have her every 2-3 days clean this with Wound Vashe,  apply Betadine wet-to-dry dressing, wrap with Kerlix and Ace wraps, and return to clinic and see me in 1 week.  This is improving.      Moderate level of medical decision making.

## 2021-09-17 NOTE — PATIENT INSTRUCTIONS
Thanks for coming today.  [facility]  [address] [place] [address]    To schedule future appointments in Ortho Clinic, you may call [phone].    To schedule ordered imaging by your provider:   Call Central Imaging [phone]    To schedule an injection ordered by your provider:  Call Central Imaging Injection scheduling line: [phone]  [employer] available online at:  [email or web address]    Please call if any further questions or concerns ([phone]).  Clinic hours 8 am to 5 pm.    Return to clinic (call) if symptoms worsen or fail to improve.

## 2021-09-17 NOTE — LETTER
9/17/2021         RE: Tiffani Tan  [address] [facility] [address]        Dear Colleague,    Thank you for referring your patient, Tiffani Tna, to the [facility]. Please see a copy of my visit note below.    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)     GI bleed     UGIB (upper gastrointestinal bleed)     Morbid obesity (H)     Pain in joint, ankle and foot, left     Charcot ankle, left     Below-knee amputation (H)     Acute kidney failure, unspecified (H)     S/P BKA (below knee amputation) (H)     Past Surgical History:   Procedure Laterality Date     AMPUTATE LEG BELOW KNEE Left 7/22/2020    Procedure: Left below knee amputation;  Surgeon: Aniceto Adams MD;  [facility]     ESOPHAGOSCOPY, GASTROSCOPY, DUODENOSCOPY (EGD), COMBINED N/A 2/5/2020    Procedure: ESOPHAGOGASTRODUODENOSCOPY (EGD);  Surgeon: Tanya Dias MD;  [facility]     Social History     Socioeconomic History     Marital status:      Spouse name: Not on file     Number of children: Not on file     Years of education: Not on file     Highest education level: Not on file   Occupational History     Not on file   Tobacco Use     Smoking status: Current Every Day Smoker     Packs/day: 1.00     Smokeless tobacco: Never Used   Substance and Sexual Activity     Alcohol use: Yes     Drug  use: Not on file     Sexual activity: Not on file   Other Topics Concern     Parent/sibling w/ CABG, MI or angioplasty before 65F 55M? Not Asked   Social History Narrative     Not on file     Social Determinants of Health     Financial Resource Strain:      Difficulty of Paying Living Expenses:    Food Insecurity:      Worried About Running Out of Food in the Last Year:      Ran Out of Food in the Last Year:    Transportation Needs:      Lack of Transportation (Medical):      Lack of Transportation (Non-Medical):    Physical Activity:      Days of Exercise per Week:      Minutes of Exercise per Session:    Stress:      Feeling of Stress :    Social Connections:      Frequency of Communication with Friends and Family:      Frequency of Social Gatherings with Friends and Family:      Attends Confucianism Services:      Active Member of Clubs or Organizations:      Attends Club or Organization Meetings:      Marital Status:    Intimate Partner Violence:      Fear of Current or Ex-Partner:      Emotionally Abused:      Physically Abused:      Sexually Abused:      Family History   Problem Relation Age of Onset     No Known Problems Mother      No Known Problems Father                  A1c      4.5            4/08/21    SUBJECTIVE FINDINGS:  A 61-year-old female returns to clinic for ulcers, right leg, with venous stasis, peripheral edema, and diabetes with peripheral neuropathy and vascular disease.  She relates she is doing okay.  She is taking the amoxicillin and Levaquin with no problems.    OBJECTIVE FINDINGS:  She has decreased edema.  She has a right anterior leg ulcer, medial ankle and leg ulcer, plantar foot ulcer and second toe ulcer.  These have contracted.  There is decreased drainage.  There is no erythema, no odor, no calor.  Loose skin, which was debrided upon consent, with underlying skin intact.    ASSESSMENT AND PLAN:  Ulcer, right first MPJ.  Ulcer, right second toe.  Ulcers, right leg.  She has venous  stasis, lymphedema, diabetes with peripheral neuropathy and vascular disease.  Diagnosis and treatment discussed with her.  Local wound care done upon consent today.  I am going to have her every 2-3 days clean this with Wound Vashe, apply Betadine wet-to-dry dressing, wrap with Kerlix and Ace wraps, and return to clinic and see me in 1 week.  This is improving.      Moderate level of medical decision making.      Again, thank you for allowing me to participate in the care of your patient.        Sincerely,        Nolan Whitten DPM

## 2021-09-17 NOTE — NURSING NOTE
Tiffani Tan's chief complaint for this visit includes:  Chief Complaint   Patient presents with     RECHECK     right foot and lower leg wound care     PCP: Wily Bonilla    Referring Provider:  No referring provider defined for this encounter.    /66 (BP Location: Right arm, Patient Position: Sitting, Cuff Size: Adult Regular)   Pulse 87   SpO2 93%   Data Unavailable     Do you need any medication refills at today's visit? No    Christina Oshea CMA

## 2021-10-08 ENCOUNTER — TELEPHONE (OUTPATIENT)
Dept: PODIATRY | Facility: CLINIC | Age: 61
End: 2021-10-08

## 2021-10-08 NOTE — TELEPHONE ENCOUNTER
[facility] Call Center    Phone Message    May a detailed message be left on voicemail: no     Reason for Call: Other: Pt has appt today with Dr Whitten and pt is asking for a call back before her appt.  Please call.      Action Taken: Message routed to:  Adult Clinics: Podiatry p [id number]    Travel Screening: Not Applicable

## 2023-05-07 NOTE — LETTER
5/1/2019         RE: Tiffani Tan  [address] [facility] [address]        Dear Colleague,    Thank you for referring your patient, Tiffani Tan, to the [facility]. Please see a copy of my visit note below.    No past medical history on file.  Patient Active Problem List   Diagnosis     Charcot's joint of left foot     Type 2 diabetes mellitus with diabetic polyneuropathy, without long-term current use of insulin (H)     Diabetic ulcer of right midfoot associated with type 2 diabetes mellitus, with bone involvement without evidence of necrosis (H)     No past surgical history on file.  Social History     Socioeconomic History     Marital status: Single     Spouse name: Not on file     Number of children: Not on file     Years of education: Not on file     Highest education level: Not on file   Occupational History     Not on file   Social Needs     Financial resource strain: Not on file     Food insecurity:     Worry: Not on file     Inability: Not on file     Transportation needs:     Medical: Not on file     Non-medical: Not on file   Tobacco Use     Smoking status: Current Every Day Smoker     Smokeless tobacco: Never Used   Substance and Sexual Activity     Alcohol use: Not on file     Drug use: Not on file     Sexual activity: Not on file   Lifestyle     Physical activity:     Days per week: Not on file     Minutes per session: Not on file     Stress: Not on file   Relationships     Social connections:     Talks on phone: Not on file     Gets together: Not on file     Attends Sabianist service: Not on file     Active member of club or organization: Not on file     Attends meetings of clubs or organizations: Not on file     Relationship status: Not on file     Intimate partner violence:     Fear of current or ex partner: Not on file     Emotionally abused: Not on file     Physically abused: Not on file     Forced sexual activity: Not on file   Other Topics Concern     Not on file   Social History  Narrative     Not on file     No family history on file.  A1c             5.5                1/21/2019  Labs, imaging and previous notes reviewed as noted in emr.  SUBJECTIVE FINDINGS:  59-year-old female presents for Apligraf from Dr. Giron.  She is in an EZ total contact cast.  She relates it has been going okay.  She has seen Dr. Adams for Charcot foot on the left yesterday.  She relates there is no specific treatment for that for her.      OBJECTIVE FINDINGS:  DP and PT are 2/4 right.  She has a right plantar first MPJ ulcer that is deep into the subcutaneous tissues.  There is some serosanguineous drainage.  Minimal edema, no erythema, no odor, no calor.  It is about 2 x 2.5 cm at its widest margin.  There are some new skin islands.  The depth has decreased from looking at last week's picture.      ASSESSMENT AND PLAN:  Ulcer, right first MPJ.  She is diabetic with peripheral neuropathy.  Diagnosis and treatment discussed with her.  The ulcer was prepped for Apligraf.  The Apligraf was applied and secured with Wound Veil and Steri-Strips.  A saline wet-to-dry dressing applied.  Total contact EZ cast to the right lower extremity.  This is the fifth Apligraf that has been applied.  An entire Apligraf was used to cover the wound and margins.  None was discarded.  Return to clinic next week to follow up with Dr. Giron.         Again, thank you for allowing me to participate in the care of your patient.        Sincerely,        Nolan Whitten DPM     N/A

## 2023-10-26 NOTE — NURSING NOTE
Tiffani Tan's chief complaint for this visit includes:  Chief Complaint   Patient presents with     Right Foot - Follow Up, WOUND CARE, Cast Change     [facility]    Referring Provider:  Wily Bonilla MD  [facility] [place] [facility] [address] [place] [address]    There were no vitals taken for this visit.  Data Unavailable     Do you need any medication refills at today's visit? No    Lisette Long LPN       None

## 2024-03-28 NOTE — PLAN OF CARE
Occupational Therapy Discharge Summary    Reason for therapy discharge:    All goals and outcomes met, no further needs identified.    Progress towards therapy goal(s). See goals on Care Plan in Epic electronic health record for goal details.  Goals met    Therapy recommendation(s):    No further therapy is recommended. Pt is WC based and able to manage self cares MOD I. Recommend assist with shower transfer and to use SB for shower transfer. Otherwise pt has other recommended DME. Pt got her loaner WC today after having her WC eval. Pt and  declined family training.  is available to assist as needed and has in the past assisted her. No further OT needed.        Normal for race

## 2024-05-07 NOTE — PROGRESS NOTES
Chief Complaint   Patient presents with     RECHECK     pressure ulcer right  plantar 1st met head -  / left charcot foot and left ankle ulcer          No Known Allergies      Subjective: Tiffani is a 60 year old female who presents to the clinic today for a follow up of BL foot and leg ulcerations. She was unna's boooted BL with casting on the left foot last week. She tolerated these well. She did weight bear on the left leg.    Objective            A right plantar 1st met head wound is noted measuring 1.5cm x 3cm x 0.2cm.  Inman Classification: 2     Wound base: Red/Granulation     Edges: macerated tissue     Drainage: scant/serous     Odor: no     Undermining: no     Bone Exposure: No     Clinical Signs of Infection: Yes - cellulitis of the hallux        After obtaining patient consent, the wound was irrigated with copious amounts of saline. A curette was then used to debride the wound into subcutaneous tissue. The wound edges were debrided back to healthy, bleeding tissue. The wound base exhibited healthy bleeding. Given the patient's lack of sensation, no anesthesia was necessary for the procedure.     ____________________________________  Wound #2                        A Charcot wound is noted at left  lateral malleolus measuring 2.2cm x 2.6cm x 2.6cm.     Inman Classification: 3     Wound base: Red/hypergranulation     Edges: epibole     Drainage: copious/serous     Odor: no     Underminin O'Clock     Bone Exposure: Yes: lateral malleolus     Clinical Signs of Infection: No     After obtaining patient consent, the wound was irrigated with copious amounts of saline. No sharp debridement performed on this ulceration.   Legs are warm with venous stasis dermatitis changes noted BL. No s/s of infection noted.            Assessment: Healing right foot ulceration  DM2 with neuropathy.   Left Charcot foot and ankle with probing ulceration to the lateral malleolus. This does not appear infected today. She has  seriously thought about having the BKA over the last few days.   Venous stasis dermatitis.     Plan:   - Pt seen and evaluated  - Right foot wound debrided as described.  - ABDIEL Simona's booting applied. Posterior splint applied to the left leg. She will likely need left BKA.   - If worsening over the next week, she should present to the ED.  - Pt to return to clinic in 1 week.          MAR 1900

## 2025-01-24 NOTE — TELEPHONE ENCOUNTER
Patient had a 1st and 2nd right toe amputation on Cesar 10/3/2021. She would like to cancel her appointments.  She stated she would be following up with the surgeon who performed the amputation.    no

## (undated) DEVICE — GLOVE PROTEXIS W/NEU-THERA 7.5  2D73TE75

## (undated) DEVICE — SU VICRYL 2-0 CT-1 27" UND J259H

## (undated) DEVICE — SOL WATER IRRIG 1000ML BOTTLE 2F7114

## (undated) DEVICE — CAST PADDING 4" STERILE 9044S

## (undated) DEVICE — CAST PLASTER SPLINT 5X30" 7395

## (undated) DEVICE — SU FIBERWIRE 2 38"  AR-7200

## (undated) DEVICE — SU SILK 2-0 SH 30" K833H

## (undated) DEVICE — Device

## (undated) DEVICE — LINEN ORTHO PACK 5446

## (undated) DEVICE — DRSG GAUZE 4X8" NON21842

## (undated) DEVICE — GLOVE PROTEXIS BLUE W/NEU-THERA 7.0  2D73EB70

## (undated) DEVICE — SOL NACL 0.9% IRRIG 1000ML BOTTLE 2F7124

## (undated) DEVICE — BLADE KNIFE SURG 10 371110

## (undated) DEVICE — DEVICE RETRIEVER HEWSON 71111579

## (undated) DEVICE — ESU GROUND PAD ADULT W/CORD E7507

## (undated) DEVICE — BLADE SAW SAGITTAL STRK 25X75X0.89MM SYS 6 6125-089-075

## (undated) DEVICE — PACK LOWER EXTREMITY RIVERSIDE SOP32LEFSX

## (undated) DEVICE — DRAIN JACKSON PRATT 10FR ROUND SU130-1321

## (undated) DEVICE — BLADE KNIFE SURG 15 371115

## (undated) DEVICE — LINEN TOWEL PACK X5 5464

## (undated) DEVICE — SU VICRYL 2-0 CT-2 27" UND J269H

## (undated) DEVICE — SU PROLENE 2-0 SHDA 36" 8523H

## (undated) DEVICE — GLOVE PROTEXIS MICRO 6.5  2D73PM65

## (undated) DEVICE — SPONGE LAP 18X18" X8435

## (undated) DEVICE — DRAPE STOCKINETTE IMPERVIOUS 12" 1587

## (undated) DEVICE — SU PROLENE 2-0 CT-2 30" 8411H

## (undated) DEVICE — CAST PADDING 4" UNSTERILE 9044

## (undated) DEVICE — DRSG TEGADERM 2 3/8X2 3/4" 1624W

## (undated) DEVICE — GLOVE PROTEXIS BLUE W/NEU-THERA 8.0  2D73EB80

## (undated) DEVICE — GOWN XLG DISP 9545

## (undated) DEVICE — SUCTION MANIFOLD DORNOCH ULTRA CART UL-CL500

## (undated) DEVICE — DRSG TEGADERM 4X4 3/4" 1626W

## (undated) DEVICE — SU SILK 2-0 TIE 12X30" A305H

## (undated) DEVICE — BNDG ELASTIC 6" DBL LENGTH UNSTERILE 6611-16

## (undated) DEVICE — SU ETHILON 3-0 FS-1 18" 663G

## (undated) DEVICE — DRSG ABDOMINAL 07 1/2X8" 7197D

## (undated) DEVICE — STRAP KNEE/BODY 31143004

## (undated) DEVICE — TOURNIQUET CUFF 34" REPRO BROWN 60-7070-106

## (undated) RX ORDER — FENTANYL CITRATE 50 UG/ML
INJECTION, SOLUTION INTRAMUSCULAR; INTRAVENOUS
Status: DISPENSED
Start: 2020-07-22

## (undated) RX ORDER — CEFAZOLIN SODIUM 2 G/100ML
INJECTION, SOLUTION INTRAVENOUS
Status: DISPENSED
Start: 2020-07-22